# Patient Record
Sex: MALE | Race: WHITE | NOT HISPANIC OR LATINO | Employment: OTHER | URBAN - NONMETROPOLITAN AREA
[De-identification: names, ages, dates, MRNs, and addresses within clinical notes are randomized per-mention and may not be internally consistent; named-entity substitution may affect disease eponyms.]

---

## 2019-07-16 ENCOUNTER — OFFICE VISIT (OUTPATIENT)
Dept: PULMONOLOGY | Facility: HOSPITAL | Age: 54
End: 2019-07-16
Payer: COMMERCIAL

## 2019-07-16 ENCOUNTER — HOSPITAL ENCOUNTER (OUTPATIENT)
Dept: RADIOLOGY | Facility: HOSPITAL | Age: 54
Discharge: HOME | End: 2019-07-16
Attending: INTERNAL MEDICINE
Payer: COMMERCIAL

## 2019-07-16 VITALS
HEIGHT: 69 IN | WEIGHT: 315 LBS | HEART RATE: 79 BPM | OXYGEN SATURATION: 90 % | SYSTOLIC BLOOD PRESSURE: 130 MMHG | DIASTOLIC BLOOD PRESSURE: 84 MMHG | BODY MASS INDEX: 46.65 KG/M2

## 2019-07-16 DIAGNOSIS — J45.40 MODERATE PERSISTENT ASTHMA WITHOUT COMPLICATION: Primary | ICD-10-CM

## 2019-07-16 DIAGNOSIS — R09.02 HYPOXEMIA REQUIRING SUPPLEMENTAL OXYGEN: ICD-10-CM

## 2019-07-16 DIAGNOSIS — Z99.89 OSA ON CPAP: ICD-10-CM

## 2019-07-16 DIAGNOSIS — J41.8 MIXED SIMPLE AND MUCOPURULENT CHRONIC BRONCHITIS (HCC): ICD-10-CM

## 2019-07-16 DIAGNOSIS — Z99.81 HYPOXEMIA REQUIRING SUPPLEMENTAL OXYGEN: ICD-10-CM

## 2019-07-16 DIAGNOSIS — G47.33 OSA ON CPAP: ICD-10-CM

## 2019-07-16 DIAGNOSIS — J45.40 MODERATE PERSISTENT ASTHMA WITHOUT COMPLICATION: ICD-10-CM

## 2019-07-16 PROCEDURE — 71046 X-RAY EXAM CHEST 2 VIEWS: CPT

## 2019-07-16 PROCEDURE — 94640 AIRWAY INHALATION TREATMENT: CPT

## 2019-07-16 PROCEDURE — 94010 BREATHING CAPACITY TEST: CPT

## 2019-07-16 PROCEDURE — 99205 OFFICE O/P NEW HI 60 MIN: CPT

## 2019-07-16 RX ORDER — VALSARTAN 320 MG/1
1 TABLET ORAL DAILY
COMMUNITY
Start: 2014-07-07 | End: 2019-07-16

## 2019-07-16 RX ORDER — MONTELUKAST SODIUM 10 MG/1
10 TABLET ORAL
COMMUNITY
Start: 2018-12-10 | End: 2019-07-16

## 2019-07-16 RX ORDER — MELOXICAM 15 MG/1
15 TABLET ORAL
COMMUNITY
Start: 2017-02-06

## 2019-07-16 RX ORDER — ALBUTEROL SULFATE 90 UG/1
2 AEROSOL, METERED RESPIRATORY (INHALATION)
COMMUNITY
Start: 2018-10-04 | End: 2019-08-20

## 2019-07-16 RX ORDER — ALBUTEROL SULFATE 2.5 MG/3ML
SOLUTION RESPIRATORY (INHALATION)
COMMUNITY
End: 2019-07-16

## 2019-07-16 RX ORDER — LOSARTAN POTASSIUM 100 MG/1
100 TABLET ORAL
COMMUNITY
Start: 2019-02-01 | End: 2020-03-25 | Stop reason: SDUPTHER

## 2019-07-16 RX ORDER — CETIRIZINE HYDROCHLORIDE 10 MG/1
10 TABLET ORAL
COMMUNITY
Start: 2018-12-10

## 2019-07-16 RX ORDER — IPRATROPIUM BROMIDE AND ALBUTEROL SULFATE 2.5; .5 MG/3ML; MG/3ML
3 SOLUTION RESPIRATORY (INHALATION)
Status: DISCONTINUED | OUTPATIENT
Start: 2019-07-16 | End: 2019-07-18

## 2019-07-16 RX ORDER — CYCLOBENZAPRINE HCL 10 MG
10 TABLET ORAL
COMMUNITY
Start: 2017-02-06

## 2019-07-16 RX ORDER — HYDROCHLOROTHIAZIDE 25 MG/1
TABLET ORAL
COMMUNITY
Start: 2019-04-04 | End: 2020-02-07

## 2019-07-16 RX ORDER — AMLODIPINE BESYLATE 10 MG/1
TABLET ORAL
COMMUNITY
Start: 2019-03-28 | End: 2020-04-06 | Stop reason: SDUPTHER

## 2019-07-16 RX ORDER — PREDNISONE 20 MG/1
TABLET ORAL
COMMUNITY
Start: 2019-04-17 | End: 2019-07-16 | Stop reason: ALTCHOICE

## 2019-07-16 RX ORDER — SPIRONOLACTONE 50 MG/1
50 TABLET, FILM COATED ORAL DAILY
Qty: 30 TABLET | Refills: 5 | Status: SHIPPED | OUTPATIENT
Start: 2019-07-16 | End: 2020-02-03 | Stop reason: SDUPTHER

## 2019-07-16 RX ORDER — ATORVASTATIN CALCIUM 40 MG/1
40 TABLET, FILM COATED ORAL
COMMUNITY
Start: 2018-12-26 | End: 2020-02-03 | Stop reason: SDUPTHER

## 2019-07-16 RX ORDER — IPRATROPIUM BROMIDE AND ALBUTEROL SULFATE 2.5; .5 MG/3ML; MG/3ML
3 SOLUTION RESPIRATORY (INHALATION) 4 TIMES DAILY
Qty: 120 VIAL | Refills: 5 | Status: SHIPPED | OUTPATIENT
Start: 2019-07-16 | End: 2020-07-28

## 2019-07-16 RX ORDER — FLUTICASONE FUROATE AND VILANTEROL 100; 25 UG/1; UG/1
1 POWDER RESPIRATORY (INHALATION)
COMMUNITY
Start: 2018-12-10 | End: 2019-07-16

## 2019-07-16 RX ADMIN — IPRATROPIUM BROMIDE AND ALBUTEROL SULFATE 3 ML: 2.5; .5 SOLUTION RESPIRATORY (INHALATION) at 09:19

## 2019-07-16 NOTE — ASSESSMENT & PLAN NOTE
He is followed thru [facility] and is compliant w/ the CPAP but doesn't know the settings  He gets his machine thru [address] [facility]  He will need a nocturnal oximetry to evaluate adequacy of his CPAP and oxygenation status

## 2019-07-16 NOTE — ASSESSMENT & PLAN NOTE
He has a clinical diagnosis of asthma and is clinically better w/ steroids but inhalers are insufficient  Will check spirometry and CXR  NOTE: he was given an urgent Duoneb since he was profoundly hypoxemic w/ walking into the office

## 2019-07-16 NOTE — PROGRESS NOTES
Assessment/Plan:    SCOTT on CPAP  He is followed thru [facility] and is compliant w/ the CPAP but doesn't know the settings  He gets his machine thru [facility]  He will need a nocturnal oximetry to evaluate adequacy of his CPAP and oxygenation status  Moderate persistent asthma without complication  He has a clinical diagnosis of asthma and is clinically better w/ steroids but inhalers are insufficient  Will check spirometry and CXR  NOTE: he was given an urgent Duoneb since he was profoundly hypoxemic w/ walking into the office  Hypoxemia requiring supplemental oxygen  He had profound drop in his O2 sats w/ walking into the exam room from the waiting room (30 ft)  Will check ECHO to evaluate for pulmonary HTN    Mixed simple and mucopurulent chronic bronchitis ([address] )  Continue Duonebs  He will do better w/ supplemental since he was profoundly hypoxemic on presentation today  Will follow closely and adjust meds as needed  Diagnoses and all orders for this visit:    Moderate persistent asthma without complication  -     XR chest pa & lateral; Future  -     Discontinue: ipratropium-albuterol (DUO-NEB) 0 5-2 5 mg/3 mL inhalation solution 3 mL  -     Echo complete with contrast if indicated; Future  -     POCT spirometry  -     ipratropium-albuterol (DUO-NEB) 0 5-2 5 mg/3 mL nebulizer solution; Take 1 vial (3 mL total) by nebulization 4 (four) times a day  -     Oxygen    SCOTT on CPAP  -     Pulse oximetry overnight  -     Echo complete with contrast if indicated; Future  -     spironolactone (ALDACTONE) 50 mg tablet; Take 1 tablet (50 mg total) by mouth daily  -     Oxygen    Hypoxemia requiring supplemental oxygen  -     XR chest pa & lateral; Future  -     Pulse oximetry overnight  -     Echo complete with contrast if indicated; Future  -     spironolactone (ALDACTONE) 50 mg tablet;  Take 1 tablet (50 mg total) by mouth daily  -     Oxygen    Mixed simple and mucopurulent chronic bronchitis ([address] )    Other orders  -     Discontinue: fluticasone-salmeterol (ADVAIR DISKUS) 500-50 mcg/dose inhaler; Inhale  -     Discontinue: valsartan (DIOVAN) 320 MG tablet; Take 1 tablet by mouth daily  -     Discontinue: albuterol (PROAIR HFA) 90 mcg/act inhaler; Inhale 2 puffs  -     Discontinue: albuterol (2 5 mg/3 mL) 0 083 % nebulizer solution; Inhale  -     amLODIPine (NORVASC) 10 mg tablet; TAKE (1) TABLET BY MOUTH DAILY  -     atorvastatin (LIPITOR) 40 mg tablet; Take 40 mg by mouth  -     cetirizine (ZYRTEC ALLERGY) 10 mg tablet; Take 10 mg by mouth  -     cyclobenzaprine (FLEXERIL) 10 mg tablet; Take 10 mg by mouth  -     Discontinue: fluticasone-vilanterol (BREO ELLIPTA) 100-25 mcg/inh inhaler; Inhale 1 puff  -     hydrochlorothiazide (HYDRODIURIL) 25 mg tablet; TAKE 1 TABLET DAILY  -     losartan (COZAAR) 100 MG tablet; Take 100 mg by mouth  -     Discontinue: insulin NPH-insulin regular (NOVOLIN 70/30 RELION) 100 units/mL subcutaneous injection; Take 15 units prior to morning meal (prior to sleep) and 25 units prior to evening meal (prior to work)  -     meloxicam (MOBIC) 15 mg tablet; Take 15 mg by mouth  -     metFORMIN (GLUCOPHAGE) 1000 MG tablet; TAKE 1 TABLET TWICE DAILY WITH MORNING AND EVENING MEALS  -     Discontinue: montelukast (SINGULAIR) 10 mg tablet; Take 10 mg by mouth  -     Discontinue: predniSONE 20 mg tablet; Take 3 tabs (60mg) daily x 5 days (start 4/18/19)  -     sertraline (ZOLOFT) 50 mg tablet; Take 1 Tab by mouth daily  Subjective:      Patient ID: Elena Bhakta is a 53 y o  male  55yo non smoker is here for evaluation of SOBOE  He's been routinely treated for asthma w/ steroid bursts as well as Singulair and Advair but is unable to walk more than 50 feet once the steroids are finished  He continues to work full time in a [address] [employer] , third shift  He is morbidly obese but is slowly losing weight, about 40 pound over the last year    He says he struggles w/ it because he is unable to exercise due to SOB,  He denies cough or sputum but admits to wheezing  He has SCOTT that is treated w/ CPAP, given to him from [facility]  He sleeps better w/ the CPAP but doesn't know the settings  He denies h/o CAD or CHF  He denies significant family history of heart or lung disease  NOTE:spirometry shows combined severe obstruction and restriction and was done post BD because he needed urgent neb on presentation to the office  CXR to my read shows pulm edema, enlarged pulm arteries and mild cardiomegaly  The following portions of the patient's history were reviewed and updated as appropriate: allergies, current medications, past family history, past medical history, past social history, past surgical history and problem list     Review of Systems   Constitutional:        Trying to be active but becomes too SOB  Continues to work in spite of SOB   HENT: Negative  Respiratory:        Being treated for asthma and SCOTT w/ CPAP   Cardiovascular:        No h/o CAD or heart failure   Gastrointestinal: Negative  Endocrine:        Type 2 DM, poorly controlled but on intermittent steroids   Genitourinary: Negative  Musculoskeletal: Negative  Skin: Negative  Allergic/Immunologic: Negative  Neurological: Positive for dizziness  Negative for syncope  Hematological: Negative  Psychiatric/Behavioral: Negative  Objective:      /84 (BP Location: Left arm, Patient Position: Sitting)   Pulse 79   Ht 5' 9" (1 753 m)   Wt (!) 157 kg (347 lb)   SpO2 90%   BMI 51 24 kg/m²          Physical Exam   Constitutional: He is oriented to person, place, and time  He appears well-developed  Morbidly obese  Obvious SOB w/ minimal exertion   HENT:   Head: Normocephalic  Mouth/Throat: No oropharyngeal exudate  Narrowed oropharynx   Eyes: Pupils are equal, round, and reactive to light  Conjunctivae and EOM are normal  No scleral icterus     Neck: Normal range of motion  Neck supple  Short and stocky   Cardiovascular: Normal rate, regular rhythm, normal heart sounds and intact distal pulses  Exam reveals no gallop and no friction rub  No murmur heard  distant   Pulmonary/Chest: Effort normal    Generally diminished, improved w/ Duoneb but no wheezes, rales, rhonchi or sputum  Abdominal: Bowel sounds are normal  There is no tenderness  There is no guarding  Musculoskeletal: Normal range of motion  1+ edema, ambulatory   Neurological: He is alert and oriented to person, place, and time  Skin: Skin is warm and dry  No rash noted  Psychiatric: He has a normal mood and affect   His behavior is normal

## 2019-07-16 NOTE — ASSESSMENT & PLAN NOTE
He had profound drop in his O2 sats w/ walking into the exam room from the waiting room (30 ft)      Will check ECHO to evaluate for pulmonary HTN

## 2019-07-19 ENCOUNTER — APPOINTMENT (OUTPATIENT)
Dept: LAB | Facility: MEDICAL CENTER | Age: 54
End: 2019-07-19
Payer: COMMERCIAL

## 2019-07-19 ENCOUNTER — TRANSCRIBE ORDERS (OUTPATIENT)
Dept: RADIOLOGY | Facility: MEDICAL CENTER | Age: 54
End: 2019-07-19

## 2019-07-19 DIAGNOSIS — I10 HYPERTENSION, UNSPECIFIED TYPE: ICD-10-CM

## 2019-07-19 DIAGNOSIS — E78.00 HYPERCHOLESTEROLEMIA: ICD-10-CM

## 2019-07-19 DIAGNOSIS — J42 CHRONIC BRONCHITIS, UNSPECIFIED CHRONIC BRONCHITIS TYPE (HCC): Primary | ICD-10-CM

## 2019-07-19 DIAGNOSIS — I10 HYPERTENSION, UNSPECIFIED TYPE: Primary | ICD-10-CM

## 2019-07-19 DIAGNOSIS — R00.2 PALPITATIONS: ICD-10-CM

## 2019-07-19 LAB
ALBUMIN SERPL BCP-MCNC: 3.3 G/DL (ref 3.5–5)
ALP SERPL-CCNC: 95 U/L (ref 46–116)
ALT SERPL W P-5'-P-CCNC: 38 U/L (ref 12–78)
ANION GAP SERPL CALCULATED.3IONS-SCNC: 3 MMOL/L (ref 4–13)
AST SERPL W P-5'-P-CCNC: 17 U/L (ref 5–45)
BASOPHILS # BLD AUTO: 0.04 THOUSANDS/ΜL (ref 0–0.1)
BASOPHILS NFR BLD AUTO: 1 % (ref 0–1)
BILIRUB SERPL-MCNC: 0.71 MG/DL (ref 0.2–1)
BUN SERPL-MCNC: 11 MG/DL (ref 5–25)
CALCIUM SERPL-MCNC: 8.5 MG/DL (ref 8.3–10.1)
CHLORIDE SERPL-SCNC: 101 MMOL/L (ref 100–108)
CHOLEST SERPL-MCNC: 102 MG/DL (ref 50–200)
CO2 SERPL-SCNC: 31 MMOL/L (ref 21–32)
CREAT SERPL-MCNC: 0.63 MG/DL (ref 0.6–1.3)
EOSINOPHIL # BLD AUTO: 0.18 THOUSAND/ΜL (ref 0–0.61)
EOSINOPHIL NFR BLD AUTO: 2 % (ref 0–6)
ERYTHROCYTE [DISTWIDTH] IN BLOOD BY AUTOMATED COUNT: 13.6 % (ref 11.6–15.1)
GFR SERPL CREATININE-BSD FRML MDRD: 111 ML/MIN/1.73SQ M
GLUCOSE P FAST SERPL-MCNC: 245 MG/DL (ref 65–99)
HCT VFR BLD AUTO: 51.5 % (ref 36.5–49.3)
HDLC SERPL-MCNC: 22 MG/DL (ref 40–60)
HGB BLD-MCNC: 16.8 G/DL (ref 12–17)
IMM GRANULOCYTES # BLD AUTO: 0.04 THOUSAND/UL (ref 0–0.2)
IMM GRANULOCYTES NFR BLD AUTO: 1 % (ref 0–2)
LDLC SERPL CALC-MCNC: 43 MG/DL (ref 0–100)
LYMPHOCYTES # BLD AUTO: 1.02 THOUSANDS/ΜL (ref 0.6–4.47)
LYMPHOCYTES NFR BLD AUTO: 12 % (ref 14–44)
MCH RBC QN AUTO: 28.3 PG (ref 26.8–34.3)
MCHC RBC AUTO-ENTMCNC: 32.6 G/DL (ref 31.4–37.4)
MCV RBC AUTO: 87 FL (ref 82–98)
MONOCYTES # BLD AUTO: 0.58 THOUSAND/ΜL (ref 0.17–1.22)
MONOCYTES NFR BLD AUTO: 7 % (ref 4–12)
NEUTROPHILS # BLD AUTO: 6.34 THOUSANDS/ΜL (ref 1.85–7.62)
NEUTS SEG NFR BLD AUTO: 77 % (ref 43–75)
NONHDLC SERPL-MCNC: 80 MG/DL
NRBC BLD AUTO-RTO: 0 /100 WBCS
PLATELET # BLD AUTO: 264 THOUSANDS/UL (ref 149–390)
PMV BLD AUTO: 11 FL (ref 8.9–12.7)
POTASSIUM SERPL-SCNC: 3.4 MMOL/L (ref 3.5–5.3)
PROT SERPL-MCNC: 7 G/DL (ref 6.4–8.2)
RBC # BLD AUTO: 5.94 MILLION/UL (ref 3.88–5.62)
SODIUM SERPL-SCNC: 135 MMOL/L (ref 136–145)
T4 FREE SERPL-MCNC: 1.22 NG/DL (ref 0.76–1.46)
TRIGL SERPL-MCNC: 186 MG/DL
TSH SERPL DL<=0.05 MIU/L-ACNC: 1.8 UIU/ML (ref 0.36–3.74)
WBC # BLD AUTO: 8.2 THOUSAND/UL (ref 4.31–10.16)

## 2019-07-19 PROCEDURE — 36415 COLL VENOUS BLD VENIPUNCTURE: CPT

## 2019-07-19 PROCEDURE — 80053 COMPREHEN METABOLIC PANEL: CPT

## 2019-07-19 PROCEDURE — 85025 COMPLETE CBC W/AUTO DIFF WBC: CPT

## 2019-07-19 PROCEDURE — 84153 ASSAY OF PSA TOTAL: CPT

## 2019-07-19 PROCEDURE — 84443 ASSAY THYROID STIM HORMONE: CPT

## 2019-07-19 PROCEDURE — 84439 ASSAY OF FREE THYROXINE: CPT

## 2019-07-19 PROCEDURE — 84154 ASSAY OF PSA FREE: CPT

## 2019-07-19 PROCEDURE — 80061 LIPID PANEL: CPT

## 2019-07-22 ENCOUNTER — DOCUMENTATION (OUTPATIENT)
Dept: PULMONOLOGY | Facility: HOSPITAL | Age: 54
End: 2019-07-22

## 2019-07-22 ENCOUNTER — TELEPHONE (OUTPATIENT)
Dept: PULMONOLOGY | Facility: HOSPITAL | Age: 54
End: 2019-07-22

## 2019-07-22 NOTE — TELEPHONE ENCOUNTER
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

## 2019-07-22 NOTE — PROGRESS NOTES
Received message from [address] that OV note and O2 order need to match  I created addendum to note choosing chronic bronchitis

## 2019-07-23 LAB
PSA FREE MFR SERPL: >10 %
PSA FREE SERPL-MCNC: 0.01 NG/ML
PSA SERPL-MCNC: <0.1 NG/ML (ref 0–4)

## 2019-08-02 ENCOUNTER — HOSPITAL ENCOUNTER (OUTPATIENT)
Dept: NON INVASIVE DIAGNOSTICS | Facility: HOSPITAL | Age: 54
Discharge: HOME | End: 2019-08-02
Attending: INTERNAL MEDICINE
Payer: COMMERCIAL

## 2019-08-02 DIAGNOSIS — R09.02 HYPOXEMIA REQUIRING SUPPLEMENTAL OXYGEN: ICD-10-CM

## 2019-08-02 DIAGNOSIS — J45.40 MODERATE PERSISTENT ASTHMA WITHOUT COMPLICATION: ICD-10-CM

## 2019-08-02 DIAGNOSIS — Z99.81 HYPOXEMIA REQUIRING SUPPLEMENTAL OXYGEN: ICD-10-CM

## 2019-08-02 DIAGNOSIS — G47.33 OSA ON CPAP: ICD-10-CM

## 2019-08-02 DIAGNOSIS — Z99.89 OSA ON CPAP: ICD-10-CM

## 2019-08-02 PROCEDURE — 93306 TTE W/DOPPLER COMPLETE: CPT

## 2019-08-02 PROCEDURE — 93306 TTE W/DOPPLER COMPLETE: CPT | Performed by: INTERNAL MEDICINE

## 2019-08-20 ENCOUNTER — OFFICE VISIT (OUTPATIENT)
Dept: PULMONOLOGY | Facility: HOSPITAL | Age: 54
End: 2019-08-20
Payer: COMMERCIAL

## 2019-08-20 VITALS
SYSTOLIC BLOOD PRESSURE: 114 MMHG | DIASTOLIC BLOOD PRESSURE: 76 MMHG | OXYGEN SATURATION: 94 % | HEIGHT: 69 IN | HEART RATE: 77 BPM | WEIGHT: 315 LBS | BODY MASS INDEX: 46.65 KG/M2

## 2019-08-20 DIAGNOSIS — Z99.81 HYPOXEMIA REQUIRING SUPPLEMENTAL OXYGEN: ICD-10-CM

## 2019-08-20 DIAGNOSIS — J45.40 MODERATE PERSISTENT ASTHMA WITHOUT COMPLICATION: ICD-10-CM

## 2019-08-20 DIAGNOSIS — Z99.89 OSA ON CPAP: Primary | ICD-10-CM

## 2019-08-20 DIAGNOSIS — G47.33 OSA ON CPAP: Primary | ICD-10-CM

## 2019-08-20 DIAGNOSIS — E66.01 MORBID OBESITY WITH BMI OF 50.0-59.9, ADULT (HCC): ICD-10-CM

## 2019-08-20 DIAGNOSIS — I51.89 DIASTOLIC DYSFUNCTION WITHOUT HEART FAILURE: ICD-10-CM

## 2019-08-20 DIAGNOSIS — J41.8 MIXED SIMPLE AND MUCOPURULENT CHRONIC BRONCHITIS (HCC): ICD-10-CM

## 2019-08-20 DIAGNOSIS — R09.02 HYPOXEMIA REQUIRING SUPPLEMENTAL OXYGEN: ICD-10-CM

## 2019-08-20 PROCEDURE — 99215 OFFICE O/P EST HI 40 MIN: CPT | Performed by: INTERNAL MEDICINE

## 2019-08-20 RX ORDER — MONTELUKAST SODIUM 10 MG/1
TABLET ORAL
COMMUNITY
Start: 2019-08-07 | End: 2022-06-10

## 2019-08-20 RX ORDER — FLUTICASONE FUROATE AND VILANTEROL 200; 25 UG/1; UG/1
1 POWDER RESPIRATORY (INHALATION) DAILY
Qty: 1 INHALER | Refills: 11 | Status: SHIPPED | OUTPATIENT
Start: 2019-08-20 | End: 2020-02-03

## 2019-08-20 RX ORDER — CARVEDILOL 12.5 MG/1
12.5 TABLET ORAL 2 TIMES DAILY WITH MEALS
Qty: 60 TABLET | Refills: 11 | Status: SHIPPED | OUTPATIENT
Start: 2019-08-20 | End: 2020-09-14 | Stop reason: SDUPTHER

## 2019-08-20 RX ORDER — ALBUTEROL SULFATE 90 UG/1
2 AEROSOL, METERED RESPIRATORY (INHALATION) EVERY 6 HOURS PRN
Qty: 1 INHALER | Refills: 11 | Status: SHIPPED | OUTPATIENT
Start: 2019-08-20 | End: 2021-02-03 | Stop reason: SDUPTHER

## 2019-08-20 NOTE — PATIENT INSTRUCTIONS
SCOTT on CPAP  His CPAP machine is 18+ yrs old so we will repeat a CPAP titration in the sleep lab and get him new equipment  Moderate persistent asthma without complication  Respiratory status is improved since last visit  Will add Breo 1 puff daily for maintenance  Rinse mouth after use  Ventolin rescue inhaler as needed  He can still use the Duoneb as needed  Hypoxemia requiring supplemental oxygen  Continue supplemental O2 w/ POC  This may help his diastolic dysfunction  Diastolic dysfunction without heart failure  ECHO w/ grade 2 diastolic dysfunction  He is already on Aldactone, Cozaar and Norvasc  Will add small dose of Coreg 6 25mg BID and increase as tolerated to 12 5 mg BID  Morbid obesity with BMI of 50 0-59 9, adult [facility])  He declines dietary consultation because he understands what he needs to do to lose weight, now he just needs to do it! Increase activity by increasing arm exercises or doing water exercises

## 2019-08-20 NOTE — ASSESSMENT & PLAN NOTE
His CPAP machine is 18+ yrs old so we will repeat a CPAP titration in the sleep lab and get him new equipment

## 2019-08-20 NOTE — ASSESSMENT & PLAN NOTE
ECHO w/ grade 2 diastolic dysfunction  He is already on Aldactone, Cozaar and Norvasc  Will add small dose of Coreg 6 25mg BID and increase as tolerated to 12 5 mg BID

## 2019-08-20 NOTE — PROGRESS NOTES
Assessment/Plan:    SCOTT on CPAP  His CPAP machine is 18+ yrs old so we will repeat a CPAP titration in the sleep lab and get him new equipment  Moderate persistent asthma without complication  Respiratory status is improved since last visit  Will add Breo 1 puff daily for maintenance  Rinse mouth after use  Ventolin rescue inhaler as needed  He can still use the Duoneb as needed  Hypoxemia requiring supplemental oxygen  Continue supplemental O2 w/ POC  This may help his diastolic dysfunction  Diastolic dysfunction without heart failure  ECHO w/ grade 2 diastolic dysfunction  He is already on Aldactone, Cozaar and Norvasc  Will add small dose of Coreg 6 25mg BID and increase as tolerated to 12 5 mg BID  Morbid obesity with BMI of 50 0-59 9, adult [facility])  He declines dietary consultation because he understands what he needs to do to lose weight, now he just needs to do it! Increase activity by increasing arm exercises or doing water exercises  Mixed simple and mucopurulent chronic bronchitis (HCC)  Continue nebs and supplemental O2  Treat respiratory infections quickly  Diagnoses and all orders for this visit:    SCOTT on CPAP  -     CPAP Study; Future    Moderate persistent asthma without complication  -     fluticasone-vilanterol (BREO ELLIPTA) 200-25 MCG/INH inhaler; Inhale 1 puff daily Rinse mouth after use  -     albuterol (VENTOLIN HFA) 90 mcg/act inhaler; Inhale 2 puffs every 6 (six) hours as needed for wheezing  -     Complete PFT with post bronchodilator; Future    Hypoxemia requiring supplemental oxygen    Diastolic dysfunction without heart failure  -     carvedilol (COREG) 12 5 mg tablet; Take 1 tablet (12 5 mg total) by mouth 2 (two) times a day with meals BEGIN W/ 1/2 PO BID X 2 WEEKS, THEN INCREASE TO 1 PO BID  -     Basic metabolic panel; Future  -     HEMOGLOBIN A1C W/ EAG ESTIMATION;  Future    Morbid obesity with BMI of 50 0-59 9, adult ([facility] [address] )  -     Basic metabolic panel; Future  -     HEMOGLOBIN A1C W/ EAG ESTIMATION; Future    Mixed simple and mucopurulent chronic bronchitis (HCC)    Other orders  -     montelukast (SINGULAIR) 10 mg tablet          Subjective:      Patient ID: Lillie Hendrickson is a 53 y o  male  52 yo man w/ chronic bronchitis, asthma and hypoxemia is here for re evaluation of his respiratory status  He is better w/ the supplemental O2 and the regular inhalers  He is still takingDuonebs QID reugularly and feels improve exercise tolerance and energy  He is wearing his O2 all the time  NOTE: ECHO shows NO evidence of CAD or pulm HTN but he does have grade 2 diastolic dysfunction, even w/ his current meds  Sputum is much improved and cough is also imrpoved  He and his wife haven't noticed wheezing  The following portions of the patient's history were reviewed and updated as appropriate: allergies, current medications, past family history, past medical history, past social history, past surgical history and problem list     Review of Systems   All other systems reviewed and are negative  Objective:      /76 (BP Location: Left arm, Patient Position: Sitting)   Pulse 77   Ht 5' 9" (1 753 m)   Wt (!) 156 kg (345 lb)   SpO2 94%   BMI 50 95 kg/m²          Physical Exam   Constitutional: He is oriented to person, place, and time  He appears well-developed and well-nourished  HENT:   Narrowed oropharynx but without lesions  Eyes: Pupils are equal, round, and reactive to light  Conjunctivae are normal  No scleral icterus  Neck:   Short and stocky   Cardiovascular: Normal rate, regular rhythm and intact distal pulses  Exam reveals no gallop and no friction rub  Murmur heard  2/6 MARSHALL LLSB   Pulmonary/Chest: Effort normal and breath sounds normal  No stridor  No respiratory distress  He has no wheezes  He has no rales  He exhibits no tenderness  Cough currently non productive   Abdominal: Soft   Bowel sounds are normal  He exhibits no distension  There is no tenderness  obese   Musculoskeletal: Normal range of motion  He exhibits no edema  Neurological: He is alert and oriented to person, place, and time  Skin: Skin is warm and dry  No erythema  Psychiatric: He has a normal mood and affect  His behavior is normal    Nursing note and vitals reviewed

## 2019-08-20 NOTE — ASSESSMENT & PLAN NOTE
He declines dietary consultation because he understands what he needs to do to lose weight, now he just needs to do it! Increase activity by increasing arm exercises or doing water exercises

## 2019-08-20 NOTE — ASSESSMENT & PLAN NOTE
Respiratory status is improved since last visit  Will add Breo 1 puff daily for maintenance  Rinse mouth after use  Ventolin rescue inhaler as needed  He can still use the Duoneb as needed

## 2019-08-21 ENCOUNTER — TELEPHONE (OUTPATIENT)
Dept: PULMONOLOGY | Facility: CLINIC | Age: 54
End: 2019-08-21

## 2019-08-21 NOTE — TELEPHONE ENCOUNTER
Omar Summers from [facility] calling saying she got the order for the cpap study which is to be done during the day but she spoke to the patient and he said he can do anytime so she wants to clarify when to get it done  She also mentioned if we had the previous study from 20 years ago and if not, then we would need a diagnostic study order to do the cpap study  She will be back in the office on Friday if you have any questions for her please call [phone]

## 2019-08-21 NOTE — ASSESSMENT & PLAN NOTE
Continue Duonebs  He will do better w/ supplemental since he was profoundly hypoxemic on presentation today  Will follow closely and adjust meds as needed

## 2019-08-23 DIAGNOSIS — J42 CHRONIC BRONCHITIS, UNSPECIFIED CHRONIC BRONCHITIS TYPE (HCC): Primary | ICD-10-CM

## 2019-09-04 ENCOUNTER — HOSPITAL ENCOUNTER (OUTPATIENT)
Dept: PULMONOLOGY | Facility: HOSPITAL | Age: 54
Discharge: HOME | End: 2019-09-04
Attending: INTERNAL MEDICINE
Payer: COMMERCIAL

## 2019-09-04 DIAGNOSIS — J45.40 MODERATE PERSISTENT ASTHMA WITHOUT COMPLICATION: ICD-10-CM

## 2019-09-04 PROCEDURE — 94060 EVALUATION OF WHEEZING: CPT | Performed by: INTERNAL MEDICINE

## 2019-09-04 PROCEDURE — 94060 EVALUATION OF WHEEZING: CPT

## 2019-09-04 PROCEDURE — 94729 DIFFUSING CAPACITY: CPT | Performed by: INTERNAL MEDICINE

## 2019-09-04 PROCEDURE — 94729 DIFFUSING CAPACITY: CPT

## 2019-09-04 PROCEDURE — 94760 N-INVAS EAR/PLS OXIMETRY 1: CPT

## 2019-09-04 PROCEDURE — 94726 PLETHYSMOGRAPHY LUNG VOLUMES: CPT | Performed by: INTERNAL MEDICINE

## 2019-09-04 PROCEDURE — 94727 GAS DIL/WSHOT DETER LNG VOL: CPT

## 2019-09-04 RX ORDER — ALBUTEROL SULFATE 2.5 MG/3ML
2.5 SOLUTION RESPIRATORY (INHALATION) ONCE AS NEEDED
Status: COMPLETED | OUTPATIENT
Start: 2019-09-04 | End: 2019-09-04

## 2019-09-04 RX ADMIN — ALBUTEROL SULFATE 2.5 MG: 2.5 SOLUTION RESPIRATORY (INHALATION) at 07:45

## 2019-11-12 ENCOUNTER — TELEPHONE (OUTPATIENT)
Dept: PULMONOLOGY | Facility: CLINIC | Age: 54
End: 2019-11-12

## 2019-11-12 NOTE — TELEPHONE ENCOUNTER
Pt called c/o trying to get over a cold but now having a lot of green phlegm when coughing  Has been using nebulizer daily   Has appt 11/22, will forward to PA

## 2019-11-13 DIAGNOSIS — J44.1 COPD EXACERBATION (HCC): Primary | ICD-10-CM

## 2019-11-13 RX ORDER — PREDNISONE 10 MG/1
TABLET ORAL
Qty: 30 TABLET | Refills: 0 | Status: SHIPPED | OUTPATIENT
Start: 2019-11-13 | End: 2019-12-20 | Stop reason: ALTCHOICE

## 2019-11-13 NOTE — PROGRESS NOTES
Called patient to discuss symptoms  Patient has been complaining of increased chest tightness, wheeze, increased cough with sputum production for the last 4 days  Patient states that he originally had sinus infection that he believes travel down to his chest   Denies fevers or chills  Patient has been compliant on all pulmonary medications and oxygen therapy at home  He has an appointment with Dr Nuha Pena next week but doesn't think he can make it that long  Will sent prescription to [facility] for prednisone taper  Instructed patient to call in 48 hours if he does not note improvement

## 2019-12-03 ENCOUNTER — TELEPHONE (OUTPATIENT)
Dept: SLEEP CENTER | Facility: CLINIC | Age: 54
End: 2019-12-03

## 2019-12-03 NOTE — TELEPHONE ENCOUNTER
----- Message from Kevin Centeno MD sent at 12/2/2019  2:24 PM EST -----  Approved  ----- Message -----  From: Silvina Alvarez MA  Sent: 11/21/2019   9:24 AM EST  To: [facility] [place] Provider    This sleep study needs approval      If approved please sign and return to clerical pool  If denied please include reasons why  Also provide alternative testing if warranted  Please sign and return to clerical pool

## 2019-12-10 ENCOUNTER — TELEPHONE (OUTPATIENT)
Dept: PULMONOLOGY | Facility: CLINIC | Age: 54
End: 2019-12-10

## 2019-12-10 NOTE — TELEPHONE ENCOUNTER
I called Mr Akash Amin home number, spoke with his spouse, Larisa Stern  I asked if Mr Saad Palencia has another insurance because [facility] is listing him as inactive  Larisa Stern said grisel is in the process of "suing" his company  He was fired for his health issues and his insurance had been termed  Lj Elias is also trying to get coverage through [employer]  I asked if Lj Elias could call us as to whether or not he is planning on keeping the test appt  She will leave him a message

## 2019-12-20 ENCOUNTER — OFFICE VISIT (OUTPATIENT)
Dept: PULMONOLOGY | Facility: CLINIC | Age: 54
End: 2019-12-20
Payer: COMMERCIAL

## 2019-12-20 VITALS
WEIGHT: 315 LBS | DIASTOLIC BLOOD PRESSURE: 82 MMHG | OXYGEN SATURATION: 87 % | HEIGHT: 69 IN | HEART RATE: 76 BPM | SYSTOLIC BLOOD PRESSURE: 122 MMHG | BODY MASS INDEX: 46.65 KG/M2

## 2019-12-20 DIAGNOSIS — Z99.89 OSA ON CPAP: ICD-10-CM

## 2019-12-20 DIAGNOSIS — J44.9 MODERATE COPD (CHRONIC OBSTRUCTIVE PULMONARY DISEASE) (HCC): ICD-10-CM

## 2019-12-20 DIAGNOSIS — J45.40 MODERATE PERSISTENT ASTHMA WITHOUT COMPLICATION: Primary | ICD-10-CM

## 2019-12-20 DIAGNOSIS — G47.33 OSA ON CPAP: ICD-10-CM

## 2019-12-20 PROBLEM — R09.02 HYPOXEMIA REQUIRING SUPPLEMENTAL OXYGEN: Status: RESOLVED | Noted: 2019-07-16 | Resolved: 2019-12-20

## 2019-12-20 PROBLEM — J96.11 CHRONIC HYPOXEMIC RESPIRATORY FAILURE (HCC): Status: ACTIVE | Noted: 2019-12-20

## 2019-12-20 PROBLEM — Z99.81 HYPOXEMIA REQUIRING SUPPLEMENTAL OXYGEN: Status: RESOLVED | Noted: 2019-07-16 | Resolved: 2019-12-20

## 2019-12-20 PROBLEM — I50.30 DIASTOLIC HEART FAILURE (HCC): Status: ACTIVE | Noted: 2019-08-20

## 2019-12-20 PROBLEM — I51.89 DIASTOLIC DYSFUNCTION WITHOUT HEART FAILURE: Status: RESOLVED | Noted: 2019-08-20 | Resolved: 2019-12-20

## 2019-12-20 PROCEDURE — 99214 OFFICE O/P EST MOD 30 MIN: CPT | Performed by: INTERNAL MEDICINE

## 2019-12-20 RX ORDER — PREDNISONE 20 MG/1
40 TABLET ORAL DAILY
Qty: 10 TABLET | Refills: 0 | Status: SHIPPED | OUTPATIENT
Start: 2019-12-20 | End: 2020-02-03

## 2019-12-20 RX ORDER — DOXYCYCLINE 100 MG/1
100 TABLET ORAL 2 TIMES DAILY
Qty: 10 TABLET | Refills: 0 | Status: SHIPPED | OUTPATIENT
Start: 2019-12-20 | End: 2019-12-25

## 2019-12-20 NOTE — ASSESSMENT & PLAN NOTE
Nima Askew having acute exacerbation of his COPD  He is hypoxic in the office today without supplemental oxygen  I have encouraged him to continue his oxygen, uses duo nebs 4 times a day and continue his Breo  In addition I have given him 5 days of prednisone at 40 mg a day and 5 days of doxycycline at 100 mg twice a day  He will let us know if he is not feeling better within the next 24 hours  I am also concerned at the degree of his obstructive lung disease given his age and remote smoking history  About an alpha-1 phenotype in the office today  He denies any allergies

## 2019-12-20 NOTE — ASSESSMENT & PLAN NOTE
Wt Readings from Last 3 Encounters:   12/20/19 (!) 159 kg (350 lb)   08/20/19 (!) 156 kg (345 lb)   07/16/19 (!) 157 kg (347 lb)     Jordan Hoffmann appears well compensated on his current medical regimen

## 2019-12-20 NOTE — PROGRESS NOTES
Assessment/Plan: Moderate COPD (chronic obstructive pulmonary disease) (HCC)  Rut Sabillon having acute exacerbation of his COPD  He is hypoxic in the office today without supplemental oxygen  I have encouraged him to continue his oxygen, uses duo nebs 4 times a day and continue his Breo  In addition I have given him 5 days of prednisone at 40 mg a day and 5 days of doxycycline at 100 mg twice a day  He will let us know if he is not feeling better within the next 24 hours  I am also concerned at the degree of his obstructive lung disease given his age and remote smoking history  About an alpha-1 phenotype in the office today  He denies any allergies  Chronic hypoxemic respiratory failure (HCC)  Continue supplemental oxygen at 2 L    SCOTT on CPAP  Unfortunately patient was terminated from his job which means he has no health insurance to pay for repeat sleep study  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  Otherwise he will continue to use his CPAP  Diastolic heart failure (HCC)  Wt Readings from Last 3 Encounters:   12/20/19 (!) 159 kg (350 lb)   08/20/19 (!) 156 kg (345 lb)   07/16/19 (!) 157 kg (347 lb)     Cheri Guzman appears well compensated on his current medical regimen  Diagnoses and all orders for this visit:    Moderate persistent asthma without complication  -     predniSONE 20 mg tablet; Take 2 tablets (40 mg total) by mouth daily  -     doxycycline (ADOXA) 100 MG tablet; Take 1 tablet (100 mg total) by mouth 2 (two) times a day for 5 days    Moderate COPD (chronic obstructive pulmonary disease) (HCC)  -     Alpha 1 Antitrypsin Phenotype; Future  -     influenza vaccine, [id number], quadrivalent, recombinant, PF, 0 5 mL, for patients 18 yr+ (FLUBLOK)    SCOTT on CPAP          Subjective:      Patient ID: Kary Burger is a 53 y o  male  Jey's here for an exacerbation of his COPD  He has been coughing for about a week    He coughs up green mucus and has wheezing and shortness of breath  He is using his nebulizer 4 times a day and usually uses it once or twice a week  He is wearing his oxygen 24/7  He is wearing his CPAP all night every night      The following portions of the patient's history were reviewed and updated as appropriate: allergies, current medications, past family history, past medical history, past social history, past surgical history and problem list     Review of Systems   Constitutional: Negative  HENT: Negative  Eyes: Negative  Respiratory: Positive for cough, shortness of breath and wheezing  Cardiovascular: Negative  Gastrointestinal: Negative  Endocrine: Negative  Genitourinary: Negative  Allergic/Immunologic: Negative  Neurological: Negative  Hematological: Negative  Psychiatric/Behavioral: Negative  Objective:      /82 (BP Location: Left arm, Patient Position: Sitting)   Pulse 76   Ht 5' 9" (1 753 m)   Wt (!) 159 kg (350 lb)   SpO2 (!) 87%   BMI 51 69 kg/m²          Physical Exam   Constitutional: He is oriented to person, place, and time  He appears well-developed and well-nourished  HENT:   Head: Normocephalic  Eyes: Pupils are equal, round, and reactive to light  Neck: Neck supple  Cardiovascular: Normal rate  Pulmonary/Chest: Effort normal  No respiratory distress  He has no wheezes (Bilateral wheeze)  He has no rales  Abdominal: Soft  Musculoskeletal: Normal range of motion  He exhibits no edema  Neurological: He is alert and oriented to person, place, and time  Skin: Skin is warm and dry

## 2019-12-20 NOTE — ASSESSMENT & PLAN NOTE
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

## 2019-12-27 ENCOUNTER — TELEPHONE (OUTPATIENT)
Dept: PULMONOLOGY | Facility: CLINIC | Age: 54
End: 2019-12-27

## 2019-12-27 NOTE — TELEPHONE ENCOUNTER
Pt lmom c/o since he completed course of Prednisone, he is having difficulty breathing again  His pulse ox drops to 82 with ambulation and is 92 while sitting  Pt requesting another round of Prednisone   Will forward to PA

## 2019-12-27 NOTE — PROGRESS NOTES
Called patient to review symptoms  He continues to increased dyspnea on exertion, wheeze, cough and phlegm production  Denies fevers, chills, sick contacts  Dr Yan Munoz provided him with prednisone 40 mg p o  For 5 days last week which he states did not touch it    He has been compliant with pulmonary regimen  He is wearing supplemental oxygen and stating that oxygen saturations are currently at 85 %  I recommended that he go to the emergency department right away for IV solumedrol, bronchodilators, and further work-up if necessary

## 2020-01-16 NOTE — TELEPHONE ENCOUNTER
Pt called stating he now has insurance again  He will schedule previous sleep study that he cx  Pt also states that his PCP had an MI and retired without notice  He needs a refill for metformin and asking if Dr Tiana Del Rosario wouldn't mind sending a 30 day supply to his pharmacy   Will forward

## 2020-01-17 DIAGNOSIS — E11.69 TYPE 2 DIABETES MELLITUS WITH OTHER SPECIFIED COMPLICATION, WITHOUT LONG-TERM CURRENT USE OF INSULIN (HCC): Primary | ICD-10-CM

## 2020-02-03 ENCOUNTER — OFFICE VISIT (OUTPATIENT)
Dept: FAMILY MEDICINE CLINIC | Facility: CLINIC | Age: 55
End: 2020-02-03
Payer: COMMERCIAL

## 2020-02-03 ENCOUNTER — VBI (OUTPATIENT)
Dept: INTERNAL MEDICINE CLINIC | Facility: CLINIC | Age: 55
End: 2020-02-03

## 2020-02-03 VITALS
HEART RATE: 81 BPM | DIASTOLIC BLOOD PRESSURE: 62 MMHG | WEIGHT: 315 LBS | OXYGEN SATURATION: 87 % | HEIGHT: 69 IN | SYSTOLIC BLOOD PRESSURE: 108 MMHG | BODY MASS INDEX: 46.65 KG/M2

## 2020-02-03 DIAGNOSIS — Z99.89 OSA ON CPAP: ICD-10-CM

## 2020-02-03 DIAGNOSIS — Z13.31 DEPRESSION SCREENING NEGATIVE: ICD-10-CM

## 2020-02-03 DIAGNOSIS — G47.33 OSA ON CPAP: ICD-10-CM

## 2020-02-03 DIAGNOSIS — R09.02 HYPOXEMIA REQUIRING SUPPLEMENTAL OXYGEN: ICD-10-CM

## 2020-02-03 DIAGNOSIS — Z12.5 SCREENING FOR PROSTATE CANCER: ICD-10-CM

## 2020-02-03 DIAGNOSIS — E66.01 MORBID OBESITY WITH BMI OF 50.0-59.9, ADULT (HCC): ICD-10-CM

## 2020-02-03 DIAGNOSIS — Z12.11 SCREENING FOR COLON CANCER: ICD-10-CM

## 2020-02-03 DIAGNOSIS — E11.9 TYPE 2 DIABETES MELLITUS WITH HEMOGLOBIN A1C GOAL OF LESS THAN 8.0% (HCC): Primary | ICD-10-CM

## 2020-02-03 DIAGNOSIS — I10 ESSENTIAL HYPERTENSION: ICD-10-CM

## 2020-02-03 DIAGNOSIS — Z11.59 NEED FOR HEPATITIS C SCREENING TEST: ICD-10-CM

## 2020-02-03 DIAGNOSIS — Z23 NEED FOR INFLUENZA VACCINATION: ICD-10-CM

## 2020-02-03 DIAGNOSIS — E78.49 OTHER HYPERLIPIDEMIA: ICD-10-CM

## 2020-02-03 DIAGNOSIS — Z99.81 HYPOXEMIA REQUIRING SUPPLEMENTAL OXYGEN: ICD-10-CM

## 2020-02-03 PROBLEM — H52.13 MYOPIA, BILATERAL: Status: ACTIVE | Noted: 2019-07-30

## 2020-02-03 PROBLEM — H52.4 PRESBYOPIA: Status: ACTIVE | Noted: 2020-02-03

## 2020-02-03 PROCEDURE — 99204 OFFICE O/P NEW MOD 45 MIN: CPT | Performed by: PHYSICIAN ASSISTANT

## 2020-02-03 PROCEDURE — 90471 IMMUNIZATION ADMIN: CPT | Performed by: PHYSICIAN ASSISTANT

## 2020-02-03 PROCEDURE — 3074F SYST BP LT 130 MM HG: CPT | Performed by: PHYSICIAN ASSISTANT

## 2020-02-03 PROCEDURE — 3008F BODY MASS INDEX DOCD: CPT | Performed by: PHYSICIAN ASSISTANT

## 2020-02-03 PROCEDURE — 3078F DIAST BP <80 MM HG: CPT | Performed by: PHYSICIAN ASSISTANT

## 2020-02-03 PROCEDURE — 3066F NEPHROPATHY DOC TX: CPT | Performed by: PHYSICIAN ASSISTANT

## 2020-02-03 PROCEDURE — 90682 RIV4 VACC RECOMBINANT DNA IM: CPT | Performed by: PHYSICIAN ASSISTANT

## 2020-02-03 PROCEDURE — 1036F TOBACCO NON-USER: CPT | Performed by: PHYSICIAN ASSISTANT

## 2020-02-03 RX ORDER — SPIRONOLACTONE 50 MG/1
50 TABLET, FILM COATED ORAL DAILY
Qty: 30 TABLET | Refills: 2 | Status: SHIPPED | OUTPATIENT
Start: 2020-02-03 | End: 2020-05-24 | Stop reason: SDUPTHER

## 2020-02-03 RX ORDER — ATORVASTATIN CALCIUM 40 MG/1
40 TABLET, FILM COATED ORAL DAILY
Qty: 30 TABLET | Refills: 2 | Status: SHIPPED | OUTPATIENT
Start: 2020-02-03 | End: 2020-05-08

## 2020-02-03 NOTE — LETTER
Diabetic Eye Exam Form    Date Requested: 20  Patient: Crys Clinton  Patient : 1965   Referring Provider: Med Newton PA-C    Dilated Retinal Exam, Optomap-Iris Exam, or Fundus Photography Done         Yes (Elem one above)         No     Date of Diabetic Eye Exam ______________________________  Left Eye      Exam did show retinopathy    Exam did not show retinopathy         Mild       Moderate       None       Proliferative       Severe     Right Eye     Exam did show retinopathy    Exam did not show retinopathy         Mild       Moderate       None       Proliferative       Severe     Comments __________________________________________________________    Practice Providing Exam ______________________________________________    Exam Performed By (print name) _______________________________________      Provider Signature ___________________________________________________      These reports are needed for  compliance    Please fax this completed form and a copy of the Diabetic Eye Exam report to our office as soon as possible to [phone] kaykay Marte: Phone [phone]    We thank you for your assistance in treating our mutual patient

## 2020-02-03 NOTE — TELEPHONE ENCOUNTER
02/03/20 1:36 PM     Upon review of the In Basket request and the patient's chart, initial outreach has been made to the external facility via fax to [phone]  A second outreach attempt will be made within 3 business days      Thank you  Corbin Causey

## 2020-02-04 ENCOUNTER — TELEPHONE (OUTPATIENT)
Dept: GASTROENTEROLOGY | Facility: CLINIC | Age: 55
End: 2020-02-04

## 2020-02-04 NOTE — PROGRESS NOTES
Assessment/Plan:    Problem List Items Addressed This Visit        Endocrine    Type 2 diabetes mellitus with hemoglobin A1c goal of less than 8 0% (HCC) - Primary    Relevant Orders    Hemoglobin A1C    Microalbumin / creatinine urine ratio       Cardiovascular and Mediastinum    Hypertension    Relevant Medications    spironolactone (ALDACTONE) 50 mg tablet    Other Relevant Orders    CBC    Comprehensive metabolic panel    TSH, 3rd generation with Free T4 reflex       Other    Morbid obesity with BMI of 50 0-59 9, adult ([facility] [address] )      Other Visit Diagnoses     Need for hepatitis C screening test        Relevant Orders    Hepatitis C antibody    Screening for colon cancer        Relevant Orders    Ambulatory referral to Gastroenterology    Need for influenza vaccination        Relevant Orders    influenza vaccine, 1762-7765, quadrivalent, recombinant, PF, 0 5 mL, for patients 18 yr+ ([facility]) (Completed)    SCOTT on CPAP        Relevant Medications    spironolactone (ALDACTONE) 50 mg tablet    Hypoxemia requiring supplemental oxygen        Relevant Medications    spironolactone (ALDACTONE) 50 mg tablet    Other hyperlipidemia        Relevant Medications    atorvastatin (LIPITOR) 40 mg tablet    Other Relevant Orders    Lipid Panel with Direct LDL reflex    Depression screening negative        Screening for prostate cancer        Relevant Orders    PSA, Total Screen           Diagnoses and all orders for this visit:    Type 2 diabetes mellitus with hemoglobin A1c goal of less than 8 0% (HCC)  -     Hemoglobin A1C; Future  -     Microalbumin / creatinine urine ratio    Need for hepatitis C screening test  -     Hepatitis C antibody    Screening for colon cancer  -     Ambulatory referral to Gastroenterology; Future    Need for influenza vaccination  -     influenza vaccine, [id number], quadrivalent, recombinant, PF, 0 5 mL, for patients 18 yr+ ([facility])    SCOTT on CPAP  -     spironolactone (ALDACTONE) 50 mg tablet;  Take 1 tablet (50 mg total) by mouth daily    Hypoxemia requiring supplemental oxygen  -     spironolactone (ALDACTONE) 50 mg tablet; Take 1 tablet (50 mg total) by mouth daily    Other hyperlipidemia  -     atorvastatin (LIPITOR) 40 mg tablet; Take 1 tablet (40 mg total) by mouth daily  -     Lipid Panel with Direct LDL reflex; Future    Depression screening negative    Essential hypertension  -     CBC; Future  -     Comprehensive metabolic panel; Future  -     TSH, 3rd generation with Free T4 reflex; Future    Morbid obesity with BMI of 50 0-59 9, adult ([facility] [address] )    Screening for prostate cancer  -     PSA, Total Screen; Future    Other orders  -     Multiple Vitamins-Minerals (MENS MULTIVITAMIN PO); Take by mouth              Subjective:      Patient ID: Jenny Simmons is a 53 y o  male  Pt is here today to establish as a new patient  He was a patient of Dr Mignon Olszewski  He is diabetic, has not had labs x at least 1 year  He does not check his blood glucose at home  He refuses a foot exam today, states he had one done in December at Dr Rojelio Elizondo and "all was good " Will caregap for records  He refuses IRIS today, had eye exam within the past year, will caregap for records  He sees pulmonology in [place] every 3 months  He wears a CPAP machine nightly for at least 30 years  The following portions of the patient's history were reviewed and updated as appropriate:   He has no past medical history on file  ,  does not have any pertinent problems on file  ,   has a past surgical history that includes Appendectomy  ,  family history is not on file  ,   reports that he has quit smoking  His smoking use included cigarettes  He quit after 10 00 years of use  He has never used smokeless tobacco  He reports that he does not drink alcohol  His drug history is not on file  ,  is allergic to theophylline     Current Outpatient Medications   Medication Sig Dispense Refill    albuterol (VENTOLIN HFA) 90 mcg/act inhaler Inhale 2 puffs every 6 (six) hours as needed for wheezing 1 Inhaler 11    amLODIPine (NORVASC) 10 mg tablet TAKE (1) TABLET BY MOUTH DAILY   atorvastatin (LIPITOR) 40 mg tablet Take 1 tablet (40 mg total) by mouth daily 30 tablet 2    carvedilol (COREG) 12 5 mg tablet Take 1 tablet (12 5 mg total) by mouth 2 (two) times a day with meals BEGIN W/ 1/2 PO BID X 2 WEEKS, THEN INCREASE TO 1 PO BID 60 tablet 11    cetirizine (ZYRTEC ALLERGY) 10 mg tablet Take 10 mg by mouth      hydrochlorothiazide (HYDRODIURIL) 25 mg tablet TAKE 1 TABLET DAILY      ipratropium-albuterol (DUO-NEB) 0 5-2 5 mg/3 mL nebulizer solution Take 1 vial (3 mL total) by nebulization 4 (four) times a day 120 vial 5    losartan (COZAAR) 100 MG tablet Take 100 mg by mouth      metFORMIN (GLUCOPHAGE) 1000 MG tablet Take 1 tablet (1,000 mg total) by mouth 2 (two) times a day with meals 120 tablet 0    montelukast (SINGULAIR) 10 mg tablet       Multiple Vitamins-Minerals (MENS MULTIVITAMIN PO) Take by mouth      sertraline (ZOLOFT) 50 mg tablet Take 1 Tab by mouth daily   spironolactone (ALDACTONE) 50 mg tablet Take 1 tablet (50 mg total) by mouth daily 30 tablet 2    cyclobenzaprine (FLEXERIL) 10 mg tablet Take 10 mg by mouth      meloxicam (MOBIC) 15 mg tablet Take 15 mg by mouth       No current facility-administered medications for this visit  Review of Systems   Constitutional: Negative for activity change, appetite change, chills, diaphoresis, fatigue, fever and unexpected weight change  HENT: Negative for congestion, ear pain, postnasal drip, rhinorrhea, sinus pressure, sinus pain, sneezing, sore throat, tinnitus and voice change  Eyes: Negative for pain, redness and visual disturbance  Respiratory: Negative for cough, chest tightness, shortness of breath and wheezing  Cardiovascular: Negative for chest pain, palpitations and leg swelling     Gastrointestinal: Negative for abdominal pain, blood in stool, constipation, diarrhea, nausea and vomiting  Genitourinary: Negative for difficulty urinating, dysuria, frequency, hematuria and urgency  Musculoskeletal: Negative for arthralgias, back pain, gait problem, joint swelling, myalgias, neck pain and neck stiffness  Skin: Negative for color change, pallor, rash and wound  Neurological: Negative for dizziness, tremors, weakness, light-headedness and headaches  Psychiatric/Behavioral: Negative for dysphoric mood, self-injury, sleep disturbance and suicidal ideas  The patient is not nervous/anxious  Objective:  Vitals:    02/03/20 1106   BP: 108/62   BP Location: Left arm   Patient Position: Sitting   Pulse: 81   SpO2: (!) 87%   Weight: (!) 159 kg (349 lb 9 6 oz)   Height: 5' 9" (1 753 m)     Body mass index is 51 63 kg/m²  Physical Exam   Constitutional: He is oriented to person, place, and time  He appears well-developed and well-nourished  No distress  HENT:   Head: Normocephalic and atraumatic  Right Ear: Hearing, tympanic membrane, external ear and ear canal normal    Left Ear: Hearing, tympanic membrane, external ear and ear canal normal    Mouth/Throat: Uvula is midline, oropharynx is clear and moist and mucous membranes are normal  No oropharyngeal exudate  Eyes: Conjunctivae are normal  Right eye exhibits no discharge  Left eye exhibits no discharge  No scleral icterus  Neck: Neck supple  Carotid bruit is not present  No thyromegaly present  Cardiovascular: Normal rate, regular rhythm and normal heart sounds  No murmur heard  Pulmonary/Chest: Effort normal and breath sounds normal  No respiratory distress  He has no wheezes  Abdominal: Soft  Bowel sounds are normal  He exhibits no distension and no mass  There is no tenderness  There is no rebound and no guarding  Large/obese   Musculoskeletal: Normal range of motion  He exhibits no edema or tenderness  Lymphadenopathy:     He has no cervical adenopathy     Neurological: He is alert and oriented to person, place, and time  Skin: Skin is warm and dry  No rash noted  He is not diaphoretic  No erythema  Psychiatric: He has a normal mood and affect  His behavior is normal  Judgment and thought content normal    Vitals reviewed  PHQ-9 Depression Screening    PHQ-9:    Frequency of the following problems over the past two weeks:       Little interest or pleasure in doing things:  0 - not at all  Feeling down, depressed, or hopeless:  0 - not at all  PHQ-2 Score:  0       BMI Counseling: Body mass index is 51 63 kg/m²  The BMI is above normal  Nutrition recommendations include reducing portion sizes, decreasing overall calorie intake, 3-5 servings of fruits/vegetables daily, reducing fast food intake, consuming healthier snacks, decreasing soda and/or juice intake, moderation in carbohydrate intake, increasing intake of lean protein, reducing intake of saturated fat and trans fat and reducing intake of cholesterol  Exercise recommendations include exercising 3-5 times per week

## 2020-02-05 ENCOUNTER — APPOINTMENT (OUTPATIENT)
Dept: LAB | Facility: MEDICAL CENTER | Age: 55
End: 2020-02-05
Payer: COMMERCIAL

## 2020-02-05 ENCOUNTER — VBI (OUTPATIENT)
Dept: INTERNAL MEDICINE CLINIC | Facility: CLINIC | Age: 55
End: 2020-02-05

## 2020-02-05 DIAGNOSIS — Z12.5 SCREENING FOR PROSTATE CANCER: ICD-10-CM

## 2020-02-05 DIAGNOSIS — E11.9 TYPE 2 DIABETES MELLITUS WITH HEMOGLOBIN A1C GOAL OF LESS THAN 8.0% (HCC): ICD-10-CM

## 2020-02-05 DIAGNOSIS — I10 ESSENTIAL HYPERTENSION: ICD-10-CM

## 2020-02-05 DIAGNOSIS — E78.49 OTHER HYPERLIPIDEMIA: ICD-10-CM

## 2020-02-05 LAB
ALBUMIN SERPL BCP-MCNC: 3.5 G/DL (ref 3.5–5)
ALP SERPL-CCNC: 92 U/L (ref 46–116)
ALT SERPL W P-5'-P-CCNC: 35 U/L (ref 12–78)
ANION GAP SERPL CALCULATED.3IONS-SCNC: 4 MMOL/L (ref 4–13)
AST SERPL W P-5'-P-CCNC: 13 U/L (ref 5–45)
BILIRUB SERPL-MCNC: 0.56 MG/DL (ref 0.2–1)
BUN SERPL-MCNC: 17 MG/DL (ref 5–25)
CALCIUM SERPL-MCNC: 9.1 MG/DL (ref 8.3–10.1)
CHLORIDE SERPL-SCNC: 97 MMOL/L (ref 100–108)
CHOLEST SERPL-MCNC: 97 MG/DL (ref 50–200)
CO2 SERPL-SCNC: 29 MMOL/L (ref 21–32)
CREAT SERPL-MCNC: 0.87 MG/DL (ref 0.6–1.3)
CREAT UR-MCNC: 49.3 MG/DL
ERYTHROCYTE [DISTWIDTH] IN BLOOD BY AUTOMATED COUNT: 12.5 % (ref 11.6–15.1)
EST. AVERAGE GLUCOSE BLD GHB EST-MCNC: 286 MG/DL
GFR SERPL CREATININE-BSD FRML MDRD: 97 ML/MIN/1.73SQ M
GLUCOSE P FAST SERPL-MCNC: 337 MG/DL (ref 65–99)
HBA1C MFR BLD: 11.6 % (ref 4.2–6.3)
HCT VFR BLD AUTO: 45.1 % (ref 36.5–49.3)
HCV AB SER QL: NORMAL
HDLC SERPL-MCNC: 27 MG/DL
HGB BLD-MCNC: 15.6 G/DL (ref 12–17)
LDLC SERPL CALC-MCNC: 16 MG/DL (ref 0–100)
MCH RBC QN AUTO: 29.3 PG (ref 26.8–34.3)
MCHC RBC AUTO-ENTMCNC: 34.6 G/DL (ref 31.4–37.4)
MCV RBC AUTO: 85 FL (ref 82–98)
MICROALBUMIN UR-MCNC: 35.2 MG/L (ref 0–20)
MICROALBUMIN/CREAT 24H UR: 71 MG/G CREATININE (ref 0–30)
PLATELET # BLD AUTO: 249 THOUSANDS/UL (ref 149–390)
PMV BLD AUTO: 10.5 FL (ref 8.9–12.7)
POTASSIUM SERPL-SCNC: 4.4 MMOL/L (ref 3.5–5.3)
PROT SERPL-MCNC: 7.3 G/DL (ref 6.4–8.2)
PSA SERPL-MCNC: 0.1 NG/ML (ref 0–4)
RBC # BLD AUTO: 5.32 MILLION/UL (ref 3.88–5.62)
SODIUM SERPL-SCNC: 130 MMOL/L (ref 136–145)
TRIGL SERPL-MCNC: 271 MG/DL
TSH SERPL DL<=0.05 MIU/L-ACNC: 2.29 UIU/ML (ref 0.36–3.74)
WBC # BLD AUTO: 8.84 THOUSAND/UL (ref 4.31–10.16)

## 2020-02-05 PROCEDURE — 85027 COMPLETE CBC AUTOMATED: CPT

## 2020-02-05 PROCEDURE — 3046F HEMOGLOBIN A1C LEVEL >9.0%: CPT | Performed by: INTERNAL MEDICINE

## 2020-02-05 PROCEDURE — 82570 ASSAY OF URINE CREATININE: CPT | Performed by: PHYSICIAN ASSISTANT

## 2020-02-05 PROCEDURE — 80053 COMPREHEN METABOLIC PANEL: CPT

## 2020-02-05 PROCEDURE — 83036 HEMOGLOBIN GLYCOSYLATED A1C: CPT

## 2020-02-05 PROCEDURE — 80061 LIPID PANEL: CPT

## 2020-02-05 PROCEDURE — 36415 COLL VENOUS BLD VENIPUNCTURE: CPT | Performed by: PHYSICIAN ASSISTANT

## 2020-02-05 PROCEDURE — 82043 UR ALBUMIN QUANTITATIVE: CPT | Performed by: PHYSICIAN ASSISTANT

## 2020-02-05 PROCEDURE — 3060F POS MICROALBUMINURIA REV: CPT | Performed by: INTERNAL MEDICINE

## 2020-02-05 PROCEDURE — 86803 HEPATITIS C AB TEST: CPT | Performed by: PHYSICIAN ASSISTANT

## 2020-02-05 PROCEDURE — G0103 PSA SCREENING: HCPCS

## 2020-02-05 PROCEDURE — 84443 ASSAY THYROID STIM HORMONE: CPT

## 2020-02-05 NOTE — TELEPHONE ENCOUNTER
02/05/20 8:21 AM     As a follow-up, a second attempt has been made for outreach to the external facility via telephone call spoke to Franca Carney  A third and final attempt will be made within 3 business days      Thank you  Roland Montanez     Date/Time Type Contact Phone/Fax           02/05/2020 08:20 AM Phone (Stefano Russell) Franca Carney (Provider) [phone] Remove   Call Complete - Spoke with Franca Carney and she will fax 07/2019 diabetic eye exam      By Rloand Montanez

## 2020-02-06 ENCOUNTER — TELEPHONE (OUTPATIENT)
Dept: FAMILY MEDICINE CLINIC | Facility: CLINIC | Age: 55
End: 2020-02-06

## 2020-02-06 DIAGNOSIS — E11.65 UNCONTROLLED TYPE 2 DIABETES MELLITUS WITH HYPERGLYCEMIA (HCC): ICD-10-CM

## 2020-02-06 DIAGNOSIS — E87.1 HYPONATREMIA: ICD-10-CM

## 2020-02-06 DIAGNOSIS — E11.9 TYPE 2 DIABETES MELLITUS WITH HEMOGLOBIN A1C GOAL OF LESS THAN 8.0% (HCC): Primary | ICD-10-CM

## 2020-02-06 RX ORDER — LISINOPRIL 5 MG/1
5 TABLET ORAL DAILY
Qty: 90 TABLET | Refills: 0 | Status: SHIPPED | OUTPATIENT
Start: 2020-02-06 | End: 2020-02-07

## 2020-02-07 ENCOUNTER — TELEPHONE (OUTPATIENT)
Dept: FAMILY MEDICINE CLINIC | Facility: CLINIC | Age: 55
End: 2020-02-07

## 2020-02-07 ENCOUNTER — LAB (OUTPATIENT)
Dept: LAB | Facility: HOSPITAL | Age: 55
End: 2020-02-07
Payer: COMMERCIAL

## 2020-02-07 DIAGNOSIS — E87.1 HYPONATREMIA: Primary | ICD-10-CM

## 2020-02-07 DIAGNOSIS — E11.9 TYPE 2 DIABETES MELLITUS WITH HEMOGLOBIN A1C GOAL OF LESS THAN 8.0% (HCC): Primary | ICD-10-CM

## 2020-02-07 DIAGNOSIS — E87.1 HYPONATREMIA: ICD-10-CM

## 2020-02-07 LAB
ALBUMIN SERPL BCP-MCNC: 3.4 G/DL (ref 3.5–5)
ALP SERPL-CCNC: 89 U/L (ref 46–116)
ALT SERPL W P-5'-P-CCNC: 38 U/L (ref 12–78)
ANION GAP SERPL CALCULATED.3IONS-SCNC: 6 MMOL/L (ref 4–13)
AST SERPL W P-5'-P-CCNC: 17 U/L (ref 5–45)
BILIRUB SERPL-MCNC: 0.4 MG/DL (ref 0.2–1)
BUN SERPL-MCNC: 22 MG/DL (ref 5–25)
CALCIUM SERPL-MCNC: 8.8 MG/DL (ref 8.3–10.1)
CHLORIDE SERPL-SCNC: 97 MMOL/L (ref 100–108)
CO2 SERPL-SCNC: 29 MMOL/L (ref 21–32)
CREAT SERPL-MCNC: 0.83 MG/DL (ref 0.6–1.3)
GFR SERPL CREATININE-BSD FRML MDRD: 99 ML/MIN/1.73SQ M
GLUCOSE SERPL-MCNC: 346 MG/DL (ref 65–140)
POTASSIUM SERPL-SCNC: 4.6 MMOL/L (ref 3.5–5.3)
PROT SERPL-MCNC: 7.2 G/DL (ref 6.4–8.2)
SODIUM SERPL-SCNC: 132 MMOL/L (ref 136–145)

## 2020-02-07 PROCEDURE — 36415 COLL VENOUS BLD VENIPUNCTURE: CPT

## 2020-02-07 PROCEDURE — 80053 COMPREHEN METABOLIC PANEL: CPT

## 2020-02-07 RX ORDER — BLOOD-GLUCOSE METER
EACH MISCELLANEOUS
Qty: 1 KIT | Refills: 0 | Status: SHIPPED | OUTPATIENT
Start: 2020-02-07

## 2020-02-07 RX ORDER — LANCETS 30 GAUGE
EACH MISCELLANEOUS
Qty: 100 EACH | Refills: 3 | Status: SHIPPED | OUTPATIENT
Start: 2020-02-07 | End: 2020-06-21 | Stop reason: SDUPTHER

## 2020-02-07 NOTE — TELEPHONE ENCOUNTER
Marta Christensen   Phone Number: [phone]             02/06/20 3:34 PM     As a result of outreach to the external facility it was discovered that the patient did not have the requested Diabetic Eye Exam completed/given  We have spoke with the external facility and the patient did not have the requested service(s)  This message will now be closed  *To review the status updates documented on this request, open sent messages within your In Basket and select this message to view comments  If comments do not automatically appear for review, select the properties button to review*     Thank you   Luis Segovia    Previous Messages      ----- Message -----   From: Rex Beard   Sent: 2/3/2020  11:08 AM EST   To: [facility]   Subject: diabetic eye exam-TFP                             02/03/20 11:09 AM     Hello, our patient Kevin Connolly has had Diabetic Eye Exam completed/performed  Please assist in updating the patient chart by making an External outreach to [facility] specialist facility located in [place], [place]  The date of service is within past year       Thank you,   Rex HARTMANN

## 2020-02-18 LAB
LEFT EYE DIABETIC RETINOPATHY: NORMAL
RIGHT EYE DIABETIC RETINOPATHY: NORMAL
SEVERITY (EYE EXAM): NORMAL

## 2020-02-27 ENCOUNTER — DOCUMENTATION (OUTPATIENT)
Dept: PULMONOLOGY | Facility: CLINIC | Age: 55
End: 2020-02-27

## 2020-03-03 ENCOUNTER — TELEPHONE (OUTPATIENT)
Dept: PULMONOLOGY | Facility: CLINIC | Age: 55
End: 2020-03-03

## 2020-03-03 NOTE — TELEPHONE ENCOUNTER
Pt c/o having cold last week and now feels like it settled in his chest  Pt questioning if he could have some Prednisone sent to [facility] in chart   Has upcoming appt also scheduled for 3/20, will forward

## 2020-03-04 ENCOUNTER — TELEPHONE (OUTPATIENT)
Dept: OTHER | Facility: HOSPITAL | Age: 55
End: 2020-03-04

## 2020-03-04 DIAGNOSIS — J44.1 COPD WITH ACUTE EXACERBATION (HCC): Primary | ICD-10-CM

## 2020-03-04 DIAGNOSIS — J44.9 MODERATE COPD (CHRONIC OBSTRUCTIVE PULMONARY DISEASE) (HCC): Primary | ICD-10-CM

## 2020-03-04 RX ORDER — PREDNISONE 10 MG/1
TABLET ORAL
Qty: 30 TABLET | Refills: 0 | Status: SHIPPED | OUTPATIENT
Start: 2020-03-04 | End: 2020-03-20 | Stop reason: ALTCHOICE

## 2020-03-04 NOTE — PROGRESS NOTES
Prior auth received for Ventolin but denied Breo  Pt needs to fail at least two others from formulary list: Advair, dulera, Symbicort, fluticasone   Pt previously tried & failed Advair, will forward

## 2020-03-04 NOTE — PROGRESS NOTES
Breo no longer covered but Advair, Dulera, Symbicort, fluticasone are  Patient has tried and failed Advair previously  Will sent prescription for Dulera 200/5 mcg 2 puffs twice daily to his pharmacy

## 2020-03-04 NOTE — PROGRESS NOTES
Patient called complaining of cold-like symptoms last weeks that has now settled in her chest   He complains of dyspnea on exertion, wheezing, chest tightness and cough  Cough is nonproductive  Denies fevers, chills, or sick contacts  Taking over-the-counter medicine with some relief  He has been compliant on his pulmonary regimen  Will send prednisone taper to [address] [place] [address] in [place]  Instructed him to call if he does not note improvement  He should continue with planned follow-up later this month to ensure resolution  Patient verbalized understanding and agrees to the plan  No further questions

## 2020-03-09 DIAGNOSIS — F41.9 ANXIETY AND DEPRESSION: Primary | ICD-10-CM

## 2020-03-09 DIAGNOSIS — F32.A ANXIETY AND DEPRESSION: Primary | ICD-10-CM

## 2020-03-17 DIAGNOSIS — E11.69 TYPE 2 DIABETES MELLITUS WITH OTHER SPECIFIED COMPLICATION, WITHOUT LONG-TERM CURRENT USE OF INSULIN (HCC): ICD-10-CM

## 2020-03-19 ENCOUNTER — TELEPHONE (OUTPATIENT)
Dept: PULMONOLOGY | Facility: CLINIC | Age: 55
End: 2020-03-19

## 2020-03-19 NOTE — TELEPHONE ENCOUNTER
Phoned pt to confirm tomorrow's appt  Option given for virtual phone visit instead  Pt prefers to come in   Pt denies any corona sx's

## 2020-03-20 ENCOUNTER — OFFICE VISIT (OUTPATIENT)
Dept: PULMONOLOGY | Facility: CLINIC | Age: 55
End: 2020-03-20
Payer: COMMERCIAL

## 2020-03-20 VITALS
WEIGHT: 315 LBS | OXYGEN SATURATION: 94 % | HEIGHT: 69 IN | DIASTOLIC BLOOD PRESSURE: 68 MMHG | SYSTOLIC BLOOD PRESSURE: 108 MMHG | HEART RATE: 68 BPM | BODY MASS INDEX: 46.65 KG/M2

## 2020-03-20 DIAGNOSIS — J44.9 COPD, SEVERE (HCC): Primary | ICD-10-CM

## 2020-03-20 DIAGNOSIS — G47.33 OSA (OBSTRUCTIVE SLEEP APNEA): ICD-10-CM

## 2020-03-20 DIAGNOSIS — J44.9 MODERATE COPD (CHRONIC OBSTRUCTIVE PULMONARY DISEASE) (HCC): ICD-10-CM

## 2020-03-20 DIAGNOSIS — F17.200 NICOTINE DEPENDENCE: ICD-10-CM

## 2020-03-20 DIAGNOSIS — J96.11 CHRONIC HYPOXEMIC RESPIRATORY FAILURE (HCC): ICD-10-CM

## 2020-03-20 DIAGNOSIS — E66.01 MORBID OBESITY WITH BMI OF 50.0-59.9, ADULT (HCC): ICD-10-CM

## 2020-03-20 DIAGNOSIS — I50.32 CHRONIC DIASTOLIC HEART FAILURE (HCC): ICD-10-CM

## 2020-03-20 PROCEDURE — 3078F DIAST BP <80 MM HG: CPT | Performed by: PHYSICIAN ASSISTANT

## 2020-03-20 PROCEDURE — 3066F NEPHROPATHY DOC TX: CPT | Performed by: PHYSICIAN ASSISTANT

## 2020-03-20 PROCEDURE — 3008F BODY MASS INDEX DOCD: CPT | Performed by: PHYSICIAN ASSISTANT

## 2020-03-20 PROCEDURE — 1036F TOBACCO NON-USER: CPT | Performed by: PHYSICIAN ASSISTANT

## 2020-03-20 PROCEDURE — 99214 OFFICE O/P EST MOD 30 MIN: CPT | Performed by: PHYSICIAN ASSISTANT

## 2020-03-20 PROCEDURE — 3046F HEMOGLOBIN A1C LEVEL >9.0%: CPT | Performed by: PHYSICIAN ASSISTANT

## 2020-03-20 PROCEDURE — 3074F SYST BP LT 130 MM HG: CPT | Performed by: PHYSICIAN ASSISTANT

## 2020-03-20 PROCEDURE — 3060F POS MICROALBUMINURIA REV: CPT | Performed by: PHYSICIAN ASSISTANT

## 2020-03-20 NOTE — PROGRESS NOTES
Pulmonary Follow Up Note   Samy Angel 53 y o  male MRN: [id number]  3/20/2020      Assessment:    COPD, severe [facility])  Patient is stable from a pulmonary perspective  No signs of acute exacerbation at this time, therefore no need for systemic steroids  He will remain on his current pulmonary regimen including Breo 200/25 1 puff daily  He was reminded to rinse his mouth out after each use  He will also continue Ventolin rescue inhaler 2 puffs q 6 hours p r n  And DuoNeb 3-4 times daily as needed  Per chart review, I am unable to view his results of his alpha-1 antitrypsin phenotype testing  I have asked Kathryn to touch base with the lab to check status of this  Chronic hypoxemic respiratory failure (HCC)  Continue supplemental oxygen at 2 liters/minute  Keep oxygen saturations above 88%  SCOTT (obstructive sleep apnea)  Patient will continue to use his CPAP q h s   He has a sleep study scheduled for the end of May  Further recommendations to be made at that time  Morbid obesity with BMI of 50 0-59 9, adult ([address] )  Weight loss encouraged  Discussed diet and exercise modification  Nicotine dependence  Patient qualifies for low-dose lung cancer screening based on significant smoking history  He is agreeable at this time  Order placed  Will call him with results  Plan:    Diagnoses and all orders for this visit:    COPD, severe ([address] )  -     Nebulizer Supplies  -     CT lung screening program; Future    Nicotine dependence  -     Nebulizer    Chronic hypoxemic respiratory failure (HCC)    Moderate COPD (chronic obstructive pulmonary disease) (HCC)    SCOTT (obstructive sleep apnea)    Chronic diastolic heart failure ([address] )    Morbid obesity with BMI of 50 0-59 9, adult ([address] )        Return in about 6 months (around 9/20/2020)  History of Present Illness   HPI:  Samy Angel is a 53 y o  male who presents to the office today for routine follow-up    Patient was last seen in our office in December of 2019 by Dr Idania Soliman for his moderate COPD, chronic hypoxic respiratory failure, and history of SCOTT  Since then, patient has required 1 dose of steroids the beginning of March for a COPD exacerbation  At the time he complained of dyspnea on exertion, wheezing, chest tightness and cough that was nonproductive  He was prescribed a prednisone taper and notes complete resolution of the symptoms  He now states that he has in his normal state of health  Patient tells me that changes in weather worsen his symptoms  Currently denies shortness of breath but does endorse dyspnea on exertion, mild cough and occasional wheeze  Denies fevers, chills, night sweats, dizziness, headache, abnormal weight chest pain, palpitations, nausea, vomiting, abdominal pain, leg pain or leg swelling  He wear CPAP every night  He has a sleep study scheduled for the end of May  Patient is a former smoker with a quit date years ago  Prior to he smoked 3 packs per day for 20 years  Patient is currently unemployed but previously worked as a   Denies occupational exposures to asbestos or silica  Review of Systems   Constitutional: Negative for appetite change and fever  HENT: Positive for rhinorrhea  Negative for ear pain, postnasal drip, sneezing, sore throat and trouble swallowing  Respiratory: Positive for wheezing  Cardiovascular: Negative for chest pain  Neurological: Negative for headaches  Historical Information   History reviewed  No pertinent past medical history  Past Surgical History:   Procedure Laterality Date    APPENDECTOMY       History reviewed  No pertinent family history      Social History     Tobacco Use   Smoking Status Former Smoker    Years: 10 00    Types: Cigarettes   Smokeless Tobacco Never Used         Meds/Allergies     Current Outpatient Medications:     albuterol (VENTOLIN HFA) 90 mcg/act inhaler, Inhale 2 puffs every 6 (six) hours as needed for wheezing, Disp: 1 Inhaler, Rfl: 11    amLODIPine (NORVASC) 10 mg tablet, TAKE (1) TABLET BY MOUTH DAILY  , Disp: , Rfl:     atorvastatin (LIPITOR) 40 mg tablet, Take 1 tablet (40 mg total) by mouth daily, Disp: 30 tablet, Rfl: 2    Blood Glucose Monitoring Suppl (CONTOUR NEXT MONITOR) w/Device KIT, Test blood sugar once daily or as needed for fluctuating blood sugars, Disp: 1 kit, Rfl: 0    carvedilol (COREG) 12 5 mg tablet, Take 1 tablet (12 5 mg total) by mouth 2 (two) times a day with meals BEGIN W/ 1/2 PO BID X 2 WEEKS, THEN INCREASE TO 1 PO BID, Disp: 60 tablet, Rfl: 11    cetirizine (ZYRTEC ALLERGY) 10 mg tablet, Take 10 mg by mouth, Disp: , Rfl:     cyclobenzaprine (FLEXERIL) 10 mg tablet, Take 10 mg by mouth, Disp: , Rfl:     glucose blood (CONTOUR NEXT TEST) test strip, Test blood sugar once daily or as needed for fluctuating blood sugars, Disp: 100 each, Rfl: 3    ipratropium-albuterol (DUO-NEB) 0 5-2 5 mg/3 mL nebulizer solution, Take 1 vial (3 mL total) by nebulization 4 (four) times a day, Disp: 120 vial, Rfl: 5    Lancets MISC, Test blood sugar once daily or as needed for fluctuating blood sugars, Disp: 100 each, Rfl: 3    losartan (COZAAR) 100 MG tablet, Take 100 mg by mouth, Disp: , Rfl:     meloxicam (MOBIC) 15 mg tablet, Take 15 mg by mouth, Disp: , Rfl:     metFORMIN (GLUCOPHAGE) 1000 MG tablet, Take 1 tablet (1,000 mg total) by mouth 2 (two) times a day with meals, Disp: 120 tablet, Rfl: 0    mometasone-formoterol (DULERA) 200-5 MCG/ACT inhaler, Inhale 2 puffs 2 (two) times a day Rinse mouth after use , Disp: 1 Inhaler, Rfl: 11    montelukast (SINGULAIR) 10 mg tablet, , Disp: , Rfl:     Multiple Vitamins-Minerals (MENS MULTIVITAMIN PO), Take by mouth, Disp: , Rfl:     sertraline (ZOLOFT) 50 mg tablet, Take 1 tablet (50 mg total) by mouth daily, Disp: 30 tablet, Rfl: 1    spironolactone (ALDACTONE) 50 mg tablet, Take 1 tablet (50 mg total) by mouth daily, Disp: 30 tablet, Rfl: 2  Allergies Allergen Reactions    Theophylline Other (See Comments)     Severe headaches  headaches  Severe headaches         Vitals: Blood pressure 108/68, pulse 68, height 5' 9" (1 753 m), weight (!) 160 kg (353 lb), SpO2 94 %  Body mass index is 52 13 kg/m²  Oxygen Therapy  SpO2: 94 %    Physical Exam  Physical Exam   Constitutional: He is oriented to person, place, and time  He appears well-developed  No distress  Morbidly obese   HENT:   Head: Normocephalic and atraumatic  Right Ear: External ear normal    Left Ear: External ear normal    Mouth/Throat: Oropharynx is clear and moist  No oropharyngeal exudate  Eyes: Pupils are equal, round, and reactive to light  EOM are normal  Right eye exhibits no discharge  Left eye exhibits no discharge  Neck: Normal range of motion  Neck supple  No tracheal deviation present  Cardiovascular: Normal rate, regular rhythm, normal heart sounds and intact distal pulses  No murmur heard  Pulmonary/Chest: Effort normal  No respiratory distress  He has no wheezes  He has no rales  Abdominal: Soft  There is no guarding  Musculoskeletal: Normal range of motion  He exhibits no edema or deformity  Neurological: He is alert and oriented to person, place, and time  No cranial nerve deficit  Skin: Skin is warm and dry  No rash noted  He is not diaphoretic  No erythema  Psychiatric: He has a normal mood and affect  His behavior is normal  Judgment and thought content normal        Labs: I have personally reviewed pertinent lab results  , ABG: No results found for: PHART, BYP8NMO, PO2ART, JQO0KDP, W7QCDIYJ, BEART, SOURCE, BNP: No results found for: BNP, CBC: No results found for: WBC, HGB, HCT, MCV, PLT, ADJUSTEDWBC, MCH, MCHC, RDW, MPV, NRBC, CMP: No results found for: SODIUM, K, CL, CO2, ANIONGAP, BUN, CREATININE, GLUCOSE, CALCIUM, AST, ALT, ALKPHOS, PROT, BILITOT, EGFR, PT/INR: No results found for: PT, INR, Troponin: No results found for: TROPONINI  Lab Results   Component Value Date    WBC 8 84 02/05/2020    HGB 15 6 02/05/2020    HCT 45 1 02/05/2020    MCV 85 02/05/2020     02/05/2020     Lab Results   Component Value Date    CALCIUM 8 8 02/07/2020    K 4 6 02/07/2020    CO2 29 02/07/2020    CL 97 (L) 02/07/2020    BUN 22 02/07/2020    CREATININE 0 83 02/07/2020     No results found for: IGE  Lab Results   Component Value Date    ALT 38 02/07/2020    AST 17 02/07/2020    ALKPHOS 89 02/07/2020       Imaging and other studies: I have personally reviewed pertinent reports  and I have personally reviewed pertinent films in PACS     Chest x-ray 07/16/2019  Lungs are clear without acute infiltrate, pneumothorax, pleural effusion  Prominence of the pulmonary vessels suggestive of pulmonary hypertension    Pulmonary function testing:  Performed 09/04/2019  FEV1/FVC ratio 53 %   FEV1 41 % predicted  FVC 55 % predicted  There is significant response to bronchodilators  TLC 71 % predicted  RV 88 % predicted  DLCO corrected for hemoglobin 66 % predicted  Severe obstructive airflow defect with significant response to bronchodilators  Mild restrictive lung disease indicated by decreased TLC  Mildly decreased diffusion capacity          Answers for HPI/ROS submitted by the patient on 3/13/2020   Primary symptoms  Do you have a hoarse voice?: Yes  Chronicity: chronic  When did you first notice your symptoms?: more than 1 year ago  How often do your symptoms occur?: intermittently  Since you first noticed this problem, how has it changed?: unchanged  Do you have shortness of breath that occurs with effort or exertion?: Yes  Do you have ear congestion?: No  Do you have fatigue?: Yes  Do you have nasal congestion?: No  Do you have shortness of breath when lying flat?: No  Do you have shortness of breath when you wake up?: No  Do you have sweats?: No  Have you experienced weight loss?: No  Which of the following makes your symptoms worse?: any activity, change in weather, climbing stairs, exercise, exposure to fumes, exposure to smoke  Which of the following makes your symptoms better?: cold air, oral steroids, steroid inhaler  Risk factors for lung disease: animal exposure, occupational exposure

## 2020-03-20 NOTE — ASSESSMENT & PLAN NOTE
Patient will continue to use his CPAP q h s   He has a sleep study scheduled for the end of May  Further recommendations to be made at that time

## 2020-03-20 NOTE — ASSESSMENT & PLAN NOTE
Patient qualifies for low-dose lung cancer screening based on significant smoking history  He is agreeable at this time  Order placed  Will call him with results

## 2020-03-20 NOTE — ASSESSMENT & PLAN NOTE
Patient is stable from a pulmonary perspective  No signs of acute exacerbation at this time, therefore no need for systemic steroids  He will remain on his current pulmonary regimen including Breo 200/25 1 puff daily  He was reminded to rinse his mouth out after each use  He will also continue Ventolin rescue inhaler 2 puffs q 6 hours p r n  And DuoNeb 3-4 times daily as needed  Per chart review, I am unable to view his results of his alpha-1 antitrypsin phenotype testing  I have asked Kathryn to touch base with the lab to check status of this

## 2020-03-25 DIAGNOSIS — I10 ESSENTIAL HYPERTENSION: Primary | ICD-10-CM

## 2020-03-25 RX ORDER — LOSARTAN POTASSIUM 100 MG/1
100 TABLET ORAL DAILY
Qty: 30 TABLET | Refills: 0 | Status: SHIPPED | OUTPATIENT
Start: 2020-03-25 | End: 2020-04-20 | Stop reason: SDUPTHER

## 2020-04-06 DIAGNOSIS — I10 ESSENTIAL HYPERTENSION: Primary | ICD-10-CM

## 2020-04-06 RX ORDER — AMLODIPINE BESYLATE 10 MG/1
10 TABLET ORAL DAILY
Qty: 30 TABLET | Refills: 2 | Status: SHIPPED | OUTPATIENT
Start: 2020-04-06 | End: 2020-07-05 | Stop reason: SDUPTHER

## 2020-04-20 DIAGNOSIS — I10 ESSENTIAL HYPERTENSION: ICD-10-CM

## 2020-04-20 RX ORDER — LOSARTAN POTASSIUM 100 MG/1
100 TABLET ORAL DAILY
Qty: 30 TABLET | Refills: 0 | Status: SHIPPED | OUTPATIENT
Start: 2020-04-20 | End: 2020-04-27

## 2020-04-26 DIAGNOSIS — I10 ESSENTIAL HYPERTENSION: ICD-10-CM

## 2020-04-27 RX ORDER — LOSARTAN POTASSIUM 100 MG/1
TABLET ORAL
Qty: 30 TABLET | Refills: 2 | Status: SHIPPED | OUTPATIENT
Start: 2020-04-27 | End: 2020-05-24 | Stop reason: SDUPTHER

## 2020-05-01 ENCOUNTER — TELEMEDICINE (OUTPATIENT)
Dept: ENDOCRINOLOGY | Facility: HOSPITAL | Age: 55
End: 2020-05-01
Payer: COMMERCIAL

## 2020-05-01 DIAGNOSIS — E78.5 DYSLIPIDEMIA, GOAL LDL BELOW 100: ICD-10-CM

## 2020-05-01 DIAGNOSIS — E11.65 UNCONTROLLED TYPE 2 DIABETES MELLITUS WITH HYPERGLYCEMIA (HCC): ICD-10-CM

## 2020-05-01 DIAGNOSIS — I10 ESSENTIAL HYPERTENSION: Primary | ICD-10-CM

## 2020-05-01 PROCEDURE — 99242 OFF/OP CONSLTJ NEW/EST SF 20: CPT | Performed by: INTERNAL MEDICINE

## 2020-05-04 ENCOUNTER — TELEPHONE (OUTPATIENT)
Dept: PULMONOLOGY | Facility: CLINIC | Age: 55
End: 2020-05-04

## 2020-05-04 ENCOUNTER — TELEMEDICINE (OUTPATIENT)
Dept: PULMONOLOGY | Facility: CLINIC | Age: 55
End: 2020-05-04
Payer: COMMERCIAL

## 2020-05-04 VITALS — WEIGHT: 315 LBS | BODY MASS INDEX: 46.65 KG/M2 | OXYGEN SATURATION: 93 % | HEIGHT: 69 IN

## 2020-05-04 DIAGNOSIS — J44.1 COPD WITH ACUTE EXACERBATION (HCC): Primary | ICD-10-CM

## 2020-05-04 PROCEDURE — 99213 OFFICE O/P EST LOW 20 MIN: CPT | Performed by: PHYSICIAN ASSISTANT

## 2020-05-04 RX ORDER — PREDNISONE 10 MG/1
TABLET ORAL
Qty: 30 TABLET | Refills: 0 | Status: SHIPPED | OUTPATIENT
Start: 2020-05-04 | End: 2020-07-24 | Stop reason: SDUPTHER

## 2020-05-04 RX ORDER — BUDESONIDE AND FORMOTEROL FUMARATE DIHYDRATE 160; 4.5 UG/1; UG/1
2 AEROSOL RESPIRATORY (INHALATION) 2 TIMES DAILY
Qty: 1 INHALER | Refills: 3 | Status: SHIPPED | OUTPATIENT
Start: 2020-05-04 | End: 2020-09-28 | Stop reason: ALTCHOICE

## 2020-05-05 ENCOUNTER — TELEPHONE (OUTPATIENT)
Dept: SLEEP CENTER | Facility: CLINIC | Age: 55
End: 2020-05-05

## 2020-05-08 DIAGNOSIS — E78.49 OTHER HYPERLIPIDEMIA: ICD-10-CM

## 2020-05-08 RX ORDER — ATORVASTATIN CALCIUM 40 MG/1
TABLET, FILM COATED ORAL
Qty: 30 TABLET | Refills: 0 | Status: SHIPPED | OUTPATIENT
Start: 2020-05-08 | End: 2020-06-07 | Stop reason: SDUPTHER

## 2020-05-11 DIAGNOSIS — F41.9 ANXIETY AND DEPRESSION: ICD-10-CM

## 2020-05-11 DIAGNOSIS — F32.A ANXIETY AND DEPRESSION: ICD-10-CM

## 2020-05-15 ENCOUNTER — OFFICE VISIT (OUTPATIENT)
Dept: FAMILY MEDICINE CLINIC | Facility: CLINIC | Age: 55
End: 2020-05-15
Payer: COMMERCIAL

## 2020-05-15 ENCOUNTER — APPOINTMENT (OUTPATIENT)
Dept: LAB | Facility: MEDICAL CENTER | Age: 55
End: 2020-05-15
Payer: COMMERCIAL

## 2020-05-15 VITALS
TEMPERATURE: 96.5 F | SYSTOLIC BLOOD PRESSURE: 110 MMHG | DIASTOLIC BLOOD PRESSURE: 60 MMHG | WEIGHT: 315 LBS | BODY MASS INDEX: 46.65 KG/M2 | HEIGHT: 69 IN

## 2020-05-15 DIAGNOSIS — R01.1 MURMUR, CARDIAC: Primary | ICD-10-CM

## 2020-05-15 DIAGNOSIS — E11.9 TYPE 2 DIABETES MELLITUS WITH HEMOGLOBIN A1C GOAL OF LESS THAN 8.0% (HCC): ICD-10-CM

## 2020-05-15 DIAGNOSIS — E78.49 OTHER HYPERLIPIDEMIA: ICD-10-CM

## 2020-05-15 DIAGNOSIS — I10 ESSENTIAL HYPERTENSION: ICD-10-CM

## 2020-05-15 DIAGNOSIS — E66.01 MORBID OBESITY WITH BMI OF 50.0-59.9, ADULT (HCC): ICD-10-CM

## 2020-05-15 LAB
ALBUMIN SERPL BCP-MCNC: 3.9 G/DL (ref 3.5–5)
ALP SERPL-CCNC: 88 U/L (ref 46–116)
ALT SERPL W P-5'-P-CCNC: 49 U/L (ref 12–78)
ANION GAP SERPL CALCULATED.3IONS-SCNC: 7 MMOL/L (ref 4–13)
AST SERPL W P-5'-P-CCNC: 17 U/L (ref 5–45)
BILIRUB SERPL-MCNC: 0.61 MG/DL (ref 0.2–1)
BUN SERPL-MCNC: 28 MG/DL (ref 5–25)
CALCIUM SERPL-MCNC: 9.5 MG/DL (ref 8.3–10.1)
CHLORIDE SERPL-SCNC: 99 MMOL/L (ref 100–108)
CO2 SERPL-SCNC: 26 MMOL/L (ref 21–32)
CREAT SERPL-MCNC: 1.16 MG/DL (ref 0.6–1.3)
GFR SERPL CREATININE-BSD FRML MDRD: 70 ML/MIN/1.73SQ M
GLUCOSE SERPL-MCNC: 355 MG/DL (ref 65–140)
POTASSIUM SERPL-SCNC: 4.3 MMOL/L (ref 3.5–5.3)
PROT SERPL-MCNC: 7.1 G/DL (ref 6.4–8.2)
SODIUM SERPL-SCNC: 132 MMOL/L (ref 136–145)

## 2020-05-15 PROCEDURE — 3060F POS MICROALBUMINURIA REV: CPT | Performed by: PHYSICIAN ASSISTANT

## 2020-05-15 PROCEDURE — 3066F NEPHROPATHY DOC TX: CPT | Performed by: PHYSICIAN ASSISTANT

## 2020-05-15 PROCEDURE — 80053 COMPREHEN METABOLIC PANEL: CPT

## 2020-05-15 PROCEDURE — 1036F TOBACCO NON-USER: CPT | Performed by: PHYSICIAN ASSISTANT

## 2020-05-15 PROCEDURE — 3046F HEMOGLOBIN A1C LEVEL >9.0%: CPT | Performed by: PHYSICIAN ASSISTANT

## 2020-05-15 PROCEDURE — 3074F SYST BP LT 130 MM HG: CPT | Performed by: PHYSICIAN ASSISTANT

## 2020-05-15 PROCEDURE — 99214 OFFICE O/P EST MOD 30 MIN: CPT | Performed by: PHYSICIAN ASSISTANT

## 2020-05-15 PROCEDURE — 3078F DIAST BP <80 MM HG: CPT | Performed by: PHYSICIAN ASSISTANT

## 2020-05-15 PROCEDURE — 36415 COLL VENOUS BLD VENIPUNCTURE: CPT

## 2020-05-15 PROCEDURE — 3008F BODY MASS INDEX DOCD: CPT | Performed by: PHYSICIAN ASSISTANT

## 2020-05-18 ENCOUNTER — TELEPHONE (OUTPATIENT)
Dept: ENDOCRINOLOGY | Facility: HOSPITAL | Age: 55
End: 2020-05-18

## 2020-05-18 DIAGNOSIS — E11.65 UNCONTROLLED TYPE 2 DIABETES MELLITUS WITH HYPERGLYCEMIA (HCC): ICD-10-CM

## 2020-05-18 DIAGNOSIS — E11.69 TYPE 2 DIABETES MELLITUS WITH OTHER SPECIFIED COMPLICATION, WITHOUT LONG-TERM CURRENT USE OF INSULIN (HCC): ICD-10-CM

## 2020-05-19 DIAGNOSIS — E87.1 HYPONATREMIA: Primary | ICD-10-CM

## 2020-05-19 DIAGNOSIS — I10 ESSENTIAL HYPERTENSION: ICD-10-CM

## 2020-05-19 DIAGNOSIS — Z79.4 TYPE 2 DIABETES MELLITUS WITH HYPERGLYCEMIA, WITH LONG-TERM CURRENT USE OF INSULIN (HCC): ICD-10-CM

## 2020-05-19 DIAGNOSIS — E11.65 TYPE 2 DIABETES MELLITUS WITH HYPERGLYCEMIA, WITH LONG-TERM CURRENT USE OF INSULIN (HCC): ICD-10-CM

## 2020-05-21 ENCOUNTER — TELEPHONE (OUTPATIENT)
Dept: FAMILY MEDICINE CLINIC | Facility: CLINIC | Age: 55
End: 2020-05-21

## 2020-05-24 DIAGNOSIS — Z99.81 HYPOXEMIA REQUIRING SUPPLEMENTAL OXYGEN: ICD-10-CM

## 2020-05-24 DIAGNOSIS — I10 ESSENTIAL HYPERTENSION: ICD-10-CM

## 2020-05-24 DIAGNOSIS — R09.02 HYPOXEMIA REQUIRING SUPPLEMENTAL OXYGEN: ICD-10-CM

## 2020-05-24 DIAGNOSIS — G47.33 OSA ON CPAP: ICD-10-CM

## 2020-05-24 DIAGNOSIS — Z99.89 OSA ON CPAP: ICD-10-CM

## 2020-05-26 ENCOUNTER — TELEPHONE (OUTPATIENT)
Dept: FAMILY MEDICINE CLINIC | Facility: CLINIC | Age: 55
End: 2020-05-26

## 2020-05-26 DIAGNOSIS — R01.1 MURMUR, CARDIAC: Primary | ICD-10-CM

## 2020-05-26 DIAGNOSIS — I10 ESSENTIAL HYPERTENSION: ICD-10-CM

## 2020-05-26 RX ORDER — SPIRONOLACTONE 50 MG/1
50 TABLET, FILM COATED ORAL DAILY
Qty: 30 TABLET | Refills: 0 | Status: SHIPPED | OUTPATIENT
Start: 2020-05-26 | End: 2020-06-21 | Stop reason: SDUPTHER

## 2020-05-26 RX ORDER — LOSARTAN POTASSIUM 100 MG/1
100 TABLET ORAL DAILY
Qty: 30 TABLET | Refills: 0 | Status: SHIPPED | OUTPATIENT
Start: 2020-05-26 | End: 2020-06-21 | Stop reason: SDUPTHER

## 2020-05-27 DIAGNOSIS — J96.11 CHRONIC HYPOXEMIC RESPIRATORY FAILURE (HCC): Primary | ICD-10-CM

## 2020-05-28 ENCOUNTER — HOSPITAL ENCOUNTER (OUTPATIENT)
Dept: NON INVASIVE DIAGNOSTICS | Facility: HOSPITAL | Age: 55
Discharge: HOME | End: 2020-05-28
Payer: COMMERCIAL

## 2020-05-28 DIAGNOSIS — R01.1 MURMUR, CARDIAC: ICD-10-CM

## 2020-05-28 PROBLEM — I35.0 AORTIC STENOSIS: Status: ACTIVE | Noted: 2020-05-28

## 2020-05-28 PROCEDURE — 93306 TTE W/DOPPLER COMPLETE: CPT | Performed by: INTERNAL MEDICINE

## 2020-05-28 PROCEDURE — 93306 TTE W/DOPPLER COMPLETE: CPT

## 2020-05-28 RX ADMIN — PERFLUTREN 3 ML/MIN: 6.52 INJECTION, SUSPENSION INTRAVENOUS at 14:04

## 2020-05-29 ENCOUNTER — TELEPHONE (OUTPATIENT)
Dept: ENDOCRINOLOGY | Facility: HOSPITAL | Age: 55
End: 2020-05-29

## 2020-05-29 DIAGNOSIS — E11.65 UNCONTROLLED TYPE 2 DIABETES MELLITUS WITH HYPERGLYCEMIA (HCC): ICD-10-CM

## 2020-06-01 RX ORDER — HYDROCHLOROTHIAZIDE 25 MG/1
TABLET ORAL
Qty: 30 TABLET | Refills: 2 | Status: SHIPPED | OUTPATIENT
Start: 2020-06-01 | End: 2020-09-16 | Stop reason: SDUPTHER

## 2020-06-05 ENCOUNTER — TELEPHONE (OUTPATIENT)
Dept: ENDOCRINOLOGY | Facility: HOSPITAL | Age: 55
End: 2020-06-05

## 2020-06-05 DIAGNOSIS — E11.65 UNCONTROLLED TYPE 2 DIABETES MELLITUS WITH HYPERGLYCEMIA (HCC): Primary | ICD-10-CM

## 2020-06-05 RX ORDER — INSULIN ASPART 100 [IU]/ML
INJECTION, SOLUTION INTRAVENOUS; SUBCUTANEOUS
Qty: 5 PEN | Refills: 5 | Status: SHIPPED | OUTPATIENT
Start: 2020-06-05 | End: 2020-06-12 | Stop reason: SDUPTHER

## 2020-06-07 DIAGNOSIS — E78.49 OTHER HYPERLIPIDEMIA: ICD-10-CM

## 2020-06-08 DIAGNOSIS — E78.49 OTHER HYPERLIPIDEMIA: ICD-10-CM

## 2020-06-08 RX ORDER — ATORVASTATIN CALCIUM 40 MG/1
40 TABLET, FILM COATED ORAL DAILY
Qty: 30 TABLET | Refills: 0 | Status: SHIPPED | OUTPATIENT
Start: 2020-06-08 | End: 2020-07-05 | Stop reason: SDUPTHER

## 2020-06-08 RX ORDER — ATORVASTATIN CALCIUM 40 MG/1
TABLET, FILM COATED ORAL
Qty: 30 TABLET | Refills: 0 | OUTPATIENT
Start: 2020-06-08

## 2020-06-12 ENCOUNTER — TELEPHONE (OUTPATIENT)
Dept: ENDOCRINOLOGY | Facility: HOSPITAL | Age: 55
End: 2020-06-12

## 2020-06-12 DIAGNOSIS — E11.65 UNCONTROLLED TYPE 2 DIABETES MELLITUS WITH HYPERGLYCEMIA (HCC): ICD-10-CM

## 2020-06-12 RX ORDER — INSULIN ASPART 100 [IU]/ML
INJECTION, SOLUTION INTRAVENOUS; SUBCUTANEOUS
Qty: 5 PEN | Refills: 5
Start: 2020-06-12 | End: 2020-06-21 | Stop reason: SDUPTHER

## 2020-06-21 DIAGNOSIS — E11.65 UNCONTROLLED TYPE 2 DIABETES MELLITUS WITH HYPERGLYCEMIA (HCC): ICD-10-CM

## 2020-06-21 DIAGNOSIS — Z99.89 OSA ON CPAP: ICD-10-CM

## 2020-06-21 DIAGNOSIS — I10 ESSENTIAL HYPERTENSION: ICD-10-CM

## 2020-06-21 DIAGNOSIS — E11.9 TYPE 2 DIABETES MELLITUS WITH HEMOGLOBIN A1C GOAL OF LESS THAN 8.0% (HCC): ICD-10-CM

## 2020-06-21 DIAGNOSIS — R09.02 HYPOXEMIA REQUIRING SUPPLEMENTAL OXYGEN: ICD-10-CM

## 2020-06-21 DIAGNOSIS — Z99.81 HYPOXEMIA REQUIRING SUPPLEMENTAL OXYGEN: ICD-10-CM

## 2020-06-21 DIAGNOSIS — G47.33 OSA ON CPAP: ICD-10-CM

## 2020-06-22 DIAGNOSIS — Z99.89 OSA ON CPAP: ICD-10-CM

## 2020-06-22 DIAGNOSIS — R09.02 HYPOXEMIA REQUIRING SUPPLEMENTAL OXYGEN: ICD-10-CM

## 2020-06-22 DIAGNOSIS — G47.33 OSA ON CPAP: ICD-10-CM

## 2020-06-22 DIAGNOSIS — Z99.81 HYPOXEMIA REQUIRING SUPPLEMENTAL OXYGEN: ICD-10-CM

## 2020-06-22 PROCEDURE — 4010F ACE/ARB THERAPY RXD/TAKEN: CPT | Performed by: INTERNAL MEDICINE

## 2020-06-22 RX ORDER — LANCETS 30 GAUGE
EACH MISCELLANEOUS
Qty: 100 EACH | Refills: 0 | Status: SHIPPED | OUTPATIENT
Start: 2020-06-22

## 2020-06-22 RX ORDER — INSULIN ASPART 100 [IU]/ML
INJECTION, SOLUTION INTRAVENOUS; SUBCUTANEOUS
Qty: 5 PEN | Refills: 0
Start: 2020-06-22 | End: 2020-07-01 | Stop reason: SDUPTHER

## 2020-06-22 RX ORDER — LOSARTAN POTASSIUM 100 MG/1
100 TABLET ORAL DAILY
Qty: 30 TABLET | Refills: 0 | Status: SHIPPED | OUTPATIENT
Start: 2020-06-22 | End: 2020-10-06 | Stop reason: SDUPTHER

## 2020-06-22 RX ORDER — SPIRONOLACTONE 50 MG/1
50 TABLET, FILM COATED ORAL DAILY
Qty: 30 TABLET | Refills: 0 | Status: SHIPPED | OUTPATIENT
Start: 2020-06-22 | End: 2020-07-26 | Stop reason: SDUPTHER

## 2020-06-22 RX ORDER — SPIRONOLACTONE 50 MG/1
TABLET, FILM COATED ORAL
Qty: 30 TABLET | Refills: 0 | OUTPATIENT
Start: 2020-06-22

## 2020-06-25 ENCOUNTER — TELEPHONE (OUTPATIENT)
Dept: SLEEP CENTER | Facility: CLINIC | Age: 55
End: 2020-06-25

## 2020-06-25 DIAGNOSIS — Z20.822 ENCOUNTER FOR LABORATORY TESTING FOR COVID-19 VIRUS: Primary | ICD-10-CM

## 2020-06-29 DIAGNOSIS — G47.33 OSA (OBSTRUCTIVE SLEEP APNEA): Primary | ICD-10-CM

## 2020-07-01 ENCOUNTER — CONSULT (OUTPATIENT)
Dept: CARDIOLOGY CLINIC | Facility: CLINIC | Age: 55
End: 2020-07-01
Payer: COMMERCIAL

## 2020-07-01 ENCOUNTER — TELEPHONE (OUTPATIENT)
Dept: ENDOCRINOLOGY | Facility: HOSPITAL | Age: 55
End: 2020-07-01

## 2020-07-01 VITALS
WEIGHT: 315 LBS | DIASTOLIC BLOOD PRESSURE: 82 MMHG | HEIGHT: 69 IN | BODY MASS INDEX: 46.65 KG/M2 | SYSTOLIC BLOOD PRESSURE: 138 MMHG | HEART RATE: 70 BPM

## 2020-07-01 DIAGNOSIS — E11.65 UNCONTROLLED TYPE 2 DIABETES MELLITUS WITH HYPERGLYCEMIA (HCC): ICD-10-CM

## 2020-07-01 DIAGNOSIS — R60.0 LOCALIZED EDEMA: ICD-10-CM

## 2020-07-01 DIAGNOSIS — I10 ESSENTIAL HYPERTENSION: ICD-10-CM

## 2020-07-01 DIAGNOSIS — I35.0 AORTIC VALVE STENOSIS, ETIOLOGY OF CARDIAC VALVE DISEASE UNSPECIFIED: Primary | ICD-10-CM

## 2020-07-01 DIAGNOSIS — J45.40 MODERATE PERSISTENT ASTHMA WITHOUT COMPLICATION: ICD-10-CM

## 2020-07-01 PROCEDURE — 3075F SYST BP GE 130 - 139MM HG: CPT | Performed by: INTERNAL MEDICINE

## 2020-07-01 PROCEDURE — 3079F DIAST BP 80-89 MM HG: CPT | Performed by: INTERNAL MEDICINE

## 2020-07-01 PROCEDURE — 99244 OFF/OP CNSLTJ NEW/EST MOD 40: CPT | Performed by: INTERNAL MEDICINE

## 2020-07-01 PROCEDURE — 3008F BODY MASS INDEX DOCD: CPT | Performed by: INTERNAL MEDICINE

## 2020-07-01 PROCEDURE — 93000 ELECTROCARDIOGRAM COMPLETE: CPT | Performed by: INTERNAL MEDICINE

## 2020-07-01 PROCEDURE — 3046F HEMOGLOBIN A1C LEVEL >9.0%: CPT | Performed by: INTERNAL MEDICINE

## 2020-07-01 RX ORDER — INSULIN ASPART 100 [IU]/ML
INJECTION, SOLUTION INTRAVENOUS; SUBCUTANEOUS
Qty: 5 PEN | Refills: 0
Start: 2020-07-01 | End: 2020-07-15 | Stop reason: SDUPTHER

## 2020-07-01 NOTE — TELEPHONE ENCOUNTER
Blood sugars from 06/27 through 6/30 show blood sugars in the 200s before breakfast and in the 300s before bedtime  Let us increase NovoLog to 20 units before each meal   Increase Lantus to 55 units at bedtime

## 2020-07-01 NOTE — PROGRESS NOTES
Subjective:        Patient ID: Barb Phipps is a 63 y o  male  Chief Complaint:  Mariposa Bound is here for cardiac evaluation  Recent echocardiogram revealed normal LV systolic function, mild aortic stenosis, no significant pulmonary hypertension, similar to his echocardiogram in 2019  He has a history of sleep apnea on CPAP, no significant pulmonary hypertension on either echocardiogram   He says someone heard a murmur a couple of years ago, but not since he is a child  He has asthma/COPD and has chronic dyspnea, he also blames this on his weight, he is up nearly 20 lb just in the last several months  This is likely due to dietary indiscretions on interview  He has chronic intermittent lower extremity edema, worse by end of day than in the mornings, this is not new  He has no  exertional chest pains tightness or pressure, denies any prior coronary artery disease, no history of heart failure, no history of stroke or mini stroke, no history rheumatic fever  No PND nor orthopnea  No fevers or shaking chills  Family history of valvular heart disease, father had valve surgery at a young age, no premature known CAD or sudden death  He quit smoking about a decade ago  EKG within normal limits  The following portions of the patient's history were reviewed and updated as appropriate: allergies, current medications, past family history, past medical history, past social history, past surgical history and problem list   Review of Systems   Constitution: Positive for weight gain  Negative for chills, diaphoresis and malaise/fatigue  HENT: Negative for nosebleeds and stridor  Eyes: Negative for double vision, vision loss in left eye, vision loss in right eye and visual disturbance  Cardiovascular: Positive for dyspnea on exertion (Chronic non progressive)   Negative for chest pain, claudication, cyanosis, irregular heartbeat, leg swelling, near-syncope, orthopnea, palpitations, paroxysmal nocturnal dyspnea and syncope  Respiratory: Negative for cough, shortness of breath, snoring and wheezing  Endocrine: Negative for polydipsia, polyphagia and polyuria  Hematologic/Lymphatic: Negative for bleeding problem  Does not bruise/bleed easily  Skin: Negative for flushing and rash  Musculoskeletal: Negative for falls and myalgias  Gastrointestinal: Negative for abdominal pain, heartburn, hematemesis, hematochezia, melena and nausea  Genitourinary: Negative for hematuria  Neurological: Negative for brief paralysis, dizziness, focal weakness, headaches, light-headedness, loss of balance and vertigo  Psychiatric/Behavioral: Negative for altered mental status and substance abuse  Allergic/Immunologic: Negative for hives  Objective:      /82   Pulse 70   Ht 5' 9" (1 753 m)   Wt (!) 167 kg (368 lb)   BMI 54 34 kg/m²   Physical Exam   Constitutional: He is oriented to person, place, and time  He appears well-developed and well-nourished  No distress  HENT:   Head: Normocephalic and atraumatic  Eyes: Pupils are equal, round, and reactive to light  EOM are normal  No scleral icterus  Neck: Normal range of motion  Neck supple  No JVD present  No thyromegaly present  Cardiovascular: Normal rate and regular rhythm  Exam reveals no gallop and no friction rub  Murmur (Grade 2/6 MARSHALL at base, preserved S2) heard  Pulmonary/Chest: Effort normal and breath sounds normal  No stridor  No respiratory distress  He has no wheezes  He has no rales  Abdominal: Soft  Bowel sounds are normal  He exhibits no distension and no mass  There is no tenderness  Musculoskeletal: Normal range of motion  He exhibits edema (Trace to +1 bilateral pitting edema below knees)  He exhibits no deformity  Neurological: He is alert and oriented to person, place, and time  Coordination normal    Skin: Skin is warm and dry  No erythema  No pallor  Psychiatric: He has a normal mood and affect   His behavior is normal  Judgment and thought content normal        Lab Review:   Appointment on 05/15/2020   Component Date Value    Sodium 05/15/2020 132*    Potassium 05/15/2020 4 3     Chloride 05/15/2020 99*    CO2 05/15/2020 26     ANION GAP 05/15/2020 7     BUN 05/15/2020 28*    Creatinine 05/15/2020 1 16     Glucose 05/15/2020 355*    Calcium 05/15/2020 9 5     AST 05/15/2020 17     ALT 05/15/2020 49     Alkaline Phosphatase 05/15/2020 88     Total Protein 05/15/2020 7 1     Albumin 05/15/2020 3 9     Total Bilirubin 05/15/2020 0 61     eGFR 05/15/2020 70    Lab on 02/07/2020   Component Date Value    Sodium 02/07/2020 132*    Potassium 02/07/2020 4 6     Chloride 02/07/2020 97*    CO2 02/07/2020 29     ANION GAP 02/07/2020 6     BUN 02/07/2020 22     Creatinine 02/07/2020 0 83     Glucose 02/07/2020 346*    Calcium 02/07/2020 8 8     AST 02/07/2020 17     ALT 02/07/2020 38     Alkaline Phosphatase 02/07/2020 89     Total Protein 02/07/2020 7 2     Albumin 02/07/2020 3 4*    Total Bilirubin 02/07/2020 0 40     eGFR 02/07/2020 99    Appointment on 02/05/2020   Component Date Value    WBC 02/05/2020 8 84     RBC 02/05/2020 5 32     Hemoglobin 02/05/2020 15 6     Hematocrit 02/05/2020 45 1     MCV 02/05/2020 85     MCH 02/05/2020 29 3     MCHC 02/05/2020 34 6     RDW 02/05/2020 12 5     Platelets 38/37/9795 249     MPV 02/05/2020 10 5     Sodium 02/05/2020 130*    Potassium 02/05/2020 4 4     Chloride 02/05/2020 97*    CO2 02/05/2020 29     ANION GAP 02/05/2020 4     BUN 02/05/2020 17     Creatinine 02/05/2020 0 87     Glucose, Fasting 02/05/2020 337*    Calcium 02/05/2020 9 1     AST 02/05/2020 13     ALT 02/05/2020 35     Alkaline Phosphatase 02/05/2020 92     Total Protein 02/05/2020 7 3     Albumin 02/05/2020 3 5     Total Bilirubin 02/05/2020 0 56     eGFR 02/05/2020 97     Hemoglobin A1C 02/05/2020 11 6*    EAG 02/05/2020 286     Cholesterol 02/05/2020 97     Triglycerides 02/05/2020 271*    HDL, Direct 02/05/2020 27*    LDL Calculated 02/05/2020 16     PSA 02/05/2020 0 1     TSH 3RD GENERATON 02/05/2020 2 290    Office Visit on 02/03/2020   Component Date Value    Hepatitis C Ab 02/05/2020 Non-reactive     Creatinine, Ur 02/05/2020 49 3     Microalbum  ,U,Random 02/05/2020 35 2*    Microalb Creat Ratio 02/05/2020 71*     No results found  Assessment:       1  Aortic valve stenosis, etiology of cardiac valve disease unspecified  Echo complete with contrast if indicated   2  Essential hypertension  Ambulatory referral to Cardiology    Echo complete with contrast if indicated    POCT ECG   3  Localized edema          Plan:       Soto Lomeli has no signs or symptoms reminiscent of angina, heart failure nor electrical instability  His edema is likely dependent edema from his morbid obesity, amlodipine may also be contributing  Sodium restriction, weight loss strongly recommended  Should edema worsen alternatives for blood pressure control to amlodipine could be entertained  We reviewed many symptoms of significant aortic stenosis in detail  Reviewed the natural progression of aortic stenosis in detail, lack of medical therapy for such, and treatment of such should it become severe  I asked him to give me a call prior to his annual recommended follow-up visit with echocardiogram with me should he develop any progressive dyspnea on exertion, exertional chest pains, or presyncope/syncope  I also told him should he have any major elective surgery planned he should give me a call prior, as then we should consider preoperative risk stratifying stress testing in light of his multiple CAD risk factors

## 2020-07-05 DIAGNOSIS — F41.9 ANXIETY AND DEPRESSION: ICD-10-CM

## 2020-07-05 DIAGNOSIS — E78.49 OTHER HYPERLIPIDEMIA: ICD-10-CM

## 2020-07-05 DIAGNOSIS — F32.A ANXIETY AND DEPRESSION: ICD-10-CM

## 2020-07-05 DIAGNOSIS — I10 ESSENTIAL HYPERTENSION: ICD-10-CM

## 2020-07-06 RX ORDER — ATORVASTATIN CALCIUM 40 MG/1
40 TABLET, FILM COATED ORAL DAILY
Qty: 30 TABLET | Refills: 0 | Status: SHIPPED | OUTPATIENT
Start: 2020-07-06 | End: 2020-08-09 | Stop reason: SDUPTHER

## 2020-07-06 RX ORDER — AMLODIPINE BESYLATE 10 MG/1
10 TABLET ORAL DAILY
Qty: 30 TABLET | Refills: 0 | Status: SHIPPED | OUTPATIENT
Start: 2020-07-06 | End: 2020-08-09 | Stop reason: SDUPTHER

## 2020-07-07 DIAGNOSIS — G47.33 OSA (OBSTRUCTIVE SLEEP APNEA): Primary | ICD-10-CM

## 2020-07-13 DIAGNOSIS — E11.69 TYPE 2 DIABETES MELLITUS WITH OTHER SPECIFIED COMPLICATION, WITHOUT LONG-TERM CURRENT USE OF INSULIN (HCC): ICD-10-CM

## 2020-07-15 ENCOUNTER — TELEPHONE (OUTPATIENT)
Dept: OTHER | Facility: HOSPITAL | Age: 55
End: 2020-07-15

## 2020-07-15 DIAGNOSIS — E11.65 UNCONTROLLED TYPE 2 DIABETES MELLITUS WITH HYPERGLYCEMIA (HCC): ICD-10-CM

## 2020-07-15 RX ORDER — INSULIN ASPART 100 [IU]/ML
INJECTION, SOLUTION INTRAVENOUS; SUBCUTANEOUS
Qty: 5 PEN | Refills: 0
Start: 2020-07-15 | End: 2021-07-26

## 2020-07-15 NOTE — TELEPHONE ENCOUNTER
Reviewed blood sugars submitted from 07/06 through 7/13  Blood sugars appear to be improving but still consistently running above 200  Increase NovoLog to 25 units with each meal   Continue current Lantus dose  Send in blood sugars again in 1-2 weeks for review

## 2020-07-24 ENCOUNTER — TELEPHONE (OUTPATIENT)
Dept: PULMONOLOGY | Facility: CLINIC | Age: 55
End: 2020-07-24

## 2020-07-24 DIAGNOSIS — J44.1 COPD WITH ACUTE EXACERBATION (HCC): ICD-10-CM

## 2020-07-24 RX ORDER — PREDNISONE 10 MG/1
TABLET ORAL
Qty: 30 TABLET | Refills: 0 | Status: SHIPPED | OUTPATIENT
Start: 2020-07-24 | End: 2020-09-16 | Stop reason: ALTCHOICE

## 2020-07-24 NOTE — TELEPHONE ENCOUNTER
Pt called c/o tightness and more sob  Denies any fever or mucous  Last few days of O2 levels are ranging from 87-89, lowest at 84  Pt reqeusting steroid pack for weekend  Will forward to provider

## 2020-07-24 NOTE — TELEPHONE ENCOUNTER
Patient experiencing symptoms of a COPD exacerbation  Will send prednisone taper to pharmacy  Patient was instructed to go the emergency department if he has any further worsening symptoms

## 2020-07-26 DIAGNOSIS — R09.02 HYPOXEMIA REQUIRING SUPPLEMENTAL OXYGEN: ICD-10-CM

## 2020-07-26 DIAGNOSIS — Z99.81 HYPOXEMIA REQUIRING SUPPLEMENTAL OXYGEN: ICD-10-CM

## 2020-07-26 DIAGNOSIS — Z99.89 OSA ON CPAP: ICD-10-CM

## 2020-07-26 DIAGNOSIS — G47.33 OSA ON CPAP: ICD-10-CM

## 2020-07-27 DIAGNOSIS — J45.40 MODERATE PERSISTENT ASTHMA WITHOUT COMPLICATION: ICD-10-CM

## 2020-07-27 RX ORDER — SPIRONOLACTONE 50 MG/1
50 TABLET, FILM COATED ORAL DAILY
Qty: 30 TABLET | Refills: 0 | Status: SHIPPED | OUTPATIENT
Start: 2020-07-27 | End: 2020-09-01 | Stop reason: SDUPTHER

## 2020-07-28 DIAGNOSIS — E11.65 UNCONTROLLED TYPE 2 DIABETES MELLITUS WITH HYPERGLYCEMIA (HCC): ICD-10-CM

## 2020-07-28 RX ORDER — IPRATROPIUM BROMIDE AND ALBUTEROL SULFATE 2.5; .5 MG/3ML; MG/3ML
SOLUTION RESPIRATORY (INHALATION)
Qty: 360 ML | Refills: 0 | Status: SHIPPED | OUTPATIENT
Start: 2020-07-28 | End: 2020-11-10 | Stop reason: SDUPTHER

## 2020-08-09 DIAGNOSIS — F41.9 ANXIETY AND DEPRESSION: ICD-10-CM

## 2020-08-09 DIAGNOSIS — F32.A ANXIETY AND DEPRESSION: ICD-10-CM

## 2020-08-09 DIAGNOSIS — I10 ESSENTIAL HYPERTENSION: ICD-10-CM

## 2020-08-09 DIAGNOSIS — E78.49 OTHER HYPERLIPIDEMIA: ICD-10-CM

## 2020-08-10 RX ORDER — AMLODIPINE BESYLATE 10 MG/1
10 TABLET ORAL DAILY
Qty: 30 TABLET | Refills: 0 | Status: SHIPPED | OUTPATIENT
Start: 2020-08-10 | End: 2020-09-06 | Stop reason: SDUPTHER

## 2020-08-10 RX ORDER — ATORVASTATIN CALCIUM 40 MG/1
40 TABLET, FILM COATED ORAL DAILY
Qty: 30 TABLET | Refills: 0 | Status: SHIPPED | OUTPATIENT
Start: 2020-08-10 | End: 2020-09-06 | Stop reason: SDUPTHER

## 2020-08-31 DIAGNOSIS — G47.33 OSA ON CPAP: ICD-10-CM

## 2020-08-31 DIAGNOSIS — R09.02 HYPOXEMIA REQUIRING SUPPLEMENTAL OXYGEN: ICD-10-CM

## 2020-08-31 DIAGNOSIS — Z99.89 OSA ON CPAP: ICD-10-CM

## 2020-08-31 DIAGNOSIS — Z99.81 HYPOXEMIA REQUIRING SUPPLEMENTAL OXYGEN: ICD-10-CM

## 2020-09-01 DIAGNOSIS — G47.33 OSA ON CPAP: ICD-10-CM

## 2020-09-01 DIAGNOSIS — Z99.89 OSA ON CPAP: ICD-10-CM

## 2020-09-01 DIAGNOSIS — R09.02 HYPOXEMIA REQUIRING SUPPLEMENTAL OXYGEN: ICD-10-CM

## 2020-09-01 DIAGNOSIS — Z99.81 HYPOXEMIA REQUIRING SUPPLEMENTAL OXYGEN: ICD-10-CM

## 2020-09-01 RX ORDER — SPIRONOLACTONE 50 MG/1
50 TABLET, FILM COATED ORAL DAILY
Qty: 30 TABLET | Refills: 2 | Status: SHIPPED | OUTPATIENT
Start: 2020-09-01 | End: 2020-12-01 | Stop reason: SDUPTHER

## 2020-09-01 RX ORDER — SPIRONOLACTONE 50 MG/1
TABLET, FILM COATED ORAL
Qty: 30 TABLET | Refills: 0 | OUTPATIENT
Start: 2020-09-01

## 2020-09-02 ENCOUNTER — TELEPHONE (OUTPATIENT)
Dept: ADMINISTRATIVE | Facility: OTHER | Age: 55
End: 2020-09-02

## 2020-09-02 ENCOUNTER — APPOINTMENT (OUTPATIENT)
Dept: LAB | Facility: MEDICAL CENTER | Age: 55
End: 2020-09-02
Payer: COMMERCIAL

## 2020-09-02 ENCOUNTER — OFFICE VISIT (OUTPATIENT)
Dept: FAMILY MEDICINE CLINIC | Facility: CLINIC | Age: 55
End: 2020-09-02
Payer: COMMERCIAL

## 2020-09-02 ENCOUNTER — TELEPHONE (OUTPATIENT)
Dept: FAMILY MEDICINE CLINIC | Facility: CLINIC | Age: 55
End: 2020-09-02

## 2020-09-02 VITALS
TEMPERATURE: 97 F | HEART RATE: 76 BPM | DIASTOLIC BLOOD PRESSURE: 82 MMHG | SYSTOLIC BLOOD PRESSURE: 148 MMHG | WEIGHT: 315 LBS | OXYGEN SATURATION: 93 % | BODY MASS INDEX: 46.65 KG/M2 | HEIGHT: 69 IN

## 2020-09-02 DIAGNOSIS — E78.5 DYSLIPIDEMIA, GOAL LDL BELOW 100: ICD-10-CM

## 2020-09-02 DIAGNOSIS — E11.65 UNCONTROLLED TYPE 2 DIABETES MELLITUS WITH HYPERGLYCEMIA (HCC): ICD-10-CM

## 2020-09-02 DIAGNOSIS — Z23 NEED FOR VACCINATION: Primary | ICD-10-CM

## 2020-09-02 DIAGNOSIS — E66.01 MORBID OBESITY WITH BMI OF 50.0-59.9, ADULT (HCC): ICD-10-CM

## 2020-09-02 LAB
ALBUMIN SERPL BCP-MCNC: 3.5 G/DL (ref 3.5–5)
ALP SERPL-CCNC: 76 U/L (ref 46–116)
ALT SERPL W P-5'-P-CCNC: 40 U/L (ref 12–78)
ANION GAP SERPL CALCULATED.3IONS-SCNC: 7 MMOL/L (ref 4–13)
AST SERPL W P-5'-P-CCNC: 14 U/L (ref 5–45)
BASOPHILS # BLD AUTO: 0.05 THOUSANDS/ΜL (ref 0–0.1)
BASOPHILS NFR BLD AUTO: 1 % (ref 0–1)
BILIRUB SERPL-MCNC: 0.61 MG/DL (ref 0.2–1)
BUN SERPL-MCNC: 20 MG/DL (ref 5–25)
CALCIUM SERPL-MCNC: 8.8 MG/DL (ref 8.3–10.1)
CHLORIDE SERPL-SCNC: 100 MMOL/L (ref 100–108)
CHOLEST SERPL-MCNC: 108 MG/DL (ref 50–200)
CO2 SERPL-SCNC: 27 MMOL/L (ref 21–32)
CREAT SERPL-MCNC: 0.84 MG/DL (ref 0.6–1.3)
CREAT UR-MCNC: 60.6 MG/DL
EOSINOPHIL # BLD AUTO: 0.21 THOUSAND/ΜL (ref 0–0.61)
EOSINOPHIL NFR BLD AUTO: 2 % (ref 0–6)
ERYTHROCYTE [DISTWIDTH] IN BLOOD BY AUTOMATED COUNT: 12.8 % (ref 11.6–15.1)
EST. AVERAGE GLUCOSE BLD GHB EST-MCNC: 260 MG/DL
GFR SERPL CREATININE-BSD FRML MDRD: 98 ML/MIN/1.73SQ M
GLUCOSE P FAST SERPL-MCNC: 315 MG/DL (ref 65–99)
HBA1C MFR BLD: 10.7 %
HCT VFR BLD AUTO: 44.1 % (ref 36.5–49.3)
HDLC SERPL-MCNC: 23 MG/DL
HGB BLD-MCNC: 14.9 G/DL (ref 12–17)
IMM GRANULOCYTES # BLD AUTO: 0.07 THOUSAND/UL (ref 0–0.2)
IMM GRANULOCYTES NFR BLD AUTO: 1 % (ref 0–2)
LDLC SERPL CALC-MCNC: 24 MG/DL (ref 0–100)
LYMPHOCYTES # BLD AUTO: 1.25 THOUSANDS/ΜL (ref 0.6–4.47)
LYMPHOCYTES NFR BLD AUTO: 14 % (ref 14–44)
MCH RBC QN AUTO: 29.6 PG (ref 26.8–34.3)
MCHC RBC AUTO-ENTMCNC: 33.8 G/DL (ref 31.4–37.4)
MCV RBC AUTO: 88 FL (ref 82–98)
MICROALBUMIN UR-MCNC: 26.4 MG/L (ref 0–20)
MICROALBUMIN/CREAT 24H UR: 44 MG/G CREATININE (ref 0–30)
MONOCYTES # BLD AUTO: 0.74 THOUSAND/ΜL (ref 0.17–1.22)
MONOCYTES NFR BLD AUTO: 8 % (ref 4–12)
NEUTROPHILS # BLD AUTO: 6.84 THOUSANDS/ΜL (ref 1.85–7.62)
NEUTS SEG NFR BLD AUTO: 74 % (ref 43–75)
NRBC BLD AUTO-RTO: 0 /100 WBCS
PLATELET # BLD AUTO: 259 THOUSANDS/UL (ref 149–390)
PMV BLD AUTO: 10 FL (ref 8.9–12.7)
POTASSIUM SERPL-SCNC: 4.5 MMOL/L (ref 3.5–5.3)
PROT SERPL-MCNC: 7.2 G/DL (ref 6.4–8.2)
RBC # BLD AUTO: 5.03 MILLION/UL (ref 3.88–5.62)
SODIUM SERPL-SCNC: 134 MMOL/L (ref 136–145)
TRIGL SERPL-MCNC: 307 MG/DL
TSH SERPL DL<=0.05 MIU/L-ACNC: 2.36 UIU/ML (ref 0.36–3.74)
WBC # BLD AUTO: 9.16 THOUSAND/UL (ref 4.31–10.16)

## 2020-09-02 PROCEDURE — 85025 COMPLETE CBC W/AUTO DIFF WBC: CPT

## 2020-09-02 PROCEDURE — 82043 UR ALBUMIN QUANTITATIVE: CPT

## 2020-09-02 PROCEDURE — 90471 IMMUNIZATION ADMIN: CPT | Performed by: PHYSICIAN ASSISTANT

## 2020-09-02 PROCEDURE — 36415 COLL VENOUS BLD VENIPUNCTURE: CPT

## 2020-09-02 PROCEDURE — 80053 COMPREHEN METABOLIC PANEL: CPT

## 2020-09-02 PROCEDURE — 80061 LIPID PANEL: CPT

## 2020-09-02 PROCEDURE — 99213 OFFICE O/P EST LOW 20 MIN: CPT | Performed by: PHYSICIAN ASSISTANT

## 2020-09-02 PROCEDURE — 3060F POS MICROALBUMINURIA REV: CPT | Performed by: INTERNAL MEDICINE

## 2020-09-02 PROCEDURE — 84443 ASSAY THYROID STIM HORMONE: CPT

## 2020-09-02 PROCEDURE — 83036 HEMOGLOBIN GLYCOSYLATED A1C: CPT

## 2020-09-02 PROCEDURE — 3046F HEMOGLOBIN A1C LEVEL >9.0%: CPT | Performed by: INTERNAL MEDICINE

## 2020-09-02 PROCEDURE — 3725F SCREEN DEPRESSION PERFORMED: CPT | Performed by: PHYSICIAN ASSISTANT

## 2020-09-02 PROCEDURE — 90682 RIV4 VACC RECOMBINANT DNA IM: CPT | Performed by: PHYSICIAN ASSISTANT

## 2020-09-02 PROCEDURE — 82570 ASSAY OF URINE CREATININE: CPT

## 2020-09-02 NOTE — TELEPHONE ENCOUNTER
----- Message from Amada Sanders sent at 9/2/2020  8:41 AM EDT -----  Regarding: Diabetic foot exam-TFP  09/02/20 8:41 AM    Hello, our patient Tanesha Fields has had Diabetic Foot Exam completed/performed  Please assist in updating the patient chart by making an External outreach to Dr Bo Skiff (endocrinology) facility located in [place], [place] The date of service is within past year      Thank you,  Amada Sanders  [facility]

## 2020-09-02 NOTE — TELEPHONE ENCOUNTER
----- Message from Nereyda Perkins sent at 9/2/2020  8:40 AM EDT -----  Regarding: Diabetic eye exam-TFP  09/02/20 8:40 AM    Hello, our patient Siri Hanson has had Diabetic Eye Exam completed/performed   Please assist in updating the patient chart by making an External outreach to Dr Sol Garibay, eyecare specialists facility located in [place], [place] The date of service is within past year    Thank you,  Nereyda Perkins  [facility]

## 2020-09-02 NOTE — TELEPHONE ENCOUNTER
Thank him for the update!          1----- Message from Angelika Anaya sent at 9/2/2020 11:22 AM EDT -----  Regarding: FW: Non-Urgent Medical Question  Contact: [phone]    ----- Message -----  From: Kolton Bennett  Sent: 9/2/2020  11:19 AM EDT  To: [facility]  Subject: Non-Urgent Medical Question                      Hi Doctor I just wanted to let you know that after I took my mask off my O2 level came up to 93%   I know you were concerned so I thought I'd share it with you

## 2020-09-02 NOTE — TELEPHONE ENCOUNTER
Upon review of your request/inquiry, we have noted that you are requesting us to reach out to a [facility] Provider,(Dr Max montez) because of this we are requesting that you forward this request/concern to the [employer] email  The Quality team members assigned to this email will be more than happy to assist you  Any additional questions or concerns should be emailed to the Practice Liaisons via [employer] email, please do not reply via In Basket       Thank you  Ramin Guzman

## 2020-09-02 NOTE — LETTER
Diabetic Eye Exam Form    Date Requested: 20  Patient: Chun Tran  Patient : 1965   Referring Provider: Cristina Salinas PA-C    Dilated Retinal Exam, Optomap-Iris Exam, or Fundus Photography Done            IF Yes ([place] one, Type above)         No     Date of Diabetic Eye Exam ______________________________  Left Eye      Exam did show retinopathy    Exam did not show retinopathy         Mild       Moderate       None       Proliferative       Severe     Right Eye     Exam did show retinopathy    Exam did not show retinopathy         Mild       Moderate       None       Proliferative       Severe     Comments __________________________________________________________    Practice Providing Exam ______________________________________________    Exam Performed By (print name) _______________________________________      Provider Signature ___________________________________________________      These reports are needed for  compliance    Please fax this completed form and a copy of the Diabetic Eye Exam report to our office located at [place] [address] as soon as possible to [phone] attention Ana Maria: Phone [phone]    We thank you for your assistance in treating our mutual patient

## 2020-09-02 NOTE — PROGRESS NOTES
Assessment/Plan:    Problem List Items Addressed This Visit        Other    Morbid obesity with BMI of 50 0-59 9, adult ([facility] )      Other Visit Diagnoses     Need for vaccination    -  Primary    Relevant Orders    influenza vaccine, quadrivalent, recombinant, PF, 0 5 mL, for patients 18 yr+ ([facility]) (Completed)           Diagnoses and all orders for this visit:    Need for vaccination  -     influenza vaccine, quadrivalent, recombinant, PF, 0 5 mL, for patients 18 yr+ (FLUBLOK)    Morbid obesity with BMI of 50 0-59 9, adult ([facility] )        Did discuss his weight and that he gained more since his last OV  He has information at home to call weight management, he will call them himself  Subjective:      Patient ID: Poly Barker is a 63 y o  male  Barnet Bar is here today for a 3 month follow up appointments  He does not have any new complaints  He is doing labs today for his endocrinologist, therefore is refusing a FS A1C in office today  He sees a podiatrist and is also refusing a foot exam today, will send [employer] message  Pt refuses IRIS as he sees an eye specialist in [place], will send [employer] message  Pt has information at home to call GI and reschedule colonoscopy, states he will do that himself  The following portions of the patient's history were reviewed and updated as appropriate:   He has a past medical history of Aortic stenosis, Diabetes ([facility] ), Hyperlipidemia, and Hypertension (7/2/2014)  ,  does not have any pertinent problems on file  ,   has a past surgical history that includes Appendectomy  ,  family history includes No Known Problems in his mother  ,   reports that he has quit smoking  His smoking use included cigarettes  He quit after 10 00 years of use  He has never used smokeless tobacco  He reports that he does not drink alcohol  No history on file for drug ,  is allergic to theophylline     Current Outpatient Medications   Medication Sig Dispense Refill    amLODIPine (NORVASC) 10 mg tablet Take 1 tablet (10 mg total) by mouth daily 30 tablet 0    atorvastatin (LIPITOR) 40 mg tablet Take 1 tablet (40 mg total) by mouth daily 30 tablet 0    Blood Glucose Monitoring Suppl (CONTOUR NEXT MONITOR) w/Device KIT Test blood sugar once daily or as needed for fluctuating blood sugars 1 kit 0    budesonide-formoterol (SYMBICORT) 160-4 5 mcg/act inhaler Inhale 2 puffs 2 (two) times a day Rinse mouth after use   1 Inhaler 3    cyclobenzaprine (FLEXERIL) 10 mg tablet Take 10 mg by mouth      glucose blood (CONTOUR NEXT TEST) test strip Test blood sugar once daily or as needed for fluctuating blood sugars 100 each 3    hydrochlorothiazide (HYDRODIURIL) 25 mg tablet TAKE 1 TABLET DAILY 30 tablet 2    Insulin Aspart FlexPen (NovoLOG FlexPen) 100 UNIT/ML SOPN 25 units with each meal 5 pen 0    insulin glargine (Lantus SoloStar) 100 units/mL injection pen 55 units qhs 20 pen 1    Insulin Pen Needle (BD Pen Needle Dee Dee U/F) 32G X 4 MM MISC Use 4 a day 200 each 5    ipratropium-albuterol (DUO-NEB) 0 5-2 5 mg/3 mL nebulizer solution USE 1 AMPULE IN NEBULIZER 4 TIMES DAILY 360 mL 0    Lancets MISC Test blood sugar once daily or as needed for fluctuating blood sugars 100 each 0    losartan (COZAAR) 100 MG tablet Take 1 tablet (100 mg total) by mouth daily 30 tablet 0    meloxicam (MOBIC) 15 mg tablet Take 15 mg by mouth      metFORMIN (GLUCOPHAGE) 1000 MG tablet Take 1 tablet (1,000 mg total) by mouth 2 (two) times a day with meals 180 tablet 1    montelukast (SINGULAIR) 10 mg tablet       Multiple Vitamins-Minerals (MENS MULTIVITAMIN PO) Take by mouth      sertraline (ZOLOFT) 50 mg tablet Take 1 tablet (50 mg total) by mouth daily 30 tablet 0    spironolactone (ALDACTONE) 50 mg tablet Take 1 tablet (50 mg total) by mouth daily 30 tablet 2    albuterol (VENTOLIN HFA) 90 mcg/act inhaler Inhale 2 puffs every 6 (six) hours as needed for wheezing (Patient not taking: Reported on 7/1/2020) 1 Inhaler 11    carvedilol (COREG) 12 5 mg tablet Take 1 tablet (12 5 mg total) by mouth 2 (two) times a day with meals BEGIN W/ 1/2 PO BID X 2 WEEKS, THEN INCREASE TO 1 PO BID (Patient not taking: Reported on 7/1/2020) 60 tablet 11    cetirizine (ZYRTEC ALLERGY) 10 mg tablet Take 10 mg by mouth      predniSONE 10 mg tablet Take 40 mg daily for 3 days then 30 mg daily for 3 days then 20 mg daily for 3 days then 10 mg daily for 3 days (Patient not taking: Reported on 9/2/2020) 30 tablet 0     No current facility-administered medications for this visit  Review of Systems   Constitutional: Negative for activity change, appetite change, chills, diaphoresis, fatigue, fever and unexpected weight change  HENT: Negative for congestion, ear pain, postnasal drip, rhinorrhea, sinus pressure, sinus pain, sneezing, sore throat, tinnitus and voice change  Eyes: Negative for pain, redness and visual disturbance  Respiratory: Negative for cough, chest tightness, shortness of breath and wheezing  Cardiovascular: Negative for chest pain, palpitations and leg swelling  Gastrointestinal: Negative for abdominal pain, blood in stool, constipation, diarrhea, nausea and vomiting  Genitourinary: Negative for difficulty urinating, dysuria, frequency, hematuria and urgency  Musculoskeletal: Negative for arthralgias, back pain, gait problem, joint swelling, myalgias, neck pain and neck stiffness  Skin: Negative for color change, pallor, rash and wound  Neurological: Negative for dizziness, tremors, weakness, light-headedness and headaches  Psychiatric/Behavioral: Negative for dysphoric mood, self-injury, sleep disturbance and suicidal ideas  The patient is not nervous/anxious  Objective:  Vitals:    09/02/20 0844   BP: 148/82   Pulse: 76   Temp: (!) 97 °F (36 1 °C)   SpO2: 93%   Weight: (!) 171 kg (376 lb 12 8 oz)   Height: 5' 9" (1 753 m)     Body mass index is 55 64 kg/m²  Physical Exam  Vitals signs reviewed  Constitutional:       General: He is not in acute distress  Appearance: He is well-developed  He is obese  He is not diaphoretic  HENT:      Head: Normocephalic and atraumatic  Right Ear: Hearing, tympanic membrane, ear canal and external ear normal       Left Ear: Hearing, tympanic membrane, ear canal and external ear normal       Mouth/Throat:      Pharynx: Uvula midline  No oropharyngeal exudate  Eyes:      General: No scleral icterus  Right eye: No discharge  Left eye: No discharge  Conjunctiva/sclera: Conjunctivae normal    Neck:      Musculoskeletal: Neck supple  Thyroid: No thyromegaly  Vascular: No carotid bruit  Cardiovascular:      Rate and Rhythm: Normal rate and regular rhythm  Heart sounds: Normal heart sounds  No murmur  Pulmonary:      Effort: Pulmonary effort is normal  No respiratory distress  Breath sounds: Normal breath sounds  No wheezing  Abdominal:      General: Bowel sounds are normal  There is no distension  Palpations: Abdomen is soft  There is no mass  Tenderness: There is no abdominal tenderness  There is no guarding or rebound  Musculoskeletal: Normal range of motion  General: No tenderness  Lymphadenopathy:      Cervical: No cervical adenopathy  Skin:     General: Skin is warm and dry  Findings: No erythema or rash  Neurological:      Mental Status: He is alert and oriented to person, place, and time  Psychiatric:         Behavior: Behavior normal          Thought Content:  Thought content normal          Judgment: Judgment normal

## 2020-09-02 NOTE — TELEPHONE ENCOUNTER
Upon review of the In Basket request and the patient's chart,  DM eye-    Initial outreach has been made via fax, please see Contacts section for details  A second outreach attempt will be made within 5 business days      Thank you  Diane Borja

## 2020-09-05 DIAGNOSIS — I10 ESSENTIAL HYPERTENSION: ICD-10-CM

## 2020-09-05 DIAGNOSIS — F41.9 ANXIETY AND DEPRESSION: ICD-10-CM

## 2020-09-05 DIAGNOSIS — E78.49 OTHER HYPERLIPIDEMIA: ICD-10-CM

## 2020-09-05 DIAGNOSIS — F32.A ANXIETY AND DEPRESSION: ICD-10-CM

## 2020-09-06 DIAGNOSIS — I10 ESSENTIAL HYPERTENSION: ICD-10-CM

## 2020-09-06 DIAGNOSIS — E78.49 OTHER HYPERLIPIDEMIA: ICD-10-CM

## 2020-09-06 DIAGNOSIS — F32.A ANXIETY AND DEPRESSION: ICD-10-CM

## 2020-09-06 DIAGNOSIS — F41.9 ANXIETY AND DEPRESSION: ICD-10-CM

## 2020-09-08 RX ORDER — AMLODIPINE BESYLATE 10 MG/1
10 TABLET ORAL DAILY
Qty: 30 TABLET | Refills: 2 | Status: SHIPPED | OUTPATIENT
Start: 2020-09-08 | End: 2020-12-15 | Stop reason: SDUPTHER

## 2020-09-08 RX ORDER — ATORVASTATIN CALCIUM 40 MG/1
TABLET, FILM COATED ORAL
Qty: 30 TABLET | Refills: 0 | OUTPATIENT
Start: 2020-09-08

## 2020-09-08 RX ORDER — ATORVASTATIN CALCIUM 40 MG/1
40 TABLET, FILM COATED ORAL DAILY
Qty: 30 TABLET | Refills: 2 | Status: SHIPPED | OUTPATIENT
Start: 2020-09-08 | End: 2020-12-15 | Stop reason: SDUPTHER

## 2020-09-08 RX ORDER — AMLODIPINE BESYLATE 10 MG/1
TABLET ORAL
Qty: 30 TABLET | Refills: 0 | OUTPATIENT
Start: 2020-09-08

## 2020-09-09 NOTE — TELEPHONE ENCOUNTER
Upon review of the In Basket request we were able to locate, review, and update the patient chart as requested for Diabetic Eye Exam     Any additional questions or concerns should be emailed to the Practice Liaisons via [email or web address] email, please do not reply via In Basket      Thank you  Everette Cushing

## 2020-09-11 ENCOUNTER — TELEPHONE (OUTPATIENT)
Dept: PULMONOLOGY | Facility: CLINIC | Age: 55
End: 2020-09-11

## 2020-09-11 NOTE — TELEPHONE ENCOUNTER
Pt left a message c/o temp changes causing him to become more stuffy and producing more phlegm  Pt requesting steroids and also refill on his beta-blocker   Provider to be notified

## 2020-09-11 NOTE — TELEPHONE ENCOUNTER
We have on his chart that it was discontinued as of 7/1  Recommend he call Dr Loan Montalvo as it is a cardiac medication to see if he is still even supposed to be taking it anymore? If so, cardio should fill it

## 2020-09-11 NOTE — TELEPHONE ENCOUNTER
Pt called requesting a refill for Carvedilol 12 5 mg daily  Luli Dietrich he is unable to get in touch w/ the dr Katya An prescribes it and was wondering if you would give him a refill?

## 2020-09-14 DIAGNOSIS — I51.89 DIASTOLIC DYSFUNCTION WITHOUT HEART FAILURE: ICD-10-CM

## 2020-09-14 RX ORDER — CARVEDILOL 12.5 MG/1
12.5 TABLET ORAL 2 TIMES DAILY WITH MEALS
Qty: 180 TABLET | Refills: 3 | Status: SHIPPED | OUTPATIENT
Start: 2020-09-14 | End: 2021-10-06 | Stop reason: SDUPTHER

## 2020-09-16 ENCOUNTER — OFFICE VISIT (OUTPATIENT)
Dept: PULMONOLOGY | Facility: CLINIC | Age: 55
End: 2020-09-16
Payer: COMMERCIAL

## 2020-09-16 VITALS
OXYGEN SATURATION: 90 % | BODY MASS INDEX: 46.65 KG/M2 | SYSTOLIC BLOOD PRESSURE: 108 MMHG | HEIGHT: 69 IN | HEART RATE: 89 BPM | WEIGHT: 315 LBS | DIASTOLIC BLOOD PRESSURE: 78 MMHG

## 2020-09-16 DIAGNOSIS — I10 ESSENTIAL HYPERTENSION: ICD-10-CM

## 2020-09-16 DIAGNOSIS — F17.211 CIGARETTE NICOTINE DEPENDENCE IN REMISSION: ICD-10-CM

## 2020-09-16 DIAGNOSIS — I35.0 NONRHEUMATIC AORTIC VALVE STENOSIS: ICD-10-CM

## 2020-09-16 DIAGNOSIS — J96.11 CHRONIC HYPOXEMIC RESPIRATORY FAILURE (HCC): ICD-10-CM

## 2020-09-16 DIAGNOSIS — E66.01 MORBID OBESITY WITH BMI OF 50.0-59.9, ADULT (HCC): ICD-10-CM

## 2020-09-16 DIAGNOSIS — J44.9 COPD, SEVERE (HCC): Primary | ICD-10-CM

## 2020-09-16 DIAGNOSIS — G47.33 OSA (OBSTRUCTIVE SLEEP APNEA): ICD-10-CM

## 2020-09-16 PROCEDURE — 3078F DIAST BP <80 MM HG: CPT | Performed by: INTERNAL MEDICINE

## 2020-09-16 PROCEDURE — 99214 OFFICE O/P EST MOD 30 MIN: CPT | Performed by: INTERNAL MEDICINE

## 2020-09-16 PROCEDURE — 1036F TOBACCO NON-USER: CPT | Performed by: INTERNAL MEDICINE

## 2020-09-16 PROCEDURE — 3074F SYST BP LT 130 MM HG: CPT | Performed by: INTERNAL MEDICINE

## 2020-09-16 RX ORDER — HYDROCHLOROTHIAZIDE 25 MG/1
25 TABLET ORAL DAILY
Qty: 30 TABLET | Refills: 0 | Status: SHIPPED | OUTPATIENT
Start: 2020-09-16 | End: 2020-11-09 | Stop reason: SDUPTHER

## 2020-09-16 NOTE — ASSESSMENT & PLAN NOTE
No evidence of acute exacerbation at this time  No need for systemic steroids  Based on patient's frequency of exacerbation however I do believe that he requires increased maintenance therapy  I have instructed him to discontinue Symbicort and instead I have provided him samples of Trelegy Ellipta 1 puff daily  Samples were logged in provided patient  He demonstrated good technique in my presence  I have asked him to give me a call in 2 weeks to let me know his thoughts on trilogy at which point I will sent to the pharmacy if he finds it beneficial   Continue albuterol nebulizer/HFA q 6 hours p r n  Leia Reed Patient is up-to-date pneumococcal and influenza vaccination

## 2020-09-16 NOTE — ASSESSMENT & PLAN NOTE
Continue supplemental oxygen at 2 liters/minute with activities  Keep SpO2 greater than or equal to 88%  Increased activity was recommended  Patient's exercise tolerance is adequate at this time but could benefit from pulmonary rehab in the future

## 2020-09-16 NOTE — PROGRESS NOTES
Pulmonary Follow Up Note   Wolfgang Stark 63 y o  male MRN: [id number]  9/16/2020      Assessment:    COPD, severe ([address] )  No evidence of acute exacerbation at this time  No need for systemic steroids  Based on patient's frequency of exacerbation however I do believe that he requires increased maintenance therapy  I have instructed him to discontinue Symbicort and instead I have provided him samples of Trelegy Ellipta 1 puff daily  Samples were logged in provided patient  He demonstrated good technique in my presence  I have asked him to give me a call in 2 weeks to let me know his thoughts on trilogy at which point I will sent to the pharmacy if he finds it beneficial   Continue albuterol nebulizer/HFA q 6 hours p r n  Ryan Emmons Patient is up-to-date pneumococcal and influenza vaccination  Chronic hypoxemic respiratory failure (HCC)  Continue supplemental oxygen at 2 liters/minute with activities  Keep SpO2 greater than or equal to 88%  Increased activity was recommended  Patient's exercise tolerance is adequate at this time but could benefit from pulmonary rehab in the future  Aortic stenosis  Likely contributing to patient's breathlessness  Medical management per Cardiology  Morbid obesity with BMI of 50 0-59 9, adult [facility])  Patient needs massive weight loss  Discussed diet and exercise modification today in the office  Cigarette nicotine dependence in remission  Remains committed to abstinence  Patient qualifies for low-dose lung cancer screening CT that had not been scheduled from last appointment  Will have my medical assistant help schedule now  Will call him with results  SCOTT (obstructive sleep apnea)  Continue CPAP q h s     Patient's machine is well over 8years old and he is meant to have CPAP study scheduled post pandemic to confirm SCOTT and get him a new CPAP  This is yet to be scheduled so he will continue on his current CPAP for now      Essential hypertension  Will refill patient's HCTZ for 1 month however will defer further management to PCP/Cardiology  Plan:    Diagnoses and all orders for this visit:    COPD, severe ([address] )    Chronic hypoxemic respiratory failure (HCC)  -     hydrochlorothiazide (HYDRODIURIL) 25 mg tablet; Take 1 tablet (25 mg total) by mouth daily    Nonrheumatic aortic valve stenosis    Morbid obesity with BMI of 50 0-59 9, adult ([facility])    Cigarette nicotine dependence in remission    SCOTT (obstructive sleep apnea)    Essential hypertension        Return in about 3 months (around 12/16/2020)  History of Present Illness   HPI:  Kolton Bennett is a 63 y o  male who presents to the office today for sick visit  Patient has past medical history positive for severe COPD, SCOTT, aortic stenosis, hypertension, hyperlipidemia, and morbid obesity  Patient called the office 5 days ago stating that since there is a change in temperature/increased rain he had fell increased chest tightness, wheeze, stuffiness, and cough productive of clear phlegm  He is scheduled for sick visit today where he states all the symptoms have resolved and he feels back to his baseline  Per chart review however it appears patient has had similar symptoms almost every 2 months this year requiring steroid and/or antibiotic  Patient has severe COPD and has been maintained on Symbicort 2 puffs twice daily  He tells me that he is intolerant to it taking it that frequently stating that it makes his cough worse so he has been skipping every other day  He has previously been on [facility] which she felt was ineffective  Patient reports using his nebulizer today with albuterol and has not needed the use of his rescue inhaler at all on the last couple of weeks  Patient wears 2 L nasal cannula with activity  Patient also has SCOTT for which he wear CPAP and reports compliance  Patient denies fevers, chills, night sweats, dizziness, abnormal weight loss    Notes dyspnea on exertion but denies shortness of breath at rest   No chest pain, pleurisy, palpitations  He does have increased leg swelling for which he states hydrochlorothiazide helps  Reports a seldom cough that is mostly dry but sometimes productive of clear sputum  No hemoptysis or wheeze currently  Patient is a former smoker with a quit date 10 years ago  Prior to that he had a 30 pack year history  Review of Systems   All other systems reviewed and are negative        Historical Information   Past Medical History:   Diagnosis Date    Aortic stenosis     Diabetes ([address] )     Hyperlipidemia     Hypertension 7/2/2014     Past Surgical History:   Procedure Laterality Date    APPENDECTOMY       Family History   Problem Relation Age of Onset    No Known Problems Mother        Social History     Tobacco Use   Smoking Status Former Smoker    Years: 10 00    Types: Cigarettes   Smokeless Tobacco Never Used         Meds/Allergies     Current Outpatient Medications:     albuterol (VENTOLIN HFA) 90 mcg/act inhaler, Inhale 2 puffs every 6 (six) hours as needed for wheezing, Disp: 1 Inhaler, Rfl: 11    amLODIPine (NORVASC) 10 mg tablet, Take 1 tablet (10 mg total) by mouth daily, Disp: 30 tablet, Rfl: 2    atorvastatin (LIPITOR) 40 mg tablet, Take 1 tablet (40 mg total) by mouth daily, Disp: 30 tablet, Rfl: 2    Blood Glucose Monitoring Suppl (CONTOUR NEXT MONITOR) w/Device KIT, Test blood sugar once daily or as needed for fluctuating blood sugars, Disp: 1 kit, Rfl: 0    budesonide-formoterol (SYMBICORT) 160-4 5 mcg/act inhaler, Inhale 2 puffs 2 (two) times a day Rinse mouth after use , Disp: 1 Inhaler, Rfl: 3    carvedilol (COREG) 12 5 mg tablet, Take 1 tablet (12 5 mg total) by mouth 2 (two) times a day with meals, Disp: 180 tablet, Rfl: 3    cetirizine (ZYRTEC ALLERGY) 10 mg tablet, Take 10 mg by mouth, Disp: , Rfl:     cyclobenzaprine (FLEXERIL) 10 mg tablet, Take 10 mg by mouth, Disp: , Rfl:     glucose blood (CONTOUR NEXT TEST) test strip, Test blood sugar once daily or as needed for fluctuating blood sugars, Disp: 100 each, Rfl: 3    hydrochlorothiazide (HYDRODIURIL) 25 mg tablet, Take 1 tablet (25 mg total) by mouth daily, Disp: 30 tablet, Rfl: 0    Insulin Aspart FlexPen (NovoLOG FlexPen) 100 UNIT/ML SOPN, 25 units with each meal, Disp: 5 pen, Rfl: 0    insulin glargine (Lantus SoloStar) 100 units/mL injection pen, 55 units qhs, Disp: 20 pen, Rfl: 1    Insulin Pen Needle (BD Pen Needle Dee Dee U/F) 32G X 4 MM MISC, Use 4 a day, Disp: 200 each, Rfl: 5    ipratropium-albuterol (DUO-NEB) 0 5-2 5 mg/3 mL nebulizer solution, USE 1 AMPULE IN NEBULIZER 4 TIMES DAILY, Disp: 360 mL, Rfl: 0    Lancets MISC, Test blood sugar once daily or as needed for fluctuating blood sugars, Disp: 100 each, Rfl: 0    losartan (COZAAR) 100 MG tablet, Take 1 tablet (100 mg total) by mouth daily, Disp: 30 tablet, Rfl: 0    meloxicam (MOBIC) 15 mg tablet, Take 15 mg by mouth, Disp: , Rfl:     metFORMIN (GLUCOPHAGE) 1000 MG tablet, Take 1 tablet (1,000 mg total) by mouth 2 (two) times a day with meals, Disp: 180 tablet, Rfl: 1    montelukast (SINGULAIR) 10 mg tablet, , Disp: , Rfl:     Multiple Vitamins-Minerals (MENS MULTIVITAMIN PO), Take by mouth, Disp: , Rfl:     sertraline (ZOLOFT) 50 mg tablet, Take 1 tablet (50 mg total) by mouth daily, Disp: 30 tablet, Rfl: 2    spironolactone (ALDACTONE) 50 mg tablet, Take 1 tablet (50 mg total) by mouth daily, Disp: 30 tablet, Rfl: 2  Allergies   Allergen Reactions    Theophylline Other (See Comments)     Severe headaches  headaches  Severe headaches         Vitals: Blood pressure 108/78, pulse 89, height 5' 9" (1 753 m), weight (!) 168 kg (371 lb), SpO2 90 %  Body mass index is 54 79 kg/m²  Oxygen Therapy  SpO2: 90 %    Physical Exam  Physical Exam  Vitals signs reviewed  Constitutional:       Appearance: Normal appearance  He is well-developed  He is obese  HENT:      Head: Normocephalic and atraumatic  Right Ear: External ear normal       Left Ear: External ear normal       Nose: Nose normal       Mouth/Throat:      Mouth: Mucous membranes are moist       Pharynx: Oropharynx is clear  Eyes:      Extraocular Movements: Extraocular movements intact  Pupils: Pupils are equal, round, and reactive to light  Neck:      Musculoskeletal: Normal range of motion and neck supple  Cardiovascular:      Rate and Rhythm: Normal rate and regular rhythm  Pulses: Normal pulses  Heart sounds: Murmur present  Pulmonary:      Effort: Pulmonary effort is normal  No respiratory distress  Breath sounds: Normal breath sounds  No stridor  No wheezing, rhonchi or rales  Abdominal:      Palpations: Abdomen is soft  Tenderness: There is no abdominal tenderness  Hernia: No hernia is present  Musculoskeletal: Normal range of motion  General: No tenderness or deformity  Right lower leg: Edema (+1 pitting) present  Left lower leg: Edema (+1 pitting) present  Skin:     General: Skin is warm and dry  Capillary Refill: Capillary refill takes less than 2 seconds  Neurological:      General: No focal deficit present  Mental Status: He is alert and oriented to person, place, and time  Mental status is at baseline  Psychiatric:         Behavior: Behavior normal          Thought Content: Thought content normal          Judgment: Judgment normal          Labs: I have personally reviewed pertinent lab results  , ABG: No results found for: PHART, OTC5QYQ, PO2ART, PCP5DWR, P8LXASLA, BEART, SOURCE, BNP: No results found for: BNP, CBC: No results found for: WBC, HGB, HCT, MCV, PLT, ADJUSTEDWBC, MCH, MCHC, RDW, MPV, NRBC, CMP: No results found for: SODIUM, K, CL, CO2, ANIONGAP, BUN, CREATININE, GLUCOSE, CALCIUM, AST, ALT, ALKPHOS, PROT, BILITOT, EGFR, PT/INR: No results found for: PT, INR, Troponin: No results found for: TROPONINI  Lab Results   Component Value Date    WBC 9 16 09/02/2020    HGB 14 9 09/02/2020    HCT 44 1 09/02/2020    MCV 88 09/02/2020     09/02/2020     Lab Results   Component Value Date    CALCIUM 8 8 09/02/2020    K 4 5 09/02/2020    CO2 27 09/02/2020     09/02/2020    BUN 20 09/02/2020    CREATININE 0 84 09/02/2020     No results found for: IGE  Lab Results   Component Value Date    ALT 40 09/02/2020    AST 14 09/02/2020    ALKPHOS 76 09/02/2020       Imaging and other studies: I have personally reviewed pertinent reports  and I have personally reviewed pertinent films in PACS     Chest x-ray 07/16/2019  No acute cardiopulmonary disease  Prominence in central pulmonary vessels  No evidence of heart failure  No acute infiltrates, pneumothorax, pleural effusion  Pulmonary function testing:  Performed  09/04/2019  FEV1/FVC ratio 53 %   FEV1 41 % predicted  FVC 55 % predicted  There is response to bronchodilators  TLC 71 % predicted  RV 88 % predicted  DLCO corrected for hemoglobin 66 % predicted  Severe obstructive airflow defect  There is significant response to bronchodilators  There is mild restriction indicated by decreased TLC  Mildly decreased diffusion capacity  Other Studies: I have personally reviewed pertinent reports  Echocardiogram 05/28/2020  EF 65%  Left ventricular diastolic function parameters were normal   Right ventricular size and systolic function normal   Mild aortic stenosis  Trace tricuspid regurgitation  Estimated peak PA pressure 30 mm Hg

## 2020-09-16 NOTE — ASSESSMENT & PLAN NOTE
Continue CPAP q h s     Patient's machine is well over 8years old and he is meant to have CPAP study scheduled post pandemic to confirm SCOTT and get him a new CPAP  This is yet to be scheduled so he will continue on his current CPAP for now

## 2020-09-16 NOTE — ASSESSMENT & PLAN NOTE
Remains committed to abstinence  Patient qualifies for low-dose lung cancer screening CT that had not been scheduled from last appointment  Will have my medical assistant help schedule now  Will call him with results

## 2020-09-28 ENCOUNTER — TELEPHONE (OUTPATIENT)
Dept: PULMONOLOGY | Facility: CLINIC | Age: 55
End: 2020-09-28

## 2020-09-28 DIAGNOSIS — J44.9 COPD, SEVERE (HCC): Primary | ICD-10-CM

## 2020-09-28 NOTE — PROGRESS NOTES
Patient call the office back stating that trilogy is working very well for is requesting prescription sent to pharmacy  Scripts sent

## 2020-09-29 ENCOUNTER — DOCUMENTATION (OUTPATIENT)
Dept: PULMONOLOGY | Facility: CLINIC | Age: 55
End: 2020-09-29

## 2020-09-29 NOTE — PROGRESS NOTES
Beau Winston from [facility] called said patients medication is being denied and to try another med   She is also going to fax you a copy of the denial

## 2020-10-06 DIAGNOSIS — I10 ESSENTIAL HYPERTENSION: ICD-10-CM

## 2020-10-07 PROCEDURE — 4010F ACE/ARB THERAPY RXD/TAKEN: CPT | Performed by: INTERNAL MEDICINE

## 2020-10-07 RX ORDER — LOSARTAN POTASSIUM 100 MG/1
100 TABLET ORAL DAILY
Qty: 90 TABLET | Refills: 3 | OUTPATIENT
Start: 2020-10-07 | End: 2021-11-08

## 2020-10-09 ENCOUNTER — TELEPHONE (OUTPATIENT)
Dept: PULMONOLOGY | Facility: CLINIC | Age: 55
End: 2020-10-09

## 2020-10-12 DIAGNOSIS — J44.9 COPD, SEVERE (HCC): Primary | ICD-10-CM

## 2020-10-20 ENCOUNTER — HOSPITAL ENCOUNTER (OUTPATIENT)
Dept: CT IMAGING | Facility: HOSPITAL | Age: 55
Discharge: HOME | End: 2020-10-20
Payer: COMMERCIAL

## 2020-10-20 DIAGNOSIS — J44.9 COPD, SEVERE (HCC): Primary | ICD-10-CM

## 2020-10-20 DIAGNOSIS — J44.9 COPD, SEVERE (HCC): ICD-10-CM

## 2020-10-20 PROCEDURE — G0297 LDCT FOR LUNG CA SCREEN: HCPCS

## 2020-10-21 ENCOUNTER — TELEPHONE (OUTPATIENT)
Dept: PULMONOLOGY | Facility: CLINIC | Age: 55
End: 2020-10-21

## 2020-10-22 DIAGNOSIS — J44.9 COPD, SEVERE (HCC): Primary | ICD-10-CM

## 2020-10-22 RX ORDER — FLUTICASONE PROPIONATE 220 UG/1
2 AEROSOL, METERED RESPIRATORY (INHALATION) 2 TIMES DAILY
Qty: 1 INHALER | Refills: 5 | Status: SHIPPED | OUTPATIENT
Start: 2020-10-22 | End: 2020-12-16

## 2020-11-09 DIAGNOSIS — J96.11 CHRONIC HYPOXEMIC RESPIRATORY FAILURE (HCC): ICD-10-CM

## 2020-11-09 RX ORDER — HYDROCHLOROTHIAZIDE 25 MG/1
25 TABLET ORAL DAILY
Qty: 90 TABLET | Refills: 3 | Status: SHIPPED | OUTPATIENT
Start: 2020-11-09 | End: 2021-11-22

## 2020-11-10 ENCOUNTER — TELEPHONE (OUTPATIENT)
Dept: PULMONOLOGY | Facility: CLINIC | Age: 55
End: 2020-11-10

## 2020-11-10 DIAGNOSIS — J45.40 MODERATE PERSISTENT ASTHMA WITHOUT COMPLICATION: ICD-10-CM

## 2020-11-10 RX ORDER — IPRATROPIUM BROMIDE AND ALBUTEROL SULFATE 2.5; .5 MG/3ML; MG/3ML
3 SOLUTION RESPIRATORY (INHALATION) EVERY 6 HOURS PRN
Qty: 360 ML | Refills: 3 | Status: SHIPPED | OUTPATIENT
Start: 2020-11-10 | End: 2021-02-03 | Stop reason: SDUPTHER

## 2020-11-13 ENCOUNTER — TELEPHONE (OUTPATIENT)
Dept: ENDOCRINOLOGY | Facility: CLINIC | Age: 55
End: 2020-11-13

## 2020-11-16 DIAGNOSIS — E78.5 DYSLIPIDEMIA, GOAL LDL BELOW 100: ICD-10-CM

## 2020-11-16 DIAGNOSIS — E11.69 TYPE 2 DIABETES MELLITUS WITH OTHER SPECIFIED COMPLICATION, WITHOUT LONG-TERM CURRENT USE OF INSULIN (HCC): ICD-10-CM

## 2020-11-16 DIAGNOSIS — E11.65 UNCONTROLLED TYPE 2 DIABETES MELLITUS WITH HYPERGLYCEMIA (HCC): Primary | ICD-10-CM

## 2020-11-16 DIAGNOSIS — I10 ESSENTIAL HYPERTENSION: ICD-10-CM

## 2020-12-01 DIAGNOSIS — Z99.89 OSA ON CPAP: ICD-10-CM

## 2020-12-01 DIAGNOSIS — R09.02 HYPOXEMIA REQUIRING SUPPLEMENTAL OXYGEN: ICD-10-CM

## 2020-12-01 DIAGNOSIS — Z99.81 HYPOXEMIA REQUIRING SUPPLEMENTAL OXYGEN: ICD-10-CM

## 2020-12-01 DIAGNOSIS — G47.33 OSA ON CPAP: ICD-10-CM

## 2020-12-02 RX ORDER — SPIRONOLACTONE 50 MG/1
50 TABLET, FILM COATED ORAL DAILY
Qty: 90 TABLET | Refills: 3 | Status: SHIPPED | OUTPATIENT
Start: 2020-12-02 | End: 2021-12-13

## 2020-12-15 DIAGNOSIS — I10 ESSENTIAL HYPERTENSION: ICD-10-CM

## 2020-12-15 DIAGNOSIS — E78.49 OTHER HYPERLIPIDEMIA: ICD-10-CM

## 2020-12-15 DIAGNOSIS — F32.A ANXIETY AND DEPRESSION: ICD-10-CM

## 2020-12-15 DIAGNOSIS — F41.9 ANXIETY AND DEPRESSION: ICD-10-CM

## 2020-12-16 ENCOUNTER — TELEMEDICINE (OUTPATIENT)
Dept: PULMONOLOGY | Facility: CLINIC | Age: 55
End: 2020-12-16
Payer: COMMERCIAL

## 2020-12-16 VITALS — WEIGHT: 315 LBS | HEIGHT: 69 IN | BODY MASS INDEX: 46.65 KG/M2 | HEART RATE: 90 BPM

## 2020-12-16 DIAGNOSIS — E66.01 MORBID OBESITY WITH BMI OF 50.0-59.9, ADULT (HCC): ICD-10-CM

## 2020-12-16 DIAGNOSIS — I35.0 NONRHEUMATIC AORTIC VALVE STENOSIS: ICD-10-CM

## 2020-12-16 DIAGNOSIS — J44.9 COPD, SEVERE (HCC): Primary | ICD-10-CM

## 2020-12-16 DIAGNOSIS — J96.11 CHRONIC HYPOXEMIC RESPIRATORY FAILURE (HCC): ICD-10-CM

## 2020-12-16 DIAGNOSIS — G47.33 OSA (OBSTRUCTIVE SLEEP APNEA): ICD-10-CM

## 2020-12-16 DIAGNOSIS — F17.211 CIGARETTE NICOTINE DEPENDENCE IN REMISSION: ICD-10-CM

## 2020-12-16 DIAGNOSIS — R42 DIZZINESS: ICD-10-CM

## 2020-12-16 DIAGNOSIS — I50.32 CHRONIC DIASTOLIC HEART FAILURE (HCC): ICD-10-CM

## 2020-12-16 PROCEDURE — 3008F BODY MASS INDEX DOCD: CPT | Performed by: PHYSICIAN ASSISTANT

## 2020-12-16 PROCEDURE — 99214 OFFICE O/P EST MOD 30 MIN: CPT | Performed by: PHYSICIAN ASSISTANT

## 2020-12-16 PROCEDURE — 1036F TOBACCO NON-USER: CPT | Performed by: PHYSICIAN ASSISTANT

## 2020-12-16 RX ORDER — FLUTICASONE PROPIONATE 220 UG/1
2 AEROSOL, METERED RESPIRATORY (INHALATION) 2 TIMES DAILY
Qty: 1 INHALER | Refills: 5
Start: 2020-12-16 | End: 2021-02-03 | Stop reason: SDUPTHER

## 2020-12-16 RX ORDER — AMLODIPINE BESYLATE 10 MG/1
10 TABLET ORAL DAILY
Qty: 90 TABLET | Refills: 3 | Status: SHIPPED | OUTPATIENT
Start: 2020-12-16 | End: 2022-01-11 | Stop reason: SDUPTHER

## 2020-12-16 RX ORDER — ATORVASTATIN CALCIUM 40 MG/1
40 TABLET, FILM COATED ORAL DAILY
Qty: 90 TABLET | Refills: 3 | Status: SHIPPED | OUTPATIENT
Start: 2020-12-16 | End: 2022-01-11 | Stop reason: SDUPTHER

## 2021-01-16 DIAGNOSIS — F41.9 ANXIETY AND DEPRESSION: ICD-10-CM

## 2021-01-16 DIAGNOSIS — F32.A ANXIETY AND DEPRESSION: ICD-10-CM

## 2021-01-29 ENCOUNTER — TELEPHONE (OUTPATIENT)
Dept: PULMONOLOGY | Facility: CLINIC | Age: 56
End: 2021-01-29

## 2021-01-29 NOTE — TELEPHONE ENCOUNTER
Pt called  He wanted your input on the vaccine  Do you think he should get it with his lung issues? He just wants your opinion

## 2021-01-31 DIAGNOSIS — E11.69 TYPE 2 DIABETES MELLITUS WITH OTHER SPECIFIED COMPLICATION, WITHOUT LONG-TERM CURRENT USE OF INSULIN (HCC): ICD-10-CM

## 2021-02-03 ENCOUNTER — TELEPHONE (OUTPATIENT)
Dept: PULMONOLOGY | Facility: CLINIC | Age: 56
End: 2021-02-03

## 2021-02-03 DIAGNOSIS — J45.40 MODERATE PERSISTENT ASTHMA WITHOUT COMPLICATION: ICD-10-CM

## 2021-02-03 DIAGNOSIS — J44.9 COPD, SEVERE (HCC): ICD-10-CM

## 2021-02-03 RX ORDER — IPRATROPIUM BROMIDE AND ALBUTEROL SULFATE 2.5; .5 MG/3ML; MG/3ML
3 SOLUTION RESPIRATORY (INHALATION) EVERY 6 HOURS PRN
Qty: 360 ML | Refills: 3 | Status: SHIPPED | OUTPATIENT
Start: 2021-02-03 | End: 2021-02-04

## 2021-02-03 RX ORDER — ALBUTEROL SULFATE 90 UG/1
2 AEROSOL, METERED RESPIRATORY (INHALATION) EVERY 6 HOURS PRN
Qty: 1 INHALER | Refills: 5 | Status: SHIPPED | OUTPATIENT
Start: 2021-02-03 | End: 2021-02-04

## 2021-02-03 RX ORDER — FLUTICASONE PROPIONATE 220 UG/1
2 AEROSOL, METERED RESPIRATORY (INHALATION) 2 TIMES DAILY
Qty: 1 INHALER | Refills: 5
Start: 2021-02-03 | End: 2021-02-04

## 2021-02-03 NOTE — TELEPHONE ENCOUNTER
Pt called  He went to  his inhaers at [facility] and was told his inhalers were cancelled  Can you please send them to pharmacy, he's just about out  ?

## 2021-02-04 ENCOUNTER — TELEPHONE (OUTPATIENT)
Dept: PULMONOLOGY | Facility: CLINIC | Age: 56
End: 2021-02-04

## 2021-02-04 RX ORDER — ALBUTEROL SULFATE 90 UG/1
2 AEROSOL, METERED RESPIRATORY (INHALATION) EVERY 6 HOURS PRN
Qty: 1 INHALER | Refills: 2 | Status: SHIPPED | OUTPATIENT
Start: 2021-02-04 | End: 2022-04-21 | Stop reason: SDUPTHER

## 2021-02-04 RX ORDER — FLUTICASONE PROPIONATE 220 UG/1
2 AEROSOL, METERED RESPIRATORY (INHALATION) 2 TIMES DAILY
Qty: 1 INHALER | Refills: 2
Start: 2021-02-04 | End: 2021-02-05 | Stop reason: SDUPTHER

## 2021-02-04 RX ORDER — IPRATROPIUM BROMIDE AND ALBUTEROL SULFATE 2.5; .5 MG/3ML; MG/3ML
3 SOLUTION RESPIRATORY (INHALATION) EVERY 6 HOURS PRN
Qty: 360 ML | Refills: 2 | Status: SHIPPED | OUTPATIENT
Start: 2021-02-04

## 2021-02-05 DIAGNOSIS — J44.9 COPD, SEVERE (HCC): ICD-10-CM

## 2021-02-05 RX ORDER — FLUTICASONE PROPIONATE 220 UG/1
2 AEROSOL, METERED RESPIRATORY (INHALATION) 2 TIMES DAILY
Qty: 1 INHALER | Refills: 2 | Status: SHIPPED | OUTPATIENT
Start: 2021-02-05 | End: 2021-06-11 | Stop reason: SDUPTHER

## 2021-02-17 ENCOUNTER — TELEPHONE (OUTPATIENT)
Dept: FAMILY MEDICINE CLINIC | Facility: CLINIC | Age: 56
End: 2021-02-17

## 2021-03-05 LAB — HBA1C MFR BLD HPLC: 12.8 %

## 2021-03-05 PROCEDURE — 3046F HEMOGLOBIN A1C LEVEL >9.0%: CPT | Performed by: PHYSICIAN ASSISTANT

## 2021-03-09 ENCOUNTER — OFFICE VISIT (OUTPATIENT)
Dept: FAMILY MEDICINE CLINIC | Facility: CLINIC | Age: 56
End: 2021-03-09
Payer: COMMERCIAL

## 2021-03-09 ENCOUNTER — TELEPHONE (OUTPATIENT)
Dept: ADMINISTRATIVE | Facility: OTHER | Age: 56
End: 2021-03-09

## 2021-03-09 VITALS
BODY MASS INDEX: 46.65 KG/M2 | WEIGHT: 315 LBS | TEMPERATURE: 95.1 F | SYSTOLIC BLOOD PRESSURE: 118 MMHG | HEIGHT: 69 IN | DIASTOLIC BLOOD PRESSURE: 82 MMHG | OXYGEN SATURATION: 94 % | HEART RATE: 66 BPM

## 2021-03-09 DIAGNOSIS — E66.01 MORBID OBESITY WITH BMI OF 50.0-59.9, ADULT (HCC): ICD-10-CM

## 2021-03-09 DIAGNOSIS — Z12.11 SCREENING FOR COLON CANCER: ICD-10-CM

## 2021-03-09 DIAGNOSIS — Z13.31 DEPRESSION SCREENING NEGATIVE: ICD-10-CM

## 2021-03-09 DIAGNOSIS — E11.9 TYPE 2 DIABETES MELLITUS WITH HEMOGLOBIN A1C GOAL OF LESS THAN 8.0% (HCC): Primary | ICD-10-CM

## 2021-03-09 PROBLEM — J44.1 COPD WITH ACUTE EXACERBATION (HCC): Status: RESOLVED | Noted: 2020-05-04 | Resolved: 2021-03-09

## 2021-03-09 PROBLEM — F17.211 CIGARETTE NICOTINE DEPENDENCE IN REMISSION: Status: RESOLVED | Noted: 2020-03-20 | Resolved: 2021-03-09

## 2021-03-09 PROBLEM — R42 DIZZINESS: Status: RESOLVED | Noted: 2020-12-16 | Resolved: 2021-03-09

## 2021-03-09 PROCEDURE — 99213 OFFICE O/P EST LOW 20 MIN: CPT | Performed by: PHYSICIAN ASSISTANT

## 2021-03-09 NOTE — PROGRESS NOTES
Assessment/Plan:    Problem List Items Addressed This Visit        Endocrine    Type 2 diabetes mellitus with hemoglobin A1c goal of less than 8 0% ([facility]) - Primary       Other    Morbid obesity with BMI of 50 0-59 9, adult ([facility] )      Other Visit Diagnoses     Screening for colon cancer        Relevant Orders    Cologuard    Depression screening negative               Diagnoses and all orders for this visit:    Type 2 diabetes mellitus with hemoglobin A1c goal of less than 8 0% ([facility] )    Screening for colon cancer  -     Cologuard; Future    Depression screening negative    Morbid obesity with BMI of 50 0-59 9, adult ([facility] )        No problem-specific Assessment & Plan notes found for this encounter  Subjective:      Patient ID: Bozena Gaona is a 63 y o  male  Ruth Gallagher is here today for follow up  He had an appointment with Alysha HOU last week regarding DM, states A1C was elevated at 12%  Changes were made to medication, he no longer is taking Lantus and is going to be starting a new daily injectable that "begins with an S " He will call us back to tell us the name of his medication  He is agreeable to Cologuard to screen for colon CA  Will send Roel Verdin message to [facility] office for foot exam and official A1C results  No new problems, Ruth Gallagher just completed Moderna Covid-19 vaccination 3/6/2021 without side effect  The following portions of the patient's history were reviewed and updated as appropriate:   He has a past medical history of Aortic stenosis, Diabetes ([facility] ), Hyperlipidemia, and Hypertension (7/2/2014)  ,  does not have any pertinent problems on file  ,   has a past surgical history that includes Appendectomy  ,  family history includes No Known Problems in his mother  ,   reports that he has quit smoking  His smoking use included cigarettes  He quit after 10 00 years of use  He has never used smokeless tobacco  He reports that he does not drink alcohol   No history on file for drug ,  is allergic to theophylline     Current Outpatient Medications   Medication Sig Dispense Refill    albuterol (Ventolin HFA) 90 mcg/act inhaler Inhale 2 puffs every 6 (six) hours as needed for wheezing 1 Inhaler 2    amLODIPine (NORVASC) 10 mg tablet Take 1 tablet (10 mg total) by mouth daily 90 tablet 3    atorvastatin (LIPITOR) 40 mg tablet Take 1 tablet (40 mg total) by mouth daily 90 tablet 3    Blood Glucose Monitoring Suppl (CONTOUR NEXT MONITOR) w/Device KIT Test blood sugar once daily or as needed for fluctuating blood sugars 1 kit 0    carvedilol (COREG) 12 5 mg tablet Take 1 tablet (12 5 mg total) by mouth 2 (two) times a day with meals 180 tablet 3    cetirizine (ZYRTEC ALLERGY) 10 mg tablet Take 10 mg by mouth      cyclobenzaprine (FLEXERIL) 10 mg tablet Take 10 mg by mouth      fluticasone (FLOVENT HFA) 220 mcg/act inhaler Inhale 2 puffs 2 (two) times a day Rinse mouth after use   1 Inhaler 2    glucose blood (CONTOUR NEXT TEST) test strip Test blood sugar once daily or as needed for fluctuating blood sugars 100 each 3    hydrochlorothiazide (HYDRODIURIL) 25 mg tablet Take 1 tablet (25 mg total) by mouth daily 90 tablet 3    Insulin Pen Needle (BD Pen Needle Dee Dee U/F) 32G X 4 MM MISC Use 4 a day 200 each 5    ipratropium-albuterol (DUO-NEB) 0 5-2 5 mg/3 mL nebulizer solution Take 1 vial (3 mL total) by nebulization every 6 (six) hours as needed for wheezing or shortness of breath 360 mL 2    Lancets MISC Test blood sugar once daily or as needed for fluctuating blood sugars 100 each 0    losartan (COZAAR) 100 MG tablet Take 1 tablet (100 mg total) by mouth daily 90 tablet 3    meloxicam (MOBIC) 15 mg tablet Take 15 mg by mouth      metFORMIN (GLUCOPHAGE) 1000 MG tablet TAKE 1 TABLET BY MOUTH TWICE DAILY WITH MEALS 180 tablet 0    montelukast (SINGULAIR) 10 mg tablet       Multiple Vitamins-Minerals (MENS MULTIVITAMIN PO) Take by mouth      sertraline (ZOLOFT) 50 mg tablet Take 1 tablet (50 mg total) by mouth daily 30 tablet 2    spironolactone (ALDACTONE) 50 mg tablet Take 1 tablet (50 mg total) by mouth daily 90 tablet 3    umeclidinium-vilanterol (ANORO ELLIPTA) 62 5-25 MCG/INH inhaler Inhale 1 puff daily 1 Inhaler 2    Insulin Aspart FlexPen (NovoLOG FlexPen) 100 UNIT/ML SOPN 25 units with each meal (Patient not taking: Reported on 3/9/2021) 5 pen 0    insulin glargine (Lantus SoloStar) 100 units/mL injection pen 55 units qhs (Patient not taking: Reported on 3/9/2021) 20 pen 1     No current facility-administered medications for this visit  Review of Systems   Constitutional: Negative for activity change, appetite change, chills, diaphoresis, fatigue, fever and unexpected weight change  HENT: Negative for congestion, ear pain, postnasal drip, rhinorrhea, sinus pressure, sinus pain, sneezing, sore throat, tinnitus and voice change  Eyes: Negative for pain, redness and visual disturbance  Respiratory: Negative for cough, chest tightness, shortness of breath and wheezing  Cardiovascular: Negative for chest pain, palpitations and leg swelling  Gastrointestinal: Negative for abdominal pain, blood in stool, constipation, diarrhea, nausea and vomiting  Genitourinary: Negative for difficulty urinating, dysuria, frequency, hematuria and urgency  Musculoskeletal: Negative for arthralgias, back pain, gait problem, joint swelling, myalgias, neck pain and neck stiffness  Skin: Negative for color change, pallor, rash and wound  Neurological: Negative for dizziness, tremors, weakness, light-headedness and headaches  Psychiatric/Behavioral: Negative for dysphoric mood, self-injury, sleep disturbance and suicidal ideas  The patient is not nervous/anxious  Objective:  Vitals:    03/09/21 1135   BP: 118/82   Pulse: 66   Temp: (!) 95 1 °F (35 1 °C)   SpO2: 94%   Weight: (!) 158 kg (348 lb 3 2 oz)   Height: 5' 9" (1 753 m)     Body mass index is 51 42 kg/m²  Physical Exam  Vitals signs reviewed  Constitutional:       General: He is not in acute distress  Appearance: He is well-developed  He is obese  He is not diaphoretic  HENT:      Head: Normocephalic and atraumatic  Right Ear: Hearing, tympanic membrane, ear canal and external ear normal       Left Ear: Hearing, tympanic membrane, ear canal and external ear normal       Mouth/Throat:      Pharynx: Uvula midline  No oropharyngeal exudate  Eyes:      General: No scleral icterus  Right eye: No discharge  Left eye: No discharge  Conjunctiva/sclera: Conjunctivae normal    Neck:      Musculoskeletal: Neck supple  Thyroid: No thyromegaly  Vascular: No carotid bruit  Cardiovascular:      Rate and Rhythm: Normal rate and regular rhythm  Heart sounds: Normal heart sounds  No murmur  Pulmonary:      Effort: Pulmonary effort is normal  No respiratory distress  Breath sounds: Wheezing (mild expiratory) present  Abdominal:      General: Bowel sounds are normal  There is no distension  Palpations: Abdomen is soft  There is no mass  Tenderness: There is no abdominal tenderness  There is no guarding or rebound  Musculoskeletal: Normal range of motion  General: No tenderness  Lymphadenopathy:      Cervical: No cervical adenopathy  Skin:     General: Skin is warm and dry  Findings: No erythema or rash  Neurological:      Mental Status: He is alert and oriented to person, place, and time  Psychiatric:         Behavior: Behavior normal          Thought Content: Thought content normal          Judgment: Judgment normal          PHQ-9 Depression Screening    PHQ-9:   Frequency of the following problems over the past two weeks:      Little interest or pleasure in doing things: 0 - not at all  Feeling down, depressed, or hopeless: 0 - not at all  PHQ-2 Score: 0       BMI Counseling: Body mass index is 51 42 kg/m²   The BMI is above normal  Nutrition recommendations include reducing portion sizes, decreasing overall calorie intake, 3-5 servings of fruits/vegetables daily, reducing fast food intake, consuming healthier snacks, decreasing soda and/or juice intake, moderation in carbohydrate intake, increasing intake of lean protein, reducing intake of saturated fat and trans fat and reducing intake of cholesterol  Exercise recommendations include exercising 3-5 times per week

## 2021-03-09 NOTE — LETTER
Diabetic Foot Exam Form    Date Requested: 21  Patient: Kevin Gamble  Patient : 1965   Referring Provider: Jael Zapata PA-C    Diabetic Foot Exam Performed with shoes and socks removed        Yes         No     Date of Diabetic Foot Exam ______________________________  Risk Score ____________________________________________    Left Foot       Visual Inspection         Monofilament Testing Sensory Exam        Pedal Pulses         Additional Comments         Right Foot      Visual Inspection         Monofilament Testing Sensory Exam       Pedal Pulses         Additional Comments         Comments __________________________________________________________    Practice Providing Exam ______________________________________________    Exam Performed By (print name) _______________________________________      Provider Signature ___________________________________________________      These reports are needed for  compliance    Please fax this completed form and a copy of the Diabetic Foot Exam report to our office located at [place] [address] as soon as possible to [phone] kaykay Hopper: Phone [phone]    We thank you for your assistance in treating our mutual patient

## 2021-03-09 NOTE — TELEPHONE ENCOUNTER
----- Message from Zachery Randhawa sent at 3/9/2021 11:43 AM EST -----  Regarding: diabetic foot exam-TFP  03/09/21 11:43 AM    Hello, our patient Jules Latif has had Diabetic Foot Exam completed/performed  Please assist in updating the patient chart by making an External outreach to Dr Brittanie Jhaveri facility located in [place], [place]  The date of service is within past month      Thank you,  Zachery Randhawa  PG JORGITO HARTMANN

## 2021-03-09 NOTE — TELEPHONE ENCOUNTER
Upon review of the In Basket request and the patient's chart, initial outreach has been made via fax, please see Contacts section for details       Thank you  Guicho Borja MA

## 2021-03-11 NOTE — TELEPHONE ENCOUNTER
Upon review of the In Basket request we requested foot exam and facility stated they do not preform foot exams - need to contact podiatry    Any additional questions or concerns should be emailed to the Practice Liaisons via [email or web address] email, please do not reply via In Basket      Thank you  Kirsten Nesbitt MA

## 2021-03-25 ENCOUNTER — HOSPITAL ENCOUNTER (EMERGENCY)
Facility: HOSPITAL | Age: 56
Discharge: HOME | End: 2021-03-25
Attending: EMERGENCY MEDICINE | Admitting: EMERGENCY MEDICINE
Payer: COMMERCIAL

## 2021-03-25 VITALS
DIASTOLIC BLOOD PRESSURE: 67 MMHG | WEIGHT: 315 LBS | OXYGEN SATURATION: 92 % | RESPIRATION RATE: 22 BRPM | BODY MASS INDEX: 51.44 KG/M2 | TEMPERATURE: 97.5 F | SYSTOLIC BLOOD PRESSURE: 128 MMHG | HEART RATE: 74 BPM

## 2021-03-25 DIAGNOSIS — M79.605 LEFT LEG PAIN: Primary | ICD-10-CM

## 2021-03-25 DIAGNOSIS — E11.65 HYPERGLYCEMIA DUE TO DIABETES MELLITUS (HCC): ICD-10-CM

## 2021-03-25 LAB
ALBUMIN SERPL BCP-MCNC: 3.6 G/DL (ref 3.5–5)
ALP SERPL-CCNC: 90 U/L (ref 46–116)
ALT SERPL W P-5'-P-CCNC: 46 U/L (ref 12–78)
ANION GAP SERPL CALCULATED.3IONS-SCNC: 8 MMOL/L (ref 4–13)
APTT PPP: 26 SECONDS (ref 23–37)
AST SERPL W P-5'-P-CCNC: 15 U/L (ref 5–45)
BASOPHILS # BLD AUTO: 0.05 THOUSANDS/ΜL (ref 0–0.1)
BASOPHILS NFR BLD AUTO: 0 % (ref 0–1)
BILIRUB SERPL-MCNC: 0.4 MG/DL (ref 0.2–1)
BUN SERPL-MCNC: 20 MG/DL (ref 5–25)
CALCIUM SERPL-MCNC: 9.2 MG/DL (ref 8.3–10.1)
CHLORIDE SERPL-SCNC: 99 MMOL/L (ref 100–108)
CK SERPL-CCNC: 105 U/L (ref 39–308)
CO2 SERPL-SCNC: 28 MMOL/L (ref 21–32)
CREAT SERPL-MCNC: 0.81 MG/DL (ref 0.6–1.3)
D DIMER PPP FEU-MCNC: 0.29 UG/ML FEU
EOSINOPHIL # BLD AUTO: 0.21 THOUSAND/ΜL (ref 0–0.61)
EOSINOPHIL NFR BLD AUTO: 2 % (ref 0–6)
ERYTHROCYTE [DISTWIDTH] IN BLOOD BY AUTOMATED COUNT: 13.2 % (ref 11.6–15.1)
GFR SERPL CREATININE-BSD FRML MDRD: 99 ML/MIN/1.73SQ M
GLUCOSE SERPL-MCNC: 328 MG/DL (ref 65–140)
HCT VFR BLD AUTO: 43.5 % (ref 36.5–49.3)
HGB BLD-MCNC: 14.5 G/DL (ref 12–17)
IMM GRANULOCYTES # BLD AUTO: 0.07 THOUSAND/UL (ref 0–0.2)
IMM GRANULOCYTES NFR BLD AUTO: 1 % (ref 0–2)
INR PPP: 0.96 (ref 0.84–1.19)
LYMPHOCYTES # BLD AUTO: 1.12 THOUSANDS/ΜL (ref 0.6–4.47)
LYMPHOCYTES NFR BLD AUTO: 10 % (ref 14–44)
MAGNESIUM SERPL-MCNC: 1.8 MG/DL (ref 1.6–2.6)
MCH RBC QN AUTO: 28.7 PG (ref 26.8–34.3)
MCHC RBC AUTO-ENTMCNC: 33.3 G/DL (ref 31.4–37.4)
MCV RBC AUTO: 86 FL (ref 82–98)
MONOCYTES # BLD AUTO: 0.77 THOUSAND/ΜL (ref 0.17–1.22)
MONOCYTES NFR BLD AUTO: 7 % (ref 4–12)
NEUTROPHILS # BLD AUTO: 9.03 THOUSANDS/ΜL (ref 1.85–7.62)
NEUTS SEG NFR BLD AUTO: 80 % (ref 43–75)
NRBC BLD AUTO-RTO: 0 /100 WBCS
NT-PROBNP SERPL-MCNC: 41 PG/ML
PLATELET # BLD AUTO: 317 THOUSANDS/UL (ref 149–390)
PMV BLD AUTO: 9.4 FL (ref 8.9–12.7)
POTASSIUM SERPL-SCNC: 4.4 MMOL/L (ref 3.5–5.3)
PROT SERPL-MCNC: 7.5 G/DL (ref 6.4–8.2)
PROTHROMBIN TIME: 12.6 SECONDS (ref 11.6–14.5)
RBC # BLD AUTO: 5.06 MILLION/UL (ref 3.88–5.62)
SODIUM SERPL-SCNC: 135 MMOL/L (ref 136–145)
TROPONIN I SERPL-MCNC: <0.02 NG/ML
WBC # BLD AUTO: 11.25 THOUSAND/UL (ref 4.31–10.16)

## 2021-03-25 PROCEDURE — 85610 PROTHROMBIN TIME: CPT | Performed by: PHYSICIAN ASSISTANT

## 2021-03-25 PROCEDURE — 85379 FIBRIN DEGRADATION QUANT: CPT | Performed by: PHYSICIAN ASSISTANT

## 2021-03-25 PROCEDURE — 36415 COLL VENOUS BLD VENIPUNCTURE: CPT | Performed by: PHYSICIAN ASSISTANT

## 2021-03-25 PROCEDURE — 84484 ASSAY OF TROPONIN QUANT: CPT | Performed by: PHYSICIAN ASSISTANT

## 2021-03-25 PROCEDURE — 83735 ASSAY OF MAGNESIUM: CPT | Performed by: PHYSICIAN ASSISTANT

## 2021-03-25 PROCEDURE — 99284 EMERGENCY DEPT VISIT MOD MDM: CPT

## 2021-03-25 PROCEDURE — 82550 ASSAY OF CK (CPK): CPT | Performed by: PHYSICIAN ASSISTANT

## 2021-03-25 PROCEDURE — 85025 COMPLETE CBC W/AUTO DIFF WBC: CPT | Performed by: PHYSICIAN ASSISTANT

## 2021-03-25 PROCEDURE — 85730 THROMBOPLASTIN TIME PARTIAL: CPT | Performed by: PHYSICIAN ASSISTANT

## 2021-03-25 PROCEDURE — 93005 ELECTROCARDIOGRAM TRACING: CPT

## 2021-03-25 PROCEDURE — 99284 EMERGENCY DEPT VISIT MOD MDM: CPT | Performed by: PHYSICIAN ASSISTANT

## 2021-03-25 PROCEDURE — 96374 THER/PROPH/DIAG INJ IV PUSH: CPT

## 2021-03-25 PROCEDURE — 83880 ASSAY OF NATRIURETIC PEPTIDE: CPT | Performed by: PHYSICIAN ASSISTANT

## 2021-03-25 PROCEDURE — 80053 COMPREHEN METABOLIC PANEL: CPT | Performed by: PHYSICIAN ASSISTANT

## 2021-03-25 RX ORDER — ACETAMINOPHEN 325 MG/1
650 TABLET ORAL ONCE
Status: COMPLETED | OUTPATIENT
Start: 2021-03-25 | End: 2021-03-25

## 2021-03-25 RX ORDER — KETOROLAC TROMETHAMINE 30 MG/ML
15 INJECTION, SOLUTION INTRAMUSCULAR; INTRAVENOUS ONCE
Status: COMPLETED | OUTPATIENT
Start: 2021-03-25 | End: 2021-03-25

## 2021-03-25 RX ADMIN — ACETAMINOPHEN 650 MG: 325 TABLET, FILM COATED ORAL at 18:02

## 2021-03-25 RX ADMIN — KETOROLAC TROMETHAMINE 15 MG: 30 INJECTION, SOLUTION INTRAMUSCULAR; INTRAVENOUS at 18:02

## 2021-03-25 NOTE — ED PROVIDER NOTES
History  Chief Complaint   Patient presents with    Leg Pain     Pt states, "I think I have a DVT in my left leg " endorses LLE pain and swelling off and on for a year  63year old male presents ambulatory to the ER for evaluation of left leg pain  Pt reports on and off swelling along with pain in posterior calf over the past year  Denies injury or trauma to the leg  He reports brownish discoloration of the lower extremity which also appears darkened  Pt has otherwise been in usual state of health  Denies fever, chills, cough or congestion  Denies chest pain, SOB  Denies N/V/D, abdominal pain  No reported aggravating or alleviating factors  Occasionally takes aleve for the pain  No prior evaluation of symptoms  He denies any symptoms related to the right lower extremity  He also reports numbness/tingling in his feet  Also reports some cramping "anrulfo horse" along the sole of his foot  PMH includes DM, HTN, HLD, COPD/asthma  Denies personal h/o DVT or PE  Pt does not currently take any blood thinners  Pt expresses concern regarding a blood clot in his leg  Denies recent travel  Denies any prolonged immobilization  Denies any recent surgery  No know clotting issues        History provided by:  Patient   used: No    Leg Pain  Location:  Leg  Injury: no    Leg location:  L lower leg  Pain details:     Quality:  Aching and cramping    Radiates to:  Does not radiate    Timing:  Intermittent  Prior injury to area:  No  Relieved by:  Nothing  Worsened by:  Nothing  Ineffective treatments:  NSAIDs  Associated symptoms: numbness and swelling    Associated symptoms: no back pain, no decreased ROM, no fatigue, no fever, no itching, no muscle weakness and no neck pain    Risk factors: obesity    Risk factors: no concern for non-accidental trauma, no frequent fractures, no known bone disorder and no recent illness        Prior to Admission Medications   Prescriptions Last Dose Informant Patient Reported? Taking? Blood Glucose Monitoring Suppl (CONTOUR NEXT MONITOR) w/Device KIT  Self No No   Sig: Test blood sugar once daily or as needed for fluctuating blood sugars   Insulin Aspart FlexPen (NovoLOG FlexPen) 100 UNIT/ML SOPN Not Taking at Unknown time  No No   Si units with each meal   Patient not taking: Reported on 3/9/2021   Insulin Pen Needle (BD Pen Needle Dee Dee U/F) 32G X 4 MM MISC   No No   Sig: Use 4 a day   Lancets MISC   No No   Sig: Test blood sugar once daily or as needed for fluctuating blood sugars   Multiple Vitamins-Minerals (MENS MULTIVITAMIN PO) 3/25/2021 at Unknown time Self Yes Yes   Sig: Take by mouth   albuterol (Ventolin HFA) 90 mcg/act inhaler   No Yes   Sig: Inhale 2 puffs every 6 (six) hours as needed for wheezing   amLODIPine (NORVASC) 10 mg tablet 3/25/2021 at Unknown time  No Yes   Sig: Take 1 tablet (10 mg total) by mouth daily   atorvastatin (LIPITOR) 40 mg tablet 3/25/2021 at Unknown time  No Yes   Sig: Take 1 tablet (40 mg total) by mouth daily   carvedilol (COREG) 12 5 mg tablet 3/25/2021 at Unknown time  No Yes   Sig: Take 1 tablet (12 5 mg total) by mouth 2 (two) times a day with meals   cetirizine (ZYRTEC ALLERGY) 10 mg tablet 3/25/2021 at Unknown time Self Yes Yes   Sig: Take 10 mg by mouth   cyclobenzaprine (FLEXERIL) 10 mg tablet 3/25/2021 at Unknown time Self Yes Yes   Sig: Take 10 mg by mouth   fluticasone (FLOVENT HFA) 220 mcg/act inhaler 3/25/2021 at Unknown time  No Yes   Sig: Inhale 2 puffs 2 (two) times a day Rinse mouth after use     glucose blood (CONTOUR NEXT TEST) test strip  Self No No   Sig: Test blood sugar once daily or as needed for fluctuating blood sugars   hydrochlorothiazide (HYDRODIURIL) 25 mg tablet 3/25/2021 at Unknown time  No Yes   Sig: Take 1 tablet (25 mg total) by mouth daily   insulin glargine (Lantus SoloStar) 100 units/mL injection pen Not Taking at Unknown time  No No   Si units qhs   Patient not taking: Reported on 3/9/2021   ipratropium-albuterol (DUO-NEB) 0 5-2 5 mg/3 mL nebulizer solution   No Yes   Sig: Take 1 vial (3 mL total) by nebulization every 6 (six) hours as needed for wheezing or shortness of breath   losartan (COZAAR) 100 MG tablet 3/25/2021 at Unknown time  No Yes   Sig: Take 1 tablet (100 mg total) by mouth daily   meloxicam (MOBIC) 15 mg tablet 3/25/2021 at Unknown time Self Yes Yes   Sig: Take 15 mg by mouth   metFORMIN (GLUCOPHAGE) 1000 MG tablet 3/25/2021 at Unknown time  No Yes   Sig: TAKE 1 TABLET BY MOUTH TWICE DAILY WITH MEALS   montelukast (SINGULAIR) 10 mg tablet Not Taking at Unknown time Self Yes No   sertraline (ZOLOFT) 50 mg tablet 3/25/2021 at Unknown time  No Yes   Sig: Take 1 tablet (50 mg total) by mouth daily   spironolactone (ALDACTONE) 50 mg tablet 3/25/2021 at Unknown time  No Yes   Sig: Take 1 tablet (50 mg total) by mouth daily   umeclidinium-vilanterol (ANORO ELLIPTA) 62 5-25 MCG/INH inhaler 3/25/2021 at Unknown time  No Yes   Sig: Inhale 1 puff daily      Facility-Administered Medications: None       Past Medical History:   Diagnosis Date    Aortic stenosis     Diabetes ([facility] [address] )     Hyperlipidemia     Hypertension 7/2/2014       Past Surgical History:   Procedure Laterality Date    APPENDECTOMY         Family History   Problem Relation Age of Onset    No Known Problems Mother      I have reviewed and agree with the history as documented  E-Cigarette/Vaping    E-Cigarette Use Never User      E-Cigarette/Vaping Substances    Nicotine No     THC No     CBD No     Flavoring No     Other No     Unknown No      Social History     Tobacco Use    Smoking status: Former Smoker     Years: 10 00     Types: Cigarettes    Smokeless tobacco: Never Used   Substance Use Topics    Alcohol use: Never     Frequency: Never    Drug use: Never       Review of Systems   Constitutional: Negative  Negative for chills, fatigue and fever  HENT: Negative    Negative for congestion, rhinorrhea and sore throat  Eyes: Negative  Negative for visual disturbance  Respiratory: Negative  Negative for cough, shortness of breath and wheezing  Cardiovascular: Positive for leg swelling  Negative for chest pain and palpitations  Gastrointestinal: Negative  Negative for abdominal pain, constipation, diarrhea, nausea and vomiting  Genitourinary: Negative  Negative for dysuria, flank pain, frequency and hematuria  Musculoskeletal: Positive for arthralgias and myalgias  Negative for back pain and neck pain  Skin: Negative  Negative for itching and rash  Neurological: Positive for numbness (feet)  Negative for dizziness, light-headedness and headaches  Psychiatric/Behavioral: Negative  Negative for confusion  All other systems reviewed and are negative  Physical Exam  Physical Exam  Vitals signs and nursing note reviewed  Constitutional:       General: He is not in acute distress  Appearance: Normal appearance  He is well-developed  He is obese  He is not toxic-appearing  HENT:      Head: Normocephalic and atraumatic  Right Ear: Hearing and external ear normal       Left Ear: Hearing and external ear normal       Nose: Nose normal       Mouth/Throat:      Pharynx: Oropharynx is clear  Uvula midline  No oropharyngeal exudate  Eyes:      General: No scleral icterus  Conjunctiva/sclera: Conjunctivae normal       Pupils: Pupils are equal, round, and reactive to light  Neck:      Musculoskeletal: Normal range of motion and neck supple  Trachea: Trachea and phonation normal  No tracheal deviation  Cardiovascular:      Rate and Rhythm: Normal rate and regular rhythm  Pulses: Normal pulses  Dorsalis pedis pulses are 2+ on the right side and 2+ on the left side  Posterior tibial pulses are 2+ on the right side and 2+ on the left side  Heart sounds: Normal heart sounds, S1 normal and S2 normal  No murmur     Pulmonary:      Effort: Pulmonary effort is normal  No tachypnea or respiratory distress  Breath sounds: Normal breath sounds  No wheezing, rhonchi or rales  Abdominal:      General: Bowel sounds are normal  There is no distension  Palpations: Abdomen is soft  Tenderness: There is no abdominal tenderness  There is no guarding or rebound  Hernia: No hernia is present  Musculoskeletal: Normal range of motion  Right lower leg: He exhibits no tenderness  1+ Edema present  Left lower leg: He exhibits tenderness  He exhibits no bony tenderness, no deformity and no laceration  1+ Edema present  Skin:     General: Skin is warm and dry  Capillary Refill: Capillary refill takes less than 2 seconds  Findings: Rash present  Comments: There are stasis dermatitis changes of the bilateral lower extremities L>R  Neurological:      General: No focal deficit present  Mental Status: He is alert and oriented to person, place, and time  GCS: GCS eye subscore is 4  GCS verbal subscore is 5  GCS motor subscore is 6  Cranial Nerves: No cranial nerve deficit  Sensory: Sensation is intact  No sensory deficit  Motor: Motor function is intact  No abnormal muscle tone  Gait: Gait normal       Deep Tendon Reflexes: Reflexes are normal and symmetric     Psychiatric:         Mood and Affect: Mood normal          Speech: Speech normal          Behavior: Behavior normal          Vital Signs  ED Triage Vitals   Temperature Pulse Respirations Blood Pressure SpO2   03/25/21 1737 03/25/21 1737 03/25/21 1737 03/25/21 1737 03/25/21 1737   97 5 °F (36 4 °C) 80 17 132/71 94 %      Temp Source Heart Rate Source Patient Position - Orthostatic VS BP Location FiO2 (%)   03/25/21 1737 03/25/21 1737 03/25/21 1737 03/25/21 1737 --   Temporal Monitor Sitting Left arm       Pain Score       03/25/21 1802       No Pain           Vitals:    03/25/21 1737 03/25/21 1900   BP: 132/71 128/67   Pulse: 80 74 Patient Position - Orthostatic VS: Sitting          Visual Acuity      ED Medications  Medications   ketorolac (TORADOL) injection 15 mg (15 mg Intravenous Given 3/25/21 1802)   acetaminophen (TYLENOL) tablet 650 mg (650 mg Oral Given 3/25/21 1802)       Diagnostic Studies  Results Reviewed     Procedure Component Value Units Date/Time    Magnesium [[id number]]  (Normal) Collected: 03/25/21 1758    Lab Status: Final result Specimen: Blood from Arm, Right Updated: 03/25/21 1826     Magnesium 1 8 mg/dL     CK Total with Reflex CKMB [[id number]]  (Normal) Collected: 03/25/21 1758    Lab Status: Final result Specimen: Blood from Arm, Right Updated: 03/25/21 1826     Total  U/L     NT-BNP PRO [[id number]]  (Normal) Collected: 03/25/21 1758    Lab Status: Final result Specimen: Blood from Arm, Right Updated: 03/25/21 1826     NT-proBNP 41 pg/mL     Troponin I [[id number]]  (Normal) Collected: 03/25/21 1758    Lab Status: Final result Specimen: Blood from Arm, Right Updated: 03/25/21 1823     Troponin I <0 02 ng/mL     Comprehensive metabolic panel [[id number]]  (Abnormal) Collected: 03/25/21 1758    Lab Status: Final result Specimen: Blood from Arm, Right Updated: 03/25/21 1819     Sodium 135 mmol/L      Potassium 4 4 mmol/L      Chloride 99 mmol/L      CO2 28 mmol/L      ANION GAP 8 mmol/L      BUN 20 mg/dL      Creatinine 0 81 mg/dL      Glucose 328 mg/dL      Calcium 9 2 mg/dL      AST 15 U/L      ALT 46 U/L      Alkaline Phosphatase 90 U/L      Total Protein 7 5 g/dL      Albumin 3 6 g/dL      Total Bilirubin 0 40 mg/dL      eGFR 99 ml/min/1 73sq m     Narrative:      Juanjose guidelines for Chronic Kidney Disease (CKD):     Stage 1 with normal or high GFR (GFR > 90 mL/min/1 73 square meters)    Stage 2 Mild CKD (GFR = 60-89 mL/min/1 73 square meters)    Stage 3A Moderate CKD (GFR = 45-59 mL/min/1 73 square meters)    Stage 3B Moderate CKD (GFR = 30-44 mL/min/1 73 square meters)   Stage 4 Severe CKD (GFR = 15-29 mL/min/1 73 square meters)    Stage 5 End Stage CKD (GFR <15 mL/min/1 73 square meters)  Note: GFR calculation is accurate only with a steady state creatinine    D-Dimer [[id number]]  (Normal) Collected: 03/25/21 1758    Lab Status: Final result Specimen: Blood from Arm, Right Updated: 03/25/21 1816     D-Dimer, Quant 0 29 ug/ml FEU     Protime-INR [[id number]]  (Normal) Collected: 03/25/21 1758    Lab Status: Final result Specimen: Blood from Arm, Right Updated: 03/25/21 1813     Protime 12 6 seconds      INR 0 96    APTT [[id number]]  (Normal) Collected: 03/25/21 1758    Lab Status: Final result Specimen: Blood from Arm, Right Updated: 03/25/21 1813     PTT 26 seconds     CBC and differential [[id number]]  (Abnormal) Collected: 03/25/21 1758    Lab Status: Final result Specimen: Blood from Arm, Right Updated: 03/25/21 1804     WBC 11 25 Thousand/uL      RBC 5 06 Million/uL      Hemoglobin 14 5 g/dL      Hematocrit 43 5 %      MCV 86 fL      MCH 28 7 pg      MCHC 33 3 g/dL      RDW 13 2 %      MPV 9 4 fL      Platelets 873 Thousands/uL      nRBC 0 /100 WBCs      Neutrophils Relative 80 %      Immat GRANS % 1 %      Lymphocytes Relative 10 %      Monocytes Relative 7 %      Eosinophils Relative 2 %      Basophils Relative 0 %      Neutrophils Absolute 9 03 Thousands/µL      Immature Grans Absolute 0 07 Thousand/uL      Lymphocytes Absolute 1 12 Thousands/µL      Monocytes Absolute 0 77 Thousand/µL      Eosinophils Absolute 0 21 Thousand/µL      Basophils Absolute 0 05 Thousands/µL                  No orders to display              Procedures  ECG 12 Lead Documentation Only    Date/Time: 3/25/2021 6:46 PM  Performed by: Gwyn Lisa PA-C  Authorized by: Gwyn Lisa PA-C     Indications / Diagnosis:  Leg swelling  ECG reviewed by me, the ED Provider: yes    Patient location:  ED  Previous ECG:     Previous ECG:  Unavailable    Comparison to cardiac monitor: Yes Interpretation:     Interpretation: normal    Rate:     ECG rate:  75    ECG rate assessment: normal    Rhythm:     Rhythm: sinus rhythm    Ectopy:     Ectopy: none    QRS:     QRS axis:  Normal    QRS intervals:  Normal  Conduction:     Conduction: normal    ST segments:     ST segments:  Normal  T waves:     T waves: normal    Comments:      , QRS 92, QT/QTc 398/444; no acute ischemic changes  ED Course  ED Course as of Mar 25 1920   u Mar 25, 2021   1740 Unfortunately vascular tech not available to perform venous duplex today  Will check labs  If d dimer positive, will need anticoagulation and follow up outpatient US tomorrow morning  1807 WBC(!): 11 25   1807 Hemoglobin: 14 5   1807 Platelet Count: 343   1814 INR: 0 96   1814 Protime: 12 6   1814 PTT: 26   1816 D-Dimer, Quant: 0 29   1820 Known diabetic   Glucose, Random(!): 328   1820 Creatinine: 0 81   1820 BUN: 20   1820 Sodium(!): 135   1820 Potassium: 4 4   1820 Chloride(!): 99   1820 CO2: 28   1820 Anion Gap: 8   1820 Calcium: 9 2   1820 AST: 15   1820 ALT: 46   1820 Alkaline Phosphatase: 90   1820 Total Protein: 7 5   1820 Albumin: 3 6   1820 TOTAL BILIRUBIN: 0 40   1820 eGFR: 99   1825 Troponin I: <0 02   1827 Magnesium: 1 8   1827 NT-proBNP: 41   1827 Total CK: 105   1850 Pt updated on all results  Work up here revealed normal d dimer, making DVT less likely  There is no asymmetrical edema or recent risk factors such as surgery, immobilization, etc   No personal h/o DVT or clotting factors issues known  Chelo Rebraxton' DVT risk is low  Discussed continued symptomatic treatment  Rest, ice, compression, elevation  Continue OTC medications as needed for pain relief  Recommended continued monitoring of blood sugars and outpatient follow up  Strict return precautions outlined  Advised outpatient follow up with PCP or return to ER for change in condition as outlined   Pt verbalized understanding and had no further questions  HEART Risk Score      Most Recent Value   Heart Score Risk Calculator   History  0 Filed at: 03/25/2021 1914   ECG  0 Filed at: 03/25/2021 1914   Age  1 Filed at: 03/25/2021 1914   Risk Factors  1 Filed at: 03/25/2021 1914   Troponin  0 Filed at: 03/25/2021 1914   HEART Score  2 Filed at: 03/25/2021 1914                      SBIRT 22yo+      Most Recent Value   SBIRT (23 yo +)   In order to provide better care to our patients, we are screening all of our patients for alcohol and drug use  Would it be okay to ask you these screening questions? Yes Filed at: 03/25/2021 6046   Initial Alcohol Screen: US AUDIT-C    1  How often do you have a drink containing alcohol?  0 Filed at: 03/25/2021 1809   2  How many drinks containing alcohol do you have on a typical day you are drinking? 0 Filed at: 03/25/2021 1809   3a  Male UNDER 65: How often do you have five or more drinks on one occasion? 0 Filed at: 03/25/2021 1809   3b  FEMALE Any Age, or MALE 65+: How often do you have 4 or more drinks on one occassion? 0 Filed at: 03/25/2021 1809   Audit-C Score  0 Filed at: 03/25/2021 0844   ROSA MARIA: How many times in the past year have you    Used an illegal drug or used a prescription medication for non-medical reasons?   Never Filed at: 03/25/2021 8844            Wells' Criteria for DVT      Most Recent Value   Wells' Criteria for DVT   Active cancer Treatment or palliation within 6 months  0 Filed at: 03/25/2021 1759   Bedridden recently >3 days or major surgery within 12 weeks  0 Filed at: 03/25/2021 1759   Calf swelling >3 cm compared to the other leg  0 Filed at: 03/25/2021 1759   Entire leg swollen  0 Filed at: 03/25/2021 1759   Collateral (nonvaricose) superficial veins present  1 Filed at: 03/25/2021 1759   Localized tenderness along the deep venous system  0 Filed at: 03/25/2021 1759   Pitting edema, confined to symptomatic leg  0 Filed at: 03/25/2021 8685   Paralysis, paresis, or recent plaster immobilization of the lower extremity  0 Filed at: 03/25/2021 1759   Previously documented DVT  0 Filed at: 03/25/2021 1759   Alternative diagnosis to DVT as likely or more likely  --   Wells DVT Critera Score  1 Filed at: 03/25/2021 1759              [facility]  Number of Diagnoses or Management Options  Hyperglycemia due to diabetes mellitus ([facility] ): established and worsening  Left leg pain: new and requires workup     Amount and/or Complexity of Data Reviewed  Clinical lab tests: ordered and reviewed  Decide to obtain previous medical records or to obtain history from someone other than the patient: yes  Obtain history from someone other than the patient: yes  Review and summarize past medical records: yes    Patient Progress  Patient progress: improved      Disposition  Final diagnoses:   Left leg pain   Hyperglycemia due to diabetes mellitus ([facility] )     Time reflects when diagnosis was documented in both MDM as applicable and the Disposition within this note     Time User Action Codes Description Comment    3/25/2021  7:02 PM Kelsi Wiseman Add [M79 605] Left leg pain     3/25/2021  7:03 PM Kelsi Wiseman Add [E11 65] Hyperglycemia due to diabetes mellitus [facility])       ED Disposition     ED Disposition Condition Date/Time Comment    Discharge Stable Thu Mar 25, 2021  7:02 PM Ifeoam Bustillos discharge to home/self care              Follow-up Information     Follow up With Specialties Details Why Contact Info Additional Information    Ashley Jhaveri PA-C Physician Assistant Schedule an appointment as soon as possible for a visit   [address]  [facility] [place] [address] [facility] Emergency Medicine  As needed [phone]  [facility] Emergency Department[address], [address]          Discharge Medication List as of 3/25/2021  7:04 PM      CONTINUE these medications which have NOT CHANGED    Details   albuterol (Ventolin HFA) 90 mcg/act inhaler Inhale 2 puffs every 6 (six) hours as needed for wheezing, Starting Thu 2/4/2021, Normal      amLODIPine (NORVASC) 10 mg tablet Take 1 tablet (10 mg total) by mouth daily, Starting Wed 12/16/2020, Normal      atorvastatin (LIPITOR) 40 mg tablet Take 1 tablet (40 mg total) by mouth daily, Starting Wed 12/16/2020, Normal      carvedilol (COREG) 12 5 mg tablet Take 1 tablet (12 5 mg total) by mouth 2 (two) times a day with meals, Starting Mon 9/14/2020, Normal      cetirizine (ZYRTEC ALLERGY) 10 mg tablet Take 10 mg by mouth, Starting Mon 12/10/2018, Historical Med      cyclobenzaprine (FLEXERIL) 10 mg tablet Take 10 mg by mouth, Starting Mon 2/6/2017, Historical Med      fluticasone (FLOVENT HFA) 220 mcg/act inhaler Inhale 2 puffs 2 (two) times a day Rinse mouth after use , Starting Fri 2/5/2021, Normal      hydrochlorothiazide (HYDRODIURIL) 25 mg tablet Take 1 tablet (25 mg total) by mouth daily, Starting Mon 11/9/2020, Normal      ipratropium-albuterol (DUO-NEB) 0 5-2 5 mg/3 mL nebulizer solution Take 1 vial (3 mL total) by nebulization every 6 (six) hours as needed for wheezing or shortness of breath, Starting Thu 2/4/2021, Normal      losartan (COZAAR) 100 MG tablet Take 1 tablet (100 mg total) by mouth daily, Starting Wed 10/7/2020, Phone In      meloxicam (MOBIC) 15 mg tablet Take 15 mg by mouth, Starting Mon 2/6/2017, Historical Med      metFORMIN (GLUCOPHAGE) 1000 MG tablet TAKE 1 TABLET BY MOUTH TWICE DAILY WITH MEALS, Normal      Multiple Vitamins-Minerals (MENS MULTIVITAMIN PO) Take by mouth, Historical Med      sertraline (ZOLOFT) 50 mg tablet Take 1 tablet (50 mg total) by mouth daily, Starting Mon 1/18/2021, Normal      spironolactone (ALDACTONE) 50 mg tablet Take 1 tablet (50 mg total) by mouth daily, Starting Wed 12/2/2020, Normal      umeclidinium-vilanterol (ANORO ELLIPTA) 62 5-25 MCG/INH inhaler Inhale 1 puff daily, Starting u 2/4/2021, No Print      Blood Glucose Monitoring Suppl (CONTOUR NEXT MONITOR) w/Device KIT Test blood sugar once daily or as needed for fluctuating blood sugars, Normal      glucose blood (CONTOUR NEXT TEST) test strip Test blood sugar once daily or as needed for fluctuating blood sugars, Normal      Insulin Aspart FlexPen (NovoLOG FlexPen) 100 UNIT/ML SOPN 25 units with each meal, No Print      insulin glargine (Lantus SoloStar) 100 units/mL injection pen 55 units qhs, Normal      Insulin Pen Needle (BD Pen Needle Dee Dee U/F) 32G X 4 MM MISC Use 4 a day, Normal      Lancets MISC Test blood sugar once daily or as needed for fluctuating blood sugars, Normal      montelukast (SINGULAIR) 10 mg tablet Starting Wed 8/7/2019, Historical Med           No discharge procedures on file      PDMP Review     None          ED Provider  Electronically Signed by           Edwardo Saxena PA-C  03/25/21 8940

## 2021-03-25 NOTE — DISCHARGE INSTRUCTIONS
Rest, ice, compression, elevation  Continue OTC medications as needed for pain relief  Consider use of compression stockings  Continue to monitor your blood sugars and follow up with PCP  Return to ER as needed

## 2021-03-26 LAB
ATRIAL RATE: 75 BPM
P AXIS: 76 DEGREES
PR INTERVAL: 158 MS
QRS AXIS: -6 DEGREES
QRSD INTERVAL: 92 MS
QT INTERVAL: 398 MS
QTC INTERVAL: 444 MS
T WAVE AXIS: 52 DEGREES
VENTRICULAR RATE: 75 BPM

## 2021-03-26 PROCEDURE — 93010 ELECTROCARDIOGRAM REPORT: CPT | Performed by: INTERNAL MEDICINE

## 2021-03-30 ENCOUNTER — OFFICE VISIT (OUTPATIENT)
Dept: FAMILY MEDICINE CLINIC | Facility: CLINIC | Age: 56
End: 2021-03-30
Payer: COMMERCIAL

## 2021-03-30 ENCOUNTER — TELEPHONE (OUTPATIENT)
Dept: ADMINISTRATIVE | Facility: OTHER | Age: 56
End: 2021-03-30

## 2021-03-30 VITALS
TEMPERATURE: 97.1 F | SYSTOLIC BLOOD PRESSURE: 110 MMHG | WEIGHT: 315 LBS | OXYGEN SATURATION: 92 % | DIASTOLIC BLOOD PRESSURE: 80 MMHG | HEART RATE: 88 BPM | HEIGHT: 69 IN | BODY MASS INDEX: 46.65 KG/M2

## 2021-03-30 DIAGNOSIS — L81.9 DISCOLORATION OF SKIN OF MULTIPLE SITES OF LOWER EXTREMITY: ICD-10-CM

## 2021-03-30 DIAGNOSIS — I87.2 VENOUS STASIS DERMATITIS OF BOTH LOWER EXTREMITIES: Primary | ICD-10-CM

## 2021-03-30 PROCEDURE — 3008F BODY MASS INDEX DOCD: CPT | Performed by: PHYSICIAN ASSISTANT

## 2021-03-30 PROCEDURE — 3079F DIAST BP 80-89 MM HG: CPT | Performed by: PHYSICIAN ASSISTANT

## 2021-03-30 PROCEDURE — 3074F SYST BP LT 130 MM HG: CPT | Performed by: PHYSICIAN ASSISTANT

## 2021-03-30 PROCEDURE — 3725F SCREEN DEPRESSION PERFORMED: CPT | Performed by: PHYSICIAN ASSISTANT

## 2021-03-30 PROCEDURE — 1036F TOBACCO NON-USER: CPT | Performed by: PHYSICIAN ASSISTANT

## 2021-03-30 PROCEDURE — 99213 OFFICE O/P EST LOW 20 MIN: CPT | Performed by: PHYSICIAN ASSISTANT

## 2021-03-30 RX ORDER — DAPAGLIFLOZIN 10 MG/1
10 TABLET, FILM COATED ORAL DAILY
COMMUNITY

## 2021-03-30 RX ORDER — INSULIN GLARGINE AND LIXISENATIDE 100; 33 U/ML; UG/ML
45 INJECTION, SOLUTION SUBCUTANEOUS DAILY
COMMUNITY
Start: 2021-03-11 | End: 2022-01-26

## 2021-03-30 NOTE — TELEPHONE ENCOUNTER
----- Message from Anshu Arboleda sent at 3/30/2021  7:57 AM EDT -----  Regarding: Diabetic foot exam-TFP  03/30/21 7:57 AM    Hello, our patient Cherise Turner has had Diabetic Foot Exam completed/performed  Please assist in updating the patient chart by making an External outreach to [facility] located in [place], [place]  The date of service is within past year      Thank you,  Anshu HARTMANN

## 2021-03-30 NOTE — TELEPHONE ENCOUNTER
Upon review of the In Basket request and the patient's chart, initial outreach has been made via fax, please see Contacts section for details     Hemoglobin A1c  Thank you  Farhan Mohan MA

## 2021-03-30 NOTE — TELEPHONE ENCOUNTER
----- Message from Batsheva Gibbs sent at 3/30/2021  7:54 AM EDT -----  Regarding: Hemoglobin N2x-FGI  03/30/21 7:54 AM    Hello, our patient No patient name on file  has had Hemoglobin A1c completed/performed  Please assist in updating the patient chart by making an External outreach to [facility] located in [place], [place]  The date of service is within the past year      Thank you,  Batsheva Gibbs  PG JORGITO HARTMANN

## 2021-03-30 NOTE — PROGRESS NOTES
Assessment/Plan:    Problem List Items Addressed This Visit     None      Visit Diagnoses     Venous stasis dermatitis of both lower extremities    -  Primary    Relevant Orders    VAS lower limb venous duplex study, complete bilateral    Discoloration of skin of multiple sites of lower extremity        Relevant Orders    VAS lower limb arterial duplex, complete bilateral           Diagnoses and all orders for this visit:    Venous stasis dermatitis of both lower extremities  -     VAS lower limb venous duplex study, complete bilateral; Future    Discoloration of skin of multiple sites of lower extremity  -     VAS lower limb arterial duplex, complete bilateral; Future    Other orders  -     Dapagliflozin Propanediol (Farxiga) 10 MG TABS; Take 10 mg by mouth daily  -     Soliqua 100-33 UNT-MCG/ML injection pen; Inject 45 Units under the skin daily              Subjective:      Patient ID: Bozena Gaona is a 63 y o  male  Ruth Gallagher is here today to follow up from ER visit last week  He went to the ER with concerns of swelling and discolored skin L LE  He has magda discoloration in his anterior LE, however, on his posterior L LE has a round, dark bluish/purple/brown circular lesion approximately 5 cm diameter  The area is not painful or itchy, it is not open  Pt perceived it to be black in color and was concerned for gangrene  The following portions of the patient's history were reviewed and updated as appropriate:   He has a past medical history of Aortic stenosis, Diabetes ([address] ), Hyperlipidemia, and Hypertension (7/2/2014)  ,  does not have any pertinent problems on file  ,   has a past surgical history that includes Appendectomy  ,  family history includes No Known Problems in his mother  ,   reports that he has quit smoking  His smoking use included cigarettes  He quit after 10 00 years of use  He has never used smokeless tobacco  He reports that he does not drink alcohol or use drugs  ,  is allergic to theophylline     Current Outpatient Medications   Medication Sig Dispense Refill    albuterol (Ventolin HFA) 90 mcg/act inhaler Inhale 2 puffs every 6 (six) hours as needed for wheezing 1 Inhaler 2    amLODIPine (NORVASC) 10 mg tablet Take 1 tablet (10 mg total) by mouth daily 90 tablet 3    atorvastatin (LIPITOR) 40 mg tablet Take 1 tablet (40 mg total) by mouth daily 90 tablet 3    Blood Glucose Monitoring Suppl (CONTOUR NEXT MONITOR) w/Device KIT Test blood sugar once daily or as needed for fluctuating blood sugars 1 kit 0    carvedilol (COREG) 12 5 mg tablet Take 1 tablet (12 5 mg total) by mouth 2 (two) times a day with meals 180 tablet 3    cetirizine (ZYRTEC ALLERGY) 10 mg tablet Take 10 mg by mouth      cyclobenzaprine (FLEXERIL) 10 mg tablet Take 10 mg by mouth      Dapagliflozin Propanediol (Farxiga) 10 MG TABS Take 10 mg by mouth daily      fluticasone (FLOVENT HFA) 220 mcg/act inhaler Inhale 2 puffs 2 (two) times a day Rinse mouth after use   1 Inhaler 2    glucose blood (CONTOUR NEXT TEST) test strip Test blood sugar once daily or as needed for fluctuating blood sugars 100 each 3    hydrochlorothiazide (HYDRODIURIL) 25 mg tablet Take 1 tablet (25 mg total) by mouth daily 90 tablet 3    Insulin Pen Needle (BD Pen Needle Dee Dee U/F) 32G X 4 MM MISC Use 4 a day 200 each 5    ipratropium-albuterol (DUO-NEB) 0 5-2 5 mg/3 mL nebulizer solution Take 1 vial (3 mL total) by nebulization every 6 (six) hours as needed for wheezing or shortness of breath 360 mL 2    Lancets MISC Test blood sugar once daily or as needed for fluctuating blood sugars 100 each 0    losartan (COZAAR) 100 MG tablet Take 1 tablet (100 mg total) by mouth daily 90 tablet 3    meloxicam (MOBIC) 15 mg tablet Take 15 mg by mouth      metFORMIN (GLUCOPHAGE) 1000 MG tablet TAKE 1 TABLET BY MOUTH TWICE DAILY WITH MEALS 180 tablet 0    montelukast (SINGULAIR) 10 mg tablet       Multiple Vitamins-Minerals (MENS MULTIVITAMIN PO) Take by mouth      sertraline (ZOLOFT) 50 mg tablet Take 1 tablet (50 mg total) by mouth daily 30 tablet 2    Soliqua 100-33 UNT-MCG/ML injection pen Inject 45 Units under the skin daily      spironolactone (ALDACTONE) 50 mg tablet Take 1 tablet (50 mg total) by mouth daily 90 tablet 3    umeclidinium-vilanterol (ANORO ELLIPTA) 62 5-25 MCG/INH inhaler Inhale 1 puff daily 1 Inhaler 2    Insulin Aspart FlexPen (NovoLOG FlexPen) 100 UNIT/ML SOPN 25 units with each meal (Patient not taking: Reported on 3/9/2021) 5 pen 0    insulin glargine (Lantus SoloStar) 100 units/mL injection pen 55 units qhs (Patient not taking: Reported on 3/9/2021) 20 pen 1     No current facility-administered medications for this visit  Review of Systems   Constitutional: Negative for activity change, appetite change, chills, diaphoresis, fatigue, fever and unexpected weight change  HENT: Negative for congestion, ear pain, postnasal drip, rhinorrhea, sinus pressure, sinus pain, sneezing, sore throat, tinnitus and voice change  Eyes: Negative for pain, redness and visual disturbance  Respiratory: Negative for cough, chest tightness, shortness of breath and wheezing  Cardiovascular: Positive for leg swelling  Negative for chest pain and palpitations  Gastrointestinal: Negative for abdominal pain, blood in stool, constipation, diarrhea, nausea and vomiting  Genitourinary: Negative for difficulty urinating, dysuria, frequency, hematuria and urgency  Musculoskeletal: Negative for arthralgias, back pain, gait problem, joint swelling, myalgias, neck pain and neck stiffness  Skin: Positive for color change  Negative for pallor, rash and wound  Neurological: Negative for dizziness, tremors, weakness, light-headedness and headaches  Psychiatric/Behavioral: Negative for dysphoric mood, self-injury, sleep disturbance and suicidal ideas  The patient is not nervous/anxious            Objective:  Vitals:    03/30/21 0749   BP: 110/80   Pulse: 88   Temp: (!) 97 1 °F (36 2 °C)   SpO2: 92%   Weight: (!) 163 kg (360 lb 6 4 oz)   Height: 5' 9" (1 753 m)     Body mass index is 53 22 kg/m²  Physical Exam  Vitals signs reviewed  Constitutional:       General: He is not in acute distress  Appearance: He is well-developed  He is not diaphoretic  HENT:      Head: Normocephalic and atraumatic  Right Ear: Hearing, tympanic membrane, ear canal and external ear normal       Left Ear: Hearing, tympanic membrane, ear canal and external ear normal       Mouth/Throat:      Pharynx: Uvula midline  No oropharyngeal exudate  Eyes:      General: No scleral icterus  Right eye: No discharge  Left eye: No discharge  Conjunctiva/sclera: Conjunctivae normal    Neck:      Musculoskeletal: Neck supple  Thyroid: No thyromegaly  Vascular: No carotid bruit  Cardiovascular:      Rate and Rhythm: Normal rate and regular rhythm  Heart sounds: Normal heart sounds  No murmur  Pulmonary:      Effort: Pulmonary effort is normal  No respiratory distress  Breath sounds: Normal breath sounds  No wheezing  Abdominal:      General: Bowel sounds are normal  There is no distension  Palpations: Abdomen is soft  There is no mass  Tenderness: There is no abdominal tenderness  There is no guarding or rebound  Musculoskeletal: Normal range of motion  General: No tenderness  Lymphadenopathy:      Cervical: No cervical adenopathy  Skin:     General: Skin is warm and dry  Findings: No erythema or rash  Comments: Bilateral LE with magda skin discoloration from chronic venous stasis   Neurological:      Mental Status: He is alert and oriented to person, place, and time  Psychiatric:         Behavior: Behavior normal          Thought Content:  Thought content normal          Judgment: Judgment normal

## 2021-03-30 NOTE — TELEPHONE ENCOUNTER
Upon review of the In Basket request we were able to locate, review, and update the patient chart as requested for Hemoglobin A1c  Any additional questions or concerns should be emailed to the Practice Liaisons via [email or web address] email, please do not reply via In Basket      Thank you  Calvin Rosenthal MA

## 2021-03-30 NOTE — LETTER
Lab Result(s) Request Form: Hemoglobin A1c      Date Requested: 21  Patient: Omar Uriasinstein  Patient : 1965   Referring Provider: Amirah Ellis, PA-C        Date of Lab Collection ______________________________       The above patient has informed us that they have completed their   most recent Hemoglobin A1c at your facility  Please complete   this form and attach all corresponding procedure reports/results  Comments __________________________________________________________  ____________________________________________________________________  ____________________________________________________________________  ____________________________________________________________________    Collecting/Resulting Facility  ___________________________________________  Form Completed By (print name) ________________________________________    Signature ___________________________________________________________      These reports are needed for  compliance    Please fax this completed form and a copy of the lab results/report to our office located at [place] [address] as soon as possible to [phone] kay[address] [place]: Phone [phone]    We thank you for your assistance in treating our mutual patient

## 2021-04-18 DIAGNOSIS — F41.9 ANXIETY AND DEPRESSION: ICD-10-CM

## 2021-04-18 DIAGNOSIS — F32.A ANXIETY AND DEPRESSION: ICD-10-CM

## 2021-06-11 ENCOUNTER — TELEPHONE (OUTPATIENT)
Dept: PULMONOLOGY | Facility: CLINIC | Age: 56
End: 2021-06-11

## 2021-06-11 DIAGNOSIS — J44.9 COPD, SEVERE (HCC): ICD-10-CM

## 2021-06-11 RX ORDER — FLUTICASONE PROPIONATE 220 UG/1
2 AEROSOL, METERED RESPIRATORY (INHALATION) 2 TIMES DAILY
Qty: 12 G | Refills: 3 | Status: SHIPPED | OUTPATIENT
Start: 2021-06-11 | End: 2021-10-25

## 2021-06-14 ENCOUNTER — HOSPITAL ENCOUNTER (OUTPATIENT)
Dept: NON INVASIVE DIAGNOSTICS | Facility: HOSPITAL | Age: 56
Discharge: HOME | End: 2021-06-14
Attending: INTERNAL MEDICINE
Payer: COMMERCIAL

## 2021-06-14 DIAGNOSIS — I10 ESSENTIAL HYPERTENSION: ICD-10-CM

## 2021-06-14 DIAGNOSIS — I35.0 AORTIC VALVE STENOSIS, ETIOLOGY OF CARDIAC VALVE DISEASE UNSPECIFIED: ICD-10-CM

## 2021-06-14 PROCEDURE — 93306 TTE W/DOPPLER COMPLETE: CPT | Performed by: INTERNAL MEDICINE

## 2021-06-14 PROCEDURE — 93306 TTE W/DOPPLER COMPLETE: CPT

## 2021-07-01 ENCOUNTER — OFFICE VISIT (OUTPATIENT)
Dept: CARDIOLOGY CLINIC | Facility: CLINIC | Age: 56
End: 2021-07-01
Payer: COMMERCIAL

## 2021-07-01 VITALS
SYSTOLIC BLOOD PRESSURE: 122 MMHG | HEIGHT: 69 IN | HEART RATE: 78 BPM | BODY MASS INDEX: 46.65 KG/M2 | WEIGHT: 315 LBS | DIASTOLIC BLOOD PRESSURE: 70 MMHG

## 2021-07-01 DIAGNOSIS — E78.5 DYSLIPIDEMIA, GOAL LDL BELOW 100: ICD-10-CM

## 2021-07-01 DIAGNOSIS — I10 ESSENTIAL HYPERTENSION: ICD-10-CM

## 2021-07-01 DIAGNOSIS — I35.0 AORTIC VALVE STENOSIS, ETIOLOGY OF CARDIAC VALVE DISEASE UNSPECIFIED: Primary | ICD-10-CM

## 2021-07-01 DIAGNOSIS — G47.33 OSA (OBSTRUCTIVE SLEEP APNEA): ICD-10-CM

## 2021-07-01 DIAGNOSIS — R60.0 LOCALIZED EDEMA: ICD-10-CM

## 2021-07-01 PROCEDURE — 3008F BODY MASS INDEX DOCD: CPT | Performed by: INTERNAL MEDICINE

## 2021-07-01 PROCEDURE — 3078F DIAST BP <80 MM HG: CPT | Performed by: INTERNAL MEDICINE

## 2021-07-01 PROCEDURE — 93000 ELECTROCARDIOGRAM COMPLETE: CPT | Performed by: INTERNAL MEDICINE

## 2021-07-01 PROCEDURE — 99214 OFFICE O/P EST MOD 30 MIN: CPT | Performed by: INTERNAL MEDICINE

## 2021-07-01 PROCEDURE — 1036F TOBACCO NON-USER: CPT | Performed by: INTERNAL MEDICINE

## 2021-07-01 PROCEDURE — 3074F SYST BP LT 130 MM HG: CPT | Performed by: INTERNAL MEDICINE

## 2021-07-01 NOTE — PROGRESS NOTES
Subjective:        Patient ID: Marcia Galvez is a 61 y o  male  Chief Complaint:  Ira Gutierres is here for his routine cardiac follow-up for aortic stenosis  Since our visit last use it new cardiac setbacks, no hospitalizations, no cardiac symptoms  Specifically no chest pains concerning shortness of breath palpitations presyncope syncope TIA or claudication like symptoms  Echocardiogram recently revealed moderate aortic stenosis, preserved LV function wall motion  EKG today within normal limits  I reviewed the echo with him today personally, his mean gradient has geoffrey fairly significantly from the mid teens to the mid to upper 20s now  The following portions of the patient's history were reviewed and updated as appropriate: allergies, current medications, past family history, past medical history, past social history, past surgical history and problem list   Review of Systems   Constitutional: Negative for chills, diaphoresis, malaise/fatigue and weight gain  HENT: Negative for nosebleeds and stridor  Eyes: Negative for double vision, vision loss in left eye, vision loss in right eye and visual disturbance  Cardiovascular: Negative for chest pain, claudication, cyanosis, dyspnea on exertion, irregular heartbeat, leg swelling, near-syncope, orthopnea, palpitations, paroxysmal nocturnal dyspnea and syncope  Respiratory: Negative for cough, shortness of breath, snoring and wheezing  Endocrine: Negative for polydipsia, polyphagia and polyuria  Hematologic/Lymphatic: Negative for bleeding problem  Does not bruise/bleed easily  Skin: Negative for flushing and rash  Musculoskeletal: Negative for falls and myalgias  Gastrointestinal: Negative for abdominal pain, heartburn, hematemesis, hematochezia, melena and nausea  Genitourinary: Negative for hematuria  Neurological: Negative for brief paralysis, dizziness, focal weakness, headaches, light-headedness, loss of balance and vertigo  Psychiatric/Behavioral: Negative for altered mental status and substance abuse  Allergic/Immunologic: Negative for hives  Objective:      /70   Pulse 78   Ht 5' 9" (1 753 m)   Wt (!) 163 kg (359 lb)   BMI 53 02 kg/m²   Physical Exam  Constitutional:       General: He is not in acute distress  Appearance: He is well-developed  HENT:      Head: Normocephalic and atraumatic  Eyes:      General: No scleral icterus  Pupils: Pupils are equal, round, and reactive to light  Neck:      Thyroid: No thyromegaly  Vascular: No JVD  Cardiovascular:      Rate and Rhythm: Normal rate and regular rhythm  Heart sounds: Murmur (Grade 2/6 MARSHALL at base preserved S2) heard  No friction rub  No gallop  Pulmonary:      Effort: Pulmonary effort is normal  No respiratory distress  Breath sounds: Normal breath sounds  No stridor  No wheezing or rales  Abdominal:      General: Bowel sounds are normal  There is no distension  Palpations: Abdomen is soft  There is no mass  Tenderness: There is no abdominal tenderness  Musculoskeletal:         General: No deformity  Normal range of motion  Cervical back: Normal range of motion and neck supple  Skin:     General: Skin is warm and dry  Coloration: Skin is not pale  Findings: No erythema  Neurological:      Mental Status: He is alert and oriented to person, place, and time  Coordination: Coordination normal    Psychiatric:         Mood and Affect: Mood normal          Behavior: Behavior normal          Thought Content: Thought content normal          Judgment: Judgment normal          Lab Review:   No visits with results within 2 Month(s) from this visit  Latest known visit with results is:   Telephone on 03/30/2021   Component Date Value    Hemoglobin A1C 03/05/2021 12 8      Echo complete with contrast if indicated    Result Date: 6/14/2021  Narrative: [address] [phone] Transthoracic Echocardiogram Limited 2D, M-mode, Doppler, and Color Doppler Study date:  2021 Patient: Jay Freire MR number: [id number] Account number: [de-identified] : 1965 Age: 61 years Gender: Male Status: Outpatient Location: Echo lab Height: 69 in Weight: 360 lb BP: 117/ 55 mmHg Indications: aortic stenosis Diagnoses: 424 1 - AORTIC VALVE DISORDER Sonographer:  Raudel Ratliff RDCS Primary Physician:  Chris Amin PA-C Referring Physician: WENDY Hill  Group:  [facility] Interpreting Physician:  Neetu Low DO SUMMARY LEFT VENTRICLE: Systolic function was normal  Ejection fraction was estimated to be 60 %  Although no diagnostic regional wall motion abnormality was identified, this possibility cannot be completely excluded on the basis of this study  Wall thickness was mildly increased  AORTIC VALVE: The valve was not visualized well enough to rule out a bicuspid morphology  Leaflets exhibited normal thickness and normal cuspal separation  There was moderate stenosis  Valve mean gradient was 16 mmHg  Estimated aortic valve area (by VTI) was 1 38 cmï¾²  Estimated aortic valve area (by Vmax) was 1 23 cmï¾²  TRICUSPID VALVE: There was mild regurgitation  HISTORY: PRIOR HISTORY: morbid obesity, hypertension, diabetes mellitus, obstructive sleep apnea, asthma, severe copd, respiratory failure with hypoxia, dyslipidemia PROCEDURE: The procedure was performed in the echo lab  This was a routine study  The transthoracic approach was used  The study included limited 2D imaging, M-mode, limited spectral Doppler, and color Doppler  Intravenous contrast (Definity solution [1 3 ml Definity/8 7ml normal saline solution], 6 ml) was administered to opacify the left ventricle  Echocardiographic views were limited due to poor acoustic window availability, decreased penetration, and lung interference   This was a technically difficult study  LEFT VENTRICLE: Size was normal  Systolic function was normal  Ejection fraction was estimated to be 60 %  Although no diagnostic regional wall motion abnormality was identified, this possibility cannot be completely excluded on the basis of this study  Wall thickness was mildly increased  DOPPLER: Left ventricular diastolic function parameters were normal  RIGHT VENTRICLE: The size was normal  Systolic function was normal  Wall thickness was normal  LEFT ATRIUM: Size was normal  RIGHT ATRIUM: Size was normal  MITRAL VALVE: Valve structure was normal  There was normal leaflet separation  DOPPLER: The transmitral velocity was within the normal range  There was no evidence for stenosis  There was no regurgitation  AORTIC VALVE: The valve was not visualized well enough to rule out a bicuspid morphology  Leaflets exhibited normal thickness and normal cuspal separation  DOPPLER: Transaortic velocity was within the normal range  There was moderate stenosis  There was no regurgitation  TRICUSPID VALVE: The valve structure was normal  There was normal leaflet separation  DOPPLER: The transtricuspid velocity was within the normal range  There was no evidence for stenosis  There was mild regurgitation  PULMONIC VALVE: Leaflets exhibited normal thickness, no calcification, and normal cuspal separation  DOPPLER: The transpulmonic velocity was within the normal range  There was no regurgitation  PERICARDIUM: There was no pericardial effusion  The pericardium was normal in appearance  AORTA: The root exhibited normal size  SYSTEMIC VEINS: IVC: The inferior vena cava was not well visualized   MEASUREMENT TABLES 2D MEASUREMENTS LVOT   (Reference normals) Diam   20 mm   (--) DOPPLER MEASUREMENTS LVOT   (Reference normals) Peak brad   130 cm/s   (--) VTI   28 cm   (--) Stroke vol   87 96 ml   (--) Aortic valve   (Reference normals) Peak brad   337 cm/s   (--) VTI   63 cm   (--) Mean gradient   16 mmHg   (--) Obstr index, VTI   0 44    (--) Valve area, VTI   1 38 cmï¾²   (--) Obstr index, Vmax   0 39    (--) Valve area, Vmax   1 23 cmï¾²   (--) SYSTEM MEASUREMENT TABLES 2D %FS: 45 24 % EDV(Teich): 118 01 ml EF(Teich): 76 35 % ESV(Teich): 27 9 ml IVSd: 0 96 cm LVEDV MOD A4C: 123 29 ml LVEF MOD A4C: 15 36 % LVESV MOD A4C: 104 35 ml LVIDd: 5 cm LVIDs: 2 74 cm LVLd A4C: 10 cm LVLs A4C: 9 48 cm LVOT Diam: 2 02 cm LVPWd: 0 96 cm RWT: 0 38 SV MOD A4C: 18 94 ml SV(Teich): 90 1 ml CW AV Env  Ti: 256 9 ms AV VTI: 63 47 cm AV Vmax: 3 47 m/s AV Vmean: 2 46 m/s AV maxP 04 mmHg AV meanP 87 mmHg TR Vmax: 2 99 m/s TR maxP 38 mmHg MM Ao Diam: 3 38 cm PW LOLIS (VTI): 1 35 cm2 LOLIS Vmax: 1 11 cm2 LOLIS Vmax, Pt: 1 13 cm2 E' Lat: 0 11 m/s E' Sept: 0 09 m/s E/E' Lat: 8 86 E/E' Sept: 11 64 LVOT Env  Ti: 302 57 ms LVOT VTI: 26 71 cm LVOT Vmax: 1 22 m/s LVOT Vmean: 0 88 m/s LVOT maxP 97 mmHg LVOT meanPG: 3 5 mmHg LVSV Dopp: 85 51 ml MV A Jorge A: 0 89 m/s MV Dec [place]: 3 37 m/s2 MV DecT: 301 54 ms MV E Jorge A: 1 01 m/s MV E/A Ratio: 1 14 [facility] Accredited Echocardiography Laboratory Prepared and electronically signed by Trey Parish DO Signed 2021 15:49:54         Assessment:       1  Aortic valve stenosis, etiology of cardiac valve disease unspecified  Echo complete with contrast if indicated   2  Localized edema  POCT ECG   3  Essential hypertension  POCT ECG   4  Dyslipidemia, goal LDL below 100     5  SCOTT (obstructive sleep apnea)          Plan:       [place] Frank has no signs nor symptoms reminiscent of unstable angina, left or right-sided heart failure decompensation nor malignant dysrhythmia  His edema is consistent with morbid obesity and dependent edema, not decompensated heart failure  His blood pressure is well controlled, he is tolerating statin therapy for lipid control , may I recommend goal LDL less than 100  He is compliant with CPAP for his sleep apnea that I encouraged continue      From an aortic stenosis standpoint his AS remains asymptomatic and is now moderate in severity  Discussed that his progression rate is likely a little bit higher than average and that I have recommended annual echocardiographic follow-up now  He understands  SBE antibiotic prophylaxis is not required  Natural progression and symptoms of AS and its lack of medical therapy and potential surgical therapies in the future when it would become severe or symptomatic such as TAVR and open AVR explained today to him  Answered all questions to his liking  He will call me prior to his 1 year scheduled follow-up visit should he develop any chest pains shortness of breath presyncope or syncope prior  Echocardiogram ordered in 1 year  Please let me know if you need me to see him sooner

## 2021-07-01 NOTE — PATIENT INSTRUCTIONS
Aortic Stenosis   WHAT YOU NEED TO KNOW:   What is aortic stenosis? Aortic stenosis is a condition that makes your aortic valve become narrow and stiff  The narrow, stiff valve causes your heart to work harder to pump blood into the aorta  What causes aortic stenosis? · Calcium buildup  can happen as you age  Calcium builds up on the aortic valve walls  The buildup stiffens and thickens the valve  · Congenital heart defect  means you were born with a heart problem  Over time, the problem may lead to narrowing or blocking of your aortic valve  · Rheumatic fever  can develop after you have a strep throat infection  Rheumatic fever causes your heart valves to thicken with scars  What are the signs and symptoms of aortic stenosis? · Chest pain or tightness    · Fast, jumpy, or fluttery heartbeat    · Shortness of breath during activity or when you lie down    · Severe tiredness    · Dizziness or feeling faint    How is aortic stenosis diagnosed? Your healthcare provider will ask about your signs and symptoms and listen to your heart  He or she will ask if you have had strep throat or rheumatic fever in the past  You may need any of the following tests:  · Blood tests: You may need blood taken to give healthcare providers information about how your body is working  The blood may be taken from your hand, arm, or IV  · A chest x-ray  shows the size of your heart  It may also show if fluid is around your heart and lungs  · An EKG  test records the electrical activity of your heart  It is used to check for abnormal heart rhythm caused by aortic stenosis  · An echocardiogram  is a type of ultrasound  Sound waves are used to show the structure and function of your heart  · A stress test  may show the changes that take place in your heart while it is under stress  Stress may be placed on your heart with exercise or medicine   Ask for more information about this test     · Cardiac catheterization  is a procedure done to find and treat heart blockages  A catheter (long thin tube) is inserted into your arm, neck, or groin and moved into your heart  An x-ray may be used to guide the tube to the right place  Contrast liquid may be put into your vein so the pictures show up better on a monitor  Tell the healthcare provider if you have ever had an allergic reaction to contrast liquid  How is aortic stenosis treated? · Valve replacement  is the main treatment for aortic stenosis  It is a surgery to remove part or all of your aortic valve  A new valve is then secured in place  The new valve may be from a donor (another person or animal), or may be an artificial valve  There are 2 different approaches for valve replacement  It may be done as a open heart procedure  Your valve may also be replaced through a catheter placed through a vessel in your groin area  Your healthcare provider will talk to you about which approach is right for you  · Balloon valvuloplasty  helps widen your aortic valve and allow blood to flow through easier  It is also called a closed valvotomy  A catheter with a balloon on the tip is inserted through a small incision in your arm or groin  The catheter is guided through a blood vessel and into your left atrium near your aortic valve  When the balloon is inflated, it stretches the valve opening  How can I manage my symptoms? · Limit activities  Your healthcare provider may have you limit strenuous activity  Strenuous activity will make your heart work too hard  Ask your healthcare provider what activities are safe for you to do  · Take your medicines as directed  You may need medicines to lower your blood pressure  You may also need medicine to help your heart's rhythm  Your healthcare provider will prescribe the medicine that is right for you  How can I prevent aortic stenosis? · Manage other health conditions    High blood pressure and high cholesterol levels increase your risk for aortic stenosis  Ask your healthcare providers for more information on managing these conditions  · Maintain a healthy weight  Being overweight can increase your risk for high blood pressure and coronary artery disease  These conditions can make your symptoms worse  Ask your healthcare provider how much you should weigh  Ask him or her to help you create a weight loss plan if you are overweight  · Eat heart healthy foods  Eat whole grains, fruits, and vegetables every day  Limit salt and high-fat foods  Ask your healthcare provider for more information on a heart healthy diet  · Get treatment for strep throat  If strep throat is not treated, it can cause rheumatic fever  · Take care of your teeth and gums  Gingivitis, a gum disease, increases your risk for aortic stenosis  See your dental provider regularly to treat problems early  Call 911 or have someone else call if:   · You have any of the following signs of a heart attack:      ? Squeezing, pressure, or pain in your chest    ? You may  also have any of the following:     § Discomfort or pain in your back, neck, jaw, stomach, or arm    § Shortness of breath    § Nausea or vomiting    § Lightheadedness or a sudden cold sweat    · You have any of the following signs of a stroke:      ? Numbness or drooping on one side of your face     ? Weakness in an arm or leg    ? Confusion or difficulty speaking    ? Dizziness, a severe headache, or vision loss    When should I seek immediate care? · You have chest pain when you move around  It goes away when you are still  · You have increasing shortness of breath  · You faint  When should I contact my healthcare provider? · The veins in your neck look swollen or are bulging  · You have increased swelling in your legs or ankles  · Your heart beats faster than usual     · You feel your heart flutter often  · You have questions or concerns about your condition or care      CARE AGREEMENT:   You have the right to help plan your care  Learn about your health condition and how it may be treated  Discuss treatment options with your healthcare providers to decide what care you want to receive  You always have the right to refuse treatment  The above information is an  only  It is not intended as medical advice for individual conditions or treatments  Talk to your doctor, nurse or pharmacist before following any medical regimen to see if it is safe and effective for you  © Copyright [employer] [facility] 2021 Information is for End User's use only and may not be sold, redistributed or otherwise used for commercial purposes   All illustrations and images included in CareNotes® are the copyrighted property of [employer]  or [address] [place] [address]

## 2021-07-19 DIAGNOSIS — F41.9 ANXIETY AND DEPRESSION: ICD-10-CM

## 2021-07-19 DIAGNOSIS — F32.A ANXIETY AND DEPRESSION: ICD-10-CM

## 2021-07-22 ENCOUNTER — HOSPITAL ENCOUNTER (EMERGENCY)
Facility: HOSPITAL | Age: 56
Discharge: HOME | End: 2021-07-22
Attending: EMERGENCY MEDICINE
Payer: COMMERCIAL

## 2021-07-22 ENCOUNTER — APPOINTMENT (EMERGENCY)
Dept: RADIOLOGY | Facility: HOSPITAL | Age: 56
End: 2021-07-22
Payer: COMMERCIAL

## 2021-07-22 VITALS
TEMPERATURE: 97.9 F | BODY MASS INDEX: 46.65 KG/M2 | WEIGHT: 315 LBS | HEIGHT: 69 IN | DIASTOLIC BLOOD PRESSURE: 53 MMHG | OXYGEN SATURATION: 91 % | RESPIRATION RATE: 20 BRPM | HEART RATE: 91 BPM | SYSTOLIC BLOOD PRESSURE: 120 MMHG

## 2021-07-22 DIAGNOSIS — J44.1 COPD WITH ACUTE EXACERBATION (HCC): Primary | ICD-10-CM

## 2021-07-22 DIAGNOSIS — J18.9 PNEUMONIA: ICD-10-CM

## 2021-07-22 DIAGNOSIS — U07.1 LAB TEST POSITIVE FOR DETECTION OF COVID-19 VIRUS: ICD-10-CM

## 2021-07-22 LAB
ANION GAP SERPL CALCULATED.3IONS-SCNC: 9 MMOL/L (ref 4–13)
BASOPHILS # BLD AUTO: 0.05 THOUSANDS/ΜL (ref 0–0.1)
BASOPHILS NFR BLD AUTO: 0 % (ref 0–1)
BUN SERPL-MCNC: 30 MG/DL (ref 5–25)
CALCIUM SERPL-MCNC: 9.2 MG/DL (ref 8.3–10.1)
CHLORIDE SERPL-SCNC: 99 MMOL/L (ref 100–108)
CO2 SERPL-SCNC: 28 MMOL/L (ref 21–32)
CREAT SERPL-MCNC: 1.06 MG/DL (ref 0.6–1.3)
D DIMER PPP FEU-MCNC: 0.28 UG/ML FEU
EOSINOPHIL # BLD AUTO: 0.11 THOUSAND/ΜL (ref 0–0.61)
EOSINOPHIL NFR BLD AUTO: 1 % (ref 0–6)
ERYTHROCYTE [DISTWIDTH] IN BLOOD BY AUTOMATED COUNT: 13.9 % (ref 11.6–15.1)
GFR SERPL CREATININE-BSD FRML MDRD: 78 ML/MIN/1.73SQ M
GLUCOSE SERPL-MCNC: 264 MG/DL (ref 65–140)
HCT VFR BLD AUTO: 46.5 % (ref 36.5–49.3)
HGB BLD-MCNC: 15 G/DL (ref 12–17)
IMM GRANULOCYTES # BLD AUTO: 0.09 THOUSAND/UL (ref 0–0.2)
IMM GRANULOCYTES NFR BLD AUTO: 1 % (ref 0–2)
LYMPHOCYTES # BLD AUTO: 0.89 THOUSANDS/ΜL (ref 0.6–4.47)
LYMPHOCYTES NFR BLD AUTO: 5 % (ref 14–44)
MAGNESIUM SERPL-MCNC: 2 MG/DL (ref 1.6–2.6)
MCH RBC QN AUTO: 27.9 PG (ref 26.8–34.3)
MCHC RBC AUTO-ENTMCNC: 32.3 G/DL (ref 31.4–37.4)
MCV RBC AUTO: 87 FL (ref 82–98)
MONOCYTES # BLD AUTO: 1.1 THOUSAND/ΜL (ref 0.17–1.22)
MONOCYTES NFR BLD AUTO: 6 % (ref 4–12)
NEUTROPHILS # BLD AUTO: 15.12 THOUSANDS/ΜL (ref 1.85–7.62)
NEUTS SEG NFR BLD AUTO: 87 % (ref 43–75)
NRBC BLD AUTO-RTO: 0 /100 WBCS
NT-PROBNP SERPL-MCNC: 97 PG/ML
PLATELET # BLD AUTO: 293 THOUSANDS/UL (ref 149–390)
PMV BLD AUTO: 9.7 FL (ref 8.9–12.7)
POTASSIUM SERPL-SCNC: 4.3 MMOL/L (ref 3.5–5.3)
RBC # BLD AUTO: 5.37 MILLION/UL (ref 3.88–5.62)
SARS-COV-2 RNA RESP QL NAA+PROBE: POSITIVE
SODIUM SERPL-SCNC: 136 MMOL/L (ref 136–145)
TROPONIN I SERPL-MCNC: <0.02 NG/ML
WBC # BLD AUTO: 17.36 THOUSAND/UL (ref 4.31–10.16)

## 2021-07-22 PROCEDURE — U0003 INFECTIOUS AGENT DETECTION BY NUCLEIC ACID (DNA OR RNA); SEVERE ACUTE RESPIRATORY SYNDROME CORONAVIRUS 2 (SARS-COV-2) (CORONAVIRUS DISEASE [COVID-19]), AMPLIFIED PROBE TECHNIQUE, MAKING USE OF HIGH THROUGHPUT TECHNOLOGIES AS DESCRIBED BY CMS-2020-01-R: HCPCS | Performed by: EMERGENCY MEDICINE

## 2021-07-22 PROCEDURE — 96375 TX/PRO/DX INJ NEW DRUG ADDON: CPT

## 2021-07-22 PROCEDURE — 93005 ELECTROCARDIOGRAM TRACING: CPT

## 2021-07-22 PROCEDURE — 80048 BASIC METABOLIC PNL TOTAL CA: CPT | Performed by: EMERGENCY MEDICINE

## 2021-07-22 PROCEDURE — 96365 THER/PROPH/DIAG IV INF INIT: CPT

## 2021-07-22 PROCEDURE — 84484 ASSAY OF TROPONIN QUANT: CPT | Performed by: EMERGENCY MEDICINE

## 2021-07-22 PROCEDURE — 83880 ASSAY OF NATRIURETIC PEPTIDE: CPT | Performed by: EMERGENCY MEDICINE

## 2021-07-22 PROCEDURE — 85379 FIBRIN DEGRADATION QUANT: CPT | Performed by: EMERGENCY MEDICINE

## 2021-07-22 PROCEDURE — 99285 EMERGENCY DEPT VISIT HI MDM: CPT | Performed by: EMERGENCY MEDICINE

## 2021-07-22 PROCEDURE — 87040 BLOOD CULTURE FOR BACTERIA: CPT | Performed by: EMERGENCY MEDICINE

## 2021-07-22 PROCEDURE — 94640 AIRWAY INHALATION TREATMENT: CPT

## 2021-07-22 PROCEDURE — 36415 COLL VENOUS BLD VENIPUNCTURE: CPT | Performed by: EMERGENCY MEDICINE

## 2021-07-22 PROCEDURE — U0005 INFEC AGEN DETEC AMPLI PROBE: HCPCS | Performed by: EMERGENCY MEDICINE

## 2021-07-22 PROCEDURE — 83735 ASSAY OF MAGNESIUM: CPT | Performed by: EMERGENCY MEDICINE

## 2021-07-22 PROCEDURE — 99285 EMERGENCY DEPT VISIT HI MDM: CPT

## 2021-07-22 PROCEDURE — 85025 COMPLETE CBC W/AUTO DIFF WBC: CPT | Performed by: EMERGENCY MEDICINE

## 2021-07-22 PROCEDURE — 71045 X-RAY EXAM CHEST 1 VIEW: CPT

## 2021-07-22 RX ORDER — AZITHROMYCIN 250 MG/1
500 TABLET, FILM COATED ORAL ONCE
Status: COMPLETED | OUTPATIENT
Start: 2021-07-22 | End: 2021-07-22

## 2021-07-22 RX ORDER — DEXAMETHASONE 2 MG/1
TABLET ORAL
Qty: 5 TABLET | Refills: 0 | Status: SHIPPED | OUTPATIENT
Start: 2021-07-22 | End: 2021-07-26

## 2021-07-22 RX ORDER — IPRATROPIUM BROMIDE AND ALBUTEROL SULFATE 2.5; .5 MG/3ML; MG/3ML
3 SOLUTION RESPIRATORY (INHALATION)
Status: DISCONTINUED | OUTPATIENT
Start: 2021-07-22 | End: 2021-07-22 | Stop reason: HOSPADM

## 2021-07-22 RX ORDER — AZITHROMYCIN 250 MG/1
250 TABLET, FILM COATED ORAL DAILY
Qty: 4 TABLET | Refills: 0 | Status: SHIPPED | OUTPATIENT
Start: 2021-07-22 | End: 2021-07-26

## 2021-07-22 RX ORDER — AMOXICILLIN AND CLAVULANATE POTASSIUM 875; 125 MG/1; MG/1
1 TABLET, FILM COATED ORAL 2 TIMES DAILY
Qty: 10 TABLET | Refills: 0 | Status: SHIPPED | OUTPATIENT
Start: 2021-07-22 | End: 2021-08-02 | Stop reason: HOSPADM

## 2021-07-22 RX ORDER — DEXAMETHASONE SODIUM PHOSPHATE 10 MG/ML
10 INJECTION, SOLUTION INTRAMUSCULAR; INTRAVENOUS ONCE
Status: COMPLETED | OUTPATIENT
Start: 2021-07-22 | End: 2021-07-22

## 2021-07-22 RX ORDER — CEFPODOXIME PROXETIL 200 MG/1
200 TABLET, FILM COATED ORAL 2 TIMES DAILY
Qty: 10 TABLET | Refills: 0 | Status: SHIPPED | OUTPATIENT
Start: 2021-07-22 | End: 2021-07-22

## 2021-07-22 RX ORDER — CEFTRIAXONE 2 G/50ML
2000 INJECTION, SOLUTION INTRAVENOUS ONCE
Status: COMPLETED | OUTPATIENT
Start: 2021-07-22 | End: 2021-07-22

## 2021-07-22 RX ADMIN — CEFTRIAXONE 2000 MG: 2 INJECTION, SOLUTION INTRAVENOUS at 15:58

## 2021-07-22 RX ADMIN — IPRATROPIUM BROMIDE AND ALBUTEROL SULFATE 3 ML: 2.5; .5 SOLUTION RESPIRATORY (INHALATION) at 14:52

## 2021-07-22 RX ADMIN — DEXAMETHASONE SODIUM PHOSPHATE 10 MG: 10 INJECTION, SOLUTION INTRAMUSCULAR; INTRAVENOUS at 14:50

## 2021-07-22 RX ADMIN — AZITHROMYCIN MONOHYDRATE 500 MG: 250 TABLET ORAL at 16:29

## 2021-07-22 NOTE — ED PROVIDER NOTES
History  Chief Complaint   Patient presents with    Shortness of Breath     pt reports chronic sob; worse with exertion; worsening today     65-year-old male with history of COPD and on chronic 5 L nasal cannula as baseline sats run 90% he is 91-92 on a great day  He has hyperlipidemia hypertension type 2 diabetes peripheral edema obstructive sleep apnea and moderate aortic stenosis  There is just at his cardiologist's office on 2021 he had some progression of the aortic stenosis and a repeat echo was ordered  Patient presents with 4 day history of gradual worsening shortness of breath increasing dyspnea on exertion he is coughing up greenish phlegm he denies any fever he denies any pleuritic pain he has had no DVT or PE he initially presented with sats of 74% here in the emergency department but he did not wears oxygen in his car on his baseline nasal cannula he is 89-90% here he is denying any chest pain there has been no sick contacts or travel travel his lower extremity edema is at baseline he denies any nausea vomiting no abdominal pain no difficulties with urination  He has use nebulizers at home and those do seem to help his breathing; he has been on steroids previously but not recently for his wheezing; s/p Moderna *2 last 3/6/21          Prior to Admission Medications   Prescriptions Last Dose Informant Patient Reported? Taking?    Blood Glucose Monitoring Suppl (CONTOUR NEXT MONITOR) w/Device KIT  Self No No   Sig: Test blood sugar once daily or as needed for fluctuating blood sugars   Dapagliflozin Propanediol (Farxiga) 10 MG TABS  Self Yes No   Sig: Take 10 mg by mouth daily   Insulin Aspart FlexPen (NovoLOG FlexPen) 100 UNIT/ML SOPN   No No   Si units with each meal   Patient not taking: Reported on 3/9/2021   Insulin Pen Needle (BD Pen Needle Dee Dee U/F) 32G X 4 MM MISC   No No   Sig: Use 4 a day   Lancets MISC   No No   Sig: Test blood sugar once daily or as needed for fluctuating blood sugars   Multiple Vitamins-Minerals (MENS MULTIVITAMIN PO)  Self Yes No   Sig: Take by mouth   Soliqua 100-33 UNT-MCG/ML injection pen   Yes No   Sig: Inject 45 Units under the skin daily   albuterol (Ventolin HFA) 90 mcg/act inhaler   No No   Sig: Inhale 2 puffs every 6 (six) hours as needed for wheezing   amLODIPine (NORVASC) 10 mg tablet   No No   Sig: Take 1 tablet (10 mg total) by mouth daily   atorvastatin (LIPITOR) 40 mg tablet   No No   Sig: Take 1 tablet (40 mg total) by mouth daily   carvedilol (COREG) 12 5 mg tablet   No No   Sig: Take 1 tablet (12 5 mg total) by mouth 2 (two) times a day with meals   cetirizine (ZYRTEC ALLERGY) 10 mg tablet  Self Yes No   Sig: Take 10 mg by mouth   cyclobenzaprine (FLEXERIL) 10 mg tablet  Self Yes No   Sig: Take 10 mg by mouth   fluticasone (FLOVENT HFA) 220 mcg/act inhaler   No No   Sig: Inhale 2 puffs 2 (two) times a day Rinse mouth after use     glucose blood (CONTOUR NEXT TEST) test strip  Self No No   Sig: Test blood sugar once daily or as needed for fluctuating blood sugars   hydrochlorothiazide (HYDRODIURIL) 25 mg tablet   No No   Sig: Take 1 tablet (25 mg total) by mouth daily   insulin glargine (Lantus SoloStar) 100 units/mL injection pen   No No   Si units qhs   Patient not taking: Reported on 3/9/2021   ipratropium-albuterol (DUO-NEB) 0 5-2 5 mg/3 mL nebulizer solution   No No   Sig: Take 1 vial (3 mL total) by nebulization every 6 (six) hours as needed for wheezing or shortness of breath   losartan (COZAAR) 100 MG tablet   No No   Sig: Take 1 tablet (100 mg total) by mouth daily   meloxicam (MOBIC) 15 mg tablet  Self Yes No   Sig: Take 15 mg by mouth   metFORMIN (GLUCOPHAGE) 1000 MG tablet   No No   Sig: TAKE 1 TABLET BY MOUTH TWICE DAILY WITH MEALS   montelukast (SINGULAIR) 10 mg tablet  Self Yes No   sertraline (ZOLOFT) 50 mg tablet   No No   Sig: Take 1 tablet (50 mg total) by mouth daily   spironolactone (ALDACTONE) 50 mg tablet   No No   Sig: Take 1 tablet (50 mg total) by mouth daily   umeclidinium-vilanterol (ANORO ELLIPTA) 62 5-25 MCG/INH inhaler   No No   Sig: Inhale 1 puff daily      Facility-Administered Medications: None       Past Medical History:   Diagnosis Date    Aortic stenosis     Diabetes ([facility] [address] )     Hyperlipidemia     Hypertension 7/2/2014       Past Surgical History:   Procedure Laterality Date    APPENDECTOMY         Family History   Problem Relation Age of Onset    No Known Problems Mother      I have reviewed and agree with the history as documented  E-Cigarette/Vaping    E-Cigarette Use Never User      E-Cigarette/Vaping Substances    Nicotine No     THC No     CBD No     Flavoring No     Other No     Unknown No      Social History     Tobacco Use    Smoking status: Former Smoker     Years: 10 00     Types: Cigarettes    Smokeless tobacco: Never Used   Vaping Use    Vaping Use: Never used   Substance Use Topics    Alcohol use: Never    Drug use: Never       Review of Systems   Constitutional: Positive for activity change  Negative for appetite change, chills, diaphoresis and fever  HENT: Negative for congestion, ear discharge, ear pain, sinus pressure, sinus pain, sore throat, trouble swallowing and voice change  Eyes: Negative for pain and visual disturbance  Respiratory: Positive for cough, shortness of breath and wheezing  Negative for chest tightness  Cardiovascular: Positive for leg swelling (at baseline)  Negative for chest pain and palpitations  Gastrointestinal: Negative for abdominal pain and vomiting  Genitourinary: Negative for difficulty urinating  Musculoskeletal: Negative for arthralgias and back pain  Skin: Negative for color change and rash  Neurological: Negative for dizziness, seizures, syncope, weakness, light-headedness and headaches  All other systems reviewed and are negative  Physical Exam  Physical Exam  Vitals and nursing note reviewed     Constitutional:       General: He is not in acute distress  Appearance: He is well-developed  HENT:      Head: Normocephalic and atraumatic  Right Ear: Tympanic membrane normal       Left Ear: Tympanic membrane normal    Eyes:      Conjunctiva/sclera: Conjunctivae normal    Cardiovascular:      Rate and Rhythm: Normal rate and regular rhythm  Heart sounds: No murmur heard  Pulmonary:      Effort: Pulmonary effort is normal  No respiratory distress  Breath sounds: No stridor  Wheezing present  No rhonchi or rales  Comments: sats 89-90% pm 5L NC; speaks full sentences  Chest:      Chest wall: No tenderness  Abdominal:      General: There is no distension  Palpations: Abdomen is soft  Tenderness: There is no abdominal tenderness  There is no right CVA tenderness, left CVA tenderness or guarding  Musculoskeletal:      Cervical back: Neck supple  Right lower leg: No edema  Left lower leg: No edema  Comments: Symmetric trace   Skin:     General: Skin is warm and dry  Capillary Refill: Capillary refill takes less than 2 seconds  Neurological:      General: No focal deficit present  Mental Status: He is alert  Cranial Nerves: No cranial nerve deficit  Sensory: No sensory deficit  Motor: No weakness        Coordination: Coordination normal       Gait: Gait normal    Psychiatric:         Mood and Affect: Mood normal          Vital Signs  ED Triage Vitals [07/22/21 1412]   Temperature Pulse Respirations Blood Pressure SpO2   97 9 °F (36 6 °C) 101 22 140/69 (!) 74 %      Temp Source Heart Rate Source Patient Position - Orthostatic VS BP Location FiO2 (%)   Temporal Monitor Sitting Left arm --      Pain Score       --           Vitals:    07/22/21 1412 07/22/21 1430 07/22/21 1530 07/22/21 1630   BP: 140/69 120/62 123/57 120/53   Pulse: 101 86 89 91   Patient Position - Orthostatic VS: Sitting Sitting Sitting Sitting         Visual Acuity      ED Medications  Medications ipratropium-albuterol (DUO-NEB) 0 5-2 5 mg/3 mL inhalation solution 3 mL (3 mL Nebulization Given 7/22/21 1452)   dexamethasone (PF) (DECADRON) injection 10 mg (10 mg Intravenous Given 7/22/21 1450)   cefTRIAXone (ROCEPHIN) IVPB (premix in dextrose) 2,000 mg 50 mL (0 mg Intravenous Stopped 7/22/21 1629)   azithromycin (ZITHROMAX) tablet 500 mg (500 mg Oral Given 7/22/21 1629)       Diagnostic Studies  Results Reviewed     Procedure Component Value Units Date/Time    Novel Coronavirus (Covid-19),PCR [facility] - 2 Hour Stat [[id number]]  (Abnormal) Collected: 07/22/21 1556    Lab Status: Final result Specimen: Nares from Nose Updated: 07/22/21 1646     SARS-CoV-2 Positive    Narrative: The specimen collection materials, transport medium, and/or testing methodology utilized in the production of these test results have been proven to be reliable in a limited validation with an abbreviated program under the Emergency Utilization Authorization provided by the FDA  Testing reported as "Presumptive positive" will be confirmed with secondary testing to ensure result accuracy  Clinical caution and judgement should be used with the interpretation of these results with consideration of the clinical impression and other laboratory testing  Testing reported as "Positive" or "Negative" has been proven to be accurate according to standard laboratory validation requirements  All testing is performed with control materials showing appropriate reactivity at standard intervals  Blood culture #2 [[id number]] Collected: 07/22/21 1557    Lab Status: In process Specimen: Blood from Arm, Right Updated: 07/22/21 1603    Blood culture #1 [[id number]] Collected: 07/22/21 1557    Lab Status:  In process Specimen: Blood from Arm, Left Updated: 07/22/21 0562    Basic metabolic panel [[id number]]  (Abnormal) Collected: 07/22/21 1448    Lab Status: Final result Specimen: Blood from Arm, Left Updated: 07/22/21 1519     Sodium 136 mmol/L Relative 6 %      Eosinophils Relative 1 %      Basophils Relative 0 %      Neutrophils Absolute 15 12 Thousands/µL      Immature Grans Absolute 0 09 Thousand/uL      Lymphocytes Absolute 0 89 Thousands/µL      Monocytes Absolute 1 10 Thousand/µL      Eosinophils Absolute 0 11 Thousand/µL      Basophils Absolute 0 05 Thousands/µL                  XR chest 1 view portable   ED Interpretation by John Jauregui MD (07/22 1534)   Read by me; Radiologist to provide formal interpretation  Right basilar infiltrate                 Procedures  ECG 12 Lead Documentation Only    Date/Time: 7/22/2021 2:20 PM  Performed by: John Jauregui MD  Authorized by: John Jauregui MD     Indications / Diagnosis:  Sob  ECG reviewed by me, the ED Provider: yes    Patient location:  ED  Previous ECG:     Previous ECG:  Compared to current    Comparison ECG info:  3/25/21 18:44    Similarity:  No change  Rate:     ECG rate:  89    ECG rate assessment: normal    Rhythm:     Rhythm: sinus rhythm    QRS:     QRS axis:  Normal  Comments:      No acute ischemic changes             ED Course  ED Course as of Jul 22 1850   Thu Jul 22, 2021   1541 Patient feels markedly improved   Reasculation no wheezing improved excursion sats 87% on 5L; reviewed labs offered admission prefers to go home will rx with zithromax , rocephin IV then cefpodoxime and continued zithromax at home      1703 Contacted pulmonary Dr Cesar Jackson via TC to review breathru COVID result      1747 Case reviewed with Dr Cesar Jackson recommends admission as hypoxic can result precipitously I did review the pulmonary recommendations with the patient but he does not want to be admitted reviewed self isolation precautions for period of 10 days which would amount to 14 days of quarantine pulmonary kait rib request Augmentin as has better coverage than the cefpodoxime as well as at the Zithromax patient will monitor his pulse oximeter if he drops will return for re-evaluation  Will review vitamin DC and zinc supplementation as well as patient is already on a multivitamin                                SBIRT 20yo+      Most Recent Value   SBIRT (22 yo +)   In order to provide better care to our patients, we are screening all of our patients for alcohol and drug use  Would it be okay to ask you these screening questions? Yes Filed at: 07/22/2021 1414   Initial Alcohol Screen: US AUDIT-C    1  How often do you have a drink containing alcohol?  0 Filed at: 07/22/2021 1414   2  How many drinks containing alcohol do you have on a typical day you are drinking? 0 Filed at: 07/22/2021 1414   3a  Male UNDER 65: How often do you have five or more drinks on one occasion? 0 Filed at: 07/22/2021 1414   3b  FEMALE Any Age, or MALE 65+: How often do you have 4 or more drinks on one occassion? 0 Filed at: 07/22/2021 1414   Audit-C Score  0 Filed at: 07/22/2021 1414   ROSA MARIA: How many times in the past year have you    Used an illegal drug or used a prescription medication for non-medical reasons?   Never Filed at: 07/22/2021 1414                    MDM  Number of Diagnoses or Management Options  COPD with acute exacerbation ([facility] )  Lab test positive for detection of COVID-19 virus  Pneumonia  Diagnosis management comments: Mdm:  COPD exacerbation, acute coronary syndrome, pulmonary embolism, CHF      Patient will reviewed monoclonal Ab therapy with pulmonary at followup visit      Disposition  Final diagnoses:   COPD with acute exacerbation ([facility] )   Pneumonia - RLL   Lab test positive for detection of COVID-19 virus - breakthru covid     Time reflects when diagnosis was documented in both MDM as applicable and the Disposition within this note     Time User Action Codes Description Comment    7/22/2021  4:18 PM Alea Bello [J44 1] COPD with acute exacerbation ([facility] )     7/22/2021  4:18 PM Asif Capone [J18 9] Pneumonia     7/22/2021  4:19 PM Asif Solano Modify [J18 9] Pneumonia RLL    7/22/2021  5:21 PM Tajik Matas Add [U07 1] Lab test positive for detection of COVID-19 virus     7/22/2021  5:21 PM Tajik Matas Modify [U07 1] Lab test positive for detection of COVID-19 virus breakthru covid      ED Disposition     ED Disposition Condition Date/Time Comment    Discharge Stable Thu Jul 22, 2021  5:49 PM Alveda Reil discharge to home/self care  Follow-up Information     Follow up With Specialties Details Why Contact Info Additional [facility] Call in 1 day schedule telemedicine visits [address] [phone] [facility], [address], [address] [place] [address] Medication List as of 7/22/2021  6:01 PM      START taking these medications    Details   amoxicillin-clavulanate (AUGMENTIN) 875-125 mg per tablet Take 1 tablet by mouth 2 (two) times a day for 5 days, Starting Thu 7/22/2021, Until Tue 7/27/2021, Normal      azithromycin (Zithromax Z-Melvin) 250 mg tablet Take 1 tablet (250 mg total) by mouth daily for 4 days, Starting Thu 7/22/2021, Until Mon 7/26/2021, Normal      dexamethasone (DECADRON) 2 mg tablet Take with food   Take one time dose of 10mg by mouth tomorrow, Normal         CONTINUE these medications which have NOT CHANGED    Details   albuterol (Ventolin HFA) 90 mcg/act inhaler Inhale 2 puffs every 6 (six) hours as needed for wheezing, Starting Thu 2/4/2021, Normal      amLODIPine (NORVASC) 10 mg tablet Take 1 tablet (10 mg total) by mouth daily, Starting Wed 12/16/2020, Normal      atorvastatin (LIPITOR) 40 mg tablet Take 1 tablet (40 mg total) by mouth daily, Starting Wed 12/16/2020, Normal      Blood Glucose Monitoring Suppl (CONTOUR NEXT MONITOR) w/Device KIT Test blood sugar once daily or as needed for fluctuating blood sugars, Normal      carvedilol (COREG) 12 5 mg tablet Take 1 tablet (12 5 mg total) by mouth 2 (two) times a day with meals, Starting Mon 9/14/2020, Normal      cetirizine (ZYRTEC ALLERGY) 10 mg tablet Take 10 mg by mouth, Starting Mon 12/10/2018, Historical Med      cyclobenzaprine (FLEXERIL) 10 mg tablet Take 10 mg by mouth, Starting Mon 2/6/2017, Historical Med      Dapagliflozin Propanediol (Farxiga) 10 MG TABS Take 10 mg by mouth daily, Historical Med      fluticasone (FLOVENT HFA) 220 mcg/act inhaler Inhale 2 puffs 2 (two) times a day Rinse mouth after use , Starting Fri 6/11/2021, Normal      glucose blood (CONTOUR NEXT TEST) test strip Test blood sugar once daily or as needed for fluctuating blood sugars, Normal      hydrochlorothiazide (HYDRODIURIL) 25 mg tablet Take 1 tablet (25 mg total) by mouth daily, Starting Mon 11/9/2020, Normal      Insulin Aspart FlexPen (NovoLOG FlexPen) 100 UNIT/ML SOPN 25 units with each meal, No Print      insulin glargine (Lantus SoloStar) 100 units/mL injection pen 55 units qhs, Normal      Insulin Pen Needle (BD Pen Needle Dee Dee U/F) 32G X 4 MM MISC Use 4 a day, Normal      ipratropium-albuterol (DUO-NEB) 0 5-2 5 mg/3 mL nebulizer solution Take 1 vial (3 mL total) by nebulization every 6 (six) hours as needed for wheezing or shortness of breath, Starting Thu 2/4/2021, Normal      Lancets MISC Test blood sugar once daily or as needed for fluctuating blood sugars, Normal      losartan (COZAAR) 100 MG tablet Take 1 tablet (100 mg total) by mouth daily, Starting Wed 10/7/2020, Phone In      meloxicam (MOBIC) 15 mg tablet Take 15 mg by mouth, Starting Mon 2/6/2017, Historical Med      metFORMIN (GLUCOPHAGE) 1000 MG tablet TAKE 1 TABLET BY MOUTH TWICE DAILY WITH MEALS, Normal      montelukast (SINGULAIR) 10 mg tablet Starting Wed 8/7/2019, Historical Med      Multiple Vitamins-Minerals (MENS MULTIVITAMIN PO) Take by mouth, Historical Med      sertraline (ZOLOFT) 50 mg tablet Take 1 tablet (50 mg total) by mouth daily, Starting Mon 7/19/2021, Normal      Soliqua 100-33 UNT-MCG/ML injection pen Inject 45 Units under the skin daily, Starting Thu 3/11/2021, Historical Med      spironolactone (ALDACTONE) 50 mg tablet Take 1 tablet (50 mg total) by mouth daily, Starting Wed 12/2/2020, Normal      umeclidinium-vilanterol (ANORO ELLIPTA) 62 5-25 MCG/INH inhaler Inhale 1 puff daily, Starting Thu 2/4/2021, No Print               PDMP Review     None          ED Provider  Electronically Signed by           Sindhu Vicente MD  07/22/21 3750

## 2021-07-22 NOTE — ED NOTES
Patient placed on 5L NC  Patient 74% on arrival  States he wears 5L NC chronically, however, states he left oxygen in the car       Titus Lancaster RN  07/22/21 5128

## 2021-07-22 NOTE — DISCHARGE INSTRUCTIONS
Continue nebs as previously prescribed    Return if need albuterol neb more often than every 4 hours  Return with worsening shortness of breath chest pain oxygens below 87% on baseline of 5 L nasal cannula  Worsening shortness of breath with activity or any new or worsening symptoms  Finish 4 additional days of zithromax  Finish 5 additional day of augmentin  Complete 1 additional dose of dexamethasone 10mg tomorrow  Probiotics over the counter to prevent diarrhea from antibiotics  Self isolate for 10days  Zinc 220mg by mouth daily for 7 days  Vitamin D3 2000 IU daily  Vitamin C 1g twice daily for 7 days  Continue multivitamin daily  Move positions while sleeping side to side recruits different parts of lung

## 2021-07-23 ENCOUNTER — TELEPHONE (OUTPATIENT)
Dept: FAMILY MEDICINE CLINIC | Facility: CLINIC | Age: 56
End: 2021-07-23

## 2021-07-23 ENCOUNTER — TELEPHONE (OUTPATIENT)
Dept: PULMONOLOGY | Facility: CLINIC | Age: 56
End: 2021-07-23

## 2021-07-23 LAB
ATRIAL RATE: 89 BPM
P AXIS: 75 DEGREES
PR INTERVAL: 156 MS
QRS AXIS: -6 DEGREES
QRSD INTERVAL: 94 MS
QT INTERVAL: 372 MS
QTC INTERVAL: 452 MS
T WAVE AXIS: 59 DEGREES
VENTRICULAR RATE: 89 BPM

## 2021-07-23 PROCEDURE — 93010 ELECTROCARDIOGRAM REPORT: CPT | Performed by: INTERNAL MEDICINE

## 2021-07-23 NOTE — TELEPHONE ENCOUNTER
Please have him call back on Monday with an update  If he is still feeling poorly schedule a virtual visit tuesday

## 2021-07-23 NOTE — TELEPHONE ENCOUNTER
ROMANAI-Pt called  He was seen at ER yesterday  Pt has Pneumonia and tested positive for Covid  They wanted to keep him but he wouldn't stay  He is feeling a little better today  He just wanted you to be aware

## 2021-07-26 ENCOUNTER — TELEMEDICINE (OUTPATIENT)
Dept: FAMILY MEDICINE CLINIC | Facility: CLINIC | Age: 56
End: 2021-07-26
Payer: COMMERCIAL

## 2021-07-26 VITALS — HEIGHT: 69 IN | BODY MASS INDEX: 46.65 KG/M2 | WEIGHT: 315 LBS

## 2021-07-26 DIAGNOSIS — U07.1 COVID-19: Primary | ICD-10-CM

## 2021-07-26 DIAGNOSIS — Z92.29 COVID-19 VACCINE SERIES COMPLETED: ICD-10-CM

## 2021-07-26 PROCEDURE — 1036F TOBACCO NON-USER: CPT | Performed by: PHYSICIAN ASSISTANT

## 2021-07-26 PROCEDURE — 99212 OFFICE O/P EST SF 10 MIN: CPT | Performed by: PHYSICIAN ASSISTANT

## 2021-07-26 RX ORDER — GLIMEPIRIDE 4 MG/1
4 TABLET ORAL 2 TIMES DAILY
COMMUNITY
Start: 2021-07-20

## 2021-07-26 NOTE — PROGRESS NOTES
COVID-19 Outpatient Progress Note    Assessment/Plan:    Problem List Items Addressed This Visit     None      Visit Diagnoses     COVID-19    -  Primary    COVID-19 vaccine series completed             Disposition:     I recommended continued isolation until at least 24 hours have passed since recovery defined as resolution of fever without the use of fever-reducing medications AND improvement in COVID symptoms AND 10 days have passed since onset of symptoms (or 10 days have passed since date of first positive viral diagnostic test for asymptomatic patients)  Pt tested positive for Covid 19 7/22, pt states he is feeling better every day  He increased supplemental oxygen from usual 5L to 7L and sats mid 80s to low 90s  Pt initially refused admission recommendation  Pt did complete Moderna vaccination series  Pt declines monoclonal antibody treatment  I have spent 10 minutes directly with the patient  Greater than 50% of this time was spent in counseling/coordination of care regarding: diagnostic results, prognosis, risks and benefits of treatment options, instructions for management, patient and family education, importance of treatment compliance, risk factor reductions and impressions  If symptoms worsen, pt to go to ER immediately  He is in agreement           Verification of patient location:    Patient is located in the following state in which I hold an active license [place]    Encounter provider Veronica Dumont PA-C    Provider located at [address] [id number]    Recent Visits  Date Type Provider Dept   07/23/21 Telephone [facility]   Showing recent visits within past 7 days and meeting all other requirements  Today's Visits  Date Type Provider Dept   07/26/21 Telemedicine [facility]   Showing today's visits and meeting all other requirements  Future Appointments  No visits were found meeting these conditions  Showing future appointments within next 150 days and meeting all other requirements       Patient agrees to participate in a virtual check in via telephone or video visit instead of presenting to the office to address urgent/immediate medical needs  Patient is aware this is a billable service  After connecting through Telephone, the patient was identified by name and date of birth  Gm Jarquin was informed that this was a telemedicine visit and that the exam was being conducted confidentially over secure lines  My office door was closed  No one else was in the room  Gm Jarquin acknowledged consent and understanding of privacy and security of the telemedicine visit  I informed the patient that I have reviewed his record in Epic and presented the opportunity for him to ask any questions regarding the visit today  The patient agreed to participate  It was my intent to perform this visit via video technology but the patient was not able to do a video connection so the visit was completed via audio telephone only  Subjective:   Gm Jarquin is a 61 y o  male who has been screened for COVID-19  Symptom change since last report: improving  Patient's symptoms include fever, chills, fatigue, anosmia, loss of taste, cough, shortness of breath, chest tightness and myalgias  Patient denies congestion, rhinorrhea, sore throat, abdominal pain, nausea, vomiting, diarrhea and headaches  Lori Eldridge has been staying home and has isolated themselves in his home  He is taking care to not share personal items and is cleaning all surfaces that are touched often, like counters, tabletops, and doorknobs using household cleaning sprays or wipes  He is wearing a mask when he leaves his room       Date of positive COVID-19 PCR: 7/22/2021    Lab Results   Component Value Date    SARSCOV2 Positive (A) 07/22/2021     Past Medical History:   Diagnosis Date    Aortic stenosis     Diabetes ([facility] [address] )     Hyperlipidemia     Hypertension 7/2/2014     Past Surgical History:   Procedure Laterality Date    APPENDECTOMY       Current Outpatient Medications   Medication Sig Dispense Refill    albuterol (Ventolin HFA) 90 mcg/act inhaler Inhale 2 puffs every 6 (six) hours as needed for wheezing 1 Inhaler 2    amLODIPine (NORVASC) 10 mg tablet Take 1 tablet (10 mg total) by mouth daily 90 tablet 3    amoxicillin-clavulanate (AUGMENTIN) 875-125 mg per tablet Take 1 tablet by mouth 2 (two) times a day for 5 days 10 tablet 0    atorvastatin (LIPITOR) 40 mg tablet Take 1 tablet (40 mg total) by mouth daily 90 tablet 3    azithromycin (Zithromax Z-Melvin) 250 mg tablet Take 1 tablet (250 mg total) by mouth daily for 4 days 4 tablet 0    Blood Glucose Monitoring Suppl (CONTOUR NEXT MONITOR) w/Device KIT Test blood sugar once daily or as needed for fluctuating blood sugars 1 kit 0    carvedilol (COREG) 12 5 mg tablet Take 1 tablet (12 5 mg total) by mouth 2 (two) times a day with meals 180 tablet 3    cetirizine (ZYRTEC ALLERGY) 10 mg tablet Take 10 mg by mouth      cyclobenzaprine (FLEXERIL) 10 mg tablet Take 10 mg by mouth      Dapagliflozin Propanediol (Farxiga) 10 MG TABS Take 10 mg by mouth daily      fluticasone (FLOVENT HFA) 220 mcg/act inhaler Inhale 2 puffs 2 (two) times a day Rinse mouth after use   12 g 3    glucose blood (CONTOUR NEXT TEST) test strip Test blood sugar once daily or as needed for fluctuating blood sugars 100 each 3    hydrochlorothiazide (HYDRODIURIL) 25 mg tablet Take 1 tablet (25 mg total) by mouth daily 90 tablet 3    Insulin Pen Needle (BD Pen Needle Dee Dee U/F) 32G X 4 MM MISC Use 4 a day 200 each 5    ipratropium-albuterol (DUO-NEB) 0 5-2 5 mg/3 mL nebulizer solution Take 1 vial (3 mL total) by nebulization every 6 (six) hours as needed for wheezing or shortness of breath 360 mL 2    Lancets MISC Test blood sugar once daily or as needed for fluctuating blood sugars 100 each 0    losartan (COZAAR) 100 MG tablet Take 1 tablet (100 mg total) by mouth daily 90 tablet 3    meloxicam (MOBIC) 15 mg tablet Take 15 mg by mouth      metFORMIN (GLUCOPHAGE) 1000 MG tablet TAKE 1 TABLET BY MOUTH TWICE DAILY WITH MEALS 180 tablet 0    Multiple Vitamins-Minerals (MENS MULTIVITAMIN PO) Take by mouth      sertraline (ZOLOFT) 50 mg tablet Take 1 tablet (50 mg total) by mouth daily 30 tablet 0    Soliqua 100-33 UNT-MCG/ML injection pen Inject 45 Units under the skin daily      spironolactone (ALDACTONE) 50 mg tablet Take 1 tablet (50 mg total) by mouth daily 90 tablet 3    umeclidinium-vilanterol (ANORO ELLIPTA) 62 5-25 MCG/INH inhaler Inhale 1 puff daily 1 Inhaler 2    glimepiride (AMARYL) 4 mg tablet Take 4 mg by mouth 2 (two) times a day      montelukast (SINGULAIR) 10 mg tablet  (Patient not taking: Reported on 7/26/2021)       No current facility-administered medications for this visit  Allergies   Allergen Reactions    Theophylline Other (See Comments)     Severe headaches  headaches  Severe headaches         Review of Systems   Constitutional: Positive for chills, fatigue and fever  Negative for activity change, appetite change, diaphoresis and unexpected weight change  HENT: Negative for congestion, ear pain, postnasal drip, rhinorrhea, sinus pressure, sinus pain, sneezing, sore throat, tinnitus and voice change  Eyes: Negative for pain, redness and visual disturbance  Respiratory: Positive for cough, chest tightness and shortness of breath  Negative for wheezing  Cardiovascular: Negative for chest pain, palpitations and leg swelling  Gastrointestinal: Negative for abdominal pain, blood in stool, constipation, diarrhea, nausea and vomiting  Genitourinary: Negative for difficulty urinating, dysuria, frequency, hematuria and urgency  Musculoskeletal: Positive for myalgias  Negative for arthralgias, back pain, gait problem, joint swelling, neck pain and neck stiffness  Skin: Negative for color change, pallor, rash and wound  Neurological: Negative for dizziness, tremors, weakness, light-headedness and headaches  Psychiatric/Behavioral: Negative for dysphoric mood, self-injury, sleep disturbance and suicidal ideas  The patient is not nervous/anxious  Objective:    Vitals:    07/26/21 1155   Weight: (!) 163 kg (360 lb)   Height: 5' 9" (1 753 m)            VIRTUAL VISIT DISCLAIMER    Lillie Lairdcorry verbally agrees to participate in [employer]  Pt is aware that [employer] could be limited without vital signs or the ability to perform a full hands-on physical Shan Lav understands he or the provider may request at any time to terminate the video visit and request the patient to seek care or treatment in person

## 2021-07-27 LAB
BACTERIA BLD CULT: NORMAL
BACTERIA BLD CULT: NORMAL

## 2021-07-28 ENCOUNTER — HOSPITAL ENCOUNTER (INPATIENT)
Facility: HOSPITAL | Age: 56
LOS: 5 days | Discharge: HOME | DRG: 720 | End: 2021-08-02
Attending: EMERGENCY MEDICINE | Admitting: INTERNAL MEDICINE
Payer: COMMERCIAL

## 2021-07-28 ENCOUNTER — APPOINTMENT (EMERGENCY)
Dept: RADIOLOGY | Facility: HOSPITAL | Age: 56
DRG: 720 | End: 2021-07-28
Payer: COMMERCIAL

## 2021-07-28 ENCOUNTER — TELEPHONE (OUTPATIENT)
Dept: FAMILY MEDICINE CLINIC | Facility: CLINIC | Age: 56
End: 2021-07-28

## 2021-07-28 DIAGNOSIS — U07.1 COVID-19: Primary | ICD-10-CM

## 2021-07-28 DIAGNOSIS — A41.89 SEPSIS DUE TO COVID-19 (HCC): ICD-10-CM

## 2021-07-28 DIAGNOSIS — J18.9 PNEUMONIA: ICD-10-CM

## 2021-07-28 DIAGNOSIS — E87.1 HYPONATREMIA: ICD-10-CM

## 2021-07-28 DIAGNOSIS — J96.21 ACUTE ON CHRONIC RESPIRATORY FAILURE WITH HYPOXIA (HCC): ICD-10-CM

## 2021-07-28 DIAGNOSIS — U07.1 SEPSIS DUE TO COVID-19 (HCC): ICD-10-CM

## 2021-07-28 DIAGNOSIS — R09.02 HYPOXIA: ICD-10-CM

## 2021-07-28 DIAGNOSIS — J44.9 COPD, SEVERE (HCC): ICD-10-CM

## 2021-07-28 PROBLEM — J96.01 ACUTE RESPIRATORY FAILURE WITH HYPOXIA (HCC): Status: ACTIVE | Noted: 2021-07-28

## 2021-07-28 PROBLEM — A41.9 SEPSIS (HCC): Status: ACTIVE | Noted: 2021-07-28

## 2021-07-28 LAB
ALBUMIN SERPL BCP-MCNC: 2.8 G/DL (ref 3.5–5)
ALP SERPL-CCNC: 84 U/L (ref 46–116)
ALT SERPL W P-5'-P-CCNC: 39 U/L (ref 12–78)
ANION GAP SERPL CALCULATED.3IONS-SCNC: 11 MMOL/L (ref 4–13)
AST SERPL W P-5'-P-CCNC: 21 U/L (ref 5–45)
BASE EX.OXY STD BLDV CALC-SCNC: 89.6 % (ref 60–80)
BASE EXCESS BLDV CALC-SCNC: -2.5 MMOL/L
BASOPHILS # BLD AUTO: 0.07 THOUSANDS/ΜL (ref 0–0.1)
BASOPHILS NFR BLD AUTO: 0 % (ref 0–1)
BILIRUB SERPL-MCNC: 0.62 MG/DL (ref 0.2–1)
BUN SERPL-MCNC: 32 MG/DL (ref 5–25)
CALCIUM ALBUM COR SERPL-MCNC: 10.2 MG/DL (ref 8.3–10.1)
CALCIUM SERPL-MCNC: 9.2 MG/DL (ref 8.3–10.1)
CHLORIDE SERPL-SCNC: 92 MMOL/L (ref 100–108)
CK SERPL-CCNC: 110 U/L (ref 39–308)
CO2 SERPL-SCNC: 25 MMOL/L (ref 21–32)
CREAT SERPL-MCNC: 1.01 MG/DL (ref 0.6–1.3)
CRP SERPL QL: 235.1 MG/L
D DIMER PPP FEU-MCNC: 0.54 UG/ML FEU
EOSINOPHIL # BLD AUTO: 0.1 THOUSAND/ΜL (ref 0–0.61)
EOSINOPHIL NFR BLD AUTO: 0 % (ref 0–6)
ERYTHROCYTE [DISTWIDTH] IN BLOOD BY AUTOMATED COUNT: 14 % (ref 11.6–15.1)
GFR SERPL CREATININE-BSD FRML MDRD: 82 ML/MIN/1.73SQ M
GLUCOSE SERPL-MCNC: 159 MG/DL (ref 65–140)
HCO3 BLDV-SCNC: 24.4 MMOL/L (ref 24–30)
HCT VFR BLD AUTO: 45.8 % (ref 36.5–49.3)
HGB BLD-MCNC: 15 G/DL (ref 12–17)
IMM GRANULOCYTES # BLD AUTO: 0.18 THOUSAND/UL (ref 0–0.2)
IMM GRANULOCYTES NFR BLD AUTO: 1 % (ref 0–2)
INR PPP: 1.17 (ref 0.84–1.19)
LACTATE SERPL-SCNC: 1.8 MMOL/L (ref 0.5–2)
LYMPHOCYTES # BLD AUTO: 0.84 THOUSANDS/ΜL (ref 0.6–4.47)
LYMPHOCYTES NFR BLD AUTO: 4 % (ref 14–44)
MAGNESIUM SERPL-MCNC: 2.5 MG/DL (ref 1.6–2.6)
MCH RBC QN AUTO: 27.8 PG (ref 26.8–34.3)
MCHC RBC AUTO-ENTMCNC: 32.8 G/DL (ref 31.4–37.4)
MCV RBC AUTO: 85 FL (ref 82–98)
MONOCYTES # BLD AUTO: 1.22 THOUSAND/ΜL (ref 0.17–1.22)
MONOCYTES NFR BLD AUTO: 5 % (ref 4–12)
NEUTROPHILS # BLD AUTO: 21.6 THOUSANDS/ΜL (ref 1.85–7.62)
NEUTS SEG NFR BLD AUTO: 90 % (ref 43–75)
NRBC BLD AUTO-RTO: 0 /100 WBCS
NT-PROBNP SERPL-MCNC: 63 PG/ML
O2 CT BLDV-SCNC: 18.5 ML/DL
PCO2 BLDV: 50.7 MM HG (ref 42–50)
PH BLDV: 7.3 [PH] (ref 7.3–7.4)
PLATELET # BLD AUTO: 378 THOUSANDS/UL (ref 149–390)
PMV BLD AUTO: 9.5 FL (ref 8.9–12.7)
PO2 BLDV: 68.8 MM HG (ref 35–45)
POTASSIUM SERPL-SCNC: 4.5 MMOL/L (ref 3.5–5.3)
PROT SERPL-MCNC: 8.2 G/DL (ref 6.4–8.2)
PROTHROMBIN TIME: 14.7 SECONDS (ref 11.6–14.5)
RBC # BLD AUTO: 5.39 MILLION/UL (ref 3.88–5.62)
SODIUM SERPL-SCNC: 128 MMOL/L (ref 136–145)
TRIGL SERPL-MCNC: 130 MG/DL
TROPONIN I SERPL-MCNC: <0.02 NG/ML
WBC # BLD AUTO: 24.01 THOUSAND/UL (ref 4.31–10.16)

## 2021-07-28 PROCEDURE — 80053 COMPREHEN METABOLIC PANEL: CPT | Performed by: EMERGENCY MEDICINE

## 2021-07-28 PROCEDURE — 83880 ASSAY OF NATRIURETIC PEPTIDE: CPT | Performed by: EMERGENCY MEDICINE

## 2021-07-28 PROCEDURE — 84484 ASSAY OF TROPONIN QUANT: CPT | Performed by: EMERGENCY MEDICINE

## 2021-07-28 PROCEDURE — 85610 PROTHROMBIN TIME: CPT | Performed by: EMERGENCY MEDICINE

## 2021-07-28 PROCEDURE — 93005 ELECTROCARDIOGRAM TRACING: CPT

## 2021-07-28 PROCEDURE — 99223 1ST HOSP IP/OBS HIGH 75: CPT | Performed by: NURSE PRACTITIONER

## 2021-07-28 PROCEDURE — 83735 ASSAY OF MAGNESIUM: CPT | Performed by: EMERGENCY MEDICINE

## 2021-07-28 PROCEDURE — 82330 ASSAY OF CALCIUM: CPT | Performed by: EMERGENCY MEDICINE

## 2021-07-28 PROCEDURE — 71045 X-RAY EXAM CHEST 1 VIEW: CPT

## 2021-07-28 PROCEDURE — 36415 COLL VENOUS BLD VENIPUNCTURE: CPT | Performed by: EMERGENCY MEDICINE

## 2021-07-28 PROCEDURE — 87040 BLOOD CULTURE FOR BACTERIA: CPT | Performed by: EMERGENCY MEDICINE

## 2021-07-28 PROCEDURE — 82955 ASSAY OF G6PD ENZYME: CPT | Performed by: EMERGENCY MEDICINE

## 2021-07-28 PROCEDURE — 96361 HYDRATE IV INFUSION ADD-ON: CPT

## 2021-07-28 PROCEDURE — 85025 COMPLETE CBC W/AUTO DIFF WBC: CPT | Performed by: EMERGENCY MEDICINE

## 2021-07-28 PROCEDURE — 82805 BLOOD GASES W/O2 SATURATION: CPT | Performed by: EMERGENCY MEDICINE

## 2021-07-28 PROCEDURE — 96365 THER/PROPH/DIAG IV INF INIT: CPT

## 2021-07-28 PROCEDURE — 83605 ASSAY OF LACTIC ACID: CPT | Performed by: EMERGENCY MEDICINE

## 2021-07-28 PROCEDURE — 99291 CRITICAL CARE FIRST HOUR: CPT | Performed by: EMERGENCY MEDICINE

## 2021-07-28 PROCEDURE — 99285 EMERGENCY DEPT VISIT HI MDM: CPT

## 2021-07-28 PROCEDURE — 84145 PROCALCITONIN (PCT): CPT | Performed by: EMERGENCY MEDICINE

## 2021-07-28 PROCEDURE — 94760 N-INVAS EAR/PLS OXIMETRY 1: CPT

## 2021-07-28 PROCEDURE — 86140 C-REACTIVE PROTEIN: CPT | Performed by: EMERGENCY MEDICINE

## 2021-07-28 PROCEDURE — 84478 ASSAY OF TRIGLYCERIDES: CPT | Performed by: EMERGENCY MEDICINE

## 2021-07-28 PROCEDURE — 82728 ASSAY OF FERRITIN: CPT | Performed by: EMERGENCY MEDICINE

## 2021-07-28 PROCEDURE — 85379 FIBRIN DEGRADATION QUANT: CPT | Performed by: EMERGENCY MEDICINE

## 2021-07-28 PROCEDURE — 82550 ASSAY OF CK (CPK): CPT | Performed by: EMERGENCY MEDICINE

## 2021-07-28 RX ORDER — ASCORBIC ACID 500 MG
1000 TABLET ORAL EVERY 12 HOURS SCHEDULED
Status: DISCONTINUED | OUTPATIENT
Start: 2021-07-28 | End: 2021-08-02 | Stop reason: HOSPADM

## 2021-07-28 RX ORDER — ACETAMINOPHEN 325 MG/1
650 TABLET ORAL EVERY 6 HOURS PRN
Status: DISCONTINUED | OUTPATIENT
Start: 2021-07-28 | End: 2021-08-02 | Stop reason: HOSPADM

## 2021-07-28 RX ORDER — FAMOTIDINE 20 MG/1
20 TABLET, FILM COATED ORAL 2 TIMES DAILY
Status: DISCONTINUED | OUTPATIENT
Start: 2021-07-29 | End: 2021-08-02 | Stop reason: HOSPADM

## 2021-07-28 RX ORDER — ATORVASTATIN CALCIUM 40 MG/1
40 TABLET, FILM COATED ORAL
Status: DISCONTINUED | OUTPATIENT
Start: 2021-07-28 | End: 2021-07-28 | Stop reason: SDUPTHER

## 2021-07-28 RX ORDER — MULTIVITAMIN/IRON/FOLIC ACID 18MG-0.4MG
1 TABLET ORAL DAILY
Status: DISCONTINUED | OUTPATIENT
Start: 2021-08-05 | End: 2021-08-02 | Stop reason: HOSPADM

## 2021-07-28 RX ORDER — ALBUTEROL SULFATE 90 UG/1
2 AEROSOL, METERED RESPIRATORY (INHALATION) EVERY 6 HOURS PRN
Status: DISCONTINUED | OUTPATIENT
Start: 2021-07-28 | End: 2021-08-02 | Stop reason: HOSPADM

## 2021-07-28 RX ORDER — CARVEDILOL 12.5 MG/1
12.5 TABLET ORAL 2 TIMES DAILY WITH MEALS
Status: DISCONTINUED | OUTPATIENT
Start: 2021-07-29 | End: 2021-08-02 | Stop reason: HOSPADM

## 2021-07-28 RX ORDER — CEFTRIAXONE 1 G/50ML
1000 INJECTION, SOLUTION INTRAVENOUS ONCE
Status: COMPLETED | OUTPATIENT
Start: 2021-07-28 | End: 2021-07-28

## 2021-07-28 RX ORDER — LOSARTAN POTASSIUM 50 MG/1
100 TABLET ORAL DAILY
Status: DISCONTINUED | OUTPATIENT
Start: 2021-07-29 | End: 2021-08-02 | Stop reason: HOSPADM

## 2021-07-28 RX ORDER — DEXAMETHASONE SODIUM PHOSPHATE 4 MG/ML
6 INJECTION, SOLUTION INTRA-ARTICULAR; INTRALESIONAL; INTRAMUSCULAR; INTRAVENOUS; SOFT TISSUE EVERY 24 HOURS
Status: DISCONTINUED | OUTPATIENT
Start: 2021-07-28 | End: 2021-07-29

## 2021-07-28 RX ORDER — INSULIN GLARGINE 100 [IU]/ML
45 INJECTION, SOLUTION SUBCUTANEOUS EVERY MORNING
Status: DISCONTINUED | OUTPATIENT
Start: 2021-07-29 | End: 2021-08-02 | Stop reason: HOSPADM

## 2021-07-28 RX ORDER — HEPARIN SODIUM 5000 [USP'U]/ML
5000 INJECTION, SOLUTION INTRAVENOUS; SUBCUTANEOUS EVERY 8 HOURS SCHEDULED
Status: DISCONTINUED | OUTPATIENT
Start: 2021-07-28 | End: 2021-08-02 | Stop reason: HOSPADM

## 2021-07-28 RX ORDER — ONDANSETRON 2 MG/ML
4 INJECTION INTRAMUSCULAR; INTRAVENOUS EVERY 6 HOURS PRN
Status: DISCONTINUED | OUTPATIENT
Start: 2021-07-28 | End: 2021-08-02 | Stop reason: HOSPADM

## 2021-07-28 RX ORDER — CEFTRIAXONE 1 G/50ML
1000 INJECTION, SOLUTION INTRAVENOUS EVERY 24 HOURS
Status: DISCONTINUED | OUTPATIENT
Start: 2021-07-29 | End: 2021-07-30

## 2021-07-28 RX ORDER — POLYETHYLENE GLYCOL 3350 17 G/17G
17 POWDER, FOR SOLUTION ORAL DAILY PRN
Status: DISCONTINUED | OUTPATIENT
Start: 2021-07-28 | End: 2021-08-02 | Stop reason: HOSPADM

## 2021-07-28 RX ORDER — AMLODIPINE BESYLATE 10 MG/1
10 TABLET ORAL DAILY
Status: DISCONTINUED | OUTPATIENT
Start: 2021-07-29 | End: 2021-08-02 | Stop reason: HOSPADM

## 2021-07-28 RX ORDER — ZINC SULFATE 50(220)MG
220 CAPSULE ORAL DAILY
Status: DISCONTINUED | OUTPATIENT
Start: 2021-07-29 | End: 2021-08-02 | Stop reason: HOSPADM

## 2021-07-28 RX ORDER — DOXYCYCLINE HYCLATE 100 MG/1
100 CAPSULE ORAL EVERY 12 HOURS
Status: DISCONTINUED | OUTPATIENT
Start: 2021-07-28 | End: 2021-07-30

## 2021-07-28 RX ORDER — ATORVASTATIN CALCIUM 40 MG/1
40 TABLET, FILM COATED ORAL
Status: DISCONTINUED | OUTPATIENT
Start: 2021-07-29 | End: 2021-08-02 | Stop reason: HOSPADM

## 2021-07-28 RX ORDER — MELATONIN
2000 DAILY
Status: DISCONTINUED | OUTPATIENT
Start: 2021-07-29 | End: 2021-08-02 | Stop reason: HOSPADM

## 2021-07-28 RX ORDER — FLUTICASONE PROPIONATE 220 UG/1
2 AEROSOL, METERED RESPIRATORY (INHALATION) 2 TIMES DAILY
Status: DISCONTINUED | OUTPATIENT
Start: 2021-07-29 | End: 2021-08-02 | Stop reason: HOSPADM

## 2021-07-28 RX ADMIN — SODIUM CHLORIDE 1000 ML: 0.9 INJECTION, SOLUTION INTRAVENOUS at 21:12

## 2021-07-28 RX ADMIN — CEFTRIAXONE 1000 MG: 1 INJECTION, SOLUTION INTRAVENOUS at 21:12

## 2021-07-28 NOTE — TELEPHONE ENCOUNTER
While preparing the Pt for an [address] [place] [address] visit the pt stated that he was moving air but didn't feel right,he felt really weak  Pt stated that he took his oxygen level this morning when he woke up and it was at 60%   Advised fluke and Pt was advised to go to the ER

## 2021-07-29 PROBLEM — U07.1 SEPSIS DUE TO COVID-19 (HCC): Status: ACTIVE | Noted: 2021-07-28

## 2021-07-29 PROBLEM — E87.1 HYPONATREMIA: Status: ACTIVE | Noted: 2021-07-29

## 2021-07-29 PROBLEM — A41.89 SEPSIS DUE TO COVID-19 (HCC): Status: ACTIVE | Noted: 2021-07-28

## 2021-07-29 PROBLEM — J96.21 ACUTE ON CHRONIC RESPIRATORY FAILURE WITH HYPOXIA (HCC): Status: ACTIVE | Noted: 2021-07-28

## 2021-07-29 LAB
ALBUMIN SERPL BCP-MCNC: 2.5 G/DL (ref 3.5–5)
ALP SERPL-CCNC: 76 U/L (ref 46–116)
ALT SERPL W P-5'-P-CCNC: 36 U/L (ref 12–78)
ANION GAP SERPL CALCULATED.3IONS-SCNC: 10 MMOL/L (ref 4–13)
ANION GAP SERPL CALCULATED.3IONS-SCNC: 9 MMOL/L (ref 4–13)
AST SERPL W P-5'-P-CCNC: 22 U/L (ref 5–45)
ATRIAL RATE: 84 BPM
BASOPHILS # BLD AUTO: 0.03 THOUSANDS/ΜL (ref 0–0.1)
BASOPHILS NFR BLD AUTO: 0 % (ref 0–1)
BILIRUB SERPL-MCNC: 0.45 MG/DL (ref 0.2–1)
BUN SERPL-MCNC: 27 MG/DL (ref 5–25)
BUN SERPL-MCNC: 34 MG/DL (ref 5–25)
CA-I BLD-SCNC: 1.17 MMOL/L (ref 1.12–1.32)
CALCIUM ALBUM COR SERPL-MCNC: 10 MG/DL (ref 8.3–10.1)
CALCIUM SERPL-MCNC: 7.2 MG/DL (ref 8.3–10.1)
CALCIUM SERPL-MCNC: 8.8 MG/DL (ref 8.3–10.1)
CHLORIDE SERPL-SCNC: 101 MMOL/L (ref 100–108)
CHLORIDE SERPL-SCNC: 94 MMOL/L (ref 100–108)
CK MB SERPL-MCNC: 1 NG/ML (ref 0–5)
CK MB SERPL-MCNC: <1 % (ref 0–2.5)
CK SERPL-CCNC: 169 U/L (ref 39–308)
CO2 SERPL-SCNC: 21 MMOL/L (ref 21–32)
CO2 SERPL-SCNC: 24 MMOL/L (ref 21–32)
CREAT SERPL-MCNC: 0.83 MG/DL (ref 0.6–1.3)
CREAT SERPL-MCNC: 0.86 MG/DL (ref 0.6–1.3)
CRP SERPL QL: 248.6 MG/L
EOSINOPHIL # BLD AUTO: 0.01 THOUSAND/ΜL (ref 0–0.61)
EOSINOPHIL NFR BLD AUTO: 0 % (ref 0–6)
ERYTHROCYTE [DISTWIDTH] IN BLOOD BY AUTOMATED COUNT: 14 % (ref 11.6–15.1)
EST. AVERAGE GLUCOSE BLD GHB EST-MCNC: 189 MG/DL
FERRITIN SERPL-MCNC: 1053 NG/ML (ref 8–388)
FERRITIN SERPL-MCNC: 978 NG/ML (ref 8–388)
GFR SERPL CREATININE-BSD FRML MDRD: 96 ML/MIN/1.73SQ M
GFR SERPL CREATININE-BSD FRML MDRD: 98 ML/MIN/1.73SQ M
GLUCOSE SERPL-MCNC: 108 MG/DL (ref 65–140)
GLUCOSE SERPL-MCNC: 185 MG/DL (ref 65–140)
GLUCOSE SERPL-MCNC: 208 MG/DL (ref 65–140)
GLUCOSE SERPL-MCNC: 213 MG/DL (ref 65–140)
GLUCOSE SERPL-MCNC: 222 MG/DL (ref 65–140)
GLUCOSE SERPL-MCNC: 231 MG/DL (ref 65–140)
GLUCOSE SERPL-MCNC: 272 MG/DL (ref 65–140)
HBA1C MFR BLD: 8.2 %
HBV CORE AB SER QL: NORMAL
HBV CORE IGM SER QL: NORMAL
HBV SURFACE AG SER QL: NORMAL
HCT VFR BLD AUTO: 42.1 % (ref 36.5–49.3)
HCV AB SER QL: NORMAL
HGB BLD-MCNC: 13.8 G/DL (ref 12–17)
HIV 1+2 AB+HIV1 P24 AG SERPL QL IA: NORMAL
HIV1 P24 AG SER QL: NORMAL
IMM GRANULOCYTES # BLD AUTO: 0.1 THOUSAND/UL (ref 0–0.2)
IMM GRANULOCYTES NFR BLD AUTO: 1 % (ref 0–2)
LYMPHOCYTES # BLD AUTO: 0.39 THOUSANDS/ΜL (ref 0.6–4.47)
LYMPHOCYTES NFR BLD AUTO: 2 % (ref 14–44)
MAGNESIUM SERPL-MCNC: 2.6 MG/DL (ref 1.6–2.6)
MCH RBC QN AUTO: 27.9 PG (ref 26.8–34.3)
MCHC RBC AUTO-ENTMCNC: 32.8 G/DL (ref 31.4–37.4)
MCV RBC AUTO: 85 FL (ref 82–98)
MONOCYTES # BLD AUTO: 0.28 THOUSAND/ΜL (ref 0.17–1.22)
MONOCYTES NFR BLD AUTO: 2 % (ref 4–12)
NEUTROPHILS # BLD AUTO: 17.09 THOUSANDS/ΜL (ref 1.85–7.62)
NEUTS SEG NFR BLD AUTO: 95 % (ref 43–75)
NRBC BLD AUTO-RTO: 0 /100 WBCS
P AXIS: 78 DEGREES
PHOSPHATE SERPL-MCNC: 4 MG/DL (ref 2.7–4.5)
PLATELET # BLD AUTO: 335 THOUSANDS/UL (ref 149–390)
PMV BLD AUTO: 9.3 FL (ref 8.9–12.7)
POTASSIUM SERPL-SCNC: 3.9 MMOL/L (ref 3.5–5.3)
POTASSIUM SERPL-SCNC: 4.8 MMOL/L (ref 3.5–5.3)
PR INTERVAL: 142 MS
PROCALCITONIN SERPL-MCNC: 0.11 NG/ML
PROCALCITONIN SERPL-MCNC: 0.19 NG/ML
PROT SERPL-MCNC: 7.7 G/DL (ref 6.4–8.2)
QRS AXIS: -12 DEGREES
QRSD INTERVAL: 94 MS
QT INTERVAL: 382 MS
QTC INTERVAL: 451 MS
RBC # BLD AUTO: 4.95 MILLION/UL (ref 3.88–5.62)
SODIUM SERPL-SCNC: 128 MMOL/L (ref 136–145)
SODIUM SERPL-SCNC: 131 MMOL/L (ref 136–145)
T WAVE AXIS: 65 DEGREES
VENTRICULAR RATE: 84 BPM
WBC # BLD AUTO: 17.9 THOUSAND/UL (ref 4.31–10.16)

## 2021-07-29 PROCEDURE — 82948 REAGENT STRIP/BLOOD GLUCOSE: CPT

## 2021-07-29 PROCEDURE — 99255 IP/OBS CONSLTJ NEW/EST HI 80: CPT | Performed by: INTERNAL MEDICINE

## 2021-07-29 PROCEDURE — 86704 HEP B CORE ANTIBODY TOTAL: CPT | Performed by: NURSE PRACTITIONER

## 2021-07-29 PROCEDURE — 93010 ELECTROCARDIOGRAM REPORT: CPT | Performed by: INTERNAL MEDICINE

## 2021-07-29 PROCEDURE — 86140 C-REACTIVE PROTEIN: CPT | Performed by: NURSE PRACTITIONER

## 2021-07-29 PROCEDURE — 83036 HEMOGLOBIN GLYCOSYLATED A1C: CPT | Performed by: NURSE PRACTITIONER

## 2021-07-29 PROCEDURE — 84145 PROCALCITONIN (PCT): CPT | Performed by: NURSE PRACTITIONER

## 2021-07-29 PROCEDURE — 85025 COMPLETE CBC W/AUTO DIFF WBC: CPT | Performed by: NURSE PRACTITIONER

## 2021-07-29 PROCEDURE — 82550 ASSAY OF CK (CPK): CPT | Performed by: NURSE PRACTITIONER

## 2021-07-29 PROCEDURE — 3052F HG A1C>EQUAL 8.0%<EQUAL 9.0%: CPT | Performed by: PHYSICIAN ASSISTANT

## 2021-07-29 PROCEDURE — 80048 BASIC METABOLIC PNL TOTAL CA: CPT | Performed by: FAMILY MEDICINE

## 2021-07-29 PROCEDURE — XW033E5 INTRODUCTION OF REMDESIVIR ANTI-INFECTIVE INTO PERIPHERAL VEIN, PERCUTANEOUS APPROACH, NEW TECHNOLOGY GROUP 5: ICD-10-PCS | Performed by: INTERNAL MEDICINE

## 2021-07-29 PROCEDURE — 97162 PT EVAL MOD COMPLEX 30 MIN: CPT

## 2021-07-29 PROCEDURE — 94760 N-INVAS EAR/PLS OXIMETRY 1: CPT

## 2021-07-29 PROCEDURE — 87340 HEPATITIS B SURFACE AG IA: CPT | Performed by: NURSE PRACTITIONER

## 2021-07-29 PROCEDURE — 94660 CPAP INITIATION&MGMT: CPT

## 2021-07-29 PROCEDURE — 82728 ASSAY OF FERRITIN: CPT | Performed by: NURSE PRACTITIONER

## 2021-07-29 PROCEDURE — 97167 OT EVAL HIGH COMPLEX 60 MIN: CPT

## 2021-07-29 PROCEDURE — 86803 HEPATITIS C AB TEST: CPT | Performed by: NURSE PRACTITIONER

## 2021-07-29 PROCEDURE — 87806 HIV AG W/HIV1&2 ANTB W/OPTIC: CPT | Performed by: NURSE PRACTITIONER

## 2021-07-29 PROCEDURE — 83735 ASSAY OF MAGNESIUM: CPT | Performed by: NURSE PRACTITIONER

## 2021-07-29 PROCEDURE — 99232 SBSQ HOSP IP/OBS MODERATE 35: CPT | Performed by: FAMILY MEDICINE

## 2021-07-29 PROCEDURE — 84100 ASSAY OF PHOSPHORUS: CPT | Performed by: NURSE PRACTITIONER

## 2021-07-29 PROCEDURE — 86705 HEP B CORE ANTIBODY IGM: CPT | Performed by: NURSE PRACTITIONER

## 2021-07-29 PROCEDURE — 80053 COMPREHEN METABOLIC PANEL: CPT | Performed by: NURSE PRACTITIONER

## 2021-07-29 PROCEDURE — 82553 CREATINE MB FRACTION: CPT | Performed by: NURSE PRACTITIONER

## 2021-07-29 RX ORDER — DEXAMETHASONE SODIUM PHOSPHATE 4 MG/ML
8 INJECTION, SOLUTION INTRA-ARTICULAR; INTRALESIONAL; INTRAMUSCULAR; INTRAVENOUS; SOFT TISSUE EVERY 12 HOURS SCHEDULED
Status: DISCONTINUED | OUTPATIENT
Start: 2021-07-29 | End: 2021-08-02 | Stop reason: HOSPADM

## 2021-07-29 RX ORDER — SODIUM CHLORIDE 9 MG/ML
75 INJECTION, SOLUTION INTRAVENOUS ONCE
Status: COMPLETED | OUTPATIENT
Start: 2021-07-29 | End: 2021-07-29

## 2021-07-29 RX ADMIN — ZINC SULFATE 220 MG (50 MG) CAPSULE 220 MG: CAPSULE at 09:26

## 2021-07-29 RX ADMIN — INSULIN GLARGINE 45 UNITS: 100 INJECTION, SOLUTION SUBCUTANEOUS at 09:25

## 2021-07-29 RX ADMIN — OXYCODONE HYDROCHLORIDE AND ACETAMINOPHEN 1000 MG: 500 TABLET ORAL at 09:25

## 2021-07-29 RX ADMIN — INSULIN LISPRO 4 UNITS: 100 INJECTION, SOLUTION INTRAVENOUS; SUBCUTANEOUS at 16:53

## 2021-07-29 RX ADMIN — ATORVASTATIN CALCIUM 40 MG: 40 TABLET, FILM COATED ORAL at 16:53

## 2021-07-29 RX ADMIN — DOXYCYCLINE HYCLATE 100 MG: 100 CAPSULE ORAL at 01:12

## 2021-07-29 RX ADMIN — INSULIN LISPRO 6 UNITS: 100 INJECTION, SOLUTION INTRAVENOUS; SUBCUTANEOUS at 12:01

## 2021-07-29 RX ADMIN — HEPARIN SODIUM 5000 UNITS: 5000 INJECTION INTRAVENOUS; SUBCUTANEOUS at 21:52

## 2021-07-29 RX ADMIN — HEPARIN SODIUM 5000 UNITS: 5000 INJECTION INTRAVENOUS; SUBCUTANEOUS at 05:23

## 2021-07-29 RX ADMIN — TIOTROPIUM BROMIDE 18 MCG: 18 CAPSULE ORAL; RESPIRATORY (INHALATION) at 09:32

## 2021-07-29 RX ADMIN — OXYCODONE HYDROCHLORIDE AND ACETAMINOPHEN 1000 MG: 500 TABLET ORAL at 01:11

## 2021-07-29 RX ADMIN — CHOLECALCIFEROL TAB 25 MCG (1000 UNIT) 2000 UNITS: 25 TAB at 09:25

## 2021-07-29 RX ADMIN — DOXYCYCLINE HYCLATE 100 MG: 100 CAPSULE ORAL at 22:00

## 2021-07-29 RX ADMIN — AMLODIPINE BESYLATE 10 MG: 10 TABLET ORAL at 09:27

## 2021-07-29 RX ADMIN — DEXAMETHASONE SODIUM PHOSPHATE 8 MG: 4 INJECTION, SOLUTION INTRA-ARTICULAR; INTRALESIONAL; INTRAMUSCULAR; INTRAVENOUS; SOFT TISSUE at 21:52

## 2021-07-29 RX ADMIN — TOCILIZUMAB 800 MG: 20 INJECTION, SOLUTION, CONCENTRATE INTRAVENOUS at 03:33

## 2021-07-29 RX ADMIN — DOXYCYCLINE HYCLATE 100 MG: 100 CAPSULE ORAL at 11:19

## 2021-07-29 RX ADMIN — SODIUM CHLORIDE 75 ML/HR: 0.9 INJECTION, SOLUTION INTRAVENOUS at 10:05

## 2021-07-29 RX ADMIN — SODIUM CHLORIDE 200 MG: 0.9 INJECTION, SOLUTION INTRAVENOUS at 01:12

## 2021-07-29 RX ADMIN — INSULIN LISPRO 4 UNITS: 100 INJECTION, SOLUTION INTRAVENOUS; SUBCUTANEOUS at 21:55

## 2021-07-29 RX ADMIN — HEPARIN SODIUM 5000 UNITS: 5000 INJECTION INTRAVENOUS; SUBCUTANEOUS at 01:11

## 2021-07-29 RX ADMIN — FAMOTIDINE 20 MG: 20 TABLET ORAL at 09:27

## 2021-07-29 RX ADMIN — FLUTICASONE PROPIONATE 2 PUFF: 220 AEROSOL, METERED RESPIRATORY (INHALATION) at 11:20

## 2021-07-29 RX ADMIN — DEXAMETHASONE SODIUM PHOSPHATE 6 MG: 4 INJECTION, SOLUTION INTRAMUSCULAR; INTRAVENOUS at 01:11

## 2021-07-29 RX ADMIN — CEFTRIAXONE 1000 MG: 1 INJECTION, SOLUTION INTRAVENOUS at 21:52

## 2021-07-29 RX ADMIN — OXYCODONE HYDROCHLORIDE AND ACETAMINOPHEN 1000 MG: 500 TABLET ORAL at 21:52

## 2021-07-29 RX ADMIN — LOSARTAN POTASSIUM 100 MG: 50 TABLET, FILM COATED ORAL at 09:26

## 2021-07-29 RX ADMIN — REMDESIVIR 100 MG: 100 INJECTION, POWDER, LYOPHILIZED, FOR SOLUTION INTRAVENOUS at 23:31

## 2021-07-29 RX ADMIN — SALMETEROL XINAFOATE 1 PUFF: 50 POWDER, METERED ORAL; RESPIRATORY (INHALATION) at 21:53

## 2021-07-29 RX ADMIN — SALMETEROL XINAFOATE 1 PUFF: 50 POWDER, METERED ORAL; RESPIRATORY (INHALATION) at 11:20

## 2021-07-29 RX ADMIN — CARVEDILOL 12.5 MG: 12.5 TABLET, FILM COATED ORAL at 09:30

## 2021-07-29 RX ADMIN — FLUTICASONE PROPIONATE 2 PUFF: 220 AEROSOL, METERED RESPIRATORY (INHALATION) at 17:02

## 2021-07-29 RX ADMIN — SERTRALINE HYDROCHLORIDE 50 MG: 50 TABLET ORAL at 09:27

## 2021-07-29 RX ADMIN — HEPARIN SODIUM 5000 UNITS: 5000 INJECTION INTRAVENOUS; SUBCUTANEOUS at 14:06

## 2021-07-29 RX ADMIN — FAMOTIDINE 20 MG: 20 TABLET ORAL at 17:05

## 2021-07-29 RX ADMIN — INSULIN LISPRO 4 UNITS: 100 INJECTION, SOLUTION INTRAVENOUS; SUBCUTANEOUS at 08:30

## 2021-07-29 RX ADMIN — CARVEDILOL 12.5 MG: 12.5 TABLET, FILM COATED ORAL at 16:53

## 2021-07-29 NOTE — PLAN OF CARE
Problem: Nutrition/Hydration-ADULT  Goal: Nutrient/Hydration intake appropriate for improving, restoring or maintaining nutritional needs  Description: Monitor and assess patient's nutrition/hydration status for malnutrition  Collaborate with interdisciplinary team and initiate plan and interventions as ordered  Monitor patient's weight and dietary intake as ordered or per policy  Utilize nutrition screening tool and intervene as necessary  Determine patient's food preferences and provide high-protein, high-caloric foods as appropriate       INTERVENTIONS:  - Monitor oral intake, urinary output, labs, and treatment plans  - Assess nutrition and hydration status and recommend course of action  - Evaluate amount of meals eaten  - Assist patient with eating if necessary   - Allow adequate time for meals  - Recommend/ encourage appropriate diets, oral nutritional supplements, and vitamin/mineral supplements  - Order, calculate, and assess calorie counts as needed  - Recommend, monitor, and adjust tube feedings and TPN/PPN based on assessed needs  - Assess need for intravenous fluids  - Provide specific nutrition/hydration education as appropriate  - Include patient/family/caregiver in decisions related to nutrition  Outcome: Progressing     Problem: RESPIRATORY - ADULT  Goal: Achieves optimal ventilation and oxygenation  Description: INTERVENTIONS:  - Assess for changes in respiratory status  - Assess for changes in mentation and behavior  - Position to facilitate oxygenation and minimize respiratory effort  - Oxygen administered by appropriate delivery if ordered  - Initiate smoking cessation education as indicated  - Encourage broncho-pulmonary hygiene including cough, deep breathe, Incentive Spirometry  - Assess the need for suctioning and aspirate as needed  - Assess and instruct to report SOB or any respiratory difficulty  - Respiratory Therapy support as indicated  Outcome: Progressing     Problem: METABOLIC, FLUID AND ELECTROLYTES - ADULT  Goal: Electrolytes maintained within normal limits  Description: INTERVENTIONS:  - Monitor labs and assess patient for signs and symptoms of electrolyte imbalances  - Administer electrolyte replacement as ordered  - Monitor response to electrolyte replacements, including repeat lab results as appropriate  - Instruct patient on fluid and nutrition as appropriate  Outcome: Progressing  Goal: Fluid balance maintained  Description: INTERVENTIONS:  - Monitor labs   - Monitor I/O and WT  - Instruct patient on fluid and nutrition as appropriate  - Assess for signs & symptoms of volume excess or deficit  Outcome: Progressing  Goal: Glucose maintained within target range  Description: INTERVENTIONS:  - Monitor Blood Glucose as ordered  - Assess for signs and symptoms of hyperglycemia and hypoglycemia  - Administer ordered medications to maintain glucose within target range  - Assess nutritional intake and initiate nutrition service referral as needed  Outcome: Progressing     Problem: PAIN - ADULT  Goal: Verbalizes/displays adequate comfort level or baseline comfort level  Description: Interventions:  - Encourage patient to monitor pain and request assistance  - Assess pain using appropriate pain scale  - Administer analgesics based on type and severity of pain and evaluate response  - Implement non-pharmacological measures as appropriate and evaluate response  - Consider cultural and social influences on pain and pain management  - Notify physician/advanced practitioner if interventions unsuccessful or patient reports new pain  Outcome: Progressing     Problem: INFECTION - ADULT  Goal: Absence or prevention of progression during hospitalization  Description: INTERVENTIONS:  - Assess and monitor for signs and symptoms of infection  - Monitor lab/diagnostic results  - Monitor all insertion sites, i e  indwelling lines, tubes, and drains  - Monitor endotracheal if appropriate and nasal secretions for changes in amount and color  - [place] appropriate cooling/warming therapies per order  - Administer medications as ordered  - Instruct and encourage patient and family to use good hand hygiene technique  - Identify and instruct in appropriate isolation precautions for identified infection/condition  Outcome: Progressing     Problem: SAFETY ADULT  Goal: Patient will remain free of falls  Description: INTERVENTIONS:  - Educate patient/family on patient safety including physical limitations  - Instruct patient to call for assistance with activity   - Consult OT/PT to assist with strengthening/mobility   - Keep Call bell within reach  - Keep bed low and locked with side rails adjusted as appropriate  - Keep care items and personal belongings within reach  - Initiate and maintain comfort rounds  - Make Fall Risk Sign visible to staff  - Offer Toileting every 2 Hours, in advance of need  - Initiate/Maintain fall alarm  - Obtain necessary fall risk management equipment: nonskid socks, alarm, mobility equipment as needed  - Apply yellow socks and bracelet for high fall risk patients  - Consider moving patient to room near nurses station  Outcome: Progressing  Goal: Maintain or return to baseline ADL function  Description: INTERVENTIONS:  -  Assess patient's ability to carry out ADLs; assess patient's baseline for ADL function and identify physical deficits which impact ability to perform ADLs (bathing, care of mouth/teeth, toileting, grooming, dressing, etc )  - Assess/evaluate cause of self-care deficits   - Assess range of motion  - Assess patient's mobility; develop plan if impaired  - Assess patient's need for assistive devices and provide as appropriate  - Encourage maximum independence but intervene and supervise when necessary  - Involve family in performance of ADLs  - Assess for home care needs following discharge   - Consider OT consult to assist with ADL evaluation and planning for discharge  - Provide patient education as appropriate  Outcome: Progressing  Goal: Maintains/Returns to pre admission functional level  Description: INTERVENTIONS:  - Perform BMAT or MOVE assessment daily    - Set and communicate daily mobility goal to care team and patient/family/caregiver  - Collaborate with rehabilitation services on mobility goals if consulted  - Perform Range of Motion 4 times a day  - Reposition patient every 2 hours  - Dangle patient 3 times a day  - Stand patient 3 times a day  - Ambulate patient 3 times a day  - Out of bed to chair 3 times a day   - Out of bed for meals 3 times a day  - Out of bed for toileting  - Record patient progress and toleration of activity level   Outcome: Progressing     Problem: DISCHARGE PLANNING  Goal: Discharge to home or other facility with appropriate resources  Description: INTERVENTIONS:  - Identify barriers to discharge w/patient and caregiver  - Arrange for needed discharge resources and transportation as appropriate  - Identify discharge learning needs (meds, wound care, etc )  - Arrange for interpretive services to assist at discharge as needed  - Refer to Case Management Department for coordinating discharge planning if the patient needs post-hospital services based on physician/advanced practitioner order or complex needs related to functional status, cognitive ability, or social support system  Outcome: Progressing     Problem: Knowledge Deficit  Goal: Patient/family/caregiver demonstrates understanding of disease process, treatment plan, medications, and discharge instructions  Description: Complete learning assessment and assess knowledge base    Interventions:  - Provide teaching at level of understanding  - Provide teaching via preferred learning methods  Outcome: Progressing

## 2021-07-29 NOTE — ASSESSMENT & PLAN NOTE
Baseline 5 L O2 supplementation now requiring mid flow 12 L to keep an SpO2 greater than 90%  In the setting of severe COPD and COVID positive    Respiratory protocol in place  Pulmonary consulted  Continue mid flow-titrate oxygen to keep SpO2 greater than 90%  Please see additional treatment plan for COVID

## 2021-07-29 NOTE — RESPIRATORY THERAPY NOTE
Pt's wife brought in bipap to getting home settings off his for use with our v60 bipap, pt has covid and needs filter on exhalation port which is not capable with his unit  Upon observation the bipap unit was filthy with broken cord wrapped with paper tape, and water chamber extremely dirty  Talked with infection control, case management to see if we can get him a new unit  Dr Minesh Rodriguez aware  Waiting to hear from casemanagement

## 2021-07-29 NOTE — ASSESSMENT & PLAN NOTE
As evidence by febrile 100 3, tachycardia tachypnea and elevated white blood cell count of 24 01  In the setting of COVID-please see additional treatment plan for COVID  Follow-up blood cultures obtained x2  Decadron 1 milligram/kilogram  Day 1 of remdesivir  Received Actemra 07/28/2021  Day 1 Rocephin/doxycycline  Continue to trend inflammatory markers

## 2021-07-29 NOTE — OCCUPATIONAL THERAPY NOTE
Occupational Therapy Evaluation     Patient Name: Karyna Denney  XIGFA'T Date: 7/29/2021  Problem List  Principal Problem:    COVID-19  Active Problems: Morbid obesity with BMI of 50 0-59 9, adult (HCC)    COPD, severe ([facility] [address] )    Essential hypertension    Type 2 diabetes mellitus with hemoglobin A1c goal of less than 8 0% ([facility])    Aortic stenosis    Acute on chronic respiratory failure with hypoxia ([facility])    Sepsis due to COVID-19 [facility])    Hyponatremia    Past Medical History  Past Medical History:   Diagnosis Date    Aortic stenosis     Diabetes ([facility] )     Hyperlipidemia     Hypertension 7/2/2014     Past Surgical History  Past Surgical History:   Procedure Laterality Date    APPENDECTOMY      EYE SURGERY               07/29/21 1042   OT Last Visit   OT Visit Date 07/29/21   Note Type   Note type Evaluation   Restrictions/Precautions   Weight Bearing Precautions Per Order No   Other Precautions Fall Risk;Multiple lines; Airborne/isolation;Contact/isolation   Pain Assessment   Pain Assessment Tool Pain Assessment not indicated - pt denies pain   Home Living   Type of [address] [place] [address] Two level;Bed/bath upstairs;Stairs to enter without rails; Other (Comment)  (1 [place] no HR; FOS to 2nd c HR)   Bathroom Shower/Tub Tub/shower unit   Bathroom Toilet Standard   Bathroom Accessibility Accessible   Home Equipment Other (Comment)  (no DME; O2-5L at baseline)   Additional Comments pt reports no device at baseline during functional mobility   Prior Function   Level of [place] Independent with ADLs and functional mobility   Lives With Spouse   ADL Assistance Independent   IADLs Independent   Falls in the last 6 months 0   Vocational On disability   Comments pt is (I) with driving   Psychosocial   Psychosocial (WDL) WDL   Subjective   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"   ADL   Where Assessed Edge of bed   LB Dressing Assistance 7  Independent   Additional Comments pt seated EOB and dons socks with increased time    Bed Mobility   Supine to Sit 6  Modified independent   Additional items Bedrails   Sit to Supine   (pt seated EOB at end of session)   Additional Comments pt on 7L O2 during session; pt on 8L O2 on tank during session; seated EOB ranged 88-91%; pt with mobility range 85-89% on 8L O2; pt moderately SOB after ~40 ft of mobility   Transfers   Sit to Stand 7  Independent   Stand to Sit 7  Independent   Additional Comments pt with no device, no LOB or instability, and adequate safety awareness with functional transfers and lines during session   Functional Mobility   Functional Mobility 7  Independent   Additional Comments (A) for line management, however no physical (A); pt with midly decreased endurance and moderately SOB as stated above    Additional items   (no device)   Balance   Static Sitting Good   Dynamic Sitting Good   Static Standing Good   Dynamic Standing Fair +   Ambulatory Fair +   Activity Tolerance   Activity Tolerance Patient limited by fatigue   RUE Assessment   RUE Assessment WFL   LUE Assessment   LUE Assessment WFL   Hand Function   Gross Motor Coordination Functional   Fine Motor Coordination Functional   Sensation   Light Touch No apparent deficits   Sharp/Dull No apparent deficits   Cognition   Overall Cognitive Status WFL   Arousal/Participation Alert   Attention Within functional limits   Orientation Level Oriented X4   Memory Within functional limits   Following Commands Follows all commands and directions without difficulty   Assessment   Limitation Decreased endurance   Assessment Patient is a 61 y o  male [facility] due to COVID-19  Pt performed at (I) level with no OT needs identified at this time Comorbidities affecting pt's physical performance at time of assessment include HTN, hyperlipidemia, DM, aortic stenosis    The patient's raw score on the AM-PAC Daily Activity inpatient short form is 24, standardized score is 57 54, greater than 39 4  Patients at this level are likely to benefit from DC to home  From OT standpoint recommend anticipate no needs upon D/C  No further acute OT needs identified at this time  Recommend continued mobilization with hospital staff and restorative services while in the hospital to increase pts endurance and strength upon D/C  From OT standpoint, recommend D/C to home with family support when medically cleared  D/C pt from OT caseload at this time  Goals   Patient Goals to go home    Recommendation   OT Discharge Recommendation No rehabilitation needs   AM-PAC Daily Activity Inpatient   Lower Body Dressing 4   Bathing 4   Toileting 4   Upper Body Dressing 4   Grooming 4   Eating 4   Daily Activity Raw Score 24   Daily Activity Standardized Score (Calc for Raw Score >=11) 57 54   AM-PAC Applied Cognition Inpatient   Following a Speech/Presentation 4   Understanding Ordinary Conversation 4   Taking Medications 4   Remembering Where Things Are Placed or Put Away 4   Remembering List of 4-5 Errands 4   Taking Care of Complicated Tasks 4   Applied Cognition Raw Score 24   Applied Cognition Standardized Score 62 21     Pt is performing at PLOF; OT services will be discontinued at this time  Pt left seated EOB at end of session; PT present with pt at end of session for COVID education and breathing techniques; all needs within reach; all lines intact

## 2021-07-29 NOTE — RESPIRATORY THERAPY NOTE
Pt stated he wears 5 l/m @ home, pt is on 6 l/m nc here and stated a lil SOB when moving around  Increased 0xygen to 7 l/m and added humidity pulse ox 90-92% pt states much better

## 2021-07-29 NOTE — CASE MANAGEMENT
Received call from Respiratory therapist that wife brought patient's bipap in from home  The bipap machine is extremely dirty with a badly frayed cord  I called Cheri Graf from [address] to see when patient got Bipap and if they would be able to repair it or give him a new one  She is checking patients record and will get back to me

## 2021-07-29 NOTE — ED PROVIDER NOTES
History  Chief Complaint   Patient presents with    Shortness of Breath     Covid positive on thursday and c/o increase of SOB that is worse today  O2 at 64% on room air upon arrival     Patient is a 14-year-old male with history of COPD on 5 L at baseline who presents for shortness of breath, dyspnea on exertion  Patient was seen here on 07/22 and diagnosed with COVID pneumonia  It was recommended that he stay for admission but he decided to go home on antibiotics  Patient says that [facility] he left he should return around and came back in   He says that he has been having progressing shortness of breath with exertion over the past few days  He says that his pulse ox was reading in the low 60s at home on his 5 L  He says that if his oxygen is at 92% they are happy   He also says that he has been producing some mucus with this cough   He denies any chest pain, lightheadedness, dizziness, nausea, vomiting, abdominal pain, leg swelling  He is satting in the low 90s on 10 L here  He has no in obvious respiratory distress  Prior to Admission Medications   Prescriptions Last Dose Informant Patient Reported? Taking?    Blood Glucose Monitoring Suppl (CONTOUR NEXT MONITOR) w/Device KIT  Self No No   Sig: Test blood sugar once daily or as needed for fluctuating blood sugars   Dapagliflozin Propanediol (Farxiga) 10 MG TABS  Self Yes No   Sig: Take 10 mg by mouth daily   Insulin Pen Needle (BD Pen Needle [place] U/F) 32G X 4 MM MISC   No No   Sig: Use 4 a day   Lancets MISC   No No   Sig: Test blood sugar once daily or as needed for fluctuating blood sugars   Multiple Vitamins-Minerals (MENS MULTIVITAMIN PO)  Self Yes No   Sig: Take by mouth   Soliqua 100-33 UNT-MCG/ML injection pen   Yes No   Sig: Inject 45 Units under the skin daily   albuterol (Ventolin HFA) 90 mcg/act inhaler   No No   Sig: Inhale 2 puffs every 6 (six) hours as needed for wheezing   amLODIPine (NORVASC) 10 mg tablet   No No   Sig: Take 1 tablet (10 mg total) by mouth daily   amoxicillin-clavulanate (AUGMENTIN) 875-125 mg per tablet   No No   Sig: Take 1 tablet by mouth 2 (two) times a day for 5 days   Patient not taking: Reported on 7/28/2021   atorvastatin (LIPITOR) 40 mg tablet   No No   Sig: Take 1 tablet (40 mg total) by mouth daily   carvedilol (COREG) 12 5 mg tablet   No No   Sig: Take 1 tablet (12 5 mg total) by mouth 2 (two) times a day with meals   cetirizine (ZYRTEC ALLERGY) 10 mg tablet  Self Yes No   Sig: Take 10 mg by mouth   cyclobenzaprine (FLEXERIL) 10 mg tablet  Self Yes No   Sig: Take 10 mg by mouth   fluticasone (FLOVENT HFA) 220 mcg/act inhaler   No No   Sig: Inhale 2 puffs 2 (two) times a day Rinse mouth after use     glimepiride (AMARYL) 4 mg tablet   Yes No   Sig: Take 4 mg by mouth 2 (two) times a day   glucose blood (CONTOUR NEXT TEST) test strip  Self No No   Sig: Test blood sugar once daily or as needed for fluctuating blood sugars   hydrochlorothiazide (HYDRODIURIL) 25 mg tablet   No No   Sig: Take 1 tablet (25 mg total) by mouth daily   ipratropium-albuterol (DUO-NEB) 0 5-2 5 mg/3 mL nebulizer solution   No No   Sig: Take 1 vial (3 mL total) by nebulization every 6 (six) hours as needed for wheezing or shortness of breath   losartan (COZAAR) 100 MG tablet   No No   Sig: Take 1 tablet (100 mg total) by mouth daily   meloxicam (MOBIC) 15 mg tablet  Self Yes No   Sig: Take 15 mg by mouth   metFORMIN (GLUCOPHAGE) 1000 MG tablet   No No   Sig: TAKE 1 TABLET BY MOUTH TWICE DAILY WITH MEALS   montelukast (SINGULAIR) 10 mg tablet  Self Yes No   Patient not taking: Reported on 7/26/2021   sertraline (ZOLOFT) 50 mg tablet   No No   Sig: Take 1 tablet (50 mg total) by mouth daily   spironolactone (ALDACTONE) 50 mg tablet   No No   Sig: Take 1 tablet (50 mg total) by mouth daily   umeclidinium-vilanterol (ANORO ELLIPTA) 62 5-25 MCG/INH inhaler   No No   Sig: Inhale 1 puff daily      Facility-Administered Medications: None       Past Medical History:   Diagnosis Date    Aortic stenosis     Diabetes ([facility] [address] )     Hyperlipidemia     Hypertension 7/2/2014       Past Surgical History:   Procedure Laterality Date    APPENDECTOMY         Family History   Problem Relation Age of Onset    No Known Problems Mother      I have reviewed and agree with the history as documented  E-Cigarette/Vaping    E-Cigarette Use Never User      E-Cigarette/Vaping Substances    Nicotine No     THC No     CBD No     Flavoring No     Other No     Unknown No      Social History     Tobacco Use    Smoking status: Former Smoker     Years: 10 00     Types: Cigarettes    Smokeless tobacco: Never Used   Vaping Use    Vaping Use: Never used   Substance Use Topics    Alcohol use: Never    Drug use: Never       Review of Systems   Constitutional: Negative for chills, fever and unexpected weight change  HENT: Negative for congestion, sore throat and trouble swallowing  Eyes: Negative for pain, discharge and itching  Respiratory: Negative for cough, chest tightness, shortness of breath and wheezing  Cardiovascular: Negative for chest pain, palpitations and leg swelling  Gastrointestinal: Negative for abdominal pain, blood in stool, diarrhea, nausea and vomiting  Endocrine: Negative for polyuria  Genitourinary: Negative for difficulty urinating, dysuria, frequency and hematuria  Musculoskeletal: Negative for arthralgias and back pain  Neurological: Negative for dizziness, syncope, weakness, light-headedness and headaches  Physical Exam  Physical Exam  Vitals and nursing note reviewed  Constitutional:       General: He is not in acute distress  Appearance: He is well-developed  HENT:      Head: Normocephalic and atraumatic  Right Ear: External ear normal       Left Ear: External ear normal    Eyes:      Conjunctiva/sclera: Conjunctivae normal       Pupils: Pupils are equal, round, and reactive to light     Cardiovascular:      Rate and Rhythm: Normal rate and regular rhythm  Heart sounds: Normal heart sounds  No murmur heard  No friction rub  No gallop  Pulmonary:      Effort: Pulmonary effort is normal  No respiratory distress  Breath sounds: Decreased breath sounds and wheezing present  No rales  Abdominal:      General: Bowel sounds are normal  There is no distension  Palpations: Abdomen is soft  Tenderness: There is no abdominal tenderness  There is no guarding  Musculoskeletal:         General: No tenderness or deformity  Normal range of motion  Cervical back: Normal range of motion  Lymphadenopathy:      Cervical: No cervical adenopathy  Skin:     General: Skin is warm and dry  Neurological:      General: No focal deficit present  Mental Status: He is alert and oriented to person, place, and time  Mental status is at baseline  Cranial Nerves: No cranial nerve deficit  Sensory: No sensory deficit  Motor: No weakness or abnormal muscle tone     Psychiatric:         Behavior: Behavior normal          Vital Signs  ED Triage Vitals [07/28/21 2000]   Temperature Pulse Respirations Blood Pressure SpO2   100 3 °F (37 9 °C) 86 (!) 24 159/73 (!) 64 %      Temp Source Heart Rate Source Patient Position - Orthostatic VS BP Location FiO2 (%)   Oral Monitor Sitting Right arm --      Pain Score       --           Vitals:    07/28/21 2030 07/28/21 2115 07/28/21 2130 07/28/21 2200   BP: 120/56 127/59 142/62 126/61   Pulse: 80 85 79 79   Patient Position - Orthostatic VS: Sitting Sitting Sitting Sitting         Visual Acuity      ED Medications  Medications   albuterol (PROVENTIL HFA,VENTOLIN HFA) inhaler 2 puff (has no administration in time range)   amLODIPine (NORVASC) tablet 10 mg (has no administration in time range)   atorvastatin (LIPITOR) tablet 40 mg (has no administration in time range)   carvedilol (COREG) tablet 12 5 mg (has no administration in time range)   Dapagliflozin Propanediol TABS 10 mg (has no administration in time range)   fluticasone (FLOVENT HFA) 220 mcg/act inhaler 2 puff (has no administration in time range)   losartan (COZAAR) tablet 100 mg (has no administration in time range)   sertraline (ZOLOFT) tablet 50 mg (has no administration in time range)   insulin glargine (LANTUS) subcutaneous injection 45 Units 0 45 mL (has no administration in time range)   anoro ellipta 1 puff (has no administration in time range)   cholecalciferol (VITAMIN D3) tablet 2,000 Units (has no administration in time range)   cefTRIAXone (ROCEPHIN) IVPB (premix in dextrose) 1,000 mg 50 mL (has no administration in time range)   doxycycline hyclate (VIBRAMYCIN) capsule 100 mg (has no administration in time range)   ascorbic acid (VITAMIN C) tablet 1,000 mg (has no administration in time range)   zinc sulfate (ZINCATE) capsule 220 mg (has no administration in time range)     Followed by   multivitamin-minerals (CENTRUM ADULTS) tablet 1 tablet (has no administration in time range)   atorvastatin (LIPITOR) tablet 40 mg (has no administration in time range)   dexamethasone (DECADRON) injection 6 mg (has no administration in time range)   famotidine (PEPCID) tablet 20 mg (has no administration in time range)   tocilizumab (ACTEMRA) 800 mg in sodium chloride 0 9 % 60 mL IVPB (has no administration in time range)   polyethylene glycol (MIRALAX) packet 17 g (has no administration in time range)   remdesivir (Veklury) 200 mg in sodium chloride 0 9 % 250 mL IVPB (has no administration in time range)     Followed by   remdesivir Afton Tomeka) 100 mg in sodium chloride 0 9 % 250 mL IVPB (has no administration in time range)   insulin lispro (HumaLOG) 100 units/mL subcutaneous injection 2-12 Units (has no administration in time range)   insulin lispro (HumaLOG) 100 units/mL subcutaneous injection 2-12 Units (has no administration in time range)   acetaminophen (TYLENOL) tablet 650 mg (has no administration in time range) ondansetron (ZOFRAN) injection 4 mg (has no administration in time range)   heparin (porcine) subcutaneous injection 5,000 Units (has no administration in time range)   cefTRIAXone (ROCEPHIN) IVPB (premix in dextrose) 1,000 mg 50 mL (0 mg Intravenous Stopped 7/28/21 2139)   sodium chloride 0 9 % bolus 1,000 mL (1,000 mL Intravenous New Bag 7/28/21 2112)       Diagnostic Studies  Results Reviewed     Procedure Component Value Units Date/Time    Blood gas, venous [[id number]]  (Abnormal) Collected: 07/28/21 2115    Lab Status: Final result Specimen: Blood from Arm, Right Updated: 07/28/21 2144     pH, Anurag 7 301     pCO2, Anurag 50 7 mm Hg      pO2, Anurag 68 8 mm Hg      HCO3, Anurag 24 4 mmol/L      Base Excess, Anurag -2 5 mmol/L      O2 Content, Anurag 18 5 ml/dL      O2 HGB, VENOUS 89 6 %     C-reactive protein [[id number]]  (Abnormal) Collected: 07/28/21 2028    Lab Status: Final result Specimen: Blood from Arm, Right Updated: 07/28/21 2111      1 mg/L     NT-BNP PRO [[id number]]  (Normal) Collected: 07/28/21 2028    Lab Status: Final result Specimen: Blood from Arm, Right Updated: 07/28/21 2111     NT-proBNP 63 pg/mL     Triglycerides [[id number]]  (Normal) Collected: 07/28/21 2028    Lab Status: Final result Specimen: Blood from Arm, Right Updated: 07/28/21 2111     Triglycerides 130 mg/dL     Magnesium [[id number]]  (Normal) Collected: 07/28/21 2028    Lab Status: Final result Specimen: Blood from Arm, Right Updated: 07/28/21 2111     Magnesium 2 5 mg/dL     Comprehensive metabolic panel [[id number]]  (Abnormal) Collected: 07/28/21 2028    Lab Status: Final result Specimen: Blood from Arm, Right Updated: 07/28/21 2108     Sodium 128 mmol/L      Potassium 4 5 mmol/L      Chloride 92 mmol/L      CO2 25 mmol/L      ANION GAP 11 mmol/L      BUN 32 mg/dL      Creatinine 1 01 mg/dL      Glucose 159 mg/dL      Calcium 9 2 mg/dL      Corrected Calcium 10 2 mg/dL      AST 21 U/L      ALT 39 U/L      Alkaline Phosphatase 84 U/L Total Protein 8 2 g/dL      Albumin 2 8 g/dL      Total Bilirubin 0 62 mg/dL      eGFR 82 ml/min/1 73sq m     Narrative:      [place] guidelines for Chronic Kidney Disease (CKD):     Stage 1 with normal or high GFR (GFR > 90 mL/min/1 73 square meters)    Stage 2 Mild CKD (GFR = 60-89 mL/min/1 73 square meters)    Stage 3A Moderate CKD (GFR = 45-59 mL/min/1 73 square meters)    Stage 3B Moderate CKD (GFR = 30-44 mL/min/1 73 square meters)    Stage 4 Severe CKD (GFR = 15-29 mL/min/1 73 square meters)    Stage 5 End Stage CKD (GFR <15 mL/min/1 73 square meters)  Note: GFR calculation is accurate only with a steady state creatinine    CK (with reflex to MB) [[id number]]  (Normal) Collected: 07/28/21 2028    Lab Status: Final result Specimen: Blood from Arm, Right Updated: 07/28/21 2106     Total  U/L     Lactic acid, plasma [[id number]]  (Normal) Collected: 07/28/21 2028    Lab Status: Final result Specimen: Blood from Arm, Right Updated: 07/28/21 2102     LACTIC ACID 1 8 mmol/L     Narrative:      Result may be elevated if tourniquet was used during collection      Protime-INR [[id number]]  (Abnormal) Collected: 07/28/21 2028    Lab Status: Final result Specimen: Blood from Arm, Right Updated: 07/28/21 2102     Protime 14 7 seconds      INR 1 17    Troponin I [[id number]]  (Normal) Collected: 07/28/21 2028    Lab Status: Final result Specimen: Blood from Arm, Right Updated: 07/28/21 2101     Troponin I <0 02 ng/mL     D-dimer, quantitative [[id number]]  (Abnormal) Collected: 07/28/21 2028    Lab Status: Final result Specimen: Blood from Arm, Right Updated: 07/28/21 2100     D-Dimer, Quant 0 54 ug/ml FEU     CBC and differential [[id number]]  (Abnormal) Collected: 07/28/21 2028    Lab Status: Final result Specimen: Blood from Arm, Right Updated: 07/28/21 2055     WBC 24 01 Thousand/uL      RBC 5 39 Million/uL      Hemoglobin 15 0 g/dL      Hematocrit 45 8 %      MCV 85 fL      MCH 27 8 pg      MCHC 32 8 g/dL      RDW 14 0 %      MPV 9 5 fL      Platelets 940 Thousands/uL      nRBC 0 /100 WBCs      Neutrophils Relative 90 %      Immat GRANS % 1 %      Lymphocytes Relative 4 %      Monocytes Relative 5 %      Eosinophils Relative 0 %      Basophils Relative 0 %      Neutrophils Absolute 21 60 Thousands/µL      Immature Grans Absolute 0 18 Thousand/uL      Lymphocytes Absolute 0 84 Thousands/µL      Monocytes Absolute 1 22 Thousand/µL      Eosinophils Absolute 0 10 Thousand/µL      Basophils Absolute 0 07 Thousands/µL     Narrative: This is an appended report  These results have been appended to a previously verified report  Blood culture #1 [[id number]] Collected: 07/28/21 2028    Lab Status: In process Specimen: Blood from Arm, Right Updated: 07/28/21 2040    Blood culture #2 [[id number]] Collected: 07/28/21 2028    Lab Status: In process Specimen: Blood from Arm, Right Updated: 07/28/21 2040    Ferritin [[id number]] Collected: 07/28/21 2028    Lab Status: In process Specimen: Blood from Arm, Right Updated: 07/28/21 2040    Calcium, ionized [[id number]] Collected: 07/28/21 2028    Lab Status: In process Specimen: Blood from Arm, Right Updated: 07/28/21 2040    Procalcitonin with AM Reflex [[id number]] Collected: 07/28/21 2028    Lab Status: In process Specimen: Blood from Arm, Right Updated: 07/28/21 2040    Glucose 6 phosphate dehydrogenase [[id number]] Collected: 07/28/21 2028    Lab Status:  In process Specimen: Blood from Arm, Right Updated: 07/28/21 2040                 XR chest 1 view portable    (Results Pending)              Procedures  CriticalCare Time  Performed by: Vipul Salas DO  Authorized by: Vipul Salas DO     Critical care provider statement:     Critical care time (minutes):  35    Critical care time was exclusive of:  Separately billable procedures and treating other patients and teaching time    Critical care was necessary to treat or prevent imminent or life-threatening deterioration of the following conditions:  Respiratory failure    Critical care was time spent personally by me on the following activities:  Blood draw for specimens, obtaining history from patient or surrogate, development of treatment plan with patient or surrogate, discussions with consultants, examination of patient, evaluation of patient's response to treatment, review of old charts, re-evaluation of patient's condition, ordering and review of radiographic studies, ordering and review of laboratory studies, ordering and performing treatments and interventions and interpretation of cardiac output measurements    I assumed direction of critical care for this patient from another provider in my specialty: no               ED Course                             SBIRT 22yo+      Most Recent Value   SBIRT (22 yo +)   In order to provide better care to our patients, we are screening all of our patients for alcohol and drug use  Would it be okay to ask you these screening questions? Yes Filed at: 07/28/2021 2009   Initial Alcohol Screen: US AUDIT-C    1  How often do you have a drink containing alcohol?  0 Filed at: 07/28/2021 2009   2  How many drinks containing alcohol do you have on a typical day you are drinking? 0 Filed at: 07/28/2021 2009   3a  Male UNDER 65: How often do you have five or more drinks on one occasion? 0 Filed at: 07/28/2021 2009   3b  FEMALE Any Age, or MALE 65+: How often do you have 4 or more drinks on one occassion? 0 Filed at: 07/28/2021 2009   Audit-C Score  0 Filed at: 07/28/2021 2009   ROSA MARIA: How many times in the past year have you    Used an illegal drug or used a prescription medication for non-medical reasons?   Never Filed at: 07/28/2021 2009                    MDM  Number of Diagnoses or Management Options  COVID-19  Hypoxia  Pneumonia  Diagnosis management comments: 58-year-old male with history of COPD on 5 L baseline presenting for progressing shortness of breath  Was diagnosed with COVID pneumonia on 07/22, left against medical advice was not admitted to the hospital   Has been having progressing worsening shortness breath over the last few days  Initial sats in the low 60s on 5 L  Improved to low 90s on 10 L  Rest of vitals within normal limits  COVID workup obtain  Patient started on mid flow  Labs show a white count of 24  Patient comfortable on current oxygen settings  Spoke with ICU doctor here who agreed that patient is okay for admission here  Disposition  Final diagnoses:   COVID-19   Pneumonia   Hypoxia     Time reflects when diagnosis was documented in both MDM as applicable and the Disposition within this note     Time User Action Codes Description Comment    7/28/2021  9:59 PM Brielle Newer Add [U07 1] COVID-19     7/28/2021  9:59 PM Brielle Newer Add [J18 9] Pneumonia     7/28/2021  9:59 PM Brielle Newer Add [R09 02] Hypoxia       ED Disposition     ED Disposition Condition Date/Time Comment    Admit Stable Wed Jul 28, 2021  9:59 PM Case was discussed with JULES and the patient's admission status was agreed to be Admission Status: inpatient status to the service of Dr Beverly Zaldivar   Follow-up Information    None         Current Discharge Medication List      CONTINUE these medications which have NOT CHANGED    Details   albuterol (Ventolin HFA) 90 mcg/act inhaler Inhale 2 puffs every 6 (six) hours as needed for wheezing  Qty: 1 Inhaler, Refills: 2    Comments: Substitution to a formulary equivalent within the same pharmaceutical class is authorized  Associated Diagnoses:  Moderate persistent asthma without complication      amLODIPine (NORVASC) 10 mg tablet Take 1 tablet (10 mg total) by mouth daily  Qty: 90 tablet, Refills: 3    Associated Diagnoses: Essential hypertension      atorvastatin (LIPITOR) 40 mg tablet Take 1 tablet (40 mg total) by mouth daily  Qty: 90 tablet, Refills: 3    Associated Diagnoses: Other hyperlipidemia      Blood Glucose Monitoring Suppl (CONTOUR NEXT MONITOR) w/Device KIT Test blood sugar once daily or as needed for fluctuating blood sugars  Qty: 1 kit, Refills: 0    Associated Diagnoses: Type 2 diabetes mellitus with hemoglobin A1c goal of less than 8 0% ([facility])      carvedilol (COREG) 12 5 mg tablet Take 1 tablet (12 5 mg total) by mouth 2 (two) times a day with meals  Qty: 180 tablet, Refills: 3    Associated Diagnoses: Diastolic dysfunction without heart failure      cetirizine (ZYRTEC ALLERGY) 10 mg tablet Take 10 mg by mouth      cyclobenzaprine (FLEXERIL) 10 mg tablet Take 10 mg by mouth      Dapagliflozin Propanediol (Farxiga) 10 MG TABS Take 10 mg by mouth daily      fluticasone (FLOVENT HFA) 220 mcg/act inhaler Inhale 2 puffs 2 (two) times a day Rinse mouth after use  Qty: 12 g, Refills: 3    Associated Diagnoses: COPD, severe ([facility])      glimepiride (AMARYL) 4 mg tablet Take 4 mg by mouth 2 (two) times a day      glucose blood (CONTOUR NEXT TEST) test strip Test blood sugar once daily or as needed for fluctuating blood sugars  Qty: 100 each, Refills: 3    Associated Diagnoses: Type 2 diabetes mellitus with hemoglobin A1c goal of less than 8 0% ([facility])      hydrochlorothiazide (HYDRODIURIL) 25 mg tablet Take 1 tablet (25 mg total) by mouth daily  Qty: 90 tablet, Refills: 3    Associated Diagnoses: Chronic hypoxemic respiratory failure ([facility])      Insulin Pen Needle (BD Pen Needle [place] U/F) 32G X 4 MM MISC Use 4 a day  Qty: 200 each, Refills: 5    Associated Diagnoses: Uncontrolled type 2 diabetes mellitus with hyperglycemia ([facility])      ipratropium-albuterol (DUO-NEB) 0 5-2 5 mg/3 mL nebulizer solution Take 1 vial (3 mL total) by nebulization every 6 (six) hours as needed for wheezing or shortness of breath  Qty: 360 mL, Refills: 2    Associated Diagnoses:  Moderate persistent asthma without complication      Lancets MISC Test blood sugar once daily or as needed for fluctuating blood sugars  Qty: 100 each, Refills: 0 Associated Diagnoses: Type 2 diabetes mellitus with hemoglobin A1c goal of less than 8 0% ([facility])      losartan (COZAAR) 100 MG tablet Take 1 tablet (100 mg total) by mouth daily  Qty: 90 tablet, Refills: 3    Associated Diagnoses: Essential hypertension      meloxicam (MOBIC) 15 mg tablet Take 15 mg by mouth      metFORMIN (GLUCOPHAGE) 1000 MG tablet TAKE 1 TABLET BY MOUTH TWICE DAILY WITH MEALS  Qty: 180 tablet, Refills: 0    Associated Diagnoses: Type 2 diabetes mellitus with other specified complication, without long-term current use of insulin ([facility])      montelukast (SINGULAIR) 10 mg tablet       Multiple Vitamins-Minerals (MENS MULTIVITAMIN PO) Take by mouth      sertraline (ZOLOFT) 50 mg tablet Take 1 tablet (50 mg total) by mouth daily  Qty: 30 tablet, Refills: 0    Associated Diagnoses: Anxiety and depression      Soliqua 100-33 UNT-MCG/ML injection pen Inject 45 Units under the skin daily      spironolactone (ALDACTONE) 50 mg tablet Take 1 tablet (50 mg total) by mouth daily  Qty: 90 tablet, Refills: 3    Associated Diagnoses: SCOTT on CPAP; Hypoxemia requiring supplemental oxygen      umeclidinium-vilanterol (ANORO ELLIPTA) 62 5-25 MCG/INH inhaler Inhale 1 puff daily  Qty: 1 Inhaler, Refills: 2    Associated Diagnoses: COPD, severe ([facility])         STOP taking these medications       amoxicillin-clavulanate (AUGMENTIN) 875-125 mg per tablet Comments:   Reason for Stopping:             No discharge procedures on file      PDMP Review     None          ED Provider  Electronically Signed by           Artie Leon DO  07/28/21 7673

## 2021-07-29 NOTE — ASSESSMENT & PLAN NOTE
Lab Results   Component Value Date    HGBA1C 12 8 03/05/2021       No results for input(s): POCGLU in the last 72 hours  Blood Sugar Average: Last 72 hrs:     Collect new A1c  Accu-Cheks a c  HS  Insulin sliding scale  Continue prior to admission long-acting insulin 45 units q a m

## 2021-07-29 NOTE — PROGRESS NOTES
[address] [facility] [address]  Progress Note Benja Ibarra 1965, 61 y o  male MRN: [id number]  Unit/Bed#: 427-01 Encounter: [id number]  Primary Care Provider: Jh Cleaning PA-C   Date and time admitted to hospital: 7/28/2021  7:59 PM    Acute on chronic respiratory failure with hypoxia (HCC)  Assessment & Plan  Baseline O2 5 L nasal cannula  Initially requiring 12 L but is now requiring 7 L    Sepsis due to COVID-19 [facility])  Assessment & Plan  As evidence by febrile 100 3, tachycardia tachypnea and elevated white blood cell count of 24 01  In the setting of COVID-please see additional treatment plan for COVID  Follow-up blood cultures obtained x2  Decadron 1 milligram/kilogram  Day 1 of remdesivir  Received Actemra 07/28/2021  Day 1 Rocephin/doxycycline  Continue to trend inflammatory markers      Hyponatremia  Assessment & Plan  Likely hypovolemic hyponatremia  Volume expand with normal saline x1 L  Check BMP this evening    Type 2 diabetes mellitus with hemoglobin A1c goal of less than 8 0% [facility])  Assessment & Plan  Lab Results   Component Value Date    HGBA1C 12 8 03/05/2021       Recent Labs     07/29/21  0102 07/29/21  0650   POCGLU 108 208*       Blood Sugar Average: Last 72 hrs:  (P) 158   Collect new A1c    Continue dapagliflozin  lantus 45 units HS  ISS     Morbid obesity with BMI of 50 0-59 9, adult ([facility] [address] )  Assessment & Plan  As evidence by a BMI of 51 24  Recommend outpatient nutritional counseling Lifestyle modification        Progress Note Benja Ibarra 61 y o  male MRN: [id number]    Unit/Bed#: 427-01 Encounter: [id number]        Subjective:   Patient seen and examined at bedside  Feeling better, but significantly SOB with ambulation    Objective:     Vitals:   Vitals:    07/29/21 0652   BP: 139/81   Pulse: 83   Resp: 22   Temp: 97 9 °F (36 6 °C)   SpO2: 90%     Body mass index is 51 05 kg/m²      Intake/Output Summary (Last 24 hours) at 7/29/2021 1012  Last data filed at 7/28/2021 2139  Gross per 24 hour   Intake 50 ml   Output --   Net 50 ml       Physical Exam:   /81 (BP Location: Right arm)   Pulse 83   Temp 97 9 °F (36 6 °C) (Oral)   Resp 22   Ht 5' 9" (1 753 m)   Wt (!) 157 kg (345 lb 10 9 oz)   SpO2 90%   BMI 51 05 kg/m²   General appearance: alert and oriented, in no acute distress  Lungs: minimally diminished  Heart: regular rate and rhythm, S1, S2 normal, no murmur, click, rub or gallop  Abdomen: soft, non-tender; bowel sounds normal; no masses,  no organomegaly  Extremities: extremities normal, warm and well-perfused; no cyanosis, clubbing, or edema  Pulses: 2+ and symmetric  Neurologic: Grossly normal     Invasive Devices     Peripheral Intravenous Line            Peripheral IV 07/28/21 Left Antecubital <1 day    Peripheral IV 07/28/21 Right Forearm <1 day                Results from last 7 days   Lab Units 07/29/21  0524 07/28/21 2028 07/22/21  1448   WBC Thousand/uL 17 90* 24 01* 17 36*   HEMOGLOBIN g/dL 13 8 15 0 15 0   HEMATOCRIT % 42 1 45 8 46 5   PLATELETS Thousands/uL 335 378 293       Results from last 7 days   Lab Units 07/29/21  0524 07/28/21 2028 07/22/21  1448   POTASSIUM mmol/L 4 8 4 5 4 3   CHLORIDE mmol/L 94* 92* 99*   CO2 mmol/L 24 25 28   BUN mg/dL 27* 32* 30*   CREATININE mg/dL 0 86 1 01 1 06   CALCIUM mg/dL 8 8 9 2 9 2   ALK PHOS U/L 76 84  --    ALT U/L 36 39  --    AST U/L 22 21  --        Medication Administration - last 24 hours from 07/28/2021 1012 to 07/29/2021 1012       Date/Time Order Dose Route Action Action by     07/28/2021 2139 cefTRIAXone (ROCEPHIN) IVPB (premix in dextrose) 1,000 mg 50 mL 0 mg Intravenous Stopped Maye Diaz RN     07/28/2021 2112 cefTRIAXone (ROCEPHIN) IVPB (premix in dextrose) 1,000 mg 50 mL 1,000 mg Intravenous [address] Maye Diaz RN     07/29/2021 0114 sodium chloride 0 9 % bolus 1,000 mL 0 mL Intravenous Stopped Ellen Crisostomo RN     07/28/2021 2112 sodium chloride 0 9 % bolus 1,000 mL 1,000 mL Intravenous [address] [facility]     07/29/2021 7909 amLODIPine (NORVASC) tablet 10 mg 10 mg Oral Given [facility], RN     07/29/2021 0930 carvedilol (COREG) tablet 12 5 mg 12 5 mg Oral Given [facility], RN     07/29/2021 1002 Dapagliflozin Propanediol TABS 10 mg 10 mg Oral Not Given [facility], RN     07/29/2021 0926 losartan (COZAAR) tablet 100 mg 100 mg Oral Given [facility], RN     07/29/2021 4814 sertraline (ZOLOFT) tablet 50 mg 50 mg Oral Given [facility], RN     07/29/2021 0925 insulin glargine (LANTUS) subcutaneous injection 45 Units 0 45 mL 45 Units Subcutaneous Given [facility], RN     07/29/2021 5811 cholecalciferol (VITAMIN D3) tablet 2,000 Units 2,000 Units Oral Given [facility], RN     07/29/2021 0112 doxycycline hyclate (VIBRAMYCIN) capsule 100 mg 100 mg Oral Given Nate Del Toro, RN     07/29/2021 5686 ascorbic acid (VITAMIN C) tablet 1,000 mg 1,000 mg Oral Given [facility], RN     07/29/2021 0111 ascorbic acid (VITAMIN C) tablet 1,000 mg 1,000 mg Oral Given [place] Alverto, RN     07/29/2021 6953 zinc sulfate (ZINCATE) capsule 220 mg 220 mg Oral Given [facility], RN     07/28/2021 2304 atorvastatin (LIPITOR) tablet 40 mg 40 mg Oral Not Given Nate Del Toro, RN     07/29/2021 0111 dexamethasone (DECADRON) injection 6 mg 6 mg Intravenous Given Nate Del Toro, RN     07/29/2021 3095 famotidine (PEPCID) tablet 20 mg 20 mg Oral Given [facility], RN     07/29/2021 0333 tocilizumab (ACTEMRA) 800 mg in sodium chloride 0 9 % 60 mL IVPB 800 mg Intravenous [address] Nate Del Toro, RN     07/29/2021 0112 remdesivir (Veklury) 200 mg in sodium chloride 0 9 % 250 mL IVPB 200 mg Intravenous [address] Nate Del Toro RN     07/29/2021 0830 insulin lispro (HumaLOG) 100 units/mL subcutaneous injection 2-12 Units 4 Units Subcutaneous Given Grabiel Jose RN     07/29/2021 0112 insulin lispro (HumaLOG) 100 units/mL subcutaneous injection 2-12 Units 2 Units Subcutaneous Not Given Nate Del Toro RN 07/29/2021 0523 heparin (porcine) subcutaneous injection 5,000 Units 5,000 Units Subcutaneous Given Magdy Gale RN     07/29/2021 0111 heparin (porcine) subcutaneous injection 5,000 Units 5,000 Units Subcutaneous Given Magdy Gale RN     07/29/2021 0932 tiotropium (SPIRIVA) capsule for inhaler 18 mcg 18 mcg Inhalation Given Marta Logan RN     07/29/2021 1005 sodium chloride 0 9 % infusion 75 mL/hr Intravenous New Bag Marta Logan RN            Lab, Imaging and other studies: I have personally reviewed pertinent reports      VTE Pharmacologic Prophylaxis: Enoxaparin (Lovenox)  VTE Mechanical Prophylaxis: sequential compression device     Emily Ace MD  7/29/2021,10:12 AM

## 2021-07-29 NOTE — ASSESSMENT & PLAN NOTE
As evidence by febrile 100 3, tachycardia tachypnea and elevated white blood cell count of 24 01  In the setting of COVID-please see additional treatment plan for COVID  Blood cultures collected in process  Patient started on ceftriaxone and doxycycline

## 2021-07-29 NOTE — UTILIZATION REVIEW
Inpatient Admission Authorization Request   NOTIFICATION OF INPATIENT ADMISSION/INPATIENT AUTHORIZATION REQUEST   SERVICING FACILITY:   [address] [place] [address]  [address]  Tax ID:  [id number]  NPI: [id number]  Place of Service: [place] [address]  Place of Service Code: 24     ATTENDING PROVIDER:  Attending Name and NPI#: Amanda Flanagan [place] [[id number]]  Address: [address]  Phone: [phone]     UTILIZATION REVIEW CONTACT:  Jaqui Parish, Utilization   Network Utilization Review Department  Phone: [phone]  Fax [phone]  Email: [email or web address]     PHYSICIAN ADVISORY SERVICES:  FOR [id number] REVIEW - MEDICAL NECESSITY DENIAL  Phone: [phone]  Fax: [phone]  Email: [email or web address]     TYPE OF REQUEST:  Inpatient Status     ADMISSION INFORMATION:  ADMISSION DATE/TIME: 7/28/21 10:08 PM  PATIENT DIAGNOSIS CODE/DESCRIPTION:  Pneumonia [J18 9]  Hypoxia [R09 02]  COVID-19 [U07 1]  DISCHARGE DATE/TIME: No discharge date for patient encounter  DISCHARGE DISPOSITION (IF DISCHARGED): Home/Self Care     IMPORTANT INFORMATION:  Please contact the Jaqui Parish directly with any questions or concerns regarding this request  Department voicemails are confidential     Send requests for admission clinical reviews, concurrent reviews, approvals, and administrative denials due to lack of clinical to fax [phone]

## 2021-07-29 NOTE — CASE MANAGEMENT
Sarah Lobato from [address] called back they did not provide patient with his bipap  I also called [facility], [facility] Home patient and [facility], none of those facilities provided patient with bipap

## 2021-07-29 NOTE — ASSESSMENT & PLAN NOTE
Blood pressure currently stable  Admit to med surge  Continue prior to admission Norvasc Coreg and Cozaar currently holding prior to admission hydrochlorothiazide and spironolactone  Trend blood pressure per protocol

## 2021-07-29 NOTE — ASSESSMENT & PLAN NOTE
Lab Results   Component Value Date    HGBA1C 12 8 03/05/2021       Recent Labs     07/29/21  0102 07/29/21  0650   POCGLU 108 208*       Blood Sugar Average: Last 72 hrs:  (P) 158   Collect new A1c    Continue dapagliflozin  lantus 45 units HS  ISS

## 2021-07-29 NOTE — RESPIRATORY THERAPY NOTE
Patient arrived to ER C/O increased Shortness of Breath  Patient tested CoVid +    Placed patient on [email or web address]

## 2021-07-29 NOTE — ASSESSMENT & PLAN NOTE
As evidence by SpO2 stat of 64% on room air baseline 5 L O2 supplementation currently requiring 12 L mid flow to maintain SpO2 greater than 90%  Please see additional treatment plan for COVID  Pulmonary consulted

## 2021-07-29 NOTE — UTILIZATION REVIEW
Initial Clinical Review    Admission: Date/Time/Statement:   Admission Orders (From admission, onward)     Ordered        07/28/21 2210  Inpatient Admission  Once                   Orders Placed This Encounter   Procedures    Inpatient Admission     Standing Status:   Standing     Number of Occurrences:   1     Order Specific Question:   Level of Care     Answer:   Med Surg [16]     Order Specific Question:   Estimated length of stay     Answer:   More than 2 Midnights     Order Specific Question:   Certification     Answer:   I certify that inpatient services are medically necessary for this patient for a duration of greater than two midnights  See H&P and MD Progress Notes for additional information about the patient's course of treatment  ED Arrival Information     Expected Arrival Acuity    7/28/2021 7/28/2021 19:58 Emergent         Means of arrival Escorted by Service Admission type    Ambulance [facility] Emergency         Arrival complaint    SOB/COVID +        Chief Complaint   Patient presents with    Shortness of Breath     Covid positive on thursday and c/o increase of SOB that is worse today  O2 at 64% on room air upon arrival       Initial Presentation: 60 yo m with a pmh of severe COPD, diabetes, HTN, HLD and aortic stenosis, he had received both doses of COVID vaccines  He presented to the ED for evaluation of increasing SOB  On arrival he was noted to be 64% on room air  He reports his baseline is 5 L NC, he now requires 12L mid flow to maintain sat > 90%  He reports he tested positive for COVID- 19, last Thursday, he refused admission and went home on antibiotics  He notes increasing SOB that worsened today, he has a cough with some mucous  He is admitted inpatient due to acute respiratory failure with hypoxia due to COVID, and COPD exacerbation  Date: 7/29/2021   Day 2:   Pt initially requiring 12L  But now weaned to 7 liters ( baseline is 5L)   He reports feeling better not back to his baseline, he remains significantly SOB with ambulation  Continue current medications  COVID pathway,hyponatremia, likely hypovolemic, He was bolused 1L NSS        ED Triage Vitals   Temperature Pulse Respirations Blood Pressure SpO2   07/28/21 2000 07/28/21 2000 07/28/21 2000 07/28/21 2000 07/28/21 2000   100 3 °F (37 9 °C) 86 (!) 24 159/73 (!) 64 %      Temp Source Heart Rate Source Patient Position - Orthostatic VS BP Location FiO2 (%)   07/28/21 2000 07/28/21 2000 07/28/21 2000 07/28/21 2000 --   Oral Monitor Sitting Right arm       Pain Score       07/28/21 2307       No Pain          Wt Readings from Last 1 Encounters:   07/29/21 (!) 157 kg (345 lb 10 9 oz)     Additional Vital Signs:   Date/Time  Temp  Pulse  Resp  BP  MAP (mmHg)  SpO2  Calculated FIO2 (%) - Nasal Cannula  O2 Flow Rate (L/min)  Nasal Cannula O2 Flow Rate (L/min)  O2 Device  O2 Interface Device  Patient Position - Orthostatic VS   07/29/21 0939  --  --  --  --  --  --  --  7 L/min  --  Nasal cannula  --  --   07/29/21 06:52:48  97 9 °F (36 6 °C)  83  22  139/81  100  90 %  44  --  6 L/min  Nasal cannula  --  Sitting   07/29/21 0033  --  --  --  --  --  94 %  44  --  6 L/min  Nasal cannula  --  --   07/28/21 2313  --  --  --  --  --  --  44  --  6 L/min  Nasal cannula  --  --   07/28/21 23:01:46  98 6 °F (37 °C)  77  18  133/85  101  89 %Abnormal   --  --  --  --  --  --   07/28/21 2200  --  79  20  126/61  84  97 %  --  --  --  Mid flow nasal cannula  --  Sitting   07/28/21 2130  --  79  20  142/62  89  97 %  --  --  --  Mid flow nasal cannula  --  Sitting   07/28/21 2115  --  85  20  127/59  85  96 %  --  --  --  Mid flow nasal cannula  --  Sitting   07/28/21 2030  --  80  20  120/56  81  97 %  --  --  --  --  --  Sitting   07/28/21 2025  --  --  --  --  --  92 %  --  12 L/min  --  Mid flow nasal cannula  RICHARD vasquezgs  --   07/28/21 2008  --  --  --  --  --  --  --  --  --  Nasal cannula  --  --             Pertinent Labs/Diagnostic Test Results:   Results from last 7 days   Lab Units 07/22/21  1556   SARS-COV-2  Positive*     Results from last 7 days   Lab Units 07/29/21  0524 07/28/21 2028 07/22/21  1448   WBC Thousand/uL 17 90* 24 01* 17 36*   HEMOGLOBIN g/dL 13 8 15 0 15 0   HEMATOCRIT % 42 1 45 8 46 5   PLATELETS Thousands/uL 335 378 293   NEUTROS ABS Thousands/µL 17 09* 21 60* 15 12*         Results from last 7 days   Lab Units 07/29/21  0524 07/28/21 2028 07/22/21  1448   SODIUM mmol/L 128* 128* 136   POTASSIUM mmol/L 4 8 4 5 4 3   CHLORIDE mmol/L 94* 92* 99*   CO2 mmol/L 24 25 28   ANION GAP mmol/L 10 11 9   BUN mg/dL 27* 32* 30*   CREATININE mg/dL 0 86 1 01 1 06   EGFR ml/min/1 73sq m 96 82 78   CALCIUM mg/dL 8 8 9 2 9 2   CALCIUM, IONIZED mmol/L  --  1 17  --    MAGNESIUM mg/dL 2 6 2 5 2 0   PHOSPHORUS mg/dL 4 0  --   --      Results from last 7 days   Lab Units 07/29/21  0524 07/28/21 2028   AST U/L 22 21   ALT U/L 36 39   ALK PHOS U/L 76 84   TOTAL PROTEIN g/dL 7 7 8 2   ALBUMIN g/dL 2 5* 2 8*   TOTAL BILIRUBIN mg/dL 0 45 0 62     Results from last 7 days   Lab Units 07/29/21  0650 07/29/21  0102   POC GLUCOSE mg/dl 208* 108     Results from last 7 days   Lab Units 07/29/21  0524 07/28/21 2028 07/22/21  1448   GLUCOSE RANDOM mg/dL 185* 159* 264*     Results from last 7 days   Lab Units 07/28/21  2115   PH SUJEY  7 301   PCO2 SUJEY mm Hg 50 7*   PO2 SUJEY mm Hg 68 8*   HCO3 SUJEY mmol/L 24 4   BASE EXC SUJEY mmol/L -2 5   O2 CONTENT SUJEY ml/dL 18 5   O2 HGB, VENOUS % 89 6*         Results from last 7 days   Lab Units 07/29/21  0524 07/28/21 2028   CK TOTAL U/L 169 110   CK MB INDEX % <1 0  --    CK MB ng/mL 1 0  --      Results from last 7 days   Lab Units 07/28/21 2028 07/22/21  1448   TROPONIN I ng/mL <0 02 <0 02     Results from last 7 days   Lab Units 07/28/21 2028 07/22/21  1448   D-DIMER QUANTITATIVE ug/ml FEU 0 54* 0 28     Results from last 7 days   Lab Units 07/28/21 2028   PROTIME seconds 14 7*   INR  1 17 Results from last 7 days   Lab Units 07/28/21 2028   PROCALCITONIN ng/ml 0 19     Results from last 7 days   Lab Units 07/28/21 2028   LACTIC ACID mmol/L 1 8     Results from last 7 days   Lab Units 07/28/21 2028 07/22/21  1448   NT-PRO BNP pg/mL 63 97     Results from last 7 days   Lab Units 07/28/21 2028   FERRITIN ng/mL 978*     Results from last 7 days   Lab Units 07/29/21  0524 07/28/21 2028   CRP mg/L 248 6* 235 1*       Results from last 7 days   Lab Units 07/28/21 2028 07/22/21  1557   BLOOD CULTURE  Received in Microbiology Lab  Culture in Progress  Received in Microbiology Lab  Culture in Progress  No Growth After 5 Days  No Growth After 5 Days  CXR 7/29 - Increased density is patchy multifocal airspace opacities throughout the mid and lower lung zones with either worsening of viral pneumonia due to COVID-19 infection or, in the appropriate clinical setting, possibly developing superimposed bilateral   pneumonia       EKG - 7/29 - NSR - no significant changes     ED Treatment:   Medication Administration from 07/28/2021 1958 to 07/28/2021 2233       Date/Time Order Dose Route Action Comments     07/28/2021 2112 cefTRIAXone (ROCEPHIN) IVPB (premix in dextrose) 1,000 mg 50 mL 1,000 mg Intravenous New Bag      07/28/2021 2112 sodium chloride 0 9 % bolus 1,000 mL 1,000 mL Intravenous New Bag         Past Medical History:   Diagnosis Date    Aortic stenosis     Diabetes ([facility] )     Hyperlipidemia     Hypertension 7/2/2014     Present on Admission:   Type 2 diabetes mellitus with hemoglobin A1c goal of less than 8 0% ([facility])   Essential hypertension   COPD, severe ([facility] )   Aortic stenosis   COVID-19      Admitting Diagnosis: Pneumonia [J18 9]  Hypoxia [R09 02]  COVID-19 [U07 1]  Age/Sex: 61 y o  male       Admission Orders:  Scheduled Medications:  amLODIPine, 10 mg, Oral, Daily  ascorbic acid, 1,000 mg, Oral, Q12H [facility]  atorvastatin, 40 mg, Oral, Daily With Dinner  carvedilol, 12 5 mg, Oral, BID With Meals  cefTRIAXone, 1,000 mg, Intravenous, Q24H  cholecalciferol, 2,000 Units, Oral, Daily  Dapagliflozin Propanediol, 10 mg, Oral, Daily  dexamethasone, 6 mg, Intravenous, Q24H  doxycycline hyclate, 100 mg, Oral, Q12H  famotidine, 20 mg, Oral, BID  fluticasone, 2 puff, Inhalation, BID  heparin (porcine), 5,000 Units, Subcutaneous, Q8H MADELINE  insulin glargine, 45 Units, Subcutaneous, QAM  insulin lispro, 2-12 Units, Subcutaneous, TID AC  insulin lispro, 2-12 Units, Subcutaneous, HS  losartan, 100 mg, Oral, Daily  zinc sulfate, 220 mg, Oral, Daily   Followed by  Isla Cranker ON 8/5/2021] multivitamin-minerals, 1 tablet, Oral, Daily  remdesivir, 100 mg, Intravenous, Q24H  tiotropium, 18 mcg, Inhalation, Daily   And  salmeterol, 1 puff, Inhalation, Q12H MADELINE  sertraline, 50 mg, Oral, Daily  Actemra 800 mg iv once - 7/29 x 1       Continuous IV Infusions:     PRN Meds:  acetaminophen, 650 mg, Oral, Q6H PRN  albuterol, 2 puff, Inhalation, Q6H PRN  ondansetron, 4 mg, Intravenous, Q6H PRN  pneumococcal 13-valent conjugate vaccine, 0 5 mL, Intramuscular, Prior to discharge  polyethylene glycol, 17 g, Oral, Daily PRN    Nursing orders -  VS - incentive spirometry - daily weights - Self proning - activity as tolerated with assistance, Ambulate q shift - I & O q shift - SCD's to le's-  Diet cons carb  - PT/OT evaluation       Network Utilization Review Department  ATTENTION: Please call with any questions or concerns to [phone] and carefully listen to the prompts so that you are directed to the right person  All voicemails are confidential   Deja Watkins all requests for admission clinical reviews, approved or denied determinations and any other requests to dedicated fax number below belonging to the campus where the patient is receiving treatment   List of dedicated fax numbers for the Facilities:  [address] [place] [address] DENIALS (Administrative/Medical Necessity) [phone]   [address] [place] [address] (Maternity/NICU/Pediatrics) [address] [facility] [facility] [address] [facility] [address] [id number] [place] [address] Daniella Graham [facility] [address] [facility] [address] [facility] [phone]

## 2021-07-29 NOTE — H&P
[address] [facility] [address]  H&P- Jolene Aly 1965, 61 y o  male MRN: [id number]  Unit/Bed#: VICTOR M Encounter: [id number]  Primary Care Provider: Preethi Velásquez PA-C   Date and time admitted to hospital: 7/28/2021  7:59 PM    Sepsis [facility])  Assessment & Plan  As evidence by febrile 100 3, tachycardia tachypnea and elevated white blood cell count of 24 01  In the setting of COVID-please see additional treatment plan for COVID  Blood cultures collected in process  Patient started on ceftriaxone and doxycycline    Acute respiratory failure with hypoxia ([address] )  Assessment & Plan  As evidence by SpO2 stat of 64% on room air baseline 5 L O2 supplementation currently requiring 12 L mid flow to maintain SpO2 greater than 90%  Please see additional treatment plan for COVID  Pulmonary consulted    * COVID-19  Assessment & Plan  COVID positive emergency room  Patient  requiring 12 L mid flow O2 supplementation-baseline 5 L nasal cannula  Trend CBC and CMP daily  Collect troponin less than 0 02- CK-110, BNP-63, CRP-235 1, D-dimer-0 54 and ferritin and interleukin 6  D-dimer was 0 54-heparin 7500 units q 8 hours ordered  Vitamin D3 2000 units IU ordered daily, vitamin-C 1 g p o  Q 12 hours, zinc 220 p o  Q d , famotidine 20 mg IV b i d , and remdesivir 200 mg initial dose followed by 100 mg daily  Doxycycline and ceftriaxone ordered  Activity as tolerated respiratory protocol in place and incentive spirometer ordered  Pulmonary consulted    COPD, severe (HCC)  Assessment & Plan  Baseline 5 L O2 supplementation now requiring mid flow 12 L to keep an SpO2 greater than 90%  In the setting of severe COPD and COVID positive    Respiratory protocol in place  Pulmonary consulted  Continue mid flow-titrate oxygen to keep SpO2 greater than 90%  Please see additional treatment plan for COVID    Type 2 diabetes mellitus with hemoglobin A1c goal of less than 8 0% [facility])  Assessment & Plan  Lab Results   Component Value Date HGBA1C 12 8 03/05/2021       No results for input(s): POCGLU in the last 72 hours  Blood Sugar Average: Last 72 hrs:     Collect new A1c  Accu-Cheks a c  HS  Insulin sliding scale  Continue prior to admission long-acting insulin 45 units q a m  Essential hypertension  Assessment & Plan  Blood pressure currently stable  Admit to med surge  Continue prior to admission Norvasc Coreg and Cozaar currently holding prior to admission hydrochlorothiazide and spironolactone  Trend blood pressure per protocol    Aortic stenosis  Assessment & Plan  Moderate stenosis noted in June 2021    Morbid obesity with BMI of 50 0-59 9, adult ([facility] [address] )  Assessment & Plan  As evidence by a BMI of 51 24  Recommend outpatient nutritional counseling Lifestyle modification      VTE Prophylaxis: Heparin  / sequential compression device   Code Status: FULL  POLST: POLST is not applicable to this patient    Anticipated Length of Stay:  Patient will be admitted on an Inpatient basis with an anticipated length of stay of  Greater than 2 midnights  Justification for Hospital Stay:  Acute respiratory failure with hypoxia, COVID, sepsis criteria met COPD    Total Time for Visit, including Counseling / Coordination of Care: 45 minutes  Greater than 50% of this total time spent on direct patient counseling and coordination of care  Chief Complaint:  Diagnosed with COVID last Thursday chief complaint increased shortness of breath that is worse today    History of Present Illness:    Karyna Rudolph is a 61 y o  male current every day smoker, history of severe COPD, diabetes, hypertension hyperlipidemia and aortic stenosis presented to the emergency room for evaluation of increased shortness of breath  Upon arrival patient was noted to be 64% on room air  Patient states wears 5 L nasal cannula currently requiring 12 L mid flow to maintain SpO2 sat greater than 90%    Patient reports tested COVID positive last Thursday recommended admission at that time patient declined went home on antibiotics  Patient noted increased shortness of breath was worsened today  Patient admits to of cough with some mucus does not describe denies chest pain denies lightheadedness dizziness nausea vomiting abdominal pain denies lower extremity swelling  Images and labs were collected emergency room-see below  Patient will be admitted to [facility]-pulmonary will be consulted  Review of Systems:    Review of Systems   Respiratory: Positive for cough and shortness of breath  Neurological: Positive for weakness  All other systems reviewed and are negative  Past Medical and Surgical History:     Past Medical History:   Diagnosis Date    Aortic stenosis     Diabetes ([address] )     Hyperlipidemia     Hypertension 7/2/2014       Past Surgical History:   Procedure Laterality Date    APPENDECTOMY         Meds/Allergies:    Prior to Admission medications    Medication Sig Start Date End Date Taking?  Authorizing Provider   albuterol (Ventolin HFA) 90 mcg/act inhaler Inhale 2 puffs every 6 (six) hours as needed for wheezing 2/4/21   Lj Pickett PA-C   amLODIPine (NORVASC) 10 mg tablet Take 1 tablet (10 mg total) by mouth daily 12/16/20   Willie Valdivia DO   amoxicillin-clavulanate (AUGMENTIN) 875-125 mg per tablet Take 1 tablet by mouth 2 (two) times a day for 5 days  Patient not taking: Reported on 7/28/2021 7/22/21 7/27/21  José Miguel Jenkins MD   atorvastatin (LIPITOR) 40 mg tablet Take 1 tablet (40 mg total) by mouth daily 12/16/20   Willie Valdivia DO   Blood Glucose Monitoring Suppl (CONTOUR NEXT MONITOR) w/Device KIT Test blood sugar once daily or as needed for fluctuating blood sugars 2/7/20   Chris Amin PA-C   carvedilol (COREG) 12 5 mg tablet Take 1 tablet (12 5 mg total) by mouth 2 (two) times a day with meals 9/14/20   Iván Chan PA-C   cetirizine (ZYRTEC ALLERGY) 10 mg tablet Take 10 mg by mouth 12/10/18   Historical Provider, MD   cyclobenzaprine (FLEXERIL) 10 mg tablet Take 10 mg by mouth 2/6/17   Historical Provider, MD   Dapagliflozin Propanediol (Farxiga) 10 MG TABS Take 10 mg by mouth daily    Historical Provider, MD   fluticasone (FLOVENT HFA) 220 mcg/act inhaler Inhale 2 puffs 2 (two) times a day Rinse mouth after use  6/11/21   Lj Pickett PA-C   glimepiride (AMARYL) 4 mg tablet Take 4 mg by mouth 2 (two) times a day 7/20/21   Historical Provider, MD   glucose blood (CONTOUR NEXT TEST) test strip Test blood sugar once daily or as needed for fluctuating blood sugars 2/7/20   Ismael Fitzgerald PA-C   hydrochlorothiazide (HYDRODIURIL) 25 mg tablet Take 1 tablet (25 mg total) by mouth daily 11/9/20   Yong Lance PA-C   Insulin Pen Needle (BD Pen Needle Dee [place] U/F) 32G X 4 MM MISC Use 4 a day 7/1/20   Nel Jolly DO   ipratropium-albuterol (DUO-NEB) 0 5-2 5 mg/3 mL nebulizer solution Take 1 vial (3 mL total) by nebulization every 6 (six) hours as needed for wheezing or shortness of breath 2/4/21   Lj Pickett PA-C   Lancets MISC Test blood sugar once daily or as needed for fluctuating blood sugars 6/22/20   Ismael Fitzgerald PA-C   losartan (COZAAR) 100 MG tablet Take 1 tablet (100 mg total) by mouth daily 10/7/20   Nohelia Clancy DO   meloxicam (MOBIC) 15 mg tablet Take 15 mg by mouth 2/6/17   Historical Provider, MD   metFORMIN (GLUCOPHAGE) 1000 MG tablet TAKE 1 TABLET BY MOUTH TWICE DAILY WITH MEALS 1/31/21   Nel Jolly DO   montelukast (SINGULAIR) 10 mg tablet  8/7/19   Historical Provider, MD   Multiple Vitamins-Minerals (MENS MULTIVITAMIN PO) Take by mouth    Historical Provider, MD   sertraline (ZOLOFT) 50 mg tablet Take 1 tablet (50 mg total) by mouth daily 7/19/21   BAIRON Ramirez 100-33 UNT-MCG/ML injection pen Inject 45 Units under the skin daily 3/11/21   Historical Provider, MD   spironolactone (ALDACTONE) 50 mg tablet Take 1 tablet (50 mg total) by mouth daily 12/2/20   Yong Lance PA-C umeclidinium-vilanterol (ANORO ELLIPTA) 62 5-25 MCG/INH inhaler Inhale 1 puff daily 2/4/21   Lj Pickett PA-C     I have reviewed home medications using allscripts  Allergies: Allergies   Allergen Reactions    Theophylline Other (See Comments)     Severe headaches  headaches  Severe headaches         Social History:     Marital Status: /Civil Union   Occupation:  Noncontributory    Substance Use History:   Social History     Substance and Sexual Activity   Alcohol Use Never     Social History     Tobacco Use   Smoking Status Former Smoker    Years: 10 00    Types: Cigarettes   Smokeless Tobacco Never Used     Social History     Substance and Sexual Activity   Drug Use Never       Family History:    Family History   Problem Relation Age of Onset    No Known Problems Mother        Physical Exam:     Vitals:   Blood Pressure: 126/61 (07/28/21 2200)  Pulse: 79 (07/28/21 2200)  Temperature: 100 3 °F (37 9 °C) (07/28/21 2000)  Temp Source: Oral (07/28/21 2000)  Respirations: 20 (07/28/21 2200)  Height: 5' 9" (175 3 cm) (07/28/21 2000)  Weight - Scale: (!) 157 kg (347 lb 0 1 oz) (07/28/21 2000)  SpO2: 97 % (07/28/21 2200)    Physical Exam  Constitutional:       Appearance: Normal appearance  HENT:      Head: Normocephalic and atraumatic  Mouth/Throat:      Mouth: Mucous membranes are moist       Pharynx: Oropharynx is clear  Eyes:      Extraocular Movements: Extraocular movements intact  Pupils: Pupils are equal, round, and reactive to light  Cardiovascular:      Rate and Rhythm: Normal rate and regular rhythm  Pulmonary:      Effort: Pulmonary effort is normal       Breath sounds: Normal breath sounds  Abdominal:      General: Abdomen is flat  Bowel sounds are normal       Palpations: Abdomen is soft  Musculoskeletal:         General: No swelling or tenderness  Skin:     General: Skin is warm and dry  Capillary Refill: Capillary refill takes 2 to 3 seconds  Neurological:      General: No focal deficit present  Mental Status: He is alert and oriented to person, place, and time  Psychiatric:         Mood and Affect: Mood normal          Behavior: Behavior normal          Thought Content: Thought content normal          Judgment: Judgment normal          Additional Data:     Lab Results: I have personally reviewed pertinent reports  Results from last 7 days   Lab Units 07/28/21 2028   WBC Thousand/uL 24 01*   HEMOGLOBIN g/dL 15 0   HEMATOCRIT % 45 8   PLATELETS Thousands/uL 378   NEUTROS PCT % 90*   LYMPHS PCT % 4*   MONOS PCT % 5   EOS PCT % 0     Results from last 7 days   Lab Units 07/28/21 2028   POTASSIUM mmol/L 4 5   CHLORIDE mmol/L 92*   CO2 mmol/L 25   BUN mg/dL 32*   CREATININE mg/dL 1 01   CALCIUM mg/dL 9 2   ALK PHOS U/L 84   ALT U/L 39   AST U/L 21     Results from last 7 days   Lab Units 07/28/21 2028   INR  1 17       Imaging: I have personally reviewed pertinent reports  XR chest 1 view portable    Result Date: 7/23/2021  Narrative: CHEST INDICATION:   sob  COMPARISON: July 16, 2019  EXAM PERFORMED/VIEWS:  XR CHEST PORTABLE Images: 2 FINDINGS:  Lungs adequately aerated  No lobar consolidation or large effusion  atelectasis right lung base  Cardiac size within normal limits  No vascular congestion or peribronchial thickening  Osseous structures appear within normal limits for patient age  Impression: Atelectasis right lung base  Workstation performed: [id number]         Epic / Care Everywhere Records Reviewed: Yes     ** Please Note: This note has been constructed using a voice recognition system   **

## 2021-07-29 NOTE — PLAN OF CARE
Problem: PHYSICAL THERAPY ADULT  Goal: Performs mobility at highest level of function for planned discharge setting  See evaluation for individualized goals  Description:  Note: Prognosis: Excellent  Problem List:  (SOB with activity)  Assessment: Patient is a 61 y o  male evaluated by Physical Therapy s/p admit to [address] [facility] on 7/28/2021 with admitting diagnosis of: Pneumonia, Hypoxia, COVID-19 and principal problem of: COVID-19  PT was consulted to assess patient's functional mobility and discharge needs  Ordered are PT Evaluation and treatment with activity level of: ambulate patient  Comorbidities affecting patient's physical performance at time of assessment include: COPD with O2 dependence, DM, aortic stenosis, HTN, obesity  Personal factors affecting the patient at time of IE include: step(s) to enter home, O2 dependent  Pt lives with his wife (who is blind) and 6year old daughter in a 2 story home with bed/bath on second floor  Pt walks without device  Drives  A moderate complexity PT eval was completed on 7/29/21  Pt demonstrated good safety with gait, transfers, bed mobility and was I  Pt on 7L of O2 with sats 89-91% at rest, decreasing to as low as 85% with activity  Sats quickly increased to 87-88% with rest breaks and pursed lip breathing  Pt was educated on use of incentive spirometer  When seated at EOB, pt completed seated breathing exercises with hands behind head  Sats increased to 93%  Pt also educated on prone positioning  Pt was able to tolerate prone position  Sats 89-90% on 7 L  After 5 minutes, sats improved to 93%  Pt encouraged to tolerate prone positioning for 2 hrs, 3x a day  Pt given written handout on breathing exercises, prone positioning, and incentive spirometer use  Pt transferred OOB to chair at end of eval   The patient's AM-PAC Basic Mobility Inpatient Short Form Raw Score is 24, Standardized Score is 57 68   A standardized score greater than 42 9 suggests the patient may benefit from discharge to home  Please also refer to the recommendation of the Physical Therapist for safe discharge planning  Due to pt being safe with all mobility and demonstrating understanding of prone positioning, exercises, incentive spirometer, no further skilled PT interventions indicated  Will d/c PT at this time  D/c recommendation at this time is to home at d/c                  PT - OK to Discharge: Yes (when medically stable)    See flowsheet documentation for full assessment

## 2021-07-29 NOTE — CASE MANAGEMENT
Met with pt to discuss role as  in helping pt to develop discharge plan and to help pt carry out their plan  Pt's current LOS is    1 day   Pt is a Risk of Unplanned Readmission score of  15     Pt is not a 30 day readmission  Pt is not a bundle  Pt lives in a house with his wife  Pt has 1 [place]  Pt has 13 steps on the inside  Pt has his bedroom and bathroom on the 2nd floor  Pt has a Nebulizer, Bipap and Oxygen from [address]  Pt normally uses the 02 continuously at 5 liters  Pt has no DME  Pt is independent with ADL's and functional mobility  Pt's wife does the cooking  Pt did have both Covid Vaccines  Pt drives   Pt's wife does not drive  Pt has never had home care or any services in the home  Pt has never been to STR  Pt has no history of Substances abuse  Pt has no history of mental illness  PCP: Kassidy Farooq   Pulmonologist : [facility]  Preferred Pharmacy: [facility] in [facility] Pt with no barriers to getting or paying for his meds  Case Management met with the patient to evaluate the patient's prior level of functioning and living situation and any barriers to discharge and to forming a safe discharge plan  CM also evaluated the patient for any services in the home or need for services  Case management also notified patient that their preferences will be take into consideration when developing their discharge plan  Beatriz Wilkinson , Pt's wife is his primary contact ([phone])  Pt's family will likely give the patient a ride home  I will continue to follow for any Case Management needs

## 2021-07-29 NOTE — CONSULTS
Consultation - Pulmonary Medicine   Shaniqua Obrien 61 y o  male MRN: [id number]  Unit/Bed#: 427-01 Encounter: [id number]      Assessment/Plan:    1  Acute on chronic hypoxic respiratory failure   1   currently on 7 L nasal cannula  Oxygen requirement at baseline 5 L nasal cannula   2  Titrate oxygen maintain SpO2 greater than or equal to 88%   3  Pulmonary toilet: Increase activity as tolerated, out of bed as tolerated, cough and deep breathing as encouraged, incentive spirometry q 1 hour, self proning encouraged  2  Moderate COVID-19 pneumonia  1  Continue remdesivir for  A total of 5 days (day#2/5)  2  Continue ceftriaxone and doxycycline for now- if procalcitonin x2 negative consider discontinuation of antibiotics   3  Increase Decadron to 8 mg IV q 12 hours  4  Continue rest of COVID protocol per algorithm including vitamins, Pepcid, and atorvastatin  5  Trend D-dimer, CRP, ferritin  3  Severe COPD without acute exacerbation  1  Systemic steroids as above  2  Continue Serevent, Flovent, and Spiriva while inpatient   3  Continue as needed albuterol HFA 2 puffs q 6 hours  4  Type 2 diabetes mellitus  1  Monitor blood sugars closely in the setting of systemic steroid use  2  Further treatment per primary team  5  Morbid obesity  1  Weight loss encouraged   6  SCOTT  1  Add BIPAP with filter during all hours of sleep     History of Present Illness   Physician Requesting Consult: Fan Hamlin MD  Reason for Consult / Principal Problem: COVID  Hx and PE limited by: n/a  Chief Complaint: shortness of breath  HPI: Shaniqua Obrien is a 61 y o   male who presented to [address] [facility] with complaints of  Shortness of breath and hypoxia  Patient's past medical history positive for severe COPD, SCOTT on CPAP, morbid obesity, restrictive lung disease, chronic diastolic CHF, and nicotine dependence in remission  Patient reported feeling unwell since last Monday    Pertinent symptoms included worsening shortness of breath, nonproductive cough, generalized malaise and fatigue  He denied fevers or chills  He also noted decreased oxygenation on 5 L while at home prompting him to call the Pulmonary office last week  He was instructed to go to the emergency department which he did Thursday 07/23/2020  There, he tested positive for COVID however denied admission at that time  He went home and used his nebulizer upwards to 6 times a day without much relief  He did not receive convalescent plasma  Unfortunately he continued to have desaturations and worsening shortness of breath and cough prompting him to return yesterday to the ED  He was noted to be hypoxic and tachypneic  He had a temperature of a 100 3° on admission  Laboratory workup revealed leukocytosis with elevated CRP, D-dimer ferritin  Chest x-ray today shows worsening bilateral ground-glass infiltrates  He was admitted to the medical-surgical floor and started on Covid protocol  Pulmonary was consulted for this reason  Presently, patient reports that he is feeling better but not close to his baseline  Not requiring 7 L nasal cannula  Offers no new complaints but continues to have shortness of breath and cough  From a pulmonary perspective, patient follows with [facility]  He has a history of severe COPD on Flovent and Anoro as well as as needed DuoNeb  He is a former smoker with a  60 pack year history  Quit smoking  Patient is disabled but previously worked as a   Does have coal exposures  Patient also has obstructive sleep apnea for which he uses CPAP  He reports that he uses his CPAP every night  Inpatient consult to Pulmonology  Consult performed by: Matthew Mendez PA-C  Consult ordered by: JAMES Jimenez          Review of Systems   All other systems reviewed and are negative        Historical Information   Past Medical History:   Diagnosis Date    Aortic stenosis     Diabetes ([facility] [address] )     Hyperlipidemia     Hypertension 7/2/2014     Past Surgical History:   Procedure Laterality Date    APPENDECTOMY      EYE SURGERY       Social History   Social History     Substance and Sexual Activity   Alcohol Use Never     Social History     Substance and Sexual Activity   Drug Use Never     Social History     Tobacco Use   Smoking Status Former Smoker    Years: 10 00    Types: Cigarettes   Smokeless Tobacco Never Used     E-Cigarette/Vaping    E-Cigarette Use Never User      E-Cigarette/Vaping Substances    Nicotine No     THC No     CBD No     Flavoring No     Other No     Unknown No      Occupational History: not working currently   See HPI    Family History:   Family History   Problem Relation Age of Onset    No Known Problems Mother        Meds/Allergies   all current active meds have been reviewed, pertinent pulmonary meds have been reviewed and current meds:   Current Facility-Administered Medications   Medication Dose Route Frequency    acetaminophen (TYLENOL) tablet 650 mg  650 mg Oral Q6H PRN    albuterol (PROVENTIL HFA,VENTOLIN HFA) inhaler 2 puff  2 puff Inhalation Q6H PRN    amLODIPine (NORVASC) tablet 10 mg  10 mg Oral Daily    ascorbic acid (VITAMIN C) tablet 1,000 mg  1,000 mg Oral Q12H [facility]    atorvastatin (LIPITOR) tablet 40 mg  40 mg Oral Daily With Dinner    carvedilol (COREG) tablet 12 5 mg  12 5 mg Oral BID With Meals    cefTRIAXone (ROCEPHIN) IVPB (premix in dextrose) 1,000 mg 50 mL  1,000 mg Intravenous Q24H    cholecalciferol (VITAMIN D3) tablet 2,000 Units  2,000 Units Oral Daily    Dapagliflozin Propanediol TABS 10 mg  10 mg Oral Daily    dexamethasone (DECADRON) injection 8 mg  8 mg Intravenous Q12H [facility]    doxycycline hyclate (VIBRAMYCIN) capsule 100 mg  100 mg Oral Q12H    famotidine (PEPCID) tablet 20 mg  20 mg Oral BID    fluticasone (FLOVENT HFA) 220 mcg/act inhaler 2 puff  2 puff Inhalation BID    heparin (porcine) subcutaneous injection 5,000 Units  5,000 Units Subcutaneous Q8H [address]    insulin glargine (LANTUS) subcutaneous injection 45 Units 0 45 mL  45 Units Subcutaneous QAM    insulin lispro (HumaLOG) 100 units/mL subcutaneous injection 2-12 Units  2-12 Units Subcutaneous TID AC    insulin lispro (HumaLOG) 100 units/mL subcutaneous injection 2-12 Units  2-12 Units Subcutaneous HS    losartan (COZAAR) tablet 100 mg  100 mg Oral Daily    zinc sulfate (ZINCATE) capsule 220 mg  220 mg Oral Daily    Followed by   Hong Funez ON 8/5/2021] multivitamin-minerals (CENTRUM ADULTS) tablet 1 tablet  1 tablet Oral Daily    ondansetron (ZOFRAN) injection 4 mg  4 mg Intravenous Q6H PRN    pneumococcal 13-valent conjugate vaccine (PREVNAR-13) IM injection 0 5 mL  0 5 mL Intramuscular Prior to discharge    polyethylene glycol (MIRALAX) packet 17 g  17 g Oral Daily PRN    remdesivir (Veklury) 100 mg in sodium chloride 0 9 % 250 mL IVPB  100 mg Intravenous Q24H    tiotropium (SPIRIVA) capsule for inhaler 18 mcg  18 mcg Inhalation Daily    And    salmeterol (Serevent Diskus) 50 mcg/dose inhaler 1 puff  1 puff Inhalation Q12H [address]    sertraline (ZOLOFT) tablet 50 mg  50 mg Oral Daily       Allergies   Allergen Reactions    Theophylline Other (See Comments)     Severe headaches  headaches  Severe headaches         Objective   Vitals: Blood pressure 139/81, pulse 83, temperature 97 9 °F (36 6 °C), temperature source Oral, resp  rate 22, height 5' 9" (1 753 m), weight (!) 157 kg (345 lb 10 9 oz), SpO2 90 %  7L NC,Body mass index is 51 05 kg/m²  Intake/Output Summary (Last 24 hours) at 7/29/2021 1443  Last data filed at 7/29/2021 0840  Gross per 24 hour   Intake 350 ml   Output --   Net 350 ml     Invasive Devices     Peripheral Intravenous Line            Peripheral IV 07/28/21 Left Antecubital <1 day    Peripheral IV 07/28/21 Right Forearm <1 day                Physical Exam  Vitals and nursing note reviewed  Constitutional:       General: He is not in acute distress  Appearance: Normal appearance  He is obese  HENT:      Head: Normocephalic and atraumatic  Right Ear: External ear normal       Left Ear: External ear normal       Nose: Nose normal       Mouth/Throat:      Mouth: Mucous membranes are moist       Pharynx: Oropharynx is clear  Eyes:      Extraocular Movements: Extraocular movements intact  Conjunctiva/sclera: Conjunctivae normal       Pupils: Pupils are equal, round, and reactive to light  Cardiovascular:      Rate and Rhythm: Normal rate and regular rhythm  Pulses: Normal pulses  Heart sounds: No murmur heard  Pulmonary:      Effort: Pulmonary effort is normal  No respiratory distress  Breath sounds: No wheezing, rhonchi or rales  Comments: Diminished breath sounds bilaterally  Abdominal:      General: Bowel sounds are normal       Palpations: Abdomen is soft  There is no mass  Tenderness: There is no abdominal tenderness  Hernia: No hernia is present  Musculoskeletal:         General: No tenderness or deformity  Normal range of motion  Cervical back: Normal range of motion and neck supple  No muscular tenderness  Right lower leg: No edema  Left lower leg: No edema  Skin:     General: Skin is warm and dry  Neurological:      General: No focal deficit present  Mental Status: He is alert and oriented to person, place, and time  Mental status is at baseline  Psychiatric:         Mood and Affect: Mood normal          Behavior: Behavior normal          Thought Content: Thought content normal          Judgment: Judgment normal          Lab Results:   I have personally reviewed pertinent lab results  , ABG: No results found for: PHART, FCJ2AVG, PO2ART, WGD8TAI, W0WXSTRH, BEART, SOURCE, BNP: No results found for: BNP, CBC:   Lab Results   Component Value Date    WBC 17 90 (H) 07/29/2021    HGB 13 8 07/29/2021    HCT 42 1 07/29/2021    MCV 85 07/29/2021     07/29/2021    MCH 27 9 07/29/2021 MCHC 32 8 07/29/2021    RDW 14 0 07/29/2021    MPV 9 3 07/29/2021    NRBC 0 07/29/2021   , CMP:   Lab Results   Component Value Date    SODIUM 128 (L) 07/29/2021    K 4 8 07/29/2021    CL 94 (L) 07/29/2021    CO2 24 07/29/2021    BUN 27 (H) 07/29/2021    CREATININE 0 86 07/29/2021    CALCIUM 8 8 07/29/2021    AST 22 07/29/2021    ALT 36 07/29/2021    ALKPHOS 76 07/29/2021    EGFR 96 07/29/2021   , PT/INR:   Lab Results   Component Value Date    INR 1 17 07/28/2021   , Troponin:   Lab Results   Component Value Date    TROPONINI <0 02 07/28/2021         Imaging Studies: I have personally reviewed pertinent reports  and I have personally reviewed pertinent films in PACS      chest x-ray 07/28/2021   Increased bilateral multifocal airspace opacities when compared to 07/22/2021 consistent with worsening COVID-19 infection    EKG, Pathology, and Other Studies: I have personally reviewed pertinent reports  EKG yesterday with normal sinus rhythm    Pulmonary Results (PFTs, PSG): I have personally reviewed pertinent reports  pulmonary Function Test 09/04/2019   FEV1/FVC ratio 53%   FEV1 41% predicted   FVC 55% predicted    there is significant response to bronchodilators  TLC 71% predicted   RV 88% predicted  DLCO corrected for patient's hemoglobin 66%   Severe obstructive airflow defect with significant response to bronchodilators  Mild restrictive lung disease indicated by decreased TLC  Mildly decreased diffusion capacity  VTE Prophylaxis: Sequential compression device (Venodyne)  and Heparin    Code Status: Level 1 - Full Code    Portions of the record may have been created with voice recognition software  Occasional wrong word or "sound a like" substitutions may have occurred due to the inherent limitations of voice recognition software  Read the chart carefully and recognize, using context, where substitutions have occurred

## 2021-07-29 NOTE — ASSESSMENT & PLAN NOTE
COVID positive emergency room  Patient  requiring 12 L mid flow O2 supplementation-baseline 5 L nasal cannula  Trend CBC and CMP daily  Collect troponin less than 0 02- CK-110, BNP-63, CRP-235 1, D-dimer-0 54 and ferritin and interleukin 6  D-dimer was 0 54-heparin 7500 units q 8 hours ordered  Vitamin D3 2000 units IU ordered daily, vitamin-C 1 g p o  Q 12 hours, zinc 220 p o  Q d , famotidine 20 mg IV b i d , and remdesivir 200 mg initial dose followed by 100 mg daily  Doxycycline and ceftriaxone ordered  Activity as tolerated respiratory protocol in place and incentive spirometer ordered  Pulmonary consulted

## 2021-07-30 LAB
ALBUMIN SERPL BCP-MCNC: 2.4 G/DL (ref 3.5–5)
ALP SERPL-CCNC: 72 U/L (ref 46–116)
ALT SERPL W P-5'-P-CCNC: 57 U/L (ref 12–78)
ANION GAP SERPL CALCULATED.3IONS-SCNC: 9 MMOL/L (ref 4–13)
AST SERPL W P-5'-P-CCNC: 24 U/L (ref 5–45)
BASOPHILS # BLD AUTO: 0.02 THOUSANDS/ΜL (ref 0–0.1)
BASOPHILS NFR BLD AUTO: 0 % (ref 0–1)
BILIRUB SERPL-MCNC: 0.28 MG/DL (ref 0.2–1)
BUN SERPL-MCNC: 47 MG/DL (ref 5–25)
CALCIUM ALBUM COR SERPL-MCNC: 10.3 MG/DL (ref 8.3–10.1)
CALCIUM SERPL-MCNC: 9 MG/DL (ref 8.3–10.1)
CHLORIDE SERPL-SCNC: 97 MMOL/L (ref 100–108)
CO2 SERPL-SCNC: 24 MMOL/L (ref 21–32)
CREAT SERPL-MCNC: 1 MG/DL (ref 0.6–1.3)
CRP SERPL QL: 149.1 MG/L
D DIMER PPP FEU-MCNC: 0.37 UG/ML FEU
EOSINOPHIL # BLD AUTO: 0 THOUSAND/ΜL (ref 0–0.61)
EOSINOPHIL NFR BLD AUTO: 0 % (ref 0–6)
ERYTHROCYTE [DISTWIDTH] IN BLOOD BY AUTOMATED COUNT: 13.9 % (ref 11.6–15.1)
G6PD BLD QN: 268 U/10E12 RBC (ref 127–427)
GFR SERPL CREATININE-BSD FRML MDRD: 83 ML/MIN/1.73SQ M
GLUCOSE SERPL-MCNC: 289 MG/DL (ref 65–140)
GLUCOSE SERPL-MCNC: 292 MG/DL (ref 65–140)
GLUCOSE SERPL-MCNC: 329 MG/DL (ref 65–140)
GLUCOSE SERPL-MCNC: 343 MG/DL (ref 65–140)
GLUCOSE SERPL-MCNC: 354 MG/DL (ref 65–140)
HCT VFR BLD AUTO: 43.4 % (ref 36.5–49.3)
HGB BLD-MCNC: 13.8 G/DL (ref 12–17)
IMM GRANULOCYTES # BLD AUTO: 0.09 THOUSAND/UL (ref 0–0.2)
IMM GRANULOCYTES NFR BLD AUTO: 1 % (ref 0–2)
LYMPHOCYTES # BLD AUTO: 0.41 THOUSANDS/ΜL (ref 0.6–4.47)
LYMPHOCYTES NFR BLD AUTO: 5 % (ref 14–44)
MCH RBC QN AUTO: 27.5 PG (ref 26.8–34.3)
MCHC RBC AUTO-ENTMCNC: 31.8 G/DL (ref 31.4–37.4)
MCV RBC AUTO: 87 FL (ref 82–98)
MONOCYTES # BLD AUTO: 0.14 THOUSAND/ΜL (ref 0.17–1.22)
MONOCYTES NFR BLD AUTO: 2 % (ref 4–12)
NEUTROPHILS # BLD AUTO: 7.87 THOUSANDS/ΜL (ref 1.85–7.62)
NEUTS SEG NFR BLD AUTO: 92 % (ref 43–75)
NRBC BLD AUTO-RTO: 0 /100 WBCS
PLATELET # BLD AUTO: 384 THOUSANDS/UL (ref 149–390)
PMV BLD AUTO: 9.9 FL (ref 8.9–12.7)
POTASSIUM SERPL-SCNC: 4.9 MMOL/L (ref 3.5–5.3)
PROCALCITONIN SERPL-MCNC: 0.08 NG/ML
PROT SERPL-MCNC: 7.5 G/DL (ref 6.4–8.2)
RBC # BLD AUTO: 4.77 X10E6/UL (ref 4.14–5.8)
RBC # BLD AUTO: 5.01 MILLION/UL (ref 3.88–5.62)
SODIUM SERPL-SCNC: 130 MMOL/L (ref 136–145)
WBC # BLD AUTO: 8.53 THOUSAND/UL (ref 4.31–10.16)

## 2021-07-30 PROCEDURE — 99233 SBSQ HOSP IP/OBS HIGH 50: CPT | Performed by: INTERNAL MEDICINE

## 2021-07-30 PROCEDURE — 86140 C-REACTIVE PROTEIN: CPT | Performed by: FAMILY MEDICINE

## 2021-07-30 PROCEDURE — 85379 FIBRIN DEGRADATION QUANT: CPT | Performed by: FAMILY MEDICINE

## 2021-07-30 PROCEDURE — 85025 COMPLETE CBC W/AUTO DIFF WBC: CPT | Performed by: FAMILY MEDICINE

## 2021-07-30 PROCEDURE — 84145 PROCALCITONIN (PCT): CPT | Performed by: NURSE PRACTITIONER

## 2021-07-30 PROCEDURE — 94660 CPAP INITIATION&MGMT: CPT

## 2021-07-30 PROCEDURE — 94760 N-INVAS EAR/PLS OXIMETRY 1: CPT

## 2021-07-30 PROCEDURE — 80053 COMPREHEN METABOLIC PANEL: CPT | Performed by: FAMILY MEDICINE

## 2021-07-30 PROCEDURE — 82948 REAGENT STRIP/BLOOD GLUCOSE: CPT

## 2021-07-30 PROCEDURE — 99232 SBSQ HOSP IP/OBS MODERATE 35: CPT | Performed by: FAMILY MEDICINE

## 2021-07-30 RX ADMIN — ZINC SULFATE 220 MG (50 MG) CAPSULE 220 MG: CAPSULE at 10:07

## 2021-07-30 RX ADMIN — HEPARIN SODIUM 5000 UNITS: 5000 INJECTION INTRAVENOUS; SUBCUTANEOUS at 22:17

## 2021-07-30 RX ADMIN — ACETAMINOPHEN 650 MG: 325 TABLET, FILM COATED ORAL at 17:17

## 2021-07-30 RX ADMIN — INSULIN GLARGINE 45 UNITS: 100 INJECTION, SOLUTION SUBCUTANEOUS at 10:06

## 2021-07-30 RX ADMIN — SERTRALINE HYDROCHLORIDE 50 MG: 50 TABLET ORAL at 10:07

## 2021-07-30 RX ADMIN — OXYCODONE HYDROCHLORIDE AND ACETAMINOPHEN 1000 MG: 500 TABLET ORAL at 20:36

## 2021-07-30 RX ADMIN — ATORVASTATIN CALCIUM 40 MG: 40 TABLET, FILM COATED ORAL at 17:01

## 2021-07-30 RX ADMIN — FLUTICASONE PROPIONATE 2 PUFF: 220 AEROSOL, METERED RESPIRATORY (INHALATION) at 10:17

## 2021-07-30 RX ADMIN — FAMOTIDINE 20 MG: 20 TABLET ORAL at 17:01

## 2021-07-30 RX ADMIN — FLUTICASONE PROPIONATE 2 PUFF: 220 AEROSOL, METERED RESPIRATORY (INHALATION) at 20:47

## 2021-07-30 RX ADMIN — LOSARTAN POTASSIUM 100 MG: 50 TABLET, FILM COATED ORAL at 10:06

## 2021-07-30 RX ADMIN — OXYCODONE HYDROCHLORIDE AND ACETAMINOPHEN 1000 MG: 500 TABLET ORAL at 10:02

## 2021-07-30 RX ADMIN — CARVEDILOL 12.5 MG: 12.5 TABLET, FILM COATED ORAL at 10:05

## 2021-07-30 RX ADMIN — INSULIN LISPRO 8 UNITS: 100 INJECTION, SOLUTION INTRAVENOUS; SUBCUTANEOUS at 12:12

## 2021-07-30 RX ADMIN — AMLODIPINE BESYLATE 10 MG: 10 TABLET ORAL at 10:02

## 2021-07-30 RX ADMIN — CHOLECALCIFEROL TAB 25 MCG (1000 UNIT) 2000 UNITS: 25 TAB at 10:05

## 2021-07-30 RX ADMIN — TIOTROPIUM BROMIDE 18 MCG: 18 CAPSULE ORAL; RESPIRATORY (INHALATION) at 10:17

## 2021-07-30 RX ADMIN — SALMETEROL XINAFOATE 1 PUFF: 50 POWDER, METERED ORAL; RESPIRATORY (INHALATION) at 10:19

## 2021-07-30 RX ADMIN — DEXAMETHASONE SODIUM PHOSPHATE 8 MG: 4 INJECTION, SOLUTION INTRA-ARTICULAR; INTRALESIONAL; INTRAMUSCULAR; INTRAVENOUS; SOFT TISSUE at 20:37

## 2021-07-30 RX ADMIN — SALMETEROL XINAFOATE 1 PUFF: 50 POWDER, METERED ORAL; RESPIRATORY (INHALATION) at 20:47

## 2021-07-30 RX ADMIN — DOXYCYCLINE HYCLATE 100 MG: 100 CAPSULE ORAL at 10:06

## 2021-07-30 RX ADMIN — REMDESIVIR 100 MG: 100 INJECTION, POWDER, LYOPHILIZED, FOR SOLUTION INTRAVENOUS at 22:41

## 2021-07-30 RX ADMIN — FAMOTIDINE 20 MG: 20 TABLET ORAL at 10:06

## 2021-07-30 RX ADMIN — DEXAMETHASONE SODIUM PHOSPHATE 8 MG: 4 INJECTION, SOLUTION INTRA-ARTICULAR; INTRALESIONAL; INTRAMUSCULAR; INTRAVENOUS; SOFT TISSUE at 10:06

## 2021-07-30 RX ADMIN — HEPARIN SODIUM 5000 UNITS: 5000 INJECTION INTRAVENOUS; SUBCUTANEOUS at 05:45

## 2021-07-30 RX ADMIN — INSULIN LISPRO 8 UNITS: 100 INJECTION, SOLUTION INTRAVENOUS; SUBCUTANEOUS at 22:18

## 2021-07-30 RX ADMIN — HEPARIN SODIUM 5000 UNITS: 5000 INJECTION INTRAVENOUS; SUBCUTANEOUS at 17:01

## 2021-07-30 RX ADMIN — INSULIN LISPRO 10 UNITS: 100 INJECTION, SOLUTION INTRAVENOUS; SUBCUTANEOUS at 17:15

## 2021-07-30 NOTE — ASSESSMENT & PLAN NOTE
Continue prior to admission Norvasc Coreg and Cozaar currently holding prior to admission hydrochlorothiazide and spironolactone

## 2021-07-30 NOTE — PROGRESS NOTES
Progress Note - Pulmonary   Elena Bhakta 61 y o  male MRN: [id number]  Unit/Bed#: 427-01 Encounter: [id number]    Assessment/Plan:    1  Acute on chronic hypoxic respiratory failure   1  Currently on 6L NC  Oxygen requirement at baseline 5 L nasal cannula   2  Titrate oxygen maintain SpO2 greater than or equal to 88%   3  Pulmonary toilet: Increase activity as tolerated, out of bed as tolerated, cough and deep breathing as encouraged, incentive spirometry q 1 hour, self proning encouraged  2  Moderate COVID-19 pneumonia  1  Of note,  He was vaccinated with Moderna vaccine in March at [address]  2  Continue remdesivir for a total of 5 days (day#3/5)  3  Can discontinue ceftriaxone and doxycycline now that PCT x2 WNL  4  Responding well to increased dose of decadron- will continue Decadron 8 mg IV q12hrs  5  Received Actemra 800 mg IV once   6  Not a candidate for convalescent plasma  7  Continue rest of COVID protocol per algorithm including vitamins, Pepcid, and atorvastatin  8  Trend D-dimer, CRP, ferritin  3  Severe COPD without acute exacerbation  1  Systemic steroids as above  2  Continue Serevent, Flovent, and Spiriva while inpatient   3  Continue as needed albuterol HFA 2 puffs q 6 hours  4  Type 2 diabetes mellitus  1  Monitor blood sugars closely in the setting of systemic steroid use  2  Further treatment per primary team  5  Morbid obesity  1  Weight loss encouraged   6  SCOTT  1  Continue BIPAP with filter during all hours of sleep      plan of care discussed with patient and Dr Raffy Escobedo  Chief Complaint:    "My breathing is about the same "    Subjective:     patient was seen examined today  No overnight events reported  Patient slept with BiPAP on now currently on 6 L nasal cannula  Patient reports that his breathing at rest feels about the same but he still has significant dyspnea on exertion with minimal activities    He states when he walks around the room his oxygen saturations dropped into the 70s  He also has a cough productive of green sputum  Denies wheezing or chest tightness  No hemoptysis  No pain  Denies fevers or chills  Objective:    Vitals: Blood pressure 120/68, pulse 68, temperature (!) 97 3 °F (36 3 °C), resp  rate 21, height 5' 9" (1 753 m), weight (!) 156 kg (343 lb 14 7 oz), SpO2 94 %  6L NC,Body mass index is 50 79 kg/m²  Intake/Output Summary (Last 24 hours) at 7/30/2021 1056  Last data filed at 7/29/2021 1200  Gross per 24 hour   Intake 143 75 ml   Output --   Net 143 75 ml       Invasive Devices     Peripheral Intravenous Line            Peripheral IV 07/28/21 Right Forearm 1 day                Physical Exam:     Physical Exam  Vitals and nursing note reviewed  Constitutional:       General: He is not in acute distress  Appearance: Normal appearance  He is obese  HENT:      Head: Normocephalic and atraumatic  Right Ear: External ear normal       Left Ear: External ear normal       Nose: Nose normal       Mouth/Throat:      Mouth: Mucous membranes are moist       Pharynx: Oropharynx is clear  Eyes:      Extraocular Movements: Extraocular movements intact  Conjunctiva/sclera: Conjunctivae normal       Pupils: Pupils are equal, round, and reactive to light  Cardiovascular:      Rate and Rhythm: Normal rate and regular rhythm  Pulses: Normal pulses  Heart sounds: No murmur heard  Pulmonary:      Effort: Pulmonary effort is normal  No respiratory distress  Breath sounds: No wheezing, rhonchi or rales  Comments: Decreased breath sounds bilaterally  Abdominal:      General: Bowel sounds are normal       Palpations: Abdomen is soft  There is no mass  Tenderness: There is no abdominal tenderness  Hernia: No hernia is present  Musculoskeletal:         General: No tenderness or deformity  Normal range of motion  Cervical back: Normal range of motion and neck supple  No muscular tenderness        Right lower leg: No edema  Left lower leg: No edema  Skin:     General: Skin is warm and dry  Neurological:      General: No focal deficit present  Mental Status: He is alert and oriented to person, place, and time  Mental status is at baseline  Psychiatric:         Mood and Affect: Mood normal          Behavior: Behavior normal          Thought Content: Thought content normal          Judgment: Judgment normal          Labs: I have personally reviewed pertinent lab results  , ABG: No results found for: PHART, CRD2EWA, PO2ART, PQE2GUN, H0JGUOVV, BEART, SOURCE, BNP: No results found for: BNP, CBC:   Lab Results   Component Value Date    WBC 8 53 07/30/2021    HGB 13 8 07/30/2021    HCT 43 4 07/30/2021    MCV 87 07/30/2021     07/30/2021    MCH 27 5 07/30/2021    MCHC 31 8 07/30/2021    RDW 13 9 07/30/2021    MPV 9 9 07/30/2021    NRBC 0 07/30/2021   , CMP:   Lab Results   Component Value Date    SODIUM 130 (L) 07/30/2021    K 4 9 07/30/2021    CL 97 (L) 07/30/2021    CO2 24 07/30/2021    BUN 47 (H) 07/30/2021    CREATININE 1 00 07/30/2021    CALCIUM 9 0 07/30/2021    AST 24 07/30/2021    ALT 57 07/30/2021    ALKPHOS 72 07/30/2021    EGFR 83 07/30/2021   , PT/INR: No results found for: PT, INR, Troponin: No results found for: TROPONINI    Imaging and other studies: I have personally reviewed pertinent films in PACS

## 2021-07-30 NOTE — ASSESSMENT & PLAN NOTE
Sepsis due to covid pneumonia   As evidence by febrile 100 3, tachycardia tachypnea and elevated white blood cell count of 24 01  In the setting of COVID-please see additional treatment plan for COVID  Follow-up blood cultures obtained x2  Decadron 1 milligram/kilogram  Day 2 of remdesivir  Received Actemra 07/28/2021  Day 1 Rocephin/doxycycline, will DC given negative procal  Continue to trend inflammatory markers

## 2021-07-30 NOTE — PROGRESS NOTES
[address] [facility] [address]  Progress Note Benja Ibarra 1965, 61 y o  male MRN: [id number]  Unit/Bed#: 427-01 Encounter: [id number]  Primary Care Provider: Jh Cleaning PA-C   Date and time admitted to hospital: 7/28/2021  7:59 PM    Acute on chronic respiratory failure with hypoxia (HCC)  Assessment & Plan  Baseline O2 5 L nasal cannula  Initially requiring 12 L but is now requiring 7 L    Sepsis due to COVID-19 [facility])  Assessment & Plan  Sepsis due to covid pneumonia   As evidence by febrile 100 3, tachycardia tachypnea and elevated white blood cell count of 24 01  In the setting of COVID-please see additional treatment plan for COVID  Follow-up blood cultures obtained x2  Decadron 1 milligram/kilogram  Day 2 of remdesivir  Received Actemra 07/28/2021  Day 1 Rocephin/doxycycline, will DC given negative procal  Continue to trend inflammatory markers      Hyponatremia  Assessment & Plan  Mild continue to monitor    Essential hypertension  Assessment & Plan  Continue prior to admission Norvasc Coreg and Cozaar currently holding prior to admission hydrochlorothiazide and spironolactone          Progress Note - Nelly Nation 61 y o  male MRN: [id number]    Unit/Bed#: 427-01 Encounter: [id number]        Subjective:   Patient seen and examined, feeling better  But gets verySOB with ambulation    Objective:     Vitals:   Vitals:    07/30/21 0649   BP: 120/68   Pulse: 68   Resp: 21   Temp: (!) 97 3 °F (36 3 °C)   SpO2: 94%     Body mass index is 50 79 kg/m²      Intake/Output Summary (Last 24 hours) at 7/30/2021 1036  Last data filed at 7/29/2021 1200  Gross per 24 hour   Intake 143 75 ml   Output --   Net 143 75 ml       Physical Exam:   /68   Pulse 68   Temp (!) 97 3 °F (36 3 °C)   Resp 21   Ht 5' 9" (1 753 m)   Wt (!) 156 kg (343 lb 14 7 oz)   SpO2 94%   BMI 50 79 kg/m²   General appearance: alert and oriented, in no acute distress  Head: Normocephalic, without obvious abnormality, atraumatic  Lungs: clear to auscultation bilaterally  Heart: regular rate and rhythm, S1, S2 normal, no murmur, click, rub or gallop  Abdomen: soft, non-tender; bowel sounds normal; no masses,  no organomegaly  Extremities: extremities normal, warm and well-perfused; no cyanosis, clubbing, or edema  Pulses: 2+ and symmetric  Neurologic: Grossly normal     Invasive Devices     Peripheral Intravenous Line            Peripheral IV 07/28/21 Right Forearm 1 day                Results from last 7 days   Lab Units 07/30/21  0654 07/29/21  0524 07/28/21 2028   WBC Thousand/uL 8 53 17 90* 24 01*   HEMOGLOBIN g/dL 13 8 13 8 15 0   HEMATOCRIT % 43 4 42 1 45 8   PLATELETS Thousands/uL 384 335 378       Results from last 7 days   Lab Units 07/30/21  0653 07/29/21  1734 07/29/21  0524 07/28/21 2028   POTASSIUM mmol/L 4 9 3 9 4 8 4 5   CHLORIDE mmol/L 97* 101 94* 92*   CO2 mmol/L 24 21 24 25   BUN mg/dL 47* 34* 27* 32*   CREATININE mg/dL 1 00 0 83 0 86 1 01   CALCIUM mg/dL 9 0 7 2* 8 8 9 2   ALK PHOS U/L 72  --  76 84   ALT U/L 57  --  36 39   AST U/L 24  --  22 21       Medication Administration - last 24 hours from 07/29/2021 1036 to 07/30/2021 1036       Date/Time Order Dose Route Action Action by     07/30/2021 1002 amLODIPine (NORVASC) tablet 10 mg 10 mg Oral Given Rebecca Jett RN     07/29/2021 1653 atorvastatin (LIPITOR) tablet 40 mg 40 mg Oral Given Thai Lindsey RN     07/30/2021 1005 carvedilol (COREG) tablet 12 5 mg 12 5 mg Oral Given Rebecca Jett RN     07/29/2021 1653 carvedilol (COREG) tablet 12 5 mg 12 5 mg Oral Given Thai Lindsey RN     07/30/2021 1015 Dapagliflozin Propanediol TABS 10 mg 10 mg Oral Not Given Rebecca Jett RN     07/30/2021 1017 fluticasone (FLOVENT HFA) 220 mcg/act inhaler 2 puff 2 puff Inhalation Given Rebecca Jett RN     07/29/2021 1702 fluticasone (FLOVENT HFA) 220 mcg/act inhaler 2 puff 2 puff Inhalation Given Thai Lindsey RN     07/29/2021 1120 fluticasone (FLOVENT HFA) 220 mcg/act inhaler 2 puff 2 puff Inhalation Given Samara Duran, RN     07/30/2021 1006 losartan (COZAAR) tablet 100 mg 100 mg Oral Given Sina Landers RN     07/30/2021 1007 sertraline (ZOLOFT) tablet 50 mg 50 mg Oral Given Sina Landers RN     07/30/2021 1006 insulin glargine (LANTUS) subcutaneous injection 45 Units 0 45 mL 45 Units Subcutaneous Given Sina Landers RN     07/30/2021 1005 cholecalciferol (VITAMIN D3) tablet 2,000 Units 2,000 Units Oral Given Sina Landers RN     07/29/2021 2152 cefTRIAXone (ROCEPHIN) IVPB (premix in dextrose) 1,000 mg 50 mL 1,000 mg Intravenous New Bag Sarah Atkins, TAMIKO     07/30/2021 1006 doxycycline hyclate (VIBRAMYCIN) capsule 100 mg 100 mg Oral Given Sina Landers RN     07/30/2021 0722 doxycycline hyclate (VIBRAMYCIN) capsule 100 mg 100 mg Oral Not Given Paresh Mendoza RN     07/29/2021 2200 doxycycline hyclate (VIBRAMYCIN) capsule 100 mg 100 mg Oral Given Paresh Mendoza RN     07/29/2021 1119 doxycycline hyclate (VIBRAMYCIN) capsule 100 mg 100 mg Oral Given Samara Duran RN     07/30/2021 1002 ascorbic acid (VITAMIN C) tablet 1,000 mg 1,000 mg Oral Given Sina Landers RN     07/29/2021 2152 ascorbic acid (VITAMIN C) tablet 1,000 mg 1,000 mg Oral Given Paresh Mendoza RN     07/30/2021 1007 zinc sulfate (ZINCATE) capsule 220 mg 220 mg Oral Given Sina Landers RN     07/30/2021 1006 famotidine (PEPCID) tablet 20 mg 20 mg Oral Given Sina Landers RN     07/29/2021 1705 famotidine (PEPCID) tablet 20 mg 20 mg Oral Given Samara Duran RN     07/29/2021 1400 remdesivir (Veklury) 200 mg in sodium chloride 0 9 % 250 mL IVPB 0 mg Intravenous Stopped Paresh Mendoza RN     07/29/2021 2331 remdesivir (Veklury) 100 mg in sodium chloride 0 9 % 250 mL IVPB 100 mg Intravenous New Bag Sarah Atkins, RN     07/30/2021 1018 insulin lispro (HumaLOG) 100 units/mL subcutaneous injection 2-12 Units 6 Units Subcutaneous Given Sina Landers, RN     07/29/2021 1653 insulin lispro (HumaLOG) 100 units/mL subcutaneous injection 2-12 Units 4 Units Subcutaneous Given Ruth Cook RN     07/29/2021 1201 insulin lispro (HumaLOG) 100 units/mL subcutaneous injection 2-12 Units 6 Units Subcutaneous Given Ruth Cook RN     07/29/2021 2155 insulin lispro (HumaLOG) 100 units/mL subcutaneous injection 2-12 Units 4 Units Subcutaneous Given Kendal Quesada RN     07/30/2021 0545 heparin (porcine) subcutaneous injection 5,000 Units 5,000 Units Subcutaneous Given Kendal Quesada RN     07/29/2021 2152 heparin (porcine) subcutaneous injection 5,000 Units 5,000 Units Subcutaneous Given Kendal Quesada RN     07/29/2021 1406 heparin (porcine) subcutaneous injection 5,000 Units 5,000 Units Subcutaneous Given Ruth Cook RN     07/30/2021 1017 tiotropium (SPIRIVA) capsule for inhaler 18 mcg 18 mcg Inhalation Given Mariluz Riggs RN     07/30/2021 1019 salmeterol (Serevent Diskus) 50 mcg/dose inhaler 1 puff 1 puff Inhalation Given Mariluz Riggs RN     07/29/2021 2153 salmeterol (Serevent Diskus) 50 mcg/dose inhaler 1 puff 1 puff Inhalation Given Kendal Quesada RN     07/29/2021 1120 salmeterol (Serevent Diskus) 50 mcg/dose inhaler 1 puff 1 puff Inhalation Given Estuardo Howard RN     07/29/2021 1200 sodium chloride 0 9 % infusion 0 mL/hr Intravenous Stopped Sarah Atkins RN     07/30/2021 1006 dexamethasone (DECADRON) injection 8 mg 8 mg Intravenous Given Mariluz Riggs RN     07/29/2021 2152 dexamethasone (DECADRON) injection 8 mg 8 mg Intravenous Given Kendal Quesada RN            Lab, Imaging and other studies: I have personally reviewed pertinent reports      VTE Pharmacologic Prophylaxis: heparin  VTE Mechanical Prophylaxis: sequential compression device     Jumana Bhatti MD  7/30/2021,10:36 AM

## 2021-07-31 LAB
ALBUMIN SERPL BCP-MCNC: 2.3 G/DL (ref 3.5–5)
ALP SERPL-CCNC: 66 U/L (ref 46–116)
ALT SERPL W P-5'-P-CCNC: 60 U/L (ref 12–78)
ANION GAP SERPL CALCULATED.3IONS-SCNC: 7 MMOL/L (ref 4–13)
AST SERPL W P-5'-P-CCNC: 15 U/L (ref 5–45)
BASOPHILS # BLD AUTO: 0.01 THOUSANDS/ΜL (ref 0–0.1)
BASOPHILS NFR BLD AUTO: 0 % (ref 0–1)
BILIRUB SERPL-MCNC: 0.28 MG/DL (ref 0.2–1)
BUN SERPL-MCNC: 46 MG/DL (ref 5–25)
CALCIUM ALBUM COR SERPL-MCNC: 10.7 MG/DL (ref 8.3–10.1)
CALCIUM SERPL-MCNC: 9.3 MG/DL (ref 8.3–10.1)
CHLORIDE SERPL-SCNC: 98 MMOL/L (ref 100–108)
CO2 SERPL-SCNC: 24 MMOL/L (ref 21–32)
CREAT SERPL-MCNC: 0.97 MG/DL (ref 0.6–1.3)
CRP SERPL QL: 74.1 MG/L
D DIMER PPP FEU-MCNC: 0.3 UG/ML FEU
EOSINOPHIL # BLD AUTO: 0 THOUSAND/ΜL (ref 0–0.61)
EOSINOPHIL NFR BLD AUTO: 0 % (ref 0–6)
ERYTHROCYTE [DISTWIDTH] IN BLOOD BY AUTOMATED COUNT: 14 % (ref 11.6–15.1)
GFR SERPL CREATININE-BSD FRML MDRD: 86 ML/MIN/1.73SQ M
GLUCOSE SERPL-MCNC: 312 MG/DL (ref 65–140)
GLUCOSE SERPL-MCNC: 330 MG/DL (ref 65–140)
GLUCOSE SERPL-MCNC: 333 MG/DL (ref 65–140)
GLUCOSE SERPL-MCNC: 370 MG/DL (ref 65–140)
GLUCOSE SERPL-MCNC: 384 MG/DL (ref 65–140)
HCT VFR BLD AUTO: 40.4 % (ref 36.5–49.3)
HGB BLD-MCNC: 12.8 G/DL (ref 12–17)
IMM GRANULOCYTES # BLD AUTO: 0.13 THOUSAND/UL (ref 0–0.2)
IMM GRANULOCYTES NFR BLD AUTO: 2 % (ref 0–2)
LYMPHOCYTES # BLD AUTO: 0.42 THOUSANDS/ΜL (ref 0.6–4.47)
LYMPHOCYTES NFR BLD AUTO: 5 % (ref 14–44)
MCH RBC QN AUTO: 27.5 PG (ref 26.8–34.3)
MCHC RBC AUTO-ENTMCNC: 31.7 G/DL (ref 31.4–37.4)
MCV RBC AUTO: 87 FL (ref 82–98)
MONOCYTES # BLD AUTO: 0.34 THOUSAND/ΜL (ref 0.17–1.22)
MONOCYTES NFR BLD AUTO: 4 % (ref 4–12)
NEUTROPHILS # BLD AUTO: 7.78 THOUSANDS/ΜL (ref 1.85–7.62)
NEUTS SEG NFR BLD AUTO: 89 % (ref 43–75)
NRBC BLD AUTO-RTO: 0 /100 WBCS
PLATELET # BLD AUTO: 416 THOUSANDS/UL (ref 149–390)
PMV BLD AUTO: 9.4 FL (ref 8.9–12.7)
POTASSIUM SERPL-SCNC: 5.5 MMOL/L (ref 3.5–5.3)
PROT SERPL-MCNC: 6.8 G/DL (ref 6.4–8.2)
RBC # BLD AUTO: 4.66 MILLION/UL (ref 3.88–5.62)
SODIUM SERPL-SCNC: 129 MMOL/L (ref 136–145)
WBC # BLD AUTO: 8.68 THOUSAND/UL (ref 4.31–10.16)

## 2021-07-31 PROCEDURE — 80053 COMPREHEN METABOLIC PANEL: CPT | Performed by: FAMILY MEDICINE

## 2021-07-31 PROCEDURE — 94660 CPAP INITIATION&MGMT: CPT

## 2021-07-31 PROCEDURE — 86140 C-REACTIVE PROTEIN: CPT | Performed by: FAMILY MEDICINE

## 2021-07-31 PROCEDURE — 94760 N-INVAS EAR/PLS OXIMETRY 1: CPT

## 2021-07-31 PROCEDURE — 85025 COMPLETE CBC W/AUTO DIFF WBC: CPT | Performed by: FAMILY MEDICINE

## 2021-07-31 PROCEDURE — 82948 REAGENT STRIP/BLOOD GLUCOSE: CPT

## 2021-07-31 PROCEDURE — 99232 SBSQ HOSP IP/OBS MODERATE 35: CPT | Performed by: FAMILY MEDICINE

## 2021-07-31 PROCEDURE — 85379 FIBRIN DEGRADATION QUANT: CPT | Performed by: FAMILY MEDICINE

## 2021-07-31 RX ORDER — INSULIN GLARGINE 100 [IU]/ML
15 INJECTION, SOLUTION SUBCUTANEOUS
Status: DISCONTINUED | OUTPATIENT
Start: 2021-07-31 | End: 2021-08-02 | Stop reason: HOSPADM

## 2021-07-31 RX ADMIN — FLUTICASONE PROPIONATE 2 PUFF: 220 AEROSOL, METERED RESPIRATORY (INHALATION) at 17:12

## 2021-07-31 RX ADMIN — DEXAMETHASONE SODIUM PHOSPHATE 8 MG: 4 INJECTION, SOLUTION INTRA-ARTICULAR; INTRALESIONAL; INTRAMUSCULAR; INTRAVENOUS; SOFT TISSUE at 09:45

## 2021-07-31 RX ADMIN — SERTRALINE HYDROCHLORIDE 50 MG: 50 TABLET ORAL at 09:45

## 2021-07-31 RX ADMIN — INSULIN LISPRO 10 UNITS: 100 INJECTION, SOLUTION INTRAVENOUS; SUBCUTANEOUS at 17:12

## 2021-07-31 RX ADMIN — INSULIN GLARGINE 15 UNITS: 100 INJECTION, SOLUTION SUBCUTANEOUS at 22:53

## 2021-07-31 RX ADMIN — CHOLECALCIFEROL TAB 25 MCG (1000 UNIT) 2000 UNITS: 25 TAB at 09:46

## 2021-07-31 RX ADMIN — FAMOTIDINE 20 MG: 20 TABLET ORAL at 17:12

## 2021-07-31 RX ADMIN — SALMETEROL XINAFOATE 1 PUFF: 50 POWDER, METERED ORAL; RESPIRATORY (INHALATION) at 22:48

## 2021-07-31 RX ADMIN — ATORVASTATIN CALCIUM 40 MG: 40 TABLET, FILM COATED ORAL at 17:11

## 2021-07-31 RX ADMIN — FAMOTIDINE 20 MG: 20 TABLET ORAL at 09:46

## 2021-07-31 RX ADMIN — SALMETEROL XINAFOATE 1 PUFF: 50 POWDER, METERED ORAL; RESPIRATORY (INHALATION) at 10:04

## 2021-07-31 RX ADMIN — DEXAMETHASONE SODIUM PHOSPHATE 8 MG: 4 INJECTION, SOLUTION INTRA-ARTICULAR; INTRALESIONAL; INTRAMUSCULAR; INTRAVENOUS; SOFT TISSUE at 22:42

## 2021-07-31 RX ADMIN — FLUTICASONE PROPIONATE 2 PUFF: 220 AEROSOL, METERED RESPIRATORY (INHALATION) at 10:03

## 2021-07-31 RX ADMIN — AMLODIPINE BESYLATE 10 MG: 10 TABLET ORAL at 09:46

## 2021-07-31 RX ADMIN — INSULIN LISPRO 8 UNITS: 100 INJECTION, SOLUTION INTRAVENOUS; SUBCUTANEOUS at 09:51

## 2021-07-31 RX ADMIN — OXYCODONE HYDROCHLORIDE AND ACETAMINOPHEN 1000 MG: 500 TABLET ORAL at 09:46

## 2021-07-31 RX ADMIN — ZINC SULFATE 220 MG (50 MG) CAPSULE 220 MG: CAPSULE at 09:46

## 2021-07-31 RX ADMIN — INSULIN LISPRO 10 UNITS: 100 INJECTION, SOLUTION INTRAVENOUS; SUBCUTANEOUS at 12:39

## 2021-07-31 RX ADMIN — INSULIN GLARGINE 45 UNITS: 100 INJECTION, SOLUTION SUBCUTANEOUS at 09:45

## 2021-07-31 RX ADMIN — OXYCODONE HYDROCHLORIDE AND ACETAMINOPHEN 1000 MG: 500 TABLET ORAL at 22:42

## 2021-07-31 RX ADMIN — INSULIN LISPRO 10 UNITS: 100 INJECTION, SOLUTION INTRAVENOUS; SUBCUTANEOUS at 17:13

## 2021-07-31 RX ADMIN — LOSARTAN POTASSIUM 100 MG: 50 TABLET, FILM COATED ORAL at 09:46

## 2021-07-31 RX ADMIN — REMDESIVIR 100 MG: 100 INJECTION, POWDER, LYOPHILIZED, FOR SOLUTION INTRAVENOUS at 22:43

## 2021-07-31 RX ADMIN — TIOTROPIUM BROMIDE 18 MCG: 18 CAPSULE ORAL; RESPIRATORY (INHALATION) at 10:03

## 2021-07-31 RX ADMIN — HEPARIN SODIUM 5000 UNITS: 5000 INJECTION INTRAVENOUS; SUBCUTANEOUS at 22:42

## 2021-07-31 RX ADMIN — INSULIN LISPRO 10 UNITS: 100 INJECTION, SOLUTION INTRAVENOUS; SUBCUTANEOUS at 22:47

## 2021-07-31 RX ADMIN — CARVEDILOL 12.5 MG: 12.5 TABLET, FILM COATED ORAL at 09:46

## 2021-07-31 RX ADMIN — HEPARIN SODIUM 5000 UNITS: 5000 INJECTION INTRAVENOUS; SUBCUTANEOUS at 13:17

## 2021-07-31 RX ADMIN — INSULIN LISPRO 8 UNITS: 100 INJECTION, SOLUTION INTRAVENOUS; SUBCUTANEOUS at 12:39

## 2021-07-31 RX ADMIN — CARVEDILOL 12.5 MG: 12.5 TABLET, FILM COATED ORAL at 17:11

## 2021-07-31 RX ADMIN — HEPARIN SODIUM 5000 UNITS: 5000 INJECTION INTRAVENOUS; SUBCUTANEOUS at 05:58

## 2021-07-31 NOTE — ASSESSMENT & PLAN NOTE
Sepsis due to covid pneumonia   As evidence by febrile 100 3, tachycardia tachypnea and elevated white blood cell count of 24 01  In the setting of COVID-please see additional treatment plan for COVID  Follow-up blood cultures obtained x2  Decadron 1 milligram/kilogram  Day 3 of remdesivir  Received Actemra 07/28/2021  Rocephin/doxycycline or discontinued in the setting of negative procalcitonin  Inflammatory markers are improving

## 2021-07-31 NOTE — PLAN OF CARE
Problem: Nutrition/Hydration-ADULT  Goal: Nutrient/Hydration intake appropriate for improving, restoring or maintaining nutritional needs  Description: Monitor and assess patient's nutrition/hydration status for malnutrition  Collaborate with interdisciplinary team and initiate plan and interventions as ordered  Monitor patient's weight and dietary intake as ordered or per policy  Utilize nutrition screening tool and intervene as necessary  Determine patient's food preferences and provide high-protein, high-caloric foods as appropriate       INTERVENTIONS:  - Monitor oral intake, urinary output, labs, and treatment plans  - Assess nutrition and hydration status and recommend course of action  - Evaluate amount of meals eaten  - Assist patient with eating if necessary   - Allow adequate time for meals  - Recommend/ encourage appropriate diets, oral nutritional supplements, and vitamin/mineral supplements  - Order, calculate, and assess calorie counts as needed  - Recommend, monitor, and adjust tube feedings and TPN/PPN based on assessed needs  - Assess need for intravenous fluids  - Provide specific nutrition/hydration education as appropriate  - Include patient/family/caregiver in decisions related to nutrition  Outcome: Progressing     Problem: RESPIRATORY - ADULT  Goal: Achieves optimal ventilation and oxygenation  Description: INTERVENTIONS:  - Assess for changes in respiratory status  - Assess for changes in mentation and behavior  - Position to facilitate oxygenation and minimize respiratory effort  - Oxygen administered by appropriate delivery if ordered  - Initiate smoking cessation education as indicated  - Encourage broncho-pulmonary hygiene including cough, deep breathe, Incentive Spirometry  - Assess the need for suctioning and aspirate as needed  - Assess and instruct to report SOB or any respiratory difficulty  - Respiratory Therapy support as indicated  Outcome: Progressing     Problem: METABOLIC, FLUID AND ELECTROLYTES - ADULT  Goal: Electrolytes maintained within normal limits  Description: INTERVENTIONS:  - Monitor labs and assess patient for signs and symptoms of electrolyte imbalances  - Administer electrolyte replacement as ordered  - Monitor response to electrolyte replacements, including repeat lab results as appropriate  - Instruct patient on fluid and nutrition as appropriate  Outcome: Progressing  Goal: Fluid balance maintained  Description: INTERVENTIONS:  - Monitor labs   - Monitor I/O and WT  - Instruct patient on fluid and nutrition as appropriate  - Assess for signs & symptoms of volume excess or deficit  Outcome: Progressing  Goal: Glucose maintained within target range  Description: INTERVENTIONS:  - Monitor Blood Glucose as ordered  - Assess for signs and symptoms of hyperglycemia and hypoglycemia  - Administer ordered medications to maintain glucose within target range  - Assess nutritional intake and initiate nutrition service referral as needed  Outcome: Progressing     Problem: PAIN - ADULT  Goal: Verbalizes/displays adequate comfort level or baseline comfort level  Description: Interventions:  - Encourage patient to monitor pain and request assistance  - Assess pain using appropriate pain scale  - Administer analgesics based on type and severity of pain and evaluate response  - Implement non-pharmacological measures as appropriate and evaluate response  - Consider cultural and social influences on pain and pain management  - Notify physician/advanced practitioner if interventions unsuccessful or patient reports new pain  Outcome: Progressing     Problem: INFECTION - ADULT  Goal: Absence or prevention of progression during hospitalization  Description: INTERVENTIONS:  - Assess and monitor for signs and symptoms of infection  - Monitor lab/diagnostic results  - Monitor all insertion sites, i e  indwelling lines, tubes, and drains  - Monitor endotracheal if appropriate and nasal secretions for changes in amount and color  - Parker appropriate cooling/warming therapies per order  - Administer medications as ordered  - Instruct and encourage patient and family to use good hand hygiene technique  - Identify and instruct in appropriate isolation precautions for identified infection/condition  Outcome: Progressing     Problem: SAFETY ADULT  Goal: Patient will remain free of falls  Description: INTERVENTIONS:  - Educate patient/family on patient safety including physical limitations  - Instruct patient to call for assistance with activity   - Consult OT/PT to assist with strengthening/mobility   - Keep Call bell within reach  - Keep bed low and locked with side rails adjusted as appropriate  - Keep care items and personal belongings within reach  - Initiate and maintain comfort rounds  - Make Fall Risk Sign visible to staff  - Offer Toileting every 2 Hours, in advance of need  - Initiate/Maintain fall alarm  - Obtain necessary fall risk management equipment: nonskid socks, alarm, mobility equipment as needed  - Apply yellow socks and bracelet for high fall risk patients  - Consider moving patient to room near nurses station  Outcome: Progressing  Goal: Maintain or return to baseline ADL function  Description: INTERVENTIONS:  -  Assess patient's ability to carry out ADLs; assess patient's baseline for ADL function and identify physical deficits which impact ability to perform ADLs (bathing, care of mouth/teeth, toileting, grooming, dressing, etc )  - Assess/evaluate cause of self-care deficits   - Assess range of motion  - Assess patient's mobility; develop plan if impaired  - Assess patient's need for assistive devices and provide as appropriate  - Encourage maximum independence but intervene and supervise when necessary  - Involve family in performance of ADLs  - Assess for home care needs following discharge   - Consider OT consult to assist with ADL evaluation and planning for discharge  - Provide patient education as appropriate  Outcome: Progressing  Goal: Maintains/Returns to pre admission functional level  Description: INTERVENTIONS:  - Perform BMAT or MOVE assessment daily    - Set and communicate daily mobility goal to care team and patient/family/caregiver  - Collaborate with rehabilitation services on mobility goals if consulted  - Perform Range of Motion 4 times a day  - Reposition patient every 2 hours  - Dangle patient 3 times a day  - Stand patient 3 times a day  - Ambulate patient 3 times a day  - Out of bed to chair 3 times a day   - Out of bed for meals 3 times a day  - Out of bed for toileting  - Record patient progress and toleration of activity level   Outcome: Progressing     Problem: DISCHARGE PLANNING  Goal: Discharge to home or other facility with appropriate resources  Description: INTERVENTIONS:  - Identify barriers to discharge w/patient and caregiver  - Arrange for needed discharge resources and transportation as appropriate  - Identify discharge learning needs (meds, wound care, etc )  - Arrange for interpretive services to assist at discharge as needed  - Refer to Case Management Department for coordinating discharge planning if the patient needs post-hospital services based on physician/advanced practitioner order or complex needs related to functional status, cognitive ability, or social support system  Outcome: Progressing     Problem: Knowledge Deficit  Goal: Patient/family/caregiver demonstrates understanding of disease process, treatment plan, medications, and discharge instructions  Description: Complete learning assessment and assess knowledge base    Interventions:  - Provide teaching at level of understanding  - Provide teaching via preferred learning methods  Outcome: Progressing     Problem: Potential for Falls  Goal: Patient will remain free of falls  Description: INTERVENTIONS:  - Educate patient/family on patient safety including physical limitations  - Instruct patient to call for assistance with activity   - Consult OT/PT to assist with strengthening/mobility   - Keep Call bell within reach  - Keep bed low and locked with side rails adjusted as appropriate  - Keep care items and personal belongings within reach  - Initiate and maintain comfort rounds  - Make Fall Risk Sign visible to staff  - Offer Toileting every 2 Hours, in advance of need  - Initiate/Maintain fall alarm  - Obtain necessary fall risk management equipment: nonskid socks, alarm, mobility equipment as needed  - Apply yellow socks and bracelet for high fall risk patients  - Consider moving patient to room near nurses station  Outcome: Progressing

## 2021-07-31 NOTE — PROGRESS NOTES
[address] [facility] [address]  Progress Note Garrick Neff 1965, 61 y o  male MRN: [id number]  Unit/Bed#: 427-01 Encounter: [id number]  Primary Care Provider: Bebo Zhang PA-C   Date and time admitted to hospital: 7/28/2021  7:59 PM    Acute on chronic respiratory failure with hypoxia [facility])  Assessment & Plan  Baseline O2 5 L nasal cannula  Initially requiring 12 L but is now requiring 6 L 07/31/2021    Sepsis due to COVID-19 [facility])  Assessment & Plan  Sepsis due to covid pneumonia   As evidence by febrile 100 3, tachycardia tachypnea and elevated white blood cell count of 24 01  In the setting of COVID-please see additional treatment plan for COVID  Follow-up blood cultures obtained x2  Decadron 1 milligram/kilogram  Day 3 of remdesivir  Received Actemra 07/28/2021  Rocephin/doxycycline or discontinued in the setting of negative procalcitonin  Inflammatory markers are improving      Type 2 diabetes mellitus with hemoglobin A1c goal of less than 8 0% [facility])  Assessment & Plan  Lab Results   Component Value Date    HGBA1C 8 2 (H) 07/29/2021       Recent Labs     07/30/21  1156 07/30/21  1709 07/30/21  2039 07/31/21  0818   POCGLU 329* 354* 343* 312*       Blood Sugar Average: Last 72 hrs:  (P) 266 8   Collect new A1c    Continue dapagliflozin  lantus 45 units HS  Suspect worsening hyperglycemia is due to steroid use, will add 15 units of Lantus HS and 10 units of Humalog t i d   With meals 07/31/2021    Essential hypertension  Assessment & Plan  Continue prior to admission Norvasc Coreg and Cozaar currently holding prior to admission hydrochlorothiazide and spironolactone      Morbid obesity with BMI of 50 0-59 9, adult [facility])  Assessment & Plan  As evidence by a BMI of 51 24  Recommend outpatient nutritional counseling Lifestyle modification        Progress Note - Brice Melgoza 61 y o  male MRN: [id number]    Unit/Bed#: 427-01 Encounter: [id number]        Subjective:   Patient seen an examined at bedside  Feeling better     Objective:     Vitals:   Vitals:    07/31/21 0820   BP: 129/69   Pulse: 61   Resp: 18   Temp: (!) 97 3 °F (36 3 °C)   SpO2: 94%     Body mass index is 51 18 kg/m²      Intake/Output Summary (Last 24 hours) at 7/31/2021 1018  Last data filed at 7/30/2021 1235  Gross per 24 hour   Intake 540 ml   Output --   Net 540 ml       Physical Exam:   /69 (BP Location: Right arm)   Pulse 61   Temp (!) 97 3 °F (36 3 °C) (Oral)   Resp 18   Ht 5' 9" (1 753 m)   Wt (!) 157 kg (346 lb 9 oz)   SpO2 94%   BMI 51 18 kg/m²   General appearance: alert and oriented, in no acute distress  Head: Normocephalic, without obvious abnormality, atraumatic  Lungs: clear to auscultation bilaterally  Heart: regular rate and rhythm, S1, S2 normal, no murmur, click, rub or gallop  Abdomen: soft, non-tender; bowel sounds normal; no masses,  no organomegaly  Extremities: extremities normal, warm and well-perfused; no cyanosis, clubbing, or edema  Pulses: 2+ and symmetric  Neurologic: Grossly normal     Invasive Devices     Peripheral Intravenous Line            Peripheral IV 07/28/21 Right Forearm 2 days                Results from last 7 days   Lab Units 07/31/21  0610 07/30/21  0654 07/29/21  0524   WBC Thousand/uL 8 68 8 53 17 90*   HEMOGLOBIN g/dL 12 8 13 8 13 8   HEMATOCRIT % 40 4 43 4 42 1   PLATELETS Thousands/uL 416* 384 335       Results from last 7 days   Lab Units 07/31/21  0611 07/30/21  0653 07/29/21  1734 07/29/21  0524   POTASSIUM mmol/L 5 5* 4 9 3 9 4 8   CHLORIDE mmol/L 98* 97* 101 94*   CO2 mmol/L 24 24 21 24   BUN mg/dL 46* 47* 34* 27*   CREATININE mg/dL 0 97 1 00 0 83 0 86   CALCIUM mg/dL 9 3 9 0 7 2* 8 8   ALK PHOS U/L 66 72  --  76   ALT U/L 60 57  --  36   AST U/L 15 24  --  22       Medication Administration - last 24 hours from 07/30/2021 1018 to 07/31/2021 1018       Date/Time Order Dose Route Action Action by     07/31/2021 0946 amLODIPine (NORVASC) tablet 10 mg 10 mg Oral Given Cleo Spencer Chasidy Martinez, RN     07/30/2021 1701 atorvastatin (LIPITOR) tablet 40 mg 40 mg Oral Given Sarthak Katty, RN     07/31/2021 0946 carvedilol (COREG) tablet 12 5 mg 12 5 mg Oral Given Jorge Hand, RN     07/30/2021 1701 carvedilol (COREG) tablet 12 5 mg 12 5 mg Oral Not Given Sarthak Alaniz, RN     07/31/2021 1003 Dapagliflozin Propanediol TABS 10 mg 10 mg Oral Not Given Jorge Hand, RN     07/31/2021 1003 fluticasone (FLOVENT HFA) 220 mcg/act inhaler 2 puff 2 puff Inhalation Given Jorge Hand, RN     07/30/2021 2047 fluticasone (FLOVENT HFA) 220 mcg/act inhaler 2 puff 2 puff Inhalation Given Mayra Mannheim, RN     07/31/2021 0946 losartan (COZAAR) tablet 100 mg 100 mg Oral Given Jorge Hand, RN     07/31/2021 0945 sertraline (ZOLOFT) tablet 50 mg 50 mg Oral Given Jorge Hand, RN     07/31/2021 0945 insulin glargine (LANTUS) subcutaneous injection 45 Units 0 45 mL 45 Units Subcutaneous Given Jorge Hand, RN     07/31/2021 1632 cholecalciferol (VITAMIN D3) tablet 2,000 Units 2,000 Units Oral Given Jorge Hand, RN     07/31/2021 6331 ascorbic acid (VITAMIN C) tablet 1,000 mg 1,000 mg Oral Given Jorge Hand, RN     07/30/2021 2036 ascorbic acid (VITAMIN C) tablet 1,000 mg 1,000 mg Oral Given Mayra Mannheim, RN     07/31/2021 0946 zinc sulfate (ZINCATE) capsule 220 mg 220 mg Oral Given Jorge Hand, RN     07/31/2021 0946 famotidine (PEPCID) tablet 20 mg 20 mg Oral Given Jorge Hand, RN     07/30/2021 1701 famotidine (PEPCID) tablet 20 mg 20 mg Oral Given Meyers Chuck Katty, RN     07/30/2021 2241 remdesivir Loralyn Nancy) 100 mg in sodium chloride 0 9 % 250 mL IVPB 100 mg Intravenous Valeriy, RN     07/31/2021 0951 insulin lispro (HumaLOG) 100 units/mL subcutaneous injection 2-12 Units 8 Units Subcutaneous Given Jorge Avery RN     07/30/2021 1715 insulin lispro (HumaLOG) 100 units/mL subcutaneous injection 2-12 Units 10 Units Subcutaneous Given Sarthak Alaniz RN     07/30/2021 1212 insulin lispro (HumaLOG) 100 units/mL subcutaneous injection 2-12 Units 8 Units Subcutaneous Given Manoj Parra RN     07/30/2021 2218 insulin lispro (HumaLOG) 100 units/mL subcutaneous injection 2-12 Units 8 Units Subcutaneous Given Arelis Ng RN     07/30/2021 1717 acetaminophen (TYLENOL) tablet 650 mg 650 mg Oral Given Manoj Parra, RN     07/31/2021 0558 heparin (porcine) subcutaneous injection 5,000 Units 5,000 Units Subcutaneous Given Arelis Ng RN     07/30/2021 2217 heparin (porcine) subcutaneous injection 5,000 Units 5,000 Units Subcutaneous Given Arelis Ng, TAMIKO     07/30/2021 1701 heparin (porcine) subcutaneous injection 5,000 Units 5,000 Units Subcutaneous Given Manoj Parra RN     07/31/2021 1003 tiotropium (SPIRIVA) capsule for inhaler 18 mcg 18 mcg Inhalation Given Imani Moya RN     07/31/2021 1004 salmeterol (Serevent Diskus) 50 mcg/dose inhaler 1 puff 1 puff Inhalation Given Imani Moya RN     07/30/2021 2047 salmeterol (Serevent Diskus) 50 mcg/dose inhaler 1 puff 1 puff Inhalation Given Arelis Ng RN     07/30/2021 1019 salmeterol (Serevent Diskus) 50 mcg/dose inhaler 1 puff 1 puff Inhalation Given Manoj Parra RN     07/31/2021 0945 dexamethasone (DECADRON) injection 8 mg 8 mg Intravenous Given Imani Moya RN     07/30/2021 2037 dexamethasone (DECADRON) injection 8 mg 8 mg Intravenous Given Arelis Ng RN            Lab, Imaging and other studies: I have personally reviewed pertinent reports      VTE Pharmacologic Prophylaxis: Heparin  VTE Mechanical Prophylaxis: sequential compression device     Charlaine Burkitt, MD  7/31/2021,10:18 AM

## 2021-07-31 NOTE — ASSESSMENT & PLAN NOTE
Lab Results   Component Value Date    HGBA1C 8 2 (H) 07/29/2021       Recent Labs     07/30/21  1156 07/30/21  1709 07/30/21 2039 07/31/21  0818   POCGLU 329* 354* 343* 312*       Blood Sugar Average: Last 72 hrs:  (P) 266 8   Collect new A1c    Continue dapagliflozin  lantus 45 units HS  Suspect worsening hyperglycemia is due to steroid use, will add 15 units of Lantus HS and 10 units of Humalog t i d   With meals 07/31/2021

## 2021-07-31 NOTE — RESPIRATORY THERAPY NOTE
07/30/21 1989   Respiratory Assessment   Assessment Type Assess only   General Appearance Awake; Alert   Respiratory Pattern Dyspnea with exertion   Chest Assessment Chest expansion symmetrical;Trachea midline   Bilateral Breath Sounds Diminished   Resp Comments Patient received walking out of the bathroom, patient was a little winded, patient normally wears 5 lpm oxygen at home  Patient has the incentive spirometer at bedside and states he is using it   Patient demonstrated to me, he has good return technique   Subjective Data Patient feels he is doing a little better   Oxygen Therapy/Pulse Ox   O2 Device Nasal cannula   O2 Therapy Oxygen humidified   Nasal Cannula O2 Flow Rate (L/min) 6 L/min   Calculated FIO2 (%) - Nasal Cannula 44   SpO2 Activity At Rest   $ Pulse Oximetry Spot Check Charge Completed

## 2021-07-31 NOTE — PLAN OF CARE
Problem: Nutrition/Hydration-ADULT  Goal: Nutrient/Hydration intake appropriate for improving, restoring or maintaining nutritional needs  Description: Monitor and assess patient's nutrition/hydration status for malnutrition  Collaborate with interdisciplinary team and initiate plan and interventions as ordered  Monitor patient's weight and dietary intake as ordered or per policy  Utilize nutrition screening tool and intervene as necessary  Determine patient's food preferences and provide high-protein, high-caloric foods as appropriate       INTERVENTIONS:  - Monitor oral intake, urinary output, labs, and treatment plans  - Assess nutrition and hydration status and recommend course of action  - Evaluate amount of meals eaten  - Assist patient with eating if necessary   - Allow adequate time for meals  - Recommend/ encourage appropriate diets, oral nutritional supplements, and vitamin/mineral supplements  - Order, calculate, and assess calorie counts as needed  - Recommend, monitor, and adjust tube feedings and TPN/PPN based on assessed needs  - Assess need for intravenous fluids  - Provide specific nutrition/hydration education as appropriate  - Include patient/family/caregiver in decisions related to nutrition  Outcome: Progressing     Problem: RESPIRATORY - ADULT  Goal: Achieves optimal ventilation and oxygenation  Description: INTERVENTIONS:  - Assess for changes in respiratory status  - Assess for changes in mentation and behavior  - Position to facilitate oxygenation and minimize respiratory effort  - Oxygen administered by appropriate delivery if ordered  - Initiate smoking cessation education as indicated  - Encourage broncho-pulmonary hygiene including cough, deep breathe, Incentive Spirometry  - Assess the need for suctioning and aspirate as needed  - Assess and instruct to report SOB or any respiratory difficulty  - Respiratory Therapy support as indicated  Outcome: Progressing     Problem: METABOLIC, FLUID AND ELECTROLYTES - ADULT  Goal: Electrolytes maintained within normal limits  Description: INTERVENTIONS:  - Monitor labs and assess patient for signs and symptoms of electrolyte imbalances  - Administer electrolyte replacement as ordered  - Monitor response to electrolyte replacements, including repeat lab results as appropriate  - Instruct patient on fluid and nutrition as appropriate  Outcome: Progressing  Goal: Fluid balance maintained  Description: INTERVENTIONS:  - Monitor labs   - Monitor I/O and WT  - Instruct patient on fluid and nutrition as appropriate  - Assess for signs & symptoms of volume excess or deficit  Outcome: Progressing  Goal: Glucose maintained within target range  Description: INTERVENTIONS:  - Monitor Blood Glucose as ordered  - Assess for signs and symptoms of hyperglycemia and hypoglycemia  - Administer ordered medications to maintain glucose within target range  - Assess nutritional intake and initiate nutrition service referral as needed  Outcome: Progressing     Problem: PAIN - ADULT  Goal: Verbalizes/displays adequate comfort level or baseline comfort level  Description: Interventions:  - Encourage patient to monitor pain and request assistance  - Assess pain using appropriate pain scale  - Administer analgesics based on type and severity of pain and evaluate response  - Implement non-pharmacological measures as appropriate and evaluate response  - Consider cultural and social influences on pain and pain management  - Notify physician/advanced practitioner if interventions unsuccessful or patient reports new pain  Outcome: Progressing     Problem: INFECTION - ADULT  Goal: Absence or prevention of progression during hospitalization  Description: INTERVENTIONS:  - Assess and monitor for signs and symptoms of infection  - Monitor lab/diagnostic results  - Monitor all insertion sites, i e  indwelling lines, tubes, and drains  - Monitor endotracheal if appropriate and nasal secretions for changes in amount and color  - [place] appropriate cooling/warming therapies per order  - Administer medications as ordered  - Instruct and encourage patient and family to use good hand hygiene technique  - Identify and instruct in appropriate isolation precautions for identified infection/condition  Outcome: Progressing     Problem: SAFETY ADULT  Goal: Patient will remain free of falls  Description: INTERVENTIONS:  - Educate patient/family on patient safety including physical limitations  - Instruct patient to call for assistance with activity   - Consult OT/PT to assist with strengthening/mobility   - Keep Call bell within reach  - Keep bed low and locked with side rails adjusted as appropriate  - Keep care items and personal belongings within reach  - Initiate and maintain comfort rounds  - Make Fall Risk Sign visible to staff  - Offer Toileting every 2 Hours, in advance of need  - Initiate/Maintain fall alarm  - Obtain necessary fall risk management equipment: nonskid socks, alarm, mobility equipment as needed  - Apply yellow socks and bracelet for high fall risk patients  - Consider moving patient to room near nurses station  Outcome: Progressing  Goal: Maintain or return to baseline ADL function  Description: INTERVENTIONS:  -  Assess patient's ability to carry out ADLs; assess patient's baseline for ADL function and identify physical deficits which impact ability to perform ADLs (bathing, care of mouth/teeth, toileting, grooming, dressing, etc )  - Assess/evaluate cause of self-care deficits   - Assess range of motion  - Assess patient's mobility; develop plan if impaired  - Assess patient's need for assistive devices and provide as appropriate  - Encourage maximum independence but intervene and supervise when necessary  - Involve family in performance of ADLs  - Assess for home care needs following discharge   - Consider OT consult to assist with ADL evaluation and planning for discharge  - Provide patient education as appropriate  Outcome: Progressing  Goal: Maintains/Returns to pre admission functional level  Description: INTERVENTIONS:  - Perform BMAT or MOVE assessment daily    - Set and communicate daily mobility goal to care team and patient/family/caregiver  - Collaborate with rehabilitation services on mobility goals if consulted  - Perform Range of Motion 4 times a day  - Reposition patient every 2 hours  - Dangle patient 3 times a day  - Stand patient 3 times a day  - Ambulate patient 3 times a day  - Out of bed to chair 3 times a day   - Out of bed for meals 3 times a day  - Out of bed for toileting  - Record patient progress and toleration of activity level   Outcome: Progressing     Problem: DISCHARGE PLANNING  Goal: Discharge to home or other facility with appropriate resources  Description: INTERVENTIONS:  - Identify barriers to discharge w/patient and caregiver  - Arrange for needed discharge resources and transportation as appropriate  - Identify discharge learning needs (meds, wound care, etc )  - Arrange for interpretive services to assist at discharge as needed  - Refer to Case Management Department for coordinating discharge planning if the patient needs post-hospital services based on physician/advanced practitioner order or complex needs related to functional status, cognitive ability, or social support system  Outcome: Progressing     Problem: Knowledge Deficit  Goal: Patient/family/caregiver demonstrates understanding of disease process, treatment plan, medications, and discharge instructions  Description: Complete learning assessment and assess knowledge base    Interventions:  - Provide teaching at level of understanding  - Provide teaching via preferred learning methods  Outcome: Progressing     Problem: Potential for Falls  Goal: Patient will remain free of falls  Description: INTERVENTIONS:  - Educate patient/family on patient safety including physical limitations  - Instruct patient to call for assistance with activity   - Consult OT/PT to assist with strengthening/mobility   - Keep Call bell within reach  - Keep bed low and locked with side rails adjusted as appropriate  - Keep care items and personal belongings within reach  - Initiate and maintain comfort rounds  - Make Fall Risk Sign visible to staff  - Offer Toileting every 2 Hours, in advance of need  - Initiate/Maintain fall alarm  - Obtain necessary fall risk management equipment: nonskid socks, alarm, mobility equipment as needed  - Apply yellow socks and bracelet for high fall risk patients  - Consider moving patient to room near nurses station  Outcome: Progressing

## 2021-08-01 LAB
ALBUMIN SERPL BCP-MCNC: 2.5 G/DL (ref 3.5–5)
ALP SERPL-CCNC: 68 U/L (ref 46–116)
ALT SERPL W P-5'-P-CCNC: 56 U/L (ref 12–78)
ANION GAP SERPL CALCULATED.3IONS-SCNC: 8 MMOL/L (ref 4–13)
AST SERPL W P-5'-P-CCNC: 16 U/L (ref 5–45)
BASOPHILS # BLD AUTO: 0.02 THOUSANDS/ΜL (ref 0–0.1)
BASOPHILS NFR BLD AUTO: 0 % (ref 0–1)
BILIRUB SERPL-MCNC: 0.34 MG/DL (ref 0.2–1)
BUN SERPL-MCNC: 44 MG/DL (ref 5–25)
CALCIUM ALBUM COR SERPL-MCNC: 10.6 MG/DL (ref 8.3–10.1)
CALCIUM SERPL-MCNC: 9.4 MG/DL (ref 8.3–10.1)
CHLORIDE SERPL-SCNC: 98 MMOL/L (ref 100–108)
CO2 SERPL-SCNC: 24 MMOL/L (ref 21–32)
CREAT SERPL-MCNC: 0.98 MG/DL (ref 0.6–1.3)
CRP SERPL QL: 47.6 MG/L
D DIMER PPP FEU-MCNC: 0.37 UG/ML FEU
EOSINOPHIL # BLD AUTO: 0 THOUSAND/ΜL (ref 0–0.61)
EOSINOPHIL NFR BLD AUTO: 0 % (ref 0–6)
ERYTHROCYTE [DISTWIDTH] IN BLOOD BY AUTOMATED COUNT: 13.9 % (ref 11.6–15.1)
GFR SERPL CREATININE-BSD FRML MDRD: 85 ML/MIN/1.73SQ M
GLUCOSE SERPL-MCNC: 293 MG/DL (ref 65–140)
GLUCOSE SERPL-MCNC: 316 MG/DL (ref 65–140)
GLUCOSE SERPL-MCNC: 347 MG/DL (ref 65–140)
GLUCOSE SERPL-MCNC: 406 MG/DL (ref 65–140)
GLUCOSE SERPL-MCNC: 409 MG/DL (ref 65–140)
HCT VFR BLD AUTO: 42.6 % (ref 36.5–49.3)
HGB BLD-MCNC: 13.5 G/DL (ref 12–17)
IMM GRANULOCYTES # BLD AUTO: 0.14 THOUSAND/UL (ref 0–0.2)
IMM GRANULOCYTES NFR BLD AUTO: 2 % (ref 0–2)
LYMPHOCYTES # BLD AUTO: 0.38 THOUSANDS/ΜL (ref 0.6–4.47)
LYMPHOCYTES NFR BLD AUTO: 4 % (ref 14–44)
MCH RBC QN AUTO: 27.4 PG (ref 26.8–34.3)
MCHC RBC AUTO-ENTMCNC: 31.7 G/DL (ref 31.4–37.4)
MCV RBC AUTO: 86 FL (ref 82–98)
MONOCYTES # BLD AUTO: 0.37 THOUSAND/ΜL (ref 0.17–1.22)
MONOCYTES NFR BLD AUTO: 4 % (ref 4–12)
NEUTROPHILS # BLD AUTO: 8.04 THOUSANDS/ΜL (ref 1.85–7.62)
NEUTS SEG NFR BLD AUTO: 90 % (ref 43–75)
NRBC BLD AUTO-RTO: 0 /100 WBCS
PLATELET # BLD AUTO: 459 THOUSANDS/UL (ref 149–390)
PMV BLD AUTO: 9.6 FL (ref 8.9–12.7)
POTASSIUM SERPL-SCNC: 5.6 MMOL/L (ref 3.5–5.3)
PROT SERPL-MCNC: 7 G/DL (ref 6.4–8.2)
RBC # BLD AUTO: 4.93 MILLION/UL (ref 3.88–5.62)
SODIUM SERPL-SCNC: 130 MMOL/L (ref 136–145)
WBC # BLD AUTO: 8.95 THOUSAND/UL (ref 4.31–10.16)

## 2021-08-01 PROCEDURE — 99232 SBSQ HOSP IP/OBS MODERATE 35: CPT | Performed by: FAMILY MEDICINE

## 2021-08-01 PROCEDURE — 85379 FIBRIN DEGRADATION QUANT: CPT | Performed by: FAMILY MEDICINE

## 2021-08-01 PROCEDURE — 86140 C-REACTIVE PROTEIN: CPT | Performed by: FAMILY MEDICINE

## 2021-08-01 PROCEDURE — 80053 COMPREHEN METABOLIC PANEL: CPT | Performed by: FAMILY MEDICINE

## 2021-08-01 PROCEDURE — 85025 COMPLETE CBC W/AUTO DIFF WBC: CPT | Performed by: FAMILY MEDICINE

## 2021-08-01 PROCEDURE — 94760 N-INVAS EAR/PLS OXIMETRY 1: CPT

## 2021-08-01 PROCEDURE — 82948 REAGENT STRIP/BLOOD GLUCOSE: CPT

## 2021-08-01 PROCEDURE — 94660 CPAP INITIATION&MGMT: CPT

## 2021-08-01 RX ADMIN — INSULIN LISPRO 12 UNITS: 100 INJECTION, SOLUTION INTRAVENOUS; SUBCUTANEOUS at 13:00

## 2021-08-01 RX ADMIN — FAMOTIDINE 20 MG: 20 TABLET ORAL at 17:56

## 2021-08-01 RX ADMIN — OXYCODONE HYDROCHLORIDE AND ACETAMINOPHEN 1000 MG: 500 TABLET ORAL at 09:11

## 2021-08-01 RX ADMIN — TIOTROPIUM BROMIDE 18 MCG: 18 CAPSULE ORAL; RESPIRATORY (INHALATION) at 09:16

## 2021-08-01 RX ADMIN — CARVEDILOL 12.5 MG: 12.5 TABLET, FILM COATED ORAL at 17:56

## 2021-08-01 RX ADMIN — HEPARIN SODIUM 5000 UNITS: 5000 INJECTION INTRAVENOUS; SUBCUTANEOUS at 22:31

## 2021-08-01 RX ADMIN — SALMETEROL XINAFOATE 1 PUFF: 50 POWDER, METERED ORAL; RESPIRATORY (INHALATION) at 22:37

## 2021-08-01 RX ADMIN — REMDESIVIR 100 MG: 100 INJECTION, POWDER, LYOPHILIZED, FOR SOLUTION INTRAVENOUS at 22:31

## 2021-08-01 RX ADMIN — OXYCODONE HYDROCHLORIDE AND ACETAMINOPHEN 1000 MG: 500 TABLET ORAL at 22:30

## 2021-08-01 RX ADMIN — HEPARIN SODIUM 5000 UNITS: 5000 INJECTION INTRAVENOUS; SUBCUTANEOUS at 05:43

## 2021-08-01 RX ADMIN — ATORVASTATIN CALCIUM 40 MG: 40 TABLET, FILM COATED ORAL at 17:56

## 2021-08-01 RX ADMIN — INSULIN GLARGINE 15 UNITS: 100 INJECTION, SOLUTION SUBCUTANEOUS at 22:32

## 2021-08-01 RX ADMIN — AMLODIPINE BESYLATE 10 MG: 10 TABLET ORAL at 09:12

## 2021-08-01 RX ADMIN — DEXAMETHASONE SODIUM PHOSPHATE 8 MG: 4 INJECTION, SOLUTION INTRA-ARTICULAR; INTRALESIONAL; INTRAMUSCULAR; INTRAVENOUS; SOFT TISSUE at 09:12

## 2021-08-01 RX ADMIN — SALMETEROL XINAFOATE 1 PUFF: 50 POWDER, METERED ORAL; RESPIRATORY (INHALATION) at 09:16

## 2021-08-01 RX ADMIN — CARVEDILOL 12.5 MG: 12.5 TABLET, FILM COATED ORAL at 09:13

## 2021-08-01 RX ADMIN — SERTRALINE HYDROCHLORIDE 50 MG: 50 TABLET ORAL at 09:12

## 2021-08-01 RX ADMIN — INSULIN LISPRO 10 UNITS: 100 INJECTION, SOLUTION INTRAVENOUS; SUBCUTANEOUS at 13:01

## 2021-08-01 RX ADMIN — INSULIN LISPRO 10 UNITS: 100 INJECTION, SOLUTION INTRAVENOUS; SUBCUTANEOUS at 18:00

## 2021-08-01 RX ADMIN — INSULIN LISPRO 10 UNITS: 100 INJECTION, SOLUTION INTRAVENOUS; SUBCUTANEOUS at 09:16

## 2021-08-01 RX ADMIN — ZINC SULFATE 220 MG (50 MG) CAPSULE 220 MG: CAPSULE at 09:12

## 2021-08-01 RX ADMIN — INSULIN GLARGINE 45 UNITS: 100 INJECTION, SOLUTION SUBCUTANEOUS at 09:11

## 2021-08-01 RX ADMIN — INSULIN LISPRO 6 UNITS: 100 INJECTION, SOLUTION INTRAVENOUS; SUBCUTANEOUS at 09:15

## 2021-08-01 RX ADMIN — DEXAMETHASONE SODIUM PHOSPHATE 8 MG: 4 INJECTION, SOLUTION INTRA-ARTICULAR; INTRALESIONAL; INTRAMUSCULAR; INTRAVENOUS; SOFT TISSUE at 22:31

## 2021-08-01 RX ADMIN — FLUTICASONE PROPIONATE 2 PUFF: 220 AEROSOL, METERED RESPIRATORY (INHALATION) at 18:00

## 2021-08-01 RX ADMIN — FAMOTIDINE 20 MG: 20 TABLET ORAL at 09:12

## 2021-08-01 RX ADMIN — INSULIN LISPRO 12 UNITS: 100 INJECTION, SOLUTION INTRAVENOUS; SUBCUTANEOUS at 17:57

## 2021-08-01 RX ADMIN — LOSARTAN POTASSIUM 100 MG: 50 TABLET, FILM COATED ORAL at 09:12

## 2021-08-01 RX ADMIN — HEPARIN SODIUM 5000 UNITS: 5000 INJECTION INTRAVENOUS; SUBCUTANEOUS at 13:00

## 2021-08-01 RX ADMIN — INSULIN LISPRO 10 UNITS: 100 INJECTION, SOLUTION INTRAVENOUS; SUBCUTANEOUS at 22:38

## 2021-08-01 RX ADMIN — CHOLECALCIFEROL TAB 25 MCG (1000 UNIT) 2000 UNITS: 25 TAB at 09:11

## 2021-08-01 RX ADMIN — FLUTICASONE PROPIONATE 2 PUFF: 220 AEROSOL, METERED RESPIRATORY (INHALATION) at 09:17

## 2021-08-01 NOTE — PLAN OF CARE
Problem: Nutrition/Hydration-ADULT  Goal: Nutrient/Hydration intake appropriate for improving, restoring or maintaining nutritional needs  Description: Monitor and assess patient's nutrition/hydration status for malnutrition  Collaborate with interdisciplinary team and initiate plan and interventions as ordered  Monitor patient's weight and dietary intake as ordered or per policy  Utilize nutrition screening tool and intervene as necessary  Determine patient's food preferences and provide high-protein, high-caloric foods as appropriate       INTERVENTIONS:  - Monitor oral intake, urinary output, labs, and treatment plans  - Assess nutrition and hydration status and recommend course of action  - Evaluate amount of meals eaten  - Assist patient with eating if necessary   - Allow adequate time for meals  - Recommend/ encourage appropriate diets, oral nutritional supplements, and vitamin/mineral supplements  - Order, calculate, and assess calorie counts as needed  - Recommend, monitor, and adjust tube feedings and TPN/PPN based on assessed needs  - Assess need for intravenous fluids  - Provide specific nutrition/hydration education as appropriate  - Include patient/family/caregiver in decisions related to nutrition  Outcome: Progressing     Problem: RESPIRATORY - ADULT  Goal: Achieves optimal ventilation and oxygenation  Description: INTERVENTIONS:  - Assess for changes in respiratory status  - Assess for changes in mentation and behavior  - Position to facilitate oxygenation and minimize respiratory effort  - Oxygen administered by appropriate delivery if ordered  - Initiate smoking cessation education as indicated  - Encourage broncho-pulmonary hygiene including cough, deep breathe, Incentive Spirometry  - Assess the need for suctioning and aspirate as needed  - Assess and instruct to report SOB or any respiratory difficulty  - Respiratory Therapy support as indicated  Outcome: Progressing     Problem: METABOLIC, FLUID AND ELECTROLYTES - ADULT  Goal: Electrolytes maintained within normal limits  Description: INTERVENTIONS:  - Monitor labs and assess patient for signs and symptoms of electrolyte imbalances  - Administer electrolyte replacement as ordered  - Monitor response to electrolyte replacements, including repeat lab results as appropriate  - Instruct patient on fluid and nutrition as appropriate  Outcome: Progressing  Goal: Fluid balance maintained  Description: INTERVENTIONS:  - Monitor labs   - Monitor I/O and WT  - Instruct patient on fluid and nutrition as appropriate  - Assess for signs & symptoms of volume excess or deficit  Outcome: Progressing  Goal: Glucose maintained within target range  Description: INTERVENTIONS:  - Monitor Blood Glucose as ordered  - Assess for signs and symptoms of hyperglycemia and hypoglycemia  - Administer ordered medications to maintain glucose within target range  - Assess nutritional intake and initiate nutrition service referral as needed  Outcome: Progressing     Problem: PAIN - ADULT  Goal: Verbalizes/displays adequate comfort level or baseline comfort level  Description: Interventions:  - Encourage patient to monitor pain and request assistance  - Assess pain using appropriate pain scale  - Administer analgesics based on type and severity of pain and evaluate response  - Implement non-pharmacological measures as appropriate and evaluate response  - Consider cultural and social influences on pain and pain management  - Notify physician/advanced practitioner if interventions unsuccessful or patient reports new pain  Outcome: Progressing     Problem: INFECTION - ADULT  Goal: Absence or prevention of progression during hospitalization  Description: INTERVENTIONS:  - Assess and monitor for signs and symptoms of infection  - Monitor lab/diagnostic results  - Monitor all insertion sites, i e  indwelling lines, tubes, and drains  - Monitor endotracheal if appropriate and nasal secretions for changes in amount and color  - [place] appropriate cooling/warming therapies per order  - Administer medications as ordered  - Instruct and encourage patient and family to use good hand hygiene technique  - Identify and instruct in appropriate isolation precautions for identified infection/condition  Outcome: Progressing     Problem: SAFETY ADULT  Goal: Patient will remain free of falls  Description: INTERVENTIONS:  - Educate patient/family on patient safety including physical limitations  - Instruct patient to call for assistance with activity   - Consult OT/PT to assist with strengthening/mobility   - Keep Call bell within reach  - Keep bed low and locked with side rails adjusted as appropriate  - Keep care items and personal belongings within reach  - Initiate and maintain comfort rounds  - Make Fall Risk Sign visible to staff  - Offer Toileting every 2 Hours, in advance of need  - Initiate/Maintain fall alarm  - Obtain necessary fall risk management equipment: nonskid socks, alarm, mobility equipment as needed  - Apply yellow socks and bracelet for high fall risk patients  - Consider moving patient to room near nurses station  Outcome: Progressing  Goal: Maintain or return to baseline ADL function  Description: INTERVENTIONS:  -  Assess patient's ability to carry out ADLs; assess patient's baseline for ADL function and identify physical deficits which impact ability to perform ADLs (bathing, care of mouth/teeth, toileting, grooming, dressing, etc )  - Assess/evaluate cause of self-care deficits   - Assess range of motion  - Assess patient's mobility; develop plan if impaired  - Assess patient's need for assistive devices and provide as appropriate  - Encourage maximum independence but intervene and supervise when necessary  - Involve family in performance of ADLs  - Assess for home care needs following discharge   - Consider OT consult to assist with ADL evaluation and planning for discharge  - Provide patient education as appropriate  Outcome: Progressing  Goal: Maintains/Returns to pre admission functional level  Description: INTERVENTIONS:  - Perform BMAT or MOVE assessment daily    - Set and communicate daily mobility goal to care team and patient/family/caregiver  - Collaborate with rehabilitation services on mobility goals if consulted  - Perform Range of Motion 4 times a day  - Reposition patient every 2 hours  - Dangle patient 3 times a day  - Stand patient 3 times a day  - Ambulate patient 3 times a day  - Out of bed to chair 3 times a day   - Out of bed for meals 3 times a day  - Out of bed for toileting  - Record patient progress and toleration of activity level   Outcome: Progressing     Problem: DISCHARGE PLANNING  Goal: Discharge to home or other facility with appropriate resources  Description: INTERVENTIONS:  - Identify barriers to discharge w/patient and caregiver  - Arrange for needed discharge resources and transportation as appropriate  - Identify discharge learning needs (meds, wound care, etc )  - Arrange for interpretive services to assist at discharge as needed  - Refer to Case Management Department for coordinating discharge planning if the patient needs post-hospital services based on physician/advanced practitioner order or complex needs related to functional status, cognitive ability, or social support system  Outcome: Progressing     Problem: Knowledge Deficit  Goal: Patient/family/caregiver demonstrates understanding of disease process, treatment plan, medications, and discharge instructions  Description: Complete learning assessment and assess knowledge base    Interventions:  - Provide teaching at level of understanding  - Provide teaching via preferred learning methods  Outcome: Progressing     Problem: Potential for Falls  Goal: Patient will remain free of falls  Description: INTERVENTIONS:  - Educate patient/family on patient safety including physical limitations  - Instruct patient to call for assistance with activity   - Consult OT/PT to assist with strengthening/mobility   - Keep Call bell within reach  - Keep bed low and locked with side rails adjusted as appropriate  - Keep care items and personal belongings within reach  - Initiate and maintain comfort rounds  - Make Fall Risk Sign visible to staff  - Offer Toileting every 2 Hours, in advance of need  - Initiate/Maintain fall alarm  - Obtain necessary fall risk management equipment: nonskid socks, alarm, mobility equipment as needed  - Apply yellow socks and bracelet for high fall risk patients  - Consider moving patient to room near nurses station  Outcome: Progressing

## 2021-08-01 NOTE — PROGRESS NOTES
[address] [facility] [address]  Progress Note Philly Claros 1965, 61 y o  male MRN: [id number]  Unit/Bed#: 427-01 Encounter: [id number]  Primary Care Provider: Maricarmen Montes PA-C   Date and time admitted to hospital: 7/28/2021  7:59 PM    Acute on chronic respiratory failure with hypoxia [facility])  Assessment & Plan  Baseline O2 5 L nasal cannula  Initially requiring 12 L but is now requiring 6 L   Resolved, now at baseline 5L NC 8/1/21    Sepsis due to COVID-19 [facility])  Assessment & Plan  Sepsis due to covid pneumonia   As evidence by febrile 100 3, tachycardia tachypnea and elevated white blood cell count of 24 01  In the setting of COVID-please see additional treatment plan for COVID  Follow-up blood cultures obtained x2  Decadron 1 milligram/kilogram  Day 4 of remdesivir  Received Actemra 07/28/2021  Rocephin/doxycycline or discontinued in the setting of negative procalcitonin  Inflammatory markers are improving      Type 2 diabetes mellitus with hemoglobin A1c goal of less than 8 0% [facility])  Assessment & Plan  Lab Results   Component Value Date    HGBA1C 8 2 (H) 07/29/2021       Recent Labs     07/31/21  1230 07/31/21  1703 07/31/21  2116 08/01/21  0819   POCGLU 330* 370* 384* 293*       Blood Sugar Average: Last 72 hrs:  (P) 288 [id number]   Collect new A1c    Continue dapagliflozin  lantus 45 units HS  Suspect worsening hyperglycemia is due to steroid use, will add 15 units of Lantus HS and 10 units of Humalog t i d  With meals 07/31/2021        Progress Note - Regino Myers 61 y o  male MRN: [id number]    Unit/Bed#: 427-01 Encounter: [id number]        Subjective:   Patient seen examined bedside, feels well with no acute complaints    Objective:     Vitals:   Vitals:    08/01/21 0820   BP: 118/67   Pulse: 56   Resp: 14   Temp: 97 6 °F (36 4 °C)   SpO2: 93%     Body mass index is 51 18 kg/m²    No intake or output data in the 24 hours ending 08/01/21 0915    Physical Exam:   /67   Pulse 56 Temp 97 6 °F (36 4 °C)   Resp 14   Ht 5' 9" (1 753 m)   Wt (!) 157 kg (346 lb 9 oz)   SpO2 93%   BMI 51 18 kg/m²   General appearance: alert and oriented, in no acute distress  Head: Normocephalic, without obvious abnormality, atraumatic  Lungs: clear to auscultation bilaterally  Heart: regular rate and rhythm, S1, S2 normal, no murmur, click, rub or gallop  Abdomen: soft, non-tender; bowel sounds normal; no masses,  no organomegaly  Extremities: extremities normal, warm and well-perfused; no cyanosis, clubbing, or edema  Pulses: 2+ and symmetric  Neurologic: Grossly normal     Invasive Devices     Peripheral Intravenous Line            Peripheral IV 07/28/21 Right Forearm 3 days                Results from last 7 days   Lab Units 08/01/21  0528 07/31/21  0610 07/30/21  0654   WBC Thousand/uL 8 95 8 68 8 53   HEMOGLOBIN g/dL 13 5 12 8 13 8   HEMATOCRIT % 42 6 40 4 43 4   PLATELETS Thousands/uL 459* 416* 384       Results from last 7 days   Lab Units 08/01/21  0528 07/31/21  0611 07/30/21  0653   POTASSIUM mmol/L 5 6* 5 5* 4 9   CHLORIDE mmol/L 98* 98* 97*   CO2 mmol/L 24 24 24   BUN mg/dL 44* 46* 47*   CREATININE mg/dL 0 98 0 97 1 00   CALCIUM mg/dL 9 4 9 3 9 0   ALK PHOS U/L 68 66 72   ALT U/L 56 60 57   AST U/L 16 15 24       Medication Administration - last 24 hours from 07/31/2021 0915 to 08/01/2021 0915       Date/Time Order Dose Route Action Action by     08/01/2021 0912 amLODIPine (NORVASC) tablet 10 mg 10 mg Oral Given Martha Diaz RN     07/31/2021 0946 amLODIPine (NORVASC) tablet 10 mg 10 mg Oral Given Martha Diaz RN     07/31/2021 1711 atorvastatin (LIPITOR) tablet 40 mg 40 mg Oral Given Theresa Orellana RN     08/01/2021 0913 carvedilol (COREG) tablet 12 5 mg 12 5 mg Oral Given Martha Diaz RN     07/31/2021 1711 carvedilol (COREG) tablet 12 5 mg 12 5 mg Oral Given Theresa Orellana RN     07/31/2021 0946 carvedilol (COREG) tablet 12 5 mg 12 5 mg Oral Given Martha Diaz RN 07/31/2021 1003 Dapagliflozin Propanediol TABS 10 mg 10 mg Oral Not Given Salvador Bud, RN     07/31/2021 1712 fluticasone (FLOVENT HFA) 220 mcg/act inhaler 2 puff 2 puff Inhalation Given Marcelina Campos, RN     07/31/2021 1003 fluticasone (FLOVENT HFA) 220 mcg/act inhaler 2 puff 2 puff Inhalation Given Salvador Bud, RN     08/01/2021 0912 losartan (COZAAR) tablet 100 mg 100 mg Oral Given Salvador Bud, RN     07/31/2021 0946 losartan (COZAAR) tablet 100 mg 100 mg Oral Given Salvador Bud, RN     08/01/2021 0912 sertraline (ZOLOFT) tablet 50 mg 50 mg Oral Given Salvador Bud, RN     07/31/2021 0945 sertraline (ZOLOFT) tablet 50 mg 50 mg Oral Given Salvador Bud, RN     08/01/2021 0911 insulin glargine (LANTUS) subcutaneous injection 45 Units 0 45 mL 45 Units Subcutaneous Given Salvador Bud, RN     07/31/2021 0945 insulin glargine (LANTUS) subcutaneous injection 45 Units 0 45 mL 45 Units Subcutaneous Given Salvador Bud, RN     08/01/2021 5008 cholecalciferol (VITAMIN D3) tablet 2,000 Units 2,000 Units Oral Given Salvador Bud, RN     07/31/2021 0679 cholecalciferol (VITAMIN D3) tablet 2,000 Units 2,000 Units Oral Given Salvador Bud, RN     08/01/2021 0491 ascorbic acid (VITAMIN C) tablet 1,000 mg 1,000 mg Oral Given Salvador Bud, RN     07/31/2021 2242 ascorbic acid (VITAMIN C) tablet 1,000 mg 1,000 mg Oral Given Marcelina Campos, RN     07/31/2021 0996 ascorbic acid (VITAMIN C) tablet 1,000 mg 1,000 mg Oral Given Salvador Bud, RN     08/01/2021 0912 zinc sulfate (ZINCATE) capsule 220 mg 220 mg Oral Given Salvador Bud, RN     07/31/2021 0946 zinc sulfate (ZINCATE) capsule 220 mg 220 mg Oral Given Salvador Bud, RN     08/01/2021 0912 famotidine (PEPCID) tablet 20 mg 20 mg Oral Given Salvador Rocha RN     07/31/2021 1712 famotidine (PEPCID) tablet 20 mg 20 mg Oral Given Marcelina Campos RN     07/31/2021 0946 famotidine (PEPCID) tablet 20 mg 20 mg Oral Given Salvador Rocha RN 07/31/2021 2243 remdesivir (Veklury) 100 mg in sodium chloride 0 9 % 250 mL IVPB 100 mg Intravenous Gartbinduv[address]ulio cet 37 Nate Del Toro RN     07/31/2021 1712 insulin lispro (HumaLOG) 100 units/mL subcutaneous injection 2-12 Units 10 Units Subcutaneous Given Nate Del Toro RN     07/31/2021 1239 insulin lispro (HumaLOG) 100 units/mL subcutaneous injection 2-12 Units 8 Units Subcutaneous Given Franchester Gottron, RN     07/31/2021 8639 insulin lispro (HumaLOG) 100 units/mL subcutaneous injection 2-12 Units 8 Units Subcutaneous Given Franchester Gottron, RN     07/31/2021 2247 insulin lispro (HumaLOG) 100 units/mL subcutaneous injection 2-12 Units 10 Units Subcutaneous Given Nate Del Toro RN     08/01/2021 0543 heparin (porcine) subcutaneous injection 5,000 Units 5,000 Units Subcutaneous Given Nicole Mckeon RN     07/31/2021 2242 heparin (porcine) subcutaneous injection 5,000 Units 5,000 Units Subcutaneous Given Nate Del Toro RN     07/31/2021 1317 heparin (porcine) subcutaneous injection 5,000 Units 5,000 Units Subcutaneous Given Franchester Gottron, RN     07/31/2021 1003 tiotropium (SPIRIVA) capsule for inhaler 18 mcg 18 mcg Inhalation Given Franchester Gottron, RN     07/31/2021 2248 salmeterol (Serevent Diskus) 50 mcg/dose inhaler 1 puff 1 puff Inhalation Given Nate Del Toro RN     07/31/2021 1004 salmeterol (Serevent Diskus) 50 mcg/dose inhaler 1 puff 1 puff Inhalation Given Franchester Gottron, RN     08/01/2021 0912 dexamethasone (DECADRON) injection 8 mg 8 mg Intravenous Given Franchester Gottron, RN     07/31/2021 2242 dexamethasone (DECADRON) injection 8 mg 8 mg Intravenous Given Nate Del Toro RN     07/31/2021 0945 dexamethasone (DECADRON) injection 8 mg 8 mg Intravenous Given Franchester Gottron, RN     07/31/2021 2253 insulin glargine (LANTUS) subcutaneous injection 15 Units 0 15 mL 15 Units Subcutaneous Given Nate Del Toro RN     07/31/2021 1713 insulin lispro (HumaLOG) 100 units/mL subcutaneous injection 10 Units 10 Units Subcutaneous Given Yesenia Turcios RN     07/31/2021 1239 insulin lispro (HumaLOG) 100 units/mL subcutaneous injection 10 Units 10 Units Subcutaneous Given Gaviota Moran RN            Lab, Imaging and other studies: I have personally reviewed pertinent reports      VTE Pharmacologic Prophylaxis: Enoxaparin (Lovenox)  VTE Mechanical Prophylaxis: sequential compression device     Stephy Reddy MD  8/1/2021,9:15 AM

## 2021-08-01 NOTE — ASSESSMENT & PLAN NOTE
Sepsis due to covid pneumonia   As evidence by febrile 100 3, tachycardia tachypnea and elevated white blood cell count of 24 01  In the setting of COVID-please see additional treatment plan for COVID  Follow-up blood cultures obtained x2  Decadron 1 milligram/kilogram  Day 4 of remdesivir  Received Actemra 07/28/2021  Rocephin/doxycycline or discontinued in the setting of negative procalcitonin  Inflammatory markers are improving

## 2021-08-01 NOTE — ASSESSMENT & PLAN NOTE
Baseline O2 5 L nasal cannula  Initially requiring 12 L but is now requiring 6 L   Resolved, now at baseline 5L NC 8/1/21

## 2021-08-01 NOTE — ASSESSMENT & PLAN NOTE
Lab Results   Component Value Date    HGBA1C 8 2 (H) 07/29/2021       Recent Labs     07/31/21  1230 07/31/21  1703 07/31/21  2116 08/01/21  0819   POCGLU 330* 370* 384* 293*       Blood Sugar Average: Last 72 hrs:  (P) 288 [id number]   Collect new A1c    Continue dapagliflozin  lantus 45 units HS  Suspect worsening hyperglycemia is due to steroid use, will add 15 units of Lantus HS and 10 units of Humalog t i d   With meals 07/31/2021

## 2021-08-02 VITALS
RESPIRATION RATE: 14 BRPM | DIASTOLIC BLOOD PRESSURE: 68 MMHG | TEMPERATURE: 98.3 F | HEART RATE: 50 BPM | SYSTOLIC BLOOD PRESSURE: 127 MMHG | HEIGHT: 69 IN | OXYGEN SATURATION: 94 % | WEIGHT: 315 LBS | BODY MASS INDEX: 46.65 KG/M2

## 2021-08-02 LAB
ALBUMIN SERPL BCP-MCNC: 2.7 G/DL (ref 3.5–5)
ALP SERPL-CCNC: 72 U/L (ref 46–116)
ALT SERPL W P-5'-P-CCNC: 74 U/L (ref 12–78)
ANION GAP SERPL CALCULATED.3IONS-SCNC: 6 MMOL/L (ref 4–13)
AST SERPL W P-5'-P-CCNC: 16 U/L (ref 5–45)
BASOPHILS # BLD AUTO: 0.02 THOUSANDS/ΜL (ref 0–0.1)
BASOPHILS NFR BLD AUTO: 0 % (ref 0–1)
BILIRUB SERPL-MCNC: 0.39 MG/DL (ref 0.2–1)
BUN SERPL-MCNC: 36 MG/DL (ref 5–25)
CALCIUM ALBUM COR SERPL-MCNC: 10.7 MG/DL (ref 8.3–10.1)
CALCIUM SERPL-MCNC: 9.7 MG/DL (ref 8.3–10.1)
CHLORIDE SERPL-SCNC: 99 MMOL/L (ref 100–108)
CO2 SERPL-SCNC: 26 MMOL/L (ref 21–32)
CREAT SERPL-MCNC: 0.97 MG/DL (ref 0.6–1.3)
CRP SERPL QL: 24.8 MG/L
D DIMER PPP FEU-MCNC: 0.31 UG/ML FEU
EOSINOPHIL # BLD AUTO: 0 THOUSAND/ΜL (ref 0–0.61)
EOSINOPHIL NFR BLD AUTO: 0 % (ref 0–6)
ERYTHROCYTE [DISTWIDTH] IN BLOOD BY AUTOMATED COUNT: 13.8 % (ref 11.6–15.1)
GFR SERPL CREATININE-BSD FRML MDRD: 86 ML/MIN/1.73SQ M
GLUCOSE SERPL-MCNC: 283 MG/DL (ref 65–140)
GLUCOSE SERPL-MCNC: 317 MG/DL (ref 65–140)
HCT VFR BLD AUTO: 44 % (ref 36.5–49.3)
HGB BLD-MCNC: 14 G/DL (ref 12–17)
IMM GRANULOCYTES # BLD AUTO: 0.15 THOUSAND/UL (ref 0–0.2)
IMM GRANULOCYTES NFR BLD AUTO: 2 % (ref 0–2)
LYMPHOCYTES # BLD AUTO: 0.38 THOUSANDS/ΜL (ref 0.6–4.47)
LYMPHOCYTES NFR BLD AUTO: 4 % (ref 14–44)
MCH RBC QN AUTO: 27.5 PG (ref 26.8–34.3)
MCHC RBC AUTO-ENTMCNC: 31.8 G/DL (ref 31.4–37.4)
MCV RBC AUTO: 86 FL (ref 82–98)
MONOCYTES # BLD AUTO: 0.45 THOUSAND/ΜL (ref 0.17–1.22)
MONOCYTES NFR BLD AUTO: 5 % (ref 4–12)
NEUTROPHILS # BLD AUTO: 8.57 THOUSANDS/ΜL (ref 1.85–7.62)
NEUTS SEG NFR BLD AUTO: 89 % (ref 43–75)
NRBC BLD AUTO-RTO: 0 /100 WBCS
PLATELET # BLD AUTO: 489 THOUSANDS/UL (ref 149–390)
PMV BLD AUTO: 9.3 FL (ref 8.9–12.7)
POTASSIUM SERPL-SCNC: 5.6 MMOL/L (ref 3.5–5.3)
PROCALCITONIN SERPL-MCNC: <0.05 NG/ML
PROT SERPL-MCNC: 7 G/DL (ref 6.4–8.2)
RBC # BLD AUTO: 5.1 MILLION/UL (ref 3.88–5.62)
SODIUM SERPL-SCNC: 131 MMOL/L (ref 136–145)
WBC # BLD AUTO: 9.57 THOUSAND/UL (ref 4.31–10.16)

## 2021-08-02 PROCEDURE — 99239 HOSP IP/OBS DSCHRG MGMT >30: CPT | Performed by: INTERNAL MEDICINE

## 2021-08-02 PROCEDURE — 80053 COMPREHEN METABOLIC PANEL: CPT | Performed by: FAMILY MEDICINE

## 2021-08-02 PROCEDURE — 84145 PROCALCITONIN (PCT): CPT | Performed by: FAMILY MEDICINE

## 2021-08-02 PROCEDURE — 82948 REAGENT STRIP/BLOOD GLUCOSE: CPT

## 2021-08-02 PROCEDURE — 99232 SBSQ HOSP IP/OBS MODERATE 35: CPT | Performed by: INTERNAL MEDICINE

## 2021-08-02 PROCEDURE — 85025 COMPLETE CBC W/AUTO DIFF WBC: CPT | Performed by: FAMILY MEDICINE

## 2021-08-02 PROCEDURE — 94760 N-INVAS EAR/PLS OXIMETRY 1: CPT

## 2021-08-02 PROCEDURE — 85379 FIBRIN DEGRADATION QUANT: CPT | Performed by: FAMILY MEDICINE

## 2021-08-02 PROCEDURE — 86140 C-REACTIVE PROTEIN: CPT | Performed by: FAMILY MEDICINE

## 2021-08-02 PROCEDURE — 94660 CPAP INITIATION&MGMT: CPT

## 2021-08-02 RX ORDER — MELATONIN
2000 DAILY
Qty: 6 TABLET | Refills: 0 | Status: SHIPPED | OUTPATIENT
Start: 2021-08-03 | End: 2021-08-24

## 2021-08-02 RX ORDER — PREDNISONE 20 MG/1
TABLET ORAL
Qty: 9 TABLET | Refills: 0 | Status: SHIPPED | OUTPATIENT
Start: 2021-08-02 | End: 2021-08-10

## 2021-08-02 RX ORDER — ZINC SULFATE 50(220)MG
220 CAPSULE ORAL DAILY
Qty: 2 CAPSULE | Refills: 0 | Status: SHIPPED | OUTPATIENT
Start: 2021-08-03 | End: 2021-08-24

## 2021-08-02 RX ADMIN — FLUTICASONE PROPIONATE 2 PUFF: 220 AEROSOL, METERED RESPIRATORY (INHALATION) at 09:05

## 2021-08-02 RX ADMIN — SERTRALINE HYDROCHLORIDE 50 MG: 50 TABLET ORAL at 08:44

## 2021-08-02 RX ADMIN — HEPARIN SODIUM 5000 UNITS: 5000 INJECTION INTRAVENOUS; SUBCUTANEOUS at 06:35

## 2021-08-02 RX ADMIN — OXYCODONE HYDROCHLORIDE AND ACETAMINOPHEN 1000 MG: 500 TABLET ORAL at 08:44

## 2021-08-02 RX ADMIN — DEXAMETHASONE SODIUM PHOSPHATE 8 MG: 4 INJECTION, SOLUTION INTRA-ARTICULAR; INTRALESIONAL; INTRAMUSCULAR; INTRAVENOUS; SOFT TISSUE at 08:43

## 2021-08-02 RX ADMIN — INSULIN LISPRO 10 UNITS: 100 INJECTION, SOLUTION INTRAVENOUS; SUBCUTANEOUS at 08:50

## 2021-08-02 RX ADMIN — FAMOTIDINE 20 MG: 20 TABLET ORAL at 08:44

## 2021-08-02 RX ADMIN — SALMETEROL XINAFOATE 1 PUFF: 50 POWDER, METERED ORAL; RESPIRATORY (INHALATION) at 09:05

## 2021-08-02 RX ADMIN — CARVEDILOL 12.5 MG: 12.5 TABLET, FILM COATED ORAL at 06:35

## 2021-08-02 RX ADMIN — TIOTROPIUM BROMIDE 18 MCG: 18 CAPSULE ORAL; RESPIRATORY (INHALATION) at 09:06

## 2021-08-02 RX ADMIN — CHOLECALCIFEROL TAB 25 MCG (1000 UNIT) 2000 UNITS: 25 TAB at 08:43

## 2021-08-02 RX ADMIN — INSULIN GLARGINE 45 UNITS: 100 INJECTION, SOLUTION SUBCUTANEOUS at 08:44

## 2021-08-02 RX ADMIN — AMLODIPINE BESYLATE 10 MG: 10 TABLET ORAL at 08:43

## 2021-08-02 RX ADMIN — ZINC SULFATE 220 MG (50 MG) CAPSULE 220 MG: CAPSULE at 08:44

## 2021-08-02 RX ADMIN — LOSARTAN POTASSIUM 100 MG: 50 TABLET, FILM COATED ORAL at 08:43

## 2021-08-02 RX ADMIN — INSULIN LISPRO 6 UNITS: 100 INJECTION, SOLUTION INTRAVENOUS; SUBCUTANEOUS at 08:50

## 2021-08-02 NOTE — ASSESSMENT & PLAN NOTE
Baseline 5 L O2 supplementation initially requiring mid flow 12 L in the setting of severe COPD and COVID positive  Appears stable  Will insure steroid taper following discharge

## 2021-08-02 NOTE — PLAN OF CARE
Problem: Nutrition/Hydration-ADULT  Goal: Nutrient/Hydration intake appropriate for improving, restoring or maintaining nutritional needs  Description: Monitor and assess patient's nutrition/hydration status for malnutrition  Collaborate with interdisciplinary team and initiate plan and interventions as ordered  Monitor patient's weight and dietary intake as ordered or per policy  Utilize nutrition screening tool and intervene as necessary  Determine patient's food preferences and provide high-protein, high-caloric foods as appropriate       INTERVENTIONS:  - Monitor oral intake, urinary output, labs, and treatment plans  - Assess nutrition and hydration status and recommend course of action  - Evaluate amount of meals eaten  - Assist patient with eating if necessary   - Allow adequate time for meals  - Recommend/ encourage appropriate diets, oral nutritional supplements, and vitamin/mineral supplements  - Order, calculate, and assess calorie counts as needed  - Recommend, monitor, and adjust tube feedings and TPN/PPN based on assessed needs  - Assess need for intravenous fluids  - Provide specific nutrition/hydration education as appropriate  - Include patient/family/caregiver in decisions related to nutrition  Outcome: Progressing     Problem: RESPIRATORY - ADULT  Goal: Achieves optimal ventilation and oxygenation  Description: INTERVENTIONS:  - Assess for changes in respiratory status  - Assess for changes in mentation and behavior  - Position to facilitate oxygenation and minimize respiratory effort  - Oxygen administered by appropriate delivery if ordered  - Initiate smoking cessation education as indicated  - Encourage broncho-pulmonary hygiene including cough, deep breathe, Incentive Spirometry  - Assess the need for suctioning and aspirate as needed  - Assess and instruct to report SOB or any respiratory difficulty  - Respiratory Therapy support as indicated  Outcome: Progressing     Problem: METABOLIC, FLUID AND ELECTROLYTES - ADULT  Goal: Electrolytes maintained within normal limits  Description: INTERVENTIONS:  - Monitor labs and assess patient for signs and symptoms of electrolyte imbalances  - Administer electrolyte replacement as ordered  - Monitor response to electrolyte replacements, including repeat lab results as appropriate  - Instruct patient on fluid and nutrition as appropriate  Outcome: Progressing  Goal: Fluid balance maintained  Description: INTERVENTIONS:  - Monitor labs   - Monitor I/O and WT  - Instruct patient on fluid and nutrition as appropriate  - Assess for signs & symptoms of volume excess or deficit  Outcome: Progressing  Goal: Glucose maintained within target range  Description: INTERVENTIONS:  - Monitor Blood Glucose as ordered  - Assess for signs and symptoms of hyperglycemia and hypoglycemia  - Administer ordered medications to maintain glucose within target range  - Assess nutritional intake and initiate nutrition service referral as needed  Outcome: Progressing     Problem: PAIN - ADULT  Goal: Verbalizes/displays adequate comfort level or baseline comfort level  Description: Interventions:  - Encourage patient to monitor pain and request assistance  - Assess pain using appropriate pain scale  - Administer analgesics based on type and severity of pain and evaluate response  - Implement non-pharmacological measures as appropriate and evaluate response  - Consider cultural and social influences on pain and pain management  - Notify physician/advanced practitioner if interventions unsuccessful or patient reports new pain  Outcome: Progressing     Problem: INFECTION - ADULT  Goal: Absence or prevention of progression during hospitalization  Description: INTERVENTIONS:  - Assess and monitor for signs and symptoms of infection  - Monitor lab/diagnostic results  - Monitor all insertion sites, i e  indwelling lines, tubes, and drains  - Monitor endotracheal if appropriate and nasal secretions for changes in amount and color  - [place] appropriate cooling/warming therapies per order  - Administer medications as ordered  - Instruct and encourage patient and family to use good hand hygiene technique  - Identify and instruct in appropriate isolation precautions for identified infection/condition  Outcome: Progressing     Problem: SAFETY ADULT  Goal: Patient will remain free of falls  Description: INTERVENTIONS:  - Educate patient/family on patient safety including physical limitations  - Instruct patient to call for assistance with activity   - Consult OT/PT to assist with strengthening/mobility   - Keep Call bell within reach  - Keep bed low and locked with side rails adjusted as appropriate  - Keep care items and personal belongings within reach  - Initiate and maintain comfort rounds  - Make Fall Risk Sign visible to staff  - Offer Toileting every 2 Hours, in advance of need  - Initiate/Maintain fall alarm  - Obtain necessary fall risk management equipment: nonskid socks, alarm, mobility equipment as needed  - Apply yellow socks and bracelet for high fall risk patients  - Consider moving patient to room near nurses station  Outcome: Progressing  Goal: Maintain or return to baseline ADL function  Description: INTERVENTIONS:  -  Assess patient's ability to carry out ADLs; assess patient's baseline for ADL function and identify physical deficits which impact ability to perform ADLs (bathing, care of mouth/teeth, toileting, grooming, dressing, etc )  - Assess/evaluate cause of self-care deficits   - Assess range of motion  - Assess patient's mobility; develop plan if impaired  - Assess patient's need for assistive devices and provide as appropriate  - Encourage maximum independence but intervene and supervise when necessary  - Involve family in performance of ADLs  - Assess for home care needs following discharge   - Consider OT consult to assist with ADL evaluation and planning for discharge  - Provide patient education as appropriate  Outcome: Progressing  Goal: Maintains/Returns to pre admission functional level  Description: INTERVENTIONS:  - Perform BMAT or MOVE assessment daily    - Set and communicate daily mobility goal to care team and patient/family/caregiver  - Collaborate with rehabilitation services on mobility goals if consulted  - Perform Range of Motion 4 times a day  - Reposition patient every 2 hours  - Dangle patient 3 times a day  - Stand patient 3 times a day  - Ambulate patient 3 times a day  - Out of bed to chair 3 times a day   - Out of bed for meals 3 times a day  - Out of bed for toileting  - Record patient progress and toleration of activity level   Outcome: Progressing     Problem: DISCHARGE PLANNING  Goal: Discharge to home or other facility with appropriate resources  Description: INTERVENTIONS:  - Identify barriers to discharge w/patient and caregiver  - Arrange for needed discharge resources and transportation as appropriate  - Identify discharge learning needs (meds, wound care, etc )  - Arrange for interpretive services to assist at discharge as needed  - Refer to Case Management Department for coordinating discharge planning if the patient needs post-hospital services based on physician/advanced practitioner order or complex needs related to functional status, cognitive ability, or social support system  Outcome: Progressing     Problem: Knowledge Deficit  Goal: Patient/family/caregiver demonstrates understanding of disease process, treatment plan, medications, and discharge instructions  Description: Complete learning assessment and assess knowledge base    Interventions:  - Provide teaching at level of understanding  - Provide teaching via preferred learning methods  Outcome: Progressing     Problem: Potential for Falls  Goal: Patient will remain free of falls  Description: INTERVENTIONS:  - Educate patient/family on patient safety including physical limitations  - Instruct patient to call for assistance with activity   - Consult OT/PT to assist with strengthening/mobility   - Keep Call bell within reach  - Keep bed low and locked with side rails adjusted as appropriate  - Keep care items and personal belongings within reach  - Initiate and maintain comfort rounds  - Make Fall Risk Sign visible to staff  - Offer Toileting every 2 Hours, in advance of need  - Initiate/Maintain fall alarm  - Obtain necessary fall risk management equipment: nonskid socks, alarm, mobility equipment as needed  - Apply yellow socks and bracelet for high fall risk patients  - Consider moving patient to room near nurses station  Outcome: Progressing

## 2021-08-02 NOTE — CASE MANAGEMENT
Pt is being discharged today  Pt's wife will give the patient a ride home  Pt has a follow up appointment with Pulmonary already  I in basket messaged patient's PCP to call patient to arrange a virtual appointment  AVS and discharge instructions reviewed with patient with a good understanding  Case Management reviewed discharge planning process including the following: identifying help that is needed at home, pt's preference for discharge needs and Meds at [facility]  Reviewed with Pt that any member of the healthcare team can answer questions regarding : medications, jmportance of recognizing  Signs and symptoms of any  medical problems  Case Management also encouraged pt to follow up with all recommended appointments after discharge

## 2021-08-02 NOTE — ASSESSMENT & PLAN NOTE
Sepsis secondary to covid pneumonia - diagnosis based on fevers, tachycardia, tachypnea, leukocytosis  Sepsis remains resolved

## 2021-08-02 NOTE — DISCHARGE INSTR - AVS FIRST PAGE

## 2021-08-02 NOTE — ASSESSMENT & PLAN NOTE
Potentially hypovolemic, improved during hospitalization  Mild continued hyponatremia at time of discharge, with a sodium of 131

## 2021-08-02 NOTE — RESPIRATORY THERAPY NOTE
08/02/21 0036   Respiratory Assessment   Assessment Type Assess only   Respiratory Pattern Dyspnea with exertion   Chest Assessment Chest expansion symmetrical;Trachea midline   Bilateral Breath Sounds Diminished   Cough Non-productive   Resp Comments Patient was up and walking around  He states he is feeling better   Patient is not have any wheezing   Non-Invasive Information   O2 Interface Device Full face mask   Non-Invasive Ventilation Mode BiPAP   $ Intermittent NIV Yes   $ Pulse Oximetry Spot Check Charge Completed   Non-Invasive Settings   FiO2 (%) 38   IPAP (cm) 18 cm   EPAP (cm) 9 cm   Rate (Set) 10   Pressure Support (cm H2O) 9   Rise Time 3   Inspiratory Time (Set) 0 9   Non-Invasive Readings   Skin Intervention Mask rotated;Skin intact   Total Rate 20   Vt (mL) (Mech) 920   MV (Mech) 19 4   Peak Pressure (Obs) 18   Non-Invasive Alarms   Insp Pressure High (cm H20) 40   Insp Pressure Low (cm H20) 4   Low Insp Pressure Time (sec) 20 sec   MV Low (L/min) 4   Vt High (mL) 2650   Vt Low (mL) 150   High Resp Rate (BPM) 50 BPM   Low Resp Rate (BPM) 8 BPM   Apnea Interval (sec) 20   Apnea Volume (mL) 7

## 2021-08-02 NOTE — ASSESSMENT & PLAN NOTE
Continue home medications upon discharge  Please note patient has mild hyperkalemia upon spironolactone - will recommend a low-potassium diet upon discharge, repeat BMP in 3-5 days to ensure stability

## 2021-08-02 NOTE — ASSESSMENT & PLAN NOTE
Lab Results   Component Value Date    HGBA1C 8 2 (H) 07/29/2021       Recent Labs     08/01/21  1141 08/01/21  1600 08/01/21 2022 08/02/21  0810   POCGLU 406* 409* 347* 283*       Blood Sugar Average: Last 72 hrs:  (P) 405 [id number]   Collect new A1c    Continue dapagliflozin, basal insulin upon discharge  Worsening blood sugars are in the setting of steroid use and acute infection

## 2021-08-02 NOTE — PLAN OF CARE
Problem: Nutrition/Hydration-ADULT  Goal: Nutrient/Hydration intake appropriate for improving, restoring or maintaining nutritional needs  Description: Monitor and assess patient's nutrition/hydration status for malnutrition  Collaborate with interdisciplinary team and initiate plan and interventions as ordered  Monitor patient's weight and dietary intake as ordered or per policy  Utilize nutrition screening tool and intervene as necessary  Determine patient's food preferences and provide high-protein, high-caloric foods as appropriate       INTERVENTIONS:  - Monitor oral intake, urinary output, labs, and treatment plans  - Assess nutrition and hydration status and recommend course of action  - Evaluate amount of meals eaten  - Assist patient with eating if necessary   - Allow adequate time for meals  - Recommend/ encourage appropriate diets, oral nutritional supplements, and vitamin/mineral supplements  - Order, calculate, and assess calorie counts as needed  - Recommend, monitor, and adjust tube feedings and TPN/PPN based on assessed needs  - Assess need for intravenous fluids  - Provide specific nutrition/hydration education as appropriate  - Include patient/family/caregiver in decisions related to nutrition  8/2/2021 1100 by Blade Grullon LPN  Outcome: Adequate for Discharge  8/2/2021 0752 by Blade Grullon LPN  Outcome: Progressing     Problem: RESPIRATORY - ADULT  Goal: Achieves optimal ventilation and oxygenation  Description: INTERVENTIONS:  - Assess for changes in respiratory status  - Assess for changes in mentation and behavior  - Position to facilitate oxygenation and minimize respiratory effort  - Oxygen administered by appropriate delivery if ordered  - Initiate smoking cessation education as indicated  - Encourage broncho-pulmonary hygiene including cough, deep breathe, Incentive Spirometry  - Assess the need for suctioning and aspirate as needed  - Assess and instruct to report SOB or any respiratory difficulty  - Respiratory Therapy support as indicated  8/2/2021 1100 by Kinga Alvarenga LPN  Outcome: Adequate for Discharge  8/2/2021 0752 by Kinga Alvarenga LPN  Outcome: Progressing     Problem: METABOLIC, FLUID AND ELECTROLYTES - ADULT  Goal: Electrolytes maintained within normal limits  Description: INTERVENTIONS:  - Monitor labs and assess patient for signs and symptoms of electrolyte imbalances  - Administer electrolyte replacement as ordered  - Monitor response to electrolyte replacements, including repeat lab results as appropriate  - Instruct patient on fluid and nutrition as appropriate  8/2/2021 1100 by Kinga Alvarenga LPN  Outcome: Adequate for Discharge  8/2/2021 0752 by Kinga Alvarenga LPN  Outcome: Progressing  Goal: Fluid balance maintained  Description: INTERVENTIONS:  - Monitor labs   - Monitor I/O and WT  - Instruct patient on fluid and nutrition as appropriate  - Assess for signs & symptoms of volume excess or deficit  8/2/2021 1100 by Kinga Alvarenga LPN  Outcome: Adequate for Discharge  8/2/2021 0752 by Kinga Alvarenga LPN  Outcome: Progressing  Goal: Glucose maintained within target range  Description: INTERVENTIONS:  - Monitor Blood Glucose as ordered  - Assess for signs and symptoms of hyperglycemia and hypoglycemia  - Administer ordered medications to maintain glucose within target range  - Assess nutritional intake and initiate nutrition service referral as needed  8/2/2021 1100 by Kinga Alvarenga LPN  Outcome: Adequate for Discharge  8/2/2021 0752 by Kinga Alvarenga LPN  Outcome: Progressing     Problem: PAIN - ADULT  Goal: Verbalizes/displays adequate comfort level or baseline comfort level  Description: Interventions:  - Encourage patient to monitor pain and request assistance  - Assess pain using appropriate pain scale  - Administer analgesics based on type and severity of pain and evaluate response  - Implement non-pharmacological measures as appropriate and evaluate response  - Consider cultural and social influences on pain and pain management  - Notify physician/advanced practitioner if interventions unsuccessful or patient reports new pain  8/2/2021 1100 by Kristina Mora LPN  Outcome: Adequate for Discharge  8/2/2021 0752 by Kristina Mora LPN  Outcome: Progressing     Problem: INFECTION - ADULT  Goal: Absence or prevention of progression during hospitalization  Description: INTERVENTIONS:  - Assess and monitor for signs and symptoms of infection  - Monitor lab/diagnostic results  - Monitor all insertion sites, i e  indwelling lines, tubes, and drains  - Monitor endotracheal if appropriate and nasal secretions for changes in amount and color  - Roberta appropriate cooling/warming therapies per order  - Administer medications as ordered  - Instruct and encourage patient and family to use good hand hygiene technique  - Identify and instruct in appropriate isolation precautions for identified infection/condition  8/2/2021 1100 by Kristina Mora LPN  Outcome: Adequate for Discharge  8/2/2021 0752 by Kristina Mora LPN  Outcome: Progressing     Problem: SAFETY ADULT  Goal: Patient will remain free of falls  Description: INTERVENTIONS:  - Educate patient/family on patient safety including physical limitations  - Instruct patient to call for assistance with activity   - Consult OT/PT to assist with strengthening/mobility   - Keep Call bell within reach  - Keep bed low and locked with side rails adjusted as appropriate  - Keep care items and personal belongings within reach  - Initiate and maintain comfort rounds  - Make Fall Risk Sign visible to staff  - Offer Toileting every 2 Hours, in advance of need  - Initiate/Maintain fall alarm  - Obtain necessary fall risk management equipment: nonskid socks, alarm, mobility equipment as needed  - Apply yellow socks and bracelet for high fall risk patients  - Consider moving patient to room near nurses station  8/2/2021 1100 by Boston Casiano LPN  Outcome: Adequate for Discharge  8/2/2021 9007 by Boston Casiano LPN  Outcome: Progressing  Goal: Maintain or return to baseline ADL function  Description: INTERVENTIONS:  -  Assess patient's ability to carry out ADLs; assess patient's baseline for ADL function and identify physical deficits which impact ability to perform ADLs (bathing, care of mouth/teeth, toileting, grooming, dressing, etc )  - Assess/evaluate cause of self-care deficits   - Assess range of motion  - Assess patient's mobility; develop plan if impaired  - Assess patient's need for assistive devices and provide as appropriate  - Encourage maximum independence but intervene and supervise when necessary  - Involve family in performance of ADLs  - Assess for home care needs following discharge   - Consider OT consult to assist with ADL evaluation and planning for discharge  - Provide patient education as appropriate  8/2/2021 1100 by Boston Casiano LPN  Outcome: Adequate for Discharge  8/2/2021 0752 by Boston Casiano LPN  Outcome: Progressing  Goal: Maintains/Returns to pre admission functional level  Description: INTERVENTIONS:  - Perform BMAT or MOVE assessment daily    - Set and communicate daily mobility goal to care team and patient/family/caregiver  - Collaborate with rehabilitation services on mobility goals if consulted  - Perform Range of Motion 4 times a day  - Reposition patient every 2 hours    - Dangle patient 3 times a day  - Stand patient 3 times a day  - Ambulate patient 3 times a day  - Out of bed to chair 3 times a day   - Out of bed for meals 3 times a day  - Out of bed for toileting  - Record patient progress and toleration of activity level   8/2/2021 1100 by Boston Casiano LPN  Outcome: Adequate for Discharge  8/2/2021 0752 by Boston Casiano LPN  Outcome: Progressing     Problem: DISCHARGE PLANNING  Goal: Discharge to home or other facility with appropriate resources  Description: INTERVENTIONS:  - Identify barriers to discharge w/patient and caregiver  - Arrange for needed discharge resources and transportation as appropriate  - Identify discharge learning needs (meds, wound care, etc )  - Arrange for interpretive services to assist at discharge as needed  - Refer to Case Management Department for coordinating discharge planning if the patient needs post-hospital services based on physician/advanced practitioner order or complex needs related to functional status, cognitive ability, or social support system  8/2/2021 1100 by Mark Willoughby LPN  Outcome: Adequate for Discharge  8/2/2021 0752 by Mark Willoughby LPN  Outcome: Progressing     Problem: Knowledge Deficit  Goal: Patient/family/caregiver demonstrates understanding of disease process, treatment plan, medications, and discharge instructions  Description: Complete learning assessment and assess knowledge base    Interventions:  - Provide teaching at level of understanding  - Provide teaching via preferred learning methods  8/2/2021 1100 by Mark Willoughby LPN  Outcome: Adequate for Discharge  8/2/2021 0752 by Mark Willoughby LPN  Outcome: Progressing     Problem: Potential for Falls  Goal: Patient will remain free of falls  Description: INTERVENTIONS:  - Educate patient/family on patient safety including physical limitations  - Instruct patient to call for assistance with activity   - Consult OT/PT to assist with strengthening/mobility   - Keep Call bell within reach  - Keep bed low and locked with side rails adjusted as appropriate  - Keep care items and personal belongings within reach  - Initiate and maintain comfort rounds  - Make Fall Risk Sign visible to staff  - Offer Toileting every 2 Hours, in advance of need  - Initiate/Maintain fall alarm  - Obtain necessary fall risk management equipment: nonskid socks, alarm, mobility equipment as needed  - Apply yellow socks and bracelet for high fall risk patients  - Consider moving patient to room near nurses station  8/2/2021 1100 by Ondina Hernandez LPN  Outcome: Adequate for Discharge  8/2/2021 0752 by Ondina Hernandez LPN  Outcome: Progressing

## 2021-08-02 NOTE — NURSING NOTE
Patient left floor in stable condition via wheelchair accompanied by LPN  Discharge instructions reviewed with verbal understanding

## 2021-08-02 NOTE — DISCHARGE SUMMARY
[address]  Discharge- [place] 1965, 61 y o  male MRN: [id number]  Unit/Bed#: 427-01 Encounter: [id number]  Primary Care Provider: Myrtha Mcburney, PA-C   Date and time admitted to hospital: 7/28/2021  7:59 PM    * COVID-19  Assessment & Plan  · Acute hypoxic respiratory failure has resolved  · Has remained on IV steroids - given underlying COPD may benefit from taper following discharge  · Inflammatory markers trended and all improved significantly  · Remains on high potency statin at baseline  · Was maintained on chemical DVT prophylaxis - does not meet criteria for discharge anticoagulation per guideline  · Continue vitamins following discharge  · Receive remdesivir, Actemra while hospitalized  · Initially on antibiotics, discharged with negative procalcitonin levels  · Will insure pulmonology follow-up after discharge as well, and PCP follow-up within 3 days  Acute on chronic respiratory failure with hypoxia (HCC)  Assessment & Plan  Back to baseline O2 5 L by nasal cannula - acute portion of respiratory failure has resolved  Sepsis due to COVID-19 [facility])  Assessment & Plan  Sepsis secondary to covid pneumonia - diagnosis based on fevers, tachycardia, tachypnea, leukocytosis  Sepsis remains resolved  Hyponatremia  Assessment & Plan  Potentially hypovolemic, improved during hospitalization  Mild continued hyponatremia at time of discharge, with a sodium of 131  Type 2 diabetes mellitus with hemoglobin A1c goal of less than 8 0% [facility])  Assessment & Plan  Lab Results   Component Value Date    HGBA1C 8 2 (H) 07/29/2021       Recent Labs     08/01/21  1141 08/01/21  1600 08/01/21 2022 08/02/21  0810   POCGLU 406* 409* 347* 283*       Blood Sugar Average: Last 72 hrs:  (P) 557 [id number]   Collect new A1c    Continue dapagliflozin, basal insulin upon discharge  Worsening blood sugars are in the setting of steroid use and acute infection      COPD, severe [facility])  Assessment & Plan  Baseline 5 L O2 supplementation initially requiring mid flow 12 L in the setting of severe COPD and COVID positive  Appears stable  Will insure steroid taper following discharge  Morbid obesity with BMI of 50 0-59 9, adult [facility])  Assessment & Plan  History noted  Essential hypertension  Assessment & Plan  Continue home medications upon discharge  Please note patient has mild hyperkalemia upon spironolactone - will recommend a low-potassium diet upon discharge, repeat BMP in 3-5 days to ensure stability  Aortic stenosis  Assessment & Plan  Moderate stenosis by ECHO 6/2021  Discharging Physician / Practitioner: Monika Lo MD  PCP: Preethi Velásquez PA-C  Admission Date:   Admission Orders (From admission, onward)     Ordered        07/28/21 2210  Inpatient Admission  Once                   Discharge Date: 08/02/21    Medical Problems     Resolved Problems  Date Reviewed: 8/2/2021    None                Consultations During Hospital Stay:  · Pulmonology    Procedures Performed:   · None    Significant Findings / Test Results:   XR chest 1 view portable    Result Date: 7/29/2021  Impression: Increased density is patchy multifocal airspace opacities throughout the mid and lower lung zones with either worsening of viral pneumonia due to COVID-19 infection or, in the appropriate clinical setting, possibly developing superimposed bilateral pneumonia  Workstation performed: [id number]     Incidental Findings:   · As above     Test Results Pending at Discharge (will require follow up): · None     Outpatient Tests Requested:  · BMP    Complications:  None    Reason for Admission: COVID-19 Pneumonia, Sepsis    HPI from original H&P dated 07/28/2021:     Jolene Aly is a 61 y o  male current every day smoker, history of severe COPD, diabetes, hypertension hyperlipidemia and aortic stenosis presented to the emergency room for evaluation of increased shortness of breath    Upon arrival patient was noted to be 64% on room air  Patient states wears 5 L nasal cannula currently requiring 12 L mid flow to maintain SpO2 sat greater than 90%  Patient reports tested COVID positive last Thursday recommended admission at that time patient declined went home on antibiotics  Patient noted increased shortness of breath was worsened today  Patient admits to of cough with some mucus does not describe denies chest pain denies lightheadedness dizziness nausea vomiting abdominal pain denies lower extremity swelling  Images and labs were collected emergency room-see below  Patient will be admitted to [facility]-pulmonary will be consulted  Please see above list of diagnoses and related plan for additional information  Condition at Discharge: stable     Discharge Day Visit / Exam:     * Please refer to separate progress note for these details *      Discharge instructions/Information to patient and family:   See after visit summary for information provided to patient and family  Provisions for Follow-Up Care:  See after visit summary for information related to follow-up care and any pertinent home health orders  Disposition:     Home    For Discharges to [facility] SNF:   · Not Applicable to this Patient - Not Applicable to this Patient    Planned Readmission: No     Discharge Statement:  I spent 40 minutes discharging the patient  This time was spent on the day of discharge  I had direct contact with the patient on the day of discharge  Greater than 50% of the total time was spent examining patient, answering all patient questions, arranging and discussing plan of care with patient as well as directly providing post-discharge instructions  Additional time then spent on discharge activities  Discharge Medications:  See after visit summary for reconciled discharge medications provided to patient and family        ** Please Note: This note has been constructed using a voice recognition system **

## 2021-08-02 NOTE — ASSESSMENT & PLAN NOTE
· Acute hypoxic respiratory failure has resolved  · Has remained on IV steroids - given underlying COPD may benefit from taper following discharge  · Inflammatory markers trended and all improved significantly  · Remains on high potency statin at baseline  · Was maintained on chemical DVT prophylaxis - does not meet criteria for discharge anticoagulation per guideline  · Continue vitamins following discharge  · Receive remdesivir, Actemra while hospitalized  · Initially on antibiotics, discharged with negative procalcitonin levels  · Will insure pulmonology follow-up after discharge as well, and PCP follow-up within 3 days

## 2021-08-02 NOTE — PROGRESS NOTES
Progress Note - Pulmonary   Usha Abo 61 y o  male MRN: [id number]  Unit/Bed#: 427-01 Encounter: [id number]    Assessment/Plan:    1  Acute on chronic hypoxic respiratory failure   1  Currently on 5L NC   Oxygen requirement at baseline 5 L nasal cannula   2  Titrate oxygen maintain SpO2 greater than or equal to 88%   3  Pulmonary toilet: Increase activity as tolerated, out of bed as tolerated, cough and deep breathing as encouraged, incentive spirometry q 1 hour, self proning encouraged  2  Moderate COVID-19 pneumonia  1  Of note,  He was vaccinated with Moderna vaccine in March at [address]  2  Completed 5 doses of remdesivir 8/1/21   3  Discontinued ceftriaxone and doxycycline after PCT WNL x2 earlier this admission  4  Responding well to increased dose of decadron- may go home on prednisone taper   5  Received Actemra 800 mg IV once   6  Not a candidate for convalescent plasma  7  Continue rest of COVID protocol per algorithm including vitamins, Pepcid, and atorvastatin  8  Trend D-dimer, CRP, ferritin (trending down)  9  Does not require A/C with eliquis at discharge   3  Severe COPD without acute exacerbation  1  Systemic steroids as above  2  Continue Serevent, Flovent, and Spiriva while inpatient   3  Continue as needed albuterol HFA 2 puffs q 6 hours  4  Resume Anoro 1 puff daily, Flovent  mcg 2 puffs BID, and as needed DuoNeb q6hrs prn and albuterol HFA 2 puffs q6hrs prn   4  Type 2 diabetes mellitus  1  Monitor blood sugars closely in the setting of systemic steroid use  2  Further treatment per primary team  5  Morbid obesity  1  Weight loss encouraged   6  SCOTT  1  Continue BIPAP with filter during all hours of sleep     Plan of care discussed with patient and Dr Clarissa Larios  Stable for discharge, will sign off  Call with questions  Outpatient pulmonary follow-up will be communicated with patient via phone call as he was discharged before appointment was made       Chief Complaint:    "I feel okay "    Subjective:    Patient was seen and examined today  No overnight events reported  Patient is seen sitting on edge of bed on 5L NC with no distress  He states that he feels much better  No fevers or chills  RUTHERFORD is improved and notes only mild desaturations when he gets up and moves to the mid 80s that rebounds quickly  No wheezing  Has some cough that is less frequent and less productive  He feels that he can take care of himself at home  Objective:    Vitals: Blood pressure 127/68, pulse (!) 50, temperature 98 3 °F (36 8 °C), resp  rate 14, height 5' 9" (1 753 m), weight (!) 153 kg (337 lb 4 9 oz), SpO2 94 %  5L NC,Body mass index is 49 81 kg/m²  Intake/Output Summary (Last 24 hours) at 8/2/2021 1226  Last data filed at 8/1/2021 1250  Gross per 24 hour   Intake 360 ml   Output --   Net 360 ml       Invasive Devices     None                 Physical Exam:     Physical Exam  Vitals and nursing note reviewed  Constitutional:       General: He is not in acute distress  Appearance: Normal appearance  He is obese  HENT:      Head: Normocephalic and atraumatic  Right Ear: External ear normal       Left Ear: External ear normal       Nose: Nose normal       Mouth/Throat:      Mouth: Mucous membranes are moist       Pharynx: Oropharynx is clear  Eyes:      Extraocular Movements: Extraocular movements intact  Conjunctiva/sclera: Conjunctivae normal       Pupils: Pupils are equal, round, and reactive to light  Cardiovascular:      Rate and Rhythm: Normal rate and regular rhythm  Pulses: Normal pulses  Heart sounds: No murmur heard  Pulmonary:      Effort: Pulmonary effort is normal  No respiratory distress  Breath sounds: No wheezing  Comments: Diminished but otherwise CTA bilaterally  Abdominal:      General: Bowel sounds are normal       Palpations: Abdomen is soft  There is no mass  Tenderness: There is no abdominal tenderness  Hernia: No hernia is present  Musculoskeletal:         General: No tenderness or deformity  Normal range of motion  Cervical back: Normal range of motion and neck supple  No muscular tenderness  Right lower leg: No edema  Left lower leg: No edema  Skin:     General: Skin is warm and dry  Neurological:      General: No focal deficit present  Mental Status: He is alert and oriented to person, place, and time  Mental status is at baseline  Psychiatric:         Mood and Affect: Mood normal          Behavior: Behavior normal          Thought Content: Thought content normal          Judgment: Judgment normal          Labs: I have personally reviewed pertinent lab results  , ABG: No results found for: PHART, KWA7OIT, PO2ART, DYI8XGS, M4IXGNUS, BEART, SOURCE, BNP: No results found for: BNP, CBC:   Lab Results   Component Value Date    WBC 9 57 08/02/2021    HGB 14 0 08/02/2021    HCT 44 0 08/02/2021    MCV 86 08/02/2021     (H) 08/02/2021    MCH 27 5 08/02/2021    MCHC 31 8 08/02/2021    RDW 13 8 08/02/2021    MPV 9 3 08/02/2021    NRBC 0 08/02/2021   , CMP:   Lab Results   Component Value Date    SODIUM 131 (L) 08/02/2021    K 5 6 (H) 08/02/2021    CL 99 (L) 08/02/2021    CO2 26 08/02/2021    BUN 36 (H) 08/02/2021    CREATININE 0 97 08/02/2021    CALCIUM 9 7 08/02/2021    AST 16 08/02/2021    ALT 74 08/02/2021    ALKPHOS 72 08/02/2021    EGFR 86 08/02/2021   , PT/INR: No results found for: PT, INR, Troponin: No results found for: TROPONINI  D   Dimer WNL  CRP trending down, currently 24    Imaging and other studies: none to review today

## 2021-08-03 ENCOUNTER — TRANSITIONAL CARE MANAGEMENT (OUTPATIENT)
Dept: FAMILY MEDICINE CLINIC | Facility: CLINIC | Age: 56
End: 2021-08-03

## 2021-08-03 LAB
BACTERIA BLD CULT: NORMAL
BACTERIA BLD CULT: NORMAL

## 2021-08-03 NOTE — UTILIZATION REVIEW
Notification of Discharge   This is a Notification of Discharge from our facility [address]  Please be advised that this patient has been discharge from our facility  Below you will find the admission and discharge date and time including the patients disposition  UTILIZATION REVIEW CONTACT:  Sergio Rust  Utilization   Network Utilization Review Department  Phone: [phone] x carefully listen to the prompts  All voicemails are confidential   Email: [email or web address]     PHYSICIAN ADVISORY SERVICES:  FOR [id number] REVIEW - MEDICAL NECESSITY DENIAL  Phone: [phone]  Fax: [phone]  Email: [email or web address]     PRESENTATION DATE: 7/28/2021  7:59 PM  OBERVATION ADMISSION DATE:   INPATIENT ADMISSION DATE: 7/28/21 10:08 PM   DISCHARGE DATE: 8/2/2021 11:30 AM  DISPOSITION: Home/Self Care Home/Self Care      IMPORTANT INFORMATION:  Send all requests for admission clinical reviews, approved or denied determinations and any other requests to dedicated fax number below belonging to the campus where the patient is receiving treatment   List of dedicated fax numbers:  [address] DENIALS (Administrative/Medical Necessity) [phone]   [address] [place] [address] (Maternity/NICU/Pediatrics) [phone]   Renetta Navarrete [phone]   Alice Mayo [phone]   Jeremy Pepper [phone]   Deena Lance [address] [phone]   [facility]  [phone]   [address] [facility] [address] [facility]  [address] [facility] 1000 [facility] [phone]

## 2021-08-05 ENCOUNTER — TELEMEDICINE (OUTPATIENT)
Dept: FAMILY MEDICINE CLINIC | Facility: CLINIC | Age: 56
End: 2021-08-05
Payer: COMMERCIAL

## 2021-08-05 VITALS — TEMPERATURE: 97.4 F | BODY MASS INDEX: 46.65 KG/M2 | WEIGHT: 315 LBS | HEIGHT: 69 IN

## 2021-08-05 DIAGNOSIS — Z09 HOSPITAL DISCHARGE FOLLOW-UP: Primary | ICD-10-CM

## 2021-08-05 DIAGNOSIS — U07.1 COVID-19: ICD-10-CM

## 2021-08-05 PROCEDURE — 99214 OFFICE O/P EST MOD 30 MIN: CPT | Performed by: PHYSICIAN ASSISTANT

## 2021-08-05 PROCEDURE — 1111F DSCHRG MED/CURRENT MED MERGE: CPT | Performed by: PHYSICIAN ASSISTANT

## 2021-08-05 PROCEDURE — 3008F BODY MASS INDEX DOCD: CPT | Performed by: PHYSICIAN ASSISTANT

## 2021-08-05 RX ORDER — ZINC GLUCONATE 50 MG
50 TABLET ORAL
COMMUNITY
End: 2021-08-24

## 2021-08-05 RX ORDER — SEMAGLUTIDE 1.34 MG/ML
INJECTION, SOLUTION SUBCUTANEOUS
COMMUNITY
Start: 2021-07-26 | End: 2021-10-26

## 2021-08-05 NOTE — PROGRESS NOTES
Virtual TCM Visit:    Verification of patient location:    Patient is located in the following state in which I hold an active license [place]        Assessment/Plan:        Problem List Items Addressed This Visit        Other    COVID-19      Other Visit Diagnoses     Hospital discharge follow-up    -  Primary         Pt to have BMP drawn at earliest convenience per hospital discharge record to recheck potassium level  Reason for visit is Covid 19 admission follow up  Encounter provider Toñito Lion PA-C       Provider located at [address] [address]      Recent Visits  No visits were found meeting these conditions  Showing recent visits within past 7 days and meeting all other requirements  Today's Visits  Date Type Provider Dept   08/05/21 Telemedicine Toñito Lion PA-C [facility]   Showing today's visits and meeting all other requirements  Future Appointments  No visits were found meeting these conditions  Showing future appointments within next 150 days and meeting all other requirements       After connecting through [employer], the patient was identified by name and date of birth  Karyna Denney was informed that this is a telemedicine visit and that the visit is being conducted through telephone  My office door was closed  No one else was in the room  He acknowledged consent and understanding of privacy and security of the video platform  The patient has agreed to participate and understands they can discontinue the visit at any time  Patient is aware this is a billable service  Subjective:     Patient ID: Karyna Denney is a 61 y o  male  Jey's appointment today is TCM due to Covid 19 admission, was admitted 7/28-8/2/21  Pt tested positive for Covid 19 on 7/22/21  He has follow up scheduled with pulmonology, continuous oxygen is back to baseline at 5L and is satting around 96%   He does get easily fatigued but overall feeling slightly better each day  Review of Systems   Constitutional: Positive for fatigue  Negative for activity change, appetite change, chills, diaphoresis, fever and unexpected weight change  HENT: Negative for congestion, ear pain, postnasal drip, rhinorrhea, sinus pressure, sinus pain, sneezing, sore throat, tinnitus and voice change  Eyes: Negative for pain, redness and visual disturbance  Respiratory: Negative for cough, chest tightness, shortness of breath and wheezing  Cardiovascular: Negative for chest pain, palpitations and leg swelling  Gastrointestinal: Negative for abdominal pain, blood in stool, constipation, diarrhea, nausea and vomiting  Genitourinary: Negative for difficulty urinating, dysuria, frequency, hematuria and urgency  Musculoskeletal: Negative for arthralgias, back pain, gait problem, joint swelling, myalgias, neck pain and neck stiffness  Skin: Negative for color change, pallor, rash and wound  Neurological: Negative for dizziness, tremors, weakness, light-headedness and headaches  Psychiatric/Behavioral: Negative for dysphoric mood, self-injury, sleep disturbance and suicidal ideas  The patient is not nervous/anxious  Objective:    Vitals:    08/05/21 1044   Temp: (!) 97 4 °F (36 3 °C)   Weight: (!) 153 kg (337 lb)   Height: 5' 9" (1 753 m)        It was my intent to perform this visit via video technology but the patient was not able to do a video connection so the visit was completed via audio telephone only  Transitional Care Management Review:  Lillie Hendrickson is a 61 y o  male here for TCM follow up       During the TCM phone call patient stated:    TCM Call (since 7/5/2021)     Date and time call was made  8/3/2021 10:56 AM    Patient was hospitialized at  [address] [place] [address]        Date of Admission  07/28/21    Date of discharge  08/02/21    Diagnosis  COVID 19 WITH HYPOXIA     Disposition  Home    Were the patients medications reviewed and updated  No    Current Symptoms  None      TCM Call (since 7/5/2021)     Post hospital issues  None    Should patient be enrolled in anticoag monitoring? No    Scheduled for follow up? Yes (Comment)  VIRTUAL MARCELA 8/5/21    Patients specialists  Pulmonlolgist    Pulmonologist name  DR Robb Patient     Did you obtain your prescribed medications  Yes    Do you need help managing your prescriptions or medications  No    Is transportation to your appointment needed  No    I have advised the patient to call PCP with any new or worsening symptoms  DIOGENES ZIMMERMAN-          I spent 10 minutes with the patient during this visit  Mena Schwab, PA-C      VIRTUAL VISIT DISCLAIMER    Arleen Claros verbally agrees to participate in [employer]  Pt is aware that [employer] could be limited without vital signs or the ability to perform a full hands-on physical Bethany Cloud understands he or the provider may request at any time to terminate the video visit and request the patient to seek care or treatment in person

## 2021-08-06 ENCOUNTER — TELEPHONE (OUTPATIENT)
Dept: FAMILY MEDICINE CLINIC | Facility: CLINIC | Age: 56
End: 2021-08-06

## 2021-08-06 NOTE — TELEPHONE ENCOUNTER
Pt called stating he has a sore throat that is unbearable and that he has tried numerous medications  Pt asking if there is anything you can prescribe or any advice?

## 2021-08-14 DIAGNOSIS — J44.9 COPD, SEVERE (HCC): ICD-10-CM

## 2021-08-16 DIAGNOSIS — J44.9 COPD, SEVERE (HCC): ICD-10-CM

## 2021-08-16 RX ORDER — UMECLIDINIUM BROMIDE AND VILANTEROL TRIFENATATE 62.5; 25 UG/1; UG/1
1 POWDER RESPIRATORY (INHALATION) DAILY
Qty: 60 BLISTER | Refills: 2 | Status: SHIPPED | OUTPATIENT
Start: 2021-08-16 | End: 2021-12-30 | Stop reason: SDUPTHER

## 2021-08-16 RX ORDER — UMECLIDINIUM BROMIDE AND VILANTEROL TRIFENATATE 62.5; 25 UG/1; UG/1
POWDER RESPIRATORY (INHALATION)
Qty: 60 BLISTER | Refills: 0 | Status: SHIPPED | OUTPATIENT
Start: 2021-08-16 | End: 2021-08-16 | Stop reason: SDUPTHER

## 2021-08-22 DIAGNOSIS — F32.A ANXIETY AND DEPRESSION: ICD-10-CM

## 2021-08-22 DIAGNOSIS — F41.9 ANXIETY AND DEPRESSION: ICD-10-CM

## 2021-08-24 ENCOUNTER — OFFICE VISIT (OUTPATIENT)
Dept: PULMONOLOGY | Facility: CLINIC | Age: 56
End: 2021-08-24
Payer: COMMERCIAL

## 2021-08-24 VITALS
BODY MASS INDEX: 51.69 KG/M2 | HEART RATE: 97 BPM | SYSTOLIC BLOOD PRESSURE: 145 MMHG | OXYGEN SATURATION: 92 % | DIASTOLIC BLOOD PRESSURE: 78 MMHG | TEMPERATURE: 98.5 F | WEIGHT: 315 LBS

## 2021-08-24 DIAGNOSIS — G47.33 OSA (OBSTRUCTIVE SLEEP APNEA): Primary | ICD-10-CM

## 2021-08-24 DIAGNOSIS — J96.11 CHRONIC HYPOXEMIC RESPIRATORY FAILURE (HCC): ICD-10-CM

## 2021-08-24 DIAGNOSIS — J44.9 COPD, SEVERE (HCC): ICD-10-CM

## 2021-08-24 DIAGNOSIS — U07.1 COVID-19: ICD-10-CM

## 2021-08-24 PROCEDURE — 1111F DSCHRG MED/CURRENT MED MERGE: CPT | Performed by: PHYSICIAN ASSISTANT

## 2021-08-24 PROCEDURE — 3078F DIAST BP <80 MM HG: CPT | Performed by: PHYSICIAN ASSISTANT

## 2021-08-24 PROCEDURE — 99214 OFFICE O/P EST MOD 30 MIN: CPT | Performed by: PHYSICIAN ASSISTANT

## 2021-08-24 PROCEDURE — 3077F SYST BP >= 140 MM HG: CPT | Performed by: PHYSICIAN ASSISTANT

## 2021-08-24 PROCEDURE — 1036F TOBACCO NON-USER: CPT | Performed by: PHYSICIAN ASSISTANT

## 2021-08-24 NOTE — ASSESSMENT & PLAN NOTE
Patient has severe states even FEV1 41%  predicted  No evidence of acute exacerbation at this time therefore for his pulmonary regimen will include Anoro Ellipta 1 puff daily and Flovent 220 mcg 2 puffs twice daily  His rinse his mouth out after each use of Flovent  Continue albuterol HFA / nebs q 6 hours p r n     Will recheck pulmonary function tests and 6 minutes walk in 6 weeks

## 2021-08-24 NOTE — ASSESSMENT & PLAN NOTE
Continue current CPAP during all hours of sleep  Will repeat order diagnostic sleep study in hopes that we can to give patient a new CPAP

## 2021-08-24 NOTE — ASSESSMENT & PLAN NOTE
Patient is recovering well from his recent COVID infection  No need for COVID directed therapies at this time  Reviewed his med list and recommend discontinuation of vitamin-C, vitamin-D and zinc   He may continue multivitamin  Not on AC  Recommend repeating CXR in 6 weeks

## 2021-08-24 NOTE — PROGRESS NOTES
Pulmonary Follow Up Note   Usha Rubin 61 y o  male MRN: [id number]  8/24/2021      Assessment:    COPD, severe ([facility] [address] )  Patient has severe states even FEV1 41%  predicted  No evidence of acute exacerbation at this time therefore for his pulmonary regimen will include Anoro Ellipta 1 puff daily and Flovent 220 mcg 2 puffs twice daily  His rinse his mouth out after each use of Flovent  Continue albuterol HFA / nebs q 6 hours p r n     Will recheck pulmonary function tests and 6 minutes walk in 6 weeks  SCOTT (obstructive sleep apnea)   Continue current CPAP during all hours of sleep  Will repeat order diagnostic sleep study in hopes that we can to give patient a new CPAP  Chronic hypoxemic respiratory failure (HCC)  On his baseline oxygen requirements of 5 L nasal cannula  Keep oxygen saturations above 88%  Increase activity as tolerated  COVID-19  Patient is recovering well from his recent COVID infection  No need for COVID directed therapies at this time  Reviewed his med list and recommend discontinuation of vitamin-C, vitamin-D and zinc   He may continue multivitamin  Not on AC  Recommend repeating CXR in 6 weeks  Plan:    Diagnoses and all orders for this visit:    SCOTT (obstructive sleep apnea)  -     Diagnostic Sleep Study; Future    COVID-19  -     Ambulatory referral to Pulmonology  -     XR chest pa & lateral; Future    COPD, severe (HCC)  -     Complete PFT with post Bronchodilator and Six Minute walk; Future  -     Alpha 1 Antitrypsin Phenotype; Future    Chronic hypoxemic respiratory failure ([facility] [address] )        Return in about 8 weeks (around 10/19/2021)  History of Present Illness   HPI:  Usha Rubin is a 61 y o  male who   Presents the office today for hospital follow-up    Patient's past medical history positive for severe COPD, chronic hypoxic respiratory failure,   SCOTT on CPAP, restrictive lung disease, chronic diastolic CHF, nicotine dependence in remission, and recent hospitalization for COVID-19 pneumonia  Patient was hospitalized at the end of July and early August for COVID-19 which resulted acute on chronic hypoxic respiratory failure and pneumonia  He was noted to have increased shortness of breath, nonproductive cough, generalized malaise and fever  Objectively was noted be hypoxic and tachypneic with a temperature of a 100 3° on admission  Laboratory workup revealed leukocytosis with elevated CRP, D-dimer and ferritin  Chest x-ray revealed bilateral ground-glass infiltrates  He required 7 L nasal cannula max, baseline 5 L nasal cannula  Patient was treated with 5 days of remdesivir, IV Decadron 1 milligram/kilograms and sent home on a prednisone taper  He received Actemra 100 mg IV once but did not receive convalescent plasma  Of note he was vaccinated with the Moderna vaccine in March and April       since discharge, patient reports that respiratory wise he is feeling fine  He still continues to feel easy fatigability  Denies worsening shortness of breath, cough, sputum production, hemoptysis or wheeze  No dizziness or headaches  Denies chest pain or palpitations  Reports improved appetite  He does note occasional nausea but denies episodes of vomiting, diarrhea or abdominal pain  Review of Systems   All other systems reviewed and are negative        Historical Information   Past Medical History:   Diagnosis Date    Aortic stenosis     Diabetes ([facility] [address] )     Hyperlipidemia     Hypertension 7/2/2014     Past Surgical History:   Procedure Laterality Date    APPENDECTOMY      EYE SURGERY       Family History   Problem Relation Age of Onset    No Known Problems Mother        Social History     Tobacco Use   Smoking Status Former Smoker    Years: 10 00    Types: Cigarettes   Smokeless Tobacco Never Used         Meds/Allergies     Current Outpatient Medications:     albuterol (Ventolin HFA) 90 mcg/act inhaler, Inhale 2 puffs every 6 (six) hours as needed for wheezing, Disp: 1 Inhaler, Rfl: 2    amLODIPine (NORVASC) 10 mg tablet, Take 1 tablet (10 mg total) by mouth daily, Disp: 90 tablet, Rfl: 3    atorvastatin (LIPITOR) 40 mg tablet, Take 1 tablet (40 mg total) by mouth daily, Disp: 90 tablet, Rfl: 3    Blood Glucose Monitoring Suppl (CONTOUR NEXT MONITOR) w/Device KIT, Test blood sugar once daily or as needed for fluctuating blood sugars, Disp: 1 kit, Rfl: 0    carvedilol (COREG) 12 5 mg tablet, Take 1 tablet (12 5 mg total) by mouth 2 (two) times a day with meals, Disp: 180 tablet, Rfl: 3    cetirizine (ZYRTEC ALLERGY) 10 mg tablet, Take 10 mg by mouth, Disp: , Rfl:     cyclobenzaprine (FLEXERIL) 10 mg tablet, Take 10 mg by mouth, Disp: , Rfl:     Dapagliflozin Propanediol (Farxiga) 10 MG TABS, Take 10 mg by mouth daily, Disp: , Rfl:     fluticasone (FLOVENT HFA) 220 mcg/act inhaler, Inhale 2 puffs 2 (two) times a day Rinse mouth after use , Disp: 12 g, Rfl: 3    glimepiride (AMARYL) 4 mg tablet, Take 4 mg by mouth 2 (two) times a day, Disp: , Rfl:     glucose blood (CONTOUR NEXT TEST) test strip, Test blood sugar once daily or as needed for fluctuating blood sugars, Disp: 100 each, Rfl: 3    hydrochlorothiazide (HYDRODIURIL) 25 mg tablet, Take 1 tablet (25 mg total) by mouth daily, Disp: 90 tablet, Rfl: 3    Insulin Pen Needle (BD Pen Needle Dee Dee U/F) 32G X 4 MM MISC, Use 4 a day, Disp: 200 each, Rfl: 5    ipratropium-albuterol (DUO-NEB) 0 5-2 5 mg/3 mL nebulizer solution, Take 1 vial (3 mL total) by nebulization every 6 (six) hours as needed for wheezing or shortness of breath, Disp: 360 mL, Rfl: 2    Lancets MISC, Test blood sugar once daily or as needed for fluctuating blood sugars, Disp: 100 each, Rfl: 0    losartan (COZAAR) 100 MG tablet, Take 1 tablet (100 mg total) by mouth daily, Disp: 90 tablet, Rfl: 3    meloxicam (MOBIC) 15 mg tablet, Take 15 mg by mouth, Disp: , Rfl:     metFORMIN (GLUCOPHAGE) 1000 MG tablet, TAKE 1 TABLET BY MOUTH TWICE DAILY WITH MEALS, Disp: 180 tablet, Rfl: 0    montelukast (SINGULAIR) 10 mg tablet, , Disp: , Rfl:     Multiple Vitamins-Minerals (MENS MULTIVITAMIN PO), Take by mouth, Disp: , Rfl:     Ozempic, 0 25 or 0 5 MG/DOSE, 2 MG/1 5ML SOPN, INJECT 0 5MG (ONE HALF) SUBCUTANEOUSLY ONCE WEEKLY, Disp: , Rfl:     Semaglutide,0 25 or 0 5MG/DOS, 2 MG/1 5ML SOPN, Inject 0 5 mg under the skin, Disp: , Rfl:     sertraline (ZOLOFT) 50 mg tablet, Take 1 tablet (50 mg total) by mouth daily, Disp: 30 tablet, Rfl: 0    Soliqua 100-33 UNT-MCG/ML injection pen, Inject 45 Units under the skin daily, Disp: , Rfl:     spironolactone (ALDACTONE) 50 mg tablet, Take 1 tablet (50 mg total) by mouth daily, Disp: 90 tablet, Rfl: 3    umeclidinium-vilanterol (Anoro Ellipta) 62 5-25 MCG/INH inhaler, Inhale 1 puff daily, Disp: 60 blister, Rfl: 2  Allergies   Allergen Reactions    Theophylline Other (See Comments)     Severe headaches  headaches  Severe headaches         Vitals: Blood pressure 145/78, pulse 97, temperature 98 5 °F (36 9 °C), weight (!) 159 kg (350 lb), SpO2 92 %  Body mass index is 51 69 kg/m²  Oxygen Therapy  SpO2: 92 %    Physical Exam  Physical Exam  Vitals reviewed  Constitutional:       Appearance: Normal appearance  He is well-developed  He is obese  HENT:      Head: Normocephalic and atraumatic  Right Ear: External ear normal       Left Ear: External ear normal    Eyes:      Extraocular Movements: Extraocular movements intact  Pupils: Pupils are equal, round, and reactive to light  Cardiovascular:      Rate and Rhythm: Normal rate and regular rhythm  Pulses: Normal pulses  Heart sounds: Murmur heard  Pulmonary:      Effort: Pulmonary effort is normal  No respiratory distress  Breath sounds: Normal breath sounds  No stridor  No wheezing, rhonchi or rales  Abdominal:      Palpations: Abdomen is soft  Tenderness: There is no abdominal tenderness        Hernia: No hernia is present  Musculoskeletal:         General: No swelling, tenderness or deformity  Normal range of motion  Cervical back: Normal range of motion and neck supple  Skin:     General: Skin is warm and dry  Capillary Refill: Capillary refill takes less than 2 seconds  Neurological:      General: No focal deficit present  Mental Status: He is alert and oriented to person, place, and time  Mental status is at baseline  Psychiatric:         Behavior: Behavior normal          Thought Content: Thought content normal          Judgment: Judgment normal          Labs: I have personally reviewed pertinent lab results  , ABG: No results found for: PHART, VVH1IAU, PO2ART, ZXG7OTN, B7CECJZN, BEART, SOURCE, BNP: No results found for: BNP, CBC: No results found for: WBC, HGB, HCT, MCV, PLT, ADJUSTEDWBC, MCH, MCHC, RDW, MPV, NRBC, CMP: No results found for: SODIUM, K, CL, CO2, ANIONGAP, BUN, CREATININE, GLUCOSE, CALCIUM, AST, ALT, ALKPHOS, PROT, BILITOT, EGFR, PT/INR: No results found for: PT, INR, Troponin: No results found for: TROPONINI  Lab Results   Component Value Date    WBC 9 57 08/02/2021    HGB 14 0 08/02/2021    HCT 44 0 08/02/2021    MCV 86 08/02/2021     (H) 08/02/2021     Lab Results   Component Value Date    CALCIUM 9 7 08/02/2021    K 5 6 (H) 08/02/2021    CO2 26 08/02/2021    CL 99 (L) 08/02/2021    BUN 36 (H) 08/02/2021    CREATININE 0 97 08/02/2021     No results found for: IGE  Lab Results   Component Value Date    ALT 74 08/02/2021    AST 16 08/02/2021    ALKPHOS 72 08/02/2021       Imaging and other studies: I have personally reviewed pertinent reports  and I have personally reviewed pertinent films in PACS     Chest x-ray 07/28/2021   Increase patchy multifocal opacities to around mid and lower lung zones bilaterally      Pulmonary function testing:  Performed   09/04/2019  FEV1/FVC ratio  53%   FEV1  41% predicted  FVC  55% predicted  There is response to bronchodilators  TLC 71 % predicted  RV 88 % predicted  DLCO corrected for hemoglobin 66 % predicted   severe obstructive airflow defect  There is significant response to bronchodilators  Mild restrictive lung disease indicated by decreased TLC  Mildly decreased diffusion capacity

## 2021-08-24 NOTE — ASSESSMENT & PLAN NOTE
On his baseline oxygen requirements of 5 L nasal cannula  Keep oxygen saturations above 88%  Increase activity as tolerated

## 2021-08-30 ENCOUNTER — APPOINTMENT (OUTPATIENT)
Dept: RADIOLOGY | Facility: MEDICAL CENTER | Age: 56
End: 2021-08-30
Payer: COMMERCIAL

## 2021-08-30 ENCOUNTER — APPOINTMENT (OUTPATIENT)
Dept: LAB | Facility: MEDICAL CENTER | Age: 56
End: 2021-08-30
Payer: COMMERCIAL

## 2021-08-30 DIAGNOSIS — E11.65 UNCONTROLLED TYPE 2 DIABETES MELLITUS WITH HYPERGLYCEMIA (HCC): ICD-10-CM

## 2021-08-30 DIAGNOSIS — U07.1 COVID-19: ICD-10-CM

## 2021-08-30 DIAGNOSIS — I10 ESSENTIAL HYPERTENSION: ICD-10-CM

## 2021-08-30 DIAGNOSIS — J44.9 COPD, SEVERE (HCC): ICD-10-CM

## 2021-08-30 DIAGNOSIS — E78.5 DYSLIPIDEMIA, GOAL LDL BELOW 100: ICD-10-CM

## 2021-08-30 DIAGNOSIS — E87.1 HYPONATREMIA: ICD-10-CM

## 2021-08-30 DIAGNOSIS — E11.69 TYPE 2 DIABETES MELLITUS WITH OTHER SPECIFIED COMPLICATION, WITHOUT LONG-TERM CURRENT USE OF INSULIN (HCC): ICD-10-CM

## 2021-08-30 LAB
ANION GAP SERPL CALCULATED.3IONS-SCNC: 7 MMOL/L (ref 4–13)
BUN SERPL-MCNC: 16 MG/DL (ref 5–25)
CALCIUM SERPL-MCNC: 9.3 MG/DL (ref 8.3–10.1)
CHLORIDE SERPL-SCNC: 99 MMOL/L (ref 100–108)
CO2 SERPL-SCNC: 27 MMOL/L (ref 21–32)
CREAT SERPL-MCNC: 0.82 MG/DL (ref 0.6–1.3)
GFR SERPL CREATININE-BSD FRML MDRD: 98 ML/MIN/1.73SQ M
GLUCOSE P FAST SERPL-MCNC: 190 MG/DL (ref 65–99)
POTASSIUM SERPL-SCNC: 4.3 MMOL/L (ref 3.5–5.3)
SODIUM SERPL-SCNC: 133 MMOL/L (ref 136–145)

## 2021-08-30 PROCEDURE — 80048 BASIC METABOLIC PNL TOTAL CA: CPT

## 2021-08-30 PROCEDURE — 82104 ALPHA-1-ANTITRYPSIN PHENO: CPT

## 2021-08-30 PROCEDURE — 36415 COLL VENOUS BLD VENIPUNCTURE: CPT

## 2021-08-30 PROCEDURE — 71046 X-RAY EXAM CHEST 2 VIEWS: CPT

## 2021-08-30 PROCEDURE — 82103 ALPHA-1-ANTITRYPSIN TOTAL: CPT

## 2021-09-02 LAB
A1AT PHENOTYP SERPL IFE: NORMAL
A1AT SERPL-MCNC: 186 MG/DL (ref 101–187)

## 2021-09-03 ENCOUNTER — TELEPHONE (OUTPATIENT)
Dept: FAMILY MEDICINE CLINIC | Facility: CLINIC | Age: 56
End: 2021-09-03

## 2021-09-03 NOTE — TELEPHONE ENCOUNTER
Called stating pt's right leg is swollen, warm to touch and red  Pt was told to call our office and get leg checked however he did not

## 2021-09-12 ENCOUNTER — HOSPITAL ENCOUNTER (OUTPATIENT)
Dept: SLEEP CENTER | Facility: HOSPITAL | Age: 56
Discharge: HOME | End: 2021-09-12
Payer: COMMERCIAL

## 2021-09-12 DIAGNOSIS — G47.33 OSA (OBSTRUCTIVE SLEEP APNEA): ICD-10-CM

## 2021-09-12 PROCEDURE — 95810 POLYSOM 6/> YRS 4/> PARAM: CPT | Performed by: INTERNAL MEDICINE

## 2021-09-12 PROCEDURE — 95810 POLYSOM 6/> YRS 4/> PARAM: CPT

## 2021-09-13 DIAGNOSIS — G47.33 OSA (OBSTRUCTIVE SLEEP APNEA): Primary | ICD-10-CM

## 2021-09-13 NOTE — PROGRESS NOTES
Sleep Study Documentation    Pre-Sleep Study       Sleep testing procedure explained to patient:YES    Patient napped prior to study:NO    [address] [place] worker after 12PM   Caffeine use:NO    Alcohol:Dayshift workers after 5PM: Alcohol use:NO    Typical day for patient:YES       Study Documentation    Sleep Study Indications: snoring, witnessed apneas, unrefreshing sleep    Sleep Study: Diagnostic   Snore: Moderate  Supplemental O2: no    O2 flow rate (L/min) range n/a  O2 flow rate (L/min) final n/a  Minimum SaO2 59  Baseline SaO2 87        Mode of Therapy:    EKG abnormalities: no     EEG abnormalities: no    Sleep Study Recorded < 2 hours: N/A    Sleep Study Recorded > 2 hours but incomplete study: N/A    Sleep Study Recorded 6 hours but no sleep obtained: NO    Patient classification: disabled       Post-Sleep Study    Medication used at bedtime or during sleep study:YES other prescription medications    Patient reports time it took to fall asleep:greater than 60 minutes    Patient reports waking up during study:3 or more times  Patient reports returning to sleep in greater than 30 minutes  Patient reports sleeping less than 2 hours without dreaming  Patient reports sleep during study:worse than usual    Patient rated sleepiness: Somewhat sleepy or tired    PAP treatment:no

## 2021-09-14 ENCOUNTER — TELEPHONE (OUTPATIENT)
Dept: SLEEP CENTER | Facility: CLINIC | Age: 56
End: 2021-09-14

## 2021-09-14 NOTE — TELEPHONE ENCOUNTER
I spoke with patient, advised above  Scheduled titration study 9/30/2021 at [facility]  instructions provided, patient verbalizes understanding

## 2021-09-14 NOTE — TELEPHONE ENCOUNTER
Patient seen by WENDY JULES at [address]    Left call back message patient sleep study shows severe SCOTT AHI 41 8    Dr Laura De La Cruz ordered CPAP study   Patient needs to schedule study

## 2021-09-21 ENCOUNTER — TELEPHONE (OUTPATIENT)
Dept: PULMONOLOGY | Facility: CLINIC | Age: 56
End: 2021-09-21

## 2021-09-21 NOTE — TELEPHONE ENCOUNTER
Called [facility] and patient that there are refills on his Anoro           ----- Message from Chula Lopez RN sent at 9/21/2021  2:42 PM EDT -----  It was sent on 8/16 with refills  ----- Message -----  From: Edinson Aleman  Sent: 9/21/2021   2:21 PM EDT  To: Chula Lopez RN    Pt called requesting refill on Anoro, Send to [facility]

## 2021-09-28 ENCOUNTER — HOSPITAL ENCOUNTER (OUTPATIENT)
Dept: SLEEP CENTER | Facility: HOSPITAL | Age: 56
Discharge: HOME | End: 2021-09-28
Attending: INTERNAL MEDICINE
Payer: COMMERCIAL

## 2021-09-28 DIAGNOSIS — G47.33 OSA (OBSTRUCTIVE SLEEP APNEA): ICD-10-CM

## 2021-09-28 PROCEDURE — 95811 POLYSOM 6/>YRS CPAP 4/> PARM: CPT | Performed by: INTERNAL MEDICINE

## 2021-09-28 PROCEDURE — 95811 POLYSOM 6/>YRS CPAP 4/> PARM: CPT

## 2021-09-28 NOTE — PROGRESS NOTES
Sleep Study Documentation    Pre-Sleep Study       Sleep testing procedure explained to patient:YES    Patient napped prior to study:NO    [address] [place] worker after 12PM   Caffeine use:NO    Alcohol:Dayshift workers after 5PM: Alcohol use:NO    Typical day for patient:YES       Study Documentation    Sleep Study Indications: The patient is here for a CPAP titration  He had a diagnostic study done which resulted with an AHI of 41 8 consisting of obstructive apneas and hypopneas  Snoring was also noted on the study  Sleep Study: Treatment   Optimal PAP pressure: 20cm/14cm  Leak:Small  Snore:Eliminated  REM Obtained:yes  Supplemental O2: yes  O2 flow rate (L/min) range 1  O2 flow rate (L/min) final 2  Minimum SaO2 85  Baseline SaO2 90  PAP mask tried (list all)ResMed AirFit F20, size medium  PAP mask choice (final)ResMed AirFit F20, size medium  PAP mask type:full face  PAP pressure at which snoring was eliminated 20cm/14cm  Mode of Therapy:BiPAP  ETCO2:No  CPAP changed to BiPAP:Yes  If yes why switched to Bi-PAP to try and eliminated low O2 saturations    Mode of Therapy:BiPAP    EKG abnormalities: no     EEG abnormalities: no    Sleep Study Recorded < 2 hours: N/A    Sleep Study Recorded > 2 hours but incomplete study: N/A    Sleep Study Recorded 6 hours but no sleep obtained: NO    Patient classification: Employed       Post-Sleep Study    Medication used at bedtime or during sleep study:YES other prescription medications    Patient reports time it took to fall asleep:20 to 30 minutes    Patient reports waking up during study:3 or more times  Patient reports returning to sleep without difficulty  Patient reports sleeping 4 to 6 hours without dreaming  Patient reports sleep during study:typical    Patient rated sleepiness: Somewhat sleepy or tired    PAP treatment:yes: Post PAP treatment patient reports feeling unchanged and would wear PAP mask at home

## 2021-09-29 ENCOUNTER — OFFICE VISIT (OUTPATIENT)
Dept: PULMONOLOGY | Facility: CLINIC | Age: 56
End: 2021-09-29
Payer: COMMERCIAL

## 2021-09-29 VITALS
OXYGEN SATURATION: 93 % | DIASTOLIC BLOOD PRESSURE: 60 MMHG | WEIGHT: 315 LBS | HEIGHT: 69 IN | TEMPERATURE: 97.2 F | BODY MASS INDEX: 46.65 KG/M2 | SYSTOLIC BLOOD PRESSURE: 102 MMHG | HEART RATE: 82 BPM

## 2021-09-29 DIAGNOSIS — J44.9 COPD, SEVERE (HCC): Primary | ICD-10-CM

## 2021-09-29 DIAGNOSIS — G47.33 OSA (OBSTRUCTIVE SLEEP APNEA): ICD-10-CM

## 2021-09-29 DIAGNOSIS — G47.33 OSA (OBSTRUCTIVE SLEEP APNEA): Primary | ICD-10-CM

## 2021-09-29 DIAGNOSIS — U07.1 COVID-19: ICD-10-CM

## 2021-09-29 PROBLEM — J96.21 ACUTE ON CHRONIC RESPIRATORY FAILURE WITH HYPOXIA (HCC): Status: RESOLVED | Noted: 2021-07-28 | Resolved: 2021-09-29

## 2021-09-29 PROCEDURE — 99214 OFFICE O/P EST MOD 30 MIN: CPT | Performed by: INTERNAL MEDICINE

## 2021-09-29 PROCEDURE — 3008F BODY MASS INDEX DOCD: CPT | Performed by: INTERNAL MEDICINE

## 2021-09-29 NOTE — PROGRESS NOTES
Pulmonary Follow Up Note   Lloyd Rodriguez 61 y o  male MRN: [id number]  9/29/2021      Assessment/Plan:    COPD, severe (HCC)  · Overall stable without exacerbation Previous PFTs from 2019 show FEV1: 1 36 L   41 % predicted  · Continue current regimen, Anoro Ellipta 1 puff daily and Flovent 220 mcg 2 puffs twice daily  · Discussed that he needs to lose weight as this will help his overall breathing and conditioning     SCOTT (obstructive sleep apnea)  · Currently using CPAP but reports having sleep study last night and being placed on BiPAP  · Await final sleep study interpretation, may need new script for BiPAP if indicated    COVID-19  · Overall has recovered well, no acute issues at this time    Health Maintenance  Immunization History   Administered Date(s) Administered    INFLUENZA 11/25/2014, 09/19/2016, 11/01/2017, 12/17/2018    Influenza, recombinant, quadrivalent,injectable, preservative free 02/03/2020, 09/02/2020    Influenza, seasonal, injectable 11/25/2014, 08/31/2015    Pneumococcal Polysaccharide PPV23 05/23/2014    SARS-CoV-2 / COVID-19 mRNA IM (Vanesa Martell) 02/06/2021, 03/06/2021    Tdap 05/23/2014       Return in about 3 months (around 12/29/2021)  History of Present Illness   HPI:  Lloyd Rodriguez is a 61 y o  male who  Presents today for follow-up of severe COPD, chronic hypoxic respiratory failure, SCOTT on CPAP, restrictive lung disease, heart failure, previous tobacco use and recent COVID infection  He was last seen in our clinic about 1 month ago and since then has been doing well  He has not had any acute complaints  He did undergo sleep study last night and is awaiting the final results, he does state that during the sleep study they did place him on BiPAP, he has been using CPAP previously  He reports using oxygen overnight but does not report using supplemental oxygen during the day  He has not had any exacerbations over the past 1 month      Review of Systems   Constitutional: Negative for activity change, appetite change, chills and fever  HENT: Negative for congestion, ear pain, postnasal drip, rhinorrhea, sneezing, sore throat, trouble swallowing and voice change  Respiratory: Negative for cough, shortness of breath and wheezing  Cardiovascular: Negative for chest pain and palpitations  Gastrointestinal: Negative for abdominal distention, abdominal pain, diarrhea, nausea and vomiting  Endocrine: Negative for cold intolerance and heat intolerance  Musculoskeletal: Negative for arthralgias, back pain, myalgias and neck stiffness  Skin: Negative for color change, pallor and rash  Neurological: Negative for dizziness, weakness, numbness and headaches  Psychiatric/Behavioral: Negative for agitation  The patient is not nervous/anxious          Historical Information   Past Medical History:   Diagnosis Date    Aortic stenosis     [facility]     Diabetes ([facility] )     Hyperlipidemia     Hypertension 7/2/2014    Sleep apnea, obstructive      Past Surgical History:   Procedure Laterality Date    APPENDECTOMY      EYE SURGERY       Family History   Problem Relation Age of Onset    No Known Problems Mother          Meds/Allergies     Current Outpatient Medications:     albuterol (Ventolin HFA) 90 mcg/act inhaler, Inhale 2 puffs every 6 (six) hours as needed for wheezing, Disp: 1 Inhaler, Rfl: 2    amLODIPine (NORVASC) 10 mg tablet, Take 1 tablet (10 mg total) by mouth daily, Disp: 90 tablet, Rfl: 3    atorvastatin (LIPITOR) 40 mg tablet, Take 1 tablet (40 mg total) by mouth daily, Disp: 90 tablet, Rfl: 3    Blood Glucose Monitoring Suppl (CONTOUR NEXT MONITOR) w/Device KIT, Test blood sugar once daily or as needed for fluctuating blood sugars, Disp: 1 kit, Rfl: 0    carvedilol (COREG) 12 5 mg tablet, Take 1 tablet (12 5 mg total) by mouth 2 (two) times a day with meals, Disp: 180 tablet, Rfl: 3    cetirizine (ZYRTEC ALLERGY) 10 mg tablet, Take 10 mg by mouth, Disp: , Rfl:     cyclobenzaprine (FLEXERIL) 10 mg tablet, Take 10 mg by mouth, Disp: , Rfl:     Dapagliflozin Propanediol (Farxiga) 10 MG TABS, Take 10 mg by mouth daily, Disp: , Rfl:     fluticasone (FLOVENT HFA) 220 mcg/act inhaler, Inhale 2 puffs 2 (two) times a day Rinse mouth after use , Disp: 12 g, Rfl: 3    glimepiride (AMARYL) 4 mg tablet, Take 4 mg by mouth 2 (two) times a day, Disp: , Rfl:     glucose blood (CONTOUR NEXT TEST) test strip, Test blood sugar once daily or as needed for fluctuating blood sugars, Disp: 100 each, Rfl: 3    hydrochlorothiazide (HYDRODIURIL) 25 mg tablet, Take 1 tablet (25 mg total) by mouth daily, Disp: 90 tablet, Rfl: 3    Insulin Pen Needle (BD Pen Needle Dee Dee U/F) 32G X 4 MM MISC, Use 4 a day, Disp: 200 each, Rfl: 5    ipratropium-albuterol (DUO-NEB) 0 5-2 5 mg/3 mL nebulizer solution, Take 1 vial (3 mL total) by nebulization every 6 (six) hours as needed for wheezing or shortness of breath, Disp: 360 mL, Rfl: 2    Lancets MISC, Test blood sugar once daily or as needed for fluctuating blood sugars, Disp: 100 each, Rfl: 0    losartan (COZAAR) 100 MG tablet, Take 1 tablet (100 mg total) by mouth daily, Disp: 90 tablet, Rfl: 3    meloxicam (MOBIC) 15 mg tablet, Take 15 mg by mouth, Disp: , Rfl:     metFORMIN (GLUCOPHAGE) 1000 MG tablet, TAKE 1 TABLET BY MOUTH TWICE DAILY WITH MEALS, Disp: 180 tablet, Rfl: 0    montelukast (SINGULAIR) 10 mg tablet, , Disp: , Rfl:     Multiple Vitamins-Minerals (MENS MULTIVITAMIN PO), Take by mouth, Disp: , Rfl:     Ozempic, 0 25 or 0 5 MG/DOSE, 2 MG/1 5ML SOPN, INJECT 0 5MG (ONE HALF) SUBCUTANEOUSLY ONCE WEEKLY, Disp: , Rfl:     Semaglutide,0 25 or 0 5MG/DOS, 2 MG/1 5ML SOPN, Inject 0 5 mg under the skin, Disp: , Rfl:     sertraline (ZOLOFT) 50 mg tablet, Take 1 tablet (50 mg total) by mouth daily, Disp: 30 tablet, Rfl: 2    Soliqua 100-33 UNT-MCG/ML injection pen, Inject 45 Units under the skin daily, Disp: , Rfl:     spironolactone (ALDACTONE) 50 mg tablet, Take 1 tablet (50 mg total) by mouth daily, Disp: 90 tablet, Rfl: 3    umeclidinium-vilanterol (Anoro Ellipta) 62 5-25 MCG/INH inhaler, Inhale 1 puff daily, Disp: 60 blister, Rfl: 2  Allergies   Allergen Reactions    Theophylline Other (See Comments)     Severe headaches  headaches  Severe headaches         Vitals: Blood pressure 102/60, pulse 82, temperature (!) 97 2 °F (36 2 °C), temperature source Tympanic, height 5' 9" (1 753 m), weight (!) 155 kg (341 lb 9 6 oz), SpO2 93 %  Body mass index is 50 45 kg/m²  Oxygen Therapy  SpO2: 93 %  Oxygen Therapy: None (Room air)    Physical Exam  Physical Exam  Constitutional:       Appearance: Normal appearance  HENT:      Head: Normocephalic and atraumatic  Nose: Nose normal       Mouth/Throat:      Mouth: Mucous membranes are moist       Pharynx: Oropharynx is clear  Cardiovascular:      Rate and Rhythm: Normal rate and regular rhythm  Pulses: Normal pulses  Heart sounds: Normal heart sounds  No murmur heard  Pulmonary:      Effort: Pulmonary effort is normal  No respiratory distress  Breath sounds: Normal breath sounds  No wheezing  Abdominal:      General: Abdomen is flat  There is no distension  Palpations: Abdomen is soft  Tenderness: There is no abdominal tenderness  Musculoskeletal:         General: No swelling or tenderness  Cervical back: Normal range of motion and neck supple  Right lower leg: No edema  Left lower leg: No edema  Skin:     General: Skin is warm and dry  Findings: No rash  Neurological:      General: No focal deficit present  Mental Status: He is alert and oriented to person, place, and time  Psychiatric:         Mood and Affect: Mood normal          Behavior: Behavior normal          Labs: I have personally reviewed pertinent lab results    Lab Results   Component Value Date    WBC 9 57 08/02/2021    HGB 14 0 08/02/2021    HCT 44 0 08/02/2021    MCV 86 08/02/2021     (H) 08/02/2021     Lab Results   Component Value Date    CALCIUM 9 3 08/30/2021    K 4 3 08/30/2021    CO2 27 08/30/2021    CL 99 (L) 08/30/2021    BUN 16 08/30/2021    CREATININE 0 82 08/30/2021     No results found for: IGE  Lab Results   Component Value Date    ALT 74 08/02/2021    AST 16 08/02/2021    ALKPHOS 72 08/02/2021     Imaging and other studies: I have personally reviewed pertinent reports  and I have personally reviewed pertinent films in PACS    Pulmonary function testing:   FEV1/FVC Ratio: 53 %  Forced Vital Capacity: 2 54 L    55 % predicted  FEV1: 1 36 L     41 % predicted     Lung volumes by body plethysmography:   Total Lung Capacity 71 % predicted   Residual volume 88 % predicted     DLCO corrected for patients hemoglobin level: 66 %    EKG, Pathology, and Other Studies: I have personally reviewed pertinent reports  and I have personally reviewed pertinent films in PACS      MARIEL Lomeli    [facility]    Answers for HPI/ROS submitted by the patient on 9/29/2021  Do you have shortness of breath that occurs with effort or exertion?: Yes  Do you have ear congestion?: No  Do you have heartburn?: No  Do you have fatigue?: No  Do you have nasal congestion?: No  Do you have shortness of breath when lying flat?: No  Do you have shortness of breath when you wake up?: No  Do you have sweats?: No  Have you experienced weight loss?: No  Which of the following makes your symptoms worse?: climbing stairs, exposure to smoke  Which of the following makes your symptoms better?: cold air, oral steroids, steroid inhaler

## 2021-09-30 ENCOUNTER — TELEPHONE (OUTPATIENT)
Dept: SLEEP CENTER | Facility: CLINIC | Age: 56
End: 2021-09-30

## 2021-09-30 NOTE — TELEPHONE ENCOUNTER
Spoke with patient, advised BiPAP ordered  Patient would like to use [facility]  Please send all information  Patient does have 2 follow up appointments:  10/22/2021 with [facility]  12/13/2021 with Dr Satish Alfaro  Patient asking if he needs to keep both appointments?   Please call patient

## 2021-09-30 NOTE — TELEPHONE ENCOUNTER
Spoke to patient, he's going to keep both appointments for now  Will send BIPAP orders to [address]

## 2021-10-01 LAB — HBA1C MFR BLD HPLC: 7.8 %

## 2021-10-01 PROCEDURE — 3051F HG A1C>EQUAL 7.0%<8.0%: CPT | Performed by: PHYSICIAN ASSISTANT

## 2021-10-04 DIAGNOSIS — I51.89 DIASTOLIC DYSFUNCTION WITHOUT HEART FAILURE: ICD-10-CM

## 2021-10-05 RX ORDER — CARVEDILOL 12.5 MG/1
TABLET ORAL
Qty: 180 TABLET | Refills: 0 | OUTPATIENT
Start: 2021-10-05

## 2021-10-07 ENCOUNTER — TELEPHONE (OUTPATIENT)
Dept: PULMONOLOGY | Facility: CLINIC | Age: 56
End: 2021-10-07

## 2021-10-07 RX ORDER — CARVEDILOL 12.5 MG/1
12.5 TABLET ORAL 2 TIMES DAILY WITH MEALS
Qty: 180 TABLET | Refills: 3 | Status: SHIPPED | OUTPATIENT
Start: 2021-10-07

## 2021-10-08 ENCOUNTER — TELEPHONE (OUTPATIENT)
Dept: PULMONOLOGY | Facility: CLINIC | Age: 56
End: 2021-10-08

## 2021-10-11 ENCOUNTER — TELEPHONE (OUTPATIENT)
Dept: FAMILY MEDICINE CLINIC | Facility: CLINIC | Age: 56
End: 2021-10-11

## 2021-10-22 ENCOUNTER — OFFICE VISIT (OUTPATIENT)
Dept: PULMONOLOGY | Facility: CLINIC | Age: 56
End: 2021-10-22
Payer: COMMERCIAL

## 2021-10-22 VITALS
WEIGHT: 315 LBS | HEIGHT: 69 IN | DIASTOLIC BLOOD PRESSURE: 64 MMHG | BODY MASS INDEX: 46.65 KG/M2 | HEART RATE: 80 BPM | SYSTOLIC BLOOD PRESSURE: 104 MMHG | OXYGEN SATURATION: 95 % | TEMPERATURE: 97.2 F

## 2021-10-22 DIAGNOSIS — U07.1 COVID-19: ICD-10-CM

## 2021-10-22 DIAGNOSIS — E66.01 MORBID OBESITY WITH BMI OF 50.0-59.9, ADULT (HCC): ICD-10-CM

## 2021-10-22 DIAGNOSIS — J44.9 COPD, SEVERE (HCC): ICD-10-CM

## 2021-10-22 DIAGNOSIS — J96.11 CHRONIC HYPOXEMIC RESPIRATORY FAILURE (HCC): Primary | ICD-10-CM

## 2021-10-22 DIAGNOSIS — G47.61 PERIODIC LIMB MOVEMENT DISORDER (PLMD): ICD-10-CM

## 2021-10-22 DIAGNOSIS — G47.33 OSA (OBSTRUCTIVE SLEEP APNEA): ICD-10-CM

## 2021-10-22 PROCEDURE — 99214 OFFICE O/P EST MOD 30 MIN: CPT | Performed by: PHYSICIAN ASSISTANT

## 2021-10-22 PROCEDURE — 94618 PULMONARY STRESS TESTING: CPT | Performed by: PHYSICIAN ASSISTANT

## 2021-10-22 RX ORDER — INSULIN DETEMIR 100 [IU]/ML
60 INJECTION, SOLUTION SUBCUTANEOUS DAILY
COMMUNITY
Start: 2021-10-18

## 2021-10-25 DIAGNOSIS — J44.9 COPD, SEVERE (HCC): ICD-10-CM

## 2021-10-25 RX ORDER — FLUTICASONE PROPIONATE 220 UG/1
AEROSOL, METERED RESPIRATORY (INHALATION)
Qty: 12 G | Refills: 0 | Status: SHIPPED | OUTPATIENT
Start: 2021-10-25 | End: 2021-11-24

## 2021-10-26 ENCOUNTER — TELEPHONE (OUTPATIENT)
Dept: ADMINISTRATIVE | Facility: OTHER | Age: 56
End: 2021-10-26

## 2021-10-26 ENCOUNTER — OFFICE VISIT (OUTPATIENT)
Dept: FAMILY MEDICINE CLINIC | Facility: CLINIC | Age: 56
End: 2021-10-26
Payer: COMMERCIAL

## 2021-10-26 ENCOUNTER — APPOINTMENT (OUTPATIENT)
Dept: LAB | Facility: MEDICAL CENTER | Age: 56
End: 2021-10-26
Payer: COMMERCIAL

## 2021-10-26 VITALS
HEART RATE: 82 BPM | OXYGEN SATURATION: 95 % | BODY MASS INDEX: 46.65 KG/M2 | SYSTOLIC BLOOD PRESSURE: 110 MMHG | DIASTOLIC BLOOD PRESSURE: 68 MMHG | TEMPERATURE: 96.6 F | WEIGHT: 315 LBS | HEIGHT: 69 IN

## 2021-10-26 DIAGNOSIS — Z12.11 SCREENING FOR COLORECTAL CANCER: ICD-10-CM

## 2021-10-26 DIAGNOSIS — Z79.4 TYPE 2 DIABETES MELLITUS WITH DIABETIC NEUROPATHY, WITH LONG-TERM CURRENT USE OF INSULIN (HCC): Primary | ICD-10-CM

## 2021-10-26 DIAGNOSIS — Z12.12 SCREENING FOR COLORECTAL CANCER: ICD-10-CM

## 2021-10-26 DIAGNOSIS — E11.40 TYPE 2 DIABETES MELLITUS WITH DIABETIC NEUROPATHY, WITH LONG-TERM CURRENT USE OF INSULIN (HCC): Primary | ICD-10-CM

## 2021-10-26 DIAGNOSIS — F32.A ANXIETY AND DEPRESSION: ICD-10-CM

## 2021-10-26 DIAGNOSIS — F41.9 ANXIETY AND DEPRESSION: ICD-10-CM

## 2021-10-26 DIAGNOSIS — G47.61 PERIODIC LIMB MOVEMENT DISORDER (PLMD): ICD-10-CM

## 2021-10-26 PROBLEM — A41.89 SEPSIS DUE TO COVID-19 (HCC): Status: RESOLVED | Noted: 2021-07-28 | Resolved: 2021-10-26

## 2021-10-26 PROBLEM — E87.1 HYPONATREMIA: Status: RESOLVED | Noted: 2021-07-29 | Resolved: 2021-10-26

## 2021-10-26 PROBLEM — U07.1 SEPSIS DUE TO COVID-19 (HCC): Status: RESOLVED | Noted: 2021-07-28 | Resolved: 2021-10-26

## 2021-10-26 PROBLEM — U07.1 COVID-19: Status: RESOLVED | Noted: 2021-07-28 | Resolved: 2021-10-26

## 2021-10-26 LAB
FERRITIN SERPL-MCNC: 267 NG/ML (ref 8–388)
IRON SATN MFR SERPL: 17 % (ref 20–50)
IRON SERPL-MCNC: 48 UG/DL (ref 65–175)
MAGNESIUM SERPL-MCNC: 2.6 MG/DL (ref 1.6–2.6)
TIBC SERPL-MCNC: 282 UG/DL (ref 250–450)

## 2021-10-26 PROCEDURE — 1036F TOBACCO NON-USER: CPT | Performed by: PHYSICIAN ASSISTANT

## 2021-10-26 PROCEDURE — 36415 COLL VENOUS BLD VENIPUNCTURE: CPT

## 2021-10-26 PROCEDURE — 83735 ASSAY OF MAGNESIUM: CPT

## 2021-10-26 PROCEDURE — 83540 ASSAY OF IRON: CPT

## 2021-10-26 PROCEDURE — 99214 OFFICE O/P EST MOD 30 MIN: CPT | Performed by: PHYSICIAN ASSISTANT

## 2021-10-26 PROCEDURE — 82728 ASSAY OF FERRITIN: CPT

## 2021-10-26 PROCEDURE — 3008F BODY MASS INDEX DOCD: CPT | Performed by: PHYSICIAN ASSISTANT

## 2021-10-26 PROCEDURE — 3725F SCREEN DEPRESSION PERFORMED: CPT | Performed by: PHYSICIAN ASSISTANT

## 2021-10-26 PROCEDURE — 83550 IRON BINDING TEST: CPT

## 2021-10-26 RX ORDER — GABAPENTIN 100 MG/1
100 CAPSULE ORAL 3 TIMES DAILY
Qty: 90 CAPSULE | Refills: 2 | Status: SHIPPED | OUTPATIENT
Start: 2021-10-26 | End: 2022-01-19 | Stop reason: SDUPTHER

## 2021-11-08 DIAGNOSIS — I10 ESSENTIAL HYPERTENSION: ICD-10-CM

## 2021-11-08 PROCEDURE — 4010F ACE/ARB THERAPY RXD/TAKEN: CPT | Performed by: PHYSICIAN ASSISTANT

## 2021-11-08 RX ORDER — LOSARTAN POTASSIUM 100 MG/1
TABLET ORAL
Qty: 90 TABLET | Refills: 0 | Status: SHIPPED | OUTPATIENT
Start: 2021-11-08 | End: 2022-02-10 | Stop reason: SDUPTHER

## 2021-11-14 NOTE — TELEPHONE ENCOUNTER
Upon review of the In Basket request we have found this is a duplicate request and no further action is needed  This request was completed upon initial request, the patient chart is up to date, and this message will now be closed  DM Foot Exam-[facility] doesn't do DM Foot Exams or bill for them  They will send A1c results  Request for Hemoglobin A1c sent  Any additional questions or concerns should be emailed to the Practice Liaisons via [email or web address] email, please do not reply via In Basket      Thank you  Ger Kong MA No

## 2021-11-22 DIAGNOSIS — J96.11 CHRONIC HYPOXEMIC RESPIRATORY FAILURE (HCC): ICD-10-CM

## 2021-11-22 RX ORDER — HYDROCHLOROTHIAZIDE 25 MG/1
TABLET ORAL
Qty: 90 TABLET | Refills: 0 | Status: SHIPPED | OUTPATIENT
Start: 2021-11-22 | End: 2022-02-23 | Stop reason: SDUPTHER

## 2021-11-24 DIAGNOSIS — J44.9 COPD, SEVERE (HCC): ICD-10-CM

## 2021-11-24 RX ORDER — FLUTICASONE PROPIONATE 220 UG/1
2 AEROSOL, METERED RESPIRATORY (INHALATION) 2 TIMES DAILY
Qty: 12 G | Refills: 5 | Status: SHIPPED | OUTPATIENT
Start: 2021-11-24 | End: 2022-04-21 | Stop reason: SDUPTHER

## 2021-12-13 DIAGNOSIS — Z99.81 HYPOXEMIA REQUIRING SUPPLEMENTAL OXYGEN: ICD-10-CM

## 2021-12-13 DIAGNOSIS — R09.02 HYPOXEMIA REQUIRING SUPPLEMENTAL OXYGEN: ICD-10-CM

## 2021-12-13 DIAGNOSIS — G47.33 OSA ON CPAP: ICD-10-CM

## 2021-12-13 DIAGNOSIS — Z99.89 OSA ON CPAP: ICD-10-CM

## 2021-12-13 RX ORDER — SPIRONOLACTONE 50 MG/1
TABLET, FILM COATED ORAL
Qty: 90 TABLET | Refills: 0 | Status: SHIPPED | OUTPATIENT
Start: 2021-12-13 | End: 2022-03-21 | Stop reason: SDUPTHER

## 2021-12-28 ENCOUNTER — HOSPITAL ENCOUNTER (OUTPATIENT)
Dept: PULMONOLOGY | Facility: HOSPITAL | Age: 56
Discharge: HOME | End: 2021-12-28
Payer: COMMERCIAL

## 2021-12-28 DIAGNOSIS — F41.9 ANXIETY AND DEPRESSION: ICD-10-CM

## 2021-12-28 DIAGNOSIS — F32.A ANXIETY AND DEPRESSION: ICD-10-CM

## 2021-12-28 DIAGNOSIS — J44.9 COPD, SEVERE (HCC): ICD-10-CM

## 2021-12-28 PROCEDURE — 94729 DIFFUSING CAPACITY: CPT

## 2021-12-28 PROCEDURE — 94729 DIFFUSING CAPACITY: CPT | Performed by: INTERNAL MEDICINE

## 2021-12-28 PROCEDURE — 94060 EVALUATION OF WHEEZING: CPT | Performed by: INTERNAL MEDICINE

## 2021-12-28 PROCEDURE — 94727 GAS DIL/WSHOT DETER LNG VOL: CPT

## 2021-12-28 PROCEDURE — 94060 EVALUATION OF WHEEZING: CPT

## 2021-12-28 PROCEDURE — 94726 PLETHYSMOGRAPHY LUNG VOLUMES: CPT | Performed by: INTERNAL MEDICINE

## 2021-12-28 PROCEDURE — 94760 N-INVAS EAR/PLS OXIMETRY 1: CPT

## 2021-12-28 RX ORDER — ALBUTEROL SULFATE 2.5 MG/3ML
2.5 SOLUTION RESPIRATORY (INHALATION) ONCE AS NEEDED
Status: COMPLETED | OUTPATIENT
Start: 2021-12-28 | End: 2021-12-28

## 2021-12-28 RX ADMIN — ALBUTEROL SULFATE 2.5 MG: 2.5 SOLUTION RESPIRATORY (INHALATION) at 13:21

## 2021-12-30 DIAGNOSIS — J44.9 COPD, SEVERE (HCC): ICD-10-CM

## 2021-12-30 RX ORDER — UMECLIDINIUM BROMIDE AND VILANTEROL TRIFENATATE 62.5; 25 UG/1; UG/1
1 POWDER RESPIRATORY (INHALATION) DAILY
Qty: 60 BLISTER | Refills: 3 | Status: SHIPPED | OUTPATIENT
Start: 2021-12-30 | End: 2022-04-21 | Stop reason: SDUPTHER

## 2022-01-11 DIAGNOSIS — I10 ESSENTIAL HYPERTENSION: ICD-10-CM

## 2022-01-11 DIAGNOSIS — E78.49 OTHER HYPERLIPIDEMIA: ICD-10-CM

## 2022-01-12 RX ORDER — ATORVASTATIN CALCIUM 40 MG/1
40 TABLET, FILM COATED ORAL DAILY
Qty: 90 TABLET | Refills: 3 | Status: SHIPPED | OUTPATIENT
Start: 2022-01-12

## 2022-01-12 RX ORDER — AMLODIPINE BESYLATE 10 MG/1
10 TABLET ORAL DAILY
Qty: 90 TABLET | Refills: 3 | Status: SHIPPED | OUTPATIENT
Start: 2022-01-12

## 2022-01-19 DIAGNOSIS — E11.40 TYPE 2 DIABETES MELLITUS WITH DIABETIC NEUROPATHY, WITH LONG-TERM CURRENT USE OF INSULIN (HCC): ICD-10-CM

## 2022-01-19 DIAGNOSIS — Z79.4 TYPE 2 DIABETES MELLITUS WITH DIABETIC NEUROPATHY, WITH LONG-TERM CURRENT USE OF INSULIN (HCC): ICD-10-CM

## 2022-01-20 RX ORDER — GABAPENTIN 100 MG/1
100 CAPSULE ORAL 3 TIMES DAILY
Qty: 90 CAPSULE | Refills: 0 | Status: SHIPPED | OUTPATIENT
Start: 2022-01-20 | End: 2022-01-26 | Stop reason: SDUPTHER

## 2022-01-26 ENCOUNTER — TELEPHONE (OUTPATIENT)
Dept: ADMINISTRATIVE | Facility: OTHER | Age: 57
End: 2022-01-26

## 2022-01-26 ENCOUNTER — OFFICE VISIT (OUTPATIENT)
Dept: FAMILY MEDICINE CLINIC | Facility: CLINIC | Age: 57
End: 2022-01-26
Payer: COMMERCIAL

## 2022-01-26 ENCOUNTER — APPOINTMENT (OUTPATIENT)
Dept: LAB | Facility: MEDICAL CENTER | Age: 57
End: 2022-01-26
Payer: COMMERCIAL

## 2022-01-26 VITALS
BODY MASS INDEX: 46.65 KG/M2 | DIASTOLIC BLOOD PRESSURE: 82 MMHG | OXYGEN SATURATION: 96 % | TEMPERATURE: 96.5 F | HEIGHT: 69 IN | HEART RATE: 86 BPM | SYSTOLIC BLOOD PRESSURE: 130 MMHG | WEIGHT: 315 LBS

## 2022-01-26 DIAGNOSIS — Z12.11 SCREENING FOR COLON CANCER: ICD-10-CM

## 2022-01-26 DIAGNOSIS — G62.9 POLYNEUROPATHY: ICD-10-CM

## 2022-01-26 DIAGNOSIS — I10 ESSENTIAL HYPERTENSION: ICD-10-CM

## 2022-01-26 DIAGNOSIS — E11.40 TYPE 2 DIABETES MELLITUS WITH DIABETIC NEUROPATHY, WITH LONG-TERM CURRENT USE OF INSULIN (HCC): Primary | ICD-10-CM

## 2022-01-26 DIAGNOSIS — Z79.4 TYPE 2 DIABETES MELLITUS WITH DIABETIC NEUROPATHY, WITH LONG-TERM CURRENT USE OF INSULIN (HCC): ICD-10-CM

## 2022-01-26 DIAGNOSIS — E78.5 HYPERLIPIDEMIA, UNSPECIFIED HYPERLIPIDEMIA TYPE: ICD-10-CM

## 2022-01-26 DIAGNOSIS — E55.9 VITAMIN D DEFICIENCY: ICD-10-CM

## 2022-01-26 DIAGNOSIS — E11.40 TYPE 2 DIABETES MELLITUS WITH DIABETIC NEUROPATHY, WITH LONG-TERM CURRENT USE OF INSULIN (HCC): ICD-10-CM

## 2022-01-26 DIAGNOSIS — Z79.4 TYPE 2 DIABETES MELLITUS WITH DIABETIC NEUROPATHY, WITH LONG-TERM CURRENT USE OF INSULIN (HCC): Primary | ICD-10-CM

## 2022-01-26 DIAGNOSIS — E11.8 TYPE 2 DIABETES MELLITUS WITH UNSPECIFIED COMPLICATIONS (HCC): ICD-10-CM

## 2022-01-26 DIAGNOSIS — E66.01 MORBID OBESITY WITH BMI OF 50.0-59.9, ADULT (HCC): ICD-10-CM

## 2022-01-26 LAB
25(OH)D3 SERPL-MCNC: 34.7 NG/ML (ref 30–100)
ALBUMIN SERPL BCP-MCNC: 3.6 G/DL (ref 3.5–5)
ALP SERPL-CCNC: 83 U/L (ref 46–116)
ALT SERPL W P-5'-P-CCNC: 41 U/L (ref 12–78)
ANION GAP SERPL CALCULATED.3IONS-SCNC: 6 MMOL/L (ref 4–13)
AST SERPL W P-5'-P-CCNC: 17 U/L (ref 5–45)
BASOPHILS # BLD AUTO: 0.05 THOUSANDS/ΜL (ref 0–0.1)
BASOPHILS NFR BLD AUTO: 1 % (ref 0–1)
BILIRUB SERPL-MCNC: 0.75 MG/DL (ref 0.2–1)
BUN SERPL-MCNC: 18 MG/DL (ref 5–25)
CALCIUM SERPL-MCNC: 9.1 MG/DL (ref 8.3–10.1)
CHLORIDE SERPL-SCNC: 106 MMOL/L (ref 100–108)
CHOLEST SERPL-MCNC: 94 MG/DL
CO2 SERPL-SCNC: 25 MMOL/L (ref 21–32)
CREAT SERPL-MCNC: 0.94 MG/DL (ref 0.6–1.3)
EOSINOPHIL # BLD AUTO: 0.25 THOUSAND/ΜL (ref 0–0.61)
EOSINOPHIL NFR BLD AUTO: 2 % (ref 0–6)
ERYTHROCYTE [DISTWIDTH] IN BLOOD BY AUTOMATED COUNT: 14.5 % (ref 11.6–15.1)
GFR SERPL CREATININE-BSD FRML MDRD: 89 ML/MIN/1.73SQ M
GLUCOSE P FAST SERPL-MCNC: 182 MG/DL (ref 65–99)
HCT VFR BLD AUTO: 44.8 % (ref 36.5–49.3)
HDLC SERPL-MCNC: 22 MG/DL
HGB BLD-MCNC: 14.6 G/DL (ref 12–17)
IMM GRANULOCYTES # BLD AUTO: 0.06 THOUSAND/UL (ref 0–0.2)
IMM GRANULOCYTES NFR BLD AUTO: 1 % (ref 0–2)
LDLC SERPL CALC-MCNC: 14 MG/DL (ref 0–100)
LYMPHOCYTES # BLD AUTO: 1.3 THOUSANDS/ΜL (ref 0.6–4.47)
LYMPHOCYTES NFR BLD AUTO: 13 % (ref 14–44)
MCH RBC QN AUTO: 27.5 PG (ref 26.8–34.3)
MCHC RBC AUTO-ENTMCNC: 32.6 G/DL (ref 31.4–37.4)
MCV RBC AUTO: 84 FL (ref 82–98)
MONOCYTES # BLD AUTO: 0.74 THOUSAND/ΜL (ref 0.17–1.22)
MONOCYTES NFR BLD AUTO: 7 % (ref 4–12)
NEUTROPHILS # BLD AUTO: 7.95 THOUSANDS/ΜL (ref 1.85–7.62)
NEUTS SEG NFR BLD AUTO: 76 % (ref 43–75)
NONHDLC SERPL-MCNC: 72 MG/DL
NRBC BLD AUTO-RTO: 0 /100 WBCS
PLATELET # BLD AUTO: 290 THOUSANDS/UL (ref 149–390)
PMV BLD AUTO: 9.9 FL (ref 8.9–12.7)
POTASSIUM SERPL-SCNC: 4.5 MMOL/L (ref 3.5–5.3)
PROT SERPL-MCNC: 7.2 G/DL (ref 6.4–8.2)
PSA SERPL-MCNC: 0.1 NG/ML (ref 0–4)
RBC # BLD AUTO: 5.31 MILLION/UL (ref 3.88–5.62)
SODIUM SERPL-SCNC: 137 MMOL/L (ref 136–145)
T3FREE SERPL-MCNC: 2.61 PG/ML (ref 2.3–4.2)
T4 FREE SERPL-MCNC: 1.15 NG/DL (ref 0.76–1.46)
TRIGL SERPL-MCNC: 292 MG/DL
TSH SERPL DL<=0.05 MIU/L-ACNC: 1.87 UIU/ML (ref 0.36–3.74)
WBC # BLD AUTO: 10.35 THOUSAND/UL (ref 4.31–10.16)

## 2022-01-26 PROCEDURE — 3075F SYST BP GE 130 - 139MM HG: CPT | Performed by: PHYSICIAN ASSISTANT

## 2022-01-26 PROCEDURE — 80061 LIPID PANEL: CPT

## 2022-01-26 PROCEDURE — 84443 ASSAY THYROID STIM HORMONE: CPT

## 2022-01-26 PROCEDURE — 82306 VITAMIN D 25 HYDROXY: CPT

## 2022-01-26 PROCEDURE — 99213 OFFICE O/P EST LOW 20 MIN: CPT | Performed by: PHYSICIAN ASSISTANT

## 2022-01-26 PROCEDURE — 3079F DIAST BP 80-89 MM HG: CPT | Performed by: PHYSICIAN ASSISTANT

## 2022-01-26 PROCEDURE — G0103 PSA SCREENING: HCPCS

## 2022-01-26 PROCEDURE — 84439 ASSAY OF FREE THYROXINE: CPT

## 2022-01-26 PROCEDURE — 84481 FREE ASSAY (FT-3): CPT

## 2022-01-26 PROCEDURE — 80053 COMPREHEN METABOLIC PANEL: CPT

## 2022-01-26 PROCEDURE — 85025 COMPLETE CBC W/AUTO DIFF WBC: CPT

## 2022-01-26 PROCEDURE — 1036F TOBACCO NON-USER: CPT | Performed by: PHYSICIAN ASSISTANT

## 2022-01-26 PROCEDURE — 36415 COLL VENOUS BLD VENIPUNCTURE: CPT

## 2022-01-26 PROCEDURE — 83036 HEMOGLOBIN GLYCOSYLATED A1C: CPT

## 2022-01-26 PROCEDURE — 3008F BODY MASS INDEX DOCD: CPT | Performed by: PHYSICIAN ASSISTANT

## 2022-01-26 RX ORDER — GABAPENTIN 100 MG/1
100 CAPSULE ORAL 3 TIMES DAILY
Qty: 90 CAPSULE | Refills: 0 | Status: SHIPPED | OUTPATIENT
Start: 2022-01-26 | End: 2022-02-11 | Stop reason: SDUPTHER

## 2022-01-26 RX ORDER — SEMAGLUTIDE 1.34 MG/ML
INJECTION, SOLUTION SUBCUTANEOUS
COMMUNITY
Start: 2021-12-18

## 2022-01-26 NOTE — PROGRESS NOTES
Assessment/Plan:    Problem List Items Addressed This Visit        Endocrine    Type 2 diabetes mellitus with diabetic neuropathy, with long-term current use of insulin ([facility] ) - Primary    Relevant Medications    Ozempic, 1 MG/DOSE, 4 MG/3ML SOPN injection pen    gabapentin (NEURONTIN) 100 mg capsule    Other Relevant Orders    Hemoglobin A1C       Cardiovascular and Mediastinum    Essential hypertension       Other    Morbid obesity with BMI of 50 0-59 9, adult ([facility] )      Other Visit Diagnoses     Screening for colon cancer        Relevant Orders    Cologuard           Diagnoses and all orders for this visit:    Type 2 diabetes mellitus with diabetic neuropathy, with long-term current use of insulin ([facility] )  -     Hemoglobin A1C; Future  -     gabapentin (NEURONTIN) 100 mg capsule; Take 1 capsule (100 mg total) by mouth 3 (three) times a day    Screening for colon cancer  -     Cologuard    Essential hypertension    Morbid obesity with BMI of 50 0-59 9, adult ([facility])    Other orders  -     Ozempic, 1 MG/DOSE, 4 MG/3ML SOPN injection pen; INJECT 1MG SUBCUTANEOUSLY ONCE A WEEK        No problem-specific Assessment & Plan notes found for this encounter  Subjective:      Patient ID: Radha Cannon is a 61 y o  male  Ilsa  is here today for follow up  Had labs drawn this morning  Recent A1C reportedly 7 3% which is excellent, he feels much better since DM now controlled  He does not have any new complaints  He has an echocardiogram scheduled through Dr Catherine Casanova in the near future, and believes he has a diabetic eye exam scheduled in [place] for next month (he will ask them to send us a report)  The following portions of the patient's history were reviewed and updated as appropriate:   He has a past medical history of Aortic stenosis, COPD (chronic obstructive pulmonary disease) ([facility] ), Diabetes ([facility] ), Diabetes mellitus ([facility] ), Hyperlipidemia, Hypertension (7/2/2014), and Sleep apnea, obstructive  ,  does not have any pertinent problems on file  ,   has a past surgical history that includes Appendectomy and Eye surgery  ,  family history includes No Known Problems in his mother  ,   reports that he has quit smoking  His smoking use included cigarettes  He quit after 10 00 years of use  He has never used smokeless tobacco  He reports that he does not drink alcohol and does not use drugs  ,  is allergic to theophylline     Current Outpatient Medications   Medication Sig Dispense Refill    albuterol (Ventolin HFA) 90 mcg/act inhaler Inhale 2 puffs every 6 (six) hours as needed for wheezing 1 Inhaler 2    amLODIPine (NORVASC) 10 mg tablet Take 1 tablet (10 mg total) by mouth daily 90 tablet 3    atorvastatin (LIPITOR) 40 mg tablet Take 1 tablet (40 mg total) by mouth daily 90 tablet 3    Blood Glucose Monitoring Suppl (CONTOUR NEXT MONITOR) w/Device KIT Test blood sugar once daily or as needed for fluctuating blood sugars 1 kit 0    carvedilol (COREG) 12 5 mg tablet Take 1 tablet (12 5 mg total) by mouth 2 (two) times a day with meals 180 tablet 3    cetirizine (ZYRTEC ALLERGY) 10 mg tablet Take 10 mg by mouth      cyclobenzaprine (FLEXERIL) 10 mg tablet Take 10 mg by mouth      Dapagliflozin Propanediol (Farxiga) 10 MG TABS Take 10 mg by mouth daily      fluticasone (Flovent HFA) 220 mcg/act inhaler Inhale 2 puffs 2 (two) times a day Rinse mouth after use 12 g 5    gabapentin (NEURONTIN) 100 mg capsule Take 1 capsule (100 mg total) by mouth 3 (three) times a day 90 capsule 0    glimepiride (AMARYL) 4 mg tablet Take 4 mg by mouth 2 (two) times a day      glucose blood (CONTOUR NEXT TEST) test strip Test blood sugar once daily or as needed for fluctuating blood sugars 100 each 3    hydrochlorothiazide (HYDRODIURIL) 25 mg tablet Take 1 tablet by mouth once daily 90 tablet 0    Insulin Pen Needle (BD Pen Needle Dee Dee U/F) 32G X 4 MM MISC Use 4 a day 200 each 5    ipratropium-albuterol (DUO-NEB) 0 5-2 5 mg/3 mL nebulizer solution Take 1 vial (3 mL total) by nebulization every 6 (six) hours as needed for wheezing or shortness of breath 360 mL 2    Lancets MISC Test blood sugar once daily or as needed for fluctuating blood sugars 100 each 0    Levemir FlexTouch 100 units/mL injection pen Inject 60 Units under the skin daily       losartan (COZAAR) 100 MG tablet Take 1 tablet by mouth once daily 90 tablet 0    meloxicam (MOBIC) 15 mg tablet Take 15 mg by mouth      metFORMIN (GLUCOPHAGE) 1000 MG tablet TAKE 1 TABLET BY MOUTH TWICE DAILY WITH MEALS 180 tablet 0    montelukast (SINGULAIR) 10 mg tablet       Multiple Vitamins-Minerals (MENS MULTIVITAMIN PO) Take by mouth      sertraline (ZOLOFT) 50 mg tablet Take 1 tablet (50 mg total) by mouth daily 30 tablet 0    sertraline (ZOLOFT) 50 mg tablet Take 1 tablet by mouth once daily 30 tablet 0    spironolactone (ALDACTONE) 50 mg tablet Take 1 tablet by mouth once daily 90 tablet 0    umeclidinium-vilanterol (Anoro Ellipta) 62 5-25 MCG/INH inhaler Inhale 1 puff daily 60 blister 3    Ozempic, 1 MG/DOSE, 4 MG/3ML SOPN injection pen INJECT 1MG SUBCUTANEOUSLY ONCE A WEEK       No current facility-administered medications for this visit  Review of Systems   Constitutional: Negative for activity change, appetite change, chills, diaphoresis, fatigue, fever and unexpected weight change  HENT: Negative for congestion, ear pain, postnasal drip, rhinorrhea, sinus pressure, sinus pain, sneezing, sore throat, tinnitus and voice change  Eyes: Negative for pain, redness and visual disturbance  Respiratory: Negative for cough, chest tightness, shortness of breath and wheezing  Cardiovascular: Negative for chest pain, palpitations and leg swelling  Gastrointestinal: Negative for abdominal pain, blood in stool, constipation, diarrhea, nausea and vomiting  Genitourinary: Negative for difficulty urinating, dysuria, frequency, hematuria and urgency     Musculoskeletal: Negative for arthralgias, back pain, gait problem, joint swelling, myalgias, neck pain and neck stiffness  Skin: Negative for color change, pallor, rash and wound  Neurological: Negative for dizziness, tremors, weakness, light-headedness and headaches  Psychiatric/Behavioral: Negative for dysphoric mood, self-injury, sleep disturbance and suicidal ideas  The patient is not nervous/anxious  Objective:  Vitals:    01/26/22 0948   BP: 130/82   Pulse: 86   Temp: (!) 96 5 °F (35 8 °C)   SpO2: 96%   Weight: (!) 162 kg (357 lb 6 4 oz)   Height: 5' 9" (1 753 m)     Body mass index is 52 78 kg/m²  Physical Exam  Vitals reviewed  Constitutional:       General: He is not in acute distress  Appearance: He is well-developed  He is obese  He is not diaphoretic  HENT:      Head: Normocephalic and atraumatic  Right Ear: Hearing, tympanic membrane, ear canal and external ear normal       Left Ear: Hearing, tympanic membrane, ear canal and external ear normal       Mouth/Throat:      Pharynx: Uvula midline  No oropharyngeal exudate  Eyes:      General: No scleral icterus  Right eye: No discharge  Left eye: No discharge  Conjunctiva/sclera: Conjunctivae normal    Neck:      Thyroid: No thyromegaly  Vascular: No carotid bruit  Cardiovascular:      Rate and Rhythm: Normal rate and regular rhythm  Heart sounds: Normal heart sounds  No murmur heard  Pulmonary:      Effort: Pulmonary effort is normal  No respiratory distress  Breath sounds: Normal breath sounds  No wheezing  Abdominal:      General: Bowel sounds are normal  There is no distension  Palpations: Abdomen is soft  There is no mass  Tenderness: There is no abdominal tenderness  There is no guarding or rebound  Musculoskeletal:         General: No tenderness  Normal range of motion  Cervical back: Neck supple  Lymphadenopathy:      Cervical: No cervical adenopathy     Skin: General: Skin is warm and dry  Findings: No erythema or rash  Neurological:      Mental Status: He is alert and oriented to person, place, and time  Psychiatric:         Behavior: Behavior normal          Thought Content: Thought content normal          Judgment: Judgment normal          BMI Counseling: Body mass index is 52 78 kg/m²  The BMI is above normal  Nutrition recommendations include reducing portion sizes, decreasing overall calorie intake, 3-5 servings of fruits/vegetables daily, reducing fast food intake, consuming healthier snacks, decreasing soda and/or juice intake, moderation in carbohydrate intake, increasing intake of lean protein, reducing intake of saturated fat and trans fat and reducing intake of cholesterol  Exercise recommendations include exercising 3-5 times per week

## 2022-01-26 NOTE — TELEPHONE ENCOUNTER
Upon review of the In Basket request and the patient's chart, initial outreach has been made via fax, please see Contacts section for details       Thank you  Shayne Mckeon MA

## 2022-01-26 NOTE — LETTER
Vaccination Request Form: Influenza      Date Requested: 22  Patient: Cony Rice  Patient : 1965   Referring Provider: Asia Lacey PA-C       The above patient has informed us that they have had their   most recent Influenza administered at your facility  Please   complete this form and attach all corresponding documentation  Date of Vaccine(s) Given  ______________________________    Lot Number(s) _______________________________________    Manufacture(s) ______________________________________    Dose Amount (s) _____________________________________    Expiration Date(s) ____________________________________    Comments __________________________________________________________  ____________________________________________________________________  ____________________________________________________________________  ____________________________________________________________________    Administering Facility  ________________________________________________    Vaccine Administered By (print name) ___________________________________      Form Completed By (print name) _______________________________________      Signature ___________________________________________________________      These reports are needed for  compliance  Please fax this completed form and a copy of the Vaccine Document(s) to our office located at [place] [address] as soon as possible to [phone] kaykay Simmons: Phone [phone]    We thank you for your assistance in treating our mutual patient

## 2022-01-26 NOTE — TELEPHONE ENCOUNTER
----- Message from Norma Wade sent at 1/26/2022  9:53 AM EST -----  Regarding: Flu shot-TFP  01/26/22 9:53 AM    Hello, our patient Rolanda Colón has had Immunization(s) completed/performed  Please assist in updating the patient chart by making an External outreach to [facility] located in [place], [place]  The date of service is within past 3 months      Thank you,  Norma Wade   [facility]

## 2022-01-26 NOTE — TELEPHONE ENCOUNTER
----- Message from Maira Theodore sent at 1/26/2022  9:52 AM EST -----  Regarding: HgbA1c-TPC  01/26/22 9:52 AM    Hello, our patient Dany Ward has had Hemoglobin A1c completed/performed  Please assist in updating the patient chart by making an External outreach to [facility] facility located in [place], [place]  The date of service is within past year      Thank you,  Maira Theodore   [facility]

## 2022-01-26 NOTE — TELEPHONE ENCOUNTER
Upon review of the In Basket request we have found this is a duplicate request and no further action is needed  This request was completed upon initial request, the patient chart is up to date, and this message will now be closed  Hemoglobin A1c  Any additional questions or concerns should be emailed to the Practice Liaisons via [email or web address] email, please do not reply via In Basket      Thank you  Debby Triplett MA

## 2022-01-27 LAB
EST. AVERAGE GLUCOSE BLD GHB EST-MCNC: 160 MG/DL
HBA1C MFR BLD: 7.2 %

## 2022-01-27 PROCEDURE — 3051F HG A1C>EQUAL 7.0%<8.0%: CPT | Performed by: PHYSICIAN ASSISTANT

## 2022-01-27 PROCEDURE — 3061F NEG MICROALBUMINURIA REV: CPT | Performed by: PHYSICIAN ASSISTANT

## 2022-01-28 NOTE — TELEPHONE ENCOUNTER
Upon review of the In Basket request we were able to locate, review, and update the patient chart as requested for Immunization(s) Influenza  Any additional questions or concerns should be emailed to the Practice Liaisons via [email or web address] email, please do not reply via In Basket      Thank you  Bryson Macario MA

## 2022-02-10 ENCOUNTER — OFFICE VISIT (OUTPATIENT)
Dept: PULMONOLOGY | Facility: CLINIC | Age: 57
End: 2022-02-10
Payer: COMMERCIAL

## 2022-02-10 VITALS
HEART RATE: 75 BPM | HEIGHT: 69 IN | WEIGHT: 315 LBS | DIASTOLIC BLOOD PRESSURE: 68 MMHG | OXYGEN SATURATION: 90 % | TEMPERATURE: 98.8 F | BODY MASS INDEX: 46.65 KG/M2 | SYSTOLIC BLOOD PRESSURE: 130 MMHG

## 2022-02-10 DIAGNOSIS — I10 ESSENTIAL HYPERTENSION: ICD-10-CM

## 2022-02-10 DIAGNOSIS — G47.33 OSA (OBSTRUCTIVE SLEEP APNEA): ICD-10-CM

## 2022-02-10 DIAGNOSIS — J44.9 COPD, SEVERE (HCC): Primary | ICD-10-CM

## 2022-02-10 DIAGNOSIS — E66.01 MORBID OBESITY WITH BMI OF 50.0-59.9, ADULT (HCC): ICD-10-CM

## 2022-02-10 PROCEDURE — 4010F ACE/ARB THERAPY RXD/TAKEN: CPT | Performed by: INTERNAL MEDICINE

## 2022-02-10 PROCEDURE — 99214 OFFICE O/P EST MOD 30 MIN: CPT | Performed by: INTERNAL MEDICINE

## 2022-02-10 PROCEDURE — 1036F TOBACCO NON-USER: CPT | Performed by: INTERNAL MEDICINE

## 2022-02-10 PROCEDURE — 3008F BODY MASS INDEX DOCD: CPT | Performed by: INTERNAL MEDICINE

## 2022-02-10 PROCEDURE — 3075F SYST BP GE 130 - 139MM HG: CPT | Performed by: INTERNAL MEDICINE

## 2022-02-10 PROCEDURE — 3078F DIAST BP <80 MM HG: CPT | Performed by: INTERNAL MEDICINE

## 2022-02-10 RX ORDER — LOSARTAN POTASSIUM 100 MG/1
100 TABLET ORAL DAILY
Qty: 90 TABLET | Refills: 3 | Status: SHIPPED | OUTPATIENT
Start: 2022-02-10

## 2022-02-10 NOTE — PROGRESS NOTES
Pulmonary Follow Up Note   Siri Hanson 61 y o  male MRN: [id number]  2/10/2022      Assessment/Plan:    COPD, severe (HCC)  · Overall stable without exacerbation Previous PFTs from 2019 show FEV1: 1 36 L   41 % predicted  · Continue current regimen, Anoro Ellipta 1 puff daily and Flovent 220 mcg 2 puffs twice daily  · Needs to continue with trying to improve his conditioning and overall weight loss  · Willing to try pulmonary rehab, referral placed today     SCOTT (obstructive sleep apnea)  · Currently using CPAP but reports having sleep study last night and being placed on BiPAP  · Compliance report reviewed from 12/5 to 1/3, AHI of remains less than 1  · Continue BiPAP with current settings     COVID-19  · Overall has recovered well, no acute issues at this time      Health Maintenance  Immunization History   Administered Date(s) Administered    COVID-19 MODERNA VACC 0 5 ML IM 02/06/2021, 03/06/2021, 10/26/2021    INFLUENZA 11/25/2014, 09/19/2016, 11/01/2017, 12/17/2018    Influenza, recombinant, quadrivalent,injectable, preservative free 02/03/2020, 09/02/2020    Influenza, seasonal, injectable 11/25/2014, 08/31/2015    Pneumococcal Polysaccharide PPV23 05/23/2014    Tdap 05/23/2014    influenza, injectable, quadrivalent 12/05/2021       CT Lung Cancer Screening:    Return in about 3 months (around 5/10/2022)  History of Present Illness   HPI:  Siri Hanson is a 61 y o  male who presents to the office today follow up  He does have a medical history of severe COPD he, chronic hypoxic respiratory failure, SCOTT on CPAP, restrictive lung disease, chronic diastolic CHF, nicotine dependence in remission  He does have underlying SCOTT, is compliant with his BiPAP  Compliance report reviewed, shows favorable AHI  Continues to 2 L nasal cannula overnight  Review of Systems   Constitutional: Negative for activity change, appetite change, chills and fever     HENT: Negative for congestion, ear pain, postnasal drip, rhinorrhea, sneezing, sore throat, trouble swallowing and voice change  Respiratory: Positive for cough and wheezing  Negative for shortness of breath  Cardiovascular: Negative for chest pain and palpitations  Gastrointestinal: Negative for abdominal distention, abdominal pain, diarrhea, nausea and vomiting  Endocrine: Negative for cold intolerance and heat intolerance  Musculoskeletal: Positive for myalgias  Negative for arthralgias, back pain and neck stiffness  Skin: Negative for color change, pallor and rash  Neurological: Negative for dizziness, weakness, numbness and headaches  Psychiatric/Behavioral: Negative for agitation  The patient is not nervous/anxious          Historical Information   Past Medical History:   Diagnosis Date    Aortic stenosis     [facility] )     Diabetes ([facility] )     Diabetes mellitus ([facility] )     Hyperlipidemia     Hypertension 7/2/2014    Sleep apnea, obstructive      Past Surgical History:   Procedure Laterality Date    APPENDECTOMY      EYE SURGERY       Family History   Problem Relation Age of Onset    No Known Problems Mother          Meds/Allergies     Current Outpatient Medications:     albuterol (Ventolin HFA) 90 mcg/act inhaler, Inhale 2 puffs every 6 (six) hours as needed for wheezing, Disp: 1 Inhaler, Rfl: 2    amLODIPine (NORVASC) 10 mg tablet, Take 1 tablet (10 mg total) by mouth daily, Disp: 90 tablet, Rfl: 3    atorvastatin (LIPITOR) 40 mg tablet, Take 1 tablet (40 mg total) by mouth daily, Disp: 90 tablet, Rfl: 3    Blood Glucose Monitoring Suppl (CONTOUR NEXT MONITOR) w/Device KIT, Test blood sugar once daily or as needed for fluctuating blood sugars, Disp: 1 kit, Rfl: 0    carvedilol (COREG) 12 5 mg tablet, Take 1 tablet (12 5 mg total) by mouth 2 (two) times a day with meals, Disp: 180 tablet, Rfl: 3    cetirizine (ZYRTEC ALLERGY) 10 mg tablet, Take 10 mg by mouth, Disp: , Rfl:    cyclobenzaprine (FLEXERIL) 10 mg tablet, Take 10 mg by mouth, Disp: , Rfl:     Dapagliflozin Propanediol (Farxiga) 10 MG TABS, Take 10 mg by mouth daily, Disp: , Rfl:     fluticasone (Flovent HFA) 220 mcg/act inhaler, Inhale 2 puffs 2 (two) times a day Rinse mouth after use, Disp: 12 g, Rfl: 5    gabapentin (NEURONTIN) 100 mg capsule, Take 1 capsule (100 mg total) by mouth 3 (three) times a day, Disp: 90 capsule, Rfl: 0    glimepiride (AMARYL) 4 mg tablet, Take 4 mg by mouth 2 (two) times a day, Disp: , Rfl:     glucose blood (CONTOUR NEXT TEST) test strip, Test blood sugar once daily or as needed for fluctuating blood sugars, Disp: 100 each, Rfl: 3    hydrochlorothiazide (HYDRODIURIL) 25 mg tablet, Take 1 tablet by mouth once daily, Disp: 90 tablet, Rfl: 0    Insulin Pen Needle (BD Pen Needle Dee Dee U/F) 32G X 4 MM MISC, Use 4 a day, Disp: 200 each, Rfl: 5    ipratropium-albuterol (DUO-NEB) 0 5-2 5 mg/3 mL nebulizer solution, Take 1 vial (3 mL total) by nebulization every 6 (six) hours as needed for wheezing or shortness of breath, Disp: 360 mL, Rfl: 2    Lancets MISC, Test blood sugar once daily or as needed for fluctuating blood sugars, Disp: 100 each, Rfl: 0    Levemir FlexTouch 100 units/mL injection pen, Inject 60 Units under the skin daily , Disp: , Rfl:     losartan (COZAAR) 100 MG tablet, Take 1 tablet (100 mg total) by mouth daily, Disp: 90 tablet, Rfl: 3    meloxicam (MOBIC) 15 mg tablet, Take 15 mg by mouth, Disp: , Rfl:     metFORMIN (GLUCOPHAGE) 1000 MG tablet, TAKE 1 TABLET BY MOUTH TWICE DAILY WITH MEALS, Disp: 180 tablet, Rfl: 0    montelukast (SINGULAIR) 10 mg tablet, , Disp: , Rfl:     Multiple Vitamins-Minerals (MENS MULTIVITAMIN PO), Take by mouth, Disp: , Rfl:     Ozempic, 1 MG/DOSE, 4 MG/3ML SOPN injection pen, INJECT 1MG SUBCUTANEOUSLY ONCE A WEEK, Disp: , Rfl:     sertraline (ZOLOFT) 50 mg tablet, Take 1 tablet by mouth once daily, Disp: 30 tablet, Rfl: 0    sertraline (ZOLOFT) 50 mg tablet, Take 1 tablet (50 mg total) by mouth daily, Disp: 30 tablet, Rfl: 2    spironolactone (ALDACTONE) 50 mg tablet, Take 1 tablet by mouth once daily, Disp: 90 tablet, Rfl: 0    umeclidinium-vilanterol (Anoro Ellipta) 62 5-25 MCG/INH inhaler, Inhale 1 puff daily, Disp: 60 blister, Rfl: 3  Allergies   Allergen Reactions    Theophylline Other (See Comments)     Severe headaches  headaches  Severe headaches         Vitals: Blood pressure 130/68, pulse 75, temperature 98 8 °F (37 1 °C), height 5' 9" (1 753 m), weight (!) 158 kg (348 lb), SpO2 90 %  Body mass index is 51 39 kg/m²  Oxygen Therapy  SpO2: 90 %    Physical Exam  Constitutional:       Appearance: Normal appearance  HENT:      Head: Normocephalic and atraumatic  Nose: Nose normal       Mouth/Throat:      Mouth: Mucous membranes are moist       Pharynx: Oropharynx is clear  Cardiovascular:      Rate and Rhythm: Normal rate and regular rhythm  Pulses: Normal pulses  Heart sounds: Normal heart sounds  No murmur heard  Pulmonary:      Effort: Pulmonary effort is normal  No respiratory distress  Breath sounds: Normal breath sounds  No wheezing  Abdominal:      General: Abdomen is flat  There is no distension  Palpations: Abdomen is soft  Tenderness: There is no abdominal tenderness  Musculoskeletal:         General: No swelling or tenderness  Cervical back: Normal range of motion and neck supple  Right lower leg: No edema  Left lower leg: No edema  Skin:     General: Skin is warm and dry  Findings: No rash  Neurological:      General: No focal deficit present  Mental Status: He is alert and oriented to person, place, and time  Psychiatric:         Mood and Affect: Mood normal          Behavior: Behavior normal          Labs: I have personally reviewed pertinent lab results    Lab Results   Component Value Date    WBC 10 35 (H) 01/26/2022    HGB 14 6 01/26/2022    HCT 44 8 01/26/2022    MCV 84 01/26/2022     01/26/2022     Lab Results   Component Value Date    CALCIUM 9 1 01/26/2022    K 4 5 01/26/2022    CO2 25 01/26/2022     01/26/2022    BUN 18 01/26/2022    CREATININE 0 94 01/26/2022     No results found for: IGE  Lab Results   Component Value Date    ALT 41 01/26/2022    AST 17 01/26/2022    ALKPHOS 83 01/26/2022     Imaging and other studies: I have personally reviewed pertinent reports  and I have personally reviewed pertinent films in PACS    Pulmonary function testing:   FEV1/FVC Ratio: 63 %  Forced Vital Capacity: 2 57 L    55 % predicted  FEV1: 1 62 L     45 % predicted     After administration of bronchodilator   FVC: 2 68 L, 58% predicted, 4% change  FEV1: 1 69 L, 47% predicted, 4% change     Lung volumes by body plethysmography:   Total Lung Capacity 84 % predicted   Residual volume 138 % predicted     DLCO corrected for patients hemoglobin level: 91 %    EKG, Pathology, and Other Studies: I have personally reviewed pertinent reports  and I have personally reviewed pertinent films in PACS      MARIEL Rosas    [facility]    Answers for HPI/ROS submitted by the patient on 2/3/2022  Chronicity: chronic  When did you first notice your symptoms?: more than 1 year ago  How often do your symptoms occur?: daily  Since you first noticed this problem, how has it changed?: unchanged  Do you have shortness of breath that occurs with effort or exertion?: Yes  Do you have ear congestion?: No  Do you have heartburn?: No  Do you have fatigue?: No  Do you have nasal congestion?: Yes  Do you have shortness of breath when lying flat?: No  Do you have shortness of breath when you wake up?: No  Do you have sweats?: No  Have you experienced weight loss?: No  Which of the following makes your symptoms worse?: any activity, change in weather, climbing stairs, exercise, exposure to fumes, exposure to smoke, strenuous activity, URI  Which of the following makes your symptoms better?: cold air, oral steroids, steroid inhaler

## 2022-02-10 NOTE — TELEPHONE ENCOUNTER
Patient was at his podiatrist today and he recommended that you increase his Gabapentin from 100 mg TID to 300 mg TID

## 2022-02-11 DIAGNOSIS — E11.40 TYPE 2 DIABETES MELLITUS WITH DIABETIC NEUROPATHY, WITH LONG-TERM CURRENT USE OF INSULIN (HCC): ICD-10-CM

## 2022-02-11 DIAGNOSIS — Z79.4 TYPE 2 DIABETES MELLITUS WITH DIABETIC NEUROPATHY, WITH LONG-TERM CURRENT USE OF INSULIN (HCC): ICD-10-CM

## 2022-02-11 RX ORDER — GABAPENTIN 300 MG/1
300 CAPSULE ORAL 3 TIMES DAILY
Qty: 90 CAPSULE | Refills: 2 | Status: SHIPPED | OUTPATIENT
Start: 2022-02-11 | End: 2022-06-07 | Stop reason: SDUPTHER

## 2022-02-15 ENCOUNTER — TELEPHONE (OUTPATIENT)
Dept: FAMILY MEDICINE CLINIC | Facility: CLINIC | Age: 57
End: 2022-02-15

## 2022-02-15 DIAGNOSIS — Z12.11 SCREENING FOR COLON CANCER: Primary | ICD-10-CM

## 2022-02-23 DIAGNOSIS — J96.11 CHRONIC HYPOXEMIC RESPIRATORY FAILURE (HCC): ICD-10-CM

## 2022-02-23 RX ORDER — HYDROCHLOROTHIAZIDE 25 MG/1
25 TABLET ORAL DAILY
Qty: 90 TABLET | Refills: 3 | Status: SHIPPED | OUTPATIENT
Start: 2022-02-23 | End: 2022-06-21

## 2022-02-28 ENCOUNTER — CLINICAL SUPPORT (OUTPATIENT)
Dept: PULMONOLOGY | Facility: HOSPITAL | Age: 57
End: 2022-02-28
Attending: INTERNAL MEDICINE

## 2022-02-28 DIAGNOSIS — J44.9 COPD, SEVERE (HCC): Primary | ICD-10-CM

## 2022-02-28 NOTE — PROGRESS NOTES
Pulmonary Rehabilitation Plan of Care   Initial Care Plan      Today's date: 2022   # of Exercise Sessions Completed: Evaluation   Patient name: Radha Cannon      : 1965  Age: 61 y o  MRN: [id number]  Referring Physician: Jeannine Apodaca MD  Pulmonologist: Moises Flower MD  Provider: [facility]  Clinician: Jayce Bartlett MS    Dx: COPD severe   Date of onset: 21      SUMMARY OF PROGRESS:  Today is Melvin's initial evaluation to begin Pulmonary Rehab  Patient does have a PFT on file, revealing an FEV1/FVC ratio of 63% and an FEV1 of 45%  This is suggestive of severe obstruction  Since their diagnosis, the patient has been experiencing increased dyspnea, increased sitting time and decreased physical activity  They report dyspnea, weakness and fatigue when completing ADLs   The patient currently does not follow a formal exercise program at home  Patient states he does house work and helps his friend with his [place] to keep him active  Depression screening using the PHQ-9 interprets the patient's score 10-14 = Moderate Depression  SANTI-7 screening tool for anxiety suggests 10-14 = Moderate anxiety  When addressed, the patient admits to having depression/anxiety  Patient reports excellent social/emotional support  Information to begin using [employer] was provided as well as contact information for counseling through [email or web address]  PHQ-9 score will be reassessed in 30 days  The patient is a former smoker, qt 10 years ago   Patient admits to 100% medication compliance  At rest, the patient rated dyspnea 0/10 with SpO2 90% on room air  They completed an initial 6MWT, walking 1,050 ft on supplemental O2 2/min via nasal canula  The patients rating of perceived dyspnea during the 6MWT was 6/10 with SpO2 87%  Patient took zero rest breaks  Resting  /68 with appropriate hemodynamic response to exercise reaching 122/68    Patient will exercise on supplemental O2 2/min via nasal gera  Education on smoking cessation, oxygen use, breathing techniques, pulmonary anatomy, exercise for the pulmonary patient, healthy eating, stress, and relaxation will be provided  Patient goals include: decrease sitting time, increase in strength, lose 5 lbs, wants to be able to play with his children without feeling SOB, increase functional capacity, decrease reliance in O2  [employer] will attend 24 exercise sessions, 2x/wk for 12-18 weeks beginning 3/2/22  Will progress patient as tolerated over the next 30 days          Medication compliance: Yes   Comments: Pt reports to be compliant with medications  Fall Risk: Low   Comments: Ambulates with a steady gait with no assist device    Smoking: Former user    RPD at Rest: 0/10  RPD with Exercise:  6/10    Assessment of progression of lung disease and functional status:  CAT:   Shortness of breath questionnaire: 41/120      EXERCISE ASSESSMENT and PLAN    Current Exercise Program in Rehab:       Frequency: 3 days/week        Minutes: 30-35         METS: 2 0-2 5              SpO2: >90%              RPD: 0-3                      HR: 20-30 beats above RHR   RPE: 4-5         Modalities: UBE, NuStep and Room walking      Exercise Progression 30 Day Goals :    Frequency: 3 days/week        Minutes: 40-45         METS: 2 5-3 0              SpO2: >90%              RPD: 0-3                      HR: 20-30 beats above RHR   RPE: 4-5        Modalities: Treadmill, UBE, NuStep and Recumbent bike     Strength trainin-3 days / week   Modalities: Chest Press, Pull Downs and free weights    Oxygen Needs: on room air at rest and patient states he is supposed to use O2 3-4L but does not use   Oxygen Goal: Maintain SpO2>90% during exercise    Home Exercise: none  Education: pursed lipped breathing, diaphragmatic breathing, relaxation breathing, home exercise and benefits of exercise for pulmonary disease    Goals: reduced dyspnea during exercise (0-3/10), improved exercise tolerance (max METs tolerated in pulmonary rehab), attend pulmonary rehab regularly, decrease sitting time at home and start a walking program  Progressing:  Reviewed Pt goals and determined plan of care    Plan: Titrate supplemental oxygen as needed to maintain SpO2>90% with exercise, learn to conserve energy with ADLs , practice breathing techniques 3x/day and reduce time sitting at home    Readiness to change: Preparation:  (Getting ready to change)       NUTRITION ASSESSMENT AND PLAN    Weight control:    Starting weight: 350   Current weight:   350    Diabetes: DM T2    Goals:fasting BG , eliminate processed meats, Eat 4-5 cups of fruits and vegetables daily, use olive or canola oil in baking, choose low sodium processed foods, eliminate butter and choose healthy snacks: light popcorn, plain pretzels  Education: heart healthy eating  low sodium diet  exercise and diabetes management   wt  loss   healthy choices while dining out  portion control  Progressing:Reviewed Pt goals and determined plan of care  Plan: Education class: Reading Food Labels, replace unhealthy snacks with fruits & vegs, reduce portion sizes, eat fewer desserts and sweets, avoid processed foods and drink more water  Readiness to change: Preparation:  (Getting ready to change)       PSYCHOSOCIAL ASSESSMENT AND PLAN    Emotional:  Depression assessment:  PHQ-9 = 10-14 = Moderate Depression            Anxiety measure:  SANTI-7 = 10-14 = Moderate anxiety  Self-reported stress level: 7   Social support: Very Good    Goals:   Increased interest in doing things, improved sleep, improved positive thoughts of well being, increased energy and Feel less anxious  Education: benefits of a positive support system, class:  Stress and Your Health , class:  Relaxation and class:  Stress and Pulmonary Disease    Progressing:Reviewed Pt goals and determined plan of care  Plan: Exercise, Spend time outdoors, Enjoy a hobby, Keep a positive mindset and Enjoy family  Readiness to change: Preparation:  (Getting ready to change)       OTHER CORE COMPONENTS     Tobacco:   Social History     Tobacco Use   Smoking Status Former Smoker    Years: 10 00    Types: Cigarettes   Smokeless Tobacco Never Used       Tobacco Use Intervention: Referral to tobacco expert:   N/A: Pt has a remote history of smoking    Blood pressure:    Restin/68   Exercise: 122/68   Recovery: 108/64    Goals: consistent BP < 130/80, reduced dietary sodium <2300mg, moderate intensity exercise >150 mins/wk and medication compliance  Education:  pathophysiology of pulmonary disease, control coughing, inspiratory muscle training and environmental triggers  Progressing:Reviewed Pt goals and determined plan of care  Plan: Avoid Processed foods, engage in regular exercise and check labels for sodium content  Readiness to change: Preparation:  (Getting ready to change)     PULMONARY REHAB ASSESSMENT    Today's date: 2022  Patient name: Erica Xavier     : 1965       MRN: [id number]  PCP: Holly Saucedo PA-C  Referring Physician: Dilip Ragsdale MD  Pulmonologist: Risa Fatima MD    Dx: COPD, severe       Date of onset: 21  Cultural needs: N/A    Weight:    Wt Readings from Last 1 Encounters:   02/10/22 (!) 158 kg (348 lb)      Height:   Ht Readings from Last 1 Encounters:   02/10/22 5' 9" (1 753 m)     Medical History:   Past Medical History:   Diagnosis Date    Aortic stenosis     [facility] )     Diabetes ([facility] )     Diabetes mellitus ([facility] )     Hyperlipidemia     Hypertension 2014    Sleep apnea, obstructive          Physical Limitations: N/A    Oxygen needs: patient walks on room air, patient states he is supposed to use 3-4L of O2 but does not use   Only uses bipap at night     History of Toxic Exposure:  Chemicals    Risk Factors   Cholesterol: Yes  Smoking: Former user  HTN: Yes  DM: Type 2   Obesity: Yes   Inactivity: Yes  Stress: perceived  stress: 7/10   Stressors:current health condition    Goals for Stress Management: self care, take time to relax     Family History:  Family History   Problem Relation Age of Onset    No Known Problems Mother        Allergies:  Theophylline  ETOH:   Social History     Substance and Sexual Activity   Alcohol Use Never         Current Medications:   Current Outpatient Medications   Medication Sig Dispense Refill    albuterol (Ventolin HFA) 90 mcg/act inhaler Inhale 2 puffs every 6 (six) hours as needed for wheezing 1 Inhaler 2    amLODIPine (NORVASC) 10 mg tablet Take 1 tablet (10 mg total) by mouth daily 90 tablet 3    atorvastatin (LIPITOR) 40 mg tablet Take 1 tablet (40 mg total) by mouth daily 90 tablet 3    Blood Glucose Monitoring Suppl (CONTOUR NEXT MONITOR) w/Device KIT Test blood sugar once daily or as needed for fluctuating blood sugars 1 kit 0    carvedilol (COREG) 12 5 mg tablet Take 1 tablet (12 5 mg total) by mouth 2 (two) times a day with meals 180 tablet 3    cetirizine (ZYRTEC ALLERGY) 10 mg tablet Take 10 mg by mouth      cyclobenzaprine (FLEXERIL) 10 mg tablet Take 10 mg by mouth      Dapagliflozin Propanediol (Farxiga) 10 MG TABS Take 10 mg by mouth daily      fluticasone (Flovent HFA) 220 mcg/act inhaler Inhale 2 puffs 2 (two) times a day Rinse mouth after use 12 g 5    gabapentin (NEURONTIN) 300 mg capsule Take 1 capsule (300 mg total) by mouth 3 (three) times a day 90 capsule 2    glimepiride (AMARYL) 4 mg tablet Take 4 mg by mouth 2 (two) times a day      glucose blood (CONTOUR NEXT TEST) test strip Test blood sugar once daily or as needed for fluctuating blood sugars 100 each 3    hydrochlorothiazide (HYDRODIURIL) 25 mg tablet Take 1 tablet (25 mg total) by mouth daily 90 tablet 3    Insulin Pen Needle (BD Pen Needle Dee Dee U/F) 32G X 4 MM MISC Use 4 a day 200 each 5    ipratropium-albuterol (DUO-NEB) 0 5-2 5 mg/3 mL nebulizer solution Take 1 vial (3 mL total) by nebulization every 6 (six) hours as needed for wheezing or shortness of breath 360 mL 2    Lancets MISC Test blood sugar once daily or as needed for fluctuating blood sugars 100 each 0    Levemir FlexTouch 100 units/mL injection pen Inject 60 Units under the skin daily       losartan (COZAAR) 100 MG tablet Take 1 tablet (100 mg total) by mouth daily 90 tablet 3    meloxicam (MOBIC) 15 mg tablet Take 15 mg by mouth      metFORMIN (GLUCOPHAGE) 1000 MG tablet TAKE 1 TABLET BY MOUTH TWICE DAILY WITH MEALS 180 tablet 0    montelukast (SINGULAIR) 10 mg tablet       Multiple Vitamins-Minerals (MENS MULTIVITAMIN PO) Take by mouth      Ozempic, 1 MG/DOSE, 4 MG/3ML SOPN injection pen INJECT 1MG SUBCUTANEOUSLY ONCE A WEEK      sertraline (ZOLOFT) 50 mg tablet Take 1 tablet by mouth once daily 30 tablet 0    sertraline (ZOLOFT) 50 mg tablet Take 1 tablet (50 mg total) by mouth daily 30 tablet 2    spironolactone (ALDACTONE) 50 mg tablet Take 1 tablet by mouth once daily 90 tablet 0    umeclidinium-vilanterol (Anoro Ellipta) 62 5-25 MCG/INH inhaler Inhale 1 puff daily 60 blister 3     No current facility-administered medications for this visit  Functional Status Prior to Diagnosis for Treatment   Occupation: employed  Recreation:    ADLs: No limitations  [place]: No limitations  Exercise: walking, lifting   Other: exposed to chemicals, fuel, etc     Current Functional Status  Occupation: unemployed, disabled  Recreation: see above  ADLs:Capable of performing light ADLs only  [place]: Capable of performing light ADLs only  Exercise: none   Other: N/A    Patient Specific Goals:   decrease sitting time, increase in strength, lose 5 lbs, wants to be able to play with his children without feeling SOB, increase functional capacity, decrease reliance in O2       Short Term Program Goals: increased strength improved energy/stamina with ADLs exercise 120-150 mins/wk improved BG control    Long Term Goals: increased maximal walking duration  increased intial training workload  Improved [facility] Activity Status score  Improved functional capacity    Oxygen Goals: Maintain SpO2>90% titrating supplemental oxygen as needed     Ability to reach goals/rehabilitation potential:  Good    Projected return to function: 8-12 weeks  Objective tests: 6 MWT      Nutritional   Reviewed details of Rate your Plate  Discussed key elements of heart healthy eating  Reviewed patient goals for dietary modifications and their clinical implications  Reviewed most recent lipid profile  Goals for dietary modification: increase fruits and vegetables  eliminate butter  low sodium  improved snack choices  more nuts/seeds      Emotional/Social  Patient reports he/she is coping well with good social support and denies depression or anxiety    SOCIAL SUPPORT NETWORK    Marital status:       Domestic Violence Screening: No    Comments: Patient is a good candidate for pulmonary rehab  Patient states he has been feeling down with the cold weather and cannot get out to exercise  Patient wants to get back into exercise being in a gym but feels intimidated because he is SOB  Patient will exercise on 2L of O2 as patient's O2 drops below 90%

## 2022-03-02 ENCOUNTER — TELEPHONE (OUTPATIENT)
Dept: PULMONOLOGY | Facility: CLINIC | Age: 57
End: 2022-03-02

## 2022-03-02 NOTE — TELEPHONE ENCOUNTER
Pt called  He has a lung infection right now, bringing up green phlegm  He's requesting a antibiotic and steroids  Please send to [facility]

## 2022-03-03 DIAGNOSIS — J44.9 COPD, SEVERE (HCC): Primary | ICD-10-CM

## 2022-03-03 RX ORDER — PREDNISONE 20 MG/1
40 TABLET ORAL DAILY
Qty: 10 TABLET | Refills: 0 | Status: SHIPPED | OUTPATIENT
Start: 2022-03-03 | End: 2022-03-08

## 2022-03-03 RX ORDER — AZITHROMYCIN 250 MG/1
TABLET, FILM COATED ORAL
Qty: 6 TABLET | Refills: 0 | Status: SHIPPED | OUTPATIENT
Start: 2022-03-03 | End: 2022-03-07

## 2022-03-03 NOTE — TELEPHONE ENCOUNTER
Called and spoke with Max Burt  He said he has been sick for about 2 days now  He is coughing up green mucous  He has a little wheezing  Denies chest tightness, any SOB more than normal, fevers and chills  He said he has been using his nebulizer and he has not needed it in 8 months  He is using his Anoro  Please advise

## 2022-03-03 NOTE — TELEPHONE ENCOUNTER
Called and discussed with patient, is having wheezing, coughing and some productive sputum  Will prescribe a course of prednisone as well as azithromycin for 5 days  If he has continued to have symptoms at the end of this course, he should be seen in the office for a sick visit

## 2022-03-11 ENCOUNTER — TELEPHONE (OUTPATIENT)
Dept: PULMONOLOGY | Facility: HOSPITAL | Age: 57
End: 2022-03-11

## 2022-03-14 ENCOUNTER — TELEPHONE (OUTPATIENT)
Dept: PULMONOLOGY | Facility: HOSPITAL | Age: 57
End: 2022-03-14

## 2022-03-16 ENCOUNTER — TELEPHONE (OUTPATIENT)
Dept: PULMONOLOGY | Facility: CLINIC | Age: 57
End: 2022-03-16

## 2022-03-16 NOTE — TELEPHONE ENCOUNTER
Patient is scheduled for an appointment today 03/16/2021 at 1:00 PM        ----- Message from [address] [place] [address] sent at 3/15/2022  4:22 PM EDT -----  yes  ----- Message -----  From: Ludwig Abeljarod  Sent: 3/15/2022   4:03 PM EDT  To: [address] [place], [place] [address]    Patient called  Dr Veena Cheng sent an antibiotic for him about 1 week ago and he was feeling better  He still has constant coughing and wheezing, can't bring anything up  Should he be seen? Please advise

## 2022-03-18 ENCOUNTER — HOSPITAL ENCOUNTER (EMERGENCY)
Facility: HOSPITAL | Age: 57
Discharge: HOME | End: 2022-03-18
Attending: EMERGENCY MEDICINE | Admitting: EMERGENCY MEDICINE
Payer: COMMERCIAL

## 2022-03-18 ENCOUNTER — APPOINTMENT (EMERGENCY)
Dept: RADIOLOGY | Facility: HOSPITAL | Age: 57
End: 2022-03-18
Payer: COMMERCIAL

## 2022-03-18 VITALS
OXYGEN SATURATION: 92 % | BODY MASS INDEX: 51.69 KG/M2 | TEMPERATURE: 96 F | WEIGHT: 315 LBS | SYSTOLIC BLOOD PRESSURE: 139 MMHG | RESPIRATION RATE: 18 BRPM | HEART RATE: 65 BPM | DIASTOLIC BLOOD PRESSURE: 71 MMHG

## 2022-03-18 DIAGNOSIS — J18.9 COMMUNITY ACQUIRED PNEUMONIA OF RIGHT LUNG: Primary | ICD-10-CM

## 2022-03-18 LAB
ANION GAP SERPL CALCULATED.3IONS-SCNC: 7 MMOL/L (ref 4–13)
BASE EX.OXY STD BLDV CALC-SCNC: 84.3 % (ref 60–80)
BASE EXCESS BLDV CALC-SCNC: -1.8 MMOL/L
BASOPHILS # BLD AUTO: 0.04 THOUSANDS/ΜL (ref 0–0.1)
BASOPHILS NFR BLD AUTO: 0 % (ref 0–1)
BUN SERPL-MCNC: 28 MG/DL (ref 5–25)
CALCIUM SERPL-MCNC: 8.9 MG/DL (ref 8.3–10.1)
CHLORIDE SERPL-SCNC: 100 MMOL/L (ref 100–108)
CO2 SERPL-SCNC: 25 MMOL/L (ref 21–32)
CREAT SERPL-MCNC: 1.54 MG/DL (ref 0.6–1.3)
EOSINOPHIL # BLD AUTO: 0.2 THOUSAND/ΜL (ref 0–0.61)
EOSINOPHIL NFR BLD AUTO: 2 % (ref 0–6)
ERYTHROCYTE [DISTWIDTH] IN BLOOD BY AUTOMATED COUNT: 14.9 % (ref 11.6–15.1)
FLUAV RNA RESP QL NAA+PROBE: NEGATIVE
FLUBV RNA RESP QL NAA+PROBE: NEGATIVE
GFR SERPL CREATININE-BSD FRML MDRD: 49 ML/MIN/1.73SQ M
GLUCOSE SERPL-MCNC: 316 MG/DL (ref 65–140)
HCO3 BLDV-SCNC: 23.2 MMOL/L (ref 24–30)
HCT VFR BLD AUTO: 42.8 % (ref 36.5–49.3)
HGB BLD-MCNC: 14.7 G/DL (ref 12–17)
IMM GRANULOCYTES # BLD AUTO: 0.08 THOUSAND/UL (ref 0–0.2)
IMM GRANULOCYTES NFR BLD AUTO: 1 % (ref 0–2)
LYMPHOCYTES # BLD AUTO: 0.84 THOUSANDS/ΜL (ref 0.6–4.47)
LYMPHOCYTES NFR BLD AUTO: 9 % (ref 14–44)
MCH RBC QN AUTO: 28.4 PG (ref 26.8–34.3)
MCHC RBC AUTO-ENTMCNC: 34.3 G/DL (ref 31.4–37.4)
MCV RBC AUTO: 83 FL (ref 82–98)
MONOCYTES # BLD AUTO: 0.66 THOUSAND/ΜL (ref 0.17–1.22)
MONOCYTES NFR BLD AUTO: 7 % (ref 4–12)
NEUTROPHILS # BLD AUTO: 8.08 THOUSANDS/ΜL (ref 1.85–7.62)
NEUTS SEG NFR BLD AUTO: 81 % (ref 43–75)
NRBC BLD AUTO-RTO: 0 /100 WBCS
NT-PROBNP SERPL-MCNC: 74 PG/ML
O2 CT BLDV-SCNC: 17.7 ML/DL
PCO2 BLDV: 40.2 MM HG (ref 42–50)
PH BLDV: 7.38 [PH] (ref 7.3–7.4)
PLATELET # BLD AUTO: 277 THOUSANDS/UL (ref 149–390)
PMV BLD AUTO: 9.8 FL (ref 8.9–12.7)
PO2 BLDV: 53.5 MM HG (ref 35–45)
POTASSIUM SERPL-SCNC: 4.7 MMOL/L (ref 3.5–5.3)
RBC # BLD AUTO: 5.18 MILLION/UL (ref 3.88–5.62)
RSV RNA RESP QL NAA+PROBE: NEGATIVE
SARS-COV-2 RNA RESP QL NAA+PROBE: NEGATIVE
SODIUM SERPL-SCNC: 132 MMOL/L (ref 136–145)
WBC # BLD AUTO: 9.9 THOUSAND/UL (ref 4.31–10.16)

## 2022-03-18 PROCEDURE — 99285 EMERGENCY DEPT VISIT HI MDM: CPT

## 2022-03-18 PROCEDURE — 0241U HB NFCT DS VIR RESP RNA 4 TRGT: CPT | Performed by: EMERGENCY MEDICINE

## 2022-03-18 PROCEDURE — 36415 COLL VENOUS BLD VENIPUNCTURE: CPT | Performed by: EMERGENCY MEDICINE

## 2022-03-18 PROCEDURE — 94640 AIRWAY INHALATION TREATMENT: CPT

## 2022-03-18 PROCEDURE — 85025 COMPLETE CBC W/AUTO DIFF WBC: CPT | Performed by: EMERGENCY MEDICINE

## 2022-03-18 PROCEDURE — 82805 BLOOD GASES W/O2 SATURATION: CPT | Performed by: EMERGENCY MEDICINE

## 2022-03-18 PROCEDURE — 80048 BASIC METABOLIC PNL TOTAL CA: CPT | Performed by: EMERGENCY MEDICINE

## 2022-03-18 PROCEDURE — 83880 ASSAY OF NATRIURETIC PEPTIDE: CPT | Performed by: EMERGENCY MEDICINE

## 2022-03-18 PROCEDURE — 71045 X-RAY EXAM CHEST 1 VIEW: CPT

## 2022-03-18 PROCEDURE — 99285 EMERGENCY DEPT VISIT HI MDM: CPT | Performed by: EMERGENCY MEDICINE

## 2022-03-18 PROCEDURE — 93005 ELECTROCARDIOGRAM TRACING: CPT

## 2022-03-18 RX ORDER — PREDNISONE 20 MG/1
40 TABLET ORAL ONCE
Status: COMPLETED | OUTPATIENT
Start: 2022-03-18 | End: 2022-03-18

## 2022-03-18 RX ORDER — PREDNISONE 20 MG/1
40 TABLET ORAL DAILY
Qty: 10 TABLET | Refills: 0 | Status: SHIPPED | OUTPATIENT
Start: 2022-03-19 | End: 2022-03-24

## 2022-03-18 RX ORDER — IPRATROPIUM BROMIDE AND ALBUTEROL SULFATE 2.5; .5 MG/3ML; MG/3ML
3 SOLUTION RESPIRATORY (INHALATION) ONCE
Status: DISCONTINUED | OUTPATIENT
Start: 2022-03-18 | End: 2022-03-18

## 2022-03-18 RX ORDER — LEVOFLOXACIN 750 MG/1
750 TABLET ORAL DAILY
Qty: 5 TABLET | Refills: 0 | Status: SHIPPED | OUTPATIENT
Start: 2022-03-18 | End: 2022-03-23

## 2022-03-18 RX ADMIN — PREDNISONE 40 MG: 20 TABLET ORAL at 12:18

## 2022-03-18 RX ADMIN — ALBUTEROL SULFATE 5 MG: 2.5 SOLUTION RESPIRATORY (INHALATION) at 12:19

## 2022-03-18 RX ADMIN — IPRATROPIUM BROMIDE 0.5 MG: 0.5 SOLUTION RESPIRATORY (INHALATION) at 12:19

## 2022-03-18 NOTE — DISCHARGE INSTRUCTIONS
You have been prescribed levofloxacin for antibiotic treatment  Please take it once per day for the next five days    You have been prescribed a short course of prednisone which you should take to completion  You can continue use of the nebulizer as needed for shortness of breath  Please continue all other medications at their current dosages and frequencies  Please go to the ER if you develop any increasing shortness of breath, lightheadedness, or passing out at any point

## 2022-03-18 NOTE — ED PROVIDER NOTES
History  Chief Complaint   Patient presents with    Shortness of Breath     sob for 1 week  cough was green now non-productive  using inhaler and nebulizer with no relief     79-year-old male with noted past medical history presents to the emergency department stating that he has been having increasing dyspnea over the past 3-4 days  Began with wheezing/cough about 10 days prior; cough at that point was productive of green phlegm  He did contact his pulmonologist who prescribed a five day course of azithromycin and prednisone, both of which he completed with good improvement symptoms for about two days after they were finished  However, sx subsequently worsened with increasing coughing and shortness of breath over the past three days not improving with his home nebulizer treatment  Symptoms were notably worse today not responding to home nebulizer treatment  His cough is now no longer productive of any phlegm  He is dyspneic at rest and this is worse with any physical activity  No fevers at any point  No hemoptysis  No nausea/vomiting/diaphoresis/lightheadedness/syncope/palpitations  No orthopnea or paroxysmal nocturnal dyspnea  No lower extremity edema  He has otherwise been taking his prescribed medications as ordered  No sick contacts in past 30d  He has had Covid-19 in July 2021 and has been vaccinated against the disease as well  COPD oxygen-dependent at night 4 lpm  He has not needed to use oxygen during the daytime  A/P:  Evidence of bronchospasm on examination with history of COPD with potential decompensation related to viral lower respiratory tract infection versus pneumonia  His transient improvement and subsequent worsening suggests an infectious etiology for which I will pursue workup with CBC/BMP/VBG/BNP and chest imaging  In the interval, administer nebulizer treatment and oral steroid  Disposition pending        History provided by:  Medical records and patient  Shortness of Breath  Associated symptoms: cough    Associated symptoms: no abdominal pain, no chest pain, no fever and no vomiting        Prior to Admission Medications   Prescriptions Last Dose Informant Patient Reported? Taking?    Blood Glucose Monitoring Suppl (CONTOUR NEXT MONITOR) w/Device KIT  Self No No   Sig: Test blood sugar once daily or as needed for fluctuating blood sugars   Dapagliflozin Propanediol (Farxiga) 10 MG TABS  Self Yes No   Sig: Take 10 mg by mouth daily   Insulin Pen Needle (BD Pen Needle Dee Dee U/F) 32G X 4 MM MISC   No No   Sig: Use 4 a day   Lancets MISC   No No   Sig: Test blood sugar once daily or as needed for fluctuating blood sugars   Levemir FlexTouch 100 units/mL injection pen  Self Yes No   Sig: Inject 60 Units under the skin daily    Multiple Vitamins-Minerals (MENS MULTIVITAMIN PO)  Self Yes No   Sig: Take by mouth   Ozempic, 1 MG/DOSE, 4 MG/3ML SOPN injection pen   Yes No   Sig: INJECT 1MG SUBCUTANEOUSLY ONCE A WEEK   albuterol (Ventolin HFA) 90 mcg/act inhaler   No No   Sig: Inhale 2 puffs every 6 (six) hours as needed for wheezing   amLODIPine (NORVASC) 10 mg tablet   No No   Sig: Take 1 tablet (10 mg total) by mouth daily   atorvastatin (LIPITOR) 40 mg tablet   No No   Sig: Take 1 tablet (40 mg total) by mouth daily   carvedilol (COREG) 12 5 mg tablet   No No   Sig: Take 1 tablet (12 5 mg total) by mouth 2 (two) times a day with meals   cetirizine (ZYRTEC ALLERGY) 10 mg tablet  Self Yes No   Sig: Take 10 mg by mouth   cyclobenzaprine (FLEXERIL) 10 mg tablet  Self Yes No   Sig: Take 10 mg by mouth   fluticasone (Flovent HFA) 220 mcg/act inhaler   No No   Sig: Inhale 2 puffs 2 (two) times a day Rinse mouth after use   gabapentin (NEURONTIN) 300 mg capsule   No No   Sig: Take 1 capsule (300 mg total) by mouth 3 (three) times a day   glimepiride (AMARYL) 4 mg tablet   Yes No   Sig: Take 4 mg by mouth 2 (two) times a day   glucose blood (CONTOUR NEXT TEST) test strip  Self No No   Sig: Test blood sugar once daily or as needed for fluctuating blood sugars   hydrochlorothiazide (HYDRODIURIL) 25 mg tablet   No No   Sig: Take 1 tablet (25 mg total) by mouth daily   ipratropium-albuterol (DUO-NEB) 0 5-2 5 mg/3 mL nebulizer solution   No No   Sig: Take 1 vial (3 mL total) by nebulization every 6 (six) hours as needed for wheezing or shortness of breath   losartan (COZAAR) 100 MG tablet   No No   Sig: Take 1 tablet (100 mg total) by mouth daily   meloxicam (MOBIC) 15 mg tablet  Self Yes No   Sig: Take 15 mg by mouth   metFORMIN (GLUCOPHAGE) 1000 MG tablet   No No   Sig: TAKE 1 TABLET BY MOUTH TWICE DAILY WITH MEALS   montelukast (SINGULAIR) 10 mg tablet  Self Yes No   sertraline (ZOLOFT) 50 mg tablet   No No   Sig: Take 1 tablet by mouth once daily   sertraline (ZOLOFT) 50 mg tablet   No No   Sig: Take 1 tablet (50 mg total) by mouth daily   spironolactone (ALDACTONE) 50 mg tablet   No No   Sig: Take 1 tablet by mouth once daily   umeclidinium-vilanterol (Anoro Ellipta) 62 5-25 MCG/INH inhaler   No No   Sig: Inhale 1 puff daily      Facility-Administered Medications: None       Past Medical History:   Diagnosis Date    Aortic stenosis     COPD (chronic obstructive pulmonary disease) (HCC)     Diabetes ([facility] )     Diabetes mellitus ([facility] )     Hyperlipidemia     Hypertension 7/2/2014    Sleep apnea, obstructive        Past Surgical History:   Procedure Laterality Date    APPENDECTOMY      EYE SURGERY         Family History   Problem Relation Age of Onset    No Known Problems Mother      I have reviewed and agree with the history as documented      E-Cigarette/Vaping    E-Cigarette Use Never User      E-Cigarette/Vaping Substances    Nicotine No     THC No     CBD No     Flavoring No     Other No     Unknown No      Social History     Tobacco Use    Smoking status: Former Smoker     Years: 10 00     Types: Cigarettes    Smokeless tobacco: Never Used   Vaping Use    Vaping Use: Never used Substance Use Topics    Alcohol use: Never    Drug use: Never       Review of Systems   Constitutional: Negative for chills, fatigue and fever  Respiratory: Positive for cough and shortness of breath  Cardiovascular: Negative for chest pain and palpitations  Gastrointestinal: Negative for abdominal pain, diarrhea, nausea and vomiting  Neurological: Negative for dizziness, syncope, weakness, light-headedness and numbness  All other systems reviewed and are negative  Physical Exam  Physical Exam  Vitals and nursing note reviewed  Constitutional:       General: He is awake  He is in acute distress (Mild respiratory distress)  Appearance: Normal appearance  He is well-developed  HENT:      Head: Normocephalic and atraumatic  Right Ear: Hearing and external ear normal       Left Ear: Hearing and external ear normal    Neck:      Thyroid: No thyroid mass or thyroid tenderness  Vascular: No JVD  Trachea: Trachea and phonation normal    Cardiovascular:      Rate and Rhythm: Normal rate and regular rhythm  Pulses:           Radial pulses are 2+ on the right side and 2+ on the left side  Dorsalis pedis pulses are 2+ on the right side and 2+ on the left side  Posterior tibial pulses are 2+ on the right side and 2+ on the left side  Heart sounds: Normal heart sounds, S1 normal and S2 normal  No murmur heard  No friction rub  No gallop  Pulmonary:      Effort: Tachypnea and accessory muscle usage present  No respiratory distress  Breath sounds: No stridor  Wheezing and rhonchi present  No decreased breath sounds or rales  Comments: Diffuse expiratory wheezes/rhonchi in all lung fields  Speaks in full sentences  Abdominal:      Tenderness: There is no abdominal tenderness  There is no guarding or rebound  Comments: Obese; nondistended  Skin:     General: Skin is warm and dry     Neurological:      Mental Status: He is alert and oriented to person, place, and time  GCS: GCS eye subscore is 4  GCS verbal subscore is 5  GCS motor subscore is 6  Cranial Nerves: No cranial nerve deficit  Sensory: No sensory deficit  Motor: No abnormal muscle tone  Comments: PERRLA; EOMI  Sensation intact to light touch over face in V1-V3 distribution bilaterally  Facial expressions symmetric  Tongue/uvula midline  Shoulder shrug equal bilaterally  Strength 5/5 in UE/LE bilaterally  Sensation intact to light touch in UE/LE bilaterally  Vital Signs  ED Triage Vitals   Temperature Pulse Respirations Blood Pressure SpO2   03/18/22 1137 03/18/22 1137 03/18/22 1137 03/18/22 1137 03/18/22 1137   (!) 96 °F (35 6 °C) 81 22 136/67 92 %      Temp Source Heart Rate Source Patient Position - Orthostatic VS BP Location FiO2 (%)   03/18/22 1137 03/18/22 1230 03/18/22 1137 03/18/22 1137 --   Tympanic Monitor Sitting Left arm       Pain Score       03/18/22 1137       No Pain           Vitals:    03/18/22 1137 03/18/22 1230 03/18/22 1300 03/18/22 1330   BP: 136/67 120/56 132/70 139/71   Pulse: 81 65 66 65   Patient Position - Orthostatic VS: Sitting            Visual Acuity      ED Medications  Medications   predniSONE tablet 40 mg (40 mg Oral Given 3/18/22 1218)   ipratropium (ATROVENT) 0 02 % inhalation solution 0 5 mg (0 5 mg Nebulization Given 3/18/22 1219)   albuterol inhalation solution 5 mg (5 mg Nebulization Given 3/18/22 1219)       Diagnostic Studies  Results Reviewed     Procedure Component Value Units Date/Time    COVID/FLU/RSV [[id number]]  (Normal) Collected: 03/18/22 1208    Lab Status: Final result Specimen: Nares from Nose Updated: 03/18/22 1316     SARS-CoV-2 Negative     INFLUENZA A PCR Negative     INFLUENZA B PCR Negative     RSV PCR Negative    Narrative:      FOR PEDIATRIC PATIENTS - copy/paste COVID Guidelines URL to browser: [email or web address]    SARS-CoV-2 assay is a Nucleic Acid Amplification assay intended for the  qualitative detection of nucleic acid from SARS-CoV-2 in nasopharyngeal  swabs  Results are for the presumptive identification of SARS-CoV-2 RNA  Positive results are indicative of infection with SARS-CoV-2, the virus  causing COVID-19, but do not rule out bacterial infection or co-infection  with other viruses  Laboratories within the [place] and its  territories are required to report all positive results to the appropriate  public health authorities  Negative results do not preclude SARS-CoV-2  infection and should not be used as the sole basis for treatment or other  patient management decisions  Negative results must be combined with  clinical observations, patient history, and epidemiological information  This test has not been FDA cleared or approved  This test has been authorized by FDA under an Emergency Use Authorization  (EUA)  This test is only authorized for the duration of time the  declaration that circumstances exist justifying the authorization of the  emergency use of an in vitro diagnostic tests for detection of SARS-CoV-2  virus and/or diagnosis of COVID-19 infection under section 564(b)(1) of  the Act, 21 U  S C  961NAG-1(O)(4), unless the authorization is terminated  or revoked sooner  The test has been validated but independent review by FDA  and CLIA is pending  Test performed using [employer] GeneXpert: This RT-PCR assay targets N2,  a region unique to SARS-CoV-2  A conserved region in the E-gene was chosen  for pan-Sarbecovirus detection which includes SARS-CoV-2      Basic metabolic panel [[id number]]  (Abnormal) Collected: 03/18/22 1208    Lab Status: Final result Specimen: Blood from Arm, Left Updated: 03/18/22 1239     Sodium 132 mmol/L      Potassium 4 7 mmol/L      Chloride 100 mmol/L      CO2 25 mmol/L      ANION GAP 7 mmol/L      BUN 28 mg/dL      Creatinine 1 54 mg/dL      Glucose 316 mg/dL      Calcium 8 9 mg/dL      eGFR 49 ml/min/1 73sq m     Narrative:      National Kidney Disease Foundation guidelines for Chronic Kidney Disease (CKD):     Stage 1 with normal or high GFR (GFR > 90 mL/min/1 73 square meters)    Stage 2 Mild CKD (GFR = 60-89 mL/min/1 73 square meters)    Stage 3A Moderate CKD (GFR = 45-59 mL/min/1 73 square meters)    Stage 3B Moderate CKD (GFR = 30-44 mL/min/1 73 square meters)    Stage 4 Severe CKD (GFR = 15-29 mL/min/1 73 square meters)    Stage 5 End Stage CKD (GFR <15 mL/min/1 73 square meters)  Note: GFR calculation is accurate only with a steady state creatinine    NT-BNP PRO [[id number]]  (Normal) Collected: 03/18/22 1208    Lab Status: Final result Specimen: Blood from Arm, Left Updated: 03/18/22 1239     NT-proBNP 74 pg/mL     Blood gas, venous [[id number]]  (Abnormal) Collected: 03/18/22 1208    Lab Status: Final result Specimen: Blood from Arm, Left Updated: 03/18/22 1220     pH, Anurag 7 379     pCO2, Anurag 40 2 mm Hg      pO2, Anurag 53 5 mm Hg      HCO3, Anurag 23 2 mmol/L      Base Excess, Anurag -1 8 mmol/L      O2 Content, Anurag 17 7 ml/dL      O2 HGB, VENOUS 84 3 %     CBC and differential [[id number]]  (Abnormal) Collected: 03/18/22 1208    Lab Status: Final result Specimen: Blood from Arm, Left Updated: 03/18/22 1214     WBC 9 90 Thousand/uL      RBC 5 18 Million/uL      Hemoglobin 14 7 g/dL      Hematocrit 42 8 %      MCV 83 fL      MCH 28 4 pg      MCHC 34 3 g/dL      RDW 14 9 %      MPV 9 8 fL      Platelets 496 Thousands/uL      nRBC 0 /100 WBCs      Neutrophils Relative 81 %      Immat GRANS % 1 %      Lymphocytes Relative 9 %      Monocytes Relative 7 %      Eosinophils Relative 2 %      Basophils Relative 0 %      Neutrophils Absolute 8 08 Thousands/µL      Immature Grans Absolute 0 08 Thousand/uL      Lymphocytes Absolute 0 84 Thousands/µL      Monocytes Absolute 0 66 Thousand/µL      Eosinophils Absolute 0 20 Thousand/µL      Basophils Absolute 0 04 Thousands/µL                  XR chest 1 view portable   ED Interpretation by Tiff Dixon DO (03/18 1254)   Airway midline  Right basilar consolidation consistent with infiltrate  Mild effusion on the right  No vascular congestion  Cardiac silhouette enlarged but stable compared to prior  Slightly limited by soft tissue Osseous structures appear normal        by Kaitlyn Jara MD (03/18 1344)                 Procedures  Procedures         ED Course  ED Course as of 03/18/22 1416   Fri Mar 18, 2022   1216 ECG NSR 78 bpm   QRS 84 Qtc 446  No acute st/t changes  No ectopy  Normal axis  Mild baseline variability  No Q waves  Interpreted by me   1217 CBC and differential(!)  WBC normal  Hemoglobin/hematocrit normal  Platelets normal   4075 Blood gas, venous(!)  PH normal   Mild respiratory alkalosis with metabolic compensation  Hyperoxia   1252 NT-BNP PRO  Normal   4022 Basic metabolic panel(!)  Mild hyponatremia  Mild elevation of BUN/creatinine  Mild hyperglycemia with CO2/anion gap normal    1255 CXR suggests RLL infiltrate new from previous films  This is consistent with his clinical presentation and I will plan for abx treatment--recently treated with azithromycin, so will treat with levofloxacin assuming Covid-19 testing negative  CURB-65 zero  PORT score 77, Class III    1316 COVID/FLU/RSV  Negative   1343 All results were noted and d/w patient  Will treat for community-acquired pneumonia on basis of chest x-ray  Levofloxacin 750mg QD x5 days and short course of oral prednisone  Discussed short vs long course of prednisone: d/t adverse effect profile typical of prolonged steroid courses, would avoid if possible  Will need follow-up with primary physician and/or pulmonologist coming week to assure that he has responded to treatment  ED return for any worsening symptoms or failure to respond to therapy  All questions were answered to patient's satisfaction prior to discharge             SBIRT 20yo+      Most Recent Value   SBIRT (24 yo +) In order to provide better care to our patients, we are screening all of our patients for alcohol and drug use  Would it be okay to ask you these screening questions? Yes Filed at: 03/18/2022 1141   Initial Alcohol Screen: US AUDIT-C     1  How often do you have a drink containing alcohol? 0 Filed at: 03/18/2022 1141   2  How many drinks containing alcohol do you have on a typical day you are drinking? 0 Filed at: 03/18/2022 1141   3a  Male UNDER 65: How often do you have five or more drinks on one occasion? 0 Filed at: 03/18/2022 1141   3b  FEMALE Any Age, or MALE 65+: How often do you have 4 or more drinks on one occassion? 0 Filed at: 03/18/2022 1141   Audit-C Score 0 Filed at: 03/18/2022 1141   ROSA MARIA: How many times in the past year have you    Used an illegal drug or used a prescription medication for non-medical reasons? Never Filed at: 03/18/2022 1141        MDM    Disposition  Final diagnoses:   Community acquired pneumonia of right lung     Time reflects when diagnosis was documented in both MDM as applicable and the Disposition within this note     Time User Action Codes Description Comment    3/18/2022  1:39 PM Jake Capone [J18 [id number]] Community acquired pneumonia of right lung       ED Disposition     ED Disposition Condition Date/Time Comment    Discharge Stable Fri Mar 18, 2022  1:39 PM Shelly Calderon discharge to home/self care              Follow-up Information     Follow up With Specialties Details Why Contact Info    Jimbo Campos PA-C Physician Assistant Schedule an appointment as soon as possible for a visit in 4 days For recheck of your symptoms [address] [place] [address]  [phone]            Patient's Medications   Discharge Prescriptions    LEVOFLOXACIN (LEVAQUIN) 750 MG TABLET    Take 1 tablet (750 mg total) by mouth daily for 5 days       Start Date: 3/18/2022 End Date: 3/23/2022       Order Dose: 750 mg       Quantity: 5 tablet    Refills: 0    PREDNISONE 20 MG TABLET    Take 2 tablets (40 mg total) by mouth daily for 5 days       Start Date: 3/19/2022 End Date: 3/24/2022       Order Dose: 40 mg       Quantity: 10 tablet    Refills: 0       No discharge procedures on file      PDMP Review     None          ED Provider  Electronically Signed by           Grace Wagner DO  03/18/22 6701

## 2022-03-21 ENCOUNTER — TELEPHONE (OUTPATIENT)
Dept: PULMONOLOGY | Facility: CLINIC | Age: 57
End: 2022-03-21

## 2022-03-21 DIAGNOSIS — G47.33 OSA ON CPAP: ICD-10-CM

## 2022-03-21 DIAGNOSIS — R09.02 HYPOXEMIA REQUIRING SUPPLEMENTAL OXYGEN: ICD-10-CM

## 2022-03-21 DIAGNOSIS — Z99.81 HYPOXEMIA REQUIRING SUPPLEMENTAL OXYGEN: ICD-10-CM

## 2022-03-21 DIAGNOSIS — Z99.89 OSA ON CPAP: ICD-10-CM

## 2022-03-21 LAB
ATRIAL RATE: 78 BPM
P AXIS: 77 DEGREES
PR INTERVAL: 160 MS
QRS AXIS: 18 DEGREES
QRSD INTERVAL: 84 MS
QT INTERVAL: 392 MS
QTC INTERVAL: 446 MS
T WAVE AXIS: 65 DEGREES
VENTRICULAR RATE: 78 BPM

## 2022-03-21 PROCEDURE — 93010 ELECTROCARDIOGRAM REPORT: CPT | Performed by: INTERNAL MEDICINE

## 2022-03-21 RX ORDER — SPIRONOLACTONE 50 MG/1
50 TABLET, FILM COATED ORAL DAILY
Qty: 90 TABLET | Refills: 3 | Status: SHIPPED | OUTPATIENT
Start: 2022-03-21

## 2022-03-21 NOTE — TELEPHONE ENCOUNTER
Yaneth GUTIERREZ  [facility]  Spironolactone 50 mg 1 tab QD    90 with 3 refills     Dr Robinson Crow in [place]       He is out of medication

## 2022-03-28 ENCOUNTER — TELEPHONE (OUTPATIENT)
Dept: PULMONOLOGY | Facility: HOSPITAL | Age: 57
End: 2022-03-28

## 2022-03-28 NOTE — PROGRESS NOTES
Pulmonary Rehabilitation Plan of Care   30 Day Progress Note          Today's date: 3/28/2022   Total visits to date: interview   Patient name: Wolfgang Stark      : 1965  Age: 61 y o  MRN: [id number]  Referring Physician: Deven Parra MD  Provider: [facility]  Clinician: Brayan Funez RN    Dx:   Encounter Diagnosis   Name Primary?  COPD, severe ([address] ) Yes     Date of onset:     COMMENTS:  Chance Bruno has only had his interview   He has been scheduled but has not started  He had been seen in ER on 3/18 for pneumonia  After talking to him today he did say he will start on Wednesday 3/30              EXERCISE/ACTIVITY    Cardiopulmonary Goals:   Min: 30-40   HR: 20-30bpm  resting   RPE: 4-6  (moderate to moderately hard exercise)   O2 sat: >90%    Modalities: Treadmill, UBE, NuStep and Recumbent bike  Strength trainin-3 days / week, 12-15 repitations  and 1-2 sets per modality    Modalities: Chest press, Lateral pull down  and Lateral raise    Exercise Progression: Progress as tolerated to maintain RPE 4-5      RPE 4-6  Home activity:   Goals: 10% improvement in functional capacity, home exercise days opposite MN and >150 mins of exercise/wk  Education: Benefit of exercise, home exercise, pursed lip breathing, RPE scale    Plan:home exercise target 30 mins, 2 days opposite MN and Improved 6MW results  Readiness to change: Contemplation:  (Acknowledging that there is a problem but not yet ready or sure of wanting to make a change)    NUTRITION    Weight control:    Starting weight: 350        Current Weight:      Diabetes: T2D  Goals:decreased body fat%, improved A1c and Improved Rate Your Plate score  Education:More frequent meals, smaller portions  diabetes management and exercise  weight loss  portion control    Label Reading  Plan: Education Class: Healthy Eating  Readiness to change: Contemplation:  (Acknowledging that there is a problem but not yet ready or sure of wanting to make a change)    PSYCHOSOCIAL    Emotional:  10-14 = Moderate Depression  Social support: Excellent  Goals: Reduce perceived stress to 1-3/10, improved [facility] QOL < 27 and PHQ-9 - reduced severity by one level  Education: signs/sxs of depression  benefits of positive support system  coping mechanisms  depression and chronic disease  Plan: Education Class: Stress and Your Lungs, Relaxation and Mindfulness and Anxiety and Lung Disease  Readiness to change: Pre-Contemplation:   (Not yet acknowledging that there is a problem behavior that needs to change)    OTHER CORE COMPONENTS     Tobacco:   Social History     Tobacco Use   Smoking Status Former Smoker    Years: 10     Types: Cigarettes   Smokeless Tobacco Never Used     Oxygen:6MWT on 2l/nc  Blood pressure:    Restin/68   Exercise:  122/68  Goals: consistent BP < 130/80 and moderate intensity exercise >150 mins/wkweight loss  Education: Pulmonary Disease, physiology, Exercise, strength, flexibility, oxygen, breathing techniques, Diet,nutrition and Avoiding Infections  Plan: Causes of lung disease, Prevention and treatment, Exercise benefits and Proper nutrition  Readiness to change: Pre-Contemplation:   (Not yet acknowledging that there is a problem behavior that needs to change)

## 2022-04-01 ENCOUNTER — TELEPHONE (OUTPATIENT)
Dept: PULMONOLOGY | Facility: HOSPITAL | Age: 57
End: 2022-04-01

## 2022-04-08 ENCOUNTER — TELEPHONE (OUTPATIENT)
Dept: PULMONOLOGY | Facility: HOSPITAL | Age: 57
End: 2022-04-08

## 2022-04-13 ENCOUNTER — TELEPHONE (OUTPATIENT)
Dept: CARDIAC REHAB | Facility: HOSPITAL | Age: 57
End: 2022-04-13

## 2022-04-13 NOTE — PROGRESS NOTES
Pulmonary Rehabilitation Plan of Care   Discharge               Today's date: 3/28/2022   Total visits to date:   Patient name: Noah Liu                                                : 1965  Age: 61 y o  MRN: [id number]  Referring Physician: Laury Lancaster MD  Provider: [facility]  Clinician: Suze Brooks MS      Dx:        Encounter Diagnosis   Name Primary?  COPD, severe ([facility] [address] ) Yes      Date of onset:      COMMENTS:  Bhavya Carmen has attended his initial evaluation on 22 and has not returned since  Patient was contacted and informed that he will be discharged and to give us a call if he would like to attend  [facility]      EXERCISE/ACTIVITY     Cardiopulmonary Goals:              Min: 30-40              HR: 20-30bpm  resting              RPE: 4-6  (moderate to moderately hard exercise)              O2 sat: >90%                Modalities: Treadmill, UBE, NuStep and Recumbent bike  Strength trainin-3 days / week, 12-15 repitations  and 1-2 sets per modality               Modalities: Chest press, Lateral pull down  and Lateral raise     Exercise Progression: Progress as tolerated to maintain RPE 4-5        RPE 4-6  Home activity:   Goals: 10% improvement in functional capacity, home exercise days opposite IA and >150 mins of exercise/wk  Education: Benefit of exercise, home exercise, pursed lip breathing, RPE scale    Plan:home exercise target 30 mins, 2 days opposite IA and Improved 6MW results  Readiness to change: Contemplation:  (Acknowledging that there is a problem but not yet ready or sure of wanting to make a change)     NUTRITION     Weight control:               Starting weight: 350              Current Weight:

## 2022-04-20 ENCOUNTER — OFFICE VISIT (OUTPATIENT)
Dept: PULMONOLOGY | Facility: CLINIC | Age: 57
End: 2022-04-20
Payer: COMMERCIAL

## 2022-04-20 ENCOUNTER — HOSPITAL ENCOUNTER (OUTPATIENT)
Dept: RADIOLOGY | Facility: HOSPITAL | Age: 57
Discharge: HOME | End: 2022-04-20
Payer: COMMERCIAL

## 2022-04-20 VITALS
OXYGEN SATURATION: 89 % | HEART RATE: 92 BPM | HEIGHT: 69 IN | BODY MASS INDEX: 46.65 KG/M2 | SYSTOLIC BLOOD PRESSURE: 118 MMHG | WEIGHT: 315 LBS | TEMPERATURE: 98.5 F | DIASTOLIC BLOOD PRESSURE: 82 MMHG

## 2022-04-20 DIAGNOSIS — I35.0 NONRHEUMATIC AORTIC VALVE STENOSIS: ICD-10-CM

## 2022-04-20 DIAGNOSIS — J96.11 CHRONIC HYPOXEMIC RESPIRATORY FAILURE (HCC): ICD-10-CM

## 2022-04-20 DIAGNOSIS — G47.33 OSA (OBSTRUCTIVE SLEEP APNEA): ICD-10-CM

## 2022-04-20 DIAGNOSIS — E66.01 MORBID OBESITY WITH BMI OF 50.0-59.9, ADULT (HCC): ICD-10-CM

## 2022-04-20 DIAGNOSIS — R06.00 DOE (DYSPNEA ON EXERTION): ICD-10-CM

## 2022-04-20 DIAGNOSIS — J44.9 COPD, SEVERE (HCC): Primary | ICD-10-CM

## 2022-04-20 DIAGNOSIS — I50.33 ACUTE ON CHRONIC DIASTOLIC HEART FAILURE (HCC): ICD-10-CM

## 2022-04-20 PROBLEM — R06.09 DOE (DYSPNEA ON EXERTION): Status: ACTIVE | Noted: 2022-04-20

## 2022-04-20 PROCEDURE — 3079F DIAST BP 80-89 MM HG: CPT | Performed by: PHYSICIAN ASSISTANT

## 2022-04-20 PROCEDURE — 1036F TOBACCO NON-USER: CPT | Performed by: PHYSICIAN ASSISTANT

## 2022-04-20 PROCEDURE — 3074F SYST BP LT 130 MM HG: CPT | Performed by: PHYSICIAN ASSISTANT

## 2022-04-20 PROCEDURE — 71046 X-RAY EXAM CHEST 2 VIEWS: CPT

## 2022-04-20 PROCEDURE — 99214 OFFICE O/P EST MOD 30 MIN: CPT | Performed by: PHYSICIAN ASSISTANT

## 2022-04-20 PROCEDURE — 3008F BODY MASS INDEX DOCD: CPT | Performed by: PHYSICIAN ASSISTANT

## 2022-04-20 NOTE — ASSESSMENT & PLAN NOTE
I reviewed his compliance data made available today in the office  He uses his auto BiPAP on average 8 hours and 17 minutes a night with average AHI 0 3  Recommend he continue BiPAP use during all hours of sleep  Patient reports improvement in symptoms since initiating PAP therapy  Other

## 2022-04-20 NOTE — ASSESSMENT & PLAN NOTE
Patient has severe COPD with FEV1 of 41% predicted in 2019  Has previously been treated with 2 courses of systemic steroids for exacerbation with continuation of symptoms  His physical exam today is not consistent with COPD exacerbation and I am hesitant to represcribed systemic steroids  I recommend that he continue using his maintenance inhalers with Flovent and Anoro as prescribed  Continue albuterol HFA 2 puffs q 6 hours p r n  While out of the house  He is more than welcome to increase the use of his nebulizer to every 6 hours as needed for shortness of breath or wheeze while in the house

## 2022-04-20 NOTE — ASSESSMENT & PLAN NOTE
I reviewed multiple notes/encounters with patient and his family today in the office including last ED visit with vital signs, laboratory workup, and imaging  His increased shortness of breath remain somewhat of unclear etiology currently but at the top of my differential is CHF exacerbation given physical exam findings and patient's symptoms currently  I would recommend holding off on additional antibiotics and steroids now  Will order CBC, BMP, BNP, and chest x-ray  I have low threshold to start Lasix 40 mg p o  Daily and check echocardiogram pending results

## 2022-04-20 NOTE — PROGRESS NOTES
Pulmonary Follow Up Note   Adam Neff 61 y o  male MRN: [id number]  4/20/2022      Assessment:    COPD, severe ([address] )  Patient has severe COPD with FEV1 of 41% predicted in 2019  Has previously been treated with 2 courses of systemic steroids for exacerbation with continuation of symptoms  His physical exam today is not consistent with COPD exacerbation and I am hesitant to represcribed systemic steroids  I recommend that he continue using his maintenance inhalers with Flovent and Anoro as prescribed  Continue albuterol HFA 2 puffs q 6 hours p r n  While out of the house  He is more than welcome to increase the use of his nebulizer to every 6 hours as needed for shortness of breath or wheeze while in the house  Chronic hypoxemic respiratory failure (HCC)  Oxygen saturations in the office today are 89% on room air  He does have supplemental oxygen at home that he uses on an as-needed basis  He has related that he has seen desaturations into the low 80s while at home on his home pulse oximeter  I recommended he uses supplemental oxygen at 2 liters/minute continuously while he is acutely ill  RUTHERFORD (dyspnea on exertion)  I reviewed multiple notes/encounters with patient and his family today in the office including last ED visit with vital signs, laboratory workup, and imaging  His increased shortness of breath remain somewhat of unclear etiology currently but at the top of my differential is CHF exacerbation given physical exam findings and patient's symptoms currently  I would recommend holding off on additional antibiotics and steroids now  Will order CBC, BMP, BNP, and chest x-ray  I have low threshold to start Lasix 40 mg p o  Daily and check echocardiogram pending results  Morbid obesity with BMI of 50 0-59 9, adult ([address] )  Weight loss encouraged      Diastolic heart failure (HCC)  Wt Readings from Last 3 Encounters:   04/20/22 (!) 161 kg (355 lb)   03/18/22 (!) 159 kg (350 lb)   02/10/22 (!) 158 kg (348 lb)     Appears hypervolemic on exam and weights do seem to be trending upward  Check BNP and BMP for consideration of adding Lasix  I have taken notice that his BNP is usually always within normal limits but this may be falsely normal in the setting of his elevated BMI  Aortic stenosis  Likely contributing to his breathlessness  SCOTT (obstructive sleep apnea)  I reviewed his compliance data made available today in the office  He uses his auto BiPAP on average 8 hours and 17 minutes a night with average AHI 0 3  Recommend he continue BiPAP use during all hours of sleep  Patient reports improvement in symptoms since initiating PAP therapy  Plan:    Diagnoses and all orders for this visit:    COPD, severe ([address] )    RUTHERFORD (dyspnea on exertion)  -     XR chest pa & lateral; Future  -     Basic metabolic panel; Future  -     CBC and differential; Future  -     NT-BNP PRO; Future    Morbid obesity with BMI of 50 0-59 9, adult (HCC)    Chronic hypoxemic respiratory failure (HCC)    SCOTT (obstructive sleep apnea)    Acute on chronic diastolic heart failure ([address] )    Nonrheumatic aortic valve stenosis      Will call patient with results of blood work and imaging  Return in about 6 weeks (around 6/1/2022)  History of Present Illness   HPI:  Gabriela Kawasaki is a 61 y o  male who presents the office today for routine follow-up but also happens to be sick  He follows with us for severe COPD, chronic hypoxic respiratory failure, SCOTT on BiPAP  Also with history of aortic stenosis, CHF, next morbid obesity, nicotine dependence in remission  Patient routine follow-up in February  He reports being in his normal state of health  One month later he called the office reporting that he had increased shortness of breath, cough and some wheezing    Treated with prednisone and azithromycin with reported improvement in symptoms though unfortunately symptoms returned several days after completion of therapies  Patient then sought further medical attention at [facility] Emergency Department  He was noted to be hemodynamically stable without WBC were elevated proBNP  Chest x-ray revealed pulmonary vascular congestion questionable right lower lobe infiltrate  Patient was treated with Levaquin and prednisone burst   Once again, patient felt resolution of symptoms but as soon is therapies ended his symptoms gradually came back and now progressed to the point where he is seeking further medical attention  Complaining of shortness of breath with minimal activities  Patient states he can not walk more than a few feet without having to stop due to his breathlessness  Patient also endorses a cough that is dry in nature  No sputum production or hemoptysis  Denies wheezing  Denies chest pain or palpitations  No fevers or chills  Denies sick contacts  He endorses lower extremity edema and chronic orthopnea  Denies nocturnal wheezing or coughing stating that he sleeps like a baby on his BiPAP  On that note patient reports compliance on his BiPAP  Reports compliance on his inhalers and states he has been using his nebulizer twice a day with symptomatic relief  Patient is accompanied by his wife and daughter who provided additional information  Review of Systems   Constitutional: Negative for appetite change and fever  HENT: Negative for ear pain, postnasal drip, rhinorrhea, sneezing, sore throat and trouble swallowing  Respiratory: Positive for cough and shortness of breath  Cardiovascular: Negative for chest pain  Musculoskeletal: Positive for myalgias  Neurological: Negative for headaches         Historical Information   Past Medical History:   Diagnosis Date    Aortic stenosis     COPD [facility]chronic obstructive pulmonary disease) [facility] [address] )     [facility]     Diabetes mellitus ([facility] )     Hyperlipidemia     Hypertension 7/2/2014    Sleep apnea, obstructive      Past Surgical History:   Procedure Laterality Date    APPENDECTOMY      EYE SURGERY       Family History   Problem Relation Age of Onset    No Known Problems Mother        Social History     Tobacco Use   Smoking Status Former Smoker    Years: 10 00    Types: Cigarettes   Smokeless Tobacco Never Used         Meds/Allergies     Current Outpatient Medications:     albuterol (Ventolin HFA) 90 mcg/act inhaler, Inhale 2 puffs every 6 (six) hours as needed for wheezing, Disp: 1 Inhaler, Rfl: 2    amLODIPine (NORVASC) 10 mg tablet, Take 1 tablet (10 mg total) by mouth daily, Disp: 90 tablet, Rfl: 3    atorvastatin (LIPITOR) 40 mg tablet, Take 1 tablet (40 mg total) by mouth daily, Disp: 90 tablet, Rfl: 3    Blood Glucose Monitoring Suppl (CONTOUR NEXT MONITOR) w/Device KIT, Test blood sugar once daily or as needed for fluctuating blood sugars, Disp: 1 kit, Rfl: 0    carvedilol (COREG) 12 5 mg tablet, Take 1 tablet (12 5 mg total) by mouth 2 (two) times a day with meals, Disp: 180 tablet, Rfl: 3    cetirizine (ZYRTEC ALLERGY) 10 mg tablet, Take 10 mg by mouth, Disp: , Rfl:     cyclobenzaprine (FLEXERIL) 10 mg tablet, Take 10 mg by mouth, Disp: , Rfl:     Dapagliflozin Propanediol (Farxiga) 10 MG TABS, Take 10 mg by mouth daily, Disp: , Rfl:     fluticasone (Flovent HFA) 220 mcg/act inhaler, Inhale 2 puffs 2 (two) times a day Rinse mouth after use, Disp: 12 g, Rfl: 5    gabapentin (NEURONTIN) 300 mg capsule, Take 1 capsule (300 mg total) by mouth 3 (three) times a day, Disp: 90 capsule, Rfl: 2    glimepiride (AMARYL) 4 mg tablet, Take 4 mg by mouth 2 (two) times a day, Disp: , Rfl:     glucose blood (CONTOUR NEXT TEST) test strip, Test blood sugar once daily or as needed for fluctuating blood sugars, Disp: 100 each, Rfl: 3    hydrochlorothiazide (HYDRODIURIL) 25 mg tablet, Take 1 tablet (25 mg total) by mouth daily, Disp: 90 tablet, Rfl: 3    Insulin Pen Needle (BD Pen Needle Dee Dee U/F) 32G X 4 MM MISC, Use 4 a day, Disp: 200 each, Rfl: 5    ipratropium-albuterol (DUO-NEB) 0 5-2 5 mg/3 mL nebulizer solution, Take 1 vial (3 mL total) by nebulization every 6 (six) hours as needed for wheezing or shortness of breath, Disp: 360 mL, Rfl: 2    Lancets MISC, Test blood sugar once daily or as needed for fluctuating blood sugars, Disp: 100 each, Rfl: 0    Levemir FlexTouch 100 units/mL injection pen, Inject 60 Units under the skin daily , Disp: , Rfl:     losartan (COZAAR) 100 MG tablet, Take 1 tablet (100 mg total) by mouth daily, Disp: 90 tablet, Rfl: 3    meloxicam (MOBIC) 15 mg tablet, Take 15 mg by mouth, Disp: , Rfl:     metFORMIN (GLUCOPHAGE) 1000 MG tablet, TAKE 1 TABLET BY MOUTH TWICE DAILY WITH MEALS, Disp: 180 tablet, Rfl: 0    montelukast (SINGULAIR) 10 mg tablet, , Disp: , Rfl:     Multiple Vitamins-Minerals (MENS MULTIVITAMIN PO), Take by mouth, Disp: , Rfl:     Ozempic, 1 MG/DOSE, 4 MG/3ML SOPN injection pen, INJECT 1MG SUBCUTANEOUSLY ONCE A WEEK, Disp: , Rfl:     sertraline (ZOLOFT) 50 mg tablet, Take 1 tablet by mouth once daily, Disp: 30 tablet, Rfl: 0    sertraline (ZOLOFT) 50 mg tablet, Take 1 tablet (50 mg total) by mouth daily, Disp: 30 tablet, Rfl: 2    spironolactone (ALDACTONE) 50 mg tablet, Take 1 tablet (50 mg total) by mouth daily, Disp: 90 tablet, Rfl: 3    umeclidinium-vilanterol (Anoro Ellipta) 62 5-25 MCG/INH inhaler, Inhale 1 puff daily, Disp: 60 blister, Rfl: 3  Allergies   Allergen Reactions    Theophylline Other (See Comments)     Severe headaches  headaches  Severe headaches         Vitals: Blood pressure 118/82, pulse 92, temperature 98 5 °F (36 9 °C), height 5' 9" (1 753 m), weight (!) 161 kg (355 lb), SpO2 (!) 89 %  Body mass index is 52 42 kg/m²  Oxygen Therapy  SpO2: (!) 89 %    Physical Exam  Physical Exam  Vitals reviewed  Constitutional:       Appearance: Normal appearance  He is well-developed  He is obese  HENT:      Head: Normocephalic and atraumatic        Nose: Nose normal       Mouth/Throat:      Mouth: Mucous membranes are moist       Pharynx: Oropharynx is clear  Eyes:      Extraocular Movements: Extraocular movements intact  Cardiovascular:      Rate and Rhythm: Normal rate and regular rhythm  Pulses: Normal pulses  Heart sounds: Murmur heard  Pulmonary:      Effort: Pulmonary effort is normal       Breath sounds: Normal breath sounds  Abdominal:      Palpations: Abdomen is soft  Tenderness: There is no abdominal tenderness  Musculoskeletal:         General: No tenderness  Normal range of motion  Cervical back: Normal range of motion and neck supple  Right lower leg: Edema present  Left lower leg: Edema present  Skin:     General: Skin is warm and dry  Neurological:      General: No focal deficit present  Mental Status: He is alert  Mental status is at baseline  Psychiatric:         Mood and Affect: Mood normal          Behavior: Behavior normal          Labs: I have personally reviewed pertinent lab results  , ABG: No results found for: PHART, NRH0ZDI, PO2ART, GKB6TPS, D9MEILQA, BEART, SOURCE, BNP: No results found for: BNP, CBC: No results found for: WBC, HGB, HCT, MCV, PLT, ADJUSTEDWBC, MCH, MCHC, RDW, MPV, NRBC, CMP: No results found for: SODIUM, K, CL, CO2, ANIONGAP, BUN, CREATININE, GLUCOSE, CALCIUM, AST, ALT, ALKPHOS, PROT, BILITOT, EGFR, PT/INR: No results found for: PT, INR, Troponin: No results found for: TROPONINI  Lab Results   Component Value Date    WBC 9 90 03/18/2022    HGB 14 7 03/18/2022    HCT 42 8 03/18/2022    MCV 83 03/18/2022     03/18/2022     Lab Results   Component Value Date    CALCIUM 8 9 03/18/2022    K 4 7 03/18/2022    CO2 25 03/18/2022     03/18/2022    BUN 28 (H) 03/18/2022    CREATININE 1 54 (H) 03/18/2022     No results found for: IGE  Lab Results   Component Value Date    ALT 41 01/26/2022    AST 17 01/26/2022    ALKPHOS 83 01/26/2022       Imaging and other studies: I have personally reviewed pertinent reports  and I have personally reviewed pertinent films in PACS     Chest x-ray 03/18/2022  Cardiomegaly with prominent vascular congestion and questionable right lower lobe infiltrate    Chest x-ray today per my interpretation  Right basilar atelectasis with possible small right pleural effusion best visualized on lateral   No acute infiltrates or pneumothorax  Pulmonary function testing:  Performed 12/28/2021  FEV1/FVC ratio 63%   FEV1 45% predicted  FVC 55% predicted  no response to bronchodilators  TLC 84 % predicted   % predicted  DLCO corrected for hemoglobin 91 % predicted  Severe obstructive airflow defect  No significant response to bronchodilators  TLC normal with increased RV indicative air trapping  Normal diffusion capacity  Other Studies: I have personally reviewed pertinent reports  Echo 06/14/2021  EF 60%  Moderate aortic stenosis with valve mean gradient 60 mm Hg  Mild tricuspid regurgitation  Answers for HPI/ROS submitted by the patient on 4/19/2022  Do you have difficulty breathing?: Yes  Do you have a hoarse voice?: Yes  Chronicity: recurrent  When did you first notice your symptoms?: 1 to 4 weeks ago  How often do your symptoms occur?: 2 to 4 times per day  Since you first noticed this problem, how has it changed?: unchanged  Do you have shortness of breath that occurs with effort or exertion?: Yes  Do you have ear congestion?: No  Do you have heartburn?: No  Do you have fatigue?: Yes  Do you have nasal congestion?: No  Do you have shortness of breath when lying flat?: No  Do you have shortness of breath when you wake up?: Yes  Do you have sweats?: No  Have you experienced weight loss?: No  Which of the following makes your symptoms worse?: any activity, change in weather, climbing stairs, exercise, exposure to fumes, exposure to smoke  Which of the following makes your symptoms better?: cold air, oral steroids, steroid inhaler

## 2022-04-20 NOTE — ASSESSMENT & PLAN NOTE
Wt Readings from Last 3 Encounters:   04/20/22 (!) 161 kg (355 lb)   03/18/22 (!) 159 kg (350 lb)   02/10/22 (!) 158 kg (348 lb)     Appears hypervolemic on exam and weights do seem to be trending upward  Check BNP and BMP for consideration of adding Lasix  I have taken notice that his BNP is usually always within normal limits but this may be falsely normal in the setting of his elevated BMI

## 2022-04-20 NOTE — ASSESSMENT & PLAN NOTE
Oxygen saturations in the office today are 89% on room air  He does have supplemental oxygen at home that he uses on an as-needed basis  He has related that he has seen desaturations into the low 80s while at home on his home pulse oximeter  I recommended he uses supplemental oxygen at 2 liters/minute continuously while he is acutely ill

## 2022-04-21 ENCOUNTER — APPOINTMENT (OUTPATIENT)
Dept: LAB | Facility: MEDICAL CENTER | Age: 57
End: 2022-04-21
Payer: COMMERCIAL

## 2022-04-21 DIAGNOSIS — I50.33 ACUTE ON CHRONIC DIASTOLIC HEART FAILURE (HCC): Primary | ICD-10-CM

## 2022-04-21 DIAGNOSIS — R06.00 DOE (DYSPNEA ON EXERTION): ICD-10-CM

## 2022-04-21 DIAGNOSIS — J44.9 COPD, SEVERE (HCC): ICD-10-CM

## 2022-04-21 DIAGNOSIS — J45.40 MODERATE PERSISTENT ASTHMA WITHOUT COMPLICATION: ICD-10-CM

## 2022-04-21 LAB
ANION GAP SERPL CALCULATED.3IONS-SCNC: 5 MMOL/L (ref 4–13)
BASOPHILS # BLD AUTO: 0.06 THOUSANDS/ΜL (ref 0–0.1)
BASOPHILS NFR BLD AUTO: 1 % (ref 0–1)
BUN SERPL-MCNC: 44 MG/DL (ref 5–25)
CALCIUM SERPL-MCNC: 9.4 MG/DL (ref 8.3–10.1)
CHLORIDE SERPL-SCNC: 100 MMOL/L (ref 100–108)
CO2 SERPL-SCNC: 25 MMOL/L (ref 21–32)
CREAT SERPL-MCNC: 1.22 MG/DL (ref 0.6–1.3)
EOSINOPHIL # BLD AUTO: 0.2 THOUSAND/ΜL (ref 0–0.61)
EOSINOPHIL NFR BLD AUTO: 2 % (ref 0–6)
ERYTHROCYTE [DISTWIDTH] IN BLOOD BY AUTOMATED COUNT: 14.5 % (ref 11.6–15.1)
GFR SERPL CREATININE-BSD FRML MDRD: 65 ML/MIN/1.73SQ M
GLUCOSE P FAST SERPL-MCNC: 287 MG/DL (ref 65–99)
HCT VFR BLD AUTO: 44.5 % (ref 36.5–49.3)
HGB BLD-MCNC: 14.5 G/DL (ref 12–17)
IMM GRANULOCYTES # BLD AUTO: 0.07 THOUSAND/UL (ref 0–0.2)
IMM GRANULOCYTES NFR BLD AUTO: 1 % (ref 0–2)
LYMPHOCYTES # BLD AUTO: 1.18 THOUSANDS/ΜL (ref 0.6–4.47)
LYMPHOCYTES NFR BLD AUTO: 13 % (ref 14–44)
MCH RBC QN AUTO: 28.5 PG (ref 26.8–34.3)
MCHC RBC AUTO-ENTMCNC: 32.6 G/DL (ref 31.4–37.4)
MCV RBC AUTO: 88 FL (ref 82–98)
MONOCYTES # BLD AUTO: 0.77 THOUSAND/ΜL (ref 0.17–1.22)
MONOCYTES NFR BLD AUTO: 8 % (ref 4–12)
NEUTROPHILS # BLD AUTO: 7.13 THOUSANDS/ΜL (ref 1.85–7.62)
NEUTS SEG NFR BLD AUTO: 75 % (ref 43–75)
NRBC BLD AUTO-RTO: 0 /100 WBCS
NT-PROBNP SERPL-MCNC: 20 PG/ML
PLATELET # BLD AUTO: 284 THOUSANDS/UL (ref 149–390)
PMV BLD AUTO: 10.5 FL (ref 8.9–12.7)
POTASSIUM SERPL-SCNC: 4.8 MMOL/L (ref 3.5–5.3)
RBC # BLD AUTO: 5.08 MILLION/UL (ref 3.88–5.62)
SODIUM SERPL-SCNC: 130 MMOL/L (ref 136–145)
WBC # BLD AUTO: 9.41 THOUSAND/UL (ref 4.31–10.16)

## 2022-04-21 PROCEDURE — 85025 COMPLETE CBC W/AUTO DIFF WBC: CPT

## 2022-04-21 PROCEDURE — 80048 BASIC METABOLIC PNL TOTAL CA: CPT

## 2022-04-21 PROCEDURE — 36415 COLL VENOUS BLD VENIPUNCTURE: CPT

## 2022-04-21 PROCEDURE — 83880 ASSAY OF NATRIURETIC PEPTIDE: CPT

## 2022-04-21 RX ORDER — FUROSEMIDE 20 MG/1
20 TABLET ORAL DAILY
Qty: 30 TABLET | Refills: 0 | Status: SHIPPED | OUTPATIENT
Start: 2022-04-21 | End: 2022-05-19 | Stop reason: SDUPTHER

## 2022-04-21 RX ORDER — UMECLIDINIUM BROMIDE AND VILANTEROL TRIFENATATE 62.5; 25 UG/1; UG/1
1 POWDER RESPIRATORY (INHALATION) DAILY
Qty: 60 BLISTER | Refills: 3 | Status: SHIPPED | OUTPATIENT
Start: 2022-04-21

## 2022-04-21 RX ORDER — FLUTICASONE PROPIONATE 220 UG/1
2 AEROSOL, METERED RESPIRATORY (INHALATION) 2 TIMES DAILY
Qty: 12 G | Refills: 3 | Status: SHIPPED | OUTPATIENT
Start: 2022-04-21

## 2022-04-21 RX ORDER — ALBUTEROL SULFATE 90 UG/1
2 AEROSOL, METERED RESPIRATORY (INHALATION) EVERY 6 HOURS PRN
Qty: 18 G | Refills: 3 | Status: SHIPPED | OUTPATIENT
Start: 2022-04-21

## 2022-04-21 NOTE — PROGRESS NOTES
Called patient to review results of chest x-ray  Per my read it appears that he does have pulmonary vascular congestion and possible small right pleural effusion  I suspect that he acute on chronic diastolic heart failure  Recommended starting Lasix 20 mg p o  Daily and checking echocardiogram   Patient did have his blood work done but is not yet resulted  I also refilled his inhalers at his request and sent to [address] [place] [address]  Patient verbalized understanding and agrees to the plan

## 2022-04-27 ENCOUNTER — TELEPHONE (OUTPATIENT)
Dept: FAMILY MEDICINE CLINIC | Facility: CLINIC | Age: 57
End: 2022-04-27

## 2022-05-02 DIAGNOSIS — I50.33 ACUTE ON CHRONIC DIASTOLIC HEART FAILURE (HCC): Primary | ICD-10-CM

## 2022-05-04 ENCOUNTER — TELEPHONE (OUTPATIENT)
Dept: ADMINISTRATIVE | Facility: OTHER | Age: 57
End: 2022-05-04

## 2022-05-04 ENCOUNTER — OFFICE VISIT (OUTPATIENT)
Dept: FAMILY MEDICINE CLINIC | Facility: CLINIC | Age: 57
End: 2022-05-04
Payer: COMMERCIAL

## 2022-05-04 VITALS
DIASTOLIC BLOOD PRESSURE: 78 MMHG | HEART RATE: 69 BPM | OXYGEN SATURATION: 92 % | HEIGHT: 69 IN | BODY MASS INDEX: 46.65 KG/M2 | TEMPERATURE: 96.5 F | WEIGHT: 315 LBS | SYSTOLIC BLOOD PRESSURE: 124 MMHG

## 2022-05-04 DIAGNOSIS — Z79.4 TYPE 2 DIABETES MELLITUS WITH DIABETIC NEUROPATHY, WITH LONG-TERM CURRENT USE OF INSULIN (HCC): Primary | ICD-10-CM

## 2022-05-04 DIAGNOSIS — N28.9 DECREASED RENAL FUNCTION: ICD-10-CM

## 2022-05-04 DIAGNOSIS — E87.1 HYPONATREMIA: ICD-10-CM

## 2022-05-04 DIAGNOSIS — E11.40 TYPE 2 DIABETES MELLITUS WITH DIABETIC NEUROPATHY, WITH LONG-TERM CURRENT USE OF INSULIN (HCC): Primary | ICD-10-CM

## 2022-05-04 LAB — SL AMB POCT HEMOGLOBIN AIC: 10.2 (ref ?–6.5)

## 2022-05-04 PROCEDURE — 3046F HEMOGLOBIN A1C LEVEL >9.0%: CPT | Performed by: PHYSICIAN ASSISTANT

## 2022-05-04 PROCEDURE — 1036F TOBACCO NON-USER: CPT | Performed by: PHYSICIAN ASSISTANT

## 2022-05-04 PROCEDURE — 3725F SCREEN DEPRESSION PERFORMED: CPT | Performed by: PHYSICIAN ASSISTANT

## 2022-05-04 PROCEDURE — G0439 PPPS, SUBSEQ VISIT: HCPCS | Performed by: PHYSICIAN ASSISTANT

## 2022-05-04 PROCEDURE — 3074F SYST BP LT 130 MM HG: CPT | Performed by: PHYSICIAN ASSISTANT

## 2022-05-04 PROCEDURE — 3078F DIAST BP <80 MM HG: CPT | Performed by: PHYSICIAN ASSISTANT

## 2022-05-04 PROCEDURE — 3008F BODY MASS INDEX DOCD: CPT | Performed by: PHYSICIAN ASSISTANT

## 2022-05-04 PROCEDURE — 99213 OFFICE O/P EST LOW 20 MIN: CPT | Performed by: PHYSICIAN ASSISTANT

## 2022-05-04 PROCEDURE — 83036 HEMOGLOBIN GLYCOSYLATED A1C: CPT | Performed by: PHYSICIAN ASSISTANT

## 2022-05-04 NOTE — PROGRESS NOTES
Assessment/Plan:    Problem List Items Addressed This Visit        Endocrine    Type 2 diabetes mellitus with diabetic neuropathy, with long-term current use of insulin ([facility] ) - Primary    Relevant Orders    POCT hemoglobin A1c (Completed)    Ambulatory Referral to Nephrology      Other Visit Diagnoses     Hyponatremia        Relevant Orders    Ambulatory Referral to Nephrology    Decreased renal function        Relevant Orders    Ambulatory Referral to Nephrology           Diagnoses and all orders for this visit:    Type 2 diabetes mellitus with diabetic neuropathy, with long-term current use of insulin ([facility] )  -     Cancel: IRIS Diabetic eye exam  -     POCT hemoglobin A1c  -     Ambulatory Referral to Nephrology; Future    Hyponatremia  -     Ambulatory Referral to Nephrology; Future    Decreased renal function  -     Ambulatory Referral to Nephrology; Future        No problem-specific Assessment & Plan notes found for this encounter  Subjective:      Patient ID: Neli Hurst is a 61 y o  male  Mark Paredes is here today for 3 month follow up  Unfortunately, A1C was 7 2% 3 months ago and is now 10 2%  Pt had insurance problems and no longer had coverage for Ozempic  Now on a new insurance as of 5/1/22, and has coverage once again  Pt has been in contact with Odalys Jeffrey [facility] who manages his DM  Once back on Ozempic, will stop Levemir as this was added only as an Ozempic replacement  Recent labs show pt again is hyponatremic, and BUN increased at 44  Pt agreeable to see a nephrologist, referral placed  The following portions of the patient's history were reviewed and updated as appropriate:   He has a past medical history of Aortic stenosis, COPD (chronic obstructive pulmonary disease) ([facility] ), Diabetes ([facility] ), Diabetes mellitus ([facility] ), Hyperlipidemia, Hypertension (7/2/2014), and Sleep apnea, obstructive  ,  does not have any pertinent problems on file  ,   has a past surgical history that includes Appendectomy and Eye surgery  ,  family history includes No Known Problems in his mother  ,   reports that he has quit smoking  His smoking use included cigarettes  He quit after 10 00 years of use  He has never used smokeless tobacco  He reports that he does not drink alcohol and does not use drugs  ,  is allergic to theophylline     Current Outpatient Medications   Medication Sig Dispense Refill    albuterol (Ventolin HFA) 90 mcg/act inhaler Inhale 2 puffs every 6 (six) hours as needed for wheezing 18 g 3    amLODIPine (NORVASC) 10 mg tablet Take 1 tablet (10 mg total) by mouth daily 90 tablet 3    atorvastatin (LIPITOR) 40 mg tablet Take 1 tablet (40 mg total) by mouth daily 90 tablet 3    Blood Glucose Monitoring Suppl (CONTOUR NEXT MONITOR) w/Device KIT Test blood sugar once daily or as needed for fluctuating blood sugars 1 kit 0    carvedilol (COREG) 12 5 mg tablet Take 1 tablet (12 5 mg total) by mouth 2 (two) times a day with meals 180 tablet 3    cetirizine (ZYRTEC ALLERGY) 10 mg tablet Take 10 mg by mouth      cyclobenzaprine (FLEXERIL) 10 mg tablet Take 10 mg by mouth      Dapagliflozin Propanediol (Farxiga) 10 MG TABS Take 10 mg by mouth daily      fluticasone (Flovent HFA) 220 mcg/act inhaler Inhale 2 puffs 2 (two) times a day Rinse mouth after use 12 g 3    furosemide (LASIX) 20 mg tablet Take 1 tablet (20 mg total) by mouth daily 30 tablet 0    gabapentin (NEURONTIN) 300 mg capsule Take 1 capsule (300 mg total) by mouth 3 (three) times a day 90 capsule 2    glimepiride (AMARYL) 4 mg tablet Take 4 mg by mouth 2 (two) times a day      glucose blood (CONTOUR NEXT TEST) test strip Test blood sugar once daily or as needed for fluctuating blood sugars 100 each 3    hydrochlorothiazide (HYDRODIURIL) 25 mg tablet Take 1 tablet (25 mg total) by mouth daily 90 tablet 3    Insulin Pen Needle (BD Pen Needle Dee Dee U/F) 32G X 4 MM MISC Use 4 a day 200 each 5    ipratropium-albuterol (DUO-NEB) 0 5-2 5 mg/3 mL nebulizer solution Take 1 vial (3 mL total) by nebulization every 6 (six) hours as needed for wheezing or shortness of breath 360 mL 2    Lancets MISC Test blood sugar once daily or as needed for fluctuating blood sugars 100 each 0    Levemir FlexTouch 100 units/mL injection pen Inject 60 Units under the skin daily       losartan (COZAAR) 100 MG tablet Take 1 tablet (100 mg total) by mouth daily 90 tablet 3    meloxicam (MOBIC) 15 mg tablet Take 15 mg by mouth      metFORMIN (GLUCOPHAGE) 1000 MG tablet TAKE 1 TABLET BY MOUTH TWICE DAILY WITH MEALS 180 tablet 0    Multiple Vitamins-Minerals (MENS MULTIVITAMIN PO) Take by mouth      sertraline (ZOLOFT) 50 mg tablet Take 1 tablet (50 mg total) by mouth daily 30 tablet 2    spironolactone (ALDACTONE) 50 mg tablet Take 1 tablet (50 mg total) by mouth daily 90 tablet 3    umeclidinium-vilanterol (Anoro Ellipta) 62 5-25 MCG/INH inhaler Inhale 1 puff daily 60 blister 3    montelukast (SINGULAIR) 10 mg tablet  (Patient not taking: Reported on 5/4/2022 )      Ozempic, 1 MG/DOSE, 4 MG/3ML SOPN injection pen INJECT 1MG SUBCUTANEOUSLY ONCE A WEEK      sertraline (ZOLOFT) 50 mg tablet Take 1 tablet by mouth once daily 30 tablet 0     No current facility-administered medications for this visit  Review of Systems   Constitutional: Negative for activity change, appetite change, chills, diaphoresis, fatigue, fever and unexpected weight change  HENT: Negative for congestion, ear pain, postnasal drip, rhinorrhea, sinus pressure, sinus pain, sneezing, sore throat, tinnitus and voice change  Eyes: Negative for pain, redness and visual disturbance  Respiratory: Negative for cough, chest tightness, shortness of breath and wheezing  Cardiovascular: Negative for chest pain, palpitations and leg swelling  Gastrointestinal: Negative for abdominal pain, blood in stool, constipation, diarrhea, nausea and vomiting     Genitourinary: Negative for difficulty urinating, dysuria, frequency, hematuria and urgency  Musculoskeletal: Negative for arthralgias, back pain, gait problem, joint swelling, myalgias, neck pain and neck stiffness  Skin: Negative for color change, pallor, rash and wound  Neurological: Negative for dizziness, tremors, weakness, light-headedness and headaches  Psychiatric/Behavioral: Negative for dysphoric mood, self-injury, sleep disturbance and suicidal ideas  The patient is not nervous/anxious  Objective:  Vitals:    05/04/22 0950   BP: 124/78   Pulse: 69   Temp: (!) 96 5 °F (35 8 °C)   SpO2: 92%   Weight: (!) 161 kg (354 lb 12 8 oz)   Height: 5' 9" (1 753 m)     Body mass index is 52 39 kg/m²  Physical Exam  Vitals reviewed  Constitutional:       General: He is not in acute distress  Appearance: He is well-developed  He is obese  He is not diaphoretic  HENT:      Head: Normocephalic and atraumatic  Right Ear: Hearing, tympanic membrane, ear canal and external ear normal       Left Ear: Hearing, tympanic membrane, ear canal and external ear normal       Mouth/Throat:      Pharynx: Uvula midline  No oropharyngeal exudate  Eyes:      General: No scleral icterus  Right eye: No discharge  Left eye: No discharge  Conjunctiva/sclera: Conjunctivae normal    Neck:      Thyroid: No thyromegaly  Vascular: No carotid bruit  Cardiovascular:      Rate and Rhythm: Normal rate and regular rhythm  Heart sounds: Normal heart sounds  No murmur heard  Pulmonary:      Effort: Pulmonary effort is normal  No respiratory distress  Breath sounds: Normal breath sounds  No wheezing  Abdominal:      General: Bowel sounds are normal  There is no distension  Palpations: Abdomen is soft  There is no mass  Tenderness: There is no abdominal tenderness  There is no guarding or rebound  Musculoskeletal:         General: No tenderness  Normal range of motion  Cervical back: Neck supple  Lymphadenopathy:      Cervical: No cervical adenopathy  Skin:     General: Skin is warm and dry  Findings: No erythema or rash  Neurological:      Mental Status: He is alert and oriented to person, place, and time  Psychiatric:         Behavior: Behavior normal          Thought Content:  Thought content normal          Judgment: Judgment normal

## 2022-05-04 NOTE — TELEPHONE ENCOUNTER
----- Message from [facility] sent at 5/4/2022 10:03 AM EDT -----  Regarding: DM EYE Exam  05/04/22 10:03 AM    Hello, our patient Renan Holcomb has had Diabetic Eye Exam completed/performed  Please assist in updating the patient chart by making an External outreach to [facility] facility located in [place], [place]  The date of service is within 2 months      Thank you,  Catalina Lebron   [facility]

## 2022-05-04 NOTE — LETTER
Diabetic Eye Exam Form    Date Requested: 05/10/22  Patient: Anthony Mike  Patient : 1965   Referring Provider: Xavier Macario PA-C    DIABETIC Eye Exam Date _______________________________    Type of Exam MUST be documented for Diabetic Eye Exams  Please CHECK ONE  Retinal Exam       Dilated Retinal Exam       OCT       Optomap-Iris Exam      Fundus Photography     Left Eye - Please check Retinopathy AND Type or No Retinopathy      Exam did show retinopathy    Exam did not show retinopathy         Mild     Proliferative           Moderate    Severe            None         Right Eye - Please check Retinopathy AND Type or No Retinopathy     Exam did show retinopathy    Exam did not show retinopathy         Mild     Proliferative        Moderate    Severe        None       Comments __________________________________________________________    Practice Providing Exam ______________________________________________    Exam Performed By (print name) _______________________________________      Provider Signature ___________________________________________________    These reports are needed for  compliance  Please fax this completed form and a copy of the Diabetic Eye Exam report to our office located at [place] [id number] as soon as possible via [phone] kaykay Carvalho: Phone [phone]  We thank you for your assistance in treating our mutual patient

## 2022-05-04 NOTE — PROGRESS NOTES
Assessment and Plan:     Problem List Items Addressed This Visit        Endocrine    Type 2 diabetes mellitus with diabetic neuropathy, with long-term current use of insulin ([facility] ) - Primary    Relevant Orders    POCT hemoglobin A1c (Completed)    Ambulatory Referral to Nephrology      Other Visit Diagnoses     Hyponatremia        Relevant Orders    Ambulatory Referral to Nephrology    Decreased renal function        Relevant Orders    Ambulatory Referral to Nephrology           Preventive health issues were discussed with patient, and age appropriate screening tests were ordered as noted in patient's After Visit Summary  Personalized health advice and appropriate referrals for health education or preventive services given if needed, as noted in patient's After Visit Summary       History of Present Illness:     Patient presents for Medicare Annual Wellness visit    Patient Care Team:  Pete Beltran PA-C as PCP - General (Physician Assistant)  Jesus Pod, DO as PCP - [address] [place] [address] (RTE)  Jesus Pod, DO as PCP - [facility] (RTE)     Problem List:     Patient Active Problem List   Diagnosis    SCOTT (obstructive sleep apnea)    Asthma, moderate persistent    Diastolic heart failure ([address] )    Morbid obesity with BMI of 50 0-59 9, adult ([address] )    Mixed simple and mucopurulent chronic bronchitis ([address] )    COPD, severe ([address] )    Chronic hypoxemic respiratory failure ([address] )    Dyslipidemia, goal LDL below 100    Essential hypertension    Myopia, bilateral    Presbyopia    Renovascular hypertension with goal blood pressure less than 140/90    Type 2 diabetes mellitus with diabetic neuropathy, with long-term current use of insulin ([address] )    Murmur    Aortic stenosis    Periodic limb movement disorder (PLMD)    RUTHERFORD (dyspnea on exertion)      Past Medical and Surgical History:     Past Medical History:   Diagnosis Date    Aortic stenosis     COPD (chronic obstructive pulmonary disease) ([address] )     [facility]     Diabetes mellitus ([facility] )     Hyperlipidemia     Hypertension 7/2/2014    Sleep apnea, obstructive      Past Surgical History:   Procedure Laterality Date    APPENDECTOMY      EYE SURGERY        Family History:     Family History   Problem Relation Age of Onset    No Known Problems Mother       Social History:     Social History     Socioeconomic History    Marital status: /Civil Union     Spouse name: None    Number of children: None    Years of education: None    Highest education level: None   Occupational History    None   Tobacco Use    Smoking status: Former Smoker     Years: 10 00     Types: Cigarettes    Smokeless tobacco: Never Used   Vaping Use    Vaping Use: Never used   Substance and Sexual Activity    Alcohol use: Never    Drug use: Never    Sexual activity: None   Other Topics Concern    None   Social History Narrative    None     Social Determinants of Health     Financial Resource Strain: Not on file   Food Insecurity: Not on file   Transportation Needs: Not on file   Physical Activity: Not on file   Stress: Not on file   Social Connections: Not on file   Intimate Partner Violence: Not on file   Housing Stability: Not on file      Medications and Allergies:     Current Outpatient Medications   Medication Sig Dispense Refill    albuterol (Ventolin HFA) 90 mcg/act inhaler Inhale 2 puffs every 6 (six) hours as needed for wheezing 18 g 3    amLODIPine (NORVASC) 10 mg tablet Take 1 tablet (10 mg total) by mouth daily 90 tablet 3    atorvastatin (LIPITOR) 40 mg tablet Take 1 tablet (40 mg total) by mouth daily 90 tablet 3    Blood Glucose Monitoring Suppl (CONTOUR NEXT MONITOR) w/Device KIT Test blood sugar once daily or as needed for fluctuating blood sugars 1 kit 0    carvedilol (COREG) 12 5 mg tablet Take 1 tablet (12 5 mg total) by mouth 2 (two) times a day with meals 180 tablet 3    cetirizine (ZYRTEC ALLERGY) 10 mg tablet Take 10 mg by mouth      cyclobenzaprine (FLEXERIL) 10 mg tablet Take 10 mg by mouth      Dapagliflozin Propanediol (Farxiga) 10 MG TABS Take 10 mg by mouth daily      fluticasone (Flovent HFA) 220 mcg/act inhaler Inhale 2 puffs 2 (two) times a day Rinse mouth after use 12 g 3    furosemide (LASIX) 20 mg tablet Take 1 tablet (20 mg total) by mouth daily 30 tablet 0    gabapentin (NEURONTIN) 300 mg capsule Take 1 capsule (300 mg total) by mouth 3 (three) times a day 90 capsule 2    glimepiride (AMARYL) 4 mg tablet Take 4 mg by mouth 2 (two) times a day      glucose blood (CONTOUR NEXT TEST) test strip Test blood sugar once daily or as needed for fluctuating blood sugars 100 each 3    hydrochlorothiazide (HYDRODIURIL) 25 mg tablet Take 1 tablet (25 mg total) by mouth daily 90 tablet 3    Insulin Pen Needle (BD Pen Needle Dee Dee U/F) 32G X 4 MM MISC Use 4 a day 200 each 5    ipratropium-albuterol (DUO-NEB) 0 5-2 5 mg/3 mL nebulizer solution Take 1 vial (3 mL total) by nebulization every 6 (six) hours as needed for wheezing or shortness of breath 360 mL 2    Lancets MISC Test blood sugar once daily or as needed for fluctuating blood sugars 100 each 0    Levemir FlexTouch 100 units/mL injection pen Inject 60 Units under the skin daily       losartan (COZAAR) 100 MG tablet Take 1 tablet (100 mg total) by mouth daily 90 tablet 3    meloxicam (MOBIC) 15 mg tablet Take 15 mg by mouth      metFORMIN (GLUCOPHAGE) 1000 MG tablet TAKE 1 TABLET BY MOUTH TWICE DAILY WITH MEALS 180 tablet 0    Multiple Vitamins-Minerals (MENS MULTIVITAMIN PO) Take by mouth      sertraline (ZOLOFT) 50 mg tablet Take 1 tablet (50 mg total) by mouth daily 30 tablet 2    spironolactone (ALDACTONE) 50 mg tablet Take 1 tablet (50 mg total) by mouth daily 90 tablet 3    umeclidinium-vilanterol (Anoro Ellipta) 62 5-25 MCG/INH inhaler Inhale 1 puff daily 60 blister 3    montelukast (SINGULAIR) 10 mg tablet  (Patient not taking: Reported on 5/4/2022 )      Ozempic, 1 MG/DOSE, 4 MG/3ML SOPN injection pen INJECT 1MG SUBCUTANEOUSLY ONCE A WEEK      sertraline (ZOLOFT) 50 mg tablet Take 1 tablet by mouth once daily 30 tablet 0     No current facility-administered medications for this visit  Allergies   Allergen Reactions    Theophylline Other (See Comments)     Severe headaches  headaches  Severe headaches        Immunizations:     Immunization History   Administered Date(s) Administered    COVID-19 MODERNA VACC 0 5 ML IM 02/06/2021, 03/06/2021, 10/26/2021    INFLUENZA 11/25/2014, 09/19/2016, 11/01/2017, 12/17/2018    Influenza, recombinant, quadrivalent,injectable, preservative free 02/03/2020, 09/02/2020    Influenza, seasonal, injectable 11/25/2014, 08/31/2015    Pneumococcal Polysaccharide PPV23 05/23/2014    Tdap 05/23/2014    influenza, injectable, quadrivalent 12/05/2021      Health Maintenance:         Topic Date Due    Colorectal Cancer Screening  Never done    HIV Screening  Completed    Hepatitis C Screening  Completed     There are no preventive care reminders to display for this patient  Medicare Health Risk Assessment:     /78   Pulse 69   Temp (!) 96 5 °F (35 8 °C)   Ht 5' 9" (1 753 m)   Wt (!) 161 kg (354 lb 12 8 oz)   SpO2 92%   BMI 52 39 kg/m²      Rehana Stanley is here for his Subsequent Wellness visit  Health Risk Assessment:   Patient rates overall health as good  Patient feels that their physical health rating is same  Patient is satisfied with their life  Eyesight was rated as same  Hearing was rated as same  Patient feels that their emotional and mental health rating is same  Patients states they are sometimes angry  Patient states they are sometimes unusually tired/fatigued  Pain experienced in the last 7 days has been some  Patient's pain rating has been 4/10  Patient states that he has experienced no weight loss or gain in last 6 months  Depression Screening:   PHQ-9 Score: 0      Fall Risk Screening:    In the past year, patient has experienced: no history of falling in past year      Home Safety:  Patient has trouble with stairs inside or outside of their home  Patient has working smoke alarms and has working carbon monoxide detector  Home safety hazards include: none  Nutrition:   Current diet is Regular  Medications:   Patient is currently taking over-the-counter supplements  OTC medications include: see medication list  Patient is able to manage medications  Activities of Daily Living (ADLs)/Instrumental Activities of Daily Living (IADLs):   Walk and transfer into and out of bed and chair?: Yes  Dress and groom yourself?: Yes    Bathe or shower yourself?: Yes    Feed yourself? Yes  Do your laundry/housekeeping?: Yes  Manage your money, pay your bills and track your expenses?: Yes  Make your own meals?: Yes    Do your own shopping?: Yes    Previous Hospitalizations:   Any hospitalizations or ED visits within the last 12 months?: Yes    How many hospitalizations have you had in the last year?: 1-2    Advance Care Planning:   Living will: Yes    Durable POA for healthcare:  Yes    Advanced directive: Yes      Cognitive Screening:   Provider or family/friend/caregiver concerned regarding cognition?: No    PREVENTIVE SCREENINGS      Cardiovascular Screening:    General: History Lipid Disorder and Screening Current      Diabetes Screening:     General: History Diabetes      Colorectal Cancer Screening:       Due for: Cologuard      Prostate Cancer Screening:    General: Screening Current      Osteoporosis Screening:    General: Screening Not Indicated      Abdominal Aortic Aneurysm (AAA) Screening:    Risk factors include: tobacco use        General: Screening Not Indicated      Lung Cancer Screening:     General: Screening Not Indicated      Hepatitis C Screening:    General: Screening Current    Screening, Brief Intervention, and Referral to Treatment (SBIRT)    Screening  Typical number of drinks in a day: 0  Typical number of drinks in a week: 0  Interpretation: Low risk drinking behavior      Single Item Drug Screening:  How often have you used an illegal drug (including marijuana) or a prescription medication for non-medical reasons in the past year? never    Single Item Drug Screen Score: 0  Interpretation: Negative screen for possible drug use disorder      Xavier Macario PA-C

## 2022-05-04 NOTE — PATIENT INSTRUCTIONS
Medicare Preventive Visit Patient Instructions  Thank you for completing your Welcome to Medicare Visit or Medicare Annual Wellness Visit today  Your next wellness visit will be due in one year (5/5/2023)  The screening/preventive services that you may require over the next 5-10 years are detailed below  Some tests may not apply to you based off risk factors and/or age  Screening tests ordered at today's visit but not completed yet may show as past due  Also, please note that scanned in results may not display below  Preventive Screenings:  Service Recommendations Previous Testing/Comments   Colorectal Cancer Screening  · Colonoscopy    · Fecal Occult Blood Test (FOBT)/Fecal Immunochemical Test (FIT)  · Fecal DNA/Cologuard Test  · Flexible Sigmoidoscopy Age: 54-65 years old   Colonoscopy: every 10 years (May be performed more frequently if at higher risk)  OR  FOBT/FIT: every 1 year  OR  Cologuard: every 3 years  OR  Sigmoidoscopy: every 5 years  Screening may be recommended earlier than age 48 if at higher risk for colorectal cancer  Also, an individualized decision between you and your healthcare provider will decide whether screening between the ages of 74-80 would be appropriate   Colonoscopy: Not on file  FOBT/FIT: Not on file  Cologuard: Not on file  Sigmoidoscopy: Not on file          Prostate Cancer Screening Individualized decision between patient and health care provider in men between ages of 53-78   Medicare will cover every 12 months beginning on the day after your 50th birthday PSA: 0 1 ng/mL     Screening Current     Hepatitis C Screening Once for adults born between 1945 and 1965  More frequently in patients at high risk for Hepatitis C Hep C Antibody: 02/05/2020    Screening Current   Diabetes Screening 1-2 times per year if you're at risk for diabetes or have pre-diabetes Fasting glucose: 287 mg/dL   A1C: 7 2 %    Screening Not Indicated  History Diabetes   Cholesterol Screening Once every 5 years if you don't have a lipid disorder  May order more often based on risk factors  Lipid panel: 01/26/2022    Screening Not Indicated  History Lipid Disorder      Other Preventive Screenings Covered by Medicare:  1  Abdominal Aortic Aneurysm (AAA) Screening: covered once if your at risk  You're considered to be at risk if you have a family history of AAA or a male between the age of 73-68 who smoking at least 100 cigarettes in your lifetime  2  Lung Cancer Screening: covers low dose CT scan once per year if you meet all of the following conditions: (1) Age 50-69; (2) No signs or symptoms of lung cancer; (3) Current smoker or have quit smoking within the last 15 years; (4) You have a tobacco smoking history of at least 30 pack years (packs per day x number of years you smoked); (5) You get a written order from a healthcare provider  3  Glaucoma Screening: covered annually if you're considered high risk: (1) You have diabetes OR (2) Family history of glaucoma OR (3)  aged 48 and older OR (3)  American aged 72 and older  3  Osteoporosis Screening: covered every 2 years if you meet one of the following conditions: (1) Have a vertebral abnormality; (2) On glucocorticoid therapy for more than 3 months; (3) Have primary hyperparathyroidism; (4) On osteoporosis medications and need to assess response to drug therapy  5  HIV Screening: covered annually if you're between the age of 12-76  Also covered annually if you are younger than 13 and older than 72 with risk factors for HIV infection  For pregnant patients, it is covered up to 3 times per pregnancy      Immunizations:  Immunization Recommendations   Influenza Vaccine Annual influenza vaccination during flu season is recommended for all persons aged >= 6 months who do not have contraindications   Pneumococcal Vaccine (Prevnar and Pneumovax)  * Prevnar = PCV13  * Pneumovax = PPSV23 Adults 25-60 years old: 1-3 doses may be recommended based on certain risk factors  Adults 72 years old: Prevnar (PCV13) vaccine recommended followed by Pneumovax (PPSV23) vaccine  If already received PPSV23 since turning 65, then PCV13 recommended at least one year after PPSV23 dose  Hepatitis B Vaccine 3 dose series if at intermediate or high risk (ex: diabetes, end stage renal disease, liver disease)   Tetanus (Td) Vaccine - COST NOT COVERED BY MEDICARE PART B Following completion of primary series, a booster dose should be given every 10 years to maintain immunity against tetanus  Td may also be given as tetanus wound prophylaxis  Tdap Vaccine - COST NOT COVERED BY MEDICARE PART B Recommended at least once for all adults  For pregnant patients, recommended with each pregnancy  Shingles Vaccine (Shingrix) - COST NOT COVERED BY MEDICARE PART B  2 shot series recommended in those aged 48 and above     Health Maintenance Due:      Topic Date Due    Colorectal Cancer Screening  Never done    HIV Screening  Completed    Hepatitis C Screening  Completed     Immunizations Due:  There are no preventive care reminders to display for this patient  Advance Directives   What are advance directives? Advance directives are legal documents that state your wishes and plans for medical care  These plans are made ahead of time in case you lose your ability to make decisions for yourself  Advance directives can apply to any medical decision, such as the treatments you want, and if you want to donate organs  What are the types of advance directives? There are many types of advance directives, and each state has rules about how to use them  You may choose a combination of any of the following:  · Living will: This is a written record of the treatment you want  You can also choose which treatments you do not want, which to limit, and which to stop at a certain time  This includes surgery, medicine, IV fluid, and tube feedings     · Durable power of  for healthcare [facility]): This is a written record that states who you want to make healthcare choices for you when you are unable to make them for yourself  This person, called a proxy, is usually a family member or a friend  You may choose more than 1 proxy  · Do not resuscitate (DNR) order:  A DNR order is used in case your heart stops beating or you stop breathing  It is a request not to have certain forms of treatment, such as CPR  A DNR order may be included in other types of advance directives  · Medical directive: This covers the care that you want if you are in a coma, near death, or unable to make decisions for yourself  You can list the treatments you want for each condition  Treatment may include pain medicine, surgery, blood transfusions, dialysis, IV or tube feedings, and a ventilator (breathing machine)  · Values history: This document has questions about your views, beliefs, and how you feel and think about life  This information can help others choose the care that you would choose  Why are advance directives important? An advance directive helps you control your care  Although spoken wishes may be used, it is better to have your wishes written down  Spoken wishes can be misunderstood, or not followed  Treatments may be given even if you do not want them  An advance directive may make it easier for your family to make difficult choices about your care  Weight Management   Why it is important to manage your weight:  Being overweight increases your risk of health conditions such as heart disease, high blood pressure, type 2 diabetes, and certain types of cancer  It can also increase your risk for osteoarthritis, sleep apnea, and other respiratory problems  Aim for a slow, steady weight loss  Even a small amount of weight loss can lower your risk of health problems  How to lose weight safely:  A safe and healthy way to lose weight is to eat fewer calories and get regular exercise   You can lose up about 1 pound a week by decreasing the number of calories you eat by 500 calories each day  Healthy meal plan for weight management:  A healthy meal plan includes a variety of foods, contains fewer calories, and helps you stay healthy  A healthy meal plan includes the following:  · Eat whole-grain foods more often  A healthy meal plan should contain fiber  Fiber is the part of grains, fruits, and vegetables that is not broken down by your body  Whole-grain foods are healthy and provide extra fiber in your diet  Some examples of whole-grain foods are whole-wheat breads and pastas, oatmeal, brown rice, and bulgur  · Eat a variety of vegetables every day  Include dark, leafy greens such as spinach, kale, bailee greens, and mustard greens  Eat yellow and orange vegetables such as carrots, sweet potatoes, and winter squash  · Eat a variety of fruits every day  Choose fresh or canned fruit (canned in its own juice or light syrup) instead of juice  Fruit juice has very little or no fiber  · Eat low-fat dairy foods  Drink fat-free (skim) milk or 1% milk  Eat fat-free yogurt and low-fat cottage cheese  Try low-fat cheeses such as mozzarella and other reduced-fat cheeses  · Choose meat and other protein foods that are low in fat  Choose beans or other legumes such as split peas or lentils  Choose fish, skinless poultry (chicken or turkey), or lean cuts of red meat (beef or pork)  Before you cook meat or poultry, cut off any visible fat  · Use less fat and oil  Try baking foods instead of frying them  Add less fat, such as margarine, sour cream, regular salad dressing and mayonnaise to foods  Eat fewer high-fat foods  Some examples of high-fat foods include french fries, doughnuts, ice cream, and cakes  · Eat fewer sweets  Limit foods and drinks that are high in sugar  This includes candy, cookies, regular soda, and sweetened drinks  Exercise:  Exercise at least 30 minutes per day on most days of the week   Some examples of exercise include walking, biking, dancing, and swimming  You can also fit in more physical activity by taking the stairs instead of the elevator or parking farther away from stores  Ask your healthcare provider about the best exercise plan for you  © Copyright [employer] 2018 Information is for End User's use only and may not be sold, redistributed or otherwise used for commercial purposes  All illustrations and images included in CareNotes® are the copyrighted property of A [employer]  or [address]  10% - bad control"> 10% - bad control,Hemoglobin A1c (HbA1c) greater than 10% indicating poor diabetic control,Haemoglobin A1c greater than 10% indicating poor diabetic control">   Diabetes Mellitus Type 2 in Adults, Ambulatory Care   GENERAL INFORMATION:   Diabetes mellitus type 2  is a disease that affects how your body uses glucose (sugar)  Insulin helps move sugar out of the blood so it can be used for energy  Normally, when the blood sugar level increases, the pancreas makes more insulin  Type 2 diabetes develops because either the body cannot make enough insulin, or it cannot use the insulin correctly  After many years, your pancreas may stop making insulin  Common symptoms include the following:   · More hunger or thirst than usual     · Frequent urination     · Weight loss without trying     · Blurred vision  Seek immediate care for the following symptoms:   · Severe abdominal pain, or pain that spreads to your back  You may also be vomiting  · Trouble staying awake or focusing    · Shaking or sweating    · Blurred or double vision    · Breath has a fruity, sweet smell    · Breathing is deep and labored, or rapid and shallow    · Heartbeat is fast and weak  Treatment for diabetes mellitus type 2  includes keeping your blood sugar at a normal level  You must eat the right foods, and exercise regularly   You may also need medicine if you cannot control your blood sugar level with nutrition and exercise  Manage diabetes mellitus type 2:   · Check your blood sugar level  You will be taught how to check a small drop of blood in a glucose monitor  Ask your healthcare provider when and how often to check during the day  Ask your healthcare provider what your blood sugar levels should be when you check them  · Keep track of carbohydrates (sugar and starchy foods)  Your blood sugar level can get too high if you eat too many carbohydrates  Your dietitian will help you plan meals and snacks that have the right amount of carbohydrates  · Eat low-fat foods  Some examples are skinless chicken and low-fat milk  · Eat less sodium (salt)  Some examples of high-sodium foods to limit are soy sauce, potato chips, and soup  Do not add salt to food you cook  Limit your use of table salt  · Eat high-fiber foods  Foods that are a good source of fiber include vegetables, whole grain bread, and beans  · Limit alcohol  Alcohol affects your blood sugar level and can make it harder to manage your diabetes  Women should limit alcohol to 1 drink a day  Men should limit alcohol to 2 drinks a day  A drink of alcohol is 12 ounces of beer, 5 ounces of wine, or 1½ ounces of liquor  · Get regular exercise  Exercise can help keep your blood sugar level steady, decrease your risk of heart disease, and help you lose weight  Exercise for at least 30 minutes, 5 days a week  Include muscle strengthening activities 2 days each week  Work with your healthcare provider to create an exercise plan  · Check your feet each day  for injuries or open sores  Ask your healthcare provider for activities you can do if you have an open sore  · Quit smoking  If you smoke, it is never too late to quit  Smoking can worsen the problems that may occur with diabetes  Ask your healthcare provider for information about how to stop smoking if you are having trouble quitting       · Ask about your weight:  Ask healthcare providers if you need to lose weight, and how much to lose  Ask them to help you with a weight loss program  Even a 10 to 15 pound weight loss can help you manage your blood sugar level  · Carry medical alert identification  Wear medical alert jewelry or carry a card that says you have diabetes  Ask your healthcare provider where to get these items  · Ask about vaccines  Diabetes puts you at risk of serious illness if you get the flu, pneumonia, or hepatitis  Ask your healthcare provider if you should get a flu, pneumonia, or hepatitis B vaccine, and when to get the vaccine  Follow up with your healthcare provider as directed:  Write down your questions so you remember to ask them during your visits  CARE AGREEMENT:   You have the right to help plan your care  Learn about your health condition and how it may be treated  Discuss treatment options with your caregivers to decide what care you want to receive  You always have the right to refuse treatment  The above information is an  only  It is not intended as medical advice for individual conditions or treatments  Talk to your doctor, nurse or pharmacist before following any medical regimen to see if it is safe and effective for you  © 2014 [address] is for End User's use only and may not be sold, redistributed or otherwise used for commercial purposes  All illustrations and images included in CareNotes® are the copyrighted property of A D A M [employer]  or Jakob Richards  Basic Carbohydrate Counting   AMBULATORY CARE:   Carbohydrate counting  is a way to plan your meals by counting the amount of carbohydrate in foods  Carbohydrates are the sugars, starches, and fiber found in fruit, grains, vegetables, and milk products  Carbohydrates increase your blood sugar levels  Carbohydrate counting can help you eat the right amount of carbohydrate to keep your blood sugar levels under control     What you need to know about planning meals using carbohydrate counting:  · A dietitian or healthcare provider will help you develop a healthy meal plan that works best for you  You will be taught how much carbohydrate to eat or drink for each meal and snack  Your meal plan will be based on your age, weight, usual food intake, and physical activity level  If you have diabetes, it will also include your blood sugar levels and diabetes medicine  Once you know how much carbohydrate you should eat, you can decide what type of food you want to eat  · You will need to know what foods contain carbohydrate and how much they contain  Keep track of the amount of carbohydrate in meals and snacks in order to follow your meal plan  Do not avoid carbohydrates or skip meals  Your blood sugar may fall too low if you do not eat enough carbohydrate or you skip meals  Foods that contain carbohydrate:   · Breads:  Each serving of food listed below contains about 15 g of carbohydrate   ? 1 slice of bread (1 ounce) or 1 flour or corn tortilla (6 inch)    ? ½ of a hamburger bun or ¼ of a large bagel (about 1 ounce)    ? 1 pancake (about 4 inches across and ¼ inch thick)    · Cereals and grains:  Serving sizes of ready-to-eat cereals vary  Look at the serving size and the total carbohydrate amount listed on the food label  Each serving of food listed below contains about 15 g of carbohydrate   ? ¾ cup of dry, unsweetened, ready-to-eat cereal or ¼ cup of low-fat granola     ? ½ cup of oatmeal or other cooked cereal     ? ? cup of cooked rice or pasta    · Starchy vegetables and beans:  Each serving of food listed below contains about 15 g of carbohydrate   ? ½ cup of corn, green peas, sweet potatoes, or mashed potatoes    ? ¼ of a large baked potato    ? ½ cup of beans, lentils, and peas (garbanzo, galan, kidney, white, split, black-eyed)    · Crackers and snacks:  Each serving of food listed below contains about 15 g of carbohydrate       ? 3 myrna cracker squares or 8 animal crackers     ? 6 saltine-type crackers    ? 3 cups of popcorn or ¾ ounce of pretzels, potato chips, or tortilla chips    · Fruit:  Each serving of food listed below contains about 15 g of carbohydrate   ? 1 small (4 ounce) piece of fresh fruit or ¾ to 1 cup of fresh fruit    ? ½ cup of canned or frozen fruit, packed in natural juice    ? ½ cup (4 ounces) of unsweetened fruit juice    ? 2 tablespoons of dried fruit    · Desserts or sugary foods:  Each serving of food listed below contains about 15 g of carbohydrate   ? 2-inch square unfrosted cake or brownie     ? 2 small cookies    ? ½ cup of ice cream, frozen yogurt, or nondairy frozen yogurt    ? ¼ cup of sherbet or sorbet    ? 1 tablespoon of regular syrup, jam, or jelly    ? 2 tablespoons of light syrup    · Milk and yogurt:  Foods from the milk group contain about 12 g of carbohydrate per serving  ? 1 cup of fat-free or low-fat milk    ? 1 cup of soy milk    ? ? cup of fat-free, yogurt sweetened with artificial sweetener    · Non-starchy vegetables:  Each serving contains about 5 g of carbohydrate   Three servings of non-starch vegetables count as 1 carbohydrate serving  ? ½ cup of cooked vegetables or 1 cup of raw vegetables  This includes beets, broccoli, cabbage, cauliflower, cucumber, mushrooms, tomatoes, and zucchini    ? ½ cup of vegetable juice    How to use carbohydrate counting to plan meals:   · Count carbohydrate amounts using serving sizes:      ? Pasta dinner example: You plan to have pasta, tossed salad, and an 8-ounce glass of milk  Your healthcare provider tells you that you may have 4 carbohydrate servings for dinner  One carbohydrate serving of pasta is ? cup  One cup of pasta will equal 3 carbohydrate servings  An 8-ounce glass of milk will count as 1 carbohydrate serving  These amounts of food would equal 4 carbohydrate servings   One cup of tossed salad does not count toward your carbohydrate servings  · Count carbohydrate amounts using food labels:  Find the total amount of carbohydrate in a packaged food by reading the food label  Food labels tell you the serving size of the food and the total carbohydrate amount in each serving  Find the serving size on the food label and then decide how many servings you will eat  Multiply the number of servings you plan to eat by the carbohydrate amount per serving  ? Granola bar snack example: Your meal plan allows you to have 2 carbohydrate servings (30 grams) of carbohydrate for a snack  You plan to eat 1 package of granola bars, which contains 2 bars  According to the food label, the serving size of food in this package is 1 bar  Each serving (1 bar) contains 25 grams of carbohydrate  The total amount of carbohydrate in this package of granola bars would be 50 g  Based on your meal plan, you should eat only 1 bar  Follow up with your doctor as directed:  Write down your questions so you remember to ask them during your visits  © Copyright [employer] 2022 Information is for End User's use only and may not be sold, redistributed or otherwise used for commercial purposes  All illustrations and images included in CareNotes® are the copyrighted property of A D A M , Inc  or [address] [place] [address] [facility]  The above information is an  only  It is not intended as medical advice for individual conditions or treatments  Talk to your doctor, nurse or pharmacist before following any medical regimen to see if it is safe and effective for you  Foot Care for People with Diabetes   AMBULATORY CARE:   What you need to know about foot care:   · Foot care helps protect your feet and prevent foot ulcers or sores  Long-term high blood sugar levels can damage the blood vessels and nerves in your legs and feet  This damage makes it hard to feel pressure, pain, temperature, and touch  You may not be able to feel a cut or sore, or shoes that are too tight  Foot care is needed to prevent serious problems, such as an infection or amputation  · Diabetes may cause your toes to become crooked or curved under  These changes may affect the way you walk and can lead to increased pressure on your foot  The pressure can decrease blood flow to your feet  Lack of blood flow increases your risk for a foot ulcer  Do not ignore small problems, such as dry skin or small wounds  These can become life-threatening over time without proper care  Call your care team provider if:   · Your feet become numb, weak, or hard to move  · You have pus draining from a sore on your foot  · You have a wound on your foot that gets bigger, deeper, or does not heal      · You see blisters, cuts, scratches, calluses, or sores on your foot  · You have a fever, and your feet become red, warm, and swollen  · Your toenails become thick, curled, or yellow  · You find it hard to check your feet because your vision is poor  · You have questions or concerns about your condition or care  How to care for your feet:   · Check your feet each day  Look at your whole foot, including the bottom, and between and under your toes  Check for wounds, corns, and calluses  Use a mirror to see the bottom of your feet  The skin on your feet may be shiny, tight, or darker than normal  Your feet may also be cold and pale  Feel your feet by running your hands along the tops, bottoms, sides, and between your toes  Redness, swelling, and warmth are signs of blood flow problems that can lead to a foot ulcer  Do not try to remove corns or calluses yourself  · Wash your feet each day with soap and warm water  Do not use hot water, because this can injure your foot  Dry your feet gently with a towel after you wash them  Dry between and under your toes  · Apply lotion or a moisturizer on your dry feet  Ask your care team provider what lotions are best to use   Do not put lotion or moisturizer between your toes  Moisture between your toes could lead to skin breakdown  · Cut your toenails correctly  File or cut your toenails straight across  Use a soft brush to clean around your toenails  If your toenails are very thick, you may need to have a care team provider or specialist cut them  · Protect your feet  Do not walk barefoot or wear your shoes without socks  Check your shoes for rocks or other objects that can hurt your feet  Wear cotton socks to help keep your feet dry  Wear socks without toe seams, or wear them with the seams inside out  Change your socks each day  Do not wear socks that are dirty or damp  · Wear shoes that fit well  Wear shoes that do not rub against any area of your feet  Your shoes should be ½ to ¾ inch (1 to 2 centimeters) longer than your feet  Your shoes should also have extra space around the widest part of your feet  Walking or athletic shoes with laces or straps that adjust are best  Ask your care team provider for help to choose shoes that fit you best  Ask him or her if you need to wear an insert, orthotic, or bandage on your feet  · Go to your follow-up visits  Your care team provider will do a foot exam at least once a year  You may need a foot exam more often if you have nerve damage, foot deformities, or ulcers  He will check for nerve damage and how well you can feel your feet  He will check your shoes to see if they fit well  · Do not smoke  Smoking can damage your blood vessels and put you at increased risk for foot ulcers  Ask your care team provider for information if you currently smoke and need help to quit  E-cigarettes or smokeless tobacco still contain nicotine  Talk to your care team provider before you use these products  Follow up with your diabetes care team provider or foot specialist as directed: You will need to have your feet checked at least once a year   You may need a foot exam more often if you have nerve damage, foot deformities, or ulcers  Write down your questions so you remember to ask them during your visits  © Copyright [employer] 2022 Information is for End User's use only and may not be sold, redistributed or otherwise used for commercial purposes  All illustrations and images included in CareNotes® are the copyrighted property of [employer]  or Raghav Mathias  The above information is an  only  It is not intended as medical advice for individual conditions or treatments  Talk to your doctor, nurse or pharmacist before following any medical regimen to see if it is safe and effective for you

## 2022-05-04 NOTE — LETTER
Diabetic Eye Exam Form    Date Requested: 22  Patient: Patirck Huntley  Patient : 1965   Referring Provider: Imelda Seymour PA-C    DIABETIC Eye Exam Date _______________________________    Type of Exam MUST be documented for Diabetic Eye Exams  Please CHECK ONE  Retinal Exam       Dilated Retinal Exam       OCT       Optomap-Iris Exam      Fundus Photography     Left Eye - Please check Retinopathy AND Type or No Retinopathy      Exam did show retinopathy    Exam did not show retinopathy         Mild     Proliferative           Moderate    Severe            None         Right Eye - Please check Retinopathy AND Type or No Retinopathy     Exam did show retinopathy    Exam did not show retinopathy         Mild     Proliferative        Moderate    Severe        None       Comments __________________________________________________________    Practice Providing Exam ______________________________________________    Exam Performed By (print name) _______________________________________      Provider Signature ___________________________________________________    These reports are needed for  compliance  Please fax this completed form and a copy of the Diabetic Eye Exam report to our office located at [place] [address] as soon as possible via [phone] attention Oneta Epley: Phone [phone]  We thank you for your assistance in treating our mutual patient

## 2022-05-05 ENCOUNTER — TELEPHONE (OUTPATIENT)
Dept: GASTROENTEROLOGY | Facility: CLINIC | Age: 57
End: 2022-05-05

## 2022-05-05 NOTE — TELEPHONE ENCOUNTER
Called [facility] at [phone]  Patients insurance is active and effective as of 5/1/22  [facility] plan  Patient is not responsible for copays or deductibles [place]  Referrals for specialists are not required  Ref # [id number]

## 2022-05-09 ENCOUNTER — TELEPHONE (OUTPATIENT)
Dept: PULMONOLOGY | Facility: CLINIC | Age: 57
End: 2022-05-09

## 2022-05-09 DIAGNOSIS — J44.1 COPD WITH ACUTE EXACERBATION (HCC): Primary | ICD-10-CM

## 2022-05-09 RX ORDER — AZITHROMYCIN 250 MG/1
TABLET, FILM COATED ORAL
Qty: 6 TABLET | Refills: 0 | Status: SHIPPED | OUTPATIENT
Start: 2022-05-09 | End: 2022-05-13

## 2022-05-09 RX ORDER — PREDNISONE 10 MG/1
40 TABLET ORAL DAILY
Qty: 20 TABLET | Refills: 0 | Status: SHIPPED | OUTPATIENT
Start: 2022-05-09 | End: 2022-08-08

## 2022-05-09 NOTE — TELEPHONE ENCOUNTER
Patient left  stating he would like to get a message to [place]  He states his symptoms are coming back and he is going back to the way he was  Please advise   [phone]

## 2022-05-09 NOTE — TELEPHONE ENCOUNTER
Spoke with patient and he states that he feels the same way he did two months ago  Reports increased dyspnea on exertion, increasing dry cough, wheeze  Will treat with prednisone and Johnny  Explained him that should he feel worse despite these efforts he should go to the emergency department  He verbalized understanding and agrees to the plan

## 2022-05-09 NOTE — TELEPHONE ENCOUNTER
Pierrekasandra Balbuenamiguel would like a return call regarding     What is the reason for the call/chief complaint? COUGH/SOB    What additional symptoms are present or absent? SOB, yes   Are they on O2? Yes, describe: 5L Pulse O2 [id number] When [id number]   chest pain/tightness, yes  wheezing, yes  Cough, yes  mucous production,no  What color is it N/A  fevers/chills, no  weight gain, no  Swelling no  Pain no, does it hurt when breathing or all the time? N/A  LEFT HAND FELT NUMB YESTERDAY    When did they start/how long have they been going on? 5/6/2022    Constant or intermittent; if intermittent describe yes? What makes it better or worse? RESTING MAKES IT BETTER    Have you been exposed to anyone that is sick? No    Check current pulmonary medications INHALERS/NEBS  frequency of use DAILY    Have they had any other treatment or testing for this problem elsewhere? N/A    Recent steroids? No    Recent Antibiotics? No     Last office visit? 4/20/22    [facility] pharmacy?  [facility] [address]    30 or 90 day supply

## 2022-05-10 NOTE — TELEPHONE ENCOUNTER
Upon review of the In Basket request and the patient's chart, initial outreach has been made via fax, please see Contacts section for details       Thank you  Satya Duff

## 2022-05-16 ENCOUNTER — TELEPHONE (OUTPATIENT)
Dept: PULMONOLOGY | Facility: CLINIC | Age: 57
End: 2022-05-16

## 2022-05-16 DIAGNOSIS — G47.33 OSA (OBSTRUCTIVE SLEEP APNEA): Primary | ICD-10-CM

## 2022-05-16 NOTE — TELEPHONE ENCOUNTER
Patient calling saying he has a cpap machine and needs supplies  He would like if Nicholas can place a referral for sleep medicine to see Dr Estela Russo as that is who his wife sees  Please advise

## 2022-05-16 NOTE — TELEPHONE ENCOUNTER
Patient aware and will call his office to set up an appt  He would like for his bipap supplies to be sent to [address] by our office and once he is established with Dr Dany Gupta, he will have that office take care of his supplies  Please advise

## 2022-05-17 NOTE — TELEPHONE ENCOUNTER
As a follow-up, a second attempt has been made for outreach via fax, please see Contacts section for details      Thank you  Dae Forbes

## 2022-05-19 ENCOUNTER — TELEPHONE (OUTPATIENT)
Dept: PULMONOLOGY | Facility: CLINIC | Age: 57
End: 2022-05-19

## 2022-05-19 DIAGNOSIS — I50.33 ACUTE ON CHRONIC DIASTOLIC HEART FAILURE (HCC): ICD-10-CM

## 2022-05-19 RX ORDER — FUROSEMIDE 20 MG/1
20 TABLET ORAL DAILY
Qty: 30 TABLET | Refills: 0 | Status: SHIPPED | OUTPATIENT
Start: 2022-05-19 | End: 2022-06-21 | Stop reason: SDUPTHER

## 2022-05-19 NOTE — TELEPHONE ENCOUNTER
Patient called  He's finishing up his Lasix  Is he supposed to continue taking?   If so he needs a new script sent to Pharmacy

## 2022-05-20 NOTE — TELEPHONE ENCOUNTER
Upon review of the In Basket request we were able to locate, review, and update the patient chart as requested for Diabetic Eye Exam     Any additional questions or concerns should be emailed to the Practice Liaisons via [email or web address] email, please do not reply via In Basket      Thank you  Brock Perez

## 2022-05-23 ENCOUNTER — APPOINTMENT (OUTPATIENT)
Dept: LAB | Facility: MEDICAL CENTER | Age: 57
End: 2022-05-23
Payer: COMMERCIAL

## 2022-05-23 DIAGNOSIS — I50.33 ACUTE ON CHRONIC DIASTOLIC HEART FAILURE (HCC): ICD-10-CM

## 2022-05-23 LAB
ANION GAP SERPL CALCULATED.3IONS-SCNC: 9 MMOL/L (ref 4–13)
BUN SERPL-MCNC: 36 MG/DL (ref 5–25)
CALCIUM SERPL-MCNC: 9 MG/DL (ref 8.3–10.1)
CHLORIDE SERPL-SCNC: 101 MMOL/L (ref 100–108)
CO2 SERPL-SCNC: 24 MMOL/L (ref 21–32)
CREAT SERPL-MCNC: 1.2 MG/DL (ref 0.6–1.3)
GFR SERPL CREATININE-BSD FRML MDRD: 66 ML/MIN/1.73SQ M
GLUCOSE P FAST SERPL-MCNC: 356 MG/DL (ref 65–99)
POTASSIUM SERPL-SCNC: 4.6 MMOL/L (ref 3.5–5.3)
SODIUM SERPL-SCNC: 134 MMOL/L (ref 136–145)

## 2022-05-23 PROCEDURE — 36415 COLL VENOUS BLD VENIPUNCTURE: CPT

## 2022-05-23 PROCEDURE — 80048 BASIC METABOLIC PNL TOTAL CA: CPT

## 2022-05-25 ENCOUNTER — HOSPITAL ENCOUNTER (OUTPATIENT)
Dept: NON INVASIVE DIAGNOSTICS | Facility: CLINIC | Age: 57
Discharge: HOME | End: 2022-05-25
Payer: COMMERCIAL

## 2022-05-25 DIAGNOSIS — I50.33 ACUTE ON CHRONIC DIASTOLIC HEART FAILURE (HCC): ICD-10-CM

## 2022-05-25 LAB
AORTIC ROOT: 3.7 CM
AORTIC VALVE MEAN VELOCITY: 23.5 M/S
AV MEAN GRADIENT: 27 MMHG
AV PEAK GRADIENT: 50 MMHG
DOP CALC AO PEAK VEL: 3.52 M/S
DOP CALC AO VTI: 83.71 CM
DOP CALC LVOT DIAMETER: 2 CM
E WAVE DECELERATION TIME: 234 MS
E/A RATIO: 1.24
FRACTIONAL SHORTENING: 27 (ref 28–44)
INTERVENTRICULAR SEPTUM IN DIASTOLE (PARASTERNAL SHORT AXIS VIEW): 1 CM
INTERVENTRICULAR SEPTUM: 1 CM (ref 0.6–1.1)
LEFT ATRIUM SIZE: 5.3 CM
LEFT ATRIUM VOLUME INDEX (MOD BIPLANE): 20.1
LEFT INTERNAL DIMENSION IN SYSTOLE: 4.6 CM (ref 2.1–4)
LEFT VENTRICULAR INTERNAL DIMENSION IN DIASTOLE: 6.3 CM (ref 3.5–6)
LEFT VENTRICULAR POSTERIOR WALL IN END DIASTOLE: 0.9 CM
LEFT VENTRICULAR STROKE VOLUME: 98 ML
LVSV (TEICH): 98 ML
MV PEAK A VEL: 0.92 M/S
MV PEAK E VEL: 114 CM/S
MV STENOSIS PRESSURE HALF TIME: 68 MS
MV VALVE AREA P 1/2 METHOD: 3.2
SL CV LV EF: 70
SL CV PED ECHO LEFT VENTRICLE DIASTOLIC VOLUME (MOD BIPLANE) 2D: 198 ML
SL CV PED ECHO LEFT VENTRICLE SYSTOLIC VOLUME (MOD BIPLANE) 2D: 99 ML

## 2022-05-25 PROCEDURE — 93321 DOPPLER ECHO F-UP/LMTD STD: CPT | Performed by: INTERNAL MEDICINE

## 2022-05-25 PROCEDURE — 93325 DOPPLER ECHO COLOR FLOW MAPG: CPT | Performed by: INTERNAL MEDICINE

## 2022-05-25 PROCEDURE — 93308 TTE F-UP OR LMTD: CPT | Performed by: INTERNAL MEDICINE

## 2022-05-25 PROCEDURE — C8929 TTE W OR WO FOL WCON,DOPPLER: HCPCS

## 2022-05-27 ENCOUNTER — TELEPHONE (OUTPATIENT)
Dept: PULMONOLOGY | Facility: CLINIC | Age: 57
End: 2022-05-27

## 2022-05-27 NOTE — TELEPHONE ENCOUNTER
[facility] is calling, they are in need of chart notes for this patient stating that he is benefiting from his Cpap  Otherwise he is unable to receive his supply order   Please advise

## 2022-06-07 DIAGNOSIS — Z79.4 TYPE 2 DIABETES MELLITUS WITH DIABETIC NEUROPATHY, WITH LONG-TERM CURRENT USE OF INSULIN (HCC): ICD-10-CM

## 2022-06-07 DIAGNOSIS — E11.40 TYPE 2 DIABETES MELLITUS WITH DIABETIC NEUROPATHY, WITH LONG-TERM CURRENT USE OF INSULIN (HCC): ICD-10-CM

## 2022-06-08 RX ORDER — GABAPENTIN 300 MG/1
300 CAPSULE ORAL 3 TIMES DAILY
Qty: 90 CAPSULE | Refills: 0 | Status: SHIPPED | OUTPATIENT
Start: 2022-06-08 | End: 2022-07-05 | Stop reason: SDUPTHER

## 2022-06-10 ENCOUNTER — TELEPHONE (OUTPATIENT)
Dept: PULMONOLOGY | Facility: CLINIC | Age: 57
End: 2022-06-10

## 2022-06-10 ENCOUNTER — OFFICE VISIT (OUTPATIENT)
Dept: PULMONOLOGY | Facility: CLINIC | Age: 57
End: 2022-06-10
Payer: COMMERCIAL

## 2022-06-10 VITALS
OXYGEN SATURATION: 92 % | HEIGHT: 69 IN | BODY MASS INDEX: 46.65 KG/M2 | HEART RATE: 69 BPM | DIASTOLIC BLOOD PRESSURE: 59 MMHG | SYSTOLIC BLOOD PRESSURE: 111 MMHG | TEMPERATURE: 98 F | WEIGHT: 315 LBS

## 2022-06-10 DIAGNOSIS — E66.01 MORBID OBESITY WITH BMI OF 50.0-59.9, ADULT (HCC): ICD-10-CM

## 2022-06-10 DIAGNOSIS — J44.9 COPD, SEVERE (HCC): ICD-10-CM

## 2022-06-10 DIAGNOSIS — G47.33 OSA (OBSTRUCTIVE SLEEP APNEA): Primary | ICD-10-CM

## 2022-06-10 DIAGNOSIS — I35.0 NONRHEUMATIC AORTIC VALVE STENOSIS: ICD-10-CM

## 2022-06-10 DIAGNOSIS — J96.11 CHRONIC HYPOXEMIC RESPIRATORY FAILURE (HCC): ICD-10-CM

## 2022-06-10 PROCEDURE — 99214 OFFICE O/P EST MOD 30 MIN: CPT | Performed by: PHYSICIAN ASSISTANT

## 2022-06-10 PROCEDURE — 3074F SYST BP LT 130 MM HG: CPT | Performed by: PHYSICIAN ASSISTANT

## 2022-06-10 PROCEDURE — 3078F DIAST BP <80 MM HG: CPT | Performed by: PHYSICIAN ASSISTANT

## 2022-06-10 PROCEDURE — 3008F BODY MASS INDEX DOCD: CPT | Performed by: PHYSICIAN ASSISTANT

## 2022-06-10 PROCEDURE — 1036F TOBACCO NON-USER: CPT | Performed by: PHYSICIAN ASSISTANT

## 2022-06-10 RX ORDER — SEMAGLUTIDE 1.34 MG/ML
INJECTION, SOLUTION SUBCUTANEOUS
COMMUNITY
Start: 2022-06-06

## 2022-06-10 NOTE — ASSESSMENT & PLAN NOTE
No evidence of acute exacerbation at this time  Recommend that he continue Anoro/Flovent for triple therapy  Continue DuoNeb q 6 hours p r n  And albuterol HFA 2 puffs Q 4-6 hours p r n   No no need for systemic steroids at this time

## 2022-06-10 NOTE — ASSESSMENT & PLAN NOTE
Moderate aortic stenosis noted on most recent echocardiogram   I suspect this is likely contributing to his dyspnea exertion as well  Follow-up with Cardiology

## 2022-06-10 NOTE — ASSESSMENT & PLAN NOTE
Oxygen saturations are adequate on room air during office visit today  He does have supplemental oxygen at home for which he uses on an as-needed basis and q h s  Bled into his auto BiPAP

## 2022-06-10 NOTE — PROGRESS NOTES
Pulmonary Follow Up Note   Neli Hurst 61 y o  male MRN: [id number]  6/10/2022      Assessment:    SCOTT (obstructive sleep apnea)  Continue auto CPAP during all hours of sleep  Patient reports compliance on PAP therapy  He also endorses improvement in symptoms since initiating PAP therapy  COPD, severe ([facility] )  No evidence of acute exacerbation at this time  Recommend that he continue Anoro/Flovent for triple therapy  Continue DuoNeb q 6 hours p r n  And albuterol HFA 2 puffs Q 4-6 hours p r n   No no need for systemic steroids at this time  Chronic hypoxemic respiratory failure (HCC)  Oxygen saturations are adequate on room air during office visit today  He does have supplemental oxygen at home for which he uses on an as-needed basis and q h s  Bled into his auto BiPAP  Morbid obesity with BMI of 50 0-59 9, adult ([facility] )  Weight loss encouraged  Recommended lifestyle modifications including decreased caloric intake, albuterol options, increase activity as tolerated  Aortic stenosis  Moderate aortic stenosis noted on most recent echocardiogram   I suspect this is likely contributing to his dyspnea exertion as well  Follow-up with Cardiology  Plan:    Diagnoses and all orders for this visit:    SCOTT (obstructive sleep apnea)    COPD, severe (HCC)    Chronic hypoxemic respiratory failure (HCC)    Morbid obesity with BMI of 50 0-59 9, adult (HCC)    Nonrheumatic aortic valve stenosis    Other orders  -     Ozempic, 0 25 or 0 5 MG/DOSE, 2 MG/1 5ML SOPN; INJECT 0 5 MG UNDER THE SKIN ONCE WEEKLY  Return in about 3 months (around 9/10/2022)  History of Present Illness   HPI:  Neli Hurst is a 61 y o  male who presents to the office today for follow up  Patient complains of dyspnea exertion substernal chest pressure pleurisy  Also endorses dry cough without sputum production  Denies hemoptysis  Denies wheezing  Does endorse lower extremity edema and orthopnea    Patient reports compliance on BiPAP during all hours of sleep  Reports compliance on inhalers  Review of Systems   Constitutional: Negative for appetite change and fever  HENT: Negative for ear pain, postnasal drip, rhinorrhea, sneezing, sore throat and trouble swallowing  Respiratory: Positive for cough, shortness of breath and wheezing  Cardiovascular: Positive for chest pain  Musculoskeletal: Positive for myalgias  Neurological: Positive for headaches  All other systems reviewed and are negative        Historical Information   Past Medical History:   Diagnosis Date    Aortic stenosis     COPD (chronic obstructive pulmonary disease) ([facility] )     Diabetes ([facility] )     Diabetes mellitus ([facility] )     Hyperlipidemia     Hypertension 7/2/2014    Sleep apnea, obstructive      Past Surgical History:   Procedure Laterality Date    APPENDECTOMY      EYE SURGERY       Family History   Problem Relation Age of Onset    No Known Problems Mother        Social History     Tobacco Use   Smoking Status Former Smoker    Years: 10 00    Types: Cigarettes   Smokeless Tobacco Never Used         Meds/Allergies     Current Outpatient Medications:     albuterol (Ventolin HFA) 90 mcg/act inhaler, Inhale 2 puffs every 6 (six) hours as needed for wheezing, Disp: 18 g, Rfl: 3    amLODIPine (NORVASC) 10 mg tablet, Take 1 tablet (10 mg total) by mouth daily, Disp: 90 tablet, Rfl: 3    atorvastatin (LIPITOR) 40 mg tablet, Take 1 tablet (40 mg total) by mouth daily, Disp: 90 tablet, Rfl: 3    Blood Glucose Monitoring Suppl (CONTOUR NEXT MONITOR) w/Device KIT, Test blood sugar once daily or as needed for fluctuating blood sugars, Disp: 1 kit, Rfl: 0    carvedilol (COREG) 12 5 mg tablet, Take 1 tablet (12 5 mg total) by mouth 2 (two) times a day with meals, Disp: 180 tablet, Rfl: 3    cetirizine (ZyrTEC) 10 mg tablet, Take 10 mg by mouth, Disp: , Rfl:     cyclobenzaprine (FLEXERIL) 10 mg tablet, Take 10 mg by mouth, Disp: , Rfl:    Dapagliflozin Propanediol (Farxiga) 10 MG TABS, Take 10 mg by mouth daily, Disp: , Rfl:     fluticasone (Flovent HFA) 220 mcg/act inhaler, Inhale 2 puffs 2 (two) times a day Rinse mouth after use, Disp: 12 g, Rfl: 3    furosemide (LASIX) 20 mg tablet, Take 1 tablet (20 mg total) by mouth in the morning , Disp: 30 tablet, Rfl: 0    gabapentin (NEURONTIN) 300 mg capsule, Take 1 capsule (300 mg total) by mouth 3 (three) times a day, Disp: 90 capsule, Rfl: 0    glimepiride (AMARYL) 4 mg tablet, Take 4 mg by mouth 2 (two) times a day, Disp: , Rfl:     glucose blood (CONTOUR NEXT TEST) test strip, Test blood sugar once daily or as needed for fluctuating blood sugars, Disp: 100 each, Rfl: 3    hydrochlorothiazide (HYDRODIURIL) 25 mg tablet, Take 1 tablet (25 mg total) by mouth daily, Disp: 90 tablet, Rfl: 3    Insulin Pen Needle (BD Pen Needle Dee Dee U/F) 32G X 4 MM MISC, Use 4 a day, Disp: 200 each, Rfl: 5    ipratropium-albuterol (DUO-NEB) 0 5-2 5 mg/3 mL nebulizer solution, Take 1 vial (3 mL total) by nebulization every 6 (six) hours as needed for wheezing or shortness of breath, Disp: 360 mL, Rfl: 2    Lancets MISC, Test blood sugar once daily or as needed for fluctuating blood sugars, Disp: 100 each, Rfl: 0    Levemir FlexTouch 100 units/mL injection pen, Inject 60 Units under the skin daily , Disp: , Rfl:     losartan (COZAAR) 100 MG tablet, Take 1 tablet (100 mg total) by mouth daily, Disp: 90 tablet, Rfl: 3    meloxicam (MOBIC) 15 mg tablet, Take 15 mg by mouth, Disp: , Rfl:     metFORMIN (GLUCOPHAGE) 1000 MG tablet, TAKE 1 TABLET BY MOUTH TWICE DAILY WITH MEALS, Disp: 180 tablet, Rfl: 0    Multiple Vitamins-Minerals (MENS MULTIVITAMIN PO), Take by mouth, Disp: , Rfl:     sertraline (ZOLOFT) 50 mg tablet, Take 1 tablet (50 mg total) by mouth daily, Disp: 30 tablet, Rfl: 2    spironolactone (ALDACTONE) 50 mg tablet, Take 1 tablet (50 mg total) by mouth daily, Disp: 90 tablet, Rfl: 3   umeclidinium-vilanterol (Anoro Ellipta) 62 5-25 MCG/INH inhaler, Inhale 1 puff daily, Disp: 60 blister, Rfl: 3    Ozempic, 0 25 or 0 5 MG/DOSE, 2 MG/1 5ML SOPN, INJECT 0 5 MG UNDER THE SKIN ONCE WEEKLY , Disp: , Rfl:     Ozempic, 1 MG/DOSE, 4 MG/3ML SOPN injection pen, INJECT 1MG SUBCUTANEOUSLY ONCE A WEEK, Disp: , Rfl:     predniSONE 10 mg tablet, Take 4 tablets (40 mg total) by mouth daily (Patient not taking: Reported on 6/10/2022), Disp: 20 tablet, Rfl: 0    sertraline (ZOLOFT) 50 mg tablet, Take 1 tablet by mouth once daily, Disp: 30 tablet, Rfl: 0  Allergies   Allergen Reactions    Theophylline Other (See Comments)     Severe headaches  headaches  Severe headaches         Vitals: Blood pressure 111/59, pulse 69, temperature 98 °F (36 7 °C), temperature source Tympanic, height 5' 9" (1 753 m), weight (!) 160 kg (352 lb 3 2 oz), SpO2 92 %  Body mass index is 52 01 kg/m²  Oxygen Therapy  SpO2: 92 %  Oxygen Therapy: None (Room air)    Physical Exam  Physical Exam  Vitals reviewed  Constitutional:       Appearance: Normal appearance  He is well-developed  He is obese  HENT:      Head: Normocephalic and atraumatic  Nose: Nose normal       Mouth/Throat:      Mouth: Mucous membranes are moist       Pharynx: Oropharynx is clear  Eyes:      Extraocular Movements: Extraocular movements intact  Cardiovascular:      Rate and Rhythm: Normal rate and regular rhythm  Pulses: Normal pulses  Heart sounds: Normal heart sounds  No murmur heard  Pulmonary:      Effort: Pulmonary effort is normal  No respiratory distress  Breath sounds: No wheezing, rhonchi or rales  Abdominal:      Palpations: Abdomen is soft  Tenderness: There is no abdominal tenderness  Musculoskeletal:         General: No swelling or tenderness  Normal range of motion  Cervical back: Normal range of motion and neck supple  Skin:     General: Skin is warm and dry     Neurological:      General: No focal deficit present  Mental Status: He is alert  Mental status is at baseline  Psychiatric:         Mood and Affect: Mood normal          Behavior: Behavior normal          Labs: I have personally reviewed pertinent lab results  , ABG: No results found for: PHART, LYG3NGI, PO2ART, XGO0MHZ, R6HXECGU, BEART, SOURCE, BNP: No results found for: BNP, CBC: No results found for: WBC, HGB, HCT, MCV, PLT, ADJUSTEDWBC, MCH, MCHC, RDW, MPV, NRBC, CMP: No results found for: SODIUM, K, CL, CO2, ANIONGAP, BUN, CREATININE, GLUCOSE, CALCIUM, AST, ALT, ALKPHOS, PROT, BILITOT, EGFR, PT/INR: No results found for: PT, INR, Troponin: No results found for: TROPONINI  Lab Results   Component Value Date    WBC 9 41 04/21/2022    HGB 14 5 04/21/2022    HCT 44 5 04/21/2022    MCV 88 04/21/2022     04/21/2022     Lab Results   Component Value Date    CALCIUM 9 0 05/23/2022    K 4 6 05/23/2022    CO2 24 05/23/2022     05/23/2022    BUN 36 (H) 05/23/2022    CREATININE 1 20 05/23/2022     No results found for: IGE  Lab Results   Component Value Date    ALT 41 01/26/2022    AST 17 01/26/2022    ALKPHOS 83 01/26/2022       Imaging and other studies: I have personally reviewed pertinent reports  and I have personally reviewed pertinent films in PACS     CXR PA and lateral 04/20/2022  Lungs are clear without acute infiltrate, pneumothorax, pleural effusion    Pulmonary function testing:  Performed 12/28/2021  FEV1/FVC ratio 63%   FEV1 45% predicted  FVC 55% predicted  no response to bronchodilators  TLC 84 % predicted   % predicted  DLCO corrected for hemoglobin 91 % predicted  Severe obstructive airflow defect  No significant response to bronchodilators  TLC normal with increased RV indicative air trapping  Normal diffusion capacity  Other Studies: I have personally reviewed pertinent reports  Echo 5/25/22  EF 60%    Diastolic function normal   Right ventricular size and systolic function normal   Trace aortic regurgitation  Moderate aortic stenosis      Answers for HPI/ROS submitted by the patient on 6/5/2022  Do you have difficulty breathing?: Yes  Do you experience frequent throat clearing?: Yes  Do you have a hoarse voice?: Yes  Chronicity: chronic  When did you first notice your symptoms?: more than 1 month ago  How often do your symptoms occur?: daily  Since you first noticed this problem, how has it changed?: unchanged  Do you have shortness of breath that occurs with effort or exertion?: Yes  Do you have ear congestion?: No  Do you have heartburn?: No  Do you have fatigue?: Yes  Do you have nasal congestion?: No  Do you have shortness of breath when lying flat?: No  Do you have shortness of breath when you wake up?: No  Do you have sweats?: No  Have you experienced weight loss?: No  Which of the following makes your symptoms worse?: change in weather, climbing stairs, exercise, exposure to fumes, exposure to smoke, minimal activity, strenuous activity  Which of the following makes your symptoms better?: change in position, cold air, oral steroids, steroid inhaler

## 2022-06-10 NOTE — ASSESSMENT & PLAN NOTE
Continue auto CPAP during all hours of sleep  Patient reports compliance on PAP therapy  He also endorses improvement in symptoms since initiating PAP therapy

## 2022-06-10 NOTE — ASSESSMENT & PLAN NOTE
Weight loss encouraged  Recommended lifestyle modifications including decreased caloric intake, albuterol options, increase activity as tolerated

## 2022-06-21 ENCOUNTER — TELEPHONE (OUTPATIENT)
Dept: PULMONOLOGY | Facility: CLINIC | Age: 57
End: 2022-06-21

## 2022-06-21 DIAGNOSIS — I50.33 ACUTE ON CHRONIC DIASTOLIC HEART FAILURE (HCC): ICD-10-CM

## 2022-06-21 RX ORDER — FUROSEMIDE 20 MG/1
20 TABLET ORAL DAILY
Qty: 30 TABLET | Refills: 2 | Status: SHIPPED | OUTPATIENT
Start: 2022-06-21

## 2022-06-21 NOTE — TELEPHONE ENCOUNTER
Patient called  He wants to know if he should stay on the Lasix? If so, he needs a script sent to [facility]

## 2022-06-21 NOTE — TELEPHONE ENCOUNTER
Discussed with Mabel Solis PA-C  Recommended continuing Lasix and discontinue HCTZ  Reviewed with with patient and he verbalized understanding and agreement  Refill of lasix sent and discontinued HCTZ

## 2022-06-28 ENCOUNTER — TELEPHONE (OUTPATIENT)
Dept: ADMINISTRATIVE | Facility: OTHER | Age: 57
End: 2022-06-28

## 2022-06-28 NOTE — TELEPHONE ENCOUNTER
06/28/22 2:16 PM    The patient was called and reminded about the open Cologuard order  Instructed to call the PCP office if there are any questions about completing the CRC screen  Pateint states will complete kit  Thank you    Ilsa Musa MA  [facility]

## 2022-07-05 DIAGNOSIS — Z79.4 TYPE 2 DIABETES MELLITUS WITH DIABETIC NEUROPATHY, WITH LONG-TERM CURRENT USE OF INSULIN (HCC): ICD-10-CM

## 2022-07-05 DIAGNOSIS — E11.40 TYPE 2 DIABETES MELLITUS WITH DIABETIC NEUROPATHY, WITH LONG-TERM CURRENT USE OF INSULIN (HCC): ICD-10-CM

## 2022-07-06 RX ORDER — GABAPENTIN 300 MG/1
300 CAPSULE ORAL 3 TIMES DAILY
Qty: 90 CAPSULE | Refills: 0 | Status: SHIPPED | OUTPATIENT
Start: 2022-07-06 | End: 2022-08-07 | Stop reason: SDUPTHER

## 2022-07-13 ENCOUNTER — OFFICE VISIT (OUTPATIENT)
Dept: CARDIOLOGY CLINIC | Facility: CLINIC | Age: 57
End: 2022-07-13
Payer: COMMERCIAL

## 2022-07-13 VITALS
SYSTOLIC BLOOD PRESSURE: 130 MMHG | DIASTOLIC BLOOD PRESSURE: 70 MMHG | WEIGHT: 315 LBS | HEIGHT: 69 IN | BODY MASS INDEX: 46.65 KG/M2 | HEART RATE: 80 BPM

## 2022-07-13 DIAGNOSIS — I35.0 NONRHEUMATIC AORTIC VALVE STENOSIS: Primary | ICD-10-CM

## 2022-07-13 PROCEDURE — 99214 OFFICE O/P EST MOD 30 MIN: CPT | Performed by: INTERNAL MEDICINE

## 2022-07-13 NOTE — PATIENT INSTRUCTIONS
Aortic Stenosis   WHAT YOU NEED TO KNOW:   What is aortic stenosis? Aortic stenosis is a condition that makes your aortic valve become narrow and stiff  The narrow, stiff valve causes your heart to work harder to pump blood into the aorta  What causes aortic stenosis? Calcium buildup  can happen as you age  Calcium builds up on the aortic valve walls  The buildup stiffens and thickens the valve  Congenital heart defect  means you were born with a heart problem  Over time, the problem may lead to narrowing or blocking of your aortic valve  Rheumatic fever  can develop after you have a strep throat infection  Rheumatic fever causes your heart valves to thicken with scars  What are the signs and symptoms of aortic stenosis? Chest pain or tightness    Fast, jumpy, or fluttery heartbeat    Shortness of breath during activity or when you lie down    Severe tiredness    Dizziness or feeling faint    How is aortic stenosis diagnosed? Your healthcare provider will ask about your signs and symptoms and listen to your heart  He or she will ask if you have had strep throat or rheumatic fever  Tell your provider if you have a family history of heart disease  You may also need any of the following tests:  Blood tests  may be used to check for an infection or other cause of your aortic stenosis  An echocardiogram  is a type of ultrasound  It is used to show problems with your aortic valve and how blood flows through your heart  It may also show how well your heart is pumping  You may need a transthoracic or transesophageal echocardiogram  Ask your healthcare provider about these types of echocardiogram     A chest x-ray  shows the size of your heart  It may also show if fluid is around your heart and lungs  An EKG  test records the electrical activity of your heart  It is used to check for abnormal heart rhythm caused by aortic stenosis      A stress test  helps healthcare providers see how well your aortic valve works under stress  Healthcare providers may place stress on your aortic valve with exercise or medicine  Cardiac catheterization  is a procedure to check how well your heart is pumping blood  It is also used to measure pressure in different parts of your heart  A catheter (long thin tube) is inserted into your arm, neck, or groin and moved into your heart  An x-ray may be used to guide the tube to the right place  Contrast liquid may be used to help your heart show up better in the pictures  Tell the healthcare provider if you have ever had an allergic reaction to contrast liquid  How is aortic stenosis treated? Medicines  may help decrease your cholesterol levels and your blood pressure  You may also be given medicine to help lower swelling  Valve replacement  is the main treatment for aortic stenosis  It is a surgery to remove part or all of your aortic valve  A new valve is then secured in place  The new valve may be from a donor (another person or animal), or may be an artificial valve  Balloon valvuloplasty  helps widen your aortic valve and allow blood to flow through easier  It is also called a closed valvotomy  How can I manage aortic stenosis? Limit activities  Your healthcare provider may have you limit strenuous activity  Strenuous activity will make your heart work too hard  Ask your healthcare provider what activities are safe for you to do  Take your medicines as directed  You may need medicines to lower your blood pressure  You may also need medicine to help your heart's rhythm  Your healthcare provider will prescribe the medicine that is right for you  Eat heart-healthy foods  Heart-healthy foods include salmon, tuna, walnuts, whole-grain breads, low-fat dairy products, beans, and oils such as olive or canola oil  A dietitian or your provider can give you more information on meal plans such as the DASH (Dietary Approaches to Stop Hypertension) eating plan  The DASH plan is low in sodium, processed sugar, unhealthy fats, and total fat  It is high in potassium, calcium, and fiber  These can be found in vegetables, fruit, and whole-grain foods  Limit sodium (salt) as directed  Too much sodium can affect your fluid balance  Check labels to find low-sodium or no-salt-added foods  You can also make small changes to get less salt  For example, if you add salt while you cook, do not add more salt at the table  Ask your healthcare provider or dietitian for more ways to cut down on salt  Limit or do not drink alcohol  Ask your healthcare provider if it is okay for you to drink alcohol  Alcohol can increase your risk for high blood pressure and coronary artery disease  Your provider can tell you how many drinks are okay to have within 24 hours or within 1 week  A drink of alcohol is 12 ounces of beer, 5 ounces of wine, or 1½ ounces of liquor  Maintain a healthy weight  Being overweight can increase your risk for high blood pressure and coronary artery disease  These conditions can make your symptoms worse  Ask your healthcare provider what a healthy weight is for you  Ask him or her to help you create a weight loss plan if you are overweight  Talk to your healthcare provider about pregnancy  If you are a woman and want to get pregnant, talk to your healthcare provider  You and your baby may need to be monitored by specialists during your pregnancy  Ask about vaccines you may need  Certain diseases are dangerous for a person who has aortic stenosis  Vaccines help prevent infections that can cause some diseases  Get a flu vaccine as soon as recommended each year, usually in September or October  Your healthcare provider can tell you if you also need other vaccines, and when to get them  How can I prevent aortic stenosis? Manage other health conditions  High blood pressure and high cholesterol levels increase your risk for aortic stenosis   Ask your healthcare provider for more information on managing these conditions  Get treatment for strep throat  If strep throat is not treated, it can cause rheumatic fever  Take care of your teeth and gums  Gingivitis, a gum disease, increases your risk for aortic stenosis  See your dental provider regularly to treat problems early  Call your local emergency number (911 in the [address] [facility] [facility]) or have someone call if:   You have any of the following signs of a heart attack:      Squeezing, pressure, or pain in your chest    You may  also have any of the following:     Discomfort or pain in your back, neck, jaw, stomach, or arm    Shortness of breath    Nausea or vomiting    Lightheadedness or a sudden cold sweat    You have any of the following signs of a stroke:      Numbness or drooping on one side of your face     Weakness in an arm or leg    Confusion or difficulty speaking    Dizziness, a severe headache, or vision loss    When should I seek immediate care? You have chest pain when you move around  It goes away when you are still  You have increasing shortness of breath  You faint  When should I call my doctor? The veins in your neck look swollen or are bulging  You have increased swelling in your legs or ankles  Your heart beats faster than usual     You feel your heart flutter often  You have questions or concerns about your condition or care  CARE AGREEMENT:   You have the right to help plan your care  Learn about your health condition and how it may be treated  Discuss treatment options with your healthcare providers to decide what care you want to receive  You always have the right to refuse treatment  The above information is an  only  It is not intended as medical advice for individual conditions or treatments  Talk to your doctor, nurse or pharmacist before following any medical regimen to see if it is safe and effective for you    © Copyright [employer] 2022 Information is for End User's use only and may not be sold, redistributed or otherwise used for commercial purposes   All illustrations and images included in CareNotes® are the copyrighted property of A D A M , Inc  or [facility][note 3] Denise Mathias

## 2022-07-13 NOTE — PROGRESS NOTES
Subjective:        Patient ID: Hilton Page is a 61 y o  male  Chief Complaint:  Denny Murguia is here for aortic stenosis follow-up  May echo revealed mild progression of aortic stenosis to moderate degree, mean gradient approximately 27 mm Hg preserved EF  Four BNP levels within normal limits  Recent chest x-ray negative for CHF  Audible upper airway wheezing heard from across the exam room  Pulmonary notes reviewed  He just took a nebulizer before coming here  No unusual edema orthopnea  His dyspnea is unchanged and chronic  Denies any chest pains tightness or pressure no arm neck limb or jaw pain  No palpitations presyncope or syncope  One day not long ago while preparing supper his wife tells him that he suddenly was stirring a bowl of soup that was not there and called her by a different name  No reported weakness, facial asymmetry nor loss of conscious  This went away as quickly as it came and has not recurred  Checked his sugar any said it was okay  The following portions of the patient's history were reviewed and updated as appropriate: allergies, current medications, past family history, past medical history, past social history, past surgical history and problem list   Review of Systems   Constitutional: Negative for chills, diaphoresis, malaise/fatigue and weight gain  HENT: Negative for nosebleeds and stridor  Eyes: Negative for double vision, vision loss in left eye, vision loss in right eye and visual disturbance  Cardiovascular: Negative for chest pain, claudication, cyanosis, dyspnea on exertion, irregular heartbeat, leg swelling, near-syncope, orthopnea, palpitations, paroxysmal nocturnal dyspnea and syncope  Respiratory: Negative for cough, shortness of breath, snoring and wheezing  Endocrine: Negative for polydipsia, polyphagia and polyuria  Hematologic/Lymphatic: Negative for bleeding problem  Does not bruise/bleed easily     Skin: Negative for flushing and rash  Musculoskeletal: Negative for falls and myalgias  Gastrointestinal: Negative for abdominal pain, heartburn, hematemesis, hematochezia, melena and nausea  Genitourinary: Negative for hematuria  Neurological: Positive for loss of balance, numbness and paresthesias  Negative for brief paralysis, dizziness, focal weakness, headaches, light-headedness and vertigo  Psychiatric/Behavioral: Positive for hallucinations (See HPI for description)  Negative for altered mental status and substance abuse  Allergic/Immunologic: Negative for hives  Objective:      /70   Pulse 80   Ht 5' 9" (1 753 m)   Wt (!) 165 kg (363 lb)   BMI 53 61 kg/m²   Physical Exam  Constitutional:       General: He is not in acute distress  Appearance: He is well-developed  He is not diaphoretic  HENT:      Head: Normocephalic and atraumatic  Eyes:      General: No scleral icterus  Pupils: Pupils are equal, round, and reactive to light  Neck:      Thyroid: No thyromegaly  Vascular: No carotid bruit or JVD  Cardiovascular:      Rate and Rhythm: Normal rate and regular rhythm  Heart sounds: Murmur (Grade 2/6 MARSHALL right upper sternal border diminished S2 ) heard  No friction rub  No gallop  Pulmonary:      Effort: Pulmonary effort is normal  No respiratory distress  Breath sounds: Normal breath sounds  No stridor  No wheezing or rales  Abdominal:      General: Bowel sounds are normal  There is no distension  Palpations: Abdomen is soft  There is no mass  Tenderness: There is no abdominal tenderness  Musculoskeletal:         General: No deformity  Normal range of motion  Cervical back: Normal range of motion and neck supple  Right lower leg: No edema  Left lower leg: No edema  Skin:     General: Skin is warm and dry  Coloration: Skin is not pale  Findings: No erythema     Neurological:      Mental Status: He is alert and oriented to person, place, and time  Coordination: Coordination normal    Psychiatric:         Mood and Affect: Mood normal          Behavior: Behavior normal          Thought Content:  Thought content normal          Judgment: Judgment normal          Lab Review:   Hospital Outpatient Visit on 05/25/2022   Component Date Value    LV EF 05/25/2022 70     LVIDd 05/25/2022 6 30     LVIDS 05/25/2022 4 60     IVSd 05/25/2022 1 00     LVPWd 05/25/2022 0 90     FS 05/25/2022 27     LA Volume Index (BP) 05/25/2022 20 1     E/A ratio 05/25/2022 1 24     E wave deceleration time 05/25/2022 234     MV Peak E Jorge A 05/25/2022 114     MV Peak A Jorge A 05/25/2022 0 92     LA size 05/25/2022 5 3     LVOT diameter 05/25/2022 2 0     Ao peak jorge a retrograde 05/25/2022 3 52     Ao VTI 05/25/2022 83 71     AV mean gradient 05/25/2022 27     AV peak gradient 05/25/2022 50     MV stenosis pressure 1/2* 05/25/2022 68     MV valve area p 1/2 meth* 05/25/2022 3 20     Ao root 05/25/2022 3 70     Aortic valve mean veloci* 05/25/2022 23 50     Left ventricular stroke * 05/25/2022 98 00     IVS 05/25/2022 1     LEFT VENTRICLE SYSTOLIC * 40/65/8044 99     LV DIASTOLIC VOLUME (MOD* 48/21/3490 198     LVSV, 2D 05/25/2022 98    Appointment on 05/23/2022   Component Date Value    Sodium 05/23/2022 134 (A)    Potassium 05/23/2022 4 6     Chloride 05/23/2022 101     CO2 05/23/2022 24     ANION GAP 05/23/2022 9     BUN 05/23/2022 36 (A)    Creatinine 05/23/2022 1 20     Glucose, Fasting 05/23/2022 356 (A)    Calcium 05/23/2022 9 0     eGFR 05/23/2022 66    Telephone on 05/04/2022   Component Date Value    Severity 03/30/2022 Normal     Right Eye Diabetic Retin* 03/30/2022 None     Left Eye Diabetic Retino* 03/30/2022 None    Office Visit on 05/04/2022   Component Date Value    Hemoglobin A1C 05/04/2022 10 2 (A)   Appointment on 04/21/2022   Component Date Value    Sodium 04/21/2022 130 (A)    Potassium 04/21/2022 4 8     Chloride 04/21/2022 100     CO2 04/21/2022 25     ANION GAP 04/21/2022 5     BUN 04/21/2022 44 (A)    Creatinine 04/21/2022 1 22     Glucose, Fasting 04/21/2022 287 (A)    Calcium 04/21/2022 9 4     eGFR 04/21/2022 65     WBC 04/21/2022 9 41     RBC 04/21/2022 5 08     Hemoglobin 04/21/2022 14 5     Hematocrit 04/21/2022 44 5     MCV 04/21/2022 88     MCH 04/21/2022 28 5     MCHC 04/21/2022 32 6     RDW 04/21/2022 14 5     MPV 04/21/2022 10 5     Platelets 83/18/0735 284     nRBC 04/21/2022 0     Neutrophils Relative 04/21/2022 75     Immat GRANS % 04/21/2022 1     Lymphocytes Relative 04/21/2022 13 (A)    Monocytes Relative 04/21/2022 8     Eosinophils Relative 04/21/2022 2     Basophils Relative 04/21/2022 1     Neutrophils Absolute 04/21/2022 7 13     Immature Grans Absolute 04/21/2022 0 07     Lymphocytes Absolute 04/21/2022 1 18     Monocytes Absolute 04/21/2022 0 77     Eosinophils Absolute 04/21/2022 0 20     Basophils Absolute 04/21/2022 0 06     NT-proBNP 04/21/2022 20    Admission on 03/18/2022, Discharged on 03/18/2022   Component Date Value    WBC 03/18/2022 9 90     RBC 03/18/2022 5 18     Hemoglobin 03/18/2022 14 7     Hematocrit 03/18/2022 42 8     MCV 03/18/2022 83     MCH 03/18/2022 28 4     MCHC 03/18/2022 34 3     RDW 03/18/2022 14 9     MPV 03/18/2022 9 8     Platelets 87/68/9938 277     nRBC 03/18/2022 0     Neutrophils Relative 03/18/2022 81 (A)    Immat GRANS % 03/18/2022 1     Lymphocytes Relative 03/18/2022 9 (A)    Monocytes Relative 03/18/2022 7     Eosinophils Relative 03/18/2022 2     Basophils Relative 03/18/2022 0     Neutrophils Absolute 03/18/2022 8 08 (A)    Immature Grans Absolute 03/18/2022 0 08     Lymphocytes Absolute 03/18/2022 0 84     Monocytes Absolute 03/18/2022 0 66     Eosinophils Absolute 03/18/2022 0 20     Basophils Absolute 03/18/2022 0 04     Sodium 03/18/2022 132 (A)    Potassium 03/18/2022 4 7     Chloride 03/18/2022 100     CO2 03/18/2022 25     ANION GAP 03/18/2022 7     BUN 03/18/2022 28 (A)    Creatinine 03/18/2022 1 54 (A)    Glucose 03/18/2022 316 (A)    Calcium 03/18/2022 8 9     eGFR 03/18/2022 49     pH, Anurag 03/18/2022 7 379     pCO2, Anurag 03/18/2022 40 2 (A)    pO2, Anurag 03/18/2022 53 5 (A)    HCO3, Anurag 03/18/2022 23 2 (A)    Base Excess, Anurag 03/18/2022 -1 8     O2 Content, Anurag 03/18/2022 17 7     O2 HGB, VENOUS 03/18/2022 84 3 (A)    NT-proBNP 03/18/2022 74     SARS-CoV-2 03/18/2022 Negative     INFLUENZA A PCR 03/18/2022 Negative     INFLUENZA B PCR 03/18/2022 Negative     RSV PCR 03/18/2022 Negative     Ventricular Rate 03/18/2022 78     Atrial Rate 03/18/2022 78     OR Interval 03/18/2022 160     QRSD Interval 03/18/2022 84     QT Interval 03/18/2022 392     QTC Interval 03/18/2022 446     P Axis 03/18/2022 77     QRS Axis 03/18/2022 18     T Wave Axis 03/18/2022 65    Appointment on 01/26/2022   Component Date Value    WBC 01/26/2022 10 35 (A)    RBC 01/26/2022 5 31     Hemoglobin 01/26/2022 14 6     Hematocrit 01/26/2022 44 8     MCV 01/26/2022 84     MCH 01/26/2022 27 5     MCHC 01/26/2022 32 6     RDW 01/26/2022 14 5     MPV 01/26/2022 9 9     Platelets 62/65/0077 290     nRBC 01/26/2022 0     Neutrophils Relative 01/26/2022 76 (A)    Immat GRANS % 01/26/2022 1     Lymphocytes Relative 01/26/2022 13 (A)    Monocytes Relative 01/26/2022 7     Eosinophils Relative 01/26/2022 2     Basophils Relative 01/26/2022 1     Neutrophils Absolute 01/26/2022 7 95 (A)    Immature Grans Absolute 01/26/2022 0 06     Lymphocytes Absolute 01/26/2022 1 30     Monocytes Absolute 01/26/2022 0 74     Eosinophils Absolute 01/26/2022 0 25     Basophils Absolute 01/26/2022 0 05     Sodium 01/26/2022 137     Potassium 01/26/2022 4 5     Chloride 01/26/2022 106     CO2 01/26/2022 25     ANION GAP 01/26/2022 6     BUN 01/26/2022 18     Creatinine 01/26/2022 0 94     Glucose, Fasting 01/26/2022 182 (A)    Calcium 01/26/2022 9 1     AST 01/26/2022 17     ALT 01/26/2022 41     Alkaline Phosphatase 01/26/2022 83     Total Protein 01/26/2022 7 2     Albumin 01/26/2022 3 6     Total Bilirubin 01/26/2022 0 75     eGFR 01/26/2022 89     Cholesterol 01/26/2022 94     Triglycerides 01/26/2022 292 (A)    HDL, Direct 01/26/2022 22 (A)    LDL Calculated 01/26/2022 14     Non-HDL-Chol (CHOL-HDL) 01/26/2022 72     TSH 3RD GENERATON 01/26/2022 1 870     Free T4 01/26/2022 1 15     T3, Free 01/26/2022 2 61     Vit D, 25-Hydroxy 01/26/2022 34 7     PSA 01/26/2022 0 1     Hemoglobin A1C 01/26/2022 7 2 (A)    EAG 01/26/2022 160    Transcribe Orders on 01/26/2022   Component Date Value    Creatinine, Ur 01/26/2022 112 0     Microalbum  ,U,Random 01/26/2022 15 0     Microalb Creat Ratio 01/26/2022 13      No results found  Assessment:       1  Nonrheumatic aortic valve stenosis  Echo complete w/ contrast if indicated        Plan:       I feel Melvin's aortic stenosis is moderate, wall contributing not likely a significant factor in his dyspnea, this is likely more so to severe deconditioning, morbid obesity, and advanced lung disease  Audible wheezing heard at rest here and he is clearly not in heart failure  He is not having any angina  Clinically examines in sinus rhythm  He is normotensive and euvolemic  Salt restriction, weight loss, and aggressive management of his multiple comorbidities is strongly advised and left in your capable hands  He does not require SBE antibiotic prophylaxis  I am going to repeat an echocardiogram in May and follow up with him shortly thereafter  Let me know if you need me to see him sooner

## 2022-07-19 ENCOUNTER — TELEPHONE (OUTPATIENT)
Dept: PULMONOLOGY | Facility: CLINIC | Age: 57
End: 2022-07-19

## 2022-07-19 DIAGNOSIS — J44.1 COPD EXACERBATION (HCC): Primary | ICD-10-CM

## 2022-07-19 LAB — HBA1C MFR BLD HPLC: 9.4 %

## 2022-07-19 RX ORDER — PREDNISONE 10 MG/1
TABLET ORAL
Qty: 30 TABLET | Refills: 0 | Status: SHIPPED | OUTPATIENT
Start: 2022-07-19 | End: 2022-08-08

## 2022-07-19 NOTE — TELEPHONE ENCOUNTER
Pt called  JOEL, his O2 levels are dropping in the 80's, 85-89, he recovers quickly with the Oxygen  He feels he may have to go to ER  Called patient, he has a cough but his lungs are clear  He had an appointment with Endo this morning and she said his lungs were clear  He just feels SOB especially on exertion  When he walked into endo office his O2 was 84 but after he sat for a little it went up to 90  He doesn't have much symptoms just the SOB

## 2022-07-19 NOTE — TELEPHONE ENCOUNTER
Spoke with patient  Patient states that he has increased shortness of breath especially with exertion  Patient has cough that is nonproductive but increased frequency  No wheezing  Patient reports compliance with his inhalers and nebulizers any does state that the nebulizer helps  As soon as he rests his SpO2 rebounded to 90 quickly within 1 minute  Will treat with prednisone taper but stressed that if hypoxia persists he should go to the emergency department for further evaluation  He verbalized understanding and agrees to the plan

## 2022-07-21 ENCOUNTER — TELEPHONE (OUTPATIENT)
Dept: NEPHROLOGY | Facility: CLINIC | Age: 57
End: 2022-07-21

## 2022-07-21 DIAGNOSIS — I10 ESSENTIAL HYPERTENSION: Primary | ICD-10-CM

## 2022-07-21 PROCEDURE — 3066F NEPHROPATHY DOC TX: CPT | Performed by: INTERNAL MEDICINE

## 2022-08-03 ENCOUNTER — APPOINTMENT (OUTPATIENT)
Dept: LAB | Facility: MEDICAL CENTER | Age: 57
End: 2022-08-03
Payer: COMMERCIAL

## 2022-08-03 LAB
BACTERIA UR QL AUTO: ABNORMAL /HPF
BILIRUB UR QL STRIP: NEGATIVE
CLARITY UR: CLEAR
COLOR UR: ABNORMAL
CREAT UR-MCNC: 33.9 MG/DL
CREAT UR-MCNC: 33.9 MG/DL
GLUCOSE UR STRIP-MCNC: ABNORMAL MG/DL
HGB UR QL STRIP.AUTO: NEGATIVE
KETONES UR STRIP-MCNC: NEGATIVE MG/DL
LEUKOCYTE ESTERASE UR QL STRIP: ABNORMAL
MICROALBUMIN UR-MCNC: <5 MG/L (ref 0–20)
MICROALBUMIN/CREAT 24H UR: <15 MG/G CREATININE (ref 0–30)
NITRITE UR QL STRIP: NEGATIVE
NON-SQ EPI CELLS URNS QL MICRO: ABNORMAL /HPF
PH UR STRIP.AUTO: 6 [PH]
PROT UR STRIP-MCNC: NEGATIVE MG/DL
PROT UR-MCNC: <6 MG/DL
PROT/CREAT UR: <0.18 MG/G{CREAT} (ref 0–0.1)
RBC #/AREA URNS AUTO: ABNORMAL /HPF
SP GR UR STRIP.AUTO: 1.01 (ref 1–1.03)
UROBILINOGEN UR STRIP-ACNC: <2 MG/DL
WBC #/AREA URNS AUTO: ABNORMAL /HPF

## 2022-08-03 PROCEDURE — 82043 UR ALBUMIN QUANTITATIVE: CPT

## 2022-08-03 PROCEDURE — 84156 ASSAY OF PROTEIN URINE: CPT

## 2022-08-03 PROCEDURE — 81001 URINALYSIS AUTO W/SCOPE: CPT

## 2022-08-03 PROCEDURE — 82570 ASSAY OF URINE CREATININE: CPT

## 2022-08-07 DIAGNOSIS — E11.40 TYPE 2 DIABETES MELLITUS WITH DIABETIC NEUROPATHY, WITH LONG-TERM CURRENT USE OF INSULIN (HCC): ICD-10-CM

## 2022-08-07 DIAGNOSIS — Z79.4 TYPE 2 DIABETES MELLITUS WITH DIABETIC NEUROPATHY, WITH LONG-TERM CURRENT USE OF INSULIN (HCC): ICD-10-CM

## 2022-08-08 ENCOUNTER — TELEPHONE (OUTPATIENT)
Dept: PULMONOLOGY | Facility: CLINIC | Age: 57
End: 2022-08-08

## 2022-08-08 DIAGNOSIS — J44.9 COPD, SEVERE (HCC): Primary | ICD-10-CM

## 2022-08-08 RX ORDER — PREDNISONE 10 MG/1
40 TABLET ORAL DAILY
Qty: 20 TABLET | Refills: 0 | Status: SHIPPED | OUTPATIENT
Start: 2022-08-08

## 2022-08-08 RX ORDER — GABAPENTIN 300 MG/1
300 CAPSULE ORAL 3 TIMES DAILY
Qty: 90 CAPSULE | Refills: 0 | Status: SHIPPED | OUTPATIENT
Start: 2022-08-08 | End: 2022-08-10 | Stop reason: SDUPTHER

## 2022-08-08 NOTE — TELEPHONE ENCOUNTER
Luis Holcomb would like a return call regarding     What is the reason for the call/chief complaint? SOB    What additional symptoms are present or absent? SOB, yes   Are they on O2? Yes, describe: 4L Pulse O2 resting N/A When walkingN/A   chest pain/tightness, no  wheezing, yes  Cough, yes  mucous production,no  What color is it N/A  fevers/chills, no  weight gain, no  Swelling FEET  Pain yes, FEET does it hurt when breathing or all the time? NO    When did they start/how long have they been going on? APPROX 1 WEEK AGO    Constant or intermittent; if intermittent describe yes? What makes it better or worse? NEBULIZER AND SITTING    Have you been exposed to anyone that is sick? No    Have you been tested for COVID recently? no    Check current pulmonary medications INHALERS/NEBS   frequency of use DAILY    Have they had any other treatment or testing for this problem elsewhere? Recent steroids? Yes, describe: FINISHED PREDNISONE TAPER LAST WEEK    Recent Antibiotics? No     Last office visit? 06/10/2022    [facility]? [facility] [place] - [address] or 90 day supply    PT WOULD LIKE TO SPEAK TO Riaz Lucas

## 2022-08-08 NOTE — TELEPHONE ENCOUNTER
Called and discussed with patient  He feels that his breathing has worsened stopping hydrochlorothiazide and is thinking that he may need to increase his Lasix  He does however endorse and increased wheeze and occasional cough since finishing prednisone  He reports that his nebulizers help and while he is on prednisone his symptoms are almost completely resolved  I recommended he call Dr Suleman Zapien office regarding his diuretics  Will send prednisone burst into his pharmacy now  Lamont Neighbours, please put him on my schedule on Friday for 1:00 p m  For sick visit  Thank you

## 2022-08-09 ENCOUNTER — TELEPHONE (OUTPATIENT)
Dept: CARDIOLOGY CLINIC | Facility: CLINIC | Age: 57
End: 2022-08-09

## 2022-08-09 NOTE — TELEPHONE ENCOUNTER
Patient called  His HCTZ was stopped recently and he almost immediately felt swelling in his feet and ankles and is having a tough time breathing  He is still on furosemide 20 mg daily  He was told by pulmonary to consult with cardiology  Please advise

## 2022-08-10 ENCOUNTER — OFFICE VISIT (OUTPATIENT)
Dept: FAMILY MEDICINE CLINIC | Facility: CLINIC | Age: 57
End: 2022-08-10
Payer: COMMERCIAL

## 2022-08-10 VITALS
OXYGEN SATURATION: 90 % | SYSTOLIC BLOOD PRESSURE: 118 MMHG | HEIGHT: 69 IN | WEIGHT: 315 LBS | DIASTOLIC BLOOD PRESSURE: 66 MMHG | HEART RATE: 65 BPM | BODY MASS INDEX: 46.65 KG/M2 | TEMPERATURE: 97.6 F

## 2022-08-10 DIAGNOSIS — F32.A ANXIETY AND DEPRESSION: ICD-10-CM

## 2022-08-10 DIAGNOSIS — E11.40 TYPE 2 DIABETES MELLITUS WITH DIABETIC NEUROPATHY, WITH LONG-TERM CURRENT USE OF INSULIN (HCC): ICD-10-CM

## 2022-08-10 DIAGNOSIS — F41.9 ANXIETY AND DEPRESSION: ICD-10-CM

## 2022-08-10 DIAGNOSIS — Z79.4 TYPE 2 DIABETES MELLITUS WITH DIABETIC NEUROPATHY, WITH LONG-TERM CURRENT USE OF INSULIN (HCC): ICD-10-CM

## 2022-08-10 PROCEDURE — 99214 OFFICE O/P EST MOD 30 MIN: CPT | Performed by: PHYSICIAN ASSISTANT

## 2022-08-10 RX ORDER — GABAPENTIN 400 MG/1
400 CAPSULE ORAL 3 TIMES DAILY
Qty: 90 CAPSULE | Refills: 2 | Status: SHIPPED | OUTPATIENT
Start: 2022-08-10 | End: 2022-10-28 | Stop reason: SDUPTHER

## 2022-08-10 NOTE — TELEPHONE ENCOUNTER
Spoke with patient  He is scheduled to see his pcp today and requested an appointment with Dr Diogenes Gallo to discuss edema as well  Scheduled for 8/15/22 with Dr Diogenes Gallo

## 2022-08-10 NOTE — PROGRESS NOTES
Assessment/Plan:    Problem List Items Addressed This Visit        Endocrine    Type 2 diabetes mellitus with diabetic neuropathy, with long-term current use of insulin (HCC)    Relevant Medications    gabapentin (NEURONTIN) 400 mg capsule    Other Relevant Orders    Ambulatory Referral to Podiatry      Other Visit Diagnoses     Anxiety and depression        Relevant Medications    sertraline (ZOLOFT) 50 mg tablet           Diagnoses and all orders for this visit:    Type 2 diabetes mellitus with diabetic neuropathy, with long-term current use of insulin (HCC)  -     gabapentin (NEURONTIN) 400 mg capsule; Take 1 capsule (400 mg total) by mouth 3 (three) times a day  -     Ambulatory Referral to Podiatry; Future    Anxiety and depression  -     sertraline (ZOLOFT) 50 mg tablet; Take 1 tablet (50 mg total) by mouth daily            Subjective:      Patient ID: Lucie Hess is a 61 y o  male  Anna Bones is here today in follow up  Going through a divorce, has custody of his daughter and is living in his mother's home  Requesting referral to podiatry as well as increase in gabapentin has a lot of pain/numbness in feet  The following portions of the patient's history were reviewed and updated as appropriate:   He has a past medical history of Aortic stenosis, COPD (chronic obstructive pulmonary disease) ([address] ), Diabetes ([address] ), Diabetes mellitus ([address] ), Hyperlipidemia, Hypertension (7/2/2014), and Sleep apnea, obstructive  ,  does not have any pertinent problems on file  ,   has a past surgical history that includes Appendectomy and Eye surgery  ,  family history includes No Known Problems in his mother  ,   reports that he has quit smoking  His smoking use included cigarettes  He quit after 10 00 years of use  He has never used smokeless tobacco  He reports that he does not drink alcohol and does not use drugs  ,  is allergic to theophylline     Current Outpatient Medications   Medication Sig Dispense Refill    albuterol (Ventolin HFA) 90 mcg/act inhaler Inhale 2 puffs every 6 (six) hours as needed for wheezing 18 g 3    amLODIPine (NORVASC) 10 mg tablet Take 1 tablet (10 mg total) by mouth daily 90 tablet 3    atorvastatin (LIPITOR) 40 mg tablet Take 1 tablet (40 mg total) by mouth daily 90 tablet 3    Blood Glucose Monitoring Suppl (CONTOUR NEXT MONITOR) w/Device KIT Test blood sugar once daily or as needed for fluctuating blood sugars 1 kit 0    carvedilol (COREG) 12 5 mg tablet Take 1 tablet (12 5 mg total) by mouth 2 (two) times a day with meals 180 tablet 3    cetirizine (ZyrTEC) 10 mg tablet Take 10 mg by mouth      cyclobenzaprine (FLEXERIL) 10 mg tablet Take 10 mg by mouth      Dapagliflozin Propanediol (Farxiga) 10 MG TABS Take 10 mg by mouth daily      fluticasone (Flovent HFA) 220 mcg/act inhaler Inhale 2 puffs 2 (two) times a day Rinse mouth after use 12 g 3    furosemide (LASIX) 20 mg tablet Take 1 tablet (20 mg total) by mouth daily 30 tablet 2    gabapentin (NEURONTIN) 400 mg capsule Take 1 capsule (400 mg total) by mouth 3 (three) times a day 90 capsule 2    glimepiride (AMARYL) 4 mg tablet Take 4 mg by mouth 2 (two) times a day      glucose blood (CONTOUR NEXT TEST) test strip Test blood sugar once daily or as needed for fluctuating blood sugars 100 each 3    Insulin Pen Needle (BD Pen Needle Dee Dee U/F) 32G X 4 MM MISC Use 4 a day 200 each 5    ipratropium-albuterol (DUO-NEB) 0 5-2 5 mg/3 mL nebulizer solution Take 1 vial (3 mL total) by nebulization every 6 (six) hours as needed for wheezing or shortness of breath 360 mL 2    Lancets MISC Test blood sugar once daily or as needed for fluctuating blood sugars 100 each 0    Levemir FlexTouch 100 units/mL injection pen Inject 60 Units under the skin daily       losartan (COZAAR) 100 MG tablet Take 1 tablet (100 mg total) by mouth daily 90 tablet 3    meloxicam (MOBIC) 15 mg tablet Take 15 mg by mouth      metFORMIN (GLUCOPHAGE) 1000 MG tablet TAKE 1 TABLET BY MOUTH TWICE DAILY WITH MEALS 180 tablet 0    Multiple Vitamins-Minerals (MENS MULTIVITAMIN PO) Take by mouth      Ozempic, 0 25 or 0 5 MG/DOSE, 2 MG/1 5ML SOPN INJECT 0 5 MG UNDER THE SKIN ONCE WEEKLY   predniSONE 10 mg tablet Take 4 tablets (40 mg total) by mouth daily 20 tablet 0    sertraline (ZOLOFT) 50 mg tablet Take 1 tablet (50 mg total) by mouth daily 30 tablet 2    spironolactone (ALDACTONE) 50 mg tablet Take 1 tablet (50 mg total) by mouth daily 90 tablet 3    umeclidinium-vilanterol (Anoro Ellipta) 62 5-25 MCG/INH inhaler Inhale 1 puff daily 60 blister 3    Ozempic, 1 MG/DOSE, 4 MG/3ML SOPN injection pen INJECT 1MG SUBCUTANEOUSLY ONCE A WEEK      sertraline (ZOLOFT) 50 mg tablet Take 1 tablet by mouth once daily 30 tablet 0     No current facility-administered medications for this visit  Review of Systems   Constitutional: Negative for activity change, appetite change, chills, diaphoresis, fatigue, fever and unexpected weight change  HENT: Negative for congestion, ear pain, postnasal drip, rhinorrhea, sinus pressure, sinus pain, sneezing, sore throat, tinnitus and voice change  Eyes: Negative for pain, redness and visual disturbance  Respiratory: Positive for cough, chest tightness and shortness of breath  Negative for wheezing  On prednisone, following with pulmonology  Cardiovascular: Negative for chest pain, palpitations and leg swelling  Gastrointestinal: Negative for abdominal pain, blood in stool, constipation, diarrhea, nausea and vomiting  Genitourinary: Negative for difficulty urinating, dysuria, frequency, hematuria and urgency  Musculoskeletal: Negative for arthralgias, back pain, gait problem, joint swelling, myalgias, neck pain and neck stiffness  Pain/numbness in bilateral feet   Skin: Negative for color change, pallor, rash and wound  Neurological: Negative for dizziness, tremors, weakness, light-headedness and headaches  Psychiatric/Behavioral: Negative for dysphoric mood, self-injury, sleep disturbance and suicidal ideas  The patient is not nervous/anxious  Objective:  Vitals:    08/10/22 1550   BP: 118/66   Pulse: 65   Temp: 97 6 °F (36 4 °C)   SpO2: 90%   Weight: (!) 163 kg (358 lb 12 8 oz)   Height: 5' 9" (1 753 m)     Body mass index is 52 99 kg/m²  Physical Exam  Vitals reviewed  Constitutional:       General: He is not in acute distress  Appearance: He is well-developed  He is obese  He is not diaphoretic  HENT:      Head: Normocephalic and atraumatic  Right Ear: Hearing, tympanic membrane, ear canal and external ear normal       Left Ear: Hearing, tympanic membrane, ear canal and external ear normal       Mouth/Throat:      Pharynx: Uvula midline  No oropharyngeal exudate  Eyes:      General: No scleral icterus  Right eye: No discharge  Left eye: No discharge  Conjunctiva/sclera: Conjunctivae normal    Neck:      Thyroid: No thyromegaly  Vascular: No carotid bruit  Cardiovascular:      Rate and Rhythm: Normal rate and regular rhythm  Heart sounds: Murmur heard  Pulmonary:      Effort: Pulmonary effort is normal  No respiratory distress  Breath sounds: Normal breath sounds  No wheezing  Abdominal:      General: Bowel sounds are normal  There is no distension  Palpations: Abdomen is soft  There is no mass  Tenderness: There is no abdominal tenderness  There is no guarding or rebound  Musculoskeletal:         General: No tenderness  Normal range of motion  Cervical back: Neck supple  Lymphadenopathy:      Cervical: No cervical adenopathy  Skin:     General: Skin is warm and dry  Findings: No erythema or rash  Neurological:      Mental Status: He is alert and oriented to person, place, and time  Psychiatric:         Behavior: Behavior normal          Thought Content:  Thought content normal          Judgment: Judgment normal

## 2022-08-11 ENCOUNTER — TELEPHONE (OUTPATIENT)
Dept: ADMINISTRATIVE | Facility: OTHER | Age: 57
End: 2022-08-11

## 2022-08-11 NOTE — TELEPHONE ENCOUNTER
----- Message from Nabil Atkins sent at 8/10/2022  3:58 PM EDT -----  Regarding: care gap request  08/10/22 3:58 PM    Hello, our patient attached above has had Hemoglobin A1c completed/performed  Please assist in updating the patient chart by making an External outreach to [address] [place] [address] located in [facility]  The date of service is THIS PAST MONTH A POCT WAS DONE IN HER OFFICE      Thank you,  Nabil Atkins  [facility] [address]

## 2022-08-11 NOTE — TELEPHONE ENCOUNTER
Upon review of the In Basket request and the patient's chart, initial outreach has been made via fax, please see Contacts section for details       Thank you  Shellie Pallas

## 2022-08-11 NOTE — LETTER
Procedure Request Form: Hemoglobin A1c      Date Requested: 22  Patient: Don Coppersmith  Patient : 1965   Referring Provider: Susannah Clarke, PA-C        Date of Procedure ______________________________       The above patient has informed us that they have completed their   most recent Hemoglobin A1c at your facility  Please complete   this form and attach all corresponding procedure reports/results  Comments __________________________________________________________  ____________________________________________________________________  ____________________________________________________________________  ____________________________________________________________________    Facility Completing Procedure _________________________________________    Form Completed By (print name) _______________________________________      Signature __________________________________________________________      These reports are needed for  compliance  Please fax this completed form and a copy of the procedure report to our office located at [place] [address] as soon as possible to [phone] kaykay Lorena Son: Phone [phone]    We thank you for your assistance in treating our mutual patient

## 2022-08-12 ENCOUNTER — HOSPITAL ENCOUNTER (OUTPATIENT)
Dept: CT IMAGING | Facility: HOSPITAL | Age: 57
Discharge: HOME | End: 2022-08-12
Payer: COMMERCIAL

## 2022-08-12 ENCOUNTER — TELEPHONE (OUTPATIENT)
Dept: PULMONOLOGY | Facility: CLINIC | Age: 57
End: 2022-08-12

## 2022-08-12 ENCOUNTER — OFFICE VISIT (OUTPATIENT)
Dept: PULMONOLOGY | Facility: CLINIC | Age: 57
End: 2022-08-12
Payer: COMMERCIAL

## 2022-08-12 VITALS
HEART RATE: 95 BPM | SYSTOLIC BLOOD PRESSURE: 108 MMHG | WEIGHT: 315 LBS | BODY MASS INDEX: 46.65 KG/M2 | TEMPERATURE: 99.9 F | HEIGHT: 69 IN | OXYGEN SATURATION: 89 % | DIASTOLIC BLOOD PRESSURE: 69 MMHG

## 2022-08-12 DIAGNOSIS — J96.11 CHRONIC HYPOXEMIC RESPIRATORY FAILURE (HCC): ICD-10-CM

## 2022-08-12 DIAGNOSIS — J44.9 COPD, SEVERE (HCC): ICD-10-CM

## 2022-08-12 DIAGNOSIS — R93.89 ABNORMAL CT OF THE CHEST: ICD-10-CM

## 2022-08-12 DIAGNOSIS — I35.0 NONRHEUMATIC AORTIC VALVE STENOSIS: ICD-10-CM

## 2022-08-12 DIAGNOSIS — G47.33 OSA (OBSTRUCTIVE SLEEP APNEA): ICD-10-CM

## 2022-08-12 DIAGNOSIS — R06.09 DOE (DYSPNEA ON EXERTION): ICD-10-CM

## 2022-08-12 DIAGNOSIS — R06.09 DOE (DYSPNEA ON EXERTION): Primary | ICD-10-CM

## 2022-08-12 PROCEDURE — 3074F SYST BP LT 130 MM HG: CPT | Performed by: PHYSICIAN ASSISTANT

## 2022-08-12 PROCEDURE — 71275 CT ANGIOGRAPHY CHEST: CPT

## 2022-08-12 PROCEDURE — G1004 CDSM NDSC: HCPCS

## 2022-08-12 PROCEDURE — 3078F DIAST BP <80 MM HG: CPT | Performed by: PHYSICIAN ASSISTANT

## 2022-08-12 PROCEDURE — 99214 OFFICE O/P EST MOD 30 MIN: CPT | Performed by: PHYSICIAN ASSISTANT

## 2022-08-12 RX ORDER — GUAIFENESIN 600 MG/1
600 TABLET, EXTENDED RELEASE ORAL EVERY 12 HOURS SCHEDULED
Qty: 60 TABLET | Refills: 0 | Status: SHIPPED | OUTPATIENT
Start: 2022-08-12

## 2022-08-12 RX ORDER — CEFUROXIME AXETIL 500 MG/1
500 TABLET ORAL EVERY 12 HOURS SCHEDULED
Qty: 14 TABLET | Refills: 0 | Status: SHIPPED | OUTPATIENT
Start: 2022-08-12 | End: 2022-08-19

## 2022-08-12 RX ADMIN — IOHEXOL 85 ML: 350 INJECTION, SOLUTION INTRAVENOUS at 14:10

## 2022-08-12 NOTE — ASSESSMENT & PLAN NOTE
Respiratory symptoms are not due to COPD exacerbation based on exam today  Ordered CTA chest PE study stat to rule out PE and or infectious process

## 2022-08-12 NOTE — ASSESSMENT & PLAN NOTE
No need for additional systemic steroids as he is not in acute exacerbation  Recommend continued use of Anoro/Flovent for triple therapy  Continue p r n  Use of DuoNeb and albuterol

## 2022-08-12 NOTE — PROGRESS NOTES
Pulmonary Follow Up Note   Marie Perkins 61 y o  male MRN: [id number]  8/12/2022      Assessment:    RUTHERFORD (dyspnea on exertion)  Respiratory symptoms are not due to COPD exacerbation based on exam today  Ordered CTA chest PE study stat to rule out PE and or infectious process  Abnormal CT of the chest  After appointment time was over and CTA was resulted I called the patient to review results  Findings are consistent with an infectious etiology for which I sent Ceftin 500 mg p o  B i d  x7 days to his pharmacy  Also recommended collecting sputum culture  Patient knows to call if he does not note significant improvement  COPD, severe ([address] )  No need for additional systemic steroids as he is not in acute exacerbation  Recommend continued use of Anoro/Flovent for triple therapy  Continue p r n  Use of DuoNeb and albuterol  Chronic hypoxemic respiratory failure (HCC)  Borderline hypoxia noted in the office today  Recommend using his oxygen when he gets home  He knows to maintain saturations greater than 88%  I recommended he obtain a new pulse oximeter he cannot find his own  Continue oxygen q h s  As well bled into his auto CPAP    SCOTT (obstructive sleep apnea)  Continue auto CPAP during all hours of sleep  Patient reports compliance on PAP therapy  Aortic stenosis  Moderate aortic stenosis noted on most recent echocardiogram   This likely contributing to his dyspnea  Follow-up with Cardiology  Plan:    Diagnoses and all orders for this visit:    RUTHERFORD (dyspnea on exertion)  -     Sputum culture and Gram stain; Future  -     guaiFENesin (MUCINEX) 600 mg 12 hr tablet; Take 1 tablet (600 mg total) by mouth every 12 (twelve) hours  -     CTA chest pe study; Future    Abnormal CT of the chest  -     cefuroxime (CEFTIN) 500 mg tablet;  Take 1 tablet (500 mg total) by mouth every 12 (twelve) hours for 7 days    COPD, severe (HCC)    Chronic hypoxemic respiratory failure (HCC)    SCOTT (obstructive sleep apnea)    Nonrheumatic aortic valve stenosis        No follow-ups on file  History of Present Illness   HPI:  Julian Tang is a 61 y o  male who presents the office today for sick visit  Patient follows with Pulmonary for severe COPD, SCOTT compliant on CPAP, morbid obesity, respiratory failure for which he uses 2 L nasal cannula q h s  And CPAP and p r n  Throughout the day  Patient called office 08/08/2022 reporting that he had significantly worsened shortness of breath  Initially was felt to be due to stopping his hydrochlorothiazide  He basis thought off of increased lower extremity edema especially pedal edema  Last month he was treated for COPD exacerbation with prednisone taper which he has completed few weeks ago  He had noticed slight increase in cough and wheeze since finishing prednisone taper  On 8/H we trialed prednisone burst and brought him in today  Today he reports that nebulizers and steroid burst had no effect on his breathing  He still feels very short of breath with minimal activities and even feel short of breath at rest today in the room  Patient also reports a dry hacking cough previously but this morning he coughed up thick green sputum  He does not hear any audible wheezing but feels tightness in his chest   He and his family rec no goals over the weekend where he noticed that he was not able to do the things that he normally does  Denies known sick contacts  He reports that he has appointment with Cardiology next week  He is not on blood thinners  Denies lower extremity pain but does endorse swelling  Review of Systems   Constitutional: Negative for appetite change and fever  HENT: Negative for ear pain, postnasal drip, rhinorrhea, sneezing, sore throat and trouble swallowing  Respiratory: Positive for cough, shortness of breath and wheezing  Cardiovascular: Negative for chest pain  Musculoskeletal: Negative for myalgias     Neurological: Negative for headaches  All other systems reviewed and are negative        Historical Information   Past Medical History:   Diagnosis Date    Aortic stenosis     COPD [facility]chronic obstructive pulmonary disease) ([facility] )     Diabetes ([facility] )     Diabetes mellitus ([facility] )     Hyperlipidemia     Hypertension 7/2/2014    Sleep apnea, obstructive      Past Surgical History:   Procedure Laterality Date    APPENDECTOMY      EYE SURGERY       Family History   Problem Relation Age of Onset    No Known Problems Mother        Social History     Tobacco Use   Smoking Status Former Smoker    Years: 10 00    Types: Cigarettes   Smokeless Tobacco Never Used         Meds/Allergies     Current Outpatient Medications:     cefuroxime (CEFTIN) 500 mg tablet, Take 1 tablet (500 mg total) by mouth every 12 (twelve) hours for 7 days, Disp: 14 tablet, Rfl: 0    guaiFENesin (MUCINEX) 600 mg 12 hr tablet, Take 1 tablet (600 mg total) by mouth every 12 (twelve) hours, Disp: 60 tablet, Rfl: 0    albuterol (Ventolin HFA) 90 mcg/act inhaler, Inhale 2 puffs every 6 (six) hours as needed for wheezing, Disp: 18 g, Rfl: 3    amLODIPine (NORVASC) 10 mg tablet, Take 1 tablet (10 mg total) by mouth daily, Disp: 90 tablet, Rfl: 3    atorvastatin (LIPITOR) 40 mg tablet, Take 1 tablet (40 mg total) by mouth daily, Disp: 90 tablet, Rfl: 3    Blood Glucose Monitoring Suppl (CONTOUR NEXT MONITOR) w/Device KIT, Test blood sugar once daily or as needed for fluctuating blood sugars, Disp: 1 kit, Rfl: 0    carvedilol (COREG) 12 5 mg tablet, Take 1 tablet (12 5 mg total) by mouth 2 (two) times a day with meals, Disp: 180 tablet, Rfl: 3    cetirizine (ZyrTEC) 10 mg tablet, Take 10 mg by mouth, Disp: , Rfl:     cyclobenzaprine (FLEXERIL) 10 mg tablet, Take 10 mg by mouth, Disp: , Rfl:     Dapagliflozin Propanediol (Farxiga) 10 MG TABS, Take 10 mg by mouth daily, Disp: , Rfl:     fluticasone (Flovent HFA) 220 mcg/act inhaler, Inhale 2 puffs 2 (two) times a day Rinse mouth after use, Disp: 12 g, Rfl: 3    furosemide (LASIX) 20 mg tablet, Take 1 tablet (20 mg total) by mouth daily, Disp: 30 tablet, Rfl: 2    gabapentin (NEURONTIN) 400 mg capsule, Take 1 capsule (400 mg total) by mouth 3 (three) times a day, Disp: 90 capsule, Rfl: 2    glimepiride (AMARYL) 4 mg tablet, Take 4 mg by mouth 2 (two) times a day, Disp: , Rfl:     glucose blood (CONTOUR NEXT TEST) test strip, Test blood sugar once daily or as needed for fluctuating blood sugars, Disp: 100 each, Rfl: 3    Insulin Pen Needle (BD Pen Needle Dee Dee U/F) 32G X 4 MM MISC, Use 4 a day, Disp: 200 each, Rfl: 5    ipratropium-albuterol (DUO-NEB) 0 5-2 5 mg/3 mL nebulizer solution, Take 1 vial (3 mL total) by nebulization every 6 (six) hours as needed for wheezing or shortness of breath, Disp: 360 mL, Rfl: 2    Lancets MISC, Test blood sugar once daily or as needed for fluctuating blood sugars, Disp: 100 each, Rfl: 0    Levemir FlexTouch 100 units/mL injection pen, Inject 60 Units under the skin daily , Disp: , Rfl:     losartan (COZAAR) 100 MG tablet, Take 1 tablet (100 mg total) by mouth daily, Disp: 90 tablet, Rfl: 3    meloxicam (MOBIC) 15 mg tablet, Take 15 mg by mouth, Disp: , Rfl:     metFORMIN (GLUCOPHAGE) 1000 MG tablet, TAKE 1 TABLET BY MOUTH TWICE DAILY WITH MEALS, Disp: 180 tablet, Rfl: 0    Multiple Vitamins-Minerals (MENS MULTIVITAMIN PO), Take by mouth, Disp: , Rfl:     Ozempic, 0 25 or 0 5 MG/DOSE, 2 MG/1 5ML SOPN, INJECT 0 5 MG UNDER THE SKIN ONCE WEEKLY , Disp: , Rfl:     Ozempic, 1 MG/DOSE, 4 MG/3ML SOPN injection pen, INJECT 1MG SUBCUTANEOUSLY ONCE A WEEK, Disp: , Rfl:     predniSONE 10 mg tablet, Take 4 tablets (40 mg total) by mouth daily, Disp: 20 tablet, Rfl: 0    sertraline (ZOLOFT) 50 mg tablet, Take 1 tablet by mouth once daily, Disp: 30 tablet, Rfl: 0    sertraline (ZOLOFT) 50 mg tablet, Take 1 tablet (50 mg total) by mouth daily, Disp: 30 tablet, Rfl: 2    spironolactone (ALDACTONE) 50 mg tablet, Take 1 tablet (50 mg total) by mouth daily, Disp: 90 tablet, Rfl: 3    umeclidinium-vilanterol (Anoro Ellipta) 62 5-25 MCG/INH inhaler, Inhale 1 puff daily, Disp: 60 blister, Rfl: 3  No current facility-administered medications for this visit  Allergies   Allergen Reactions    Theophylline Other (See Comments)     Severe headaches  headaches  Severe headaches         Vitals: Blood pressure 108/69, pulse 95, temperature 99 9 °F (37 7 °C), height 5' 9" (1 753 m), weight (!) 162 kg (358 lb), SpO2 (!) 89 %  Body mass index is 52 87 kg/m²  Oxygen Therapy  SpO2: (!) 89 %    Physical Exam  Physical Exam  Vitals reviewed  Constitutional:       Appearance: Normal appearance  He is well-developed  He is obese  HENT:      Head: Normocephalic and atraumatic  Nose: Nose normal       Mouth/Throat:      Mouth: Mucous membranes are moist       Pharynx: Oropharynx is clear  Eyes:      Extraocular Movements: Extraocular movements intact  Cardiovascular:      Rate and Rhythm: Normal rate and regular rhythm  Heart sounds: Murmur heard  Pulmonary:      Effort: Pulmonary effort is normal  No respiratory distress  Breath sounds: No wheezing, rhonchi or rales  Comments: Borderline hypoxic  Musculoskeletal:      Cervical back: Normal range of motion and neck supple  Skin:     General: Skin is warm and dry  Comments: Small area of hyperpigmentation posterior left calf   Neurological:      General: No focal deficit present  Mental Status: He is alert  Mental status is at baseline  Psychiatric:         Mood and Affect: Mood normal          Behavior: Behavior normal          Labs: I have personally reviewed pertinent lab results  , ABG: No results found for: PHART, VBG3ZGC, PO2ART, NLX5IEW, G7OTLXTQ, BEART, SOURCE, BNP: No results found for: BNP, CBC: No results found for: WBC, HGB, HCT, MCV, PLT, ADJUSTEDWBC, MCH, MCHC, RDW, MPV, NRBC, CMP: No results found for: SODIUM, K, CL, CO2, ANIONGAP, BUN, CREATININE, GLUCOSE, CALCIUM, AST, ALT, ALKPHOS, PROT, BILITOT, EGFR, PT/INR: No results found for: PT, INR, Troponin: No results found for: TROPONINI  Lab Results   Component Value Date    WBC 9 41 04/21/2022    HGB 14 5 04/21/2022    HCT 44 5 04/21/2022    MCV 88 04/21/2022     04/21/2022     Lab Results   Component Value Date    CALCIUM 9 0 05/23/2022    K 4 6 05/23/2022    CO2 24 05/23/2022     05/23/2022    BUN 36 (H) 05/23/2022    CREATININE 1 20 05/23/2022     No results found for: IGE  Lab Results   Component Value Date    ALT 41 01/26/2022    AST 17 01/26/2022    ALKPHOS 83 01/26/2022       Imaging and other studies: I have personally reviewed pertinent reports  and I have personally reviewed pertinent films in PACS     CTA chest PE study 08/12/2022  No large filling defect to suggest pulmonary embolism  Diffuse bronchial wall thickening, ground-glass opacities which appear to be distributed along airways and grouping and tree-in-bud nodularity in lingula and left lower lobe likely represents infectious/inflammatory process  5 mm left lower lobe nodule may also be associated with same process  Pulmonary function testing:  Performed 12/28/2021  FEV1/FVC ratio 63%   FEV1 45% predicted  FVC 55% predicted  no response to bronchodilators  TLC 84 % predicted   % predicted  DLCO corrected for hemoglobin 91 % predicted  Severe obstructive airflow defect  No significant response to bronchodilators  No hyperinflation  Borderline air trapping  Normal diffusion capacity  Other Studies: I have personally reviewed pertinent reports  Echocardiogram 05/25/2022  EF 70%  Right ventricular size and systolic function normal   Moderate aortic stenosis  Trace aortic insufficiency  Answers for HPI/ROS submitted by the patient on 8/12/2022  Do you have difficulty breathing?: Yes  Do you experience frequent throat clearing?: Yes  Do you have a hoarse voice?: Yes  Chronicity: new  When did you first notice your symptoms?: 1 to 4 weeks ago  How often do your symptoms occur?: constantly  Since you first noticed this problem, how has it changed?: unchanged  Do you have shortness of breath that occurs with effort or exertion?: Yes  Do you have ear congestion?: No  Do you have heartburn?: No  Do you have fatigue?: Yes  Do you have nasal congestion?: Yes  Do you have shortness of breath when lying flat?: No  Do you have shortness of breath when you wake up?: Yes  Do you have sweats?: No  Have you experienced weight loss?: No  Which of the following makes your symptoms worse?: any activity, climbing stairs, exposure to fumes, exposure to smoke, minimal activity, strenuous activity  Which of the following makes your symptoms better?: cold air, oral steroids, steroid inhaler

## 2022-08-12 NOTE — TELEPHONE ENCOUNTER
Patient is calling, his appt is today at 3pm  He is wondering if you want him to go for a CXR before the appt  Is so, he would need an order placed   Please advise

## 2022-08-12 NOTE — ASSESSMENT & PLAN NOTE
Moderate aortic stenosis noted on most recent echocardiogram   This likely contributing to his dyspnea  Follow-up with Cardiology

## 2022-08-12 NOTE — ASSESSMENT & PLAN NOTE
After appointment time was over and CTA was resulted I called the patient to review results  Findings are consistent with an infectious etiology for which I sent Ceftin 500 mg p o  B i d  x7 days to his pharmacy  Also recommended collecting sputum culture  Patient knows to call if he does not note significant improvement

## 2022-08-12 NOTE — ASSESSMENT & PLAN NOTE
Borderline hypoxia noted in the office today  Recommend using his oxygen when he gets home  He knows to maintain saturations greater than 88%  I recommended he obtain a new pulse oximeter he cannot find his own  Continue oxygen q h s   As well bled into his auto CPAP

## 2022-08-15 ENCOUNTER — OFFICE VISIT (OUTPATIENT)
Dept: CARDIOLOGY CLINIC | Facility: CLINIC | Age: 57
End: 2022-08-15
Payer: COMMERCIAL

## 2022-08-15 VITALS
OXYGEN SATURATION: 93 % | DIASTOLIC BLOOD PRESSURE: 78 MMHG | BODY MASS INDEX: 46.65 KG/M2 | SYSTOLIC BLOOD PRESSURE: 138 MMHG | HEART RATE: 95 BPM | WEIGHT: 315 LBS | HEIGHT: 69 IN

## 2022-08-15 DIAGNOSIS — I25.10 ASCVD (ARTERIOSCLEROTIC CARDIOVASCULAR DISEASE): Primary | ICD-10-CM

## 2022-08-15 DIAGNOSIS — Z79.4 TYPE 2 DIABETES MELLITUS WITH DIABETIC NEUROPATHY, WITH LONG-TERM CURRENT USE OF INSULIN (HCC): ICD-10-CM

## 2022-08-15 DIAGNOSIS — I35.0 NONRHEUMATIC AORTIC VALVE STENOSIS: ICD-10-CM

## 2022-08-15 DIAGNOSIS — I10 ESSENTIAL HYPERTENSION: ICD-10-CM

## 2022-08-15 DIAGNOSIS — R06.09 DYSPNEA ON EXERTION: ICD-10-CM

## 2022-08-15 DIAGNOSIS — E78.49 OTHER HYPERLIPIDEMIA: ICD-10-CM

## 2022-08-15 DIAGNOSIS — E11.40 TYPE 2 DIABETES MELLITUS WITH DIABETIC NEUROPATHY, WITH LONG-TERM CURRENT USE OF INSULIN (HCC): ICD-10-CM

## 2022-08-15 PROCEDURE — 99214 OFFICE O/P EST MOD 30 MIN: CPT | Performed by: INTERNAL MEDICINE

## 2022-08-15 RX ORDER — ASPIRIN 81 MG/1
81 TABLET ORAL DAILY
Qty: 30 TABLET | Refills: 5
Start: 2022-08-15

## 2022-08-15 NOTE — TELEPHONE ENCOUNTER
Upon review of the In Basket request we were able to locate, review, and update the patient chart as requested for Hemoglobin A1c  Any additional questions or concerns should be emailed to the Practice Liaisons via [email or web address] email, please do not reply via In Basket      Thank you  Daniella Garrison

## 2022-08-15 NOTE — PROGRESS NOTES
Subjective:        Patient ID: Violet Kendrick is a 61 y o  male  Chief Complaint:  Tosha Batista is here for follow-up  Off hydrochlorothiazide on Lasix and Aldactone  BMP stable mild pre renal azotemia noted  Edema reasonably well controlled he reports, occasionally they swell up  May echo revealed stable moderate aortic stenosis normal LV function  CT scan recently showed no PE, suggested pulmonary inflammatory/infectious process, mild coronary atherosclerosis  Discussed how this does not quantify the degree of coronary artery disease  He has chronic dyspnea but no chest pains  He had a cough progressive dyspnea and a fever for about the last week in a couple days, as of yesterday he really started to feel , says his fever broke, better after pulmonary started treatment last week  The following portions of the patient's history were reviewed and updated as appropriate: allergies, current medications, past family history, past medical history, past social history, past surgical history and problem list   Review of Systems   Constitutional: Negative for chills, diaphoresis, malaise/fatigue and weight gain  HENT: Negative for nosebleeds and stridor  Eyes: Negative for double vision, vision loss in left eye, vision loss in right eye and visual disturbance  Cardiovascular: Positive for dyspnea on exertion  Negative for chest pain, claudication, cyanosis, irregular heartbeat, leg swelling, near-syncope, orthopnea, palpitations, paroxysmal nocturnal dyspnea and syncope  Respiratory: Negative for cough, shortness of breath, snoring and wheezing  Endocrine: Negative for polydipsia, polyphagia and polyuria  Hematologic/Lymphatic: Negative for bleeding problem  Does not bruise/bleed easily  Skin: Negative for flushing and rash  Musculoskeletal: Negative for falls and myalgias  Gastrointestinal: Negative for abdominal pain, heartburn, hematemesis, hematochezia, melena and nausea     Genitourinary: Negative for hematuria  Neurological: Negative for brief paralysis, dizziness, focal weakness, headaches, light-headedness, loss of balance and vertigo  Psychiatric/Behavioral: Negative for altered mental status and substance abuse  Allergic/Immunologic: Negative for hives  Objective:      /78   Pulse 95   Ht 5' 9" (1 753 m)   Wt (!) 161 kg (354 lb)   SpO2 93%   BMI 52 28 kg/m²   Physical Exam  Constitutional:       General: He is not in acute distress  Appearance: He is well-developed  He is not diaphoretic  HENT:      Head: Normocephalic and atraumatic  Eyes:      General: No scleral icterus  Pupils: Pupils are equal, round, and reactive to light  Neck:      Thyroid: No thyromegaly  Vascular: No carotid bruit or JVD  Cardiovascular:      Rate and Rhythm: Normal rate and regular rhythm  Heart sounds: Normal heart sounds  No murmur heard  No friction rub  No gallop  Pulmonary:      Effort: Pulmonary effort is normal  No respiratory distress  Breath sounds: Normal breath sounds  No stridor  No wheezing or rales  Abdominal:      General: Bowel sounds are normal  There is no distension  Palpations: Abdomen is soft  There is no mass  Tenderness: There is no abdominal tenderness  Musculoskeletal:         General: No deformity  Normal range of motion  Cervical back: Normal range of motion and neck supple  Right lower leg: No edema  Left lower leg: No edema  Skin:     General: Skin is warm and dry  Coloration: Skin is not pale  Findings: No erythema  Neurological:      Mental Status: He is alert and oriented to person, place, and time        Coordination: Coordination normal    Psychiatric:         Mood and Affect: Mood normal          Behavior: Behavior normal          Lab Review:   Telephone on 08/11/2022   Component Date Value    Hemoglobin A1C 07/19/2022 9 4    Telephone on 07/21/2022   Component Date Value    Color, UA 08/03/2022 Light Yellow     Clarity, UA 08/03/2022 Clear     Specific Gravity, UA 08/03/2022 1 014     pH, UA 08/03/2022 6 0     Leukocytes, UA 08/03/2022 Elevated glucose may cause decreased leukocyte values  See urine microscopic for [facility] result/ (A)    Nitrite, UA 08/03/2022 Negative     Protein, UA 08/03/2022 Negative     Glucose, UA 08/03/2022 >=1000 (1%) (A)    Ketones, UA 08/03/2022 Negative     Urobilinogen, UA 08/03/2022 <2 0     Bilirubin, UA 08/03/2022 Negative     Occult Blood, UA 08/03/2022 Negative     RBC, UA 08/03/2022 1-2     WBC, UA 08/03/2022 None Seen     Epithelial Cells 08/03/2022 Occasional     Bacteria, UA 08/03/2022 None Seen     Creatinine, Ur 08/03/2022 33 9     Protein Urine Random 08/03/2022 <6     Prot/Creat Ratio, Ur 08/03/2022 <0 18 (A)    Creatinine, Ur 08/03/2022 33 9     Microalbum  ,U,Random 08/03/2022 <5 0     Microalb Creat Ratio 08/03/2022 <15      CTA chest pe study    Result Date: 8/12/2022  Narrative: CTA - CHEST WITH IV CONTRAST - PULMONARY ANGIOGRAM INDICATION:   R06 00: Dyspnea, unspecified  COMPARISON: 10/20/20 LDCT  TECHNIQUE: CTA examination of the chest was performed using angiographic technique according to a protocol specifically tailored to evaluate for pulmonary embolism  Axial, sagittal, and coronal 2D reformatted images were created from the source data and  submitted for interpretation  In addition, coronal 3D MIP postprocessing was performed on the acquisition scanner  Radiation dose length product (DLP) for this visit:  277 12 mGy-cm   This examination, like all CT scans performed in the [facility], was performed utilizing techniques to minimize radiation dose exposure, including the use of iterative  reconstruction and automated exposure control  IV Contrast:  85 mL of iohexol (OMNIPAQUE)  FINDINGS: IV contrast bolus is suboptimal   Images are degraded by artifact   PULMONARY ARTERIAL TREE:  No large filling defect in the pulmonary trunk or main pulmonary arteries  More peripheral branches are poorly assessed secondary to a combination of poor IV contrast bolus timing and degradation by artifact  Prominent caliber of the left main pulmonary artery can be seen with pulmonary arterial hypertension  LUNGS:  Images are degraded by artifact  There appears to be diffuse bronchial wall thickening  Scattered groundglass opacities suggestive appear to be distributed along the central airways with more convincing evidence of groupings of tree-in-bud nodularity in the lingula and left lower lobe  A 5 mm left lower lobe nodule 3/80 may be part of the same process  Hypoventilatory changes greatest in the right lower lobe  Calcified granulomas  PLEURA:  Unremarkable  HEART/GREAT VESSELS:  Thoracic aortic, annular, and coronary artery calcification  No thoracic aortic aneurysm  MEDIASTINUM AND TESSY:  Similar mediastinal and hilar lymphadenopathy is significantly changed from prior  CHEST WALL AND LOWER NECK:   Unremarkable  VISUALIZED STRUCTURES IN THE UPPER ABDOMEN: Hepatic steatosis  OSSEOUS STRUCTURES:  No acute fracture or destructive osseous lesion  Degenerative changes  Impression: No large filling defect in the pulmonary trunk or main pulmonary arteries  More peripheral branches are poorly assessed and therefore indeterminate  Images are degraded by artifact  Diffuse bronchial wall thickening, groundglass opacities which appear appear to be distributed along the airways and groupings of tree-in-bud nodularity in the lingula and left lower lobe; likely represents an infectious  and/or inflammatory process  5 mm left lower lobe nodule may be part of the same process  Recommend 3 month CT follow-up  Additional findings as above  The study was marked in EPIC for significant notification  Workstation performed: [id number]         Assessment:       1   ASCVD (arteriosclerotic cardiovascular disease)  aspirin (ECOTRIN LOW STRENGTH) 81 mg EC tablet    NM myocardial perfusion spect (rx stress and/or rest)   2  Nonrheumatic aortic valve stenosis  NM myocardial perfusion spect (rx stress and/or rest)   3  Essential hypertension  NM myocardial perfusion spect (rx stress and/or rest)   4  Other hyperlipidemia  NM myocardial perfusion spect (rx stress and/or rest)   5  Type 2 diabetes mellitus with diabetic neuropathy, with long-term current use of insulin (HCC)  NM myocardial perfusion spect (rx stress and/or rest)   6  Dyspnea on exertion  NM myocardial perfusion spect (rx stress and/or rest)        Plan:       In light of some coronary atherosclerosis seen on a recent CT scan, sedentary lifestyle in multiple CAD risk factors further testing warranted  He did not think he could walk on a treadmill, Lexiscan nuclear stress test ordered  Blood pressure lipids reasonably well controlled, he is on the proper statin  Agree with limiting diuretics to only 2 rather than 3 light of some mild prerenal azotemia  Stressed compliance with CPAP importance  Light of some ASCVD on his coronary arteries, advised baby dose aspirin daily, no contraindications exist on extensive questioning  Weight loss and aggressive diabetic management in order  Discussed how his aortic stenosis remains moderate, asymptomatic  For now we are planning on annual echocardiography, will be due in May or June of next year  SBE antibiotic prophylaxis not required  As long as the stress test is nonischemic, aggressive medical therapy for comorbidities is all that I will recommend, and I will keep him on a 6 month scheduled  He understands  non-reactive

## 2022-08-15 NOTE — TELEPHONE ENCOUNTER
[facility]  Prescribed Ceftin 500 mg p o  B i d  x7 days to his pharmacy  Also recommended collecting sputum culture  Patient will call back depending on progression of symptoms

## 2022-08-15 NOTE — PATIENT INSTRUCTIONS
Aortic Stenosis   WHAT YOU NEED TO KNOW:   What is aortic stenosis? Aortic stenosis is a condition that makes your aortic valve become narrow and stiff  The narrow, stiff valve causes your heart to work harder to pump blood into the aorta  What causes aortic stenosis? Calcium buildup  can happen as you age  Calcium builds up on the aortic valve walls  The buildup stiffens and thickens the valve  Congenital heart defect  means you were born with a heart problem  Over time, the problem may lead to narrowing or blocking of your aortic valve  Rheumatic fever  can develop after you have a strep throat infection  Rheumatic fever causes your heart valves to thicken with scars  What are the signs and symptoms of aortic stenosis? Chest pain or tightness    Fast, jumpy, or fluttery heartbeat    Shortness of breath during activity or when you lie down    Severe tiredness    Dizziness or feeling faint    How is aortic stenosis diagnosed? Your healthcare provider will ask about your signs and symptoms and listen to your heart  He or she will ask if you have had strep throat or rheumatic fever  Tell your provider if you have a family history of heart disease  You may also need any of the following tests:  Blood tests  may be used to check for an infection or other cause of your aortic stenosis  An echocardiogram  is a type of ultrasound  It is used to show problems with your aortic valve and how blood flows through your heart  It may also show how well your heart is pumping  You may need a transthoracic or transesophageal echocardiogram  Ask your healthcare provider about these types of echocardiogram     A chest x-ray  shows the size of your heart  It may also show if fluid is around your heart and lungs  An EKG  test records the electrical activity of your heart  It is used to check for abnormal heart rhythm caused by aortic stenosis      A stress test  helps healthcare providers see how well your aortic valve works under stress  Healthcare providers may place stress on your aortic valve with exercise or medicine  Cardiac catheterization  is a procedure to check how well your heart is pumping blood  It is also used to measure pressure in different parts of your heart  A catheter (long thin tube) is inserted into your arm, neck, or groin and moved into your heart  An x-ray may be used to guide the tube to the right place  Contrast liquid may be used to help your heart show up better in the pictures  Tell the healthcare provider if you have ever had an allergic reaction to contrast liquid  How is aortic stenosis treated? Medicines  may help decrease your cholesterol levels and your blood pressure  You may also be given medicine to help lower swelling  Valve replacement  is the main treatment for aortic stenosis  It is a surgery to remove part or all of your aortic valve  A new valve is then secured in place  The new valve may be from a donor (another person or animal), or may be an artificial valve  Balloon valvuloplasty  helps widen your aortic valve and allow blood to flow through easier  It is also called a closed valvotomy  How can I manage aortic stenosis? Limit activities  Your healthcare provider may have you limit strenuous activity  Strenuous activity will make your heart work too hard  Ask your healthcare provider what activities are safe for you to do  Take your medicines as directed  You may need medicines to lower your blood pressure  You may also need medicine to help your heart's rhythm  Your healthcare provider will prescribe the medicine that is right for you  Eat heart-healthy foods  Heart-healthy foods include salmon, tuna, walnuts, whole-grain breads, low-fat dairy products, beans, and oils such as olive or canola oil  A dietitian or your provider can give you more information on meal plans such as the DASH (Dietary Approaches to Stop Hypertension) eating plan  The DASH plan is low in sodium, processed sugar, unhealthy fats, and total fat  It is high in potassium, calcium, and fiber  These can be found in vegetables, fruit, and whole-grain foods  Limit sodium (salt) as directed  Too much sodium can affect your fluid balance  Check labels to find low-sodium or no-salt-added foods  You can also make small changes to get less salt  For example, if you add salt while you cook, do not add more salt at the table  Ask your healthcare provider or dietitian for more ways to cut down on salt  Limit or do not drink alcohol  Ask your healthcare provider if it is okay for you to drink alcohol  Alcohol can increase your risk for high blood pressure and coronary artery disease  Your provider can tell you how many drinks are okay to have within 24 hours or within 1 week  A drink of alcohol is 12 ounces of beer, 5 ounces of wine, or 1½ ounces of liquor  Maintain a healthy weight  Being overweight can increase your risk for high blood pressure and coronary artery disease  These conditions can make your symptoms worse  Ask your healthcare provider what a healthy weight is for you  Ask him or her to help you create a weight loss plan if you are overweight  Talk to your healthcare provider about pregnancy  If you are a woman and want to get pregnant, talk to your healthcare provider  You and your baby may need to be monitored by specialists during your pregnancy  Ask about vaccines you may need  Certain diseases are dangerous for a person who has aortic stenosis  Vaccines help prevent infections that can cause some diseases  Get a flu vaccine as soon as recommended each year, usually in September or October  Your healthcare provider can tell you if you also need other vaccines, and when to get them  How can I prevent aortic stenosis? Manage other health conditions  High blood pressure and high cholesterol levels increase your risk for aortic stenosis   Ask your healthcare provider for more information on managing these conditions  Get treatment for strep throat  If strep throat is not treated, it can cause rheumatic fever  Take care of your teeth and gums  Gingivitis, a gum disease, increases your risk for aortic stenosis  See your dental provider regularly to treat problems early  Call your local emergency number (911 in the [address] [facility]) or have someone call if:   You have any of the following signs of a heart attack:      Squeezing, pressure, or pain in your chest    You may  also have any of the following:     Discomfort or pain in your back, neck, jaw, stomach, or arm    Shortness of breath    Nausea or vomiting    Lightheadedness or a sudden cold sweat    You have any of the following signs of a stroke:      Numbness or drooping on one side of your face     Weakness in an arm or leg    Confusion or difficulty speaking    Dizziness, a severe headache, or vision loss    When should I seek immediate care? You have chest pain when you move around  It goes away when you are still  You have increasing shortness of breath  You faint  When should I call my doctor? The veins in your neck look swollen or are bulging  You have increased swelling in your legs or ankles  Your heart beats faster than usual     You feel your heart flutter often  You have questions or concerns about your condition or care  CARE AGREEMENT:   You have the right to help plan your care  Learn about your health condition and how it may be treated  Discuss treatment options with your healthcare providers to decide what care you want to receive  You always have the right to refuse treatment  The above information is an  only  It is not intended as medical advice for individual conditions or treatments  Talk to your doctor, nurse or pharmacist before following any medical regimen to see if it is safe and effective for you    © Copyright [employer] 2022 Information is for End User's use only and may not be sold, redistributed or otherwise used for commercial purposes   All illustrations and images included in CareNotes® are the copyrighted property of A [employer] A M [employer]  or [facility] Denise Mathias

## 2022-09-01 ENCOUNTER — HOSPITAL ENCOUNTER (OUTPATIENT)
Dept: NUCLEAR MEDICINE | Facility: HOSPITAL | Age: 57
Discharge: HOME | End: 2022-09-01
Attending: INTERNAL MEDICINE
Payer: COMMERCIAL

## 2022-09-01 DIAGNOSIS — E78.49 OTHER HYPERLIPIDEMIA: ICD-10-CM

## 2022-09-01 DIAGNOSIS — I25.10 ASCVD (ARTERIOSCLEROTIC CARDIOVASCULAR DISEASE): ICD-10-CM

## 2022-09-01 DIAGNOSIS — Z79.4 TYPE 2 DIABETES MELLITUS WITH DIABETIC NEUROPATHY, WITH LONG-TERM CURRENT USE OF INSULIN (HCC): ICD-10-CM

## 2022-09-01 DIAGNOSIS — E11.40 TYPE 2 DIABETES MELLITUS WITH DIABETIC NEUROPATHY, WITH LONG-TERM CURRENT USE OF INSULIN (HCC): ICD-10-CM

## 2022-09-01 DIAGNOSIS — I10 ESSENTIAL HYPERTENSION: ICD-10-CM

## 2022-09-01 DIAGNOSIS — I35.0 NONRHEUMATIC AORTIC VALVE STENOSIS: ICD-10-CM

## 2022-09-01 DIAGNOSIS — R06.09 DYSPNEA ON EXERTION: ICD-10-CM

## 2022-09-01 LAB
BASELINE ST DEPRESSION: 0 MM
NUC STRESS EJECTION FRACTION: 62 %
RATE PRESSURE PRODUCT: 9256
SL CV REST NUCLEAR ISOTOPE DOSE: 15.1 MCI
SL CV STRESS NUCLEAR ISOTOPE DOSE: 46.1 MCI
STRESS BASELINE HR: 68 BPM
STRESS PEAK HR: 89 BPM
STRESS POST PEAK BP: 104 MMHG
STRESS ST DEPRESSION: 0 MM
STRESS/REST PERFUSION RATIO: 1.1

## 2022-09-01 PROCEDURE — 78452 HT MUSCLE IMAGE SPECT MULT: CPT | Performed by: INTERNAL MEDICINE

## 2022-09-01 PROCEDURE — G1004 CDSM NDSC: HCPCS

## 2022-09-01 PROCEDURE — A9502 TC99M TETROFOSMIN: HCPCS

## 2022-09-01 PROCEDURE — 93018 CV STRESS TEST I&R ONLY: CPT | Performed by: INTERNAL MEDICINE

## 2022-09-01 PROCEDURE — 78452 HT MUSCLE IMAGE SPECT MULT: CPT

## 2022-09-01 PROCEDURE — 93017 CV STRESS TEST TRACING ONLY: CPT

## 2022-09-01 PROCEDURE — 93016 CV STRESS TEST SUPVJ ONLY: CPT | Performed by: INTERNAL MEDICINE

## 2022-09-01 RX ADMIN — REGADENOSON 0.4 MG: 0.08 INJECTION, SOLUTION INTRAVENOUS at 09:30

## 2022-09-05 DIAGNOSIS — J44.9 COPD, SEVERE (HCC): ICD-10-CM

## 2022-09-06 RX ORDER — FLUTICASONE PROPIONATE 220 UG/1
AEROSOL, METERED RESPIRATORY (INHALATION)
Qty: 12 G | Refills: 0 | Status: SHIPPED | OUTPATIENT
Start: 2022-09-06 | End: 2022-09-12 | Stop reason: SDUPTHER

## 2022-09-09 ENCOUNTER — OFFICE VISIT (OUTPATIENT)
Dept: PODIATRY | Facility: CLINIC | Age: 57
End: 2022-09-09
Payer: COMMERCIAL

## 2022-09-09 VITALS
HEIGHT: 69 IN | DIASTOLIC BLOOD PRESSURE: 76 MMHG | WEIGHT: 315 LBS | SYSTOLIC BLOOD PRESSURE: 138 MMHG | BODY MASS INDEX: 46.65 KG/M2

## 2022-09-09 DIAGNOSIS — L85.3 XEROSIS CUTIS: Primary | ICD-10-CM

## 2022-09-09 DIAGNOSIS — Z79.4 TYPE 2 DIABETES MELLITUS WITH DIABETIC NEUROPATHY, WITH LONG-TERM CURRENT USE OF INSULIN (HCC): ICD-10-CM

## 2022-09-09 DIAGNOSIS — E11.40 TYPE 2 DIABETES MELLITUS WITH DIABETIC NEUROPATHY, WITH LONG-TERM CURRENT USE OF INSULIN (HCC): ICD-10-CM

## 2022-09-09 PROCEDURE — 3078F DIAST BP <80 MM HG: CPT | Performed by: PODIATRIST

## 2022-09-09 PROCEDURE — 3075F SYST BP GE 130 - 139MM HG: CPT | Performed by: PODIATRIST

## 2022-09-09 PROCEDURE — 99203 OFFICE O/P NEW LOW 30 MIN: CPT | Performed by: PODIATRIST

## 2022-09-09 PROCEDURE — 11721 DEBRIDE NAIL 6 OR MORE: CPT | Performed by: PODIATRIST

## 2022-09-09 RX ORDER — AMMONIUM LACTATE 12 G/100G
CREAM TOPICAL AS NEEDED
Qty: 385 G | Refills: 0 | Status: SHIPPED | OUTPATIENT
Start: 2022-09-09

## 2022-09-09 NOTE — PROGRESS NOTES
Diabetic Foot Exam    Patient's shoes and socks removed  Right Foot/Ankle   Right Foot Inspection  Skin Exam: skin normal, skin intact and dry skin  No warmth, no callus, no erythema, no maceration, no abnormal color, no pre-ulcer, no ulcer and no callus  Toe Exam: tenderness  Sensory   Vibration: absent  Proprioception: absent  Monofilament testing: absent    Vascular  Capillary refills: elevated  The right DP pulse is 1+  The right PT pulse is 0  Left Foot/Ankle  Left Foot Inspection  Skin Exam: skin normal, skin intact and dry skin  No warmth, no erythema, no maceration, normal color, no pre-ulcer, no ulcer and no callus  Toe Exam: tenderness  Sensory   Vibration: absent  Proprioception: absent  Monofilament testing: absent    Vascular  Capillary refills: elevated  The left DP pulse is 1+  The left PT pulse is 0  Assign Risk Category  No deformity present  Loss of protective sensation  Weak pulses  Risk: 2                 PATIENT:  Pam Reaves    1965    ASSESSMENT:     1  Xerosis cutis  ammonium lactate (LAC-HYDRIN) 12 % cream   2  Type 2 diabetes mellitus with diabetic neuropathy, with long-term current use of insulin ([facility] )  Ambulatory Referral to Podiatry    Diabetic Shoe    Diabetic Shoe Inserts         PLAN:  1  Patient was counseled on the condition and diagnosis  2   Educated disease prevention and risks related to diabetes  3   Educated proper daily foot care and exam   Instructed proper skin care / protection and footwear  Instructed to identify any signs of infection and related foot problem  4   The recent blood work was reviewed / discussed and the last HbA1c was 9 4  Discussed proper blood glucose control with diet and exercise      5   The patient will return in 12 months for diabetic foot exam      High risk  foot precautions reviewed with patient including the need to wear protective shoegear at all times when walking including in the home, the need to check feet all surfaces daily with a mirror and report and skin breaks to podiatry, the need to apply an emollient to skin of feet daily   Diabetic Foot Ulcer Risks    - Between 10-15% of diabetic foot ulcers do not heal [v]  - Of diabetic foot ulcers that do not heal, 25% will require amputation  [v]    iv Lashawn Morelos , KIRIT Romero , Mary Monroy , and Ulises NGUYEN , Foot Ulcers in the Diabetic Patient, Prevention and Treatment  Vascular Health Risk Management  2007 Feb; 3(1): 65-76  v JODI Stephen , VANCE Soto , WENDY Tavarez , NESSA Solomon , JODI Lopez T , Risk factors for lower extremity amputation in patients with diabetic foot ulcers: a hospital-based case-control study  Diabetic Foot & Ankle, 2015  6:10 [id number]      Procedure: All mycotic toenails were reduced and debrided in length, width, and girth using a nail nipper and dremel  Patient tolerated procedure(s) well without complications   He is having increased pain bilateral feet worse at night which is likely due to a double crush with diabetic neuropathy, he does have Q 8 findings 85201  -Rx given for diabetic shoes and advised on daily pedal examinations and ulceration prevention   -he is to return in 9 weeks for at-risk foot care in 1 year for diabetic foot exam      Imaging: I have personally reviewed pertinent films in PACS  Labs, pathology, and Other Studies: I have personally reviewed pertinent reports  Subjective:          HPI  The patient presents for diabetic foot evaluation  The patient has diabetes for >10 years  The blood glucose is not under control and the last HbA1c was 9 4  The patient denied any history of diabetic foot ulcer, related foot infection, or amputation  significant numbness or paresthesia  Denied weakness or significant functional deficit  The patient presents with increased pain of bilateral feet which is worse at night, he does state he sees Endocrinology and his A1c has been coming down it was originally 12  [place]   He does take a proximally 1200 mg of gabapentin a day  This does provide some substantial relief at night but not fixed his overall pain level  The following portions of the patient's history were reviewed and updated as appropriate: allergies, current medications, past family history, past medical history, past social history, past surgical history and problem list   All pertinent labs and images were reviewed  Past Medical History  Past Medical History:   Diagnosis Date    Aortic stenosis     COPD (chronic obstructive pulmonary disease) ([facility] )     Diabetes ([facility] )     Diabetes mellitus ([facility] )     Hyperlipidemia     Hypertension 7/2/2014    Sleep apnea, obstructive        Past Surgical History  Past Surgical History:   Procedure Laterality Date    APPENDECTOMY      EYE SURGERY          Allergies:   Theophylline    Medications:  Current Outpatient Medications   Medication Sig Dispense Refill    albuterol (Ventolin HFA) 90 mcg/act inhaler Inhale 2 puffs every 6 (six) hours as needed for wheezing 18 g 3    amLODIPine (NORVASC) 10 mg tablet Take 1 tablet (10 mg total) by mouth daily 90 tablet 3    ammonium lactate (LAC-HYDRIN) 12 % cream Apply topically as needed for dry skin 385 g 0    aspirin (ECOTRIN LOW STRENGTH) 81 mg EC tablet Take 1 tablet (81 mg total) by mouth daily 30 tablet 5    atorvastatin (LIPITOR) 40 mg tablet Take 1 tablet (40 mg total) by mouth daily 90 tablet 3    Blood Glucose Monitoring Suppl (CONTOUR NEXT MONITOR) w/Device KIT Test blood sugar once daily or as needed for fluctuating blood sugars 1 kit 0    carvedilol (COREG) 12 5 mg tablet Take 1 tablet (12 5 mg total) by mouth 2 (two) times a day with meals 180 tablet 3    cetirizine (ZyrTEC) 10 mg tablet Take 10 mg by mouth      cyclobenzaprine (FLEXERIL) 10 mg tablet Take 10 mg by mouth      Dapagliflozin Propanediol (Farxiga) 10 MG TABS Take 10 mg by mouth daily      fluticasone (FLOVENT HFA) 220 mcg/act inhaler INHALE 2 PUFFS BY MOUTH TWICE DAILY RINSE MOUTH AFTER USE 12 g 0    furosemide (LASIX) 20 mg tablet Take 1 tablet (20 mg total) by mouth daily 30 tablet 2    gabapentin (NEURONTIN) 400 mg capsule Take 1 capsule (400 mg total) by mouth 3 (three) times a day 90 capsule 2    glimepiride (AMARYL) 4 mg tablet Take 4 mg by mouth 2 (two) times a day      glucose blood (CONTOUR NEXT TEST) test strip Test blood sugar once daily or as needed for fluctuating blood sugars 100 each 3    guaiFENesin (MUCINEX) 600 mg 12 hr tablet Take 1 tablet (600 mg total) by mouth every 12 (twelve) hours 60 tablet 0    Insulin Pen Needle (BD Pen Needle Dee Dee U/F) 32G X 4 MM MISC Use 4 a day 200 each 5    ipratropium-albuterol (DUO-NEB) 0 5-2 5 mg/3 mL nebulizer solution Take 1 vial (3 mL total) by nebulization every 6 (six) hours as needed for wheezing or shortness of breath 360 mL 2    Lancets MISC Test blood sugar once daily or as needed for fluctuating blood sugars 100 each 0    Levemir FlexTouch 100 units/mL injection pen Inject 60 Units under the skin daily       losartan (COZAAR) 100 MG tablet Take 1 tablet (100 mg total) by mouth daily 90 tablet 3    meloxicam (MOBIC) 15 mg tablet Take 15 mg by mouth      metFORMIN (GLUCOPHAGE) 1000 MG tablet TAKE 1 TABLET BY MOUTH TWICE DAILY WITH MEALS 180 tablet 0    Multiple Vitamins-Minerals (MENS MULTIVITAMIN PO) Take by mouth      Ozempic, 0 25 or 0 5 MG/DOSE, 2 MG/1 5ML SOPN INJECT 0 5 MG UNDER THE SKIN ONCE WEEKLY       Ozempic, 1 MG/DOSE, 4 MG/3ML SOPN injection pen INJECT 1MG SUBCUTANEOUSLY ONCE A WEEK      sertraline (ZOLOFT) 50 mg tablet Take 1 tablet (50 mg total) by mouth daily 30 tablet 2    spironolactone (ALDACTONE) 50 mg tablet Take 1 tablet (50 mg total) by mouth daily 90 tablet 3    umeclidinium-vilanterol (Anoro Ellipta) 62 5-25 MCG/INH inhaler Inhale 1 puff daily 60 blister 3    predniSONE 10 mg tablet Take 4 tablets (40 mg total) by mouth daily (Patient not taking: No sig reported) 20 tablet 0    sertraline (ZOLOFT) 50 mg tablet Take 1 tablet by mouth once daily (Patient not taking: No sig reported) 30 tablet 0     No current facility-administered medications for this visit  Social History:  Social History     Socioeconomic History    Marital status: /Civil Union     Spouse name: None    Number of children: None    Years of education: None    Highest education level: None   Occupational History    None   Tobacco Use    Smoking status: Former Smoker     Years: 10 00     Types: Cigarettes    Smokeless tobacco: Never Used   Vaping Use    Vaping Use: Never used   Substance and Sexual Activity    Alcohol use: Never    Drug use: Never    Sexual activity: None   Other Topics Concern    None   Social History Narrative    None     Social Determinants of Health     Financial Resource Strain: Not on file   Food Insecurity: Not on file   Transportation Needs: Not on file   Physical Activity: Not on file   Stress: Not on file   Social Connections: Not on file   Intimate Partner Violence: Not on file   Housing Stability: Not on file        Review of Systems   Constitutional: Negative for chills and fever  HENT: Negative for ear pain and sore throat  Eyes: Negative for pain and visual disturbance  Respiratory: Negative for cough and shortness of breath  Cardiovascular: Negative for chest pain and palpitations  Gastrointestinal: Negative for abdominal pain and vomiting  Genitourinary: Negative for dysuria and hematuria  Musculoskeletal: Negative for arthralgias and back pain  Skin: Negative for color change and rash  Neurological: Negative for seizures and syncope  All other systems reviewed and are negative  Objective:      /76   Ht 5' 9" (1 753 m)   Wt (!) 161 kg (354 lb)   BMI 52 28 kg/m²          Physical Exam  Vitals reviewed  Constitutional:       Appearance: Normal appearance  He is obese     HENT:      Head: Normocephalic and atraumatic  Nose: Nose normal       Mouth/Throat:      Mouth: Mucous membranes are moist    Eyes:      Pupils: Pupils are equal, round, and reactive to light  Cardiovascular:      Pulses: Pulses are weak  Dorsalis pedis pulses are 1+ on the right side and 1+ on the left side  Posterior tibial pulses are 0 on the right side and 0 on the left side  Pulmonary:      Effort: Pulmonary effort is normal    Musculoskeletal:         General: Swelling present  Right lower leg: Edema present  Left lower leg: Edema present  Feet:       Comments: Positive paresthesias bilateral lower extremities, PT pulses are nonpalpable skin is shiny and atrophic  Nails 1 through 5 bilaterally are thickened elongated with notable subungual debris  Significant loss of protective sensation to the ankle  Significant xerosis bilateral feet   Feet:      Right foot:      Skin integrity: Dry skin present  No ulcer, skin breakdown, erythema, warmth or callus  Left foot:      Skin integrity: Dry skin present  No ulcer, skin breakdown, erythema, warmth or callus  Skin:     Capillary Refill: Capillary refill takes more than 3 seconds  Neurological:      General: No focal deficit present  Mental Status: He is alert and oriented to person, place, and time  No

## 2022-09-09 NOTE — PATIENT INSTRUCTIONS
Capsaicin    Diabetic Foot Ulcers   AMBULATORY CARE:   A diabetic foot ulcer  can be redness over a bony area or an open sore  The ulcer can develop anywhere on your foot or toes  Ulcers usually develop on the bottom of the foot  You may not know you have an ulcer until you notice drainage on your sock  Drainage is fluid that may be yellow, brown, or red  The fluid may also contain pus or blood  Call your local emergency number (911 in the [address] [facility]) if:  You have a fever with chills  You begin vomiting  You feel faint or become confused  Call your doctor if:   You see new drainage on your sock  Your foot becomes red, warm, and swollen  Your foot ulcer has a bad smell or is draining pus  You feel pain in a foot that used to have little or no feeling  You see black or dead tissue in or around your ulcer  Your ulcer becomes bigger, deeper, or does not heal      You have questions or concerns about your condition or care  Treatment  may include a hospital stay and any of the following:  Debridement (removal) of dead tissue  may be done in and around your foot ulcer  This procedure may be done in your hospital room  A bandage  help keep your wound area moist and free from infection  The bandage may contain medicines to help your ulcer heal and prevent growth of unhealthy tissue  Offloading (taking the pressure off) the area  can help it heal  Healthcare providers may use cushions or braces, or custom foot wear may be ordered  You may be asked to stay in bed or use a wheelchair or crutches  These devices help decrease the amount of weight and pressure placed on your foot  Surgery  may be needed if you have an infection and decreased blood flow to your foot  Care for your wound as directed:  A bandage will be put on your ulcer  Your healthcare provider will give you instructions on changing your bandage  You may need to clean the wound and change the bandage daily   The bandage may contain medicines to help your ulcer heal  You may be asked to put medicine on your foot ulcer before putting on the bandage  The medicine may also prevent growth of tissue that is not healthy  You may need to cover your wound with a plastic bag while you bathe  Ask your healthcare provider for instructions on bathing until your foot heals  Prevent diabetic foot ulcers:  Good foot care may help prevent ulcers, or keep them from getting worse  Ask someone to help you if you are not able to check your feet by yourself  You or another person may need to do any of the following:  Keep your blood sugar levels under control  Continue the plan for your diabetes that you and your healthcare provider have discussed  Healthy food choices and taking your medicines as directed may help control blood sugars  Contact your healthcare provider if your blood sugar levels are higher than directed  Wash your feet each day with soap and warm water  Do not use hot water, because this can injure your foot  Dry your feet gently with a towel after you wash them  Dry between and under your toes  Apply lotion or a moisturizer on your dry feet  Ask your healthcare provider what lotions are best to use  Do not put lotion or moisturizer between your toes  Moisture between your toes could lead to skin breakdown  Check your feet each day  Look at your whole foot, including the bottom, and between and under your toes  Check for wounds, corns, and calluses  Feel your feet by running your hands along the tops, bottoms, sides, and between your toes  Use a nonbreakable mirror to check your feet if you have trouble seeing the bottoms  Do not try to remove corns or calluses yourself  File or cut your toenails straight across  Protect your feet  Do not walk barefoot or wear your shoes without socks  Check your shoes for rocks or other objects that can hurt your feet  Wear cotton socks to help keep your feet dry   Wear socks without toe seams, or wear them with the seams inside out  Change your socks each day  Do not wear socks that are dirty or damp  Wear shoes that fit well  Wear shoes that do not rub against any area of your feet  Your shoes should be ½ to ¾ inch (1 to 2 centimeters) longer than your feet  Your shoes should also have extra space around the widest part of your feet  Walking or athletic shoes with laces or straps that adjust are best  Ask your healthcare provider for help to choose shoes that fit you best  Ask him or her if you need to wear an insert, orthotic, or bandage on your feet  Do not smoke  Nicotine can cause damage to your blood vessels and increases your risk for foot ulcers  Do not use e-cigarettes or smokeless tobacco in place of cigarettes or to help you quit  They still contain nicotine  Ask your healthcare provider for information if you currently smoke and need help quitting  Know the risks if you choose to drink alcohol  Alcohol can cause your blood sugar levels to be low if you use insulin  Alcohol can cause high blood sugar levels and weight gain if you drink too much  A drink of alcohol is 12 ounces of beer, 5 ounces of wine, or 1½ ounces of liquor  Maintain a healthy weight  Ask your healthcare provider how much you should weigh  A healthy weight can help you control your diabetes  Ask him or her to help you create a weight loss plan if you are overweight  Even a 10 to 15 pound weight loss can help you better manage your blood sugar level  Follow up with your healthcare provider or foot specialist as directed: You may need to return often to have your wound checked  Your wound may be measured to see if it is getting smaller  Bring any offloading devices or footwear to your follow-up visits so your healthcare provider or specialist can check them  Write down your questions so you remember to ask them during your visits     © Copyright [employer] 2022 Information is for End User's use only and may not be sold, redistributed or otherwise used for commercial purposes  All illustrations and images included in CareNotes® are the copyrighted property of A D A M [employer] Inc  or Raghav Mathias  The above information is an  only  It is not intended as medical advice for individual conditions or treatments  Talk to your doctor, nurse or pharmacist before following any medical regimen to see if it is safe and effective for you

## 2022-09-12 ENCOUNTER — OFFICE VISIT (OUTPATIENT)
Dept: PULMONOLOGY | Facility: CLINIC | Age: 57
End: 2022-09-12
Payer: COMMERCIAL

## 2022-09-12 VITALS
DIASTOLIC BLOOD PRESSURE: 59 MMHG | HEART RATE: 75 BPM | TEMPERATURE: 98.9 F | SYSTOLIC BLOOD PRESSURE: 99 MMHG | OXYGEN SATURATION: 94 % | HEIGHT: 69 IN | BODY MASS INDEX: 46.65 KG/M2 | WEIGHT: 315 LBS

## 2022-09-12 DIAGNOSIS — R93.89 ABNORMAL CT OF THE CHEST: ICD-10-CM

## 2022-09-12 DIAGNOSIS — E66.01 MORBID OBESITY WITH BMI OF 50.0-59.9, ADULT (HCC): ICD-10-CM

## 2022-09-12 DIAGNOSIS — I50.33 ACUTE ON CHRONIC DIASTOLIC HEART FAILURE (HCC): ICD-10-CM

## 2022-09-12 DIAGNOSIS — R91.1 PULMONARY NODULE: ICD-10-CM

## 2022-09-12 DIAGNOSIS — G47.33 OSA (OBSTRUCTIVE SLEEP APNEA): ICD-10-CM

## 2022-09-12 DIAGNOSIS — J96.11 CHRONIC HYPOXEMIC RESPIRATORY FAILURE (HCC): ICD-10-CM

## 2022-09-12 DIAGNOSIS — J44.9 COPD, SEVERE (HCC): Primary | ICD-10-CM

## 2022-09-12 PROCEDURE — 99214 OFFICE O/P EST MOD 30 MIN: CPT | Performed by: PHYSICIAN ASSISTANT

## 2022-09-12 RX ORDER — SEMAGLUTIDE 2.68 MG/ML
INJECTION, SOLUTION SUBCUTANEOUS
COMMUNITY
Start: 2022-09-06

## 2022-09-12 RX ORDER — FUROSEMIDE 20 MG/1
20 TABLET ORAL DAILY
Qty: 30 TABLET | Refills: 2 | Status: SHIPPED | OUTPATIENT
Start: 2022-09-12

## 2022-09-12 RX ORDER — FLUTICASONE PROPIONATE 220 UG/1
2 AEROSOL, METERED RESPIRATORY (INHALATION) 2 TIMES DAILY
Qty: 12 G | Refills: 5 | Status: SHIPPED | OUTPATIENT
Start: 2022-09-12 | End: 2022-10-10 | Stop reason: SDUPTHER

## 2022-09-12 NOTE — ASSESSMENT & PLAN NOTE
No need for systemic steroids at this time  No exacerbation at present time  Recommend he continue Anoro and Flovent as prescribed  Continue prn DuoNeb and albuterol HFA as well

## 2022-09-12 NOTE — ASSESSMENT & PLAN NOTE
Symptoms improved after a week of Ceftin  Will monitor off ABX for now  Repeat CT chest in 3 months

## 2022-09-12 NOTE — PROGRESS NOTES
Pulmonary Follow Up Note   Barb Phipps 61 y o  male MRN: [id number]  9/12/2022      Assessment:    COPD, severe ([facility] )  No need for systemic steroids at this time  No exacerbation at present time  Recommend he continue Anoro and Flovent as prescribed  Continue prn DuoNeb and albuterol HFA as well  SCOTT (obstructive sleep apnea)  Continue auto BiPAP during all hours of sleep  Reviewed compliance and it shows that he is doing very well with current settings getting on average 9 hr 17 min/day and AHI is 0 3  Chronic hypoxemic respiratory failure (HCC)  Continue O2 bled into auto BiPAP qHS and prn during the day to maintain saturations greater than 88%  Morbid obesity with BMI of 50 0-59 9, adult ([facility] )  Weight loss encouraged  Abnormal CT of the chest  Symptoms improved after a week of Ceftin  Will monitor off ABX for now  Repeat CT chest in 3 months  Pulmonary nodule  Repeat CT lung nodule follow-up in 3 months to readdressed 5 mm nodule left lower lobe  Plan:    Diagnoses and all orders for this visit:    COPD, severe ([facility] )  -     fluticasone (FLOVENT HFA) 220 mcg/act inhaler; Inhale 2 puffs 2 (two) times a day Rinse mouth after use  -     umeclidinium-vilanterol (Anoro Ellipta) 62 5-25 MCG/INH inhaler; Inhale 1 puff daily    Acute on chronic diastolic heart failure (HCC)  -     furosemide (LASIX) 20 mg tablet; Take 1 tablet (20 mg total) by mouth daily    Pulmonary nodule  -     CT lung nodule follow-up; Future    SCOTT (obstructive sleep apnea)    Chronic hypoxemic respiratory failure (HCC)    Morbid obesity with BMI of 50 0-59 9, adult (HCC)    Abnormal CT of the chest    Other orders  -     Ozempic, 2 MG/DOSE, 8 MG/3ML SOPN        Return in about 3 months (around 12/12/2022)  History of Present Illness   HPI:  Barb Phipps is a 61 y o  male who presents the office today for 1 month follow-up    Patient's past medical history positive for severe COPD, SCOTT compliant on auto BiPAP, morbid obesity, respiratory failure requiring 2 L nasal cannula q h s  And p r n  Rosaura Rincon Patient seen last month for ongoing dyspnea and borderline hypoxia were CTA chest PE study was obtained  Negative for pulmonary embolism but did show inflammatory changes which may be infectious in nature  Treated with a week of Ceftin and patient reported significant improvement after completion of this regimen  He now feels that he is back to his baseline  No fevers or chills  Denies worsening cough, sputum production, hemoptysis or wheeze  Lower extremity edema also improved  Patient saw Dr Brooklyn Miles who ordered stress test and this was within normal limits  Review of Systems   Constitutional: Negative for appetite change and fever  HENT: Negative for ear pain, postnasal drip, rhinorrhea, sneezing, sore throat and trouble swallowing  Respiratory: Positive for cough  Cardiovascular: Negative for chest pain  Musculoskeletal: Negative for myalgias  Neurological: Negative for headaches  All other systems reviewed and are negative        Historical Information   Past Medical History:   Diagnosis Date    Aortic stenosis     COPD (chronic obstructive pulmonary disease) ([facility] )     Diabetes ([facility] )     Diabetes mellitus ([facility] )     Hyperlipidemia     Hypertension 7/2/2014    Sleep apnea, obstructive      Past Surgical History:   Procedure Laterality Date    APPENDECTOMY      EYE SURGERY       Family History   Problem Relation Age of Onset    No Known Problems Mother        Social History     Tobacco Use   Smoking Status Former Smoker    Years: 10 00    Types: Cigarettes   Smokeless Tobacco Never Used         Meds/Allergies     Current Outpatient Medications:     albuterol (Ventolin HFA) 90 mcg/act inhaler, Inhale 2 puffs every 6 (six) hours as needed for wheezing, Disp: 18 g, Rfl: 3    amLODIPine (NORVASC) 10 mg tablet, Take 1 tablet (10 mg total) by mouth daily, Disp: 90 tablet, Rfl: 3    ammonium lactate (LAC-HYDRIN) 12 % cream, Apply topically as needed for dry skin, Disp: 385 g, Rfl: 0    aspirin (ECOTRIN LOW STRENGTH) 81 mg EC tablet, Take 1 tablet (81 mg total) by mouth daily, Disp: 30 tablet, Rfl: 5    atorvastatin (LIPITOR) 40 mg tablet, Take 1 tablet (40 mg total) by mouth daily, Disp: 90 tablet, Rfl: 3    Blood Glucose Monitoring Suppl (CONTOUR NEXT MONITOR) w/Device KIT, Test blood sugar once daily or as needed for fluctuating blood sugars, Disp: 1 kit, Rfl: 0    carvedilol (COREG) 12 5 mg tablet, Take 1 tablet (12 5 mg total) by mouth 2 (two) times a day with meals, Disp: 180 tablet, Rfl: 3    cetirizine (ZyrTEC) 10 mg tablet, Take 10 mg by mouth, Disp: , Rfl:     cyclobenzaprine (FLEXERIL) 10 mg tablet, Take 10 mg by mouth, Disp: , Rfl:     Dapagliflozin Propanediol (Farxiga) 10 MG TABS, Take 10 mg by mouth daily, Disp: , Rfl:     fluticasone (FLOVENT HFA) 220 mcg/act inhaler, Inhale 2 puffs 2 (two) times a day Rinse mouth after use, Disp: 12 g, Rfl: 5    furosemide (LASIX) 20 mg tablet, Take 1 tablet (20 mg total) by mouth daily, Disp: 30 tablet, Rfl: 2    gabapentin (NEURONTIN) 400 mg capsule, Take 1 capsule (400 mg total) by mouth 3 (three) times a day, Disp: 90 capsule, Rfl: 2    glimepiride (AMARYL) 4 mg tablet, Take 4 mg by mouth 2 (two) times a day, Disp: , Rfl:     glucose blood (CONTOUR NEXT TEST) test strip, Test blood sugar once daily or as needed for fluctuating blood sugars, Disp: 100 each, Rfl: 3    guaiFENesin (MUCINEX) 600 mg 12 hr tablet, Take 1 tablet (600 mg total) by mouth every 12 (twelve) hours, Disp: 60 tablet, Rfl: 0    Insulin Pen Needle (BD Pen Needle Dee Dee U/F) 32G X 4 MM MISC, Use 4 a day, Disp: 200 each, Rfl: 5    ipratropium-albuterol (DUO-NEB) 0 5-2 5 mg/3 mL nebulizer solution, Take 1 vial (3 mL total) by nebulization every 6 (six) hours as needed for wheezing or shortness of breath, Disp: 360 mL, Rfl: 2    Lancets MISC, Test blood sugar once daily or as needed for fluctuating blood sugars, Disp: 100 each, Rfl: 0    Levemir FlexTouch 100 units/mL injection pen, Inject 60 Units under the skin daily , Disp: , Rfl:     losartan (COZAAR) 100 MG tablet, Take 1 tablet (100 mg total) by mouth daily, Disp: 90 tablet, Rfl: 3    meloxicam (MOBIC) 15 mg tablet, Take 15 mg by mouth, Disp: , Rfl:     metFORMIN (GLUCOPHAGE) 1000 MG tablet, TAKE 1 TABLET BY MOUTH TWICE DAILY WITH MEALS, Disp: 180 tablet, Rfl: 0    Multiple Vitamins-Minerals (MENS MULTIVITAMIN PO), Take by mouth, Disp: , Rfl:     Ozempic, 2 MG/DOSE, 8 MG/3ML SOPN, , Disp: , Rfl:     sertraline (ZOLOFT) 50 mg tablet, Take 1 tablet (50 mg total) by mouth daily, Disp: 30 tablet, Rfl: 2    spironolactone (ALDACTONE) 50 mg tablet, Take 1 tablet (50 mg total) by mouth daily, Disp: 90 tablet, Rfl: 3    umeclidinium-vilanterol (Anoro Ellipta) 62 5-25 MCG/INH inhaler, Inhale 1 puff daily, Disp: 60 blister, Rfl: 5    Ozempic, 0 25 or 0 5 MG/DOSE, 2 MG/1 5ML SOPN, INJECT 0 5 MG UNDER THE SKIN ONCE WEEKLY  (Patient not taking: Reported on 9/12/2022), Disp: , Rfl:     Ozempic, 1 MG/DOSE, 4 MG/3ML SOPN injection pen, INJECT 1MG SUBCUTANEOUSLY ONCE A WEEK (Patient not taking: Reported on 9/12/2022), Disp: , Rfl:     predniSONE 10 mg tablet, Take 4 tablets (40 mg total) by mouth daily (Patient not taking: No sig reported), Disp: 20 tablet, Rfl: 0    sertraline (ZOLOFT) 50 mg tablet, Take 1 tablet by mouth once daily (Patient not taking: No sig reported), Disp: 30 tablet, Rfl: 0  Allergies   Allergen Reactions    Theophylline Other (See Comments)     Severe headaches  headaches  Severe headaches         Vitals: Blood pressure 99/59, pulse 75, temperature 98 9 °F (37 2 °C), temperature source Tympanic, height 5' 9" (1 753 m), weight (!) 161 kg (355 lb 3 2 oz), SpO2 94 %  Body mass index is 52 45 kg/m²  Oxygen Therapy  SpO2: 94 %  Oxygen Therapy: None (Room air)    Physical Exam  Physical Exam  Vitals reviewed  Constitutional:       Appearance: Normal appearance  He is well-developed  He is obese  HENT:      Head: Normocephalic and atraumatic  Nose: Nose normal       Mouth/Throat:      Mouth: Mucous membranes are moist       Pharynx: Oropharynx is clear  Eyes:      Extraocular Movements: Extraocular movements intact  Cardiovascular:      Rate and Rhythm: Normal rate and regular rhythm  Heart sounds: Murmur heard  Pulmonary:      Effort: Pulmonary effort is normal  No respiratory distress  Breath sounds: No wheezing, rhonchi or rales  Musculoskeletal:      Cervical back: Normal range of motion and neck supple  Skin:     General: Skin is warm and dry  Neurological:      General: No focal deficit present  Mental Status: He is alert  Mental status is at baseline  Psychiatric:         Mood and Affect: Mood normal          Behavior: Behavior normal          Labs: I have personally reviewed pertinent lab results  , ABG: No results found for: PHART, AXJ8MLC, PO2ART, IFF1YSJ, D4OICBNQ, BEART, SOURCE, BNP: No results found for: BNP, CBC: No results found for: WBC, HGB, HCT, MCV, PLT, ADJUSTEDWBC, MCH, MCHC, RDW, MPV, NRBC, CMP: No results found for: SODIUM, K, CL, CO2, ANIONGAP, BUN, CREATININE, GLUCOSE, CALCIUM, AST, ALT, ALKPHOS, PROT, BILITOT, EGFR, PT/INR: No results found for: PT, INR, Troponin: No results found for: TROPONINI  Lab Results   Component Value Date    WBC 9 41 04/21/2022    HGB 14 5 04/21/2022    HCT 44 5 04/21/2022    MCV 88 04/21/2022     04/21/2022     Lab Results   Component Value Date    CALCIUM 9 0 05/23/2022    K 4 6 05/23/2022    CO2 24 05/23/2022     05/23/2022    BUN 36 (H) 05/23/2022    CREATININE 1 20 05/23/2022     No results found for: IGE  Lab Results   Component Value Date    ALT 41 01/26/2022    AST 17 01/26/2022    ALKPHOS 83 01/26/2022       Imaging and other studies: I have personally reviewed pertinent reports     and I have personally reviewed pertinent films in PACS     IMPRESSION:     No large filling defect in the pulmonary trunk or main pulmonary arteries  More peripheral branches are poorly assessed and therefore indeterminate      Images are degraded by artifact  Diffuse bronchial wall thickening, groundglass opacities which appear appear to be distributed along the airways and groupings of tree-in-bud nodularity in the lingula and left lower lobe; likely represents an infectious   and/or inflammatory process  5 mm left lower lobe nodule may be part of the same process  Recommend 3 month CT follow-up  Pulmonary function testing:  Performed 12/28/2021  FEV1/FVC ratio 63%   FEV1 45% predicted  FVC 55% predicted  no response to bronchodilators  TLC 84 % predicted   % predicted  DLCO corrected for hemoglobin 91 % predicted  Severe obstructive airflow defect  No significant response to bronchodilators  Lung volumes indicate air trapping  Normal diffusion        Answers for HPI/ROS submitted by the patient on 9/5/2022  Do you have difficulty breathing?: Yes  Do you experience frequent throat clearing?: Yes  Chronicity: chronic  When did you first notice your symptoms?: more than 1 month ago  How often do your symptoms occur?: daily  Since you first noticed this problem, how has it changed?: unchanged  Do you have shortness of breath that occurs with effort or exertion?: Yes  Do you have ear congestion?: No  Do you have heartburn?: No  Do you have fatigue?: Yes  Do you have nasal congestion?: No  Do you have shortness of breath when lying flat?: No  Do you have shortness of breath when you wake up?: No  Do you have sweats?: No  Have you experienced weight loss?: No  Which of the following makes your symptoms worse?: any activity, change in weather, climbing stairs, exercise, exposure to fumes, exposure to smoke, minimal activity, strenuous activity  Which of the following makes your symptoms better?: cold air, oral steroids, steroid inhaler

## 2022-09-12 NOTE — ASSESSMENT & PLAN NOTE
Continue O2 bled into auto BiPAP qHS and prn during the day to maintain saturations greater than 88%

## 2022-09-12 NOTE — ASSESSMENT & PLAN NOTE
Continue auto BiPAP during all hours of sleep  Reviewed compliance and it shows that he is doing very well with current settings getting on average 9 hr 17 min/day and AHI is 0 3

## 2022-09-27 DIAGNOSIS — J45.40 MODERATE PERSISTENT ASTHMA WITHOUT COMPLICATION: ICD-10-CM

## 2022-09-27 RX ORDER — IPRATROPIUM BROMIDE AND ALBUTEROL SULFATE 2.5; .5 MG/3ML; MG/3ML
SOLUTION RESPIRATORY (INHALATION)
Qty: 360 ML | Refills: 0 | Status: SHIPPED | OUTPATIENT
Start: 2022-09-27

## 2022-09-30 ENCOUNTER — TELEPHONE (OUTPATIENT)
Dept: PODIATRY | Facility: CLINIC | Age: 57
End: 2022-09-30

## 2022-09-30 ENCOUNTER — TELEPHONE (OUTPATIENT)
Dept: OBGYN CLINIC | Facility: HOSPITAL | Age: 57
End: 2022-09-30

## 2022-10-04 ENCOUNTER — CONSULT (OUTPATIENT)
Dept: NEPHROLOGY | Facility: CLINIC | Age: 57
End: 2022-10-04
Payer: COMMERCIAL

## 2022-10-04 VITALS
HEIGHT: 69 IN | OXYGEN SATURATION: 92 % | BODY MASS INDEX: 46.65 KG/M2 | HEART RATE: 98 BPM | WEIGHT: 315 LBS | SYSTOLIC BLOOD PRESSURE: 110 MMHG | DIASTOLIC BLOOD PRESSURE: 62 MMHG

## 2022-10-04 DIAGNOSIS — Z79.4 TYPE 2 DIABETES MELLITUS WITH DIABETIC NEUROPATHY, WITH LONG-TERM CURRENT USE OF INSULIN (HCC): ICD-10-CM

## 2022-10-04 DIAGNOSIS — N18.2 STAGE 2 CHRONIC KIDNEY DISEASE: Primary | ICD-10-CM

## 2022-10-04 DIAGNOSIS — E87.1 HYPONATREMIA: ICD-10-CM

## 2022-10-04 DIAGNOSIS — I10 ESSENTIAL HYPERTENSION: ICD-10-CM

## 2022-10-04 DIAGNOSIS — E11.40 TYPE 2 DIABETES MELLITUS WITH DIABETIC NEUROPATHY, WITH LONG-TERM CURRENT USE OF INSULIN (HCC): ICD-10-CM

## 2022-10-04 PROCEDURE — 99204 OFFICE O/P NEW MOD 45 MIN: CPT | Performed by: INTERNAL MEDICINE

## 2022-10-04 NOTE — PROGRESS NOTES
[facility]  Consultation - Nephrology    Cony Rice 61 y o  male MRN: [id number]      A/P:  1      1  Stage 2 chronic kidney disease  -     Comprehensive metabolic panel; Future; Expected date: 11/22/2022  -     Magnesium; Future; Expected date: 11/22/2022  -     Phosphorus; Future; Expected date: 11/22/2022  -     PTH, intact; Future; Expected date: 11/22/2022  -     Uric acid; Future; Expected date: 11/22/2022  -     Urinalysis with microscopic; Future; Expected date: 11/22/2022  -     CLYDE Screen w/ Reflex to Titer/Pattern; Future; Expected date: 11/22/2022  -     Protein electrophoresis, serum; Future; Expected date: 11/22/2022  -     US kidney and bladder; Future; Expected date: 11/22/2022    2  Hyponatremia  -     Comprehensive metabolic panel; Future; Expected date: 11/22/2022  -     Magnesium; Future; Expected date: 11/22/2022  -     Phosphorus; Future; Expected date: 11/22/2022  -     PTH, intact; Future; Expected date: 11/22/2022  -     Uric acid; Future; Expected date: 11/22/2022  -     Urinalysis with microscopic; Future; Expected date: 11/22/2022  -     CLYDE Screen w/ Reflex to Titer/Pattern; Future; Expected date: 11/22/2022  -     Protein electrophoresis, serum; Future; Expected date: 11/22/2022  -     US kidney and bladder; Future; Expected date: 11/22/2022    3  Type 2 diabetes mellitus with diabetic neuropathy, with long-term current use of insulin ([facility] [address] )    4  Essential hypertension         I have reviewed pertinent labs and imaging with patient  1  CKD2A1 :  -Baseline Cr:1 2 from May 2022    -Etiology:prerenal with diuretic, vasomotor effect of arb, HTN nephrosclerosis, obesity related, nonproteinuric  DM, previous NSAID use       -BP: 110/62, HR 98     -Electrolyte/acid-base: Cl 101, TCO2 24 WNL   -K 4 6 WNL  -sodium; 134 but when corrected for glucose 356 closer to 137   -CKD MBD:check PTH, Ca 9 0 WNL   -hgb 14 range, hold on further workup    Ua 8/3 > 1000 glucose, no protein  Recommend  1  Reviewed CKD etiologies, Cr and eGFR trends  2 need to repeat chemistry, last obtained in May  3  Check renal US for baseline anatomy scan   4  Check UA,spep,gisele ,PTH for CKD eval    5 FU in 2 months  2  Hypertension  -- staging moot point tx, goals the same  -- Body mass index is 52 57 kg/m²  -- appears euvolemic  -- Etiology:hold sec workup at this time as well controlled   -- Target Goal: < 130/80 (ACC/AHA, CKD with proteinuria, CKD in diabetics) ; if tolerated can target SBP < 120 mmHg in high cardiovascular risk ( Age > 76, CKD, CVD, No Diabetics SPRINT)  -- Lifestyle Modifications: DASH Diet, Weight Loss for ideal body weight, 45 mins of cardiovascular exercise 3 times a week as tolerated, and if no contraindications exist, smoking cessation, limit alcohol use, avoid NSAIDS, monitor blood pressure at home  -- Status: Blood pressure 110/62  No changes made to medication at this time  Repeat labs and reassess  3  Hyponatremia, when Na corrects for glucose in May, Na 137-138  Mild in past potentially related to HCTZ, aldactone, ARB  Repeat chem  Continue off HCTZ  4  DM type 2 with neuropathy  Management per primary  Continue SGLT-2i  Most recent A1C 9 4  Encourage weight loss  5  Morbid obesity, BMI 52 57, encourage weight loss        The patient and any family members present were counseled today on risks benefits and alternatives of any medications, and were agreeable to the treatment plan  They were counseled on diagnostic testing if necessary, and projected outcomes as are available and medically indicated  All questions were asked and answered during the encounter  If more questions are to arise the patient will call the office  Alarm and return precautions discussed and accepted  Thank you for allowing us to participate in the care of your patient          History of Present Illness     HPI: Rolanda Colón is a 61y o  year old male who presents for CKD eval at discretion of PCP or cardiologist  Previously taking HCTZ but stopped  He was on three diuretics at one point with lasix, aldactone and HCTZ  Currently on two only given mild prerenal azotemia  Most recent labs from May 2022  Cr 1 2 with glucose of 354 and Na 134  Cr did rise to 1 5 March 2022  He had mild hyponatremia in the spring  In 2021, he was hospitalized for COVID and had mild hyperkalemia and mild hyponatremia as well  Denies hematuria,proteinuria, nephrolithiasis,pyelo/UTI  Denies family hx renal disease  Uses aleve 200mg 4-5 times a day for several months  Previously taking 8-9 gram of tylenol a day  Denies taking tylenol in 2 months  He has been on meloxicam for years since 2017  He does not think he is taking anymore  Reports hx Dm type 2 10 years  Takes glimiperide, farxiga, insulin, metformin and ozempic  Most recent A1C 9 4 from July  Has hx neuropathy in feet due to Dm  Denies DM retinopathy  Follows with podiatry  HTN--- since 2014   - current regimen: lasix 20mg/day , norvasc 10mg/day, losartan 100g/day, coreg 12 5mg BID, aldactone 50mg/day  Denies changes to regimen, has been on several years  - adherence:good compliance   - adverse effects: denies   - blood pressure ranges:denies taking   - headache: denies   - blurry vision: denies  - focal neurological deficits: denies   - sleep apnea symptoms: wears BIpap for sleep apnea   - diet: reports low sodium diet   - 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   - smoking: denies   - alcohol: moderate at a party   - substance use:  denies    Reports 12 lb weight loss in one month  He has been going through a divorce  He is trying to eat at more scheduled times  He had COVID in July  Follows with pulmonology  Reports chronic SOB and cough due to asthma  Follows with pulmonology  Denies seeing urology  Denies LUTS  Drinks 1 galloon of water per day  Constitutional ROS- Denies fatigue, fever, chills, night sweats, +12 lb weight loss  HEENT ROS- Denies headaches or history of trauma, blurred vision     Endocrine ROS- + DM   Cardiovascular ROS- Denies chest pain, palpitation, dyspnea exertion, orthopnea, claudication  Pulmonary ROS-  + cough asthma  + sob asthma   GI ROS- Denies abdominal pain, diarrhea, nausea, swallowing problems, vomiting, constipation, blood in stools, fecal incontinence  Hematological ROS- Denies history of easy bruising, blood clots, bleeding or blood transfusions  Genitourinary ROS- Denies recent hematuria, pyuria, flank pain, change in urinary stream, decreased urinary output, increased urinary frequency, nocturia, foamy urine, or urinary incontinence  Lymphatic ROS- Denies lymphadenopathy  Musculoskeletal ROS- Denies history of muscle weakness, joint pain  Dermatological ROS- Denies rash, wounds, ulcers, itching, jaundice  Psychiatric ROS- Denies hallucinations, disorientation    Neurological ROS- potentially TIA     Historical Information   Past Medical History:   Diagnosis Date    Aortic stenosis     [facility]     Diabetes [facility]     Diabetes mellitus ([facility] )     Hyperlipidemia     Hypertension 7/2/2014    Sleep apnea, obstructive      Past Surgical History:   Procedure Laterality Date    APPENDECTOMY      EYE SURGERY       Social History   Social History     Substance and Sexual Activity   Alcohol Use Never     Social History     Substance and Sexual Activity   Drug Use Never     Social History     Tobacco Use   Smoking Status Former Smoker    Years: 10 00    Types: Cigarettes   Smokeless Tobacco Never Used     Family History   Problem Relation Age of Onset    No Known Problems Mother        Meds/Allergies   all current active meds have been reviewed, current meds:     Current Outpatient Medications:     albuterol (Ventolin HFA) 90 mcg/act inhaler, Inhale 2 puffs every 6 (six) hours as needed for wheezing, Disp: 18 g, Rfl: 3    amLODIPine (NORVASC) 10 mg tablet, Take 1 tablet (10 mg total) by mouth daily, Disp: 90 tablet, Rfl: 3    ammonium lactate (LAC-HYDRIN) 12 % cream, Apply topically as needed for dry skin, Disp: 385 g, Rfl: 0    aspirin (ECOTRIN LOW STRENGTH) 81 mg EC tablet, Take 1 tablet (81 mg total) by mouth daily, Disp: 30 tablet, Rfl: 5    atorvastatin (LIPITOR) 40 mg tablet, Take 1 tablet (40 mg total) by mouth daily, Disp: 90 tablet, Rfl: 3    Blood Glucose Monitoring Suppl (CONTOUR NEXT MONITOR) w/Device KIT, Test blood sugar once daily or as needed for fluctuating blood sugars, Disp: 1 kit, Rfl: 0    carvedilol (COREG) 12 5 mg tablet, Take 1 tablet (12 5 mg total) by mouth 2 (two) times a day with meals, Disp: 180 tablet, Rfl: 3    cetirizine (ZyrTEC) 10 mg tablet, Take 10 mg by mouth, Disp: , Rfl:     cyclobenzaprine (FLEXERIL) 10 mg tablet, Take 10 mg by mouth, Disp: , Rfl:     Dapagliflozin Propanediol (Farxiga) 10 MG TABS, Take 10 mg by mouth daily, Disp: , Rfl:     fluticasone (FLOVENT HFA) 220 mcg/act inhaler, Inhale 2 puffs 2 (two) times a day Rinse mouth after use, Disp: 12 g, Rfl: 5    furosemide (LASIX) 20 mg tablet, Take 1 tablet (20 mg total) by mouth daily, Disp: 30 tablet, Rfl: 2    glimepiride (AMARYL) 4 mg tablet, Take 4 mg by mouth 2 (two) times a day, Disp: , Rfl:     glucose blood (CONTOUR NEXT TEST) test strip, Test blood sugar once daily or as needed for fluctuating blood sugars, Disp: 100 each, Rfl: 3    guaiFENesin (MUCINEX) 600 mg 12 hr tablet, Take 1 tablet (600 mg total) by mouth every 12 (twelve) hours, Disp: 60 tablet, Rfl: 0    Insulin Pen Needle (BD Pen Needle Dee Dee U/F) 32G X 4 MM MISC, Use 4 a day, Disp: 200 each, Rfl: 5    ipratropium-albuterol (DUO-NEB) 0 5-2 5 mg/3 mL nebulizer solution, USE 1 AMPULE IN NEBULIZER EVERY 6 HOURS AS NEEDED FOR WHEEZING OR SHORTNESS OF BREATH, Disp: 360 mL, Rfl: 0    Lancets MISC, Test blood sugar once daily or as needed for fluctuating blood sugars, Disp: 100 each, Rfl: 0    Levemir FlexTouch 100 units/mL injection pen, Inject 60 Units under the skin daily , Disp: , Rfl:     losartan (COZAAR) 100 MG tablet, Take 1 tablet (100 mg total) by mouth daily, Disp: 90 tablet, Rfl: 3    metFORMIN (GLUCOPHAGE) 1000 MG tablet, TAKE 1 TABLET BY MOUTH TWICE DAILY WITH MEALS, Disp: 180 tablet, Rfl: 0    Multiple Vitamins-Minerals (MENS MULTIVITAMIN PO), Take by mouth, Disp: , Rfl:     Ozempic, 2 MG/DOSE, 8 MG/3ML SOPN, , Disp: , Rfl:     sertraline (ZOLOFT) 50 mg tablet, Take 1 tablet by mouth once daily, Disp: 30 tablet, Rfl: 0    spironolactone (ALDACTONE) 50 mg tablet, Take 1 tablet (50 mg total) by mouth daily, Disp: 90 tablet, Rfl: 3    umeclidinium-vilanterol (Anoro Ellipta) 62 5-25 MCG/INH inhaler, Inhale 1 puff daily, Disp: 60 blister, Rfl: 5    gabapentin (NEURONTIN) 400 mg capsule, Take 1 capsule (400 mg total) by mouth 3 (three) times a day, Disp: 90 capsule, Rfl: 2    Ozempic, 0 25 or 0 5 MG/DOSE, 2 MG/1 5ML SOPN, INJECT 0 5 MG UNDER THE SKIN ONCE WEEKLY  (Patient not taking: No sig reported), Disp: , Rfl:     Ozempic, 1 MG/DOSE, 4 MG/3ML SOPN injection pen, INJECT 1MG SUBCUTANEOUSLY ONCE A WEEK (Patient not taking: No sig reported), Disp: , Rfl:     predniSONE 10 mg tablet, Take 4 tablets (40 mg total) by mouth daily (Patient not taking: No sig reported), Disp: 20 tablet, Rfl: 0    sertraline (ZOLOFT) 50 mg tablet, Take 1 tablet (50 mg total) by mouth daily (Patient not taking: Reported on 10/4/2022), Disp: 30 tablet, Rfl: 2     Allergies   Allergen Reactions    Theophylline Other (See Comments)     Severe headaches  headaches  Severe headaches         Objective     Vitals:    10/04/22 1303   BP: 110/62   Pulse: 98   SpO2: 92%       General Appearance:    No acute distress  Cooperative  Appears stated age  Obese    Head:    Normocephalic  Atraumatic      Eyes:    Lids, conjunctiva normal  No scleral icterus  Ears:    Normal external ears  Nose:   Nares normal  No drainage  Mouth:   Lips, tongue normal  Mucosa normal     Neck:   Supple  Symmetrical    Back:     Symmetric  No CVA tenderness  Lungs:     Normal respiratory effort  Clear to auscultation bilaterally  Chest wall:    No tenderness or deformity  Heart:    Regular rate and rhythm  Normal S1 and S2  + murmur No JVD  Trace edema BL LE    Abdomen:     Soft  Non-tender  Bowel sounds active  Genitourinary:   No Swanson catheter present  Extremities:   Extremities normal  Atraumatic  No cyanosis  Skin:   Warm and dry  No pallor, jaundice, rash, ecchymoses  Neurologic:   Alert and oriented to person, place, time  No focal deficit  Current Weight: Weight - Scale: (!) 161 kg (356 lb)    First Weight:    Wt Readings from Last 3 Encounters:   10/04/22 (!) 161 kg (356 lb)   09/12/22 (!) 161 kg (355 lb 3 2 oz)   09/09/22 (!) 161 kg (354 lb)        Lab Results:  I have personally reviewed pertinent labs  5/23/22  Na 134 K 4 6 Cl 101 TCO2 24 BUN 36 Cr 1 2  glucose 356       Radiology review:  No results found  Counseling / Coordination of Care  Greater than 50% of total time was spent with the patient and / or family counseling and / or coordination of care  chart prep 15 min, direct patient care 30 min    Colleencarolina      This consultation note was produced in part using a dictation device which may document imprecise wording from author's original intent

## 2022-10-04 NOTE — PATIENT INSTRUCTIONS
Avoid motrin, advil, aleve as best you can  2  For the prevention of kidney injury: If you are sick and not eating well or drinking well OR vomiting or having diarrhea, temporarily hold your ACE-inhibitor / ARB / diuretic while sick and call office for further instructions  Otherwise, please take medications as prescribed  3  Check blood and urine tests in next 1-2  months  Follow up in 1 month

## 2022-10-06 ENCOUNTER — OFFICE VISIT (OUTPATIENT)
Dept: SLEEP CENTER | Facility: CLINIC | Age: 57
End: 2022-10-06
Payer: COMMERCIAL

## 2022-10-06 VITALS
OXYGEN SATURATION: 98 % | WEIGHT: 315 LBS | TEMPERATURE: 98.2 F | BODY MASS INDEX: 46.65 KG/M2 | HEIGHT: 69 IN | SYSTOLIC BLOOD PRESSURE: 122 MMHG | HEART RATE: 100 BPM | DIASTOLIC BLOOD PRESSURE: 70 MMHG

## 2022-10-06 DIAGNOSIS — G47.09 OTHER INSOMNIA: ICD-10-CM

## 2022-10-06 DIAGNOSIS — J96.11 CHRONIC HYPOXEMIC RESPIRATORY FAILURE (HCC): ICD-10-CM

## 2022-10-06 DIAGNOSIS — J44.9 COPD, SEVERE (HCC): ICD-10-CM

## 2022-10-06 DIAGNOSIS — G47.19 EXCESSIVE DAYTIME SLEEPINESS: ICD-10-CM

## 2022-10-06 DIAGNOSIS — I50.33 ACUTE ON CHRONIC DIASTOLIC HEART FAILURE (HCC): ICD-10-CM

## 2022-10-06 DIAGNOSIS — E11.40 TYPE 2 DIABETES MELLITUS WITH DIABETIC NEUROPATHY, WITH LONG-TERM CURRENT USE OF INSULIN (HCC): ICD-10-CM

## 2022-10-06 DIAGNOSIS — I35.0 NONRHEUMATIC AORTIC VALVE STENOSIS: ICD-10-CM

## 2022-10-06 DIAGNOSIS — I10 ESSENTIAL HYPERTENSION: ICD-10-CM

## 2022-10-06 DIAGNOSIS — E66.01 MORBID OBESITY WITH BMI OF 50.0-59.9, ADULT (HCC): ICD-10-CM

## 2022-10-06 DIAGNOSIS — G47.33 OSA (OBSTRUCTIVE SLEEP APNEA): Primary | ICD-10-CM

## 2022-10-06 DIAGNOSIS — Z79.4 TYPE 2 DIABETES MELLITUS WITH DIABETIC NEUROPATHY, WITH LONG-TERM CURRENT USE OF INSULIN (HCC): ICD-10-CM

## 2022-10-06 PROCEDURE — 99204 OFFICE O/P NEW MOD 45 MIN: CPT | Performed by: INTERNAL MEDICINE

## 2022-10-06 NOTE — PROGRESS NOTES
Review of Systems      Genitourinary none   Cardiology ankle/leg swelling   Gastrointestinal none   Neurology frequent headaches, need to move extremities, muscle weakness, numbness/tingling of an extremity, forgetfulness and balance problems   Constitutional claustrophobia and fatigue   Integumentary none   Psychiatry aggressiveness or irritability   Musculoskeletal joint pain, muscle aches, legs twitching/jerking and leg cramps   Pulmonary shortness of breath with activity, wheezing, frequent cough and difficulty breathing when lying flat    ENT throat clearing   Endocrine none   Hematological none

## 2022-10-06 NOTE — PATIENT INSTRUCTIONS

## 2022-10-06 NOTE — PROGRESS NOTES
Consultation - [address]  61 y o  male  :1965  [facility]  DOS:10/6/2022    Physician Requesting Consult: Flori Johansen PA-C             Reason for Consult : At your kind request I saw Kolton Bennett for initial sleep evaluation today  He was diagnosed with obstructive sleep apnea in the past and used CPAP for several decades  He had recent repeat studies and BiPAP was initiated  He is here to review results and adjust therapy  He had repeat studies in  interpreted by Dr Brady Chin  The diagnostic study demonstrated an AHI of 41 8 per hour, minimum oxygen saturation was 59% and 153 minutes of time asleep spent with saturations below 90%  Severity may be under stated since he had poor sleep efficiency at 41% and no stage REM  During the subsequent therapeutic study, sleep disordered breathing was sufficiently remediated with BiPAP at 20/14 cm H2O  The AHI at this setting was 0 5 per hour with mean oxygen saturation of 91 4% with brief desaturations to a d[place] of 87%  PFSH, Problem List, Medications & Allergies were reviewed in EMR  Roni Pedraza  has a past medical history of Aortic stenosis, COPD (chronic obstructive pulmonary disease) ([facility] [address] ), Diabetes ([facility] ), Diabetes mellitus ([facility] ), Hyperlipidemia, Hypertension (2014), and Sleep apnea, obstructive  He has a current medication list which includes the following prescription(s): albuterol, amlodipine, ammonium lactate, aspirin, atorvastatin, contour next monitor, carvedilol, cetirizine, cyclobenzaprine, farxiga, fluticasone, furosemide, glimepiride, glucose blood, guaifenesin, insulin pen needle, ipratropium-albuterol, lancets, levemir flextouch, losartan, metformin, multiple vitamins-minerals, ozempic (2 mg/dose), sertraline, spironolactone, anoro ellipta, gabapentin, ozempic (0 25 or 0 5 mg/dose), ozempic (1 mg/dose), prednisone, and sertraline        HPI:  He is using a [employer] machine that he got in 2021  He is benefiting from use and experiencing no adverse effects  Patient reports has not been using [employer] to sanitize the machine  Compliance data shows use for more than 4 hours 100% of the time  Respiratory event index is 0 4 per hour at 25/14 cm H2O with pressure support of 6 cm H2O  Other Complaints: none  Restless Leg Syndrome: reports no suggestive symptoms but reports paresthesias and pain due to diabetic peripheral neuropathy       Parasomnia: no features reported    Sleep Routine (on average):   Typical Bedtime:  11:00 p m  Gets OOB:  6:30 a m  TIB:7 5 hrs  Sleep latency: upto 60 minutes  He has racing thoughts and is on his phone or watches TV  Sleep Interruptions:3-4/nite able to fall back asleep  Awakens: needing an alarm  Upon awakening: feels more refreshed since on Rx   He estimates getting 5 hrs sleep  Kennane Jacki reports Excessive Daytime Sleepiness has improved, is not dozing off unintentionally but dozes off when sedentary at home that he attributes to boredom  He rated himself at Total score: 21 /24 on the [place] Sleepiness Scale  Habits:  reports that he has quit smoking  His smoking use included cigarettes  He quit after 10 00 years of use  He has never used smokeless tobacco  ;   E-Cigarette/Vaping    E-Cigarette Use Never User     ;  reports no history of drug use ;  reports no history of alcohol use  ; Caffeine use:excessive until bedtime; Exercise routine: regular through walking as part of his job  Family History:  Suspects brother has obstructive sleep apnea and mother is confirmed  ROS - reviewed and as attached  Significant for around 10 lb weight reduction in the past month since he started a new job  He has nasal and respiratory symptoms for which she is on treatment  He has some swelling of his legs but reported no other cardiac symptoms       EXAM:  /70 (BP Location: Left arm, Cuff Size: Large)   Pulse 100   Temp 98 2 °F (36 8 °C) (Temporal)   Ht 5' 9" (1 753 m)   Wt (!) 162 kg (358 lb)   SpO2 98%   BMI 52 87 kg/m²    General: Well groomed male, well appearing, in no apparent distress  Neurological: Alert and orientated;  cooperative; Cranial nerves intact;    Psychiatric: Speech:clear and coherent;  Normal mood, affect & thought   Skin: warm and dry; Color& Hydration good; no facial rashes or lesions   HEENT:  Craniofacial anatomy: retrognathia and maxillary hypoplasia Sinuses: non- tender  TMJ: Normal    Eyes: EOM's intact;  conjunctiva/corneas clear   Ears: Externallyappear normal     Nasal Airway: assymetric nares Septum:  Deviated; Mucosa: normal; Turbinates: normal; Rhinorrhea: None   Mouth: Lips: normal posture; Dentition: poor dentition and irregular  Mucosa:moist  ; Hard Palate:narrow and short    Oropharryx: crowded and AP narrowing Tongue: Mallampati:Class IV, Mobile and MacroglossiaSoft Palate:  redundant  Tonsils: absent  Neck:is thick and excess fatty tissue; Neck Circumference: 22 "; Supple; no abnormal masses; Thyroid:normal  Trachea:central     Lymph: No Cervical or Submandibular Lymhadenopathy  Heart: S1,S2 normal; RRR; no gallop; 3/6 ejection systolic murmur   Lungs: Respiratory Effort:normal  Air entry good bilaterally  No wheezes  No rales  Abdomen: Obese, Soft & non-tender    Extremities: No pedal edema  No clubbing or cyanosis  Musculoskeletal:  Motor normal; Gait:normal        IMPRESSION: Primary/Secondary Sleep Diagnoses (to Medical or Psych conditions) & Comorbidities   1  SCOTT (obstructive sleep apnea)  Ambulatory Referral to Sleep Medicine    PAP DME Resupply/Reorder    Ambulatory referral to Weight Management   2  Other insomnia     3  Excessive daytime sleepiness     4  COPD, severe ([address] )     5  Chronic hypoxemic respiratory failure (HCC)     6  Acute on chronic diastolic heart failure ([address] )     7  Essential hypertension     8  Nonrheumatic aortic valve stenosis     9   Morbid obesity with BMI of 50 0-59 9, adult ([address] ) Ambulatory referral to Weight Management   10  Type 2 diabetes mellitus with diabetic neuropathy, with long-term current use of insulin ([facility] )          PLAN:   1  I reviewed results of the Sleep study with the patient  2  With respect to above conditions, I counseled on pathophysiology, diagnosis, treatment options, risks and benefits; inter-relationship and effects on symptoms and comorbidities; risks of no treatment; costs and insurance aspects  3  Patient elected to continue positive airway pressure therapy and it is medically necessary  4  Multi component Cognitive behavioral therapy for Insomnia undertaken - Sleep Restriction, Stimulus control, Relaxation techniques and Sleep hygiene were discussed  5  I also advised on weight reduction and referred to weight management program   6  Follow-up to be scheduled in a year or sooner if needed to monitor progress and adjust therapy  Sincerely,      Authenticated electronically on 56/08/42   Board Certified Specialist     Portions of the record may have been created with voice recognition software  Occasional wrong word or "sound a like" substitutions may have occurred due to the inherent limitations of voice recognition software  There may also be notations and random deletions of words or characters from malfunctioning software  Read the chart carefully and recognize, using context, where substitutions/deletions have occurred

## 2022-10-07 ENCOUNTER — TELEPHONE (OUTPATIENT)
Dept: SLEEP CENTER | Facility: CLINIC | Age: 57
End: 2022-10-07

## 2022-10-07 NOTE — TELEPHONE ENCOUNTER
Rx for resupply and clinicals sent to [facility] via [address] [place] [address]

## 2022-10-10 DIAGNOSIS — J44.9 COPD, SEVERE (HCC): ICD-10-CM

## 2022-10-10 RX ORDER — FLUTICASONE PROPIONATE 220 UG/1
2 AEROSOL, METERED RESPIRATORY (INHALATION) 2 TIMES DAILY
Qty: 12 G | Refills: 5 | Status: SHIPPED | OUTPATIENT
Start: 2022-10-10

## 2022-10-13 ENCOUNTER — TELEPHONE (OUTPATIENT)
Dept: PULMONOLOGY | Facility: CLINIC | Age: 57
End: 2022-10-13

## 2022-10-13 DIAGNOSIS — I51.89 DIASTOLIC DYSFUNCTION WITHOUT HEART FAILURE: ICD-10-CM

## 2022-10-13 RX ORDER — CARVEDILOL 12.5 MG/1
12.5 TABLET ORAL 2 TIMES DAILY WITH MEALS
Qty: 180 TABLET | Refills: 3 | Status: SHIPPED | OUTPATIENT
Start: 2022-10-13

## 2022-10-13 NOTE — TELEPHONE ENCOUNTER
[facility]  Genesis Ortiz called he needs his carvedilol 12 5 mg filled     Carvedilol 12 5 mg  1 tab twice daily  180 with 3 refills     Dr Winston Acosta in [place]

## 2022-10-13 NOTE — TELEPHONE ENCOUNTER
Patient called  He has lung infection, wheezing, bringing up green phlegm, requesting antibiotic and steroids

## 2022-10-14 ENCOUNTER — OFFICE VISIT (OUTPATIENT)
Dept: PULMONOLOGY | Facility: CLINIC | Age: 57
End: 2022-10-14
Payer: COMMERCIAL

## 2022-10-14 VITALS
OXYGEN SATURATION: 91 % | DIASTOLIC BLOOD PRESSURE: 66 MMHG | WEIGHT: 315 LBS | HEIGHT: 69 IN | BODY MASS INDEX: 46.65 KG/M2 | HEART RATE: 85 BPM | TEMPERATURE: 98.3 F | SYSTOLIC BLOOD PRESSURE: 109 MMHG

## 2022-10-14 DIAGNOSIS — J44.9 COPD, SEVERE (HCC): Primary | ICD-10-CM

## 2022-10-14 DIAGNOSIS — R06.09 DOE (DYSPNEA ON EXERTION): ICD-10-CM

## 2022-10-14 DIAGNOSIS — G47.33 OSA (OBSTRUCTIVE SLEEP APNEA): ICD-10-CM

## 2022-10-14 PROCEDURE — 99214 OFFICE O/P EST MOD 30 MIN: CPT | Performed by: INTERNAL MEDICINE

## 2022-10-14 RX ORDER — PREDNISONE 20 MG/1
40 TABLET ORAL DAILY
Qty: 14 TABLET | Refills: 1 | Status: SHIPPED | OUTPATIENT
Start: 2022-10-14 | End: 2022-10-21

## 2022-10-14 RX ORDER — AZITHROMYCIN 250 MG/1
TABLET, FILM COATED ORAL
Qty: 6 TABLET | Refills: 0 | Status: SHIPPED | OUTPATIENT
Start: 2022-10-14 | End: 2022-10-18

## 2022-10-14 NOTE — ASSESSMENT & PLAN NOTE
Patient has underlying severe COPD  He presents to the office today for sick visit with evidence of mild COPD exacerbation as manifest by increased nebulizer treatments, shortness of breath, chest tightness and wheezing  This is similar to previous exacerbations  Lungs demonstrate mild wheezing  No other systemic symptoms  Plan  Treat for mild COPD exacerbation  Prednisone    40 mg p o  X7 days  Z-Melvin  Cont duonebs  Cont anoro, flovent

## 2022-10-14 NOTE — PROGRESS NOTES
Answers for HPI/ROS submitted by the patient on 10/13/2022  Do you have chest tightness?: Yes  Do you have a cough?: Yes  Do you have difficulty breathing?: Yes  Do you experience frequent throat clearing?: Yes  Do you have a hoarse voice?: Yes  Do you have shortness of breath?: Yes  Do you have a wet cough?: Yes  Do you have wheezing?: Yes  Chronicity: new  When did you first notice your symptoms?: in the past 7 days  How often do your symptoms occur?: constantly  Since you first noticed this problem, how has it changed?: gradually worsening  Have you had a change in appetite?: No  Do you have chest pain?: No  Do you have shortness of breath that occurs with effort or exertion?: Yes  Do you have ear congestion?: No  Do you have ear pain?: No  Do you have a fever?: No  Do you have headaches?: Yes  Do you have heartburn?: No  Do you have fatigue?: No  Do you have muscle pain?: No  Do you have nasal congestion?: No  Do you have shortness of breath when lying flat?: Yes  Do you have shortness of breath when you wake up?: Yes  Do you have post-nasal drip?: No  Do you have a runny nose?: No  Do you have sneezing?: No  Do you have a sore throat?: No  Do you have sweats?: No  Do you have trouble swallowing?: No  Have you experienced weight loss?: No  Which of the following makes your symptoms worse?: any activity, change in weather, climbing stairs, exercise, exposure to fumes, exposure to smoke, minimal activity, strenuous activity, URI  Which of the following makes your symptoms better?: oral steroids, steroid inhaler    Pulmonary Follow Up Note   Hilton Page 61 y o  male MRN: [id number]  10/14/2022      Assessment:    Chronic hypoxemic respiratory failure (HCC)  Continue 2 L supplemental O2 q h s  With BiPAP  COPD, severe ([facility] [address] )  Patient has underlying severe COPD    He presents to the office today for sick visit with evidence of mild COPD exacerbation as manifest by increased nebulizer treatments, shortness of breath, chest tightness and wheezing  This is similar to previous exacerbations  Lungs demonstrate mild wheezing  No other systemic symptoms  Plan  Treat for mild COPD exacerbation  Prednisone  40 mg p o  X7 days  Z-Melvin  Cont duonebs  Cont anoro, flovent       SCOTT (obstructive sleep apnea)  Continue auto titrating BiPAP for sleep      Plan:    There are no diagnoses linked to this encounter  No follow-ups on file  History of Present Illness   HPI:  Ninfa Gan is a 61 y o  male with a history of severe COPD, obstructive sleep apnea (on auto BiPAP), chronic hypoxic respiratory failure, uses 2 L q h s , morbid obesity, history of 5 mm left lower lobe pulmonary nodule  Patient was last seen in September by JORGE A Pickett  He presents today for sick visit  For the past 7 days has increased shortness of breath, cough, chest tightness and wheezing  He is not experiencing any fevers, chills or sweats  Denies hemoptysis  Denies sinonasal congestion  States that his current symptoms are similar to a episodes of COPD exacerbation/bronchitis that he has had previously that typically occurs seasonally  No other unusual symptoms with this episode  He does not have other acute issues today          Answers for HPI/ROS submitted by the patient on 10/13/2022  Do you have chest tightness?: Yes  Do you have difficulty breathing?: Yes  Do you experience frequent throat clearing?: Yes  Do you have a hoarse voice?: Yes  Do you have a wet cough?: Yes  Chronicity: new  When did you first notice your symptoms?: in the past 7 days  How often do your symptoms occur?: constantly  Since you first noticed this problem, how has it changed?: gradually worsening  Do you have shortness of breath that occurs with effort or exertion?: Yes  Do you have ear congestion?: No  Do you have heartburn?: No  Do you have fatigue?: No  Do you have nasal congestion?: No  Do you have shortness of breath when lying flat?: Yes  Do you have shortness of breath when you wake up?: Yes  Do you have sweats?: No  Have you experienced weight loss?: No  Which of the following makes your symptoms worse?: any activity, change in weather, climbing stairs, exercise, exposure to fumes, exposure to smoke, minimal activity, strenuous activity, URI  Which of the following makes your symptoms better?: oral steroids, steroid inhaler        Review of Systems   Constitutional: Negative for appetite change, chills, fatigue and fever  HENT: Negative for congestion, ear pain, nosebleeds, postnasal drip, rhinorrhea, sinus pressure, sneezing, sore throat and trouble swallowing  Eyes: Negative for discharge, redness and itching  Respiratory: Positive for cough, shortness of breath and wheezing  Negative for choking, chest tightness and stridor  Cardiovascular: Negative for chest pain, palpitations and leg swelling  Gastrointestinal: Negative for blood in stool  Genitourinary: Negative for difficulty urinating and dysuria  Musculoskeletal: Negative for arthralgias, joint swelling and myalgias  Skin: Negative for color change and rash  Neurological: Positive for headaches  Negative for light-headedness  Hematological: Negative for adenopathy         Historical Information   Past Medical History:   Diagnosis Date   • Aortic stenosis    • COPD (chronic obstructive pulmonary disease) ([facility])    • Diabetes ([facility] )    • Diabetes mellitus ([facility] )    • Hyperlipidemia    • Hypertension 7/2/2014   • Sleep apnea, obstructive      Past Surgical History:   Procedure Laterality Date   • APPENDECTOMY     • EYE SURGERY       Family History   Problem Relation Age of Onset   • No Known Problems Mother          Meds/Allergies     Current Outpatient Medications:   •  albuterol (Ventolin HFA) 90 mcg/act inhaler, Inhale 2 puffs every 6 (six) hours as needed for wheezing, Disp: 18 g, Rfl: 3  •  amLODIPine (NORVASC) 10 mg tablet, Take 1 tablet (10 mg total) by mouth daily, Disp: 90 tablet, Rfl: 3  •  ammonium lactate (LAC-HYDRIN) 12 % cream, Apply topically as needed for dry skin, Disp: 385 g, Rfl: 0  •  aspirin (ECOTRIN LOW STRENGTH) 81 mg EC tablet, Take 1 tablet (81 mg total) by mouth daily, Disp: 30 tablet, Rfl: 5  •  atorvastatin (LIPITOR) 40 mg tablet, Take 1 tablet (40 mg total) by mouth daily, Disp: 90 tablet, Rfl: 3  •  azithromycin (ZITHROMAX) 250 mg tablet, Take 2 tablets today then 1 tablet daily x 4 days, Disp: 6 tablet, Rfl: 0  •  Blood Glucose Monitoring Suppl (CONTOUR NEXT MONITOR) w/Device KIT, Test blood sugar once daily or as needed for fluctuating blood sugars, Disp: 1 kit, Rfl: 0  •  carvedilol (COREG) 12 5 mg tablet, Take 1 tablet (12 5 mg total) by mouth 2 (two) times a day with meals, Disp: 180 tablet, Rfl: 3  •  cetirizine (ZyrTEC) 10 mg tablet, Take 10 mg by mouth, Disp: , Rfl:   •  cyclobenzaprine (FLEXERIL) 10 mg tablet, Take 10 mg by mouth, Disp: , Rfl:   •  Dapagliflozin Propanediol (Farxiga) 10 MG TABS, Take 10 mg by mouth daily, Disp: , Rfl:   •  fluticasone (FLOVENT HFA) 220 mcg/act inhaler, Inhale 2 puffs 2 (two) times a day Rinse mouth after use, Disp: 12 g, Rfl: 5  •  furosemide (LASIX) 20 mg tablet, Take 1 tablet (20 mg total) by mouth daily, Disp: 30 tablet, Rfl: 2  •  gabapentin (NEURONTIN) 400 mg capsule, Take 1 capsule (400 mg total) by mouth 3 (three) times a day, Disp: 90 capsule, Rfl: 2  •  glimepiride (AMARYL) 4 mg tablet, Take 4 mg by mouth 2 (two) times a day, Disp: , Rfl:   •  glucose blood (CONTOUR NEXT TEST) test strip, Test blood sugar once daily or as needed for fluctuating blood sugars, Disp: 100 each, Rfl: 3  •  guaiFENesin (MUCINEX) 600 mg 12 hr tablet, Take 1 tablet (600 mg total) by mouth every 12 (twelve) hours, Disp: 60 tablet, Rfl: 0  •  Insulin Pen Needle (BD Pen Needle Dee Dee U/F) 32G X 4 MM MISC, Use 4 a day, Disp: 200 each, Rfl: 5  •  ipratropium-albuterol (DUO-NEB) 0 5-2 5 mg/3 mL nebulizer solution, USE 1 AMPULE IN NEBULIZER EVERY 6 HOURS AS NEEDED FOR WHEEZING OR SHORTNESS OF BREATH, Disp: 360 mL, Rfl: 0  •  Lancets MISC, Test blood sugar once daily or as needed for fluctuating blood sugars, Disp: 100 each, Rfl: 0  •  Levemir FlexTouch 100 units/mL injection pen, Inject 60 Units under the skin daily , Disp: , Rfl:   •  losartan (COZAAR) 100 MG tablet, Take 1 tablet (100 mg total) by mouth daily, Disp: 90 tablet, Rfl: 3  •  metFORMIN (GLUCOPHAGE) 1000 MG tablet, TAKE 1 TABLET BY MOUTH TWICE DAILY WITH MEALS, Disp: 180 tablet, Rfl: 0  •  Multiple Vitamins-Minerals (MENS MULTIVITAMIN PO), Take by mouth, Disp: , Rfl:   •  Ozempic, 2 MG/DOSE, 8 MG/3ML SOPN, , Disp: , Rfl:   •  predniSONE 20 mg tablet, Take 2 tablets (40 mg total) by mouth daily for 7 days, Disp: 14 tablet, Rfl: 1  •  sertraline (ZOLOFT) 50 mg tablet, Take 1 tablet by mouth once daily, Disp: 30 tablet, Rfl: 0  •  spironolactone (ALDACTONE) 50 mg tablet, Take 1 tablet (50 mg total) by mouth daily, Disp: 90 tablet, Rfl: 3  •  umeclidinium-vilanterol (Anoro Ellipta) 62 5-25 MCG/INH inhaler, Inhale 1 puff daily, Disp: 60 blister, Rfl: 5  •  Ozempic, 0 25 or 0 5 MG/DOSE, 2 MG/1 5ML SOPN, INJECT 0 5 MG UNDER THE SKIN ONCE WEEKLY  (Patient not taking: No sig reported), Disp: , Rfl:   •  Ozempic, 1 MG/DOSE, 4 MG/3ML SOPN injection pen, INJECT 1MG SUBCUTANEOUSLY ONCE A WEEK (Patient not taking: No sig reported), Disp: , Rfl:   •  predniSONE 10 mg tablet, Take 4 tablets (40 mg total) by mouth daily (Patient not taking: No sig reported), Disp: 20 tablet, Rfl: 0  •  sertraline (ZOLOFT) 50 mg tablet, Take 1 tablet (50 mg total) by mouth daily (Patient not taking: No sig reported), Disp: 30 tablet, Rfl: 2  Allergies   Allergen Reactions   • Theophylline Other (See Comments)     Severe headaches  headaches  Severe headaches         Vitals: Blood pressure 109/66, pulse 85, temperature 98 3 °F (36 8 °C), temperature source Tympanic, height 5' 9" (1 753 m), weight (!) 162 kg (358 lb), SpO2 91 %  Body mass index is 52 87 kg/m²  Oxygen Therapy  SpO2: 91 %  Oxygen Therapy: None (Room air)      Physical Exam  Physical Exam  Vitals reviewed  Constitutional:       General: He is not in acute distress  Appearance: He is well-developed  He is not ill-appearing, toxic-appearing or diaphoretic  HENT:      Head: Normocephalic and atraumatic  Right Ear: External ear normal       Left Ear: External ear normal       Nose: Nose normal  No congestion or rhinorrhea  Mouth/Throat:      Pharynx: No oropharyngeal exudate or posterior oropharyngeal erythema  Eyes:      General: No scleral icterus  Right eye: No discharge  Left eye: No discharge  Conjunctiva/sclera: Conjunctivae normal       Pupils: Pupils are equal, round, and reactive to light  Neck:      Trachea: No tracheal deviation  Cardiovascular:      Rate and Rhythm: Normal rate and regular rhythm  Heart sounds: Normal heart sounds  No murmur heard  No friction rub  No gallop  Comments: Systolic ejection murmur  Pulmonary:      Effort: Pulmonary effort is normal       Breath sounds: Normal breath sounds  No stridor  No wheezing or rales  Comments: Scant wheezes  Musculoskeletal:      Cervical back: Normal range of motion  Right lower leg: No edema  Left lower leg: No edema  Lymphadenopathy:      Head:      Right side of head: No submental, submandibular, preauricular or posterior auricular adenopathy  Left side of head: No submental, submandibular, preauricular or posterior auricular adenopathy  Cervical: No cervical adenopathy  Skin:     General: Skin is warm and dry  Findings: No lesion or rash  Neurological:      Mental Status: He is alert and oriented to person, place, and time  Labs: I have personally reviewed pertinent lab results    Lab Results   Component Value Date    WBC 9 41 04/21/2022    HGB 14 5 04/21/2022    HCT 44 5 04/21/2022    MCV 88 04/21/2022    PLT 284 04/21/2022     Lab Results   Component Value Date    CALCIUM 9 0 05/23/2022    K 4 6 05/23/2022    CO2 24 05/23/2022     05/23/2022    BUN 36 (H) 05/23/2022    CREATININE 1 20 05/23/2022     No results found for: IGE  Lab Results   Component Value Date    ALT 41 01/26/2022    AST 17 01/26/2022    ALKPHOS 83 01/26/2022        Imaging and other studies: I have personally reviewed pertinent reports  and I have personally reviewed pertinent films in PACS    CT angiogram August 2022  IMPRESSION:     No large filling defect in the pulmonary trunk or main pulmonary arteries  More peripheral branches are poorly assessed and therefore indeterminate      Images are degraded by artifact  Diffuse bronchial wall thickening, groundglass opacities which appear appear to be distributed along the airways and groupings of tree-in-bud nodularity in the lingula and left lower lobe; likely represents an infectious   and/or inflammatory process  5 mm left lower lobe nodule may be part of the same process  Recommend 3 month CT follow-up  MARIEL Jarrett [facility]

## 2022-10-17 ENCOUNTER — OFFICE VISIT (OUTPATIENT)
Dept: BARIATRICS | Facility: CLINIC | Age: 57
End: 2022-10-17
Payer: COMMERCIAL

## 2022-10-17 VITALS
HEIGHT: 69 IN | OXYGEN SATURATION: 95 % | BODY MASS INDEX: 46.65 KG/M2 | WEIGHT: 315 LBS | DIASTOLIC BLOOD PRESSURE: 70 MMHG | SYSTOLIC BLOOD PRESSURE: 100 MMHG | TEMPERATURE: 97.8 F | HEART RATE: 90 BPM

## 2022-10-17 DIAGNOSIS — G47.33 OSA (OBSTRUCTIVE SLEEP APNEA): ICD-10-CM

## 2022-10-17 DIAGNOSIS — E11.40 TYPE 2 DIABETES MELLITUS WITH DIABETIC NEUROPATHY, WITH LONG-TERM CURRENT USE OF INSULIN (HCC): ICD-10-CM

## 2022-10-17 DIAGNOSIS — Z79.4 TYPE 2 DIABETES MELLITUS WITH DIABETIC NEUROPATHY, WITH LONG-TERM CURRENT USE OF INSULIN (HCC): ICD-10-CM

## 2022-10-17 DIAGNOSIS — E66.01 MORBID OBESITY WITH BMI OF 50.0-59.9, ADULT (HCC): Primary | ICD-10-CM

## 2022-10-17 PROCEDURE — 99204 OFFICE O/P NEW MOD 45 MIN: CPT | Performed by: PHYSICIAN ASSISTANT

## 2022-10-17 NOTE — PATIENT INSTRUCTIONS
Goals: Food log (ie ) www myfitnesspal com,sparkpeople  com,loseit com,calorieking  com,etc  baritastic; NOOM  No sugary beverages  At least 64oz of water daily  Increase physical activity by 10 minutes daily  Gradually increase physical activity to a goal of 5 days per week for 30 minutes of MODERATE intensity PLUS 2 days per week of FULL BODY resistance training  5-10 servings of fruits and vegetables per day and 25-35 grams of dietary fiber per day, gradually increasing  Monitor blood pressure and notify the provider/Cardiologist if consistently around 100/60 or you have symptoms of low blood pressure (lightheadedness/dizziness)  Recommend checking your blood sugar one-three times a day  Goal is    Notify the provider if consistently outside of this range   Measure all portions: creamers, sugars, cooking oils/butters, condiments, dressings, etc and log calories   If choosing starch pick whole grain/complex carbs and keep to 3/4 cup: oatmeal, brown rice, quinoa, whole wheat pasta, potatoes, sweet potato, chickpea noodles, red lentil noodles  Add lunch even if more of a snack like yogurt and fruit or 1/4 c of nuts and veg/fruit  6818-8245 calories

## 2022-10-17 NOTE — ASSESSMENT & PLAN NOTE
Lab Results   Component Value Date    HGBA1C 9 4 07/19/2022     -c/w lifestyle changes/weight loss  -On OZempic   Metformin, farxiga, glimiperide

## 2022-10-17 NOTE — PROGRESS NOTES
Assessment/Plan: Morbid obesity with BMI of 50 0-59 9, adult ([facility] [address] )  -Discussed options of Healthy[facility]-Intensive Lifestyle Intervention Program, Very Low Calorie Diet-VLCD, Conservative Program, Anjel-En-Y Gastric Bypass and Vertical Sleeve Gastrectomy and the role of weight loss medications   -Initial weight loss goal of 5-10% weight loss for improved health  -Not interested in bariatric surgery  -AVOID phentermine/Qsymia due to cardiac history  -On Ozempic   -Screening labs: BMP reviewed from 5/23/22, A1c reviewed from 7/19/22, TSH reviewed from 1/22/22  -Patient is interested in pursuing Conservative Program   -Single parent  -Has seen RD in past, not interested at this point  -Calorie goals, sample menu, portion size guidelines, and food logging reviewed with the patient  Increase water, decrease diet tea; bfast recs provided, add lunch- suggestions provided    Type 2 diabetes mellitus with diabetic neuropathy, with long-term current use of insulin ([facility])    Lab Results   Component Value Date    HGBA1C 9 4 07/19/2022     -c/w lifestyle changes/weight loss  -On OZempic  Metformin, farxiga, glimiperide      Follow up in approximately 3 months with Non-Surgical Physician/Advanced Practitioner  Goals:  Food log (ie ) www myfitnesspal com,sparkpeople  com,loseit com,calorieking  com,etc  baritastic; NOOM  No sugary beverages  At least 64oz of water daily  Increase physical activity by 10 minutes daily  Gradually increase physical activity to a goal of 5 days per week for 30 minutes of MODERATE intensity PLUS 2 days per week of FULL BODY resistance training  5-10 servings of fruits and vegetables per day and 25-35 grams of dietary fiber per day, gradually increasing  Monitor blood pressure and notify the provider/Cardiologist if consistently around 100/60 or you have symptoms of low blood pressure (lightheadedness/dizziness)  Recommend checking your blood sugar one-three times a day  Goal is    Notify the provider if consistently outside of this range   Measure all portions: creamers, sugars, cooking oils/butters, condiments, dressings, etc and log calories   If choosing starch pick whole grain/complex carbs and keep to 3/4 cup: oatmeal, brown rice, quinoa, whole wheat pasta, potatoes, sweet potato, chickpea noodles, red lentil noodles  Add lunch even if more of a snack like yogurt and fruit or 1/4 c of nuts and veg/fruit  2715-4345 calories    Diagnoses and all orders for this visit:    Morbid obesity with BMI of 50 0-59 9, adult ([place] [address] )  -     Ambulatory referral to Weight Management    SCOTT (obstructive sleep apnea)  -     Ambulatory referral to Weight Management    Type 2 diabetes mellitus with diabetic neuropathy, with long-term current use of insulin ([place] [address] )        Subjective:   Chief Complaint   Patient presents with   • Consult     Already uses a bipap machine  Patient ID: Jessica Plaza  is a 61 y o  male with excess weight/obesity here to pursue weight management    Past Medical History:   Diagnosis Date   • Aortic stenosis    • COPD (chronic obstructive pulmonary disease) ([facility])    • Diabetes ([place] [address] )    • Diabetes mellitus ([place] [address] )    • Hyperlipidemia    • Hypertension 7/2/2014   • Sleep apnea, obstructive      HPI:  Obesity/Excess Weight:  Severity: Very Severe  Onset:  Entire life, worsened in the past 10 years after quitting smoking    Modifiers: no serious previous attempts  Contributing factors:   Poor choices/poor habits set from previous generations; 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), unable to exercise, quitting smoking, stress eat, boredom  Associated symptoms: comorbid conditions, decreased exercise capacity and increased shortness of breath    Goals: into 200s  Hydration: minimal water, mostly diet tea; drinks coffee in the winter  Alcohol: denies other than special occasions  Has cut back on dining out- now more like once per once  Exercise: recently started walking  Occupation: retired , 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    Has noticed some weight loss- attributes to walking and change in cooking with now living with his mom, also improved meal timing  Using air fryer    B: 4 slices wheat toast with butter  S: none  L: skips (not hungry or busy)   S: not usually  D: protein + veg + starch  S: not usually    The following portions of the patient's history were reviewed and updated as appropriate: allergies, current medications, past family history, past medical history, past social history, past surgical history and problem list     Review of Systems   Constitutional: Negative for chills and fever  HENT: Negative for sore throat  Respiratory: Positive for shortness of breath (attributed to COPD/asthma and recent URI- has seen pulmonary and management through them, sx improving; would like to se emore improvement with weight loss)  Negative for cough  Cardiovascular: Negative for chest pain and palpitations  Gastrointestinal: Negative for constipation and diarrhea  Genitourinary: Negative for dysuria  Musculoskeletal: Negative for arthralgias  Skin: Negative for rash  Neurological: Negative for headaches  Psychiatric/Behavioral: Negative  Objective:    /70 (BP Location: Left arm, Cuff Size: Large)   Pulse 90   Temp 97 8 °F (36 6 °C) (Temporal)   Ht 5' 9" (1 753 m)   Wt (!) 161 kg (354 lb)   SpO2 95%   BMI 52 28 kg/m²     Physical Exam  Vitals and nursing note reviewed  Constitutional   General appearance: Abnormal   well developed and morbidly obese  Eyes No conjunctival pallor  Ears, Nose, Mouth, and Throat Oral mucosa moist    Pulmonary   Respiratory effort: No increased work of breathing or signs of respiratory distress  Auscultation of lungs: Clear to auscultation, equal breath sounds bilaterally, no wheezes, no rales, no rhonci  Cardiovascular   Auscultation of heart: Normal rate and rhythm, normal S1 and S2, with grade III/VI murmurs  Examination of extremities for edema and/or varicosities: +1 edema  Abdomen   Abdomen: Abnormal   The abdomen was obese  Bowel sounds were normal  The abdomen was soft and nontender     Musculoskeletal   Gait and station: Normal     Psychiatric   Orientation to person, place and time: Normal     Affect: appropriate

## 2022-10-17 NOTE — ASSESSMENT & PLAN NOTE
-Discussed options of HealthyCORE-Intensive Lifestyle Intervention Program, Very Low Calorie Diet-VLCD, Conservative Program, Anjel-En-Y Gastric Bypass and Vertical Sleeve Gastrectomy and the role of weight loss medications   -Initial weight loss goal of 5-10% weight loss for improved health  -Not interested in bariatric surgery  -AVOID phentermine/Qsymia due to cardiac history  -On Ozempic   -Screening labs: BMP reviewed from 5/23/22, A1c reviewed from 7/19/22, TSH reviewed from 1/22/22  -Patient is interested in pursuing Conservative Program   -Single parent  -Has seen RD in past, not interested at this point  -Calorie goals, sample menu, portion size guidelines, and food logging reviewed with the patient   Increase water, decrease diet tea; bfast recs provided, add lunch- suggestions provided

## 2022-10-19 ENCOUNTER — TELEPHONE (OUTPATIENT)
Dept: PULMONOLOGY | Facility: CLINIC | Age: 57
End: 2022-10-19

## 2022-10-19 DIAGNOSIS — J20.9 ACUTE BRONCHITIS: Primary | ICD-10-CM

## 2022-10-19 RX ORDER — AMOXICILLIN AND CLAVULANATE POTASSIUM 875; 125 MG/1; MG/1
1 TABLET, FILM COATED ORAL EVERY 12 HOURS SCHEDULED
Qty: 14 TABLET | Refills: 0 | Status: SHIPPED | OUTPATIENT
Start: 2022-10-19 | End: 2022-10-26

## 2022-10-19 NOTE — TELEPHONE ENCOUNTER
Patient called, he was seen by Dr Rehana Escoto last week  He gave him an antibiotic and told him if he's not feeling better to call the office  He finished the antibiotic and not feeling better, maybe a little worse  Please advise

## 2022-10-28 DIAGNOSIS — E11.40 TYPE 2 DIABETES MELLITUS WITH DIABETIC NEUROPATHY, WITH LONG-TERM CURRENT USE OF INSULIN (HCC): ICD-10-CM

## 2022-10-28 DIAGNOSIS — Z79.4 TYPE 2 DIABETES MELLITUS WITH DIABETIC NEUROPATHY, WITH LONG-TERM CURRENT USE OF INSULIN (HCC): ICD-10-CM

## 2022-10-28 RX ORDER — GABAPENTIN 400 MG/1
400 CAPSULE ORAL 3 TIMES DAILY
Qty: 90 CAPSULE | Refills: 0 | Status: SHIPPED | OUTPATIENT
Start: 2022-10-28 | End: 2022-11-27

## 2022-11-03 ENCOUNTER — TELEPHONE (OUTPATIENT)
Dept: PULMONOLOGY | Facility: CLINIC | Age: 57
End: 2022-11-03

## 2022-11-03 DIAGNOSIS — J44.9 COPD, SEVERE (HCC): Primary | ICD-10-CM

## 2022-11-03 RX ORDER — PREDNISONE 10 MG/1
TABLET ORAL
Qty: 30 TABLET | Refills: 0 | Status: SHIPPED | OUTPATIENT
Start: 2022-11-03 | End: 2022-11-11 | Stop reason: SDUPTHER

## 2022-11-03 NOTE — TELEPHONE ENCOUNTER
Patient called  He was seen by [facility] recently  He was on antibiotic and prednisone  He is still not feeling better , wheezing and bringing up green phlegm  Please advise

## 2022-11-03 NOTE — TELEPHONE ENCOUNTER
Called patient to review symptoms  Sent prednisone taper to his pharmacy  Please arrange follow-up in the office with me next week

## 2022-11-08 ENCOUNTER — TELEPHONE (OUTPATIENT)
Dept: PULMONOLOGY | Facility: CLINIC | Age: 57
End: 2022-11-08

## 2022-11-08 NOTE — TELEPHONE ENCOUNTER
Marita Das, from [facility], called re: they need the office visit notes from 10/14/22 faxed to them @ [phone]  Please advise Becca Anderson): [phone]

## 2022-11-11 ENCOUNTER — OFFICE VISIT (OUTPATIENT)
Dept: PULMONOLOGY | Facility: CLINIC | Age: 57
End: 2022-11-11

## 2022-11-11 ENCOUNTER — APPOINTMENT (OUTPATIENT)
Dept: LAB | Facility: HOSPITAL | Age: 57
End: 2022-11-11
Attending: INTERNAL MEDICINE

## 2022-11-11 ENCOUNTER — HOSPITAL ENCOUNTER (OUTPATIENT)
Dept: RADIOLOGY | Facility: HOSPITAL | Age: 57
Discharge: HOME | End: 2022-11-11

## 2022-11-11 VITALS
SYSTOLIC BLOOD PRESSURE: 130 MMHG | DIASTOLIC BLOOD PRESSURE: 82 MMHG | OXYGEN SATURATION: 93 % | HEART RATE: 86 BPM | HEIGHT: 69 IN | BODY MASS INDEX: 46.65 KG/M2 | TEMPERATURE: 96.9 F | WEIGHT: 315 LBS

## 2022-11-11 DIAGNOSIS — J44.0 COPD WITH ACUTE BRONCHITIS (HCC): ICD-10-CM

## 2022-11-11 DIAGNOSIS — E66.01 MORBID OBESITY WITH BMI OF 50.0-59.9, ADULT (HCC): ICD-10-CM

## 2022-11-11 DIAGNOSIS — F32.A ANXIETY AND DEPRESSION: ICD-10-CM

## 2022-11-11 DIAGNOSIS — J20.9 COPD WITH ACUTE BRONCHITIS (HCC): ICD-10-CM

## 2022-11-11 DIAGNOSIS — F41.9 ANXIETY AND DEPRESSION: ICD-10-CM

## 2022-11-11 DIAGNOSIS — G47.33 OSA (OBSTRUCTIVE SLEEP APNEA): ICD-10-CM

## 2022-11-11 DIAGNOSIS — E87.1 HYPONATREMIA: ICD-10-CM

## 2022-11-11 DIAGNOSIS — R91.1 PULMONARY NODULE: ICD-10-CM

## 2022-11-11 DIAGNOSIS — J20.9 COPD WITH ACUTE BRONCHITIS (HCC): Primary | ICD-10-CM

## 2022-11-11 DIAGNOSIS — J44.0 COPD WITH ACUTE BRONCHITIS (HCC): Primary | ICD-10-CM

## 2022-11-11 DIAGNOSIS — J96.11 CHRONIC HYPOXEMIC RESPIRATORY FAILURE (HCC): ICD-10-CM

## 2022-11-11 DIAGNOSIS — N18.2 STAGE 2 CHRONIC KIDNEY DISEASE: ICD-10-CM

## 2022-11-11 DIAGNOSIS — J44.9 COPD, SEVERE (HCC): ICD-10-CM

## 2022-11-11 LAB
ALBUMIN SERPL BCP-MCNC: 3.6 G/DL (ref 3.5–5)
ALP SERPL-CCNC: 79 U/L (ref 46–116)
ALT SERPL W P-5'-P-CCNC: 45 U/L (ref 12–78)
ANION GAP SERPL CALCULATED.3IONS-SCNC: 6 MMOL/L (ref 4–13)
AST SERPL W P-5'-P-CCNC: 15 U/L (ref 5–45)
BACTERIA UR QL AUTO: ABNORMAL /HPF
BASOPHILS # BLD AUTO: 0.02 THOUSANDS/ÂΜL (ref 0–0.1)
BASOPHILS NFR BLD AUTO: 0 % (ref 0–1)
BILIRUB SERPL-MCNC: 0.64 MG/DL (ref 0.2–1)
BILIRUB UR QL STRIP: NEGATIVE
BUN SERPL-MCNC: 27 MG/DL (ref 5–25)
CALCIUM SERPL-MCNC: 9.5 MG/DL (ref 8.3–10.1)
CHLORIDE SERPL-SCNC: 104 MMOL/L (ref 96–108)
CLARITY UR: CLEAR
CO2 SERPL-SCNC: 26 MMOL/L (ref 21–32)
COLOR UR: YELLOW
CREAT SERPL-MCNC: 1.07 MG/DL (ref 0.6–1.3)
EOSINOPHIL # BLD AUTO: 0.01 THOUSAND/ÂΜL (ref 0–0.61)
EOSINOPHIL NFR BLD AUTO: 0 % (ref 0–6)
ERYTHROCYTE [DISTWIDTH] IN BLOOD BY AUTOMATED COUNT: 14.2 % (ref 11.6–15.1)
GFR SERPL CREATININE-BSD FRML MDRD: 76 ML/MIN/1.73SQ M
GLUCOSE P FAST SERPL-MCNC: 282 MG/DL (ref 65–99)
GLUCOSE UR STRIP-MCNC: ABNORMAL MG/DL
HCT VFR BLD AUTO: 47.7 % (ref 36.5–49.3)
HGB BLD-MCNC: 15.6 G/DL (ref 12–17)
HGB UR QL STRIP.AUTO: NEGATIVE
IMM GRANULOCYTES # BLD AUTO: 0.09 THOUSAND/UL (ref 0–0.2)
IMM GRANULOCYTES NFR BLD AUTO: 1 % (ref 0–2)
KETONES UR STRIP-MCNC: NEGATIVE MG/DL
LEUKOCYTE ESTERASE UR QL STRIP: ABNORMAL
LYMPHOCYTES # BLD AUTO: 0.59 THOUSANDS/ÂΜL (ref 0.6–4.47)
LYMPHOCYTES NFR BLD AUTO: 6 % (ref 14–44)
MAGNESIUM SERPL-MCNC: 2.1 MG/DL (ref 1.6–2.6)
MCH RBC QN AUTO: 27.7 PG (ref 26.8–34.3)
MCHC RBC AUTO-ENTMCNC: 32.7 G/DL (ref 31.4–37.4)
MCV RBC AUTO: 85 FL (ref 82–98)
MONOCYTES # BLD AUTO: 0.36 THOUSAND/ÂΜL (ref 0.17–1.22)
MONOCYTES NFR BLD AUTO: 4 % (ref 4–12)
NEUTROPHILS # BLD AUTO: 9.29 THOUSANDS/ÂΜL (ref 1.85–7.62)
NEUTS SEG NFR BLD AUTO: 89 % (ref 43–75)
NITRITE UR QL STRIP: NEGATIVE
NON-SQ EPI CELLS URNS QL MICRO: ABNORMAL /HPF
NRBC BLD AUTO-RTO: 0 /100 WBCS
PH UR STRIP.AUTO: 6.5 [PH]
PHOSPHATE SERPL-MCNC: 3 MG/DL (ref 2.7–4.5)
PLATELET # BLD AUTO: 266 THOUSANDS/UL (ref 149–390)
PMV BLD AUTO: 9.4 FL (ref 8.9–12.7)
POTASSIUM SERPL-SCNC: 4.8 MMOL/L (ref 3.5–5.3)
PROT SERPL-MCNC: 7.3 G/DL (ref 6.4–8.4)
PROT UR STRIP-MCNC: NEGATIVE MG/DL
PTH-INTACT SERPL-MCNC: 16.9 PG/ML (ref 18.4–80.1)
RBC # BLD AUTO: 5.64 MILLION/UL (ref 3.88–5.62)
RBC #/AREA URNS AUTO: ABNORMAL /HPF
SODIUM SERPL-SCNC: 136 MMOL/L (ref 135–147)
SP GR UR STRIP.AUTO: 1.01 (ref 1–1.03)
URATE SERPL-MCNC: 5 MG/DL (ref 3.5–8.5)
UROBILINOGEN UR QL STRIP.AUTO: 0.2 E.U./DL
WBC # BLD AUTO: 10.36 THOUSAND/UL (ref 4.31–10.16)
WBC #/AREA URNS AUTO: ABNORMAL /HPF

## 2022-11-11 RX ORDER — LEVOFLOXACIN 750 MG/1
750 TABLET ORAL EVERY 24 HOURS
Qty: 7 TABLET | Refills: 0 | Status: SHIPPED | OUTPATIENT
Start: 2022-11-11 | End: 2022-11-18

## 2022-11-11 RX ORDER — PREDNISONE 10 MG/1
TABLET ORAL
Qty: 30 TABLET | Refills: 0 | Status: SHIPPED | OUTPATIENT
Start: 2022-11-11

## 2022-11-11 NOTE — ASSESSMENT & PLAN NOTE
Persistent symptoms despite multiple courses of antibiotics and steroids  Check chest x-ray, CBC and sputum culture  Represcribed prednisone taper and trial Levaquin x7 days

## 2022-11-11 NOTE — ASSESSMENT & PLAN NOTE
Trial of Jules Hyman 100/62 5/20 5 mcg 1 puff daily  Samples provided in the office today  Side effects reviewed  Proper inhaler technique reinforced  Asked patient to call me in a month if he does not notice any improvement will resume Anoro/Flovent combination  Continue albuterol HFA as needed  Encouraged increase use of nebulizers while acutely ill to q 6 hours

## 2022-11-11 NOTE — PROGRESS NOTES
Pulmonary Follow Up Note   Jules Latif 61 y o  male MRN: [id number]  11/11/2022      Assessment:    COPD with acute bronchitis ([place] [address] )  Persistent symptoms despite multiple courses of antibiotics and steroids  Check chest x-ray, CBC and sputum culture  Represcribed prednisone taper and trial Levaquin x7 days  COPD, severe (HCC)  Trial of Trelegy Ellipta 100/62 5/20 5 mcg 1 puff daily  Samples provided in the office today  Side effects reviewed  Proper inhaler technique reinforced  Asked patient to call me in a month if he does not notice any improvement will resume Anoro/Flovent combination  Continue albuterol HFA as needed  Encouraged increase use of nebulizers while acutely ill to q 6 hours  SCOTT (obstructive sleep apnea)  Continue auto titrating BiPAP during all hours of sleep  Chronic hypoxemic respiratory failure (HCC)  Uses 2 L supplemental oxygen with BiPAP at night    Pulmonary nodule  CT lung nodule follow-up scheduled for December    Morbid obesity with BMI of 50 0-59 9, adult ([place] [address] )  Weight loss encouraged      Plan:    Diagnoses and all orders for this visit:    COPD with acute bronchitis ([place] [address] )  -     Sputum culture and Gram stain; Future  -     levofloxacin (LEVAQUIN) 750 mg tablet; Take 1 tablet (750 mg total) by mouth every 24 hours for 7 days  -     CBC and differential; Future  -     XR chest pa & lateral; Future    COPD, severe (HCC)  -     predniSONE 10 mg tablet; Take 4 tablets daily for 3 days then 3 tablets daily for 3 days then 2 tablets daily for 3 days then 1 tablet daily for 3 days    SCOTT (obstructive sleep apnea)    Chronic hypoxemic respiratory failure ([place] [address] )    Pulmonary nodule    Morbid obesity with BMI of 50 0-59 9, adult ([place] [address] )        Return for Next scheduled follow up  History of Present Illness   HPI:  Jules Latif is a 61 y o  male who the office today for sick visit    Patient has ongoing shortness breath, cough productive of mucopurulent sputum and increased wheeze  Nebulizer helps but patient reports that he only uses it 1 to 2 times a day  Recently finished prednisone and also seem to be helping but once completion symptoms return  Patient has not been on antibiotics in approximately 1 month  Patient reports compliance on his inhalers  Review of Systems   Constitutional: Negative for appetite change and fever  HENT: Negative for ear pain, postnasal drip, rhinorrhea, sneezing, sore throat and trouble swallowing  Respiratory: Positive for cough, shortness of breath and wheezing  Cardiovascular: Negative for chest pain  Musculoskeletal: Negative for myalgias  Neurological: Negative for headaches         Historical Information   Past Medical History:   Diagnosis Date   • Aortic stenosis    • COPD (chronic obstructive pulmonary disease) ([facility] )    • Diabetes ([facility] )    • Diabetes mellitus ([facility] )    • Hyperlipidemia    • Hypertension 7/2/2014   • Sleep apnea, obstructive      Past Surgical History:   Procedure Laterality Date   • APPENDECTOMY     • EYE SURGERY       Family History   Problem Relation Age of Onset   • No Known Problems Mother    • Heart disease Father    • Lung cancer Father    • Diabetes Maternal Grandfather    • Stomach cancer Maternal Grandfather        Social History     Tobacco Use   Smoking Status Former Smoker   • Years: 10 00   • Types: Cigarettes   Smokeless Tobacco Never Used         Meds/Allergies     Current Outpatient Medications:   •  albuterol (Ventolin HFA) 90 mcg/act inhaler, Inhale 2 puffs every 6 (six) hours as needed for wheezing, Disp: 18 g, Rfl: 3  •  amLODIPine (NORVASC) 10 mg tablet, Take 1 tablet (10 mg total) by mouth daily, Disp: 90 tablet, Rfl: 3  •  ammonium lactate (LAC-HYDRIN) 12 % cream, Apply topically as needed for dry skin, Disp: 385 g, Rfl: 0  •  aspirin (ECOTRIN LOW STRENGTH) 81 mg EC tablet, Take 1 tablet (81 mg total) by mouth daily, Disp: 30 tablet, Rfl: 5  •  atorvastatin (LIPITOR) 40 mg tablet, Take 1 tablet (40 mg total) by mouth daily, Disp: 90 tablet, Rfl: 3  •  Blood Glucose Monitoring Suppl (CONTOUR NEXT MONITOR) w/Device KIT, Test blood sugar once daily or as needed for fluctuating blood sugars, Disp: 1 kit, Rfl: 0  •  carvedilol (COREG) 12 5 mg tablet, Take 1 tablet (12 5 mg total) by mouth 2 (two) times a day with meals, Disp: 180 tablet, Rfl: 3  •  cetirizine (ZyrTEC) 10 mg tablet, Take 10 mg by mouth, Disp: , Rfl:   •  cyclobenzaprine (FLEXERIL) 10 mg tablet, Take 10 mg by mouth, Disp: , Rfl:   •  Dapagliflozin Propanediol (Farxiga) 10 MG TABS, Take 10 mg by mouth daily, Disp: , Rfl:   •  fluticasone (FLOVENT HFA) 220 mcg/act inhaler, Inhale 2 puffs 2 (two) times a day Rinse mouth after use, Disp: 12 g, Rfl: 5  •  furosemide (LASIX) 20 mg tablet, Take 1 tablet (20 mg total) by mouth daily, Disp: 30 tablet, Rfl: 2  •  gabapentin (NEURONTIN) 400 mg capsule, Take 1 capsule (400 mg total) by mouth 3 (three) times a day, Disp: 90 capsule, Rfl: 0  •  glimepiride (AMARYL) 4 mg tablet, Take 4 mg by mouth 2 (two) times a day, Disp: , Rfl:   •  glucose blood (CONTOUR NEXT TEST) test strip, Test blood sugar once daily or as needed for fluctuating blood sugars, Disp: 100 each, Rfl: 3  •  guaiFENesin (MUCINEX) 600 mg 12 hr tablet, Take 1 tablet (600 mg total) by mouth every 12 (twelve) hours, Disp: 60 tablet, Rfl: 0  •  Insulin Pen Needle (BD Pen Needle Dee Dee U/F) 32G X 4 MM MISC, Use 4 a day, Disp: 200 each, Rfl: 5  •  ipratropium-albuterol (DUO-NEB) 0 5-2 5 mg/3 mL nebulizer solution, USE 1 AMPULE IN NEBULIZER EVERY 6 HOURS AS NEEDED FOR WHEEZING OR SHORTNESS OF BREATH, Disp: 360 mL, Rfl: 0  •  Lancets MISC, Test blood sugar once daily or as needed for fluctuating blood sugars, Disp: 100 each, Rfl: 0  •  Levemir FlexTouch 100 units/mL injection pen, Inject 60 Units under the skin daily , Disp: , Rfl:   •  levofloxacin (LEVAQUIN) 750 mg tablet, Take 1 tablet (750 mg total) by mouth every 24 hours for 7 days, Disp: 7 tablet, Rfl: 0  •  losartan (COZAAR) 100 MG tablet, Take 1 tablet (100 mg total) by mouth daily, Disp: 90 tablet, Rfl: 3  •  metFORMIN (GLUCOPHAGE) 1000 MG tablet, TAKE 1 TABLET BY MOUTH TWICE DAILY WITH MEALS, Disp: 180 tablet, Rfl: 0  •  Multiple Vitamins-Minerals (MENS MULTIVITAMIN PO), Take by mouth, Disp: , Rfl:   •  Ozempic, 2 MG/DOSE, 8 MG/3ML SOPN, , Disp: , Rfl:   •  predniSONE 10 mg tablet, Take 4 tablets daily for 3 days then 3 tablets daily for 3 days then 2 tablets daily for 3 days then 1 tablet daily for 3 days, Disp: 30 tablet, Rfl: 0  •  sertraline (ZOLOFT) 50 mg tablet, Take 1 tablet by mouth once daily, Disp: 30 tablet, Rfl: 0  •  spironolactone (ALDACTONE) 50 mg tablet, Take 1 tablet (50 mg total) by mouth daily, Disp: 90 tablet, Rfl: 3  •  umeclidinium-vilanterol (Anoro Ellipta) 62 5-25 MCG/INH inhaler, Inhale 1 puff daily, Disp: 60 blister, Rfl: 5  •  Ozempic, 0 25 or 0 5 MG/DOSE, 2 MG/1 5ML SOPN, INJECT 0 5 MG UNDER THE SKIN ONCE WEEKLY  (Patient not taking: No sig reported), Disp: , Rfl:   •  Ozempic, 1 MG/DOSE, 4 MG/3ML SOPN injection pen, INJECT 1MG SUBCUTANEOUSLY ONCE A WEEK (Patient not taking: No sig reported), Disp: , Rfl:   •  predniSONE 10 mg tablet, Take 4 tablets (40 mg total) by mouth daily (Patient not taking: No sig reported), Disp: 20 tablet, Rfl: 0  •  sertraline (ZOLOFT) 50 mg tablet, Take 1 tablet (50 mg total) by mouth daily (Patient not taking: No sig reported), Disp: 30 tablet, Rfl: 2  Allergies   Allergen Reactions   • Theophylline Other (See Comments)     Severe headaches  headaches  Severe headaches         Vitals: Blood pressure 130/82, pulse 86, temperature (!) 96 9 °F (36 1 °C), temperature source Tympanic, height 5' 9" (1 753 m), weight (!) 159 kg (351 lb 8 oz), SpO2 93 %  Body mass index is 51 91 kg/m²  Oxygen Therapy  SpO2: 93 %  Oxygen Therapy: None (Room air)    Physical Exam  Physical Exam  Vitals reviewed  Constitutional:       Appearance: Normal appearance  He is well-developed  He is obese  HENT:      Head: Normocephalic and atraumatic  Nose: Nose normal       Mouth/Throat:      Mouth: Mucous membranes are moist    Eyes:      Extraocular Movements: Extraocular movements intact  Cardiovascular:      Rate and Rhythm: Normal rate and regular rhythm  Heart sounds: Normal heart sounds  Pulmonary:      Effort: Pulmonary effort is normal  No respiratory distress  Breath sounds: No wheezing, rhonchi or rales  Musculoskeletal:         General: No swelling  Cervical back: Normal range of motion and neck supple  Skin:     General: Skin is warm and dry  Neurological:      General: No focal deficit present  Mental Status: He is alert  Mental status is at baseline  Psychiatric:         Mood and Affect: Mood normal          Behavior: Behavior normal          Labs: I have personally reviewed pertinent lab results  Lab Results   Component Value Date    WBC 10 36 (H) 11/11/2022    HGB 15 6 11/11/2022    HCT 47 7 11/11/2022    MCV 85 11/11/2022     11/11/2022     Lab Results   Component Value Date    CALCIUM 9 0 05/23/2022    K 4 6 05/23/2022    CO2 24 05/23/2022     05/23/2022    BUN 36 (H) 05/23/2022    CREATININE 1 20 05/23/2022     No results found for: IGE  Lab Results   Component Value Date    ALT 41 01/26/2022    AST 17 01/26/2022    ALKPHOS 83 01/26/2022       Imaging and other studies: I have personally reviewed pertinent reports  and I have personally reviewed pertinent films in PACS     Chest x-ray from today per my interpretation reveals no acute infiltrates, pneumothorax, pleural effusion      CTA chest PE study 08/12/2022  IMPRESSION:     No large filling defect in the pulmonary trunk or main pulmonary arteries  More peripheral branches are poorly assessed and therefore indeterminate      Images are degraded by artifact    Diffuse bronchial wall thickening, groundglass opacities which appear appear to be distributed along the airways and groupings of tree-in-bud nodularity in the lingula and left lower lobe; likely represents an infectious   and/or inflammatory process  5 mm left lower lobe nodule may be part of the same process  Recommend 3 month CT follow-up      Additional findings as above  Pulmonary function testing:  Performed 12/28/2021  FEV1/FVC ratio 63%   FEV1 45% predicted  FVC 58% predicted  no response to bronchodilators  TLC 84 % predicted   % predicted  DLCO corrected for hemoglobin 91 % predicted  Severe obstructive airflow defect  No significant response to bronchodilators  No hyperinflation  Borderline air trapping  Normal diffusion capacity        Answers for HPI/ROS submitted by the patient on 11/10/2022  Do you have difficulty breathing?: Yes  Do you experience frequent throat clearing?: Yes  Do you have a wet cough?: Yes  Chronicity: recurrent  When did you first notice your symptoms?: 1 to 4 weeks ago  How often do your symptoms occur?: daily  Since you first noticed this problem, how has it changed?: gradually worsening  Do you have shortness of breath that occurs with effort or exertion?: Yes  Do you have ear congestion?: No  Do you have heartburn?: No  Do you have fatigue?: Yes  Do you have nasal congestion?: No  Do you have shortness of breath when lying flat?: No  Do you have shortness of breath when you wake up?: Yes  Do you have sweats?: No  Have you experienced weight loss?: No  Which of the following makes your symptoms worse?: any activity, change in weather, climbing stairs  Which of the following makes your symptoms better?: cold air, oral steroids, steroid inhaler

## 2022-11-13 LAB
BACTERIA SPT RESP CULT: ABNORMAL
GRAM STN SPEC: ABNORMAL
GRAM STN SPEC: ABNORMAL

## 2022-11-14 LAB
ALBUMIN SERPL ELPH-MCNC: 4.25 G/DL (ref 3.5–5)
ALBUMIN SERPL ELPH-MCNC: 59.9 % (ref 52–65)
ALPHA1 GLOB SERPL ELPH-MCNC: 0.33 G/DL (ref 0.1–0.4)
ALPHA1 GLOB SERPL ELPH-MCNC: 4.6 % (ref 2.5–5)
ALPHA2 GLOB SERPL ELPH-MCNC: 0.94 G/DL (ref 0.4–1.2)
ALPHA2 GLOB SERPL ELPH-MCNC: 13.2 % (ref 7–13)
BACTERIA SPT RESP CULT: ABNORMAL
BETA GLOB ABNORMAL SERPL ELPH-MCNC: 0.5 G/DL (ref 0.4–0.8)
BETA1 GLOB SERPL ELPH-MCNC: 7 % (ref 5–13)
BETA2 GLOB SERPL ELPH-MCNC: 6 % (ref 2–8)
BETA2+GAMMA GLOB SERPL ELPH-MCNC: 0.43 G/DL (ref 0.2–0.5)
DME PARACHUTE DELIVERY DATE ACTUAL: NORMAL
DME PARACHUTE DELIVERY DATE REQUESTED: NORMAL
DME PARACHUTE ITEM DESCRIPTION: NORMAL
DME PARACHUTE ORDER STATUS: NORMAL
DME PARACHUTE SUPPLIER NAME: NORMAL
DME PARACHUTE SUPPLIER PHONE: NORMAL
GAMMA GLOB ABNORMAL SERPL ELPH-MCNC: 0.66 G/DL (ref 0.5–1.6)
GAMMA GLOB SERPL ELPH-MCNC: 9.3 % (ref 12–22)
GRAM STN SPEC: ABNORMAL
GRAM STN SPEC: ABNORMAL
IGG/ALB SER: 1.49 {RATIO} (ref 1.1–1.8)
PROT PATTERN SERPL ELPH-IMP: ABNORMAL
PROT SERPL-MCNC: 7.1 G/DL (ref 6.4–8.2)

## 2022-11-16 LAB
ANA HOMOGEN SER QL IF: NORMAL
ANA HOMOGEN TITR SER: NORMAL {TITER}
ANA SER QL IA: POSITIVE
DSDNA AB SER-ACNC: >10 IU/ML
ENA RNP AB SER IA-ACNC: NEGATIVE
ENA SM AB SER IA-ACNC: NEGATIVE
ENA SM+RNP IGG SER-ACNC: NEGATIVE
ENA SS-A AB SER IA-ACNC: NEGATIVE
ENA SS-B AB SER IA-ACNC: NEGATIVE

## 2022-11-18 ENCOUNTER — TELEPHONE (OUTPATIENT)
Dept: NEPHROLOGY | Facility: CLINIC | Age: 57
End: 2022-11-18

## 2022-11-18 DIAGNOSIS — N18.2 STAGE 2 CHRONIC KIDNEY DISEASE: Primary | ICD-10-CM

## 2022-11-22 ENCOUNTER — HOSPITAL ENCOUNTER (OUTPATIENT)
Dept: ULTRASOUND IMAGING | Facility: HOSPITAL | Age: 57
Discharge: HOME | End: 2022-11-22

## 2022-11-22 DIAGNOSIS — N18.2 STAGE 2 CHRONIC KIDNEY DISEASE: ICD-10-CM

## 2022-11-22 DIAGNOSIS — E87.1 HYPONATREMIA: ICD-10-CM

## 2022-11-27 DIAGNOSIS — Z79.4 TYPE 2 DIABETES MELLITUS WITH DIABETIC NEUROPATHY, WITH LONG-TERM CURRENT USE OF INSULIN (HCC): ICD-10-CM

## 2022-11-27 DIAGNOSIS — E11.40 TYPE 2 DIABETES MELLITUS WITH DIABETIC NEUROPATHY, WITH LONG-TERM CURRENT USE OF INSULIN (HCC): ICD-10-CM

## 2022-11-28 ENCOUNTER — RA CDI HCC (OUTPATIENT)
Dept: OTHER | Facility: HOSPITAL | Age: 57
End: 2022-11-28

## 2022-11-28 RX ORDER — GABAPENTIN 400 MG/1
400 CAPSULE ORAL 3 TIMES DAILY
Qty: 90 CAPSULE | Refills: 0 | Status: SHIPPED | OUTPATIENT
Start: 2022-11-28 | End: 2022-12-28

## 2022-11-28 NOTE — PROGRESS NOTES
[facility]  coding opportunities     I13 0, E11 22 and E11 65     Chart Reviewed number of suggestions sent to Provider: 3     Patients Insurance     Medicare Insurance: [address] [place] [address]

## 2022-11-30 ENCOUNTER — OFFICE VISIT (OUTPATIENT)
Dept: FAMILY MEDICINE CLINIC | Facility: CLINIC | Age: 57
End: 2022-11-30

## 2022-11-30 ENCOUNTER — TELEPHONE (OUTPATIENT)
Dept: PULMONOLOGY | Facility: CLINIC | Age: 57
End: 2022-11-30

## 2022-11-30 VITALS
SYSTOLIC BLOOD PRESSURE: 118 MMHG | WEIGHT: 315 LBS | BODY MASS INDEX: 46.65 KG/M2 | HEART RATE: 86 BPM | DIASTOLIC BLOOD PRESSURE: 68 MMHG | HEIGHT: 69 IN | OXYGEN SATURATION: 88 % | TEMPERATURE: 97 F

## 2022-11-30 DIAGNOSIS — Z23 NEED FOR INFLUENZA VACCINATION: ICD-10-CM

## 2022-11-30 DIAGNOSIS — J96.11 CHRONIC HYPOXEMIC RESPIRATORY FAILURE (HCC): ICD-10-CM

## 2022-11-30 DIAGNOSIS — Z79.4 TYPE 2 DIABETES MELLITUS WITH DIABETIC NEUROPATHY, WITH LONG-TERM CURRENT USE OF INSULIN (HCC): Primary | ICD-10-CM

## 2022-11-30 DIAGNOSIS — E11.40 TYPE 2 DIABETES MELLITUS WITH DIABETIC NEUROPATHY, WITH LONG-TERM CURRENT USE OF INSULIN (HCC): Primary | ICD-10-CM

## 2022-11-30 LAB — SL AMB POCT HEMOGLOBIN AIC: 8.1 (ref ?–6.5)

## 2022-11-30 NOTE — PROGRESS NOTES
Assessment/Plan:    Problem List Items Addressed This Visit        Endocrine    Type 2 diabetes mellitus with diabetic neuropathy, with long-term current use of insulin ([facility] ) - Primary    Relevant Orders    POCT hemoglobin A1c (Completed)       Respiratory    Chronic hypoxemic respiratory failure (HCC)   Other Visit Diagnoses     Need for influenza vaccination        Relevant Orders    influenza vaccine, quadrivalent, recombinant, PF, 0 5 mL, for patients 18 yr+ (FLUBLOK) (Completed)           Diagnoses and all orders for this visit:    Type 2 diabetes mellitus with diabetic neuropathy, with long-term current use of insulin ([facility] )  Comments:  A1C with some improvement, now at 8 1%  Pt to continue making dietary changes  Orders:  -     POCT hemoglobin A1c    Chronic hypoxemic respiratory failure ([facility] )  Comments:  Pt following with pulmonology, currently is not wearing his supplemental oxygen  Need for influenza vaccination  -     influenza vaccine, quadrivalent, recombinant, PF, 0 5 mL, for patients 18 yr+ (FLU[place])        Subjective:      Patient ID: Samy Angel is a 61 y o  male  Keriangel Mooneyarturoa is here today for routine follow up  A1C slightly improved, now at 8 1%  He continues to follow with pulmonology, has chronic SOB/RUTHERFORD  He is supposed to be wearing supplemental oxygen, currently not wearing states it is in the car  Had some recent labs done through nephrology and was told that he has lupus, pt needs to follow up with nephrology and likely will need to see rheumatology  The following portions of the patient's history were reviewed and updated as appropriate:   He has a past medical history of Aortic stenosis, COPD (chronic obstructive pulmonary disease) ([facility] ), Diabetes ([facility] ), Diabetes mellitus ([facility] ), Hyperlipidemia, Hypertension (7/2/2014), and Sleep apnea, obstructive  ,  does not have any pertinent problems on file  ,   has a past surgical history that includes Appendectomy and Eye surgery  ,  family history includes Diabetes in his maternal grandfather; Heart disease in his father; Lung cancer in his father; No Known Problems in his mother; Stomach cancer in his maternal grandfather  ,   reports that he has quit smoking  His smoking use included cigarettes  He has never used smokeless tobacco  He reports that he does not drink alcohol and does not use drugs  ,  is allergic to theophylline     Current Outpatient Medications   Medication Sig Dispense Refill   • albuterol (Ventolin HFA) 90 mcg/act inhaler Inhale 2 puffs every 6 (six) hours as needed for wheezing 18 g 3   • amLODIPine (NORVASC) 10 mg tablet Take 1 tablet (10 mg total) by mouth daily 90 tablet 3   • ammonium lactate (LAC-HYDRIN) 12 % cream Apply topically as needed for dry skin 385 g 0   • aspirin (ECOTRIN LOW STRENGTH) 81 mg EC tablet Take 1 tablet (81 mg total) by mouth daily 30 tablet 5   • atorvastatin (LIPITOR) 40 mg tablet Take 1 tablet (40 mg total) by mouth daily 90 tablet 3   • carvedilol (COREG) 12 5 mg tablet Take 1 tablet (12 5 mg total) by mouth 2 (two) times a day with meals 180 tablet 3   • cetirizine (ZyrTEC) 10 mg tablet Take 10 mg by mouth     • cyclobenzaprine (FLEXERIL) 10 mg tablet Take 10 mg by mouth     • Dapagliflozin Propanediol (Farxiga) 10 MG TABS Take 10 mg by mouth daily     • fluticasone (FLOVENT HFA) 220 mcg/act inhaler Inhale 2 puffs 2 (two) times a day Rinse mouth after use 12 g 5   • furosemide (LASIX) 20 mg tablet Take 1 tablet (20 mg total) by mouth daily 30 tablet 2   • gabapentin (NEURONTIN) 400 mg capsule Take 1 capsule (400 mg total) by mouth 3 (three) times a day 90 capsule 0   • glimepiride (AMARYL) 4 mg tablet Take 4 mg by mouth 2 (two) times a day     • glucose blood (CONTOUR NEXT TEST) test strip Test blood sugar once daily or as needed for fluctuating blood sugars 100 each 3   • guaiFENesin (MUCINEX) 600 mg 12 hr tablet Take 1 tablet (600 mg total) by mouth every 12 (twelve) hours 60 tablet 0   • Insulin Pen Needle (BD Pen Needle Dee Dee U/F) 32G X 4 MM MISC Use 4 a day 200 each 5   • ipratropium-albuterol (DUO-NEB) 0 5-2 5 mg/3 mL nebulizer solution USE 1 AMPULE IN NEBULIZER EVERY 6 HOURS AS NEEDED FOR WHEEZING OR SHORTNESS OF BREATH 360 mL 0   • Lancets MISC Test blood sugar once daily or as needed for fluctuating blood sugars 100 each 0   • Levemir FlexTouch 100 units/mL injection pen Inject 60 Units under the skin daily      • losartan (COZAAR) 100 MG tablet Take 1 tablet (100 mg total) by mouth daily 90 tablet 3   • metFORMIN (GLUCOPHAGE) 1000 MG tablet TAKE 1 TABLET BY MOUTH TWICE DAILY WITH MEALS 180 tablet 0   • Multiple Vitamins-Minerals (MENS MULTIVITAMIN PO) Take by mouth     • Ozempic, 2 MG/DOSE, 8 MG/3ML SOPN      • sertraline (ZOLOFT) 50 mg tablet Take 1 tablet (50 mg total) by mouth daily 30 tablet 0   • spironolactone (ALDACTONE) 50 mg tablet Take 1 tablet (50 mg total) by mouth daily 90 tablet 3   • umeclidinium-vilanterol (Anoro Ellipta) 62 5-25 MCG/INH inhaler Inhale 1 puff daily 60 blister 5   • Blood Glucose Monitoring Suppl (CONTOUR NEXT MONITOR) w/Device KIT Test blood sugar once daily or as needed for fluctuating blood sugars 1 kit 0   • Ozempic, 0 25 or 0 5 MG/DOSE, 2 MG/1 5ML SOPN INJECT 0 5 MG UNDER THE SKIN ONCE WEEKLY  (Patient not taking: No sig reported)     • Ozempic, 1 MG/DOSE, 4 MG/3ML SOPN injection pen INJECT 1MG SUBCUTANEOUSLY ONCE A WEEK (Patient not taking: No sig reported)     • predniSONE 10 mg tablet Take 4 tablets (40 mg total) by mouth daily (Patient not taking: Reported on 8/15/2022) 20 tablet 0   • sertraline (ZOLOFT) 50 mg tablet Take 1 tablet by mouth once daily (Patient not taking: Reported on 11/30/2022) 30 tablet 0     No current facility-administered medications for this visit  Review of Systems   Constitutional: Negative for activity change, appetite change, chills, diaphoresis, fatigue, fever and unexpected weight change     HENT: Negative for congestion, ear pain, postnasal drip, rhinorrhea, sinus pressure, sinus pain, sneezing, sore throat, tinnitus and voice change  Eyes: Negative for pain, redness and visual disturbance  Respiratory: Positive for shortness of breath (RUTHERFORD, chronic)  Negative for cough, chest tightness and wheezing  Cardiovascular: Negative for chest pain, palpitations and leg swelling  Gastrointestinal: Negative for abdominal pain, blood in stool, constipation, diarrhea, nausea and vomiting  Genitourinary: Negative for difficulty urinating, dysuria, frequency, hematuria and urgency  Musculoskeletal: Negative for arthralgias, back pain, gait problem, joint swelling, myalgias, neck pain and neck stiffness  Skin: Negative for color change, pallor, rash and wound  Neurological: Negative for dizziness, tremors, weakness, light-headedness and headaches  Psychiatric/Behavioral: Negative for dysphoric mood, self-injury, sleep disturbance and suicidal ideas  The patient is not nervous/anxious  Objective:  Vitals:    11/30/22 1029   BP: 118/68   Pulse: 86   Temp: (!) 97 °F (36 1 °C)   SpO2: (!) 88%   Weight: (!) 162 kg (356 lb 12 8 oz)   Height: 5' 9" (1 753 m)     Body mass index is 52 69 kg/m²  Physical Exam  Vitals reviewed  Constitutional:       General: He is not in acute distress  Appearance: He is well-developed and well-nourished  He is obese  He is not diaphoretic  HENT:      Head: Normocephalic and atraumatic  Right Ear: Hearing, tympanic membrane, ear canal and external ear normal       Left Ear: Hearing, tympanic membrane, ear canal and external ear normal       Mouth/Throat:      Mouth: Oropharynx is clear and moist and mucous membranes are normal       Pharynx: Uvula midline  No oropharyngeal exudate  Eyes:      General: No scleral icterus  Right eye: No discharge  Left eye: No discharge        Conjunctiva/sclera: Conjunctivae normal    Neck:      Thyroid: No thyromegaly  Vascular: No carotid bruit  Cardiovascular:      Rate and Rhythm: Normal rate and regular rhythm  Heart sounds: Murmur heard  Pulmonary:      Effort: Pulmonary effort is normal  No respiratory distress  Breath sounds: Normal breath sounds  No wheezing  Abdominal:      General: Bowel sounds are normal  There is no distension  Palpations: Abdomen is soft  There is no mass  Tenderness: There is no abdominal tenderness  There is no guarding or rebound  Musculoskeletal:         General: No tenderness or edema  Normal range of motion  Cervical back: Neck supple  Lymphadenopathy:      Cervical: No cervical adenopathy  Skin:     General: Skin is warm and dry  Findings: No erythema or rash  Neurological:      Mental Status: He is alert and oriented to person, place, and time  Psychiatric:         Mood and Affect: Mood and affect normal          Behavior: Behavior normal          Thought Content:  Thought content normal          Judgment: Judgment normal

## 2022-11-30 NOTE — TELEPHONE ENCOUNTER
Called and reviewed with patient  Lab work looks positive for lupus  Given increased SOB, cough, phlegm, and low O2 levels in PCP office recommended he take prednisone he had on hand  Will plan to follow up CT chest next week and call him with results and follow up in office 12/12  He verbalized understanding and agrees to the plan

## 2022-11-30 NOTE — TELEPHONE ENCOUNTER
Patient called  He was just seen by his PCP and his O2 was 88 %  His doctor asked if you would look at his recent blood work that he had done  Should he start Prednisone that he has on hand? Patient has non productive cough and SOB

## 2022-12-05 ENCOUNTER — TELEPHONE (OUTPATIENT)
Dept: PULMONOLOGY | Facility: CLINIC | Age: 57
End: 2022-12-05

## 2022-12-05 ENCOUNTER — HOSPITAL ENCOUNTER (OUTPATIENT)
Dept: CT IMAGING | Facility: HOSPITAL | Age: 57
Discharge: HOME | End: 2022-12-05

## 2022-12-05 DIAGNOSIS — R91.1 PULMONARY NODULE: ICD-10-CM

## 2022-12-05 DIAGNOSIS — J45.40 MODERATE PERSISTENT ASTHMA WITHOUT COMPLICATION: ICD-10-CM

## 2022-12-05 DIAGNOSIS — I50.33 ACUTE ON CHRONIC DIASTOLIC HEART FAILURE (HCC): ICD-10-CM

## 2022-12-05 RX ORDER — FUROSEMIDE 20 MG/1
20 TABLET ORAL DAILY
Qty: 30 TABLET | Refills: 2 | Status: SHIPPED | OUTPATIENT
Start: 2022-12-05

## 2022-12-05 RX ORDER — IPRATROPIUM BROMIDE AND ALBUTEROL SULFATE 2.5; .5 MG/3ML; MG/3ML
3 SOLUTION RESPIRATORY (INHALATION) EVERY 6 HOURS PRN
Qty: 360 ML | Refills: 3 | Status: SHIPPED | OUTPATIENT
Start: 2022-12-05

## 2022-12-05 NOTE — TELEPHONE ENCOUNTER
Patient called  He needs refill on his Lasix and nebulizer solution  Send to [facility]  He also wanted you to know that the Anoro and Flovent Inhalers are working better for him than Trelegy

## 2022-12-06 ENCOUNTER — OFFICE VISIT (OUTPATIENT)
Dept: NEPHROLOGY | Facility: CLINIC | Age: 57
End: 2022-12-06

## 2022-12-06 VITALS
HEIGHT: 69 IN | WEIGHT: 315 LBS | BODY MASS INDEX: 46.65 KG/M2 | SYSTOLIC BLOOD PRESSURE: 120 MMHG | OXYGEN SATURATION: 92 % | DIASTOLIC BLOOD PRESSURE: 60 MMHG | HEART RATE: 78 BPM

## 2022-12-06 DIAGNOSIS — I15.0 RENOVASCULAR HYPERTENSION WITH GOAL BLOOD PRESSURE LESS THAN 140/90: ICD-10-CM

## 2022-12-06 DIAGNOSIS — E66.01 MORBID OBESITY WITH BMI OF 50.0-59.9, ADULT (HCC): ICD-10-CM

## 2022-12-06 DIAGNOSIS — E87.1 HYPONATREMIA: ICD-10-CM

## 2022-12-06 DIAGNOSIS — N18.2 STAGE 2 CHRONIC KIDNEY DISEASE: ICD-10-CM

## 2022-12-06 DIAGNOSIS — R76.8 POSITIVE DOUBLE STRANDED DNA ANTIBODY TEST: Primary | ICD-10-CM

## 2022-12-06 DIAGNOSIS — Z79.4 TYPE 2 DIABETES MELLITUS WITH DIABETIC NEUROPATHY, WITH LONG-TERM CURRENT USE OF INSULIN (HCC): ICD-10-CM

## 2022-12-06 DIAGNOSIS — I10 ESSENTIAL HYPERTENSION: ICD-10-CM

## 2022-12-06 DIAGNOSIS — E11.40 TYPE 2 DIABETES MELLITUS WITH DIABETIC NEUROPATHY, WITH LONG-TERM CURRENT USE OF INSULIN (HCC): ICD-10-CM

## 2022-12-06 NOTE — PROGRESS NOTES
Assessment & Plan:    1  Positive double stranded DNA antibody test  -     Ambulatory Referral to Rheumatology; Future    2  Stage 2 chronic kidney disease  -     CBC and differential; Future; Expected date: 02/27/2023  -     Comprehensive metabolic panel; Future; Expected date: 02/27/2023  -     Magnesium; Future; Expected date: 02/27/2023  -     Microalbumin / creatinine urine ratio; Future; Expected date: 02/27/2023  -     Phosphorus; Future; Expected date: 02/27/2023  -     Protein / creatinine ratio, urine; Future; Expected date: 02/27/2023  -     PTH, intact; Future; Expected date: 02/27/2023  -     Urinalysis with microscopic; Future; Expected date: 02/27/2023    3  Type 2 diabetes mellitus with diabetic neuropathy, with long-term current use of insulin ([facility] )  Assessment & Plan:    Lab Results   Component Value Date    HGBA1C 8 1 (A) 11/30/2022   Glycemic control  Continue SGLT2 inhibitor and ARB  4  Essential hypertension  Assessment & Plan:  Blood pressure at goal   Continue current hypertensive regimen and low-sodium diet  5  Renovascular hypertension with goal blood pressure less than 140/90    6  Hyponatremia    7  Morbid obesity with BMI of 50 0-59 9, adult (HCC)  Assessment & Plan:  BMI greater than 40 is associated with greater risk of progression of renal disease secondary to glomerular hyperfiltration  Recommend weight loss  The benefits, risks and alternatives to the treatment plan were discussed at this visit  Patient was advised of common adverse effects of any medical therapies prescribed  All questions were answered and discussed with the patient and any accompanying family members or caretakers  Subjective:      Patient ID: Pam Reaves is a 61 y o  male seen in the [facility] office  HPI     Today, patient presents for routine follow-up without acute complaints relating to blood pressure, edema or nephrological concerns    Patient denies any changes in health, medication changes, hospitalizations, surgeries or trip to the emergency room, falls or bone fractures since last visit  Blood pressure is 120/60 with a heart rate of 78  Patient does monitor blood pressures at home and reports amlodipine 10 mg daily, carvedilol 12 5 mg twice daily, furosemide 20 mg daily  Denies headaches, lightheadedness, dizziness  Patient reports adherence with antihypertensive regimen  Patient denies lower extremity swelling  Reviewed and discussed the results of labs feels stable renal function, positive CLYDE and positive double-stranded DNA  Patient denies any family history of autoimmune, rheumatological diseases, lupus, rheumatoid arthritis  He denies any joint pain, myalgias, fatigue or face rash  History is obtained from patient  The following portions of the patient's history were reviewed and updated as appropriate: allergies, current medications, past family history, past medical history, past social history, past surgical history, and problem list     Review of Systems   Constitutional: Negative for activity change, chills, diaphoresis, fatigue and fever  HENT: Negative for mouth sores and trouble swallowing  Respiratory: Negative for apnea, cough, chest tightness, shortness of breath and wheezing  Cardiovascular: Negative for chest pain, palpitations and leg swelling  Gastrointestinal: Negative for abdominal distention, abdominal pain, blood in stool, constipation, diarrhea and nausea  Genitourinary: Negative for decreased urine volume, difficulty urinating, dysuria, enuresis, frequency, hematuria and urgency  Musculoskeletal: Negative for arthralgias, back pain and joint swelling  Skin: Negative for pallor, rash and wound  Neurological: Negative for dizziness, seizures, light-headedness, numbness and headaches  Hematological: Does not bruise/bleed easily  Psychiatric/Behavioral: Negative for agitation, behavioral problems and confusion   The patient is not nervous/anxious  Objective:      /60 (BP Location: Right arm, Patient Position: Sitting)   Pulse 78   Ht 5' 9" (1 753 m)   Wt (!) 161 kg (356 lb)   SpO2 92%   BMI 52 57 kg/m²          Physical Exam  Vitals and nursing note reviewed  Constitutional:       General: He is awake  He is not in acute distress  Appearance: Normal appearance  He is well-developed  He is morbidly obese  He is not ill-appearing, toxic-appearing or diaphoretic  HENT:      Head: Normocephalic and atraumatic  Jaw: There is normal jaw occlusion  Nose: Nose normal       Mouth/Throat:      Mouth: Mucous membranes are moist       Pharynx: Oropharynx is clear  No oropharyngeal exudate or posterior oropharyngeal erythema  Eyes:      General: Lids are normal  Vision grossly intact  Gaze aligned appropriately  No scleral icterus  Right eye: No discharge  Left eye: No discharge  Extraocular Movements: Extraocular movements intact  Conjunctiva/sclera: Conjunctivae normal       Pupils: Pupils are equal, round, and reactive to light  Neck:      Thyroid: No thyroid mass or thyromegaly  Trachea: Trachea and phonation normal    Cardiovascular:      Rate and Rhythm: Normal rate and regular rhythm  Heart sounds: Normal heart sounds, S1 normal and S2 normal  No murmur heard  No friction rub  No gallop  Pulmonary:      Effort: Pulmonary effort is normal  No respiratory distress  Breath sounds: Normal breath sounds  No stridor  No wheezing, rhonchi or rales  Abdominal:      General: Abdomen is flat  Bowel sounds are normal  There is no distension  Palpations: Abdomen is soft  There is no mass  Tenderness: There is no abdominal tenderness  There is no guarding  Hernia: No hernia is present  Musculoskeletal:         General: Normal range of motion  Cervical back: Normal range of motion and neck supple  No rigidity or tenderness        Right lower leg: No edema  Left lower leg: No edema  Lymphadenopathy:      Cervical: No cervical adenopathy  Skin:     General: Skin is warm and dry  Coloration: Skin is not jaundiced  Findings: No bruising  Nails: There is no clubbing  Neurological:      General: No focal deficit present  Mental Status: He is alert and oriented to person, place, and time  Mental status is at baseline  Psychiatric:         Attention and Perception: Attention and perception normal          Mood and Affect: Mood and affect normal          Speech: Speech normal          Behavior: Behavior normal  Behavior is cooperative  Thought Content: Thought content normal          Judgment: Judgment normal              Lab Results   Component Value Date    SODIUM 136 11/11/2022    K 4 8 11/11/2022     11/11/2022    CO2 26 11/11/2022    AGAP 6 11/11/2022    BUN 27 (H) 11/11/2022    CREATININE 1 07 11/11/2022    GLUC 316 (H) 03/18/2022    GLUF 282 (H) 11/11/2022    CALCIUM 9 5 11/11/2022    AST 15 11/11/2022    ALT 45 11/11/2022    ALKPHOS 79 11/11/2022    TP 7 1 11/11/2022    TBILI 0 64 11/11/2022    EGFR 76 11/11/2022      Lab Results   Component Value Date    CREATININE 1 07 11/11/2022    CREATININE 1 20 05/23/2022    CREATININE 1 22 04/21/2022    CREATININE 1 54 (H) 03/18/2022    CREATININE 0 94 01/26/2022    CREATININE 0 82 08/30/2021    CREATININE 0 97 08/02/2021    CREATININE 0 98 08/01/2021    CREATININE 0 97 07/31/2021    CREATININE 1 00 07/30/2021    CREATININE 0 83 07/29/2021    CREATININE 0 86 07/29/2021    CREATININE 1 01 07/28/2021    CREATININE 1 06 07/22/2021    CREATININE 0 81 03/25/2021      Lab Results   Component Value Date    COLORU Yellow 11/11/2022    CLARITYU Clear 11/11/2022    SPECGRAV 1 010 11/11/2022    PHUR 6 5 11/11/2022    LEUKOCYTESUR (A) 11/11/2022     Elevated glucose may cause decreased leukocyte values   See urine microscopic for [facility] result/    NITRITE Negative 11/11/2022 PROTEIN UA Negative 11/11/2022    GLUCOSEU >=1000 (1%) (A) 11/11/2022    KETONESU Negative 11/11/2022    UROBILINOGEN 0 2 11/11/2022    BILIRUBINUR Negative 11/11/2022    BLOODU Negative 11/11/2022    RBCUA 0-1 (A) 11/11/2022    WBCUA 0-1 (A) 11/11/2022    EPIS None Seen 11/11/2022    BACTERIA None Seen 11/11/2022      No results found for: LABPROT  No results found for: [facility] [place] Results   Component Value Date    WBC 10 36 (H) 11/11/2022    HGB 15 6 11/11/2022    HCT 47 7 11/11/2022    MCV 85 11/11/2022     11/11/2022      Lab Results   Component Value Date    HGB 15 6 11/11/2022    HGB 14 5 04/21/2022    HGB 14 7 03/18/2022    HGB 14 6 01/26/2022    HGB 14 0 08/02/2021      Lab Results   Component Value Date    IRON 48 (L) 10/26/2021    TIBC 282 10/26/2021    FERRITIN 267 10/26/2021      No results found for: PTHCALCIUM, [id number], PHOSPHORUS   Lab Results   Component Value Date    CHOLESTEROL 94 01/26/2022    HDL 22 (L) 01/26/2022    LDLCALC 14 01/26/2022    TRIG 292 (H) 01/26/2022      Lab Results   Component Value Date    URICACID 5 0 11/11/2022      Lab Results   Component Value Date    HGBA1C 8 1 (A) 11/30/2022      Lab Results   Component Value Date    FREET4 1 15 01/26/2022      Lab Results   Component Value Date    CLYDE Positive (A) 11/11/2022      No results found for: PROT, UPEP, IMMUNOFIX, KAPPALAMBDA, KAPPALIGHT     Portions of the record may have been created with voice recognition software  Occasional wrong word or "sound a like" substitutions may have occurred due to the inherent limitations of voice recognition software  Read the chart carefully and recognize, using context, where substitutions have occurred  If you have any questions, please contact the dictating provider

## 2022-12-07 PROBLEM — R76.8 POSITIVE DOUBLE STRANDED DNA ANTIBODY TEST: Status: ACTIVE | Noted: 2022-12-07

## 2022-12-07 NOTE — ASSESSMENT & PLAN NOTE
Lab Results   Component Value Date    HGBA1C 8 1 (A) 11/30/2022   Glycemic control  Continue SGLT2 inhibitor and ARB

## 2022-12-12 ENCOUNTER — OFFICE VISIT (OUTPATIENT)
Dept: PULMONOLOGY | Facility: CLINIC | Age: 57
End: 2022-12-12

## 2022-12-12 VITALS
HEIGHT: 69 IN | DIASTOLIC BLOOD PRESSURE: 64 MMHG | BODY MASS INDEX: 46.65 KG/M2 | HEART RATE: 72 BPM | OXYGEN SATURATION: 90 % | TEMPERATURE: 96.8 F | WEIGHT: 315 LBS | SYSTOLIC BLOOD PRESSURE: 112 MMHG

## 2022-12-12 DIAGNOSIS — R91.1 PULMONARY NODULE: ICD-10-CM

## 2022-12-12 DIAGNOSIS — G47.33 OSA (OBSTRUCTIVE SLEEP APNEA): ICD-10-CM

## 2022-12-12 DIAGNOSIS — R93.89 ABNORMAL CT OF THE CHEST: ICD-10-CM

## 2022-12-12 DIAGNOSIS — J96.11 CHRONIC HYPOXEMIC RESPIRATORY FAILURE (HCC): ICD-10-CM

## 2022-12-12 DIAGNOSIS — E66.01 MORBID OBESITY WITH BMI OF 50.0-59.9, ADULT (HCC): ICD-10-CM

## 2022-12-12 DIAGNOSIS — J44.9 COPD, SEVERE (HCC): Primary | ICD-10-CM

## 2022-12-12 RX ORDER — PREDNISONE 10 MG/1
TABLET ORAL
Qty: 30 TABLET | Refills: 0 | Status: SHIPPED | OUTPATIENT
Start: 2022-12-12

## 2022-12-12 NOTE — ASSESSMENT & PLAN NOTE
Previous left lower lobe nodule resolved favoring inflammatory process  Fever tiny calcified and noncalcified granulomas have been stable and do not require further work-up

## 2022-12-12 NOTE — PROGRESS NOTES
Pulmonary Follow Up Note   Shelly Calderon 61 y o  male MRN: [id number]  12/12/2022      Assessment:    COPD, severe ([facility] )  Edmond Horta did not do well on Trelegy  He prefers to stay on Anoro/Flovent combination  Continue this  No refills needed at this time  Continue DuoNeb every 6 hours as needed  Continue albuterol HFA 2 puffs every 6 hours as needed  Provided patient with prednisone taper prescription to have on hand in case of emergency  Chronic hypoxemic respiratory failure (HCC)  Uses 2 L nasal cannula as needed and nightly with BiPAP as well  Pulmonary nodule  Previous left lower lobe nodule resolved favoring inflammatory process  Fever tiny calcified and noncalcified granulomas have been stable and do not require further work-up  Morbid obesity with BMI of 50 0-59 9, adult ([facility] )  Weight loss encouraged  SCOTT (obstructive sleep apnea)  Continue auto titrating BiPAP during all hours of sleep  Patient reports good compliance on BiPAP and benefit since initiating Pap therapy  No compliance data for me to review today  Abnormal CT of the chest  Left lower lobe tree-in-bud nodularity and a 5 mm left lower lobe nodule resolved favoring a inflammatory process  There is chronic but stable groundglass opacity in right lower lobe that may be related to chronic aspiration vs unilateral airway process  Will continue to monitor  If persistent, can refer video barium  Plan:    Diagnoses and all orders for this visit:    COPD, severe ([facility] )  -     Ambulatory Referral to Pulmonary Rehabilitation; Future  -     predniSONE 10 mg tablet; Take 4 tablets daily for 3 days then 3 tablets daily for 3 days then 2 tablets daily for 3 days then 1 tablet daily for 3 days    Chronic hypoxemic respiratory failure (HCC)    Pulmonary nodule    Morbid obesity with BMI of 50 0-59 9, adult (HCC)    SCOTT (obstructive sleep apnea)    Abnormal CT of the chest        Return in about 3 months (around 3/12/2023)      History of Present Illness   HPI:  Erica Xavier is a 61 y o  male who presents the office today for routine follow-up to go over CT chest   Patient has past medical history positive for severe COPD, chronic hypoxic respiratory failure, SCOTT on BiPAP, morbid obesity, diabetes, hypertension, hyperlipidemia, aortic stenosis, suspected lupus nephritis, CKD  Patient was last seen in our office a month ago for COPD exacerbation treated with prednisone and Levaquin  He reports resolution of symptoms and is now back to baseline  She denies worsening cough, sputum reduction, hemoptysis  No worsening shortness of breath  No chest pain  No fevers  Patient is maintained on Anoro and Flovent  He has not needed to use his rescue inhaler/nebulizer in a few weeks  Patient reports compliance on BiPAP  Has not needed to use supplemental oxygen during the day  Review of Systems   Constitutional: Negative for appetite change and fever  HENT: Negative for ear pain, postnasal drip, rhinorrhea, sneezing, sore throat and trouble swallowing  Respiratory: Positive for cough, shortness of breath and wheezing  Cardiovascular: Negative for chest pain  Musculoskeletal: Positive for myalgias  Neurological: Positive for headaches  All other systems reviewed and are negative        Historical Information   Past Medical History:   Diagnosis Date   • Aortic stenosis    • COPD (chronic obstructive pulmonary disease) (HCC)    • Diabetes ([facility] )    • Diabetes mellitus ([facility] )    • Hyperlipidemia    • Hypertension 7/2/2014   • Sleep apnea, obstructive      Past Surgical History:   Procedure Laterality Date   • APPENDECTOMY     • EYE SURGERY       Family History   Problem Relation Age of Onset   • No Known Problems Mother    • Heart disease Father    • Lung cancer Father    • Diabetes Maternal Grandfather    • Stomach cancer Maternal Grandfather        Social History     Tobacco Use   Smoking Status Former   • Years: 10 00   • Types: Cigarettes   Smokeless Tobacco Never         Meds/Allergies     Current Outpatient Medications:   •  albuterol (Ventolin HFA) 90 mcg/act inhaler, Inhale 2 puffs every 6 (six) hours as needed for wheezing, Disp: 18 g, Rfl: 3  •  amLODIPine (NORVASC) 10 mg tablet, Take 1 tablet (10 mg total) by mouth daily, Disp: 90 tablet, Rfl: 3  •  ammonium lactate (LAC-HYDRIN) 12 % cream, Apply topically as needed for dry skin, Disp: 385 g, Rfl: 0  •  aspirin (ECOTRIN LOW STRENGTH) 81 mg EC tablet, Take 1 tablet (81 mg total) by mouth daily, Disp: 30 tablet, Rfl: 5  •  atorvastatin (LIPITOR) 40 mg tablet, Take 1 tablet (40 mg total) by mouth daily, Disp: 90 tablet, Rfl: 3  •  Blood Glucose Monitoring Suppl (CONTOUR NEXT MONITOR) w/Device KIT, Test blood sugar once daily or as needed for fluctuating blood sugars, Disp: 1 kit, Rfl: 0  •  carvedilol (COREG) 12 5 mg tablet, Take 1 tablet (12 5 mg total) by mouth 2 (two) times a day with meals, Disp: 180 tablet, Rfl: 3  •  cetirizine (ZyrTEC) 10 mg tablet, Take 10 mg by mouth, Disp: , Rfl:   •  cyclobenzaprine (FLEXERIL) 10 mg tablet, Take 10 mg by mouth, Disp: , Rfl:   •  Dapagliflozin Propanediol (Farxiga) 10 MG TABS, Take 10 mg by mouth daily, Disp: , Rfl:   •  fluticasone (FLOVENT HFA) 220 mcg/act inhaler, Inhale 2 puffs 2 (two) times a day Rinse mouth after use, Disp: 12 g, Rfl: 5  •  furosemide (LASIX) 20 mg tablet, Take 1 tablet (20 mg total) by mouth daily, Disp: 30 tablet, Rfl: 2  •  gabapentin (NEURONTIN) 400 mg capsule, Take 1 capsule (400 mg total) by mouth 3 (three) times a day, Disp: 90 capsule, Rfl: 0  •  glimepiride (AMARYL) 4 mg tablet, Take 4 mg by mouth 2 (two) times a day, Disp: , Rfl:   •  glucose blood (CONTOUR NEXT TEST) test strip, Test blood sugar once daily or as needed for fluctuating blood sugars, Disp: 100 each, Rfl: 3  •  guaiFENesin (MUCINEX) 600 mg 12 hr tablet, Take 1 tablet (600 mg total) by mouth every 12 (twelve) hours, Disp: 60 tablet, Rfl: 0  • Insulin Pen Needle (BD Pen Needle Dee Dee U/F) 32G X 4 MM MISC, Use 4 a day, Disp: 200 each, Rfl: 5  •  ipratropium-albuterol (DUO-NEB) 0 5-2 5 mg/3 mL nebulizer solution, Take 3 mL by nebulization every 6 (six) hours as needed for wheezing or shortness of breath, Disp: 360 mL, Rfl: 3  •  Lancets MISC, Test blood sugar once daily or as needed for fluctuating blood sugars, Disp: 100 each, Rfl: 0  •  Levemir FlexTouch 100 units/mL injection pen, Inject 60 Units under the skin daily , Disp: , Rfl:   •  losartan (COZAAR) 100 MG tablet, Take 1 tablet (100 mg total) by mouth daily, Disp: 90 tablet, Rfl: 3  •  metFORMIN (GLUCOPHAGE) 1000 MG tablet, TAKE 1 TABLET BY MOUTH TWICE DAILY WITH MEALS, Disp: 180 tablet, Rfl: 0  •  Multiple Vitamins-Minerals (MENS MULTIVITAMIN PO), Take by mouth, Disp: , Rfl:   •  Ozempic, 0 25 or 0 5 MG/DOSE, 2 MG/1 5ML SOPN, , Disp: , Rfl:   •  Ozempic, 1 MG/DOSE, 4 MG/3ML SOPN injection pen, , Disp: , Rfl:   •  Ozempic, 2 MG/DOSE, 8 MG/3ML SOPN, , Disp: , Rfl:   •  predniSONE 10 mg tablet, Take 4 tablets daily for 3 days then 3 tablets daily for 3 days then 2 tablets daily for 3 days then 1 tablet daily for 3 days, Disp: 30 tablet, Rfl: 0  •  sertraline (ZOLOFT) 50 mg tablet, Take 1 tablet by mouth once daily, Disp: 30 tablet, Rfl: 0  •  sertraline (ZOLOFT) 50 mg tablet, Take 1 tablet (50 mg total) by mouth daily, Disp: 30 tablet, Rfl: 0  •  spironolactone (ALDACTONE) 50 mg tablet, Take 1 tablet (50 mg total) by mouth daily, Disp: 90 tablet, Rfl: 3  •  umeclidinium-vilanterol (Anoro Ellipta) 62 5-25 MCG/INH inhaler, Inhale 1 puff daily, Disp: 60 blister, Rfl: 5  Allergies   Allergen Reactions   • Theophylline Other (See Comments)     Severe headaches  headaches  Severe headaches         Vitals: Blood pressure 112/64, pulse 72, temperature (!) 96 8 °F (36 °C), temperature source Tympanic, height 5' 9" (1 753 m), weight (!) 163 kg (359 lb 9 6 oz), SpO2 90 %  Body mass index is 53 1 kg/m²   Oxygen Therapy  SpO2: 90 %    Physical Exam  Physical Exam  Vitals reviewed  Constitutional:       Appearance: Normal appearance  He is well-developed  HENT:      Head: Normocephalic and atraumatic  Nose: Nose normal       Mouth/Throat:      Mouth: Mucous membranes are moist    Eyes:      Extraocular Movements: Extraocular movements intact  Cardiovascular:      Rate and Rhythm: Normal rate and regular rhythm  Heart sounds: Normal heart sounds  Pulmonary:      Effort: Pulmonary effort is normal  No respiratory distress  Breath sounds: No wheezing, rhonchi or rales  Musculoskeletal:         General: No swelling  Cervical back: Normal range of motion and neck supple  Skin:     General: Skin is warm and dry  Neurological:      General: No focal deficit present  Mental Status: He is alert  Mental status is at baseline  Psychiatric:         Mood and Affect: Mood normal          Behavior: Behavior normal          Labs: I have personally reviewed pertinent lab results  Lab Results   Component Value Date    WBC 10 36 (H) 11/11/2022    HGB 15 6 11/11/2022    HCT 47 7 11/11/2022    MCV 85 11/11/2022     11/11/2022     Lab Results   Component Value Date    CALCIUM 9 5 11/11/2022    K 4 8 11/11/2022    CO2 26 11/11/2022     11/11/2022    BUN 27 (H) 11/11/2022    CREATININE 1 07 11/11/2022     No results found for: IGE  Lab Results   Component Value Date    ALT 45 11/11/2022    AST 15 11/11/2022    ALKPHOS 79 11/11/2022       Imaging and other studies: I have personally reviewed pertinent reports  and I have personally reviewed pertinent films in PACS     CT lung nodule follow-up 12/5/2022  Resolution of left lower lobe tree-in-bud nodularity and 5 mm left lower lobe nodule  2 years of stability noted in tiny calcified and noncalcified granulomas  2-year stability and enlargement of subcarinal lymph node also noted    Nonspecific unilateral right basilar groundglass opacification concerning for chronic aspiration  Pulmonary function testing:  Performed 12/28/2021   FEV1/FVC ratio 63%   FEV1 45% predicted  FVC 55% predicted  no response to bronchodilators  TLC 84 % predicted   % predicted  DLCO corrected for hemoglobin 91 % predicted  Severe obstructive airflow defect  No severe response of bronchodilators  Increase RV indicative of air trapping  Normal diffusion capacity  Other Studies: I have personally reviewed pertinent reports  Echocardiogram 5/25/2022  EF 70%  Moderate aortic stenosis  Trace aortic regurgitation      Answers for HPI/ROS submitted by the patient on 12/5/2022  Do you have difficulty breathing?: Yes  Do you experience frequent throat clearing?: Yes  Do you have a hoarse voice?: Yes  Do you have a wet cough?: Yes  Chronicity: recurrent  When did you first notice your symptoms?: more than 1 month ago  How often do your symptoms occur?: constantly  Since you first noticed this problem, how has it changed?: unchanged  Do you have shortness of breath that occurs with effort or exertion?: Yes  Do you have ear congestion?: No  Do you have heartburn?: No  Do you have fatigue?: Yes  Do you have nasal congestion?: No  Do you have shortness of breath when lying flat?: No  Do you have shortness of breath when you wake up?: No  Do you have sweats?: No  Have you experienced weight loss?: No  Which of the following makes your symptoms worse?: any activity, change in weather, climbing stairs, exercise, exposure to fumes, exposure to smoke, minimal activity, strenuous activity  Which of the following makes your symptoms better?: cold air, oral steroids, steroid inhaler

## 2022-12-12 NOTE — ASSESSMENT & PLAN NOTE
Sahra Bull did not do well on Trelegy  He prefers to stay on Anoro/Flovent combination  Continue this  No refills needed at this time  Continue DuoNeb every 6 hours as needed  Continue albuterol HFA 2 puffs every 6 hours as needed  Provided patient with prednisone taper prescription to have on hand in case of emergency

## 2022-12-12 NOTE — ASSESSMENT & PLAN NOTE
Continue auto titrating BiPAP during all hours of sleep  Patient reports good compliance on BiPAP and benefit since initiating Pap therapy  No compliance data for me to review today

## 2022-12-12 NOTE — ASSESSMENT & PLAN NOTE
Left lower lobe tree-in-bud nodularity and a 5 mm left lower lobe nodule resolved favoring a inflammatory process  There is chronic but stable groundglass opacity in right lower lobe that may be related to chronic aspiration vs unilateral airway process  Will continue to monitor  If persistent, can refer video barium

## 2022-12-19 ENCOUNTER — TELEPHONE (OUTPATIENT)
Dept: PULMONOLOGY | Facility: CLINIC | Age: 57
End: 2022-12-19

## 2022-12-19 ENCOUNTER — TELEPHONE (OUTPATIENT)
Dept: FAMILY MEDICINE CLINIC | Facility: CLINIC | Age: 57
End: 2022-12-19

## 2022-12-19 DIAGNOSIS — D72.829 LEUKOCYTOSIS, UNSPECIFIED TYPE: Primary | ICD-10-CM

## 2022-12-19 NOTE — TELEPHONE ENCOUNTER
Patient called  He was off Prednisone 1 week and all symptoms came back, SOB, wheezing  He's going to start next series of steroids

## 2022-12-19 NOTE — TELEPHONE ENCOUNTER
Pt states that pulmonology is suggesting he sees a hematologist due to his white blood cell count being high   He also wanted to let you know he is seeing a Rheumatologist

## 2022-12-19 NOTE — TELEPHONE ENCOUNTER
I believe seeing a hematologist at this time is slightly premature, would rather repeat the CBC in 1 month and see how it is first  If pt agreeable, let me know

## 2022-12-20 ENCOUNTER — TELEPHONE (OUTPATIENT)
Dept: PULMONOLOGY | Facility: CLINIC | Age: 57
End: 2022-12-20

## 2022-12-20 DIAGNOSIS — J44.1 COPD EXACERBATION (HCC): Primary | ICD-10-CM

## 2022-12-20 RX ORDER — AZITHROMYCIN 250 MG/1
TABLET, FILM COATED ORAL
Qty: 6 TABLET | Refills: 0 | Status: SHIPPED | OUTPATIENT
Start: 2022-12-20 | End: 2022-12-24

## 2022-12-20 NOTE — TELEPHONE ENCOUNTER
Patient called  He's requestng an antibiotic, he now has a lung infection, brining up green colored phlegm  He did start the Prednisone but he feels he needs antibiotic too

## 2022-12-27 ENCOUNTER — TELEPHONE (OUTPATIENT)
Dept: PULMONOLOGY | Facility: CLINIC | Age: 57
End: 2022-12-27

## 2022-12-27 NOTE — TELEPHONE ENCOUNTER
Patient called, finished antibiotic and steroids and there is no change in his condition  He can't walk up stairs without stopping or feeling very SOB  Patient called back today  He didn't hear anything back from us  He's still not feeling well, moving around a little bit more but still has same symptoms  Please advise

## 2022-12-30 ENCOUNTER — TELEPHONE (OUTPATIENT)
Dept: CARDIAC REHAB | Facility: HOSPITAL | Age: 57
End: 2022-12-30

## 2023-01-02 ENCOUNTER — APPOINTMENT (EMERGENCY)
Dept: RADIOLOGY | Facility: HOSPITAL | Age: 58
End: 2023-01-02

## 2023-01-02 ENCOUNTER — HOSPITAL ENCOUNTER (EMERGENCY)
Facility: HOSPITAL | Age: 58
Discharge: HOME | End: 2023-01-03
Attending: EMERGENCY MEDICINE

## 2023-01-02 VITALS
HEIGHT: 69 IN | SYSTOLIC BLOOD PRESSURE: 135 MMHG | TEMPERATURE: 98.4 F | HEART RATE: 92 BPM | RESPIRATION RATE: 19 BRPM | BODY MASS INDEX: 46.65 KG/M2 | WEIGHT: 315 LBS | OXYGEN SATURATION: 92 % | DIASTOLIC BLOOD PRESSURE: 94 MMHG

## 2023-01-02 DIAGNOSIS — J44.1 COPD WITH ACUTE EXACERBATION (HCC): Primary | ICD-10-CM

## 2023-01-02 DIAGNOSIS — J18.9 PNEUMONIA: ICD-10-CM

## 2023-01-02 DIAGNOSIS — R06.00 DYSPNEA: ICD-10-CM

## 2023-01-02 LAB
ALBUMIN SERPL BCP-MCNC: 4 G/DL (ref 3.5–5)
ALP SERPL-CCNC: 94 U/L (ref 46–116)
ALT SERPL W P-5'-P-CCNC: 42 U/L (ref 12–78)
ANION GAP SERPL CALCULATED.3IONS-SCNC: 8 MMOL/L (ref 4–13)
APTT PPP: 28 SECONDS (ref 23–37)
AST SERPL W P-5'-P-CCNC: 21 U/L (ref 5–45)
BASOPHILS # BLD AUTO: 0.05 THOUSANDS/ÂΜL (ref 0–0.1)
BASOPHILS NFR BLD AUTO: 0 % (ref 0–1)
BILIRUB SERPL-MCNC: 0.72 MG/DL (ref 0.2–1)
BUN SERPL-MCNC: 18 MG/DL (ref 5–25)
CALCIUM SERPL-MCNC: 9.8 MG/DL (ref 8.3–10.1)
CHLORIDE SERPL-SCNC: 97 MMOL/L (ref 96–108)
CO2 SERPL-SCNC: 30 MMOL/L (ref 21–32)
CREAT SERPL-MCNC: 1.04 MG/DL (ref 0.6–1.3)
EOSINOPHIL # BLD AUTO: 0.21 THOUSAND/ÂΜL (ref 0–0.61)
EOSINOPHIL NFR BLD AUTO: 1 % (ref 0–6)
ERYTHROCYTE [DISTWIDTH] IN BLOOD BY AUTOMATED COUNT: 14.6 % (ref 11.6–15.1)
GFR SERPL CREATININE-BSD FRML MDRD: 78 ML/MIN/1.73SQ M
GLUCOSE SERPL-MCNC: 161 MG/DL (ref 65–140)
HCT VFR BLD AUTO: 52.6 % (ref 36.5–49.3)
HGB BLD-MCNC: 17.2 G/DL (ref 12–17)
IMM GRANULOCYTES # BLD AUTO: 0.1 THOUSAND/UL (ref 0–0.2)
IMM GRANULOCYTES NFR BLD AUTO: 1 % (ref 0–2)
INR PPP: 0.96 (ref 0.84–1.19)
LACTATE SERPL-SCNC: 0.7 MMOL/L (ref 0.5–2)
LYMPHOCYTES # BLD AUTO: 1.09 THOUSANDS/ÂΜL (ref 0.6–4.47)
LYMPHOCYTES NFR BLD AUTO: 7 % (ref 14–44)
MCH RBC QN AUTO: 27.9 PG (ref 26.8–34.3)
MCHC RBC AUTO-ENTMCNC: 32.7 G/DL (ref 31.4–37.4)
MCV RBC AUTO: 85 FL (ref 82–98)
MONOCYTES # BLD AUTO: 0.9 THOUSAND/ÂΜL (ref 0.17–1.22)
MONOCYTES NFR BLD AUTO: 5 % (ref 4–12)
NEUTROPHILS # BLD AUTO: 14.41 THOUSANDS/ÂΜL (ref 1.85–7.62)
NEUTS SEG NFR BLD AUTO: 86 % (ref 43–75)
NRBC BLD AUTO-RTO: 0 /100 WBCS
PLATELET # BLD AUTO: 355 THOUSANDS/UL (ref 149–390)
PMV BLD AUTO: 9.3 FL (ref 8.9–12.7)
POTASSIUM SERPL-SCNC: 4.1 MMOL/L (ref 3.5–5.3)
PROCALCITONIN SERPL-MCNC: 0.11 NG/ML
PROT SERPL-MCNC: 7.9 G/DL (ref 6.4–8.4)
PROTHROMBIN TIME: 13 SECONDS (ref 11.6–14.5)
RBC # BLD AUTO: 6.17 MILLION/UL (ref 3.88–5.62)
SODIUM SERPL-SCNC: 135 MMOL/L (ref 135–147)
WBC # BLD AUTO: 16.76 THOUSAND/UL (ref 4.31–10.16)

## 2023-01-02 RX ORDER — ALBUTEROL SULFATE 2.5 MG/3ML
5 SOLUTION RESPIRATORY (INHALATION) ONCE
Status: COMPLETED | OUTPATIENT
Start: 2023-01-02 | End: 2023-01-02

## 2023-01-02 RX ADMIN — ALBUTEROL SULFATE 5 MG: 2.5 SOLUTION RESPIRATORY (INHALATION) at 21:45

## 2023-01-02 RX ADMIN — IPRATROPIUM BROMIDE 0.5 MG: 0.5 SOLUTION RESPIRATORY (INHALATION) at 21:45

## 2023-01-03 ENCOUNTER — TELEPHONE (OUTPATIENT)
Dept: PULMONOLOGY | Facility: CLINIC | Age: 58
End: 2023-01-03

## 2023-01-03 DIAGNOSIS — Z79.4 TYPE 2 DIABETES MELLITUS WITH DIABETIC NEUROPATHY, WITH LONG-TERM CURRENT USE OF INSULIN (HCC): ICD-10-CM

## 2023-01-03 DIAGNOSIS — E11.40 TYPE 2 DIABETES MELLITUS WITH DIABETIC NEUROPATHY, WITH LONG-TERM CURRENT USE OF INSULIN (HCC): ICD-10-CM

## 2023-01-03 RX ORDER — PREDNISONE 20 MG/1
40 TABLET ORAL ONCE
Status: COMPLETED | OUTPATIENT
Start: 2023-01-03 | End: 2023-01-03

## 2023-01-03 RX ORDER — PREDNISONE 10 MG/1
TABLET ORAL
Qty: 26 TABLET | Refills: 0 | Status: SHIPPED | OUTPATIENT
Start: 2023-01-03 | End: 2023-01-14

## 2023-01-03 RX ORDER — LEVOFLOXACIN 500 MG/1
500 TABLET, FILM COATED ORAL EVERY 24 HOURS
Qty: 10 TABLET | Refills: 0 | Status: SHIPPED | OUTPATIENT
Start: 2023-01-03 | End: 2023-01-09

## 2023-01-03 RX ORDER — GABAPENTIN 400 MG/1
400 CAPSULE ORAL 3 TIMES DAILY
Qty: 90 CAPSULE | Refills: 0 | Status: SHIPPED | OUTPATIENT
Start: 2023-01-03 | End: 2023-02-02

## 2023-01-03 RX ADMIN — LEVOFLOXACIN 750 MG: 500 TABLET, FILM COATED ORAL at 00:23

## 2023-01-03 RX ADMIN — PREDNISONE 40 MG: 20 TABLET ORAL at 00:23

## 2023-01-03 NOTE — ED PROVIDER NOTES
History  Chief Complaint   Patient presents with   • Shortness of Breath     Patient has been short of breath for a while,  has been on steroids     58-year-old male presents for evaluation of dyspnea, cough  Patient reports being diagnosed with the flu several weeks ago and has been suffering from dyspnea since  Patient's wife reports around Mary time is when it began to worsen up until today  Patient does have history of COPD, he reports being trialed on steroids as well as azithromycin  Patient reports significant dyspnea with exertion  He reports coughing however is unable to express mucus  He denies fevers, chest pain  He has resumed eating and drinking normally  Patient did try breathing treatments at home, last treatment was this morning without relief in symptoms  Per review it appears patient has supplemental oxygen to use as needed, BIPAP at home  Prior to Admission Medications   Prescriptions Last Dose Informant Patient Reported? Taking?    Blood Glucose Monitoring Suppl (CONTOUR NEXT MONITOR) w/Device KIT 1/2/2023  No Yes   Sig: Test blood sugar once daily or as needed for fluctuating blood sugars   Dapagliflozin Propanediol (Farxiga) 10 MG TABS 1/2/2023  Yes Yes   Sig: Take 10 mg by mouth daily   Insulin Pen Needle (BD Pen Needle Dee Dee U/F) 32G X 4 MM MISC 1/2/2023  No Yes   Sig: Use 4 a day   Lancets MISC 1/2/2023  No Yes   Sig: Test blood sugar once daily or as needed for fluctuating blood sugars   Levemir FlexTouch 100 units/mL injection pen 1/2/2023  Yes Yes   Sig: Inject 60 Units under the skin daily    Multiple Vitamins-Minerals (MENS MULTIVITAMIN PO) 1/2/2023  Yes Yes   Sig: Take by mouth   Ozempic, 0 25 or 0 5 MG/DOSE, 2 MG/1 5ML SOPN 1/2/2023  Yes Yes   Ozempic, 1 MG/DOSE, 4 MG/3ML SOPN injection pen 1/2/2023  Yes Yes   Ozempic, 2 MG/DOSE, 8 MG/3ML SOPN 1/2/2023  Yes Yes   albuterol (Ventolin HFA) 90 mcg/act inhaler 1/2/2023  No Yes   Sig: Inhale 2 puffs every 6 (six) hours as needed for wheezing   amLODIPine (NORVASC) 10 mg tablet 1/2/2023  No Yes   Sig: Take 1 tablet (10 mg total) by mouth daily   ammonium lactate (LAC-HYDRIN) 12 % cream 1/2/2023  No Yes   Sig: Apply topically as needed for dry skin   aspirin (ECOTRIN LOW STRENGTH) 81 mg EC tablet 1/2/2023  No Yes   Sig: Take 1 tablet (81 mg total) by mouth daily   atorvastatin (LIPITOR) 40 mg tablet 1/2/2023  No Yes   Sig: Take 1 tablet (40 mg total) by mouth daily   carvedilol (COREG) 12 5 mg tablet 1/2/2023  No Yes   Sig: Take 1 tablet (12 5 mg total) by mouth 2 (two) times a day with meals   cetirizine (ZyrTEC) 10 mg tablet 1/2/2023  Yes Yes   Sig: Take 10 mg by mouth   cyclobenzaprine (FLEXERIL) 10 mg tablet 1/2/2023  Yes Yes   Sig: Take 10 mg by mouth   fluticasone (FLOVENT HFA) 220 mcg/act inhaler 1/2/2023  No Yes   Sig: Inhale 2 puffs 2 (two) times a day Rinse mouth after use   furosemide (LASIX) 20 mg tablet 1/2/2023  No Yes   Sig: Take 1 tablet (20 mg total) by mouth daily   gabapentin (NEURONTIN) 400 mg capsule   No No   Sig: Take 1 capsule (400 mg total) by mouth 3 (three) times a day   glimepiride (AMARYL) 4 mg tablet 1/2/2023  Yes Yes   Sig: Take 4 mg by mouth 2 (two) times a day   glucose blood (CONTOUR NEXT TEST) test strip 1/2/2023  No Yes   Sig: Test blood sugar once daily or as needed for fluctuating blood sugars   guaiFENesin (MUCINEX) 600 mg 12 hr tablet 1/2/2023  No Yes   Sig: Take 1 tablet (600 mg total) by mouth every 12 (twelve) hours   ipratropium-albuterol (DUO-NEB) 0 5-2 5 mg/3 mL nebulizer solution 1/2/2023  No Yes   Sig: Take 3 mL by nebulization every 6 (six) hours as needed for wheezing or shortness of breath   losartan (COZAAR) 100 MG tablet 1/2/2023  No Yes   Sig: Take 1 tablet (100 mg total) by mouth daily   metFORMIN (GLUCOPHAGE) 1000 MG tablet 1/2/2023  No Yes   Sig: TAKE 1 TABLET BY MOUTH TWICE DAILY WITH MEALS   predniSONE 10 mg tablet 1/2/2023  No Yes   Sig: Take 4 tablets daily for 3 days then 3 tablets daily for 3 days then 2 tablets daily for 3 days then 1 tablet daily for 3 days   sertraline (ZOLOFT) 50 mg tablet 1/2/2023  No Yes   Sig: Take 1 tablet by mouth once daily   sertraline (ZOLOFT) 50 mg tablet 1/2/2023  No Yes   Sig: Take 1 tablet (50 mg total) by mouth daily   spironolactone (ALDACTONE) 50 mg tablet 1/2/2023  No Yes   Sig: Take 1 tablet (50 mg total) by mouth daily   umeclidinium-vilanterol (Anoro Ellipta) 62 5-25 MCG/INH inhaler 1/2/2023  No Yes   Sig: Inhale 1 puff daily      Facility-Administered Medications: None       Past Medical History:   Diagnosis Date   • Aortic stenosis    • COPD (chronic obstructive pulmonary disease) ([facility])    • Diabetes ([facility] [address] )    • Diabetes mellitus ([facility] )    • Hyperlipidemia    • Hypertension 7/2/2014   • Sleep apnea, obstructive        Past Surgical History:   Procedure Laterality Date   • APPENDECTOMY     • EYE SURGERY         Family History   Problem Relation Age of Onset   • No Known Problems Mother    • Heart disease Father    • Lung cancer Father    • Diabetes Maternal Grandfather    • Stomach cancer Maternal Grandfather      I have reviewed and agree with the history as documented  E-Cigarette/Vaping   • E-Cigarette Use Never User      E-Cigarette/Vaping Substances   • Nicotine No    • THC No    • CBD No    • Flavoring No    • Other No    • Unknown No      Social History     Tobacco Use   • Smoking status: Former     Years: 10 00     Types: Cigarettes   • Smokeless tobacco: Never   Vaping Use   • Vaping Use: Never used   Substance Use Topics   • Alcohol use: Never   • Drug use: Never       Review of Systems   Constitutional: Negative for appetite change and fever  Respiratory: Positive for cough and shortness of breath  Cardiovascular: Negative for chest pain and leg swelling  Gastrointestinal: Negative for abdominal pain, diarrhea, nausea and vomiting  Musculoskeletal: Negative for myalgias  Neurological: Negative for headaches     All other systems reviewed and are negative  Physical Exam  Physical Exam  Vitals reviewed  Constitutional:       General: He is not in acute distress  Appearance: He is well-developed  He is obese  He is not ill-appearing, toxic-appearing or diaphoretic  HENT:      Head: Normocephalic and atraumatic  Right Ear: External ear normal       Left Ear: External ear normal       Nose: Nose normal    Eyes:      General:         Right eye: No discharge  Left eye: No discharge  Extraocular Movements: Extraocular movements intact  Cardiovascular:      Rate and Rhythm: Normal rate and regular rhythm  Pulmonary:      Effort: Pulmonary effort is normal       Breath sounds: Examination of the right-middle field reveals rhonchi  Examination of the right-lower field reveals rhonchi  Wheezing and rhonchi present  Abdominal:      General: There is no distension  Palpations: Abdomen is soft  Tenderness: There is no abdominal tenderness  There is no guarding or rebound  Musculoskeletal:         General: No deformity or signs of injury  Right lower leg: No edema  Left lower leg: No edema  Skin:     General: Skin is warm  Coloration: Skin is not jaundiced or pale  Neurological:      General: No focal deficit present  Mental Status: He is alert  Mental status is at baseline           Vital Signs  ED Triage Vitals [01/02/23 2035]   Temperature Pulse Respirations Blood Pressure SpO2   98 4 °F (36 9 °C) 99 18 126/77 (!) 88 %      Temp Source Heart Rate Source Patient Position - Orthostatic VS BP Location FiO2 (%)   Tympanic Monitor Sitting Right arm --      Pain Score       No Pain           Vitals:    01/02/23 2130 01/02/23 2200 01/02/23 2230 01/02/23 2330   BP: 143/90 144/79 149/84 135/94   Pulse: 93 85 92 92   Patient Position - Orthostatic VS: Lying Lying Lying Lying         Visual Acuity      ED Medications  Medications   albuterol inhalation solution 5 mg (5 mg Nebulization Given 1/2/23 2145)   ipratropium (ATROVENT) 0 02 % inhalation solution 0 5 mg (0 5 mg Nebulization Given 1/2/23 2145)   predniSONE tablet 40 mg (40 mg Oral Given 1/3/23 0023)   levofloxacin (LEVAQUIN) tablet 750 mg (750 mg Oral Given 1/3/23 0023)       Diagnostic Studies  Results Reviewed     Procedure Component Value Units Date/Time    Lactic acid [[id number]]  (Normal) Collected: 01/02/23 2143    Lab Status: Final result Specimen: Blood from Arm, Right Updated: 01/02/23 2214     LACTIC ACID 0 7 mmol/L     Narrative:      Result may be elevated if tourniquet was used during collection      Procalcitonin [[id number]]  (Normal) Collected: 01/02/23 2126    Lab Status: Final result Specimen: Blood from Arm, Right Updated: 01/02/23 2205     Procalcitonin 0 11 ng/ml     Comprehensive metabolic panel [[id number]]  (Abnormal) Collected: 01/02/23 2126    Lab Status: Final result Specimen: Blood from Arm, Right Updated: 01/02/23 2154     Sodium 135 mmol/L      Potassium 4 1 mmol/L      Chloride 97 mmol/L      CO2 30 mmol/L      ANION GAP 8 mmol/L      BUN 18 mg/dL      Creatinine 1 04 mg/dL      Glucose 161 mg/dL      Calcium 9 8 mg/dL      AST 21 U/L      ALT 42 U/L      Alkaline Phosphatase 94 U/L      Total Protein 7 9 g/dL      Albumin 4 0 g/dL      Total Bilirubin 0 72 mg/dL      eGFR 78 ml/min/1 73sq m     Narrative:      Juanjose guidelines for Chronic Kidney Disease (CKD):   •  Stage 1 with normal or high GFR (GFR > 90 mL/min/1 73 square meters)  •  Stage 2 Mild CKD (GFR = 60-89 mL/min/1 73 square meters)  •  Stage 3A Moderate CKD (GFR = 45-59 mL/min/1 73 square meters)  •  Stage 3B Moderate CKD (GFR = 30-44 mL/min/1 73 square meters)  •  Stage 4 Severe CKD (GFR = 15-29 mL/min/1 73 square meters)  •  Stage 5 End Stage CKD (GFR <15 mL/min/1 73 square meters)  Note: GFR calculation is accurate only with a steady state creatinine    Blood culture #1 [[id number]] Collected: 01/02/23 2143    Lab Status: In process Specimen: Blood from Arm, Left Updated: 01/02/23 2154    Blood culture #2 [[id number]] Collected: 01/02/23 2143    Lab Status:  In process Specimen: Blood from Arm, Right Updated: 01/02/23 2154    Protime-INR [[id number]]  (Normal) Collected: 01/02/23 2126    Lab Status: Final result Specimen: Blood from Arm, Right Updated: 01/02/23 2143     Protime 13 0 seconds      INR 0 96    APTT [[id number]]  (Normal) Collected: 01/02/23 2126    Lab Status: Final result Specimen: Blood from Arm, Right Updated: 01/02/23 2143     PTT 28 seconds     CBC and differential [[id number]]  (Abnormal) Collected: 01/02/23 2126    Lab Status: Final result Specimen: Blood from Arm, Right Updated: 01/02/23 2132     WBC 16 76 Thousand/uL      RBC 6 17 Million/uL      Hemoglobin 17 2 g/dL      Hematocrit 52 6 %      MCV 85 fL      MCH 27 9 pg      MCHC 32 7 g/dL      RDW 14 6 %      MPV 9 3 fL      Platelets 676 Thousands/uL      nRBC 0 /100 WBCs      Neutrophils Relative 86 %      Immat GRANS % 1 %      Lymphocytes Relative 7 %      Monocytes Relative 5 %      Eosinophils Relative 1 %      Basophils Relative 0 %      Neutrophils Absolute 14 41 Thousands/µL      Immature Grans Absolute 0 10 Thousand/uL      Lymphocytes Absolute 1 09 Thousands/µL      Monocytes Absolute 0 90 Thousand/µL      Eosinophils Absolute 0 21 Thousand/µL      Basophils Absolute 0 05 Thousands/µL     UA w Reflex to Microscopic w Reflex to Culture [[id number]]     Lab Status: No result Specimen: Urine     Sputum culture and Gram stain [[id number]]     Lab Status: No result Specimen: Sputum                  XR chest portable    (Results Pending)              Procedures  Procedures         ED Course  ED Course as of 01/03/23 0032   Mon Jan 02, 2023 2148 WBC(!): 16 76   2218 LACTIC ACID: 0 7   2218 WBC(!): 16 76   2220 Per review, has supplemental oxygen at home for use PRN and at night with BIPAP   2237 XR chest portable  Possible infiltrate to R   2237 Patient just started breathing treatment, reports difficulties with first one not working right  Will re-assess after treatment  Can possibly treat for pna as an outpatient, patient has oxygen and nebs at home  Family does not know where their pulse ox is, we can supply one on discharge  Patient asking for steroids if discharged, will provide taper  Tue Jan 03, 2023   0011 Pt ambulated on room air, dip to 88-89% then recovered in bed to 90-92  88-89 generally tolerated in COPD  During our conversation, patient staying above 90%  We discussed disposition options, patient would like to trial home  Advised to return if symptoms are not improving, if his oxygen saturations are routinely below 88  Medical Decision Making  62-year-old male presents for evaluation of dyspnea, cough  Patient admits to suffering from flu as well as chronic cough and dyspnea, over the last week symptoms have worsened  On arrival to room after ambulating he was saturating 88% on room air  Patient was supplied 2 L supplemental oxygen with improvement into the 90s  On examination he does have different lung sounds in right posterior than other areas, he does have diffuse wheezing  Will administer breathing treatment and assess for improvement, will evaluate with infectious labs to rule out serious infection, will obtain a chest x-ray to rule out pneumonia  COPD with acute exacerbation ([facility] [address] ): acute illness or injury  Dyspnea: chronic illness or injury  Pneumonia: acute illness or injury  Amount and/or Complexity of Data Reviewed  Independent Historian: spouse     Details: Patient and wife provide history  External Data Reviewed: labs, radiology, ECG and notes  Labs: ordered  Decision-making details documented in ED Course  Radiology: ordered and independent interpretation performed  Decision-making details documented in ED Course  ECG/medicine tests: ordered   Decision-making details documented in ED Course  Risk  Prescription drug management  Decision regarding hospitalization  Disposition  Final diagnoses:   COPD with acute exacerbation ([facility] [address] )   Pneumonia   Dyspnea     Time reflects when diagnosis was documented in both MDM as applicable and the Disposition within this note     Time User Action Codes Description Comment    1/3/2023 12:15 AM Palmira Croft Add [J44 1] COPD with acute exacerbation ([facility] [address] )     1/3/2023 12:15 AM Palmira Croft Add [J18 9] Pneumonia     1/3/2023 12:15 AM Palmira Croft Add [R06 00] Dyspnea       ED Disposition     ED Disposition   Discharge    Condition   Stable    Date/Time   Tue Isaac 3, 2023 12:15 AM    Comment   Pam Reaves discharge to home/self care  Follow-up Information     Follow up With Specialties Details Why [place] [id number] Emergency Department Emergency Medicine  If symptoms worsen [place] [phone]  [address] [place] [address] Emergency Department, [address], [address]          Patient's Medications   Discharge Prescriptions    LEVOFLOXACIN (LEVAQUIN) 500 MG TABLET    Take 1 tablet (500 mg total) by mouth every 24 hours for 6 days       Start Date: 1/3/2023  End Date: 1/9/2023       Order Dose: 500 mg       Quantity: 10 tablet    Refills: 0    PREDNISONE 10 MG TABLET    Take 4 tablets (40 mg total) by mouth daily for 2 days, THEN 3 tablets (30 mg total) daily for 3 days, THEN 2 tablets (20 mg total) daily for 3 days, THEN 1 tablet (10 mg total) daily for 3 days  Start Date: 1/3/2023  End Date: 1/14/2023       Order Dose: --       Quantity: 26 tablet    Refills: 0       No discharge procedures on file      PDMP Review     None          ED Provider  Electronically Signed by           Unruly Kenny DO  01/03/23 1095

## 2023-01-03 NOTE — ED NOTES
Ambulatory pulse ox check was 89  Patient then sat down and it came up to 93  patient told doc he feels better and would like to go home       Bernadette Stacy RN  01/03/23 2414

## 2023-01-03 NOTE — TELEPHONE ENCOUNTER
1. PROCEDURE: Botox injections      2. INDICATION: Intractable Migraine      3. TIME OUT: The procedure was discussed in details with the patient and the consent form was signed. The patient verbalized understanding of the procedure . I discussed the risks that may include serious immune reaction and distant spread that could results in loss of breathing. Other side effects may include droopy eyelids, trouble swallowing and cardiac arrhythmias. It was also stressed that it is contraindicated in pregnancy.      3. COURSE:   The standard protocol was followed. the procedure was very smooth.      4. COMPLICATIONS: None. The patient tolerated the procedure very well.      5. AFTERCARE: I encouraged the patient to use ice if the sites of injection get tender and call me with any problems or complications.               As per the standard protocol, a total 155 units were injected in 31 sites.            RT  5 units in 1 site     LT  5 units in 1 site      Procerus 5 units in 1 site      RT Frontalis 10 units in 2 sites      LT Frontalis 10 units in 2 site      RT Temporalis 20 units in 4 sites     LT Temporalis 20 units in 4 sites     RT Occipitalis 15 units in 3 sites     LT Occipitalis 15  units in 3 sites      RT Cervical Paraspinals 10 units in 2 sites     LT Cervical Paraspinals 10 units in 2 sites     RT Trapezius 15 units in 3 sites     LT Trapezius 15 units in 3 sites         Per the standard of care protocol we use 155 units and waste 45 units. I gave the patient the option of following the standard protocol vs.using the extra 45 units to target areas where the pain is maximum which could potentially provide extra help with headache control. I explained to the patient that this will be an off-label use, not the standard protocol, not evidence-based and could result in more pronounced side effects. The patient verbalized full understanding of all the facts and the risks and benefits and  FYI-  Patient finally went to ED yesterday  He was given stronger antibiotic, and steroids  He was told he had Pneumonia in the right lung  He had blood work and chest xray done  elected to use the extra 45 units for the following sites:                 Procerus 5 units in 1 site      RT Frontalis 10 units in 2 sites      LT Frontalis 10 units in 2 site      RT Temporalis 10 units in 2 sites

## 2023-01-04 LAB
ATRIAL RATE: 92 BPM
P AXIS: 75 DEGREES
PR INTERVAL: 152 MS
QRS AXIS: -1 DEGREES
QRSD INTERVAL: 92 MS
QT INTERVAL: 356 MS
QTC INTERVAL: 440 MS
T WAVE AXIS: 74 DEGREES
VENTRICULAR RATE: 92 BPM

## 2023-01-05 ENCOUNTER — OFFICE VISIT (OUTPATIENT)
Dept: SLEEP CENTER | Facility: CLINIC | Age: 58
End: 2023-01-05

## 2023-01-05 VITALS
WEIGHT: 315 LBS | HEIGHT: 69 IN | HEART RATE: 86 BPM | DIASTOLIC BLOOD PRESSURE: 62 MMHG | SYSTOLIC BLOOD PRESSURE: 100 MMHG | OXYGEN SATURATION: 90 % | TEMPERATURE: 97.6 F | BODY MASS INDEX: 46.65 KG/M2

## 2023-01-05 DIAGNOSIS — I35.0 NONRHEUMATIC AORTIC VALVE STENOSIS: ICD-10-CM

## 2023-01-05 DIAGNOSIS — G47.09 OTHER INSOMNIA: ICD-10-CM

## 2023-01-05 DIAGNOSIS — G47.33 OSA (OBSTRUCTIVE SLEEP APNEA): Primary | ICD-10-CM

## 2023-01-05 DIAGNOSIS — Z79.4 TYPE 2 DIABETES MELLITUS WITH DIABETIC NEUROPATHY, WITH LONG-TERM CURRENT USE OF INSULIN (HCC): ICD-10-CM

## 2023-01-05 DIAGNOSIS — J44.9 COPD, SEVERE (HCC): ICD-10-CM

## 2023-01-05 DIAGNOSIS — J96.11 CHRONIC HYPOXEMIC RESPIRATORY FAILURE (HCC): ICD-10-CM

## 2023-01-05 DIAGNOSIS — I50.33 ACUTE ON CHRONIC DIASTOLIC HEART FAILURE (HCC): ICD-10-CM

## 2023-01-05 DIAGNOSIS — I10 ESSENTIAL HYPERTENSION: ICD-10-CM

## 2023-01-05 DIAGNOSIS — E66.01 MORBID OBESITY WITH BMI OF 50.0-59.9, ADULT (HCC): ICD-10-CM

## 2023-01-05 DIAGNOSIS — E11.40 TYPE 2 DIABETES MELLITUS WITH DIABETIC NEUROPATHY, WITH LONG-TERM CURRENT USE OF INSULIN (HCC): ICD-10-CM

## 2023-01-05 DIAGNOSIS — R09.82 ALLERGIC RHINITIS WITH POSTNASAL DRIP: ICD-10-CM

## 2023-01-05 DIAGNOSIS — J30.9 ALLERGIC RHINITIS WITH POSTNASAL DRIP: ICD-10-CM

## 2023-01-05 NOTE — PROGRESS NOTES
Follow-Up Note - Sleep Center   Magalis Goff  61 y o  male  :1965  LEVY:[id number]  DOS:2023    CC: I saw this patient for follow-up in clinic today for Sleep disordered breathing, Coexisting Sleep and Medical Problems  He had repeat studies in  interpreted by Dr Damaso Perez  The diagnostic study demonstrated an AHI of 41 8 per hour, minimum oxygen saturation was 59% and 153 minutes of time asleep spent with saturations below 90%  Severity may be under stated since he had poor sleep efficiency at 41% and no stage REM  During the subsequent therapeutic study, sleep disordered breathing was sufficiently remediated with BiPAP at 20/14 cm H2O  The AHI at this setting was 0 5 per hour with mean oxygen saturation of 91 4% with brief desaturations to a d[place] of 87%  PFSH, Problem List, Medications & Allergies were reviewed in EMR  Interval changes: None reported  He  has a past medical history of Aortic stenosis, COPD (chronic obstructive pulmonary disease) ([facility] ), Diabetes ([facility] ), Diabetes mellitus ([facility] ), Hyperlipidemia, Hypertension (2014), and Sleep apnea, obstructive  He has a current medication list which includes the following prescription(s): albuterol, amlodipine, ammonium lactate, aspirin, atorvastatin, contour next monitor, carvedilol, cetirizine, cyclobenzaprine, farxiga, fluticasone, furosemide, gabapentin, glimepiride, glucose blood, guaifenesin, insulin pen needle, ipratropium-albuterol, lancets, levemir flextouch, levofloxacin, losartan, metformin, multiple vitamins-minerals, ozempic (0 25 or 0 5 mg/dose), ozempic (1 mg/dose), ozempic (2 mg/dose), prednisone, prednisone, sertraline, sertraline, spironolactone, and anoro ellipta  PHYSIOLOGICAL DATA REVIEW AND INTERPRETATION: Using PAP > 4 hours/night 97%  Estimated ZAC 0 2/hour with pressure of 25/14cm Karlaim@Neul percentile; Patient has not been using ozone based devices to sanitize the machine    He is now also needing supplemental oxygen during sleep has prescribed by his pulmonologist    SUBJECTIVE: Regarding use of PAP, Vinh Reardon reports:   · no adverse effects:  has not noticed any fibres or foreign material in air line  · He is benefiting from use: sleeping better   Sleep Routine: Vinh Reardon reports getting 6-8 hrs sleep; he has difficulty initiating sleep, but not maintaining sleep   He attributes to mind racing and watches TV in bed    He arises needing an alarm and feels more refreshed since on Rx  Vinh Reardon denies excessive daytime sleepiness, no napping  He rated [himself] at Total score: 8 /24 on the [place] Sleepiness Scale  Habits:[ reports that he has quit smoking  His smoking use included cigarettes  He has never used smokeless tobacco ], [ reports no history of alcohol use ], [ reports no history of drug use ], Caffeine use:excessive; Exercise routine: regular  ROS: reviewed & significant for weight has been fluctuating  He reports some postnasal drip, coughing and wheezing and shortness of breath  He reported no cardiac symptoms  He has musculoskeletal aches and pains that he can delay sleep  [place]imeon EXAM: /62 (BP Location: Right arm, Cuff Size: Large)   Pulse 86   Temp 97 6 °F (36 4 °C) (Temporal)   Ht 5' 9" (1 753 m)   Wt (!) 158 kg (347 lb 12 8 oz)   SpO2 90%   BMI 51 36 kg/m²     Wt Readings from Last 3 Encounters:   01/05/23 (!) 158 kg (347 lb 12 8 oz)   01/02/23 (!) 163 kg (360 lb)   12/12/22 (!) 163 kg (359 lb 9 6 oz)      Patient is well groomed; well appearing  CNS: Alert, orientated, clear & coherent speech  Psych: cooperative and in no distress  Mental state: Appears normal   H&N: EOMI; NC/AT: No facial pressure marks, no rashes  Skin/Extrem: col & hydration normal; no edema  Resp: Respiratory effort is normal  Physical findings otherwise essentially unchanged from previous  IMPRESSION: Problem List Items & Comorbidities Addressed this Visit    1   SCOTT (obstructive sleep apnea)  PAP DME Resupply/Reorder      2  Other insomnia        3  Chronic hypoxemic respiratory failure (HCC)        4  COPD, severe ([facility] )        5  Allergic rhinitis with postnasal drip        6  Acute on chronic diastolic heart failure ([facility] )        7  Essential hypertension        8  Nonrheumatic aortic valve stenosis        9  Morbid obesity with BMI of 50 0-59 9, adult ([facility] )        10  Type 2 diabetes mellitus with diabetic neuropathy, with long-term current use of insulin ([facility] )            PLAN:  1  I reviewed results of prior studies and physiologic data with the patient  2  I discussed treatment options with risks and benefits  3  Treatment with  PAP is medically necessary and Teryl Given is agreable to continue use  4  Care of equipment, methods to improve comfort using PAP and importance of compliance with therapy were discussed  5  Pressure setting: continue 25/14 cmH2O together with oxygen at 4 L/min  6  Rx provided to replace supplies and Care coordinated with DME provider  7  Nasal symptoms may improve with regular nasal saline rinse up to twice a day, followed by topical nasal steroid (e g  OTC Flonase, Nasacort) once a day if necessary  8  He is under care of pulmonary and prescribed supplemental oxygen at 2 L/min during sleep and as needed  9  Multi component Cognitive behavioral therapy for Insomnia undertaken - Sleep Restriction, Stimulus control, Relaxation techniques and Sleep hygiene were discussed  10  Discussed strategies for weight reduction  11  In view of chronic pain and anxiety, he may benefit from Cymbalta in place of Zoloft  12  Follow-up is advised in 1 year or sooner if needed to monitor progress, compliance and to adjust therapy  Thank you for allowing me to participate in the care of this patient  Sincerely,     Authenticated electronically on 68/89/23   Board Certified Specialist     Portions of the record may have been created with voice recognition software  Occasional wrong word or "sound a like" substitutions may have occurred due to the inherent limitations of voice recognition software  There may also be notations and random deletions of words or characters from malfunctioning software  Read the chart carefully and recognize, using context, where substitutions/deletions have occurred

## 2023-01-05 NOTE — PROGRESS NOTES
Review of Systems      Genitourinary none   Cardiology ankle/leg swelling   Gastrointestinal none   Neurology need to move extremities, muscle weakness, numbness/tingling of an extremity and balance problems   Constitutional fatigue and weight change   Integumentary none   Psychiatry none   Musculoskeletal joint pain, muscle aches, legs twitching/jerking and leg cramps   Pulmonary shortness of breath with activity, wheezing and frequent cough   ENT throat clearing   Endocrine none   Hematological none

## 2023-01-05 NOTE — PATIENT INSTRUCTIONS

## 2023-01-06 ENCOUNTER — TELEPHONE (OUTPATIENT)
Dept: SLEEP CENTER | Facility: CLINIC | Age: 58
End: 2023-01-06

## 2023-01-06 LAB

## 2023-01-06 NOTE — TELEPHONE ENCOUNTER
Rx for CPAP supplies and clinicals sent to [address] via [address] [place] [address]

## 2023-01-08 LAB
BACTERIA BLD CULT: NORMAL
BACTERIA BLD CULT: NORMAL

## 2023-01-12 ENCOUNTER — TELEPHONE (OUTPATIENT)
Dept: PULMONOLOGY | Facility: CLINIC | Age: 58
End: 2023-01-12

## 2023-01-12 LAB
DME PARACHUTE DELIVERY DATE ACTUAL: NORMAL
DME PARACHUTE DELIVERY DATE REQUESTED: NORMAL
DME PARACHUTE ITEM DESCRIPTION: NORMAL
DME PARACHUTE ORDER STATUS: NORMAL
DME PARACHUTE SUPPLIER NAME: NORMAL
DME PARACHUTE SUPPLIER PHONE: NORMAL

## 2023-01-12 NOTE — TELEPHONE ENCOUNTER
Scheduled pt for rheum/pulm clinic on 2/8  That was the earliest avail  Date  Dr Francisco Reyes schedule is not out, so im thinking they never put it out since her baby is coming soon

## 2023-01-27 ENCOUNTER — TELEPHONE (OUTPATIENT)
Dept: PULMONOLOGY | Facility: HOSPITAL | Age: 58
End: 2023-01-27

## 2023-01-31 ENCOUNTER — TELEPHONE (OUTPATIENT)
Dept: PULMONOLOGY | Facility: CLINIC | Age: 58
End: 2023-01-31

## 2023-01-31 DIAGNOSIS — Z79.4 TYPE 2 DIABETES MELLITUS WITH DIABETIC NEUROPATHY, WITH LONG-TERM CURRENT USE OF INSULIN (HCC): ICD-10-CM

## 2023-01-31 DIAGNOSIS — E11.40 TYPE 2 DIABETES MELLITUS WITH DIABETIC NEUROPATHY, WITH LONG-TERM CURRENT USE OF INSULIN (HCC): ICD-10-CM

## 2023-01-31 RX ORDER — GABAPENTIN 400 MG/1
400 CAPSULE ORAL 3 TIMES DAILY
Qty: 90 CAPSULE | Refills: 0 | Status: SHIPPED | OUTPATIENT
Start: 2023-01-31 | End: 2023-03-02

## 2023-01-31 NOTE — TELEPHONE ENCOUNTER
Patient called  He was trying to hold off calling but he's feeling lousy  He finished Antibiotic and steroids from the ER  His appointment at [facility] isn't until 02/08/2023  He doesn't think he needs steroids just maybe an antibiotic  He's bringing up green phlegm, not sleeping,resting his O2 is 90 or above, going up steps it drops to 85  The other day he accidentally took double his Lasix and he felt wonderful for two days, not sure if that means anything or not  Please advise

## 2023-02-02 DIAGNOSIS — J44.1 COPD EXACERBATION (HCC): Primary | ICD-10-CM

## 2023-02-02 RX ORDER — PREDNISONE 10 MG/1
40 TABLET ORAL DAILY
Qty: 20 TABLET | Refills: 0 | Status: SHIPPED | OUTPATIENT
Start: 2023-02-02

## 2023-02-02 NOTE — TELEPHONE ENCOUNTER
Discussed with patient  He complains of chest tightness, wheezing, increased shortness of breath, cough productive of green sputum  Previously diagnosed with pneumonia in the ED last month and was treated with Levaquin but feels that it had no benefit to him  Last time he had steroids was mid December  Trial 5 days of prednisone 40 mg p o  daily with instructions to follow-up Rheum pulmonary clinic 2/8/2023  Verbalized understanding and agrees to the plan

## 2023-02-07 ENCOUNTER — APPOINTMENT (OUTPATIENT)
Dept: LAB | Facility: MEDICAL CENTER | Age: 58
End: 2023-02-07

## 2023-02-07 DIAGNOSIS — D72.829 LEUKOCYTOSIS, UNSPECIFIED TYPE: ICD-10-CM

## 2023-02-07 LAB
ANISOCYTOSIS BLD QL SMEAR: PRESENT
BASOPHILS # BLD MANUAL: 0.22 THOUSAND/UL (ref 0–0.1)
BASOPHILS NFR MAR MANUAL: 2 % (ref 0–1)
EOSINOPHIL # BLD MANUAL: 0.11 THOUSAND/UL (ref 0–0.4)
EOSINOPHIL NFR BLD MANUAL: 1 % (ref 0–6)
ERYTHROCYTE [DISTWIDTH] IN BLOOD BY AUTOMATED COUNT: 14.4 % (ref 11.6–15.1)
HCT VFR BLD AUTO: 44.1 % (ref 36.5–49.3)
HGB BLD-MCNC: 14.7 G/DL (ref 12–17)
LYMPHOCYTES # BLD AUTO: 1.65 THOUSAND/UL (ref 0.6–4.47)
LYMPHOCYTES # BLD AUTO: 15 % (ref 14–44)
MCH RBC QN AUTO: 28.2 PG (ref 26.8–34.3)
MCHC RBC AUTO-ENTMCNC: 33.3 G/DL (ref 31.4–37.4)
MCV RBC AUTO: 85 FL (ref 82–98)
MICROCYTES BLD QL AUTO: PRESENT
MONOCYTES # BLD AUTO: 0.44 THOUSAND/UL (ref 0–1.22)
MONOCYTES NFR BLD: 4 % (ref 4–12)
NEUTROPHILS # BLD MANUAL: 8.48 THOUSAND/UL (ref 1.85–7.62)
NEUTS BAND NFR BLD MANUAL: 4 % (ref 0–8)
NEUTS SEG NFR BLD AUTO: 73 % (ref 43–75)
PLATELET # BLD AUTO: 334 THOUSANDS/UL (ref 149–390)
PLATELET BLD QL SMEAR: ADEQUATE
PMV BLD AUTO: 9.8 FL (ref 8.9–12.7)
POLYCHROMASIA BLD QL SMEAR: PRESENT
RBC # BLD AUTO: 5.21 MILLION/UL (ref 3.88–5.62)
RBC MORPH BLD: PRESENT
VARIANT LYMPHS # BLD AUTO: 1 %
WBC # BLD AUTO: 11.01 THOUSAND/UL (ref 4.31–10.16)
WBC TOXIC VACUOLES BLD QL SMEAR: PRESENT

## 2023-02-08 ENCOUNTER — CONSULT (OUTPATIENT)
Dept: PULMONOLOGY | Facility: CLINIC | Age: 58
End: 2023-02-08

## 2023-02-08 VITALS
HEIGHT: 69 IN | RESPIRATION RATE: 18 BRPM | BODY MASS INDEX: 46.65 KG/M2 | SYSTOLIC BLOOD PRESSURE: 100 MMHG | TEMPERATURE: 98 F | OXYGEN SATURATION: 92 % | DIASTOLIC BLOOD PRESSURE: 60 MMHG | WEIGHT: 315 LBS | HEART RATE: 80 BPM

## 2023-02-08 DIAGNOSIS — R06.02 SHORTNESS OF BREATH: ICD-10-CM

## 2023-02-08 DIAGNOSIS — D72.829 LEUKOCYTOSIS, UNSPECIFIED TYPE: Primary | ICD-10-CM

## 2023-02-08 DIAGNOSIS — M32.9 LUPUS (HCC): Primary | ICD-10-CM

## 2023-02-08 NOTE — PROGRESS NOTES
Progress Note - Pulmonary   Jose Curtis 61 y o  male MRN: [id number]   Encounter: [id number]      Assessment/Plan:  Patient is a 50-year-old male with past history significant for morbid obesity who presents for routine follow up  Given the patient's autoimmune panel, there is significant concern for lupus  We will check updated autoimmune panel  Additionally, the patient does appear significantly volume overloaded therefore we will increase patient's Lasix to 40 mg twice daily  Encouraged patient to weigh himself daily  The patient has follow-up with pulmonary in approximately 1 month to address further diuresis as well as follow-up on results of autoimmune blood work  1  Lupus (HCC)  -     UA (URINE) with reflex to Scope  -     CLYDE Multiplex With Reflex To dsDNA; Future  -     Sedimentation rate, automated; Future  -     Ferritin; Future  -     CK (with reflex to MB); Future  -     Aldolase; Future  -     C3 complement; Future  -     C4 complement; Future  -     CT chest high resolution; Future; Expected date: 03/08/2023  -     IgG, IgA, IgM; Future    2  Shortness of breath  -     NT-BNP PRO; Future    Patient may follow up in 1-2 months or sooner as necessary  Greater than 50% of the total visit lasting 50 minutes was spent counseling patient on the evaluation and management of his lung disease  Orders:  Orders Placed This Encounter   Procedures   • CT chest high resolution     Standing Status:   Future     Standing Expiration Date:   2/8/2027     Scheduling Instructions: There is no prep for this study  Please bring your insurance cards, a form of photo ID and a list of your medications with you  Arrive 15 minutes prior to your appointment time to register  On the day of your test, please bring any prior CT or MRI studies of this area with you that were not performed at a [facility]  To schedule this appointment, please contact Central Scheduling at [phone]  Order Specific Question:   What is the patient's sedation requirement? Answer:   No Sedation     Order Specific Question:   Release to patient through Mychart     Answer:   Immediate     Order Specific Question:   Reason for Exam (FREE TEXT)     Answer:   lupus     Order Specific Question:   When should the test be performed? Answer:   in 1 month   • UA (URINE) with reflex to Scope   • CLYDE Multiplex With Reflex To dsDNA     Standing Status:   Future     Standing Expiration Date:   2/8/2024   • Sedimentation rate, automated     Standing Status:   Future     Standing Expiration Date:   2/8/2024   • Ferritin     Standing Status:   Future     Standing Expiration Date:   2/8/2024   • CK (with reflex to MB)     Standing Status:   Future     Standing Expiration Date:   2/8/2024   • Aldolase     Standing Status:   Future     Standing Expiration Date:   2/8/2024   • C3 complement     Standing Status:   Future     Standing Expiration Date:   2/8/2024   • C4 complement     Standing Status:   Future     Standing Expiration Date:   2/8/2024   • NT-BNP PRO     Standing Status:   Future     Standing Expiration Date:   2/8/2024   • IgG, IgA, IgM     Standing Status:   Future     Standing Expiration Date:   2/8/2024       Subjective: The patient notes significant mucus production which is green in color  He had sweats and cold chills  He had COVID in July of 2021  He was admitted for 5 [facility] at [facility]  His breathing was relatively stable prior to this  He would note difficulty with the change of weather  He would get a seasonal course of abx and prednisone with resolution  For the past 1 5 years, the patient has had 6-7 courses of prednisone and abx  The course have lasted about 5-12 days  His last course of prednisone completed yesterday  He has profound wheezing  He had no benefit from Trelegy  He is currently Anoro  He has a rescue inhaler  He is using his nebulizer about 2-4x daily       He is using bipap for sleep apnea  He will notice cramping in his legs  He will notice weakness in his lower extremity  He was on disability for breathing  He previously worked in the [employer]  The patient has lost about 20lbs in the past 2-3 months  He is taking ozempic for diabetes  He has a dog at home  He denies birds  He denies any rashes/lesions/skin problems  The patient took an extra dose of lasix which is 40mg  Inhaler Regimen:  Anoro  Ventolin -  Albuterol/ipratropium nebulizer     Remainder of review of systems negative except as described in HPI  The following portions of the patient's history were reviewed and updated as appropriate: allergies, current medications, past family history, past medical history, past social history, past surgical history and problem list      Objective:   Vitals: Blood pressure 100/60, pulse 80, temperature 98 °F (36 7 °C), temperature source Tympanic, resp  rate 18, height 5' 9" (1 753 m), weight (!) 157 kg (347 lb), SpO2 92 % , RA, Body mass index is 51 24 kg/m²  Physical Exam  Gen: Pleasant, awake, alert, oriented x 3, no acute distress  HEENT: Mucous membranes moist, no oral lesions, no thrush  NECK: No accessory muscle use, JVP not elevated  Cardiac: RRR, single S1, single S2, no murmurs, no rubs, no gallops  Lungs: decreased breath sounds  Abdomen: normoactive bowel sounds, soft nontender, nondistended, no rebound or rigidity, no guarding, obese  Extremities: no cyanosis, no clubbing, + LE edema  MSK:  Strength equal in all extremities  Derm:  No rashes/lesions noted  Neuro:  Appropriate mood/affect    Labs: I have personally reviewed pertinent lab results  Lab Results   Component Value Date    WBC 11 01 (H) 02/07/2023    HGB 14 7 02/07/2023     02/07/2023     Lab Results   Component Value Date    CREATININE 1 04 01/02/2023     Imaging and other studies: I have personally reviewed pertinent reports     and I have personally reviewed pertinent films in PACS  CT lung nodules 12/5/2022: My interpretation:  Calcified nodules; right base ground glass    Radiology findings:  LUNGS:  Resolution of the previous left lower lobe tree-in-bud nodularity with associated small nodularity, likely an infectious etiology in retrospect  Scattered tiny calcified and noncalcified pulmonary nodules are stable from studies dating back until October 2020, thus benign (all of which are annotated on series 3)  No new concerning pulmonary nodule  No lung consolidation  Unchanged right basilar diffuse unilateral groundglass opacification since October 2020, nonspecific, but favoring a unilateral airway process or possible chronic aspiration  No associated solid nodularity  Airways are normal   PLEURA:  Unremarkable  HEART/GREAT VESSELS:  Normal cardiac size  Mild pericardial thickening  Nodular valvular and coronary arterial atherosclerotic calcifications  Advanced aortic valvular calcifications  MEDIASTINUM AND TESSY:  Enlargement of a subcarinal lymph node, measuring 16 mm in short axis (series 2, image 32), stable since October 2020, nonspecific  CHEST WALL AND LOWER NECK:  Unremarkable  Pulmonary Function Testing: I have personally reviewed pertinent reports  and I have personally reviewed pertinent films in PACS    FVC  FEV1  FEV1/FVC DLCOcor  9/4/2019 2 54 55% 1 36 41% 53%  66%  12/28/2021 2 57 55% 1 62 45% 63%  91%    Mohsen Melara [facility]

## 2023-02-10 ENCOUNTER — APPOINTMENT (OUTPATIENT)
Dept: LAB | Facility: HOSPITAL | Age: 58
End: 2023-02-10
Attending: INTERNAL MEDICINE

## 2023-02-10 DIAGNOSIS — R06.02 SHORTNESS OF BREATH: ICD-10-CM

## 2023-02-10 DIAGNOSIS — M32.9 LUPUS (HCC): ICD-10-CM

## 2023-02-10 LAB
BACTERIA UR QL AUTO: NORMAL /HPF
BILIRUB UR QL STRIP: NEGATIVE
BNP SERPL-MCNC: 25 PG/ML (ref 0–100)
C3 SERPL-MCNC: 131 MG/DL (ref 90–180)
C4 SERPL-MCNC: 28 MG/DL (ref 10–40)
CK SERPL-CCNC: 84 U/L (ref 39–308)
CLARITY UR: CLEAR
COLOR UR: YELLOW
ERYTHROCYTE [SEDIMENTATION RATE] IN BLOOD: 16 MM/HOUR (ref 0–19)
FERRITIN SERPL-MCNC: 206 NG/ML (ref 8–388)
GLUCOSE UR STRIP-MCNC: ABNORMAL MG/DL
HGB UR QL STRIP.AUTO: NEGATIVE
IGA SERPL-MCNC: 319 MG/DL (ref 70–400)
IGG SERPL-MCNC: 661 MG/DL (ref 700–1600)
IGM SERPL-MCNC: 22 MG/DL (ref 40–230)
KETONES UR STRIP-MCNC: NEGATIVE MG/DL
LEUKOCYTE ESTERASE UR QL STRIP: ABNORMAL
NITRITE UR QL STRIP: NEGATIVE
NON-SQ EPI CELLS URNS QL MICRO: NORMAL /HPF
PH UR STRIP.AUTO: 6 [PH]
PROT UR STRIP-MCNC: NEGATIVE MG/DL
RBC #/AREA URNS AUTO: NORMAL /HPF
SP GR UR STRIP.AUTO: 1.02 (ref 1–1.03)
UROBILINOGEN UR QL STRIP.AUTO: 0.2 E.U./DL
WBC #/AREA URNS AUTO: NORMAL /HPF

## 2023-02-11 LAB — ANA SER QL IA: POSITIVE

## 2023-02-13 ENCOUNTER — TELEPHONE (OUTPATIENT)
Dept: PULMONOLOGY | Facility: CLINIC | Age: 58
End: 2023-02-13

## 2023-02-13 DIAGNOSIS — E78.49 OTHER HYPERLIPIDEMIA: ICD-10-CM

## 2023-02-13 DIAGNOSIS — I10 ESSENTIAL HYPERTENSION: ICD-10-CM

## 2023-02-13 DIAGNOSIS — J44.9 COPD, SEVERE (HCC): ICD-10-CM

## 2023-02-13 LAB
ALDOLASE SERPL-CCNC: 5.1 U/L (ref 3.3–10.3)
ANA HOMOGEN SER QL IF: NORMAL
ANA HOMOGEN TITR SER: NORMAL {TITER}
CHROMATIN AB SERPL-ACNC: NEGATIVE
DSDNA AB SER-ACNC: >10 IU/ML
ENA SS-A AB SER IA-ACNC: NEGATIVE
ENA SS-B AB SER IA-ACNC: NEGATIVE

## 2023-02-13 RX ORDER — AMLODIPINE BESYLATE 10 MG/1
10 TABLET ORAL DAILY
Qty: 90 TABLET | Refills: 3 | Status: SHIPPED | OUTPATIENT
Start: 2023-02-13

## 2023-02-13 RX ORDER — ATORVASTATIN CALCIUM 40 MG/1
40 TABLET, FILM COATED ORAL DAILY
Qty: 90 TABLET | Refills: 3 | Status: SHIPPED | OUTPATIENT
Start: 2023-02-13

## 2023-02-13 NOTE — TELEPHONE ENCOUNTER
Norberto Jama would like a return call regarding     What is the reason for the call/chief complaint? SOB    What additional symptoms are present or absent? SOB, yes   Are they on O2? Yes, describe: 5L Pulse O2 restingN/AWhen walking N/A  chest pain/tightness, no  wheezing, yes  Cough, yes  mucous production,yes  What color is it GREEN  fevers/chills, no  weight gain, no  Swelling no  Pain no, does it hurt when breathing or all the time? N/A    When did they start/how long have they been going on? 2/11/2023    Constant or intermittent; if intermittent describe yes? What makes it better or worse? N/A    Have you been exposed to anyone that is sick? No    Have you been tested for COVID recently? no    Check current pulmonary medications INHALERS/NEBS  frequency of use DAILY    Have they had any other treatment or testing for this problem elsewhere? N/A    Recent steroids? Yes, describe: PRESCRIBED ON 2/2/2023    Recent Antibiotics? No     Last office visit? 2/8/2023    Patient pharmacy?  [facility] - [address] [place] [id number]   Phone:  [phone]  Fax:  [phone]    30 or 90 day supply

## 2023-02-13 NOTE — TELEPHONE ENCOUNTER
Yaneth GUTIERREZ  [facility]  Amlodipine 10 mg 1 tab daily   90 with 3 refills     Atorvastatin 40 mg 1 tab daily  90 with 3 refills     Dr Jenni Fulton in [address] [facility]

## 2023-02-14 RX ORDER — PREDNISONE 10 MG/1
TABLET ORAL
Qty: 30 TABLET | Refills: 0 | Status: SHIPPED | OUTPATIENT
Start: 2023-02-14

## 2023-02-14 NOTE — TELEPHONE ENCOUNTER
Called and spoke with patient  Denies fevers and chills  State's that he felt better on the prednisone he had in the beginning of the month  Discussed with Dr Perlita Echevarria and a longer prednisone taper was ordered  He verbalized understanding

## 2023-02-15 ENCOUNTER — TELEPHONE (OUTPATIENT)
Dept: HEMATOLOGY ONCOLOGY | Facility: CLINIC | Age: 58
End: 2023-02-15

## 2023-02-15 NOTE — TELEPHONE ENCOUNTER
Patient has a referral on file - Made an attempt to schedule a new patient consultation  A voicemail was left making patient aware of their referral and instructing them to call back at the [facility] phone number in order to schedule their consultation

## 2023-02-22 ENCOUNTER — RA CDI HCC (OUTPATIENT)
Dept: OTHER | Facility: HOSPITAL | Age: 58
End: 2023-02-22

## 2023-02-22 NOTE — PROGRESS NOTES
[facility]  coding opportunities     I13 0, E11 22 and E11 65     Chart Reviewed number of suggestions sent to Provider: 3     Patients Insurance     Medicare Insurance: [address] [place] [address]

## 2023-02-28 ENCOUNTER — TELEPHONE (OUTPATIENT)
Dept: PULMONOLOGY | Facility: CLINIC | Age: 58
End: 2023-02-28

## 2023-02-28 DIAGNOSIS — I50.33 ACUTE ON CHRONIC DIASTOLIC HEART FAILURE (HCC): Primary | ICD-10-CM

## 2023-02-28 NOTE — TELEPHONE ENCOUNTER
Pt called stating that Dr Anson Cloud increased his Lasix  To 40 Mg  But he didn't sent a new script to the RX since this wass twice the amount he was taking and what he ran out  on Saturday  Pls   Send a new script to the RX on file

## 2023-03-01 ENCOUNTER — TELEPHONE (OUTPATIENT)
Dept: PULMONOLOGY | Facility: CLINIC | Age: 58
End: 2023-03-01

## 2023-03-01 DIAGNOSIS — I50.33 ACUTE ON CHRONIC DIASTOLIC HEART FAILURE (HCC): Primary | ICD-10-CM

## 2023-03-01 DIAGNOSIS — R06.09 DOE (DYSPNEA ON EXERTION): Primary | ICD-10-CM

## 2023-03-01 DIAGNOSIS — Z79.4 TYPE 2 DIABETES MELLITUS WITH DIABETIC NEUROPATHY, WITH LONG-TERM CURRENT USE OF INSULIN (HCC): ICD-10-CM

## 2023-03-01 DIAGNOSIS — E11.40 TYPE 2 DIABETES MELLITUS WITH DIABETIC NEUROPATHY, WITH LONG-TERM CURRENT USE OF INSULIN (HCC): ICD-10-CM

## 2023-03-01 RX ORDER — GABAPENTIN 400 MG/1
400 CAPSULE ORAL 3 TIMES DAILY
Qty: 90 CAPSULE | Refills: 0 | Status: SHIPPED | OUTPATIENT
Start: 2023-03-01 | End: 2023-03-09 | Stop reason: SDUPTHER

## 2023-03-01 RX ORDER — FUROSEMIDE 40 MG/1
40 TABLET ORAL DAILY
Qty: 30 TABLET | Refills: 2 | Status: SHIPPED | OUTPATIENT
Start: 2023-03-01

## 2023-03-01 NOTE — TELEPHONE ENCOUNTER
Called patient but no answer  He did call the office earlier today requesting new script for the higher dose which I sent so I'm assuming it is helping  I left message instrcuting him to have BNP done prior to next appt

## 2023-03-01 NOTE — TELEPHONE ENCOUNTER
Patient called, he needs refill on his Lasix  Dr Chuckie Ross increased it to 40 mg so he needs this dose  Please send to [facility]

## 2023-03-02 ENCOUNTER — OFFICE VISIT (OUTPATIENT)
Dept: FAMILY MEDICINE CLINIC | Facility: CLINIC | Age: 58
End: 2023-03-02

## 2023-03-02 VITALS
HEIGHT: 69 IN | HEART RATE: 87 BPM | DIASTOLIC BLOOD PRESSURE: 74 MMHG | SYSTOLIC BLOOD PRESSURE: 112 MMHG | TEMPERATURE: 97.3 F | WEIGHT: 315 LBS | BODY MASS INDEX: 46.65 KG/M2 | OXYGEN SATURATION: 95 %

## 2023-03-02 DIAGNOSIS — Z12.12 SCREENING FOR COLORECTAL CANCER: ICD-10-CM

## 2023-03-02 DIAGNOSIS — Z79.4 TYPE 2 DIABETES MELLITUS WITH DIABETIC NEUROPATHY, WITH LONG-TERM CURRENT USE OF INSULIN (HCC): Primary | ICD-10-CM

## 2023-03-02 DIAGNOSIS — E11.40 TYPE 2 DIABETES MELLITUS WITH DIABETIC NEUROPATHY, WITH LONG-TERM CURRENT USE OF INSULIN (HCC): Primary | ICD-10-CM

## 2023-03-02 DIAGNOSIS — F32.A ANXIETY AND DEPRESSION: ICD-10-CM

## 2023-03-02 DIAGNOSIS — J44.9 COPD, SEVERE (HCC): ICD-10-CM

## 2023-03-02 DIAGNOSIS — F41.9 ANXIETY AND DEPRESSION: ICD-10-CM

## 2023-03-02 DIAGNOSIS — Z12.11 SCREENING FOR COLORECTAL CANCER: ICD-10-CM

## 2023-03-02 DIAGNOSIS — J45.40 MODERATE PERSISTENT ASTHMA, UNSPECIFIED WHETHER COMPLICATED: ICD-10-CM

## 2023-03-02 LAB — SL AMB POCT HEMOGLOBIN AIC: 9.1 (ref ?–6.5)

## 2023-03-02 NOTE — PROGRESS NOTES
Assessment/Plan:    Problem List Items Addressed This Visit        Endocrine    Type 2 diabetes mellitus with diabetic neuropathy, with long-term current use of insulin ([facility] [address] ) - Primary    Relevant Orders    POCT hemoglobin A1c (Completed)       Respiratory    Asthma, moderate persistent    COPD, severe (HCC)       Other    Anxiety and depression   Other Visit Diagnoses     Screening for colorectal cancer        Cologuard ordered  Relevant Orders    Cologuard           Diagnoses and all orders for this visit:    Type 2 diabetes mellitus with diabetic neuropathy, with long-term current use of insulin ([facility] [address] [address] )  Comments:  Pt will call Merissa Seymour to make aware of poor DM control, will call her today, his next OV with her is next month  Pt needs to try and stay off prednisone  Orders:  -     Cancel: Hemoglobin A1C; Future  -     POCT hemoglobin A1c    Screening for colorectal cancer  Comments:  Cologuard ordered  Orders:  -     Cologuard  -     Cancel: Hemoglobin A1C; Future    COPD, severe ([facility] )  Comments:  Continue seeing pulmonology, has further testing ordered and seeing them next week  Moderate persistent asthma, unspecified whether complicated  Comments:  See above  Anxiety and depression  Comments:  Stable, continue sertraline  Other orders  -     MELATONIN PO; Take 10 mg by mouth          Subjective:      Patient ID: Donovan Magallon is a 61 y o  male  Lloyd Bauer is here today for routine follow up  States that he feels he gets respiratory infections constantly, continues to see pulmonology  He is on prednisone often and in fact is finishing a course today  Unfortunately, this has worsened his DM as his A1C today is 9 1%  Pt sees Merissa Seymour, states does not see her til next month but will call her today about changing his medication  He is aware that prednisone increases his glucose, however COPD/asthma are poorly controlled and this is the treatment given by pulmonology   He had some indepth labs done recently for pulm and is also scheduled for more imaging, we are hoping that he get get a better control on respiratory health so that he does not need to keep using prednisone  Pt reminded that he is due for colon CA screening  States cannot be put under as it causes bronchial spasms so he needs to complete Cologuard, Cologuard was ordered 2/2022 but states has daily diarrhea so was unable to complete  Will give new order, I've asked him to keep kit by toilet so that when he has a formed stool he can complete the test right away  He also states that specialist increased Lasix to 40 mg daily as some of his respiratory symptoms may be due to a fluid overload, he is going to start the new dose and is following up with their office in the near future  Cough  This is a chronic problem  The current episode started more than 1 month ago  The problem has been waxing and waning  The problem occurs constantly  The cough is productive of sputum  Associated symptoms include headaches, shortness of breath and wheezing  Pertinent negatives include no chest pain, chills, ear congestion, ear pain, fever, heartburn, hemoptysis, myalgias, nasal congestion, postnasal drip, rash, rhinorrhea, sore throat, sweats or weight loss  The symptoms are aggravated by dust, exercise, fumes and stress  The following portions of the patient's history were reviewed and updated as appropriate:   He has a past medical history of Aortic stenosis, Asthma (1964), COPD (chronic obstructive pulmonary disease) ([facility] [address] ), Coronary artery disease (2000), Diabetes ([facility] ), Diabetes mellitus ([facility] ), Hyperlipidemia, Hypertension (07/02/2014), Pneumonia (2015), and Sleep apnea, obstructive  ,  does not have any pertinent problems on file  ,   has a past surgical history that includes Appendectomy;  Eye surgery; and Tonsillectomy (No) ,  family history includes Cancer in his father; Diabetes in his maternal grandfather; Heart disease in his father; Hypertension in his father; Lung cancer in his father; No Known Problems in his mother; Stomach cancer in his maternal grandfather  ,   reports that he quit smoking about 17 years ago  His smoking use included cigarettes  He started smoking about 38 years ago  He has a 90 00 pack-year smoking history  He has never used smokeless tobacco  He reports that he does not drink alcohol and does not use drugs  ,  is allergic to theophylline     Current Outpatient Medications   Medication Sig Dispense Refill   • albuterol (Ventolin HFA) 90 mcg/act inhaler Inhale 2 puffs every 6 (six) hours as needed for wheezing 18 g 3   • amLODIPine (NORVASC) 10 mg tablet Take 1 tablet (10 mg total) by mouth daily 90 tablet 3   • ammonium lactate (LAC-HYDRIN) 12 % cream Apply topically as needed for dry skin 385 g 0   • aspirin (ECOTRIN LOW STRENGTH) 81 mg EC tablet Take 1 tablet (81 mg total) by mouth daily 30 tablet 5   • atorvastatin (LIPITOR) 40 mg tablet Take 1 tablet (40 mg total) by mouth daily 90 tablet 3   • Blood Glucose Monitoring Suppl (CONTOUR NEXT MONITOR) w/Device KIT Test blood sugar once daily or as needed for fluctuating blood sugars 1 kit 0   • carvedilol (COREG) 12 5 mg tablet Take 1 tablet (12 5 mg total) by mouth 2 (two) times a day with meals 180 tablet 3   • cetirizine (ZyrTEC) 10 mg tablet Take 10 mg by mouth     • cyclobenzaprine (FLEXERIL) 10 mg tablet Take 10 mg by mouth     • Dapagliflozin Propanediol (Farxiga) 10 MG TABS Take 10 mg by mouth daily     • fluticasone (FLOVENT HFA) 220 mcg/act inhaler Inhale 2 puffs 2 (two) times a day Rinse mouth after use 12 g 5   • furosemide (LASIX) 40 mg tablet Take 1 tablet (40 mg total) by mouth daily 30 tablet 2   • gabapentin (NEURONTIN) 400 mg capsule Take 1 capsule (400 mg total) by mouth 3 (three) times a day 90 capsule 0   • glimepiride (AMARYL) 4 mg tablet Take 4 mg by mouth 2 (two) times a day     • glucose blood (CONTOUR NEXT TEST) test strip Test blood sugar once daily or as needed for fluctuating blood sugars 100 each 3   • guaiFENesin (MUCINEX) 600 mg 12 hr tablet Take 1 tablet (600 mg total) by mouth every 12 (twelve) hours 60 tablet 0   • Insulin Pen Needle (BD Pen Needle Dee Dee U/F) 32G X 4 MM MISC Use 4 a day 200 each 5   • ipratropium-albuterol (DUO-NEB) 0 5-2 5 mg/3 mL nebulizer solution Take 3 mL by nebulization every 6 (six) hours as needed for wheezing or shortness of breath 360 mL 3   • Lancets MISC Test blood sugar once daily or as needed for fluctuating blood sugars 100 each 0   • Levemir FlexTouch 100 units/mL injection pen Inject 60 Units under the skin daily      • losartan (COZAAR) 100 MG tablet Take 1 tablet (100 mg total) by mouth daily 90 tablet 3   • MELATONIN PO Take 10 mg by mouth     • metFORMIN (GLUCOPHAGE) 1000 MG tablet TAKE 1 TABLET BY MOUTH TWICE DAILY WITH MEALS 180 tablet 0   • Multiple Vitamins-Minerals (MENS MULTIVITAMIN PO) Take by mouth     • Ozempic, 2 MG/DOSE, 8 MG/3ML SOPN      • predniSONE 10 mg tablet Take 4 tablets daily for 3 days then 3 tablets daily for 3 days then 2 tablets daily for 3 days then 1 tablet daily for 3 days 30 tablet 0   • sertraline (ZOLOFT) 50 mg tablet Take 1 tablet (50 mg total) by mouth daily 90 tablet 0   • spironolactone (ALDACTONE) 50 mg tablet Take 1 tablet (50 mg total) by mouth daily 90 tablet 3   • umeclidinium-vilanterol (Anoro Ellipta) 62 5-25 MCG/INH inhaler Inhale 1 puff daily 60 blister 5     No current facility-administered medications for this visit  Review of Systems   Constitutional: Negative for chills, fever and weight loss  HENT: Negative for ear pain, postnasal drip, rhinorrhea and sore throat  Respiratory: Positive for cough, shortness of breath and wheezing  Negative for hemoptysis  Cardiovascular: Negative for chest pain  Gastrointestinal: Negative for heartburn  Musculoskeletal: Negative for myalgias  Skin: Negative for rash  Neurological: Positive for headaches  Objective:  Vitals:    03/02/23 1034   BP: 112/74   Pulse: 87   Temp: (!) 97 3 °F (36 3 °C)   SpO2: 95%   Weight: (!) 159 kg (350 lb 6 4 oz)   Height: 5' 9" (1 753 m)     Body mass index is 51 75 kg/m²  Physical Exam  Vitals reviewed  Constitutional:       General: He is not in acute distress  Appearance: He is well-developed  He is obese  He is not diaphoretic  HENT:      Head: Normocephalic and atraumatic  Right Ear: Hearing, tympanic membrane, ear canal and external ear normal       Left Ear: Hearing, tympanic membrane, ear canal and external ear normal       Mouth/Throat:      Pharynx: Uvula midline  No oropharyngeal exudate  Eyes:      General: No scleral icterus  Right eye: No discharge  Left eye: No discharge  Conjunctiva/sclera: Conjunctivae normal    Neck:      Thyroid: No thyromegaly  Vascular: No carotid bruit  Cardiovascular:      Rate and Rhythm: Normal rate and regular rhythm  Heart sounds: Murmur heard  Pulmonary:      Effort: Pulmonary effort is normal  No respiratory distress  Breath sounds: Normal breath sounds  No wheezing  Abdominal:      General: Bowel sounds are normal  There is no distension  Palpations: Abdomen is soft  There is no mass  Tenderness: There is no abdominal tenderness  There is no guarding or rebound  Musculoskeletal:         General: No tenderness  Normal range of motion  Cervical back: Neck supple  Lymphadenopathy:      Cervical: No cervical adenopathy  Skin:     General: Skin is warm and dry  Findings: No erythema or rash  Neurological:      Mental Status: He is alert and oriented to person, place, and time  Psychiatric:         Behavior: Behavior normal          Thought Content: Thought content normal          Judgment: Judgment normal          BMI Counseling: Body mass index is 51 75 kg/m²   The BMI is above normal  Nutrition recommendations include decreasing portion sizes, encouraging healthy choices of fruits and vegetables, decreasing fast food intake, consuming healthier snacks, limiting drinks that contain sugar, moderation in carbohydrate intake, increasing intake of lean protein, reducing intake of saturated and trans fat and reducing intake of cholesterol  Exercise recommendations include moderate physical activity 150 minutes/week  Rationale for BMI follow-up plan is due to patient being overweight or obese  Depression Screening and Follow-up Plan: Patient's depression screening was positive with a PHQ-2 score of 4  Their PHQ-9 score was 15  Patient assessed for underlying major depression  Brief counseling provided and recommend additional follow-up/re-evaluation next office visit

## 2023-03-06 DIAGNOSIS — I10 ESSENTIAL HYPERTENSION: ICD-10-CM

## 2023-03-06 RX ORDER — LOSARTAN POTASSIUM 100 MG/1
100 TABLET ORAL DAILY
Qty: 90 TABLET | Refills: 3 | Status: SHIPPED | OUTPATIENT
Start: 2023-03-06 | End: 2023-03-09 | Stop reason: SDUPTHER

## 2023-03-08 ENCOUNTER — OFFICE VISIT (OUTPATIENT)
Dept: PULMONOLOGY | Facility: CLINIC | Age: 58
End: 2023-03-08

## 2023-03-08 VITALS
HEART RATE: 84 BPM | OXYGEN SATURATION: 92 % | SYSTOLIC BLOOD PRESSURE: 123 MMHG | TEMPERATURE: 97.5 F | HEIGHT: 69 IN | WEIGHT: 315 LBS | DIASTOLIC BLOOD PRESSURE: 63 MMHG | BODY MASS INDEX: 46.65 KG/M2

## 2023-03-08 DIAGNOSIS — E66.01 CLASS 3 SEVERE OBESITY DUE TO EXCESS CALORIES WITH SERIOUS COMORBIDITY AND BODY MASS INDEX (BMI) OF 50.0 TO 59.9 IN ADULT (HCC): ICD-10-CM

## 2023-03-08 DIAGNOSIS — J44.9 COPD, SEVERE (HCC): ICD-10-CM

## 2023-03-08 DIAGNOSIS — I50.33 ACUTE ON CHRONIC DIASTOLIC HEART FAILURE (HCC): Primary | ICD-10-CM

## 2023-03-08 DIAGNOSIS — R06.09 DOE (DYSPNEA ON EXERTION): ICD-10-CM

## 2023-03-08 DIAGNOSIS — G47.33 OSA (OBSTRUCTIVE SLEEP APNEA): ICD-10-CM

## 2023-03-08 DIAGNOSIS — R93.89 ABNORMAL CT OF THE CHEST: ICD-10-CM

## 2023-03-08 PROBLEM — E66.813 CLASS 3 SEVERE OBESITY WITH SERIOUS COMORBIDITY AND BODY MASS INDEX (BMI) OF 50.0 TO 59.9 IN ADULT (HCC): Status: ACTIVE | Noted: 2019-08-20

## 2023-03-08 NOTE — PROGRESS NOTES
Pulmonary Follow Up Note   Micha Carrasco 61 y o  male MRN: [id number]  3/8/2023      Assessment:    COPD, severe ([address] )  Able without recent exacerbation  Lungs are clear to auscultation today  He is doing well with Anoro and Flovent  Most recent CT of the chest without new nodular disease  He no longer smokes  Plan  Continue Anoro/Flovent, albuterol  I have asked him to follow-up with pulmonary rehab  He was has been a bit reluctant because he states that he has days where he is very short of breath  Reassured him that the pulmonary rehab facility will work with him  SCOTT (obstructive sleep apnea)  States he is compliant with auto titrating BiPAP device  Abnormal CT of the chest  Most recent CT images from December 2022 were reviewed  Tree-in-bud nodularity has resolved  There is persistent right basilar groundglass opacification potentially secondary to chronic aspiration though patient did have previous diagnosis of COVID as well  There is a stable subcarinal lymph node  He has a pending high-resolution CT scan in setting of what is felt to be likely underlying lupus erythematosus  Class 3 severe obesity with serious comorbidity and body mass index (BMI) of 50 0 to 59 9 in adult [facility])  We discussed dietary modification today  I encouraged weight loss to promote overall improvement in health  States he follows with weight management  He is not interested in bariatrics  RUTHERFORD (dyspnea on exertion)  Multifactorial and secondary to COPD, obesity, deconditioning  Nuclear medicine stress test without evidence of ischemia  Echocardiogram did not show evidence of pulmonary hypertension  However has positive CLYDE and double-stranded DNA titers  Possibly has underlying SLE  Will attend combined pulmonary rheumatology clinic in May, 2023 to see if he meets other ACR criteria for lupus        Plan  Continue COPD medications  Continue efforts at weight loss  Paced exercise  Pulmonary rehab        Plan:     There are no diagnoses linked to this encounter  No follow-ups on file  History of Present Illness   HPI:  Jason Nava is a 61 y o  male  With a history of severe COPD, obstructive sleep apnea (on auto BiPAP), chronic hypoxic respiratory failure (uses 2 L at night), morbid obesity, history of 5 mm left lower lobe pulmonary nodule  Patient has a history of COVID diagnosed in July 2021 requiring admission  Patient was last seen by Dr Aneudy Cruz  He was worked up for lupus  At that time felt to have some evidence of volume overload and his Lasix was increased to 40 mg twice daily  Autoimmune labs were ordered  CLYDE was positive in February 2023 with positive DDS    However he is taking Lasix only once per day  Still retaining fluid  He is only intermittently weighting himself  Reports sob with exertion  Other symptoms include a cough--w/ production of phlegm  He also wheezes intermittently though today feels good  He is using anoro and flovent  Did not benefit from trelegy previously  He has a pending rheumatology appointment in August  His most recent CT chest in 12/2022 showed resolution of tree-in-bud changes, a 2-year stability of right basilar groundglass opacification, 2-year stability of an enlarged subcarinal lymph node  Also has aortic valvular calcifications  High-resolution CT scan is pending    He is not  any experiencing systemic symptoms to include fevers, chills sweats, myalgias, arthralgias  Denies swallowing difficulty, abdominal pain, skin rashes  Does not have any acute complaints today  He is here with his wife  Review of Systems   Constitutional: Positive for appetite change  Negative for chills, fatigue and fever  HENT: Negative for congestion, ear pain, nosebleeds, postnasal drip, rhinorrhea, sinus pressure, sneezing, sore throat and trouble swallowing  Eyes: Negative for discharge, redness and itching     Respiratory: Positive for cough, shortness of breath and wheezing  Negative for choking, chest tightness and stridor  Cardiovascular: Negative for chest pain, palpitations and leg swelling  Gastrointestinal: Negative for blood in stool  Genitourinary: Negative for difficulty urinating and dysuria  Musculoskeletal: Negative for arthralgias, joint swelling and myalgias  Skin: Negative for color change and rash  Neurological: Positive for headaches  Negative for light-headedness  Hematological: Negative for adenopathy         Historical Information   Past Medical History:   Diagnosis Date   • Aortic stenosis    • Asthma 1964   • COPD (chronic obstructive pulmonary disease) ([facility] )    • Coronary artery disease 2000    Aortic stenosis   • Diabetes ([facility] )    • Diabetes mellitus ([facility] )    • Hyperlipidemia    • Hypertension 07/02/2014   • Pneumonia 2015   • Sleep apnea, obstructive      Past Surgical History:   Procedure Laterality Date   • APPENDECTOMY     • EYE SURGERY     • TONSILLECTOMY  No     Family History   Problem Relation Age of Onset   • No Known Problems Mother    • Heart disease Father    • Lung cancer Father         Passed away 1997   • Cancer Father         Lung cancer   • Hypertension Father    • Diabetes Maternal Grandfather    • Stomach cancer Maternal Grandfather          Meds/Allergies     Current Outpatient Medications:   •  albuterol (Ventolin HFA) 90 mcg/act inhaler, Inhale 2 puffs every 6 (six) hours as needed for wheezing, Disp: 18 g, Rfl: 3  •  amLODIPine (NORVASC) 10 mg tablet, Take 1 tablet (10 mg total) by mouth daily, Disp: 90 tablet, Rfl: 3  •  ammonium lactate (LAC-HYDRIN) 12 % cream, Apply topically as needed for dry skin, Disp: 385 g, Rfl: 0  •  aspirin (ECOTRIN LOW STRENGTH) 81 mg EC tablet, Take 1 tablet (81 mg total) by mouth daily, Disp: 30 tablet, Rfl: 5  •  atorvastatin (LIPITOR) 40 mg tablet, Take 1 tablet (40 mg total) by mouth daily, Disp: 90 tablet, Rfl: 3  •  Blood Glucose Monitoring Suppl (CONTOUR NEXT MONITOR) w/Device KIT, Test blood sugar once daily or as needed for fluctuating blood sugars, Disp: 1 kit, Rfl: 0  •  carvedilol (COREG) 12 5 mg tablet, Take 1 tablet (12 5 mg total) by mouth 2 (two) times a day with meals, Disp: 180 tablet, Rfl: 3  •  cetirizine (ZyrTEC) 10 mg tablet, Take 10 mg by mouth, Disp: , Rfl:   •  cyclobenzaprine (FLEXERIL) 10 mg tablet, Take 10 mg by mouth, Disp: , Rfl:   •  Dapagliflozin Propanediol (Farxiga) 10 MG TABS, Take 10 mg by mouth daily, Disp: , Rfl:   •  fluticasone (FLOVENT HFA) 220 mcg/act inhaler, Inhale 2 puffs 2 (two) times a day Rinse mouth after use, Disp: 12 g, Rfl: 5  •  furosemide (LASIX) 40 mg tablet, Take 1 tablet (40 mg total) by mouth daily, Disp: 30 tablet, Rfl: 2  •  gabapentin (NEURONTIN) 400 mg capsule, Take 1 capsule (400 mg total) by mouth 3 (three) times a day, Disp: 90 capsule, Rfl: 0  •  glimepiride (AMARYL) 4 mg tablet, Take 4 mg by mouth 2 (two) times a day, Disp: , Rfl:   •  glucose blood (CONTOUR NEXT TEST) test strip, Test blood sugar once daily or as needed for fluctuating blood sugars, Disp: 100 each, Rfl: 3  •  guaiFENesin (MUCINEX) 600 mg 12 hr tablet, Take 1 tablet (600 mg total) by mouth every 12 (twelve) hours, Disp: 60 tablet, Rfl: 0  •  Insulin Pen Needle (BD Pen Needle Dee Dee U/F) 32G X 4 MM MISC, Use 4 a day, Disp: 200 each, Rfl: 5  •  ipratropium-albuterol (DUO-NEB) 0 5-2 5 mg/3 mL nebulizer solution, Take 3 mL by nebulization every 6 (six) hours as needed for wheezing or shortness of breath, Disp: 360 mL, Rfl: 3  •  Lancets MISC, Test blood sugar once daily or as needed for fluctuating blood sugars, Disp: 100 each, Rfl: 0  •  Levemir FlexTouch 100 units/mL injection pen, Inject 60 Units under the skin daily , Disp: , Rfl:   •  losartan (COZAAR) 100 MG tablet, Take 1 tablet (100 mg total) by mouth daily, Disp: 90 tablet, Rfl: 3  •  MELATONIN PO, Take 10 mg by mouth, Disp: , Rfl:   •  metFORMIN (GLUCOPHAGE) 1000 MG tablet, TAKE 1 TABLET BY MOUTH TWICE DAILY WITH MEALS, Disp: 180 tablet, Rfl: 0  •  Multiple Vitamins-Minerals (MENS MULTIVITAMIN PO), Take by mouth, Disp: , Rfl:   •  Ozempic, 2 MG/DOSE, 8 MG/3ML SOPN, , Disp: , Rfl:   •  sertraline (ZOLOFT) 50 mg tablet, Take 1 tablet (50 mg total) by mouth daily, Disp: 90 tablet, Rfl: 0  •  spironolactone (ALDACTONE) 50 mg tablet, Take 1 tablet (50 mg total) by mouth daily, Disp: 90 tablet, Rfl: 3  •  umeclidinium-vilanterol (Anoro Ellipta) 62 5-25 MCG/INH inhaler, Inhale 1 puff daily, Disp: 60 blister, Rfl: 5  •  predniSONE 10 mg tablet, Take 4 tablets daily for 3 days then 3 tablets daily for 3 days then 2 tablets daily for 3 days then 1 tablet daily for 3 days (Patient not taking: Reported on 3/8/2023), Disp: 30 tablet, Rfl: 0  Allergies   Allergen Reactions   • Theophylline Other (See Comments)     Severe headaches  headaches  Severe headaches         Vitals: Blood pressure 123/63, pulse 84, temperature 97 5 °F (36 4 °C), temperature source Tympanic, height 5' 9" (1 753 m), weight (!) 157 kg (346 lb 9 6 oz), SpO2 92 %  Body mass index is 51 18 kg/m²  Oxygen Therapy  SpO2: 92 %  Oxygen Therapy: None (Room air)      Physical Exam  Physical Exam  Vitals reviewed  Constitutional:       General: He is not in acute distress  Appearance: He is well-developed  He is obese  He is not ill-appearing, toxic-appearing or diaphoretic  HENT:      Head: Normocephalic and atraumatic  Right Ear: External ear normal       Left Ear: External ear normal       Nose: Nose normal  No congestion or rhinorrhea  Mouth/Throat:      Pharynx: No oropharyngeal exudate or posterior oropharyngeal erythema  Eyes:      General: No scleral icterus  Right eye: No discharge  Left eye: No discharge  Conjunctiva/sclera: Conjunctivae normal       Pupils: Pupils are equal, round, and reactive to light  Neck:      Trachea: No tracheal deviation     Cardiovascular:      Rate and Rhythm: Normal rate and regular rhythm  Heart sounds: Normal heart sounds  No murmur heard  No friction rub  No gallop  Pulmonary:      Effort: Pulmonary effort is normal       Breath sounds: Normal breath sounds  No stridor  No wheezing or rales  Musculoskeletal:      Cervical back: Normal range of motion  Right lower leg: No edema  Left lower leg: No edema  Lymphadenopathy:      Head:      Right side of head: No submental, submandibular, preauricular or posterior auricular adenopathy  Left side of head: No submental, submandibular, preauricular or posterior auricular adenopathy  Cervical: No cervical adenopathy  Skin:     General: Skin is warm and dry  Findings: No lesion or rash  Neurological:      Mental Status: He is alert and oriented to person, place, and time  Labs: I have personally reviewed pertinent lab results  Lab Results   Component Value Date    WBC 11 01 (H) 02/07/2023    HGB 14 7 02/07/2023    HCT 44 1 02/07/2023    MCV 85 02/07/2023     02/07/2023     Lab Results   Component Value Date    CALCIUM 9 8 01/02/2023    K 4 1 01/02/2023    CO2 30 01/02/2023    CL 97 01/02/2023    BUN 18 01/02/2023    CREATININE 1 04 01/02/2023     No results found for: IGE  Lab Results   Component Value Date    ALT 42 01/02/2023    AST 21 01/02/2023    ALKPHOS 94 01/02/2023       Imaging and other studies: I have personally reviewed pertinent reports  and I have personally reviewed pertinent films in PACS      CT chest  12/2022   IMPRESSION:     1  Resolution of left lower lobe tree-in-bud nodularity, likely infectious or inflammatory in retrospect  2   2 year stability of unilateral right basilar groundglass opacification, nonspecific, but favoring either a unilateral airway process or sequela of chronic aspiration  3   At least 2 year size stability of enlargement of a subcarinal lymph node  4   Advanced aortic valvular calcifications   Consider correlation with echocardiography      PFT 8/2021   Pulmonary function testing:     Results:  FEV1/FVC Ratio: 63 %  Forced Vital Capacity: 2 57 L    55 % predicted  FEV1: 1 62 L     45 % predicted     After administration of bronchodilator   FVC: 2 68 L, 58% predicted, 4% change  FEV1: 1 69 L, 47% predicted, 4% change     Lung volumes by body plethysmography:   Total Lung Capacity 84 % predicted   Residual volume 138 % predicted     DLCO corrected for patients hemoglobin level: 91 %        Interpretation:     • Severe obstructive airflow defect      • No significant response to the administration to bronchodilator per ATS Standards     • No hyperinflation  Borderline air trapping     • Normal diffusion capacity     EKG, Pathology, and Other Studies: I have personally reviewed pertinent reports  and I have personally reviewed pertinent films in PACS    NM stress 9/2022   Stress ECG: No ST deviation is noted  The ECG was not diagnostic due to pharmacological (vasodilator) stress  The stress ECG is equivocal for ischemia after pharmacologic vasodilation  •  Perfusion: There are no perfusion defects  •  Stress Function: Left ventricular function post-stress is normal  Post-stress ejection fraction is 62 %  •  Stress Combined Conclusion: Left ventricular perfusion is normal      Normal pharmacologic nuclear stress test without evidence of ischemia or scar  Normal ejection fraction calculated at 62%  Echocardiogram May 2022     Left Ventricle: Left ventricular cavity size is normal  Wall thickness is normal  The left ventricular ejection fraction is 70%  Systolic function is normal  Wall motion is normal   •  Right Ventricle: Systolic function is normal   •  Aortic Valve: The aortic valve is trileaflet  The leaflets are moderately thickened  The leaflets are not calcified  There is moderately reduced mobility  There is trace regurgitation  There is moderate stenosis  •  Pericardium: There is no pericardial effusion    •  Mean AV gradient unchanged from prior study              MARIEL Mcfarland    [facility]  Answers for HPI/ROS submitted by the patient on 3/6/2023  Do you have difficulty breathing?: Yes  Do you experience frequent throat clearing?: Yes  Do you have a hoarse voice?: Yes  Do you have a wet cough?: Yes  Chronicity: recurrent  When did you first notice your symptoms?: more than 1 month ago  How often do your symptoms occur?: constantly  Since you first noticed this problem, how has it changed?: unchanged  Do you have shortness of breath that occurs with effort or exertion?: Yes  Do you have ear congestion?: No  Do you have heartburn?: No  Do you have fatigue?: Yes  Do you have nasal congestion?: No  Do you have shortness of breath when lying flat?: No  Do you have shortness of breath when you wake up?: No  Do you have sweats?: No  Have you experienced weight loss?: No  Which of the following makes your symptoms worse?: any activity, change in weather, climbing stairs, exercise, exposure to smoke  Which of the following makes your symptoms better?: oral steroids, steroid inhaler

## 2023-03-08 NOTE — ASSESSMENT & PLAN NOTE
Most recent CT images from December 2022 were reviewed  Tree-in-bud nodularity has resolved  There is persistent right basilar groundglass opacification potentially secondary to chronic aspiration though patient did have previous diagnosis of COVID as well  There is a stable subcarinal lymph node  He has a pending high-resolution CT scan in setting of what is felt to be likely underlying lupus erythematosus

## 2023-03-08 NOTE — ASSESSMENT & PLAN NOTE
Able without recent exacerbation  Lungs are clear to auscultation today  He is doing well with Anoro and Flovent  Most recent CT of the chest without new nodular disease  He no longer smokes  Plan  Continue Anoro/Flovent, albuterol  I have asked him to follow-up with pulmonary rehab  He was has been a bit reluctant because he states that he has days where he is very short of breath  Reassured him that the pulmonary rehab facility will work with him

## 2023-03-08 NOTE — ASSESSMENT & PLAN NOTE
Multifactorial and secondary to COPD, obesity, deconditioning  Nuclear medicine stress test without evidence of ischemia  Echocardiogram did not show evidence of pulmonary hypertension  However has positive CLYDE and double-stranded DNA titers  Possibly has underlying SLE  Will attend combined pulmonary rheumatology clinic in May, 2023 to see if he meets other ACR criteria for lupus  We will increase Lasix to 40 mg twice daily    Ordered BMP for him to have collected on March 13    Plan  Continue COPD medications  Continue efforts at weight loss  Paced exercise  Pulmonary rehab

## 2023-03-08 NOTE — ASSESSMENT & PLAN NOTE
We discussed dietary modification today  I encouraged weight loss to promote overall improvement in health  States he follows with weight management  He is not interested in bariatrics

## 2023-03-09 ENCOUNTER — TELEPHONE (OUTPATIENT)
Dept: PULMONOLOGY | Facility: CLINIC | Age: 58
End: 2023-03-09

## 2023-03-09 DIAGNOSIS — I10 ESSENTIAL HYPERTENSION: ICD-10-CM

## 2023-03-09 DIAGNOSIS — Z99.89 OSA ON CPAP: ICD-10-CM

## 2023-03-09 DIAGNOSIS — Z79.4 TYPE 2 DIABETES MELLITUS WITH DIABETIC NEUROPATHY, WITH LONG-TERM CURRENT USE OF INSULIN (HCC): ICD-10-CM

## 2023-03-09 DIAGNOSIS — R09.02 HYPOXEMIA REQUIRING SUPPLEMENTAL OXYGEN: ICD-10-CM

## 2023-03-09 DIAGNOSIS — E11.40 TYPE 2 DIABETES MELLITUS WITH DIABETIC NEUROPATHY, WITH LONG-TERM CURRENT USE OF INSULIN (HCC): ICD-10-CM

## 2023-03-09 DIAGNOSIS — F41.9 ANXIETY AND DEPRESSION: ICD-10-CM

## 2023-03-09 DIAGNOSIS — E78.49 OTHER HYPERLIPIDEMIA: ICD-10-CM

## 2023-03-09 DIAGNOSIS — I50.33 ACUTE ON CHRONIC DIASTOLIC HEART FAILURE (HCC): ICD-10-CM

## 2023-03-09 DIAGNOSIS — G47.33 OSA ON CPAP: ICD-10-CM

## 2023-03-09 DIAGNOSIS — Z99.81 HYPOXEMIA REQUIRING SUPPLEMENTAL OXYGEN: ICD-10-CM

## 2023-03-09 DIAGNOSIS — F32.A ANXIETY AND DEPRESSION: ICD-10-CM

## 2023-03-09 RX ORDER — ATORVASTATIN CALCIUM 40 MG/1
40 TABLET, FILM COATED ORAL DAILY
Qty: 90 TABLET | Refills: 3 | Status: SHIPPED | OUTPATIENT
Start: 2023-03-09

## 2023-03-09 RX ORDER — AMLODIPINE BESYLATE 10 MG/1
10 TABLET ORAL DAILY
Qty: 90 TABLET | Refills: 3 | Status: SHIPPED | OUTPATIENT
Start: 2023-03-09

## 2023-03-09 RX ORDER — FUROSEMIDE 40 MG/1
40 TABLET ORAL DAILY
Qty: 90 TABLET | Refills: 0 | OUTPATIENT
Start: 2023-03-09

## 2023-03-09 RX ORDER — LOSARTAN POTASSIUM 100 MG/1
100 TABLET ORAL DAILY
Qty: 90 TABLET | Refills: 3 | Status: SHIPPED | OUTPATIENT
Start: 2023-03-09

## 2023-03-09 RX ORDER — SPIRONOLACTONE 50 MG/1
50 TABLET, FILM COATED ORAL DAILY
Qty: 90 TABLET | Refills: 3 | Status: SHIPPED | OUTPATIENT
Start: 2023-03-09

## 2023-03-09 RX ORDER — GABAPENTIN 400 MG/1
400 CAPSULE ORAL 3 TIMES DAILY
Qty: 90 CAPSULE | Refills: 0 | Status: SHIPPED | OUTPATIENT
Start: 2023-03-09 | End: 2023-04-08

## 2023-03-09 NOTE — TELEPHONE ENCOUNTER
[facility] called  Patient needs new 90 day scripts sent to their Pharmacy for Flovent 220 mcg, Anoro Inhaler 62 5 mcg and DUONeb Inhalation solution 0 5-2 5 mg/3ml

## 2023-03-09 NOTE — TELEPHONE ENCOUNTER
Received fax for patient to have refills through mail order, [facility]  Patient gave authorization to have filled

## 2023-03-14 DIAGNOSIS — I51.89 DIASTOLIC DYSFUNCTION WITHOUT HEART FAILURE: ICD-10-CM

## 2023-03-14 DIAGNOSIS — J44.9 COPD, SEVERE (HCC): ICD-10-CM

## 2023-03-14 DIAGNOSIS — I50.33 ACUTE ON CHRONIC DIASTOLIC HEART FAILURE (HCC): ICD-10-CM

## 2023-03-14 RX ORDER — UMECLIDINIUM BROMIDE AND VILANTEROL TRIFENATATE 62.5; 25 UG/1; UG/1
1 POWDER RESPIRATORY (INHALATION) DAILY
Qty: 60 BLISTER | Refills: 3 | Status: SHIPPED | OUTPATIENT
Start: 2023-03-14 | End: 2023-04-13

## 2023-03-14 RX ORDER — FUROSEMIDE 40 MG/1
40 TABLET ORAL DAILY
Qty: 90 TABLET | Refills: 3 | Status: SHIPPED | OUTPATIENT
Start: 2023-03-14 | End: 2023-03-20

## 2023-03-14 RX ORDER — IPRATROPIUM BROMIDE AND ALBUTEROL SULFATE 2.5; .5 MG/3ML; MG/3ML
3 SOLUTION RESPIRATORY (INHALATION) 4 TIMES DAILY
Qty: 180 ML | Refills: 3 | Status: SHIPPED | OUTPATIENT
Start: 2023-03-14

## 2023-03-14 RX ORDER — CARVEDILOL 12.5 MG/1
12.5 TABLET ORAL 2 TIMES DAILY WITH MEALS
Qty: 180 TABLET | Refills: 3 | Status: SHIPPED | OUTPATIENT
Start: 2023-03-14

## 2023-03-14 RX ORDER — FLUTICASONE PROPIONATE 220 UG/1
2 AEROSOL, METERED RESPIRATORY (INHALATION) 2 TIMES DAILY
Qty: 12 G | Refills: 3 | Status: SHIPPED | OUTPATIENT
Start: 2023-03-14

## 2023-03-20 ENCOUNTER — HOSPITAL ENCOUNTER (OUTPATIENT)
Dept: CT IMAGING | Facility: HOSPITAL | Age: 58
Discharge: HOME | End: 2023-03-20
Attending: INTERNAL MEDICINE

## 2023-03-20 DIAGNOSIS — M32.9 LUPUS (HCC): ICD-10-CM

## 2023-03-20 RX ORDER — FUROSEMIDE 40 MG/1
40 TABLET ORAL 2 TIMES DAILY
Qty: 180 TABLET | Refills: 1 | Status: SHIPPED | OUTPATIENT
Start: 2023-03-20 | End: 2023-03-24 | Stop reason: SDUPTHER

## 2023-03-20 NOTE — TELEPHONE ENCOUNTER
Patient is calling, he said at his last appointment he was directed to continue on the Lasix 40mg twice a day  He is now out of his script and needs a refill sent in to [facility]   Please advise

## 2023-03-21 ENCOUNTER — APPOINTMENT (OUTPATIENT)
Dept: LAB | Facility: MEDICAL CENTER | Age: 58
End: 2023-03-21

## 2023-03-21 ENCOUNTER — TELEPHONE (OUTPATIENT)
Dept: PULMONOLOGY | Facility: CLINIC | Age: 58
End: 2023-03-21

## 2023-03-21 ENCOUNTER — CONSULT (OUTPATIENT)
Dept: HEMATOLOGY ONCOLOGY | Facility: CLINIC | Age: 58
End: 2023-03-21

## 2023-03-21 VITALS
DIASTOLIC BLOOD PRESSURE: 68 MMHG | SYSTOLIC BLOOD PRESSURE: 107 MMHG | HEART RATE: 90 BPM | BODY MASS INDEX: 46.65 KG/M2 | OXYGEN SATURATION: 90 % | HEIGHT: 69 IN | WEIGHT: 315 LBS | TEMPERATURE: 97.2 F

## 2023-03-21 DIAGNOSIS — D50.9 IRON DEFICIENCY ANEMIA, UNSPECIFIED IRON DEFICIENCY ANEMIA TYPE: ICD-10-CM

## 2023-03-21 DIAGNOSIS — D72.829 LEUKOCYTOSIS, UNSPECIFIED TYPE: ICD-10-CM

## 2023-03-21 DIAGNOSIS — E53.8 VITAMIN B 12 DEFICIENCY: ICD-10-CM

## 2023-03-21 DIAGNOSIS — I50.33 ACUTE ON CHRONIC DIASTOLIC HEART FAILURE (HCC): ICD-10-CM

## 2023-03-21 DIAGNOSIS — R06.09 DOE (DYSPNEA ON EXERTION): ICD-10-CM

## 2023-03-21 DIAGNOSIS — J44.9 COPD, SEVERE (HCC): ICD-10-CM

## 2023-03-21 DIAGNOSIS — J44.9 COPD, SEVERE (HCC): Primary | ICD-10-CM

## 2023-03-21 DIAGNOSIS — N18.2 STAGE 2 CHRONIC KIDNEY DISEASE: ICD-10-CM

## 2023-03-21 LAB
BASOPHILS # BLD AUTO: 0.06 THOUSANDS/ÂΜL (ref 0–0.1)
BASOPHILS NFR BLD AUTO: 1 % (ref 0–1)
CRP SERPL QL: 9 MG/L
EOSINOPHIL # BLD AUTO: 0.25 THOUSAND/ÂΜL (ref 0–0.61)
EOSINOPHIL NFR BLD AUTO: 3 % (ref 0–6)
ERYTHROCYTE [DISTWIDTH] IN BLOOD BY AUTOMATED COUNT: 15 % (ref 11.6–15.1)
FERRITIN SERPL-MCNC: 254 NG/ML (ref 8–388)
HCT VFR BLD AUTO: 45.7 % (ref 36.5–49.3)
HGB BLD-MCNC: 14.7 G/DL (ref 12–17)
IMM GRANULOCYTES # BLD AUTO: 0.09 THOUSAND/UL (ref 0–0.2)
IMM GRANULOCYTES NFR BLD AUTO: 1 % (ref 0–2)
IRON SATN MFR SERPL: 23 % (ref 20–50)
IRON SERPL-MCNC: 58 UG/DL (ref 65–175)
LYMPHOCYTES # BLD AUTO: 1.04 THOUSANDS/ÂΜL (ref 0.6–4.47)
LYMPHOCYTES NFR BLD AUTO: 12 % (ref 14–44)
MCH RBC QN AUTO: 27.9 PG (ref 26.8–34.3)
MCHC RBC AUTO-ENTMCNC: 32.2 G/DL (ref 31.4–37.4)
MCV RBC AUTO: 87 FL (ref 82–98)
MONOCYTES # BLD AUTO: 0.61 THOUSAND/ÂΜL (ref 0.17–1.22)
MONOCYTES NFR BLD AUTO: 7 % (ref 4–12)
NEUTROPHILS # BLD AUTO: 6.66 THOUSANDS/ÂΜL (ref 1.85–7.62)
NEUTS SEG NFR BLD AUTO: 76 % (ref 43–75)
NRBC BLD AUTO-RTO: 0 /100 WBCS
PLATELET # BLD AUTO: 318 THOUSANDS/UL (ref 149–390)
PMV BLD AUTO: 10 FL (ref 8.9–12.7)
RBC # BLD AUTO: 5.26 MILLION/UL (ref 3.88–5.62)
TIBC SERPL-MCNC: 256 UG/DL (ref 250–450)
VIT B12 SERPL-MCNC: 1419 PG/ML (ref 100–900)
WBC # BLD AUTO: 8.71 THOUSAND/UL (ref 4.31–10.16)

## 2023-03-21 NOTE — TELEPHONE ENCOUNTER
Patient was here today to have a visit with [facility]  Pt c/o SOB, decreased pulse ox, today was 89-90  He's requesting a script for Prednisone  Can you send this for him?

## 2023-03-21 NOTE — TELEPHONE ENCOUNTER
Patient returning Maeve's call  He asked if she would be bowen to call back tomorrow  Please advise

## 2023-03-21 NOTE — PROGRESS NOTES
22 [facility] HEMATOLOGY ONCOLOGY [address] [facility]   [address] [facility] [address] [address] [phone]  Hematology Ambulatory Consult  Seth Field, 1965, [id number]  3/21/2023      Assessment and Plan   1  Leukocytosis, unspecified type  2  COPD, severe ([address] )  3  Stage 2 chronic kidney disease  4  Iron deficiency anemia, unspecified iron deficiency anemia type  5  Vitamin B 12 deficiency   Patient is a 70-year-old male with a history of asthma, type 2 diabetes, morbid obesity, anxiety COPD, hypertension, hyperlipidemia, sleep apnea, andLeukocytosis who was referred to us for further work-up  Per chart review patient has had intermittent leukocytosis since at least March 2021  In July 2021 patient had COVID likely attributing to leukocytosis  The remainder of CBC shows normal hemoglobin, normal platelet count, differential is relatively stable however CLYDE elevated correlating with leukocytosis  Most recent CBC from 2/7/2023 shows a white blood cell count of 11 01 wise normal   Patient states he has been worked up for lupus and has a positive CLYDE with double-stranded DNA titers  Per pulmonary note there is a combined pulmonary rheumatologic clinic in May 2023 that may be attended to see if he needs other ACR criteria  Patient has had an abnormal CT scan with tree-in-bud nodularity that has resolved however there is persistent right basilar groundglass opacification potentially secondary to chronic aspiration versus COVID  There is also a stable subcarinal lymph node  He had a high-resolution CT scan on 3/20/2023 that is not read  We discussed etiology of leukocytosis is broad including nutritional deficiencies, inflammatory conditions, infections, versus an underlying bone marrow dysfunction  I do not suspect a primary bone marrow process however feel leukocytosis is likely secondary to underlying comorbidities  We will request the following labs    Patient had been iron deficient in the past with a saturation of 17% with a ferritin of 267 in 2021  We will see him in 1 month patient and his wife verbalized understanding and are in agreement with plan  - Ambulatory referral to Hematology / Oncology  - CBC and differential; Future  - C-reactive protein; Future  - Iron Panel (Includes Ferritin, Iron Sat%, Iron, and TIBC); Future  - Vitamin B12; Future    Barrier(s) to care: None  The patient is  able to self care  [address] [place] [address]    Subjective   No chief complaint on file  Referring provider    BAIRON Grijalva [address],  [address], [address]    History of present illness:  Patient is a 50-year-old male with a history of asthma, type 2 diabetes, morbid obesity, anxiety COPD, hypertension, hyperlipidemia, sleep apnea, andLeukocytosis who was referred to us for further work-up     03/21/23: Clinically stable    Review of Systems   Constitutional: Positive for fatigue  Negative for activity change, appetite change, fever and unexpected weight change  Respiratory: Positive for cough and shortness of breath  Cardiovascular: Negative for chest pain and leg swelling  Gastrointestinal: Negative for abdominal pain, constipation, diarrhea and nausea  Endocrine: Negative for cold intolerance and heat intolerance  Musculoskeletal: Negative for arthralgias and myalgias  Skin: Negative  Neurological: Negative for dizziness, weakness and headaches  Hematological: Negative for adenopathy  Does not bruise/bleed easily       Past Medical History:   Diagnosis Date   • Aortic stenosis    • Asthma 1964   • COPD (chronic obstructive pulmonary disease) [address] )    • Coronary artery disease 2000    Aortic stenosis   • [facility]    • Diabetes mellitus ([facility] )    • Hyperlipidemia    • Hypertension 07/02/2014   • Pneumonia 2015   • Sleep apnea, obstructive      Past Surgical History:   Procedure Laterality Date   • APPENDECTOMY     • EYE SURGERY     • TONSILLECTOMY  No     Family History   Problem Relation Age of Onset   • No Known Problems Mother    • Heart disease Father    • Lung cancer Father         Passed away 0   • Cancer Father         Lung cancer   • Hypertension Father    • Diabetes Maternal Grandfather    • Stomach cancer Maternal Grandfather      Social History     Socioeconomic History   • Marital status: /Civil Union     Spouse name: None   • Number of children: None   • Years of education: None   • Highest education level: None   Occupational History   • None   Tobacco Use   • Smoking status: Former     Packs/day: 3 00     Years: 30 00     Pack years: 90 00     Types: Cigarettes     Start date: 3/15/1984     Quit date: 7/15/2005     Years since quittin 6   • Smokeless tobacco: Never   Vaping Use   • Vaping Use: Never used   Substance and Sexual Activity   • Alcohol use: Never   • Drug use: Never   • Sexual activity: Yes     Partners: Female     Birth control/protection: None   Other Topics Concern   • None   Social History Narrative   • None     Social Determinants of Health     Financial Resource Strain: Not on file   Food Insecurity: Not on file   Transportation Needs: Not on file   Physical Activity: Not on file   Stress: Not on file   Social Connections: Not on file   Intimate Partner Violence: Not on file   Housing Stability: Not on file       Current Outpatient Medications:   •  albuterol (Ventolin HFA) 90 mcg/act inhaler, Inhale 2 puffs every 6 (six) hours as needed for wheezing, Disp: 18 g, Rfl: 3  •  amLODIPine (NORVASC) 10 mg tablet, Take 1 tablet (10 mg total) by mouth daily, Disp: 90 tablet, Rfl: 3  •  ammonium lactate (LAC-HYDRIN) 12 % cream, Apply topically as needed for dry skin, Disp: 385 g, Rfl: 0  •  aspirin (ECOTRIN LOW STRENGTH) 81 mg EC tablet, Take 1 tablet (81 mg total) by mouth daily, Disp: 30 tablet, Rfl: 5  •  atorvastatin (LIPITOR) 40 mg tablet, Take 1 tablet (40 mg total) by mouth daily, Disp: 90 tablet, Rfl: 3  •  Blood Glucose Monitoring Suppl (CONTOUR NEXT MONITOR) w/Device KIT, Test blood sugar once daily or as needed for fluctuating blood sugars, Disp: 1 kit, Rfl: 0  •  carvedilol (COREG) 12 5 mg tablet, Take 1 tablet (12 5 mg total) by mouth 2 (two) times a day with meals, Disp: 180 tablet, Rfl: 3  •  cetirizine (ZyrTEC) 10 mg tablet, Take 10 mg by mouth, Disp: , Rfl:   •  cyclobenzaprine (FLEXERIL) 10 mg tablet, Take 10 mg by mouth, Disp: , Rfl:   •  Dapagliflozin Propanediol (Farxiga) 10 MG TABS, Take 10 mg by mouth daily, Disp: , Rfl:   •  fluticasone (FLOVENT HFA) 220 mcg/act inhaler, Inhale 2 puffs 2 (two) times a day Rinse mouth after use, Disp: 12 g, Rfl: 3  •  furosemide (LASIX) 40 mg tablet, Take 1 tablet (40 mg total) by mouth 2 (two) times a day, Disp: 180 tablet, Rfl: 1  •  gabapentin (NEURONTIN) 400 mg capsule, Take 1 capsule (400 mg total) by mouth 3 (three) times a day, Disp: 90 capsule, Rfl: 0  •  glimepiride (AMARYL) 4 mg tablet, Take 4 mg by mouth 2 (two) times a day, Disp: , Rfl:   •  glucose blood (CONTOUR NEXT TEST) test strip, Test blood sugar once daily or as needed for fluctuating blood sugars, Disp: 100 each, Rfl: 3  •  guaiFENesin (MUCINEX) 600 mg 12 hr tablet, Take 1 tablet (600 mg total) by mouth every 12 (twelve) hours, Disp: 60 tablet, Rfl: 0  •  Insulin Pen Needle (BD Pen Needle Dee Dee U/F) 32G X 4 MM MISC, Use 4 a day, Disp: 200 each, Rfl: 5  •  ipratropium-albuterol (DUO-NEB) 0 5-2 5 mg/3 mL nebulizer solution, Take 3 mL by nebulization every 6 (six) hours as needed for wheezing or shortness of breath, Disp: 360 mL, Rfl: 3  •  ipratropium-albuterol (DUO-NEB) 0 5-2 5 mg/3 mL nebulizer solution, Take 3 mL by nebulization 4 (four) times a day, Disp: 180 mL, Rfl: 3  •  Lancets MISC, Test blood sugar once daily or as needed for fluctuating blood sugars, Disp: 100 each, Rfl: 0  •  Levemir FlexTouch 100 units/mL injection pen, Inject 60 Units under the skin daily , Disp: , Rfl:   •  losartan (COZAAR) 100 MG tablet, Take 1 tablet (100 mg total) by mouth daily, Disp: 90 tablet, Rfl: 3  •  MELATONIN PO, Take 10 mg by mouth, Disp: , Rfl:   •  metFORMIN (GLUCOPHAGE) 1000 MG tablet, TAKE 1 TABLET BY MOUTH TWICE DAILY WITH MEALS, Disp: 180 tablet, Rfl: 0  •  Multiple Vitamins-Minerals (MENS MULTIVITAMIN PO), Take by mouth, Disp: , Rfl:   •  Ozempic, 2 MG/DOSE, 8 MG/3ML SOPN, , Disp: , Rfl:   •  sertraline (ZOLOFT) 50 mg tablet, Take 1 tablet (50 mg total) by mouth daily, Disp: 90 tablet, Rfl: 0  •  spironolactone (ALDACTONE) 50 mg tablet, Take 1 tablet (50 mg total) by mouth daily, Disp: 90 tablet, Rfl: 3  •  umeclidinium-vilanterol (Anoro Ellipta) 62 5-25 mcg/actuation inhaler, Inhale 1 puff daily, Disp: 60 blister, Rfl: 3  •  umeclidinium-vilanterol (Anoro Ellipta) 62 5-25 MCG/INH inhaler, Inhale 1 puff daily, Disp: 60 blister, Rfl: 5  •  predniSONE 10 mg tablet, Take 4 tablets daily for 3 days then 3 tablets daily for 3 days then 2 tablets daily for 3 days then 1 tablet daily for 3 days (Patient not taking: Reported on 3/8/2023), Disp: 30 tablet, Rfl: 0  Allergies   Allergen Reactions   • Theophylline Other (See Comments)     Severe headaches  headaches  Severe headaches       Objective   /68   Pulse 90   Temp (!) 97 2 °F (36 2 °C) (Tympanic)   Ht 5' 9" (1 753 m)   Wt (!) 159 kg (350 lb 6 4 oz)   SpO2 90%   BMI 51 75 kg/m²   Physical Exam  Vitals reviewed  Constitutional:       Appearance: Normal appearance  He is well-developed  HENT:      Head: Normocephalic and atraumatic  Eyes:      Pupils: Pupils are equal, round, and reactive to light  Pulmonary:      Effort: Pulmonary effort is normal  No respiratory distress  Musculoskeletal:         General: Normal range of motion  Cervical back: Normal range of motion  Lymphadenopathy:      Cervical: No cervical adenopathy  Skin:     General: Skin is dry     Neurological: Mental Status: He is alert and oriented to person, place, and time  Psychiatric:         Behavior: Behavior normal      Result Review  Labs:  Office Visit on 03/02/2023   Component Date Value Ref Range Status   • Hemoglobin A1C 03/02/2023 9 1 (A)  6 5 Final   Appointment on 02/10/2023   Component Date Value Ref Range Status   • Sed Rate 02/10/2023 16  0 - 19 mm/hour Final   • Ferritin 02/10/2023 206  8 - 388 ng/mL Final   • Total CK 02/10/2023 84  39 - 308 U/L Final   • Aldolase 02/10/2023 5 1  3 3 - 10 3 U/L Final   • C3 Complement 02/10/2023 131 0  90 0 - 180 0 mg/dL Final   • C4, COMPLEMENT 02/10/2023 28 0  10 0 - 40 0 mg/dL Final   • IGA 02/10/2023 319 0  70 0 - 400 0 mg/dL Final   • IGG 02/10/2023 661 0 (L)  700 0 - 1,600 0 mg/dL Final   • IGM 02/10/2023 22 0 (L)  40 0 - 230 0 mg/dL Final   • BNP 02/10/2023 25  0 - 100 pg/mL Final   • CLYDE 02/10/2023 Positive (A)  Negative Final   • CLYDE Titer 1 02/10/2023 Titer of 80   Final   • CLYDE Pattern 1 02/10/2023 Homogeneous pattern   Final   • Anti-dsDNA Quant 02/10/2023 >10 (H)  4 - 10 IU/mL Final    Negative      <=4  IU/mL  Indeterminate 5-9  IU/mL  Positive      >=10 IU/mL   • Sjogren's Anti-SS-A 02/10/2023 Negative  Negative Final   • Sjogren's Anti-SS-B 02/10/2023 Negative  Negative Final   • Antichromatin Antibodies 02/10/2023 Negative  Negative Final   Consult on 02/08/2023   Component Date Value Ref Range Status   • Color, UA 02/10/2023 Yellow   Final   • Clarity, UA 02/10/2023 Clear   Final   • Specific Gravity, UA 02/10/2023 1 020  1 003 - 1 030 Final   • pH, UA 02/10/2023 6 0  4 5, 5 0, 5 5, 6 0, 6 5, 7 0, 7 5, 8 0 Final   • Leukocytes, UA 02/10/2023 Elevated glucose may cause decreased leukocyte values   See urine microscopic for [facility] result/ (A)  Negative Final   • Nitrite, UA 02/10/2023 Negative  Negative Final   • Protein, UA 02/10/2023 Negative  Negative mg/dl Final   • Glucose, UA 02/10/2023 >=1000 (1%) (A)  Negative mg/dl Final   • Ketones, UA 02/10/2023 Negative  Negative mg/dl Final   • Urobilinogen, UA 02/10/2023 0 2  0 2, 1 0 E U /dl E U /dl Final   • Bilirubin, UA 02/10/2023 Negative  Negative Final   • Occult Blood, UA 02/10/2023 Negative  Negative Final   • RBC, UA 02/10/2023 0-1  None Seen, 0-1, 1-2, 2-4, 0-5 /hpf Final   • WBC, UA 02/10/2023 0-1  None Seen, 0-1, 1-2, 0-5, 2-4 /hpf Final   • Epithelial Cells 02/10/2023 Occasional  None Seen, Occasional /hpf Final   • Bacteria, UA 02/10/2023 Occasional  None Seen, Occasional /hpf Final   Appointment on 02/07/2023   Component Date Value Ref Range Status   • WBC 02/07/2023 11 01 (H)  4 31 - 10 16 Thousand/uL Final   • RBC 02/07/2023 5 21  3 88 - 5 62 Million/uL Final   • Hemoglobin 02/07/2023 14 7  12 0 - 17 0 g/dL Final   • Hematocrit 02/07/2023 44 1  36 5 - 49 3 % Final   • MCV 02/07/2023 85  82 - 98 fL Final   • MCH 02/07/2023 28 2  26 8 - 34 3 pg Final   • MCHC 02/07/2023 33 3  31 4 - 37 4 g/dL Final   • RDW 02/07/2023 14 4  11 6 - 15 1 % Final   • MPV 02/07/2023 9 8  8 9 - 12 7 fL Final   • Platelets 67/68/1428 334  149 - 390 Thousands/uL Final   • Segmented % 02/07/2023 73  43 - 75 % Final   • Bands % 02/07/2023 4  0 - 8 % Final   • Lymphocytes % 02/07/2023 15  14 - 44 % Final   • Monocytes % 02/07/2023 4  4 - 12 % Final   • Eosinophils, % 02/07/2023 1  0 - 6 % Final   • Basophils % 02/07/2023 2 (H)  0 - 1 % Final   • Atypical Lymphocytes % 02/07/2023 1 (H)  <=0 % Final   • Absolute Neutrophils 02/07/2023 8 48 (H)  1 85 - 7 62 Thousand/uL Final   • Lymphocytes Absolute 02/07/2023 1 65  0 60 - 4 47 Thousand/uL Final   • Monocytes Absolute 02/07/2023 0 44  0 00 - 1 22 Thousand/uL Final   • Eosinophils Absolute 02/07/2023 0 11  0 00 - 0 40 Thousand/uL Final   • Basophils Absolute 02/07/2023 0 22 (H)  0 00 - 0 10 Thousand/uL Final   • Toxic Vacuolated Neutrophils 02/07/2023 Present   Final   • RBC Morphology 02/07/2023 Present   Final   • Anisocytosis 02/07/2023 Present   Final   • Microcytes 02/07/2023 Present   Final   • Polychromasia 02/07/2023 Present   Final   • Platelet Estimate 77/56/7802 Adequate  Adequate Final     Please note: This report has been generated by a voice recognition software system  Therefore there may be syntax, spelling, and/or grammatical errors  Please call if you have any questions

## 2023-03-22 DIAGNOSIS — Z79.4 TYPE 2 DIABETES MELLITUS WITH DIABETIC NEUROPATHY, WITH LONG-TERM CURRENT USE OF INSULIN (HCC): ICD-10-CM

## 2023-03-22 DIAGNOSIS — E11.40 TYPE 2 DIABETES MELLITUS WITH DIABETIC NEUROPATHY, WITH LONG-TERM CURRENT USE OF INSULIN (HCC): ICD-10-CM

## 2023-03-22 RX ORDER — GABAPENTIN 400 MG/1
CAPSULE ORAL
Qty: 90 CAPSULE | Refills: 10 | Status: SHIPPED | OUTPATIENT
Start: 2023-03-22

## 2023-03-22 NOTE — TELEPHONE ENCOUNTER
Radha Cannon would like a return call regarding     What is the reason for the call/chief complaint? SOB    What additional symptoms are present or absent? SOB, yes   Are they on O2? Yes, describe: 5L Pulse O2 [id number] When walking N/A  chest pain/tightness, no  wheezing, yes  Cough, yes  mucous production,yes  What color is it GREEN  fevers/chills, no  weight gain, no  Swelling no  Pain no, does it hurt when breathing or all the time? N/A    When did they start/how long have they been going on? 1 WEEK    Constant or intermittent; if intermittent describe yes? What makes it better or worse? ACTIVITY MAKES IT WORST    Have you been exposed to anyone that is sick? No    Have you been tested for COVID recently? no    Check current pulmonary medications INHALERS/NEBS  frequency of use DAILY    Have they had any other treatment or testing for this problem elsewhere? N/A    Recent steroids? No    Recent Antibiotics? No     Last office visit? 3/8/2023    Patient pharmacy?    [address], [address] [facility] Phone:  [phone]   Fax:  [phone] or 90 day supply

## 2023-03-23 DIAGNOSIS — I50.33 ACUTE ON CHRONIC DIASTOLIC HEART FAILURE (HCC): ICD-10-CM

## 2023-03-23 DIAGNOSIS — J20.9 ACUTE BRONCHITIS: Primary | ICD-10-CM

## 2023-03-23 DIAGNOSIS — J44.9 COPD, SEVERE (HCC): ICD-10-CM

## 2023-03-23 RX ORDER — CEFUROXIME AXETIL 500 MG/1
500 TABLET ORAL EVERY 12 HOURS SCHEDULED
Qty: 10 TABLET | Refills: 0 | Status: SHIPPED | OUTPATIENT
Start: 2023-03-23 | End: 2023-03-28

## 2023-03-23 RX ORDER — PREDNISONE 10 MG/1
TABLET ORAL
Qty: 30 TABLET | Refills: 0 | Status: SHIPPED | OUTPATIENT
Start: 2023-03-23

## 2023-03-24 RX ORDER — FUROSEMIDE 40 MG/1
40 TABLET ORAL 2 TIMES DAILY
Qty: 180 TABLET | Refills: 3 | Status: SHIPPED | OUTPATIENT
Start: 2023-03-24

## 2023-03-24 RX ORDER — FUROSEMIDE 40 MG/1
TABLET ORAL
Qty: 30 TABLET | Refills: 10 | OUTPATIENT
Start: 2023-03-24

## 2023-03-29 LAB — COLOGUARD RESULT REPORTABLE: POSITIVE

## 2023-04-17 PROBLEM — D72.829 LEUKOCYTOSIS: Status: ACTIVE | Noted: 2023-04-17

## 2023-04-25 ENCOUNTER — TELEPHONE (OUTPATIENT)
Dept: ENDOCRINOLOGY | Facility: CLINIC | Age: 58
End: 2023-04-25

## 2023-04-26 ENCOUNTER — TELEPHONE (OUTPATIENT)
Dept: ENDOCRINOLOGY | Facility: CLINIC | Age: 58
End: 2023-04-26

## 2023-04-26 ENCOUNTER — APPOINTMENT (EMERGENCY)
Dept: CT IMAGING | Facility: HOSPITAL | Age: 58
End: 2023-04-26

## 2023-04-26 ENCOUNTER — HOSPITAL ENCOUNTER (EMERGENCY)
Facility: HOSPITAL | Age: 58
Discharge: HOME | End: 2023-04-26
Attending: EMERGENCY MEDICINE

## 2023-04-26 ENCOUNTER — APPOINTMENT (EMERGENCY)
Dept: RADIOLOGY | Facility: HOSPITAL | Age: 58
End: 2023-04-26

## 2023-04-26 VITALS
SYSTOLIC BLOOD PRESSURE: 118 MMHG | OXYGEN SATURATION: 92 % | TEMPERATURE: 97.4 F | DIASTOLIC BLOOD PRESSURE: 66 MMHG | RESPIRATION RATE: 23 BRPM | HEART RATE: 76 BPM

## 2023-04-26 DIAGNOSIS — R04.2 COUGH WITH HEMOPTYSIS: ICD-10-CM

## 2023-04-26 DIAGNOSIS — J44.1 COPD WITH ACUTE EXACERBATION (HCC): Primary | ICD-10-CM

## 2023-04-26 DIAGNOSIS — J18.9 PNEUMONIA: ICD-10-CM

## 2023-04-26 DIAGNOSIS — J96.01 ACUTE RESPIRATORY FAILURE WITH HYPOXIA (HCC): ICD-10-CM

## 2023-04-26 LAB
2HR DELTA HS TROPONIN: -2 NG/L
ALBUMIN SERPL BCP-MCNC: 3.9 G/DL (ref 3.5–5)
ALP SERPL-CCNC: 66 U/L (ref 34–104)
ALT SERPL W P-5'-P-CCNC: 20 U/L (ref 7–52)
ANION GAP SERPL CALCULATED.3IONS-SCNC: 11 MMOL/L (ref 4–13)
APTT PPP: 31 SECONDS (ref 23–37)
AST SERPL W P-5'-P-CCNC: 10 U/L (ref 13–39)
BASOPHILS # BLD AUTO: 0.06 THOUSANDS/ΜL (ref 0–0.1)
BASOPHILS NFR BLD AUTO: 0 % (ref 0–1)
BILIRUB SERPL-MCNC: 0.85 MG/DL (ref 0.2–1)
BUN SERPL-MCNC: 28 MG/DL (ref 5–25)
CALCIUM SERPL-MCNC: 9.4 MG/DL (ref 8.4–10.2)
CARDIAC TROPONIN I PNL SERPL HS: 5 NG/L
CARDIAC TROPONIN I PNL SERPL HS: 7 NG/L
CHLORIDE SERPL-SCNC: 96 MMOL/L (ref 96–108)
CO2 SERPL-SCNC: 29 MMOL/L (ref 21–32)
CREAT SERPL-MCNC: 1.16 MG/DL (ref 0.6–1.3)
D DIMER PPP FEU-MCNC: 0.31 UG/ML FEU
EOSINOPHIL # BLD AUTO: 0.06 THOUSAND/ΜL (ref 0–0.61)
EOSINOPHIL NFR BLD AUTO: 0 % (ref 0–6)
ERYTHROCYTE [DISTWIDTH] IN BLOOD BY AUTOMATED COUNT: 14.5 % (ref 11.6–15.1)
GFR SERPL CREATININE-BSD FRML MDRD: 69 ML/MIN/1.73SQ M
GLUCOSE SERPL-MCNC: 176 MG/DL (ref 65–140)
HCT VFR BLD AUTO: 45.1 % (ref 36.5–49.3)
HGB BLD-MCNC: 14.6 G/DL (ref 12–17)
IMM GRANULOCYTES # BLD AUTO: 0.12 THOUSAND/UL (ref 0–0.2)
IMM GRANULOCYTES NFR BLD AUTO: 1 % (ref 0–2)
INR PPP: 1.06 (ref 0.84–1.19)
LACTATE SERPL-SCNC: 0.8 MMOL/L (ref 0.5–2)
LACTATE SERPL-SCNC: 2.4 MMOL/L (ref 0.5–2)
LYMPHOCYTES # BLD AUTO: 1.01 THOUSANDS/ΜL (ref 0.6–4.47)
LYMPHOCYTES NFR BLD AUTO: 6 % (ref 14–44)
MAGNESIUM SERPL-MCNC: 2.1 MG/DL (ref 1.9–2.7)
MCH RBC QN AUTO: 28.1 PG (ref 26.8–34.3)
MCHC RBC AUTO-ENTMCNC: 32.4 G/DL (ref 31.4–37.4)
MCV RBC AUTO: 87 FL (ref 82–98)
MONOCYTES # BLD AUTO: 1.31 THOUSAND/ΜL (ref 0.17–1.22)
MONOCYTES NFR BLD AUTO: 8 % (ref 4–12)
NEUTROPHILS # BLD AUTO: 14.85 THOUSANDS/ΜL (ref 1.85–7.62)
NEUTS SEG NFR BLD AUTO: 85 % (ref 43–75)
NRBC BLD AUTO-RTO: 0 /100 WBCS
PLATELET # BLD AUTO: 320 THOUSANDS/UL (ref 149–390)
PMV BLD AUTO: 9.5 FL (ref 8.9–12.7)
POTASSIUM SERPL-SCNC: 3.9 MMOL/L (ref 3.5–5.3)
PROCALCITONIN SERPL-MCNC: 0.28 NG/ML
PROT SERPL-MCNC: 6.7 G/DL (ref 6.4–8.4)
PROTHROMBIN TIME: 14 SECONDS (ref 11.6–14.5)
RBC # BLD AUTO: 5.19 MILLION/UL (ref 3.88–5.62)
SODIUM SERPL-SCNC: 136 MMOL/L (ref 135–147)
WBC # BLD AUTO: 17.41 THOUSAND/UL (ref 4.31–10.16)

## 2023-04-26 RX ORDER — MAGNESIUM SULFATE HEPTAHYDRATE 40 MG/ML
2 INJECTION, SOLUTION INTRAVENOUS ONCE
Status: COMPLETED | OUTPATIENT
Start: 2023-04-26 | End: 2023-04-26

## 2023-04-26 RX ORDER — METHYLPREDNISOLONE SODIUM SUCCINATE 125 MG/2ML
80 INJECTION, POWDER, LYOPHILIZED, FOR SOLUTION INTRAMUSCULAR; INTRAVENOUS ONCE
Status: DISCONTINUED | OUTPATIENT
Start: 2023-04-26 | End: 2023-04-26 | Stop reason: HOSPADM

## 2023-04-26 RX ORDER — LEVOFLOXACIN 750 MG/1
750 TABLET ORAL EVERY 24 HOURS
Qty: 5 TABLET | Refills: 0 | Status: SHIPPED | OUTPATIENT
Start: 2023-04-26 | End: 2023-05-01

## 2023-04-26 RX ORDER — CEFTRIAXONE 1 G/50ML
1000 INJECTION, SOLUTION INTRAVENOUS ONCE
Status: COMPLETED | OUTPATIENT
Start: 2023-04-26 | End: 2023-04-26

## 2023-04-26 RX ORDER — ALBUTEROL SULFATE 2.5 MG/3ML
10 SOLUTION RESPIRATORY (INHALATION) ONCE
Status: COMPLETED | OUTPATIENT
Start: 2023-04-26 | End: 2023-04-26

## 2023-04-26 RX ADMIN — SODIUM CHLORIDE 1000 ML: 0.9 INJECTION, SOLUTION INTRAVENOUS at 18:15

## 2023-04-26 RX ADMIN — CEFTRIAXONE 1000 MG: 1 INJECTION, SOLUTION INTRAVENOUS at 18:54

## 2023-04-26 RX ADMIN — ALBUTEROL SULFATE 10 MG: 2.5 SOLUTION RESPIRATORY (INHALATION) at 17:10

## 2023-04-26 RX ADMIN — IPRATROPIUM BROMIDE 1 MG: 0.5 SOLUTION RESPIRATORY (INHALATION) at 17:10

## 2023-04-26 RX ADMIN — AZITHROMYCIN MONOHYDRATE 500 MG: 500 INJECTION, POWDER, LYOPHILIZED, FOR SOLUTION INTRAVENOUS at 19:34

## 2023-04-26 RX ADMIN — MAGNESIUM SULFATE HEPTAHYDRATE 2 G: 40 INJECTION, SOLUTION INTRAVENOUS at 16:45

## 2023-04-26 NOTE — TELEPHONE ENCOUNTER
[facility] contact [facility] to schedule Consult/New Patient Appointment

## 2023-04-26 NOTE — ED PROVIDER NOTES
History  Chief Complaint   Patient presents with    Shortness of Breath     Patient states he started with SOB and wheezing today  Patient denies any chest pain  Patient has also been coughing up blood     27-year-old male presents for evaluation of dyspnea  Patient does have history of asthma and COPD, he believes symptoms acutely worsened today however possibly had a flare building  Prior to arrival patient went to a coughing fit and had a small amount of hematemesis  Patient has albuterol inhaler however has been unable to use his nebulizer due to younger family member playing with the machine  Patient reports wheezing and dyspnea, denies chest pain  Denies fevers, other cold symptoms  Has been eating and drinking normally, denies abdominal pain, previous history of VTE or lower extremity swelling or pain  Prior to Admission Medications   Prescriptions Last Dose Informant Patient Reported? Taking?    Blood Glucose Monitoring Suppl (CONTOUR NEXT MONITOR) w/Device KIT   No No   Sig: Test blood sugar once daily or as needed for fluctuating blood sugars   Dapagliflozin Propanediol (Farxiga) 10 MG TABS   Yes No   Sig: Take 10 mg by mouth daily   Insulin Pen Needle (BD Pen Needle Dee Dee U/F) 32G X 4 MM MISC   No No   Sig: Use 4 a day   Lancets MISC   No No   Sig: Test blood sugar once daily or as needed for fluctuating blood sugars   Levemir FlexTouch 100 units/mL injection pen   Yes No   Sig: Inject 70 Units under the skin daily   MELATONIN PO   Yes No   Sig: Take 10 mg by mouth   Multiple Vitamins-Minerals (MENS MULTIVITAMIN PO)   Yes No   Sig: Take by mouth   Ozempic, 2 MG/DOSE, 8 MG/3ML SOPN   Yes No   albuterol (Ventolin HFA) 90 mcg/act inhaler   No No   Sig: Inhale 2 puffs every 6 (six) hours as needed for wheezing   amLODIPine (NORVASC) 10 mg tablet   No No   Sig: Take 1 tablet (10 mg total) by mouth daily   ammonium lactate (LAC-HYDRIN) 12 % cream   No No   Sig: Apply topically as needed for dry skin   aspirin (ECOTRIN LOW STRENGTH) 81 mg EC tablet   No No   Sig: Take 1 tablet (81 mg total) by mouth daily   atorvastatin (LIPITOR) 40 mg tablet   No No   Sig: Take 1 tablet (40 mg total) by mouth daily   carvedilol (COREG) 12 5 mg tablet   No No   Sig: Take 1 tablet (12 5 mg total) by mouth 2 (two) times a day with meals   cetirizine (ZyrTEC) 10 mg tablet   Yes No   Sig: Take 10 mg by mouth   cyclobenzaprine (FLEXERIL) 10 mg tablet   Yes No   Sig: Take 10 mg by mouth   fluticasone (FLOVENT HFA) 220 mcg/act inhaler   No No   Sig: Inhale 2 puffs 2 (two) times a day Rinse mouth after use   furosemide (LASIX) 40 mg tablet   No No   Sig: Take 1 tablet (40 mg total) by mouth 2 (two) times a day   gabapentin (NEURONTIN) 400 mg capsule   No No   Sig: TAKE 1 CAPSULE BY MOUTH THREE TIMES A DAY   glimepiride (AMARYL) 4 mg tablet   Yes No   Sig: Take 4 mg by mouth 2 (two) times a day   glucose blood (CONTOUR NEXT TEST) test strip   No No   Sig: Test blood sugar once daily or as needed for fluctuating blood sugars   guaiFENesin (MUCINEX) 600 mg 12 hr tablet   No No   Sig: Take 1 tablet (600 mg total) by mouth every 12 (twelve) hours   Patient not taking: Reported on 2023   ipratropium-albuterol (DUO-NEB) 0 5-2 5 mg/3 mL nebulizer solution   No No   Sig: Take 3 mL by nebulization 4 (four) times a day   losartan (COZAAR) 100 MG tablet   No No   Sig: Take 1 tablet (100 mg total) by mouth daily   metFORMIN (GLUCOPHAGE) 1000 MG tablet   No No   Sig: TAKE 1 TABLET BY MOUTH TWICE DAILY WITH MEALS   predniSONE 10 mg tablet   No No   Si tablets daily for 3 days, then 3 tablets daily for 3 days, then 2 tablets daily for 3 days, then 1 tablet daily for 3 days   sertraline (ZOLOFT) 50 mg tablet   No No   Sig: Take 1 tablet (50 mg total) by mouth daily   spironolactone (ALDACTONE) 50 mg tablet   No No   Sig: Take 1 tablet (50 mg total) by mouth daily   umeclidinium-vilanterol (Anoro Ellipta) 62 5-25 mcg/actuation inhaler   No No   Sig: Inhale 1 puff daily      Facility-Administered Medications: None       Past Medical History:   Diagnosis Date    Aortic stenosis     Asthma 1964    COPD (chronic obstructive pulmonary disease) (HCC)     Coronary artery disease     Aortic stenosis    Diabetes ([facility] )     Diabetes mellitus ([facility] )     Hyperlipidemia     Hypertension 2014    Pneumonia 2015    Sleep apnea, obstructive        Past Surgical History:   Procedure Laterality Date    APPENDECTOMY      EYE SURGERY      TONSILLECTOMY  No       Family History   Problem Relation Age of Onset    No Known Problems Mother     Heart disease Father     Lung cancer Father         Passed away 0    Cancer Father         Lung cancer    Hypertension Father     Diabetes Maternal Grandfather     Stomach cancer Maternal Grandfather      I have reviewed and agree with the history as documented  E-Cigarette/Vaping    E-Cigarette Use Never User      E-Cigarette/Vaping Substances    Nicotine No     THC No     CBD No     Flavoring No     Other No     Unknown No      Social History     Tobacco Use    Smoking status: Former     Packs/day: 3 00     Years: 30 00     Pack years: 90 00     Types: Cigarettes     Start date: 3/15/1984     Quit date: 7/15/2005     Years since quittin 7    Smokeless tobacco: Never   Vaping Use    Vaping Use: Never used   Substance Use Topics    Alcohol use: Never    Drug use: Never       Review of Systems   Constitutional: Negative for appetite change, chills and fever  HENT: Negative for congestion  Respiratory: Positive for cough, shortness of breath and wheezing  Cardiovascular: Negative for chest pain and leg swelling  Gastrointestinal: Negative for abdominal pain, nausea and vomiting  Genitourinary: Negative for dysuria  All other systems reviewed and are negative  Physical Exam  Physical Exam  Vitals reviewed     Constitutional:       General: He is in acute distress (Tachypnea)  Appearance: He is well-developed  He is not ill-appearing or diaphoretic  HENT:      Head: Normocephalic and atraumatic  Right Ear: External ear normal       Left Ear: External ear normal       Nose: Nose normal       Mouth/Throat:      Mouth: Mucous membranes are moist    Eyes:      General:         Right eye: No discharge  Left eye: No discharge  Extraocular Movements: Extraocular movements intact  Cardiovascular:      Rate and Rhythm: Normal rate and regular rhythm  Pulmonary:      Effort: Tachypnea present  Breath sounds: Decreased breath sounds (Diffusely) and wheezing (Diffusely however somewhat worse in the mid and upper lung fields) present  No rhonchi or rales  Abdominal:      General: There is no distension  Palpations: Abdomen is soft  Tenderness: There is no abdominal tenderness  There is no guarding or rebound  Musculoskeletal:         General: No deformity or signs of injury  Right lower leg: No edema  Left lower leg: No edema  Skin:     General: Skin is warm  Coloration: Skin is not jaundiced or pale  Neurological:      General: No focal deficit present  Mental Status: He is alert  Mental status is at baseline           Vital Signs  ED Triage Vitals   Temperature Pulse Respirations Blood Pressure SpO2   04/26/23 1625 04/26/23 1625 04/26/23 1625 04/26/23 1628 04/26/23 1625   (!) 97 4 °F (36 3 °C) 91 (!) 24 119/59 (!) 82 %      Temp Source Heart Rate Source Patient Position - Orthostatic VS BP Location FiO2 (%)   04/26/23 1625 04/26/23 1625 04/26/23 1800 04/26/23 1800 --   Temporal Monitor Sitting Right arm       Pain Score       04/26/23 1625       No Pain           Vitals:    04/26/23 1800 04/26/23 1815 04/26/23 1926 04/26/23 2130   BP: 95/60  125/60 118/66   Pulse: 78 78  76   Patient Position - Orthostatic VS: Sitting            Visual Acuity      ED Medications  Medications   albuterol inhalation solution 10 mg (10 mg Nebulization Given 4/26/23 1710)   ipratropium (ATROVENT) 0 02 % inhalation solution 1 mg (1 mg Nebulization Given 4/26/23 1710)   magnesium sulfate 2 g/50 mL IVPB (premix) 2 g (0 g Intravenous Stopped 4/26/23 1751)   sodium chloride 0 9 % bolus 1,000 mL (0 mL Intravenous Stopped 4/26/23 1935)   cefTRIAXone (ROCEPHIN) IVPB (premix in dextrose) 1,000 mg 50 mL (0 mg Intravenous Stopped 4/26/23 1935)   azithromycin (ZITHROMAX) 500 mg in sodium chloride 0 9% 250mL IVPB 500 mg (0 mg Intravenous Stopped 4/26/23 2142)       Diagnostic Studies  Results Reviewed     Procedure Component Value Units Date/Time    Blood culture #2 [[id number]] Collected: 04/26/23 1646    Lab Status: Preliminary result Specimen: Blood from Arm, Left Updated: 04/26/23 2301     Blood Culture Received in Microbiology Lab  Culture in Progress  Lactic acid 2 Hours [[id number]]  (Normal) Collected: 04/26/23 1945    Lab Status: Final result Specimen: Blood from Arm, Right Updated: 04/26/23 2041     LACTIC ACID 0 8 mmol/L     Narrative:      Result may be elevated if tourniquet was used during collection  HS Troponin I 2hr [[id number]]  (Normal) Collected: 04/26/23 1945    Lab Status: Final result Specimen: Blood from Arm, Right Updated: 04/26/23 2014     hs TnI 2hr 5 ng/L      Delta 2hr hsTnI -2 ng/L     HS Troponin 0hr (reflex protocol) [[id number]]  (Normal) Collected: 04/26/23 1646    Lab Status: Final result Specimen: Blood from Arm, Left Updated: 04/26/23 1841     hs TnI 0hr 7 ng/L     Lactic acid [[id number]]  (Abnormal) Collected: 04/26/23 1646    Lab Status: Final result Specimen: Blood from Arm, Left Updated: 04/26/23 1749     LACTIC ACID 2 4 mmol/L     Narrative:      Result may be elevated if tourniquet was used during collection      Procalcitonin [[id number]]  (Abnormal) Collected: 04/26/23 1646    Lab Status: Final result Specimen: Blood from Arm, Left Updated: 04/26/23 2934     Procalcitonin 0 28 ng/ml     Blood culture #1 [[id number]] Collected: 04/26/23 1718    Lab Status: In process Specimen: Blood from Hand, Right Updated: 04/26/23 1726    Comprehensive metabolic panel [[id number]]  (Abnormal) Collected: 04/26/23 1646    Lab Status: Final result Specimen: Blood from Arm, Left Updated: 04/26/23 1716     Sodium 136 mmol/L      Potassium 3 9 mmol/L      Chloride 96 mmol/L      CO2 29 mmol/L      ANION GAP 11 mmol/L      BUN 28 mg/dL      Creatinine 1 16 mg/dL      Glucose 176 mg/dL      Calcium 9 4 mg/dL      AST 10 U/L      ALT 20 U/L      Alkaline Phosphatase 66 U/L      Total Protein 6 7 g/dL      Albumin 3 9 g/dL      Total Bilirubin 0 85 mg/dL      eGFR 69 ml/min/1 73sq m     Narrative:      [place] guidelines for Chronic Kidney Disease (CKD):     Stage 1 with normal or high GFR (GFR > 90 mL/min/1 73 square meters)    Stage 2 Mild CKD (GFR = 60-89 mL/min/1 73 square meters)    Stage 3A Moderate CKD (GFR = 45-59 mL/min/1 73 square meters)    Stage 3B Moderate CKD (GFR = 30-44 mL/min/1 73 square meters)    Stage 4 Severe CKD (GFR = 15-29 mL/min/1 73 square meters)    Stage 5 End Stage CKD (GFR <15 mL/min/1 73 square meters)  Note: GFR calculation is accurate only with a steady state creatinine    Magnesium [[id number]]  (Normal) Collected: 04/26/23 1646    Lab Status: Final result Specimen: Blood from Arm, Left Updated: 04/26/23 1716     Magnesium 2 1 mg/dL     D-dimer, quantitative [[id number]]  (Normal) Collected: 04/26/23 1646    Lab Status: Final result Specimen: Blood from Arm, Left Updated: 04/26/23 1710     D-Dimer, Quant 0 31 ug/ml FEU     Narrative: In the evaluation for possible pulmonary embolism, in the appropriate (Well's Score of 4 or less) patient, the age adjusted d-dimer cutoff for this patient can be calculated as:    Age x 0 01 (in ug/mL) for Age-adjusted D-dimer exclusion threshold for a patient over 50 years      [place] [[id number]]  (Normal) Collected: 04/26/23 1646    Lab Status: Final result Specimen: Blood from Arm, Left Updated: 04/26/23 1708     Protime 14 0 seconds      INR 1 06    APTT [[id number]]  (Normal) Collected: 04/26/23 1646    Lab Status: Final result Specimen: Blood from Arm, Left Updated: 04/26/23 1708     PTT 31 seconds     CBC and differential [[id number]]  (Abnormal) Collected: 04/26/23 1646    Lab Status: Final result Specimen: Blood from Arm, Left Updated: 04/26/23 1655     WBC 17 41 Thousand/uL      RBC 5 19 Million/uL      Hemoglobin 14 6 g/dL      Hematocrit 45 1 %      MCV 87 fL      MCH 28 1 pg      MCHC 32 4 g/dL      RDW 14 5 %      MPV 9 5 fL      Platelets 598 Thousands/uL      nRBC 0 /100 WBCs      Neutrophils Relative 85 %      Immat GRANS % 1 %      Lymphocytes Relative 6 %      Monocytes Relative 8 %      Eosinophils Relative 0 %      Basophils Relative 0 %      Neutrophils Absolute 14 85 Thousands/µL      Immature Grans Absolute 0 12 Thousand/uL      Lymphocytes Absolute 1 01 Thousands/µL      Monocytes Absolute 1 31 Thousand/µL      Eosinophils Absolute 0 06 Thousand/µL      Basophils Absolute 0 06 Thousands/µL                  CT chest without contrast   Final Result by Юлия Vee MD (04/26 1922)      Extensive consolidation and groundglass opacity involving the entire right lower lobe, increased from 3/20/2023  This could be due to pneumonia, but given hemoptysis, pulmonary hemorrhage is possible  Recommend follow-up with a chest CT in 3 months to    assure resolution  This study was marked in Epic for immediate notification and follow-up           Workstation performed: [id number]         XR chest portable    (Results Pending)              Procedures  CriticalCare Time  Performed by: Tu Reis DO  Authorized by: Tu Reis DO     Critical care provider statement:     Critical care time (minutes):  30    Critical care was necessary to treat or prevent imminent or life-threatening deterioration of the following conditions: Respiratory failure    Critical care was time spent personally by me on the following activities:  Obtaining history from patient or surrogate, development of treatment plan with patient or surrogate, discussions with consultants, evaluation of patient's response to treatment, examination of patient, ordering and performing treatments and interventions, ordering and review of laboratory studies, ordering and review of radiographic studies, re-evaluation of patient's condition and review of old charts    I assumed direction of critical care for this patient from another provider in my specialty: no               ED Course  ED Course as of 04/26/23 2359   Wed Apr 26, 2023   1631 Low 80s on RA, 3L with improvement to low 90s   1640 Procedure Note: EKG  Date/Time: 04/26/23 4:40 PM   Interpreted by: Jethro Ye  Indications / Diagnosis: dyspnea  ECG reviewed by me, the ED Provider: yes   The EKG demonstrates:  Rhythm: normal sinus  Intervals: normal intervals  Axis: normal axis  QRS/Blocks: normal QRS  ST Changes: No STD/[place]  Poor baseline artifact  1648 XR chest portable  Interpretation, questionable infiltrate with small effusion right lower   1656 WBC(!): 17 41  Elevated, may be in setting of infection however patient is also on steroid taper at the moment   1713 D-Dimer, Quant: 0 31  negative   1731 Procalcitonin(!): 0 28  Elevated >0 25   1752 LACTIC ACID(!!): 2 4   1754 Will provide 1L IVF given elevated lactic though this may be from tachypnea  Saturations improved on 3L, likely acute COPD/asthma exacerbation with diffuse wheezing, will hold on 30cc/kg bolus until further imaging  Will avoid volume overloading  [phone] CT chest without contrast  On my interpretation, R lobar pneumonia, will order abx   1852 Pt on 3LNC saturating 91-92, feels improved  Discussed admission given extensive lobar pneumonia, oxygen requirements   He is accepting of this   1926 CT chest without contrast  IMPRESSION:     Extensive consolidation and groundglass opacity involving the entire right lower lobe, increased from 3/20/2023  This could be due to pneumonia, but given hemoptysis, pulmonary hemorrhage is possible  Recommend follow-up with a chest CT in 3 months to assure resolution  1930 Reached out to [facility] for admission   1950 [facility] requesting pul clearance for admission here  Will reach out soon  2033 Discussed with Dr Mattie Farnsworth from pul, states pt can be transferred for bronch/stabilization if worsens  Agrees likely infectious in nature but since being worked up for lupus cannot r/o DAH  States can go to almost any other campus as med [facility] and they will see in consult  2040 Pt accepting of transfer, will try [facility] for proximity to home  2044 LACTIC ACID: 0 8  Cleared   2128 Notified by nursing patient wants to go home and leave AMA  We discussed findings again of pneumonia and why he was was being transferred  I showed him the CT results showing large portion of right lung with infiltrate/possible hemorrhage  He does not want to stay, states he will call Dr Sara York, his pulmonologist in the AM  We discussed if this is hemorrhage, his breathing may worsen to the point he stops breathing and dies  He is understanding of this  Will treat with abx and send rx to [facility] in [facility]  Medical Decision Making  14-year-old male presents for evaluation of dyspnea, patient was hypoxic on room air on arrival, 3 L bumped him into the low 90s  Patient does have some tachypnea without accessory muscle usage, he is diffusely decreased breath sounds with wheezing  Will treat as COPD or asthma exacerbation with heart neb, magnesium, steroids  Will additionally evaluate for infection such as pneumonia  Patient's wife does states she had bronchitis about 2 weeks ago    Patient notes 1 episode of hemoptysis prior to arrival, states he has not had that again since, denies history of VTE, lower extremity swelling or pain  Will evaluate with D-dimer, additionally assess for ACS with troponin and EKG  Will obtain chest x-ray to rule out pneumonia  We will also evaluate for anemia, electrolyte abnormality, renal dysfunction  Amount and/or Complexity of Data Reviewed  Labs: ordered  Decision-making details documented in ED Course  Radiology: ordered  Decision-making details documented in ED Course  Risk  Prescription drug management  Disposition  Final diagnoses:   COPD with acute exacerbation ([facility] )   Pneumonia   Ac[facility] respiratory failure with hypoxia ([facility] )   Cough with hemoptysis     Time reflects when diagnosis was documented in both MDM as applicable and the Disposition within this note     Time User Action Codes Description Comment    4/26/2023  8:41 PM Kristen  [J44 1] COPD with acute exacerbation ([facility] )     4/26/2023  8:41 PM Andra Overall Add [J18 9] Pneumonia     4/26/2023  8:41 PM Andra Overall Add [J96 01] Acute respiratory failure with hypoxia ([facility] )     4/26/2023  9:34 PM Andra Overall Add [R04 2] Cough with hemoptysis       ED Disposition     ED Disposition   AMA    Condition   --    Date/Time   Wed Apr 26, 2023  9:32 PM    Comment   Kimberly Jimenez should be transferred out             Follow-up Information     Follow up With Specialties Details Why Contact Info Additional [address] [facility] Emergency Department Emergency Medicine  If symptoms worsen [facility] [id number] [facility] Emergency Department, [address], [place] Critical Care Medicine, Pulmonary Disease, Pulmonology, Intensive Care Call   Cheryl 19 [phone]             Discharge Medication List as of 4/26/2023  9:35 PM      START taking these medications    Details   levofloxacin (Levaquin) 750 mg tablet Take 1 tablet (750 mg total) by mouth every 24 hours for 5 days, Starting Wed 4/26/2023, Until Mon 5/1/2023, Normal         CONTINUE these medications which have NOT CHANGED    Details   albuterol (Ventolin HFA) 90 mcg/act inhaler Inhale 2 puffs every 6 (six) hours as needed for wheezing, Starting Thu 4/21/2022, Normal      amLODIPine (NORVASC) 10 mg tablet Take 1 tablet (10 mg total) by mouth daily, Starting Thu 3/9/2023, Normal      ammonium lactate (LAC-HYDRIN) 12 % cream Apply topically as needed for dry skin, Starting Fri 9/9/2022, Normal      aspirin (ECOTRIN LOW STRENGTH) 81 mg EC tablet Take 1 tablet (81 mg total) by mouth daily, Starting Mon 8/15/2022, No Print      atorvastatin (LIPITOR) 40 mg tablet Take 1 tablet (40 mg total) by mouth daily, Starting Thu 3/9/2023, Normal      Blood Glucose Monitoring Suppl (CONTOUR NEXT MONITOR) w/Device KIT Test blood sugar once daily or as needed for fluctuating blood sugars, Normal      carvedilol (COREG) 12 5 mg tablet Take 1 tablet (12 5 mg total) by mouth 2 (two) times a day with meals, Starting Tue 3/14/2023, Normal      cetirizine (ZyrTEC) 10 mg tablet Take 10 mg by mouth, Starting Mon 12/10/2018, Historical Med      cyclobenzaprine (FLEXERIL) 10 mg tablet Take 10 mg by mouth, Starting Mon 2/6/2017, Historical Med      Dapagliflozin Propanediol (Farxiga) 10 MG TABS Take 10 mg by mouth daily, Historical Med      fluticasone (FLOVENT HFA) 220 mcg/act inhaler Inhale 2 puffs 2 (two) times a day Rinse mouth after use, Starting Tue 3/14/2023, Normal      furosemide (LASIX) 40 mg tablet Take 1 tablet (40 mg total) by mouth 2 (two) times a day, Starting Fri 3/24/2023, Normal      gabapentin (NEURONTIN) 400 mg capsule TAKE 1 CAPSULE BY MOUTH THREE TIMES A DAY, Normal      glimepiride (AMARYL) 4 mg tablet Take 4 mg by mouth 2 (two) times a day, Starting Tue 7/20/2021, Historical Med      glucose blood (CONTOUR NEXT TEST) test strip Test blood sugar once daily or as needed for fluctuating blood sugars, Normal      guaiFENesin (MUCINEX) 600 mg 12 hr tablet Take 1 tablet (600 mg total) by mouth every 12 (twelve) hours, Starting Fri 8/12/2022, Normal      Insulin Pen Needle (BD Pen Needle Dee Dee U/F) 32G X 4 MM MISC Use 4 a day, Normal      ipratropium-albuterol (DUO-NEB) 0 5-2 5 mg/3 mL nebulizer solution Take 3 mL by nebulization 4 (four) times a day, Starting Tue 3/14/2023, Normal      Lancets MISC Test blood sugar once daily or as needed for fluctuating blood sugars, Normal      Levemir FlexTouch 100 units/mL injection pen Inject 70 Units under the skin daily, Starting Mon 10/18/2021, Historical Med      losartan (COZAAR) 100 MG tablet Take 1 tablet (100 mg total) by mouth daily, Starting Thu 3/9/2023, Normal      MELATONIN PO Take 10 mg by mouth, Historical Med      metFORMIN (GLUCOPHAGE) 1000 MG tablet TAKE 1 TABLET BY MOUTH TWICE DAILY WITH MEALS, Normal      Multiple Vitamins-Minerals (MENS MULTIVITAMIN PO) Take by mouth, Historical Med      Ozempic, 2 MG/DOSE, 8 MG/3ML SOPN Starting Tue 9/6/2022, Historical Med      predniSONE 10 mg tablet 4 tablets daily for 3 days, then 3 tablets daily for 3 days, then 2 tablets daily for 3 days, then 1 tablet daily for 3 days, Normal      sertraline (ZOLOFT) 50 mg tablet Take 1 tablet (50 mg total) by mouth daily, Starting Thu 3/9/2023, Normal      spironolactone (ALDACTONE) 50 mg tablet Take 1 tablet (50 mg total) by mouth daily, Starting Thu 3/9/2023, Normal      umeclidinium-vilanterol (Anoro Ellipta) 62 5-25 mcg/actuation inhaler Inhale 1 puff daily, Starting Tue 3/14/2023, Until Fri 4/21/2023, Normal             No discharge procedures on file      PDMP Review     None          ED Provider  Electronically Signed by           Rafael Kilpatrick,   04/26/23 3076

## 2023-04-27 ENCOUNTER — TELEPHONE (OUTPATIENT)
Dept: PULMONOLOGY | Facility: CLINIC | Age: 58
End: 2023-04-27

## 2023-04-27 ENCOUNTER — TELEPHONE (OUTPATIENT)
Dept: OTHER | Facility: OTHER | Age: 58
End: 2023-04-27

## 2023-04-27 LAB
ATRIAL RATE: 84 BPM
P AXIS: 56 DEGREES
PR INTERVAL: 158 MS
QRS AXIS: 8 DEGREES
QRSD INTERVAL: 90 MS
QT INTERVAL: 356 MS
QTC INTERVAL: 420 MS
T WAVE AXIS: 120 DEGREES
VENTRICULAR RATE: 84 BPM

## 2023-04-27 NOTE — TELEPHONE ENCOUNTER
Patient reports he was at the ED yesterday and would like Dr Deepika Calderon to review his notes, X-rays, CAT scans, etc and patient would like a call back with care advice

## 2023-04-27 NOTE — ED NOTES
Pt stated he wants to leave AMA and will follow up with his PCP  Explained risks of leaving AMA and brought provider to bedside to explain risks of signing out  Pt understood and ultimately decided to sign out AMA   AMA form provided      Antonio Guzmán RN  04/26/23 1140

## 2023-04-27 NOTE — TELEPHONE ENCOUNTER
Patient called  He was in the ER Wednesday because he was coughing up blood  The ER doc wanted to transfer him to [address] [place] [address] but he deferred  He wants [place] and Dr Bhanu Bush to look over his CT Scan and give him advice on what to do

## 2023-04-28 ENCOUNTER — CONSULT (OUTPATIENT)
Dept: ENDOCRINOLOGY | Facility: CLINIC | Age: 58
End: 2023-04-28

## 2023-04-28 VITALS
WEIGHT: 315 LBS | HEART RATE: 69 BPM | DIASTOLIC BLOOD PRESSURE: 58 MMHG | BODY MASS INDEX: 46.65 KG/M2 | SYSTOLIC BLOOD PRESSURE: 88 MMHG | HEIGHT: 69 IN

## 2023-04-28 DIAGNOSIS — E66.01 MORBID OBESITY (HCC): ICD-10-CM

## 2023-04-28 DIAGNOSIS — E78.2 MIXED HYPERLIPIDEMIA: ICD-10-CM

## 2023-04-28 DIAGNOSIS — E11.40 TYPE 2 DIABETES MELLITUS WITH DIABETIC NEUROPATHY, WITH LONG-TERM CURRENT USE OF INSULIN (HCC): Primary | ICD-10-CM

## 2023-04-28 DIAGNOSIS — Z79.4 TYPE 2 DIABETES MELLITUS WITH DIABETIC NEUROPATHY, WITH LONG-TERM CURRENT USE OF INSULIN (HCC): Primary | ICD-10-CM

## 2023-04-28 DIAGNOSIS — I10 ESSENTIAL HYPERTENSION: ICD-10-CM

## 2023-04-28 NOTE — PROGRESS NOTES
Krys Doty 61 y o  male MRN: [id number]    Encounter: [id number]      Assessment/Plan     1  Type 2 diabetes c/b DPN - We discussed the pathophysiology of diabetes and its associations with negative health outcomes  I counseled the patient on various goals of diabetes management, including healthy lifestyle and behaviors, glycemic targets, preventative health care, and the role of pharmacotherapies  No meter or log today  Jas Neff is on an intensive anti-diabetic program with suboptimal control based on a1c  DM care is complicated by intermittent use of systemic glucocorticoids  He may need escalation of his insulin treatment to include prandial insulin coverage, perhaps starting with largest meal, which is supper  We discussed importance of SMBG monitoring for treatment decisions  He is encouraged to intensify BG monitoring to at least twice daily, scattered times preferred  He may also be eligible for CGM based on current use of basal insulin  We discussed this option, Jas Neff wishes to defer until future encounter  I will refer to CDE for MNT/DSME  He will be asked to RTC in 4-6 weeks with BG data for review  He may call earlier with any concerns  2  Hypertension - blood pressure reading was low today  He is without any concerning signs or symptoms  If this remains persistent, he may require de-escalation of anti-hypertensive regimen    3  Hyperlipidemia - currently on high intensity statin  Last lipids were poorly controlled  These will need to be followed  I believe he can also benefit from optimization of nutrition  4  Morbid obesity - referral to CDE       Problem List Items Addressed This Visit        Endocrine    Type 2 diabetes mellitus with diabetic neuropathy, with long-term current use of insulin ([address] ) - Primary    Relevant Orders    Ambulatory referral to Diabetic Education       Cardiovascular and Mediastinum    Essential hypertension   Other Visit Diagnoses     Mixed hyperlipidemia Morbid obesity ([facility] [address] )            RTC 4-6 weeks    CC: Diabetes    History of Present Illness     HPI:    Yesi Siddiqui presents today for evaluation of diabetes  He is joined by his wife who is contributing to elements of the history  Diabetes History:  Year of diabetes diagnosis and symptoms: 15 years  Is patient on insulin? yes  If yes, how long on insulin? Couple of months      List Current Medications for Glycemic control and the doses:  1-         Levemir 70 units  2-         Metformin 1000 mg bid  3-        Ozempic 2 mg weekly  4-  Glimepiride 4 mg bid  5- Farxiga 10 mg daily     Other Medications:   Statin? lipitor 40 mg  ACE inhibitor? Losartan 100 mg daily  Also takes Carvedilol 12 5 mg bid, amlodipine 10 mg and spironolactone 50 mg daily for hypertension  He is on prednisone for acute bronchitis  Diabetic Complications:   Microvascular:   - Eyes: Retinopathy? Denies [facility])          When was the last fully dilated eye exam?              - Feet:  Neuropathy? Yes, painful (Gabapentin 400 mg tid)           Foot Ulcers? When was the last time patient saw a Podiatrist?    Dr Esther Nunez       - Kidneys: Nephropathy? CKD2      Macrovascular:  - CAD? Denies  - CVD? Denies  - PVD? Denies  - Smoker? Former  Quit 10 years      SMBG (self-monitored blood glucose) readings:   - Name of glucometer:            - How often does the patient check BG?             infrequent  - Does the patient keep a log? No detailed record is available, overall estimated range: 151    Does patient get Hypoglycemic episodes? No  If yes how frequent? How low do the BG readings reach? When do most of those episodes occur? What symptoms does the patient get during those episodes?            Education: once, remote    Diet: review patient's diet:        Breakfast: denies   Lunch: left over night before  Dinner (biggest meal): fish & chicken, white rice (brown rice when I can), vegetables  Bedtime Snack: not usually  Once in a while  Iced Tea: 1-3 gallons diet iced tea    Exercise: will park far away, can make it a block or two     Review of Systems   Constitutional: Negative for diaphoresis  HENT: Negative for voice change  Cardiovascular: Negative for chest pain and palpitations  Gastrointestinal: Negative for nausea and vomiting  Endocrine: Negative for polydipsia and polyuria  Neurological: Negative for tremors  Psychiatric/Behavioral: Negative for agitation  All other systems reviewed and are negative        Historical Information   Past Medical History:   Diagnosis Date    Aortic stenosis     Asthma     [facility]     Coronary artery disease     Aortic stenosis    Diabetes [facility] [address] )     Diabetes mellitus ([facility] )     Hyperlipidemia     Hypertension 2014    Pneumonia 2015    Sleep apnea, obstructive      Past Surgical History:   Procedure Laterality Date    APPENDECTOMY      EYE SURGERY      TONSILLECTOMY  No     Social History   Social History     Substance and Sexual Activity   Alcohol Use Never     Social History     Substance and Sexual Activity   Drug Use Never     Social History     Tobacco Use   Smoking Status Former    Packs/day: 3 00    Years: 30 00    Pack years: 90 00    Types: Cigarettes    Start date: 3/15/1984   Angélica Hoyosroe Quit date: 7/15/2005    Years since quittin 8   Smokeless Tobacco Never     Family History:   Family History   Problem Relation Age of Onset    No Known Problems Mother     Heart disease Father     Lung cancer Father         Passed away 0    Cancer Father         Lung cancer    Hypertension Father     Diabetes Maternal Grandfather     Stomach cancer Maternal Grandfather        Meds/Allergies   Current Outpatient Medications   Medication Sig Dispense Refill    albuterol (Ventolin HFA) 90 mcg/act inhaler Inhale 2 puffs every 6 (six) hours as needed for wheezing 18 g 3    amLODIPine (NORVASC) 10 mg tablet Take 1 tablet (10 mg total) by mouth daily 90 tablet 3    ammonium lactate (LAC-HYDRIN) 12 % cream Apply topically as needed for dry skin 385 g 0    aspirin (ECOTRIN LOW STRENGTH) 81 mg EC tablet Take 1 tablet (81 mg total) by mouth daily 30 tablet 5    atorvastatin (LIPITOR) 40 mg tablet Take 1 tablet (40 mg total) by mouth daily 90 tablet 3    Blood Glucose Monitoring Suppl (CONTOUR NEXT MONITOR) w/Device KIT Test blood sugar once daily or as needed for fluctuating blood sugars 1 kit 0    carvedilol (COREG) 12 5 mg tablet Take 1 tablet (12 5 mg total) by mouth 2 (two) times a day with meals 180 tablet 3    cetirizine (ZyrTEC) 10 mg tablet Take 10 mg by mouth      cyclobenzaprine (FLEXERIL) 10 mg tablet Take 10 mg by mouth      Dapagliflozin Propanediol (Farxiga) 10 MG TABS Take 10 mg by mouth daily      fluticasone (FLOVENT HFA) 220 mcg/act inhaler Inhale 2 puffs 2 (two) times a day Rinse mouth after use 12 g 3    furosemide (LASIX) 40 mg tablet Take 1 tablet (40 mg total) by mouth 2 (two) times a day 180 tablet 3    gabapentin (NEURONTIN) 400 mg capsule TAKE 1 CAPSULE BY MOUTH THREE TIMES A DAY 90 capsule 10    glimepiride (AMARYL) 4 mg tablet Take 4 mg by mouth 2 (two) times a day      glucose blood (CONTOUR NEXT TEST) test strip Test blood sugar once daily or as needed for fluctuating blood sugars 100 each 3    Insulin Pen Needle (BD Pen Needle Dee [employer] U/F) 32G X 4 MM MISC Use 4 a day 200 each 5    ipratropium-albuterol (DUO-NEB) 0 5-2 5 mg/3 mL nebulizer solution Take 3 mL by nebulization 4 (four) times a day 180 mL 3    Lancets MISC Test blood sugar once daily or as needed for fluctuating blood sugars 100 each 0    Levemir FlexTouch 100 units/mL injection pen Inject 70 Units under the skin daily      levofloxacin (Levaquin) 750 mg tablet Take 1 tablet (750 mg total) by mouth "every 24 hours for 5 days 5 tablet 0    losartan (COZAAR) 100 MG tablet Take 1 tablet (100 mg total) by mouth daily 90 tablet 3    MELATONIN PO Take 10 mg by mouth      metFORMIN (GLUCOPHAGE) 1000 MG tablet TAKE 1 TABLET BY MOUTH TWICE DAILY WITH MEALS 180 tablet 0    Multiple Vitamins-Minerals (MENS MULTIVITAMIN PO) Take by mouth      Ozempic, 2 MG/DOSE, 8 MG/3ML SOPN 2 mg every 7 days      predniSONE 10 mg tablet 4 tablets daily for 3 days, then 3 tablets daily for 3 days, then 2 tablets daily for 3 days, then 1 tablet daily for 3 days 30 tablet 0    sertraline (ZOLOFT) 50 mg tablet Take 1 tablet (50 mg total) by mouth daily 90 tablet 0    spironolactone (ALDACTONE) 50 mg tablet Take 1 tablet (50 mg total) by mouth daily 90 tablet 3    umeclidinium-vilanterol (Anoro Ellipta) 62 5-25 mcg/actuation inhaler Inhale 1 puff daily 60 blister 3    guaiFENesin (MUCINEX) 600 mg 12 hr tablet Take 1 tablet (600 mg total) by mouth every 12 (twelve) hours (Patient not taking: Reported on 4/21/2023) 60 tablet 0     No current facility-administered medications for this visit  Allergies   Allergen Reactions    Theophylline Other (See Comments)     Severe headaches  headaches  Severe headaches         Objective   Vitals: Blood pressure (!) 88/58, pulse 69, height 5' 9\" (1 753 m), weight (!) 154 kg (339 lb 9 6 oz)  Physical Exam  Vitals reviewed  Constitutional:       Appearance: Normal appearance  HENT:      Head: Normocephalic and atraumatic  Eyes:      General: No scleral icterus  Conjunctiva/sclera: Conjunctivae normal    Cardiovascular:      Rate and Rhythm: Normal rate and regular rhythm  Pulmonary:      Effort: Pulmonary effort is normal  No respiratory distress  Abdominal:      Palpations: Abdomen is soft  Tenderness: There is no abdominal tenderness  Musculoskeletal:      Cervical back: Normal range of motion  Skin:     General: Skin is warm and dry     Neurological:      General: No " focal deficit present  Mental Status: He is alert  Psychiatric:         Mood and Affect: Mood normal          Behavior: Behavior normal          The history was obtained from the review of the chart, patient and family      Lab Results:   Lab Results   Component Value Date/Time    Hemoglobin A1C 9 1 (A) 03/02/2023 11:19 AM    Hemoglobin A1C 8 1 (A) 11/30/2022 10:40 AM    Hemoglobin A1C 9 4 07/19/2022 12:00 AM    Hemoglobin A1C 10 2 (A) 05/04/2022 10:02 AM    WBC 17 41 (H) 04/26/2023 04:46 PM    WBC 8 71 03/21/2023 10:21 AM    WBC 11 01 (H) 02/07/2023 10:00 AM    Hemoglobin 14 6 04/26/2023 04:46 PM    Hemoglobin 14 7 03/21/2023 10:21 AM    Hemoglobin 14 7 02/07/2023 10:00 AM    Hematocrit 45 1 04/26/2023 04:46 PM    Hematocrit 45 7 03/21/2023 10:21 AM    Hematocrit 44 1 02/07/2023 10:00 AM    MCV 87 04/26/2023 04:46 PM    MCV 87 03/21/2023 10:21 AM    MCV 85 02/07/2023 10:00 AM    Platelets 543 01/09/8467 04:46 PM    Platelets 999 19/46/3039 10:21 AM    Platelets 729 74/55/0524 10:00 AM    BUN 28 (H) 04/26/2023 04:46 PM    BUN 18 01/02/2023 09:26 PM    BUN 27 (H) 11/11/2022 09:22 AM    Potassium 3 9 04/26/2023 04:46 PM    Potassium 4 1 01/02/2023 09:26 PM    Potassium 4 8 11/11/2022 09:22 AM    Chloride 96 04/26/2023 04:46 PM    Chloride 97 01/02/2023 09:26 PM    Chloride 104 11/11/2022 09:22 AM    CO2 29 04/26/2023 04:46 PM    CO2 30 01/02/2023 09:26 PM    CO2 26 11/11/2022 09:22 AM    Creatinine 1 16 04/26/2023 04:46 PM    Creatinine 1 04 01/02/2023 09:26 PM    Creatinine 1 07 11/11/2022 09:22 AM    AST 10 (L) 04/26/2023 04:46 PM    AST 21 01/02/2023 09:26 PM    AST 15 11/11/2022 09:22 AM    ALT 20 04/26/2023 04:46 PM    ALT 42 01/02/2023 09:26 PM    ALT 45 11/11/2022 09:22 AM    Albumin 3 9 04/26/2023 04:46 PM    Albumin 4 0 01/02/2023 09:26 PM    Albumin 3 6 11/11/2022 09:22 AM     Lab Results   Component Value Date    CHOLESTEROL 94 01/26/2022    CHOLESTEROL 108 09/02/2020    CHOLESTEROL 97 02/05/2020 "    Lab Results   Component Value Date    HDL 22 (L) 01/26/2022    HDL 23 (L) 09/02/2020    HDL 27 (L) 02/05/2020     Lab Results   Component Value Date    TRIG 292 (H) 01/26/2022    TRIG 130 07/28/2021    TRIG 307 (H) 09/02/2020     Lab Results   Component Value Date    NONHDLC 72 01/26/2022    [place] 80 07/19/2019           Imaging Studies: I have personally reviewed pertinent reports  Portions of the record may have been created with voice recognition software  Occasional wrong word or \"sound a like\" substitutions may have occurred due to the inherent limitations of voice recognition software  Read the chart carefully and recognize, using context, where substitutions have occurred    "

## 2023-04-29 ENCOUNTER — NURSE TRIAGE (OUTPATIENT)
Dept: OTHER | Facility: OTHER | Age: 58
End: 2023-04-29

## 2023-04-29 NOTE — TELEPHONE ENCOUNTER
"Regarding: cughing up blood clot  ----- Message from Willi Navarrete sent at 4/29/2023  3:07 PM EDT -----  \" I was coughing up blood last week  Today I am coughing up blood clots  My breathing is ok and I don't have any other symptoms  \"    "

## 2023-04-29 NOTE — TELEPHONE ENCOUNTER
"  Reason for Disposition  • [1] Caller requesting NON-URGENT health information AND [2] PCP's office is the best resource    Answer Assessment - Initial Assessment Questions  1  REASON FOR CALL or QUESTION: \"What is your reason for calling today? \" or \"How can I best help you? \" or \"What question do you have that I can help answer? \"      Can you please tell Dr Keny Rosales I coughed up a blood clot    Protocols used: INFORMATION ONLY CALL - NO TRIAGE-ADULT-    "

## 2023-05-01 ENCOUNTER — HOSPITAL ENCOUNTER (OUTPATIENT)
Dept: NON INVASIVE DIAGNOSTICS | Facility: HOSPITAL | Age: 58
Discharge: HOME | End: 2023-05-01
Attending: INTERNAL MEDICINE

## 2023-05-01 VITALS
DIASTOLIC BLOOD PRESSURE: 75 MMHG | SYSTOLIC BLOOD PRESSURE: 118 MMHG | WEIGHT: 315 LBS | BODY MASS INDEX: 46.65 KG/M2 | HEART RATE: 73 BPM | HEIGHT: 69 IN

## 2023-05-01 DIAGNOSIS — I35.0 NONRHEUMATIC AORTIC VALVE STENOSIS: ICD-10-CM

## 2023-05-01 LAB
AORTIC ROOT: 3.2 CM
AORTIC VALVE MEAN VELOCITY: 32.4 M/S
AV AREA BY CONTINUOUS VTI: 0.6 CM2
AV AREA PEAK VELOCITY: 0.7 CM2
AV LVOT MEAN GRADIENT: 2 MMHG
AV LVOT PEAK GRADIENT: 4 MMHG
AV MEAN GRADIENT: 46 MMHG
AV PEAK GRADIENT: 85 MMHG
AV VALVE AREA: 0.57 CM2
AV VELOCITY RATIO: 0.21
BACTERIA BLD CULT: NORMAL
BACTERIA BLD CULT: NORMAL
DOP CALC AO PEAK VEL: 4.62 M/S
DOP CALC AO VTI: 99.82 CM
DOP CALC LVOT AREA: 3.14 CM2
DOP CALC LVOT DIAMETER: 2 CM
DOP CALC LVOT PEAK VEL VTI: 17.99 CM
DOP CALC LVOT PEAK VEL: 0.96 M/S
DOP CALC LVOT STROKE INDEX: 22 ML/M2
DOP CALC LVOT STROKE VOLUME: 56.49 CM3
E WAVE DECELERATION TIME: 321 MS
LAAS-AP2: 29.4 CM2
LAAS-AP4: 23.1 CM2
LEFT ATRIUM SIZE: 4.1 CM
MV E'TISSUE VEL-LAT: 9 CM/S
MV E'TISSUE VEL-SEP: 6 CM/S
MV PEAK A VEL: 0.75 M/S
MV PEAK E VEL: 75 CM/S
MV STENOSIS PRESSURE HALF TIME: 93 MS
MV VALVE AREA P 1/2 METHOD: 2.37 CM2
RIGHT ATRIUM AREA SYSTOLE A4C: 16.4 CM2
RIGHT VENTRICLE ID DIMENSION: 3.3 CM
SL CV LEFT ATRIUM LENGTH A2C: 5.7 CM
SL CV LV EF: 65
TR MAX PG: 6 MMHG
TR PEAK VELOCITY: 1.3 M/S
TRICUSPID VALVE PEAK REGURGITATION VELOCITY: 1.26 M/S

## 2023-05-01 RX ADMIN — PERFLUTREN 0.4 ML/MIN: 6.52 INJECTION, SUSPENSION INTRAVENOUS at 11:00

## 2023-05-04 ENCOUNTER — TELEPHONE (OUTPATIENT)
Dept: PULMONOLOGY | Facility: CLINIC | Age: 58
End: 2023-05-04

## 2023-05-04 NOTE — TELEPHONE ENCOUNTER
JOEL, patient called, he had an appointment with Dr Burt Maier and his Aortic Stenosis has gotten worse  At his next appointment 05/22/2023, surgery options are going to be discussed

## 2023-05-11 ENCOUNTER — OFFICE VISIT (OUTPATIENT)
Dept: CARDIOLOGY CLINIC | Facility: CLINIC | Age: 58
End: 2023-05-11

## 2023-05-11 ENCOUNTER — TELEPHONE (OUTPATIENT)
Dept: CARDIOLOGY CLINIC | Facility: CLINIC | Age: 58
End: 2023-05-11

## 2023-05-11 ENCOUNTER — TELEPHONE (OUTPATIENT)
Dept: PULMONOLOGY | Facility: CLINIC | Age: 58
End: 2023-05-11

## 2023-05-11 VITALS
DIASTOLIC BLOOD PRESSURE: 60 MMHG | WEIGHT: 315 LBS | HEART RATE: 84 BPM | SYSTOLIC BLOOD PRESSURE: 120 MMHG | BODY MASS INDEX: 46.65 KG/M2 | HEIGHT: 69 IN

## 2023-05-11 DIAGNOSIS — E78.5 DYSLIPIDEMIA ASSOCIATED WITH TYPE 2 DIABETES MELLITUS (HCC): ICD-10-CM

## 2023-05-11 DIAGNOSIS — I35.0 NONRHEUMATIC AORTIC VALVE STENOSIS: Primary | ICD-10-CM

## 2023-05-11 DIAGNOSIS — I10 ESSENTIAL HYPERTENSION: ICD-10-CM

## 2023-05-11 DIAGNOSIS — J44.9 COPD, SEVERE (HCC): ICD-10-CM

## 2023-05-11 DIAGNOSIS — I50.33 ACUTE ON CHRONIC DIASTOLIC HEART FAILURE (HCC): ICD-10-CM

## 2023-05-11 DIAGNOSIS — E11.69 DYSLIPIDEMIA ASSOCIATED WITH TYPE 2 DIABETES MELLITUS (HCC): ICD-10-CM

## 2023-05-11 DIAGNOSIS — G47.33 OSA (OBSTRUCTIVE SLEEP APNEA): ICD-10-CM

## 2023-05-11 DIAGNOSIS — E66.01 CLASS 3 SEVERE OBESITY DUE TO EXCESS CALORIES WITH SERIOUS COMORBIDITY AND BODY MASS INDEX (BMI) OF 50.0 TO 59.9 IN ADULT (HCC): ICD-10-CM

## 2023-05-11 NOTE — PATIENT INSTRUCTIONS
Aortic Stenosis   WHAT YOU NEED TO KNOW:   What is aortic stenosis? Aortic stenosis is a condition that makes your aortic valve become narrow and stiff  The narrow, stiff valve causes your heart to work harder to pump blood into the aorta  What causes aortic stenosis? Calcium buildup  can happen as you age  Calcium builds up on the aortic valve walls  The buildup stiffens and thickens the valve  Congenital heart defect  means you were born with a heart problem  Over time, the problem may lead to narrowing or blocking of your aortic valve  Rheumatic fever  can develop after you have a strep throat infection  Rheumatic fever causes your heart valves to thicken with scars  What are the signs and symptoms of aortic stenosis? Chest pain or tightness    Fast, jumpy, or fluttery heartbeat    Shortness of breath during activity or when you lie down    Severe tiredness    Dizziness or feeling faint    How is aortic stenosis diagnosed? Your healthcare provider will ask about your signs and symptoms and listen to your heart  He or she will ask if you have had strep throat or rheumatic fever  Tell your provider if you have a family history of heart disease  You may also need any of the following tests:  Blood tests  may be used to check for an infection or other cause of your aortic stenosis  An echocardiogram  is a type of ultrasound  It is used to show problems with your aortic valve and how blood flows through your heart  It may also show how well your heart is pumping  You may need a transthoracic or transesophageal echocardiogram  Ask your healthcare provider about these types of echocardiogram     A chest x-ray  shows the size of your heart  It may also show if fluid is around your heart and lungs  An EKG  test records the electrical activity of your heart  It is used to check for abnormal heart rhythm caused by aortic stenosis      A stress test  helps healthcare providers see how well your aortic valve works under stress  Healthcare providers may place stress on your aortic valve with exercise or medicine  Cardiac catheterization  is a procedure to check how well your heart is pumping blood  It is also used to measure pressure in different parts of your heart  A catheter (long thin tube) is inserted into your arm, neck, or groin and moved into your heart  An x-ray may be used to guide the tube to the right place  Contrast liquid may be used to help your heart show up better in the pictures  Tell the healthcare provider if you have ever had an allergic reaction to contrast liquid  How is aortic stenosis treated? Medicines  may help decrease your cholesterol levels and your blood pressure  You may also be given medicine to help lower swelling  Valve replacement  is the main treatment for aortic stenosis  It is a surgery to remove part or all of your aortic valve  A new valve is then secured in place  The new valve may be from a donor (another person or animal), or may be an artificial valve  Balloon valvuloplasty  helps widen your aortic valve and allow blood to flow through easier  It is also called a closed valvotomy  How can I manage aortic stenosis? Limit activities  Your healthcare provider may have you limit strenuous activity  Strenuous activity will make your heart work too hard  Ask your healthcare provider what activities are safe for you to do  Take your medicines as directed  You may need medicines to lower your blood pressure  You may also need medicine to help your heart's rhythm  Your healthcare provider will prescribe the medicine that is right for you  Eat heart-healthy foods  Heart-healthy foods include salmon, tuna, walnuts, whole-grain breads, low-fat dairy products, beans, and oils such as olive or canola oil  A dietitian or your provider can give you more information on meal plans such as the DASH (Dietary Approaches to Stop Hypertension) eating plan  The DASH plan is low in sodium, processed sugar, unhealthy fats, and total fat  It is high in potassium, calcium, and fiber  These can be found in vegetables, fruit, and whole-grain foods  Limit sodium (salt) as directed  Too much sodium can affect your fluid balance  Check labels to find low-sodium or no-salt-added foods  You can also make small changes to get less salt  For example, if you add salt while you cook, do not add more salt at the table  Ask your healthcare provider or dietitian for more ways to cut down on salt  Limit or do not drink alcohol  Ask your healthcare provider if it is okay for you to drink alcohol  Alcohol can increase your risk for high blood pressure and coronary artery disease  Your provider can tell you how many drinks are okay to have within 24 hours or within 1 week  A drink of alcohol is 12 ounces of beer, 5 ounces of wine, or 1½ ounces of liquor  Maintain a healthy weight  Being overweight can increase your risk for high blood pressure and coronary artery disease  These conditions can make your symptoms worse  Ask your healthcare provider what a healthy weight is for you  Ask him or her to help you create a weight loss plan if you are overweight  Talk to your healthcare provider about pregnancy  If you are a woman and want to get pregnant, talk to your healthcare provider  You and your baby may need to be monitored by specialists during your pregnancy  Ask about vaccines you may need  Certain diseases are dangerous for a person who has aortic stenosis  Vaccines help prevent infections that can cause some diseases  Get a flu vaccine as soon as recommended each year, usually in September or October  Your healthcare provider can tell you if you also need other vaccines, and when to get them  How can I prevent aortic stenosis? Manage other health conditions  High blood pressure and high cholesterol levels increase your risk for aortic stenosis   Ask your healthcare provider for more information on managing these conditions  Get treatment for strep throat  If strep throat is not treated, it can cause rheumatic fever  Take care of your teeth and gums  Gingivitis, a gum disease, increases your risk for aortic stenosis  See your dental provider regularly to treat problems early  Call your local emergency number (911 in the [address] [facility]) or have someone call if:   You have any of the following signs of a heart attack:      Squeezing, pressure, or pain in your chest    You may  also have any of the following:     Discomfort or pain in your back, neck, jaw, stomach, or arm    Shortness of breath    Nausea or vomiting    Lightheadedness or a sudden cold sweat    You have any of the following signs of a stroke:      Numbness or drooping on one side of your face     Weakness in an arm or leg    Confusion or difficulty speaking    Dizziness, a severe headache, or vision loss    When should I seek immediate care? You have chest pain when you move around  It goes away when you are still  You have increasing shortness of breath  You faint  When should I call my doctor? The veins in your neck look swollen or are bulging  You have increased swelling in your legs or ankles  Your heart beats faster than usual     You feel your heart flutter often  You have questions or concerns about your condition or care  CARE AGREEMENT:   You have the right to help plan your care  Learn about your health condition and how it may be treated  Discuss treatment options with your healthcare providers to decide what care you want to receive  You always have the right to refuse treatment  The above information is an  only  It is not intended as medical advice for individual conditions or treatments  Talk to your doctor, nurse or pharmacist before following any medical regimen to see if it is safe and effective for you    © Copyright [employer] 2022 Information is for End User's use only and may not be sold, redistributed or otherwise used for commercial purposes

## 2023-05-11 NOTE — H&P (VIEW-ONLY)
Subjective:        Patient ID: Veronica Valle is a 60 y o  male  Chief Complaint:  Blanca Amos recently signed out AMA  Clearly had respiratory distress/failure, with hemoptysis and chest x-ray findings possible pulmonary edema  Echo showed significant progression of aortic stenosis mean gradient now over 45  Has quite a bit of dyspnea on exertion, remains morbidly obese, has severe COPD  No chest pains  No palpitations presyncope or syncope  Lengthy discussion with patient today about his risk of traditional surgery  The following portions of the patient's history were reviewed and updated as appropriate: allergies, current medications, past family history, past medical history, past social history, past surgical history and problem list   Review of Systems   Constitutional: Negative for chills, diaphoresis, malaise/fatigue and weight gain  HENT: Negative for nosebleeds and stridor  Eyes: Negative for double vision, vision loss in left eye, vision loss in right eye and visual disturbance  Cardiovascular: Positive for dyspnea on exertion  Negative for chest pain, claudication, cyanosis, irregular heartbeat, leg swelling, near-syncope, orthopnea, palpitations, paroxysmal nocturnal dyspnea and syncope  Respiratory: Positive for shortness of breath  Negative for cough, snoring and wheezing  Endocrine: Negative for polydipsia, polyphagia and polyuria  Hematologic/Lymphatic: Negative for bleeding problem  Does not bruise/bleed easily  Skin: Negative for flushing and rash  Musculoskeletal: Negative for falls and myalgias  Gastrointestinal: Negative for abdominal pain, heartburn, hematemesis, hematochezia, melena and nausea  Genitourinary: Negative for hematuria  Neurological: Negative for brief paralysis, dizziness, focal weakness, headaches, light-headedness, loss of balance and vertigo  Psychiatric/Behavioral: Negative for altered mental status and substance abuse  "  Allergic/Immunologic: Negative for hives  Objective:      /60 (BP Location: Left arm, Patient Position: Sitting)   Pulse 84   Ht 5' 9\" (1 753 m)   Wt (!) 160 kg (353 lb)   BMI 52 13 kg/m²   Physical Exam  Constitutional:       General: He is not in acute distress  Appearance: He is well-developed  He is not diaphoretic  HENT:      Head: Normocephalic and atraumatic  Eyes:      General: No scleral icterus  Pupils: Pupils are equal, round, and reactive to light  Neck:      Thyroid: No thyromegaly  Vascular: No carotid bruit or JVD  Cardiovascular:      Rate and Rhythm: Normal rate and regular rhythm  Heart sounds: Murmur heard  No friction rub  No gallop  Pulmonary:      Effort: Pulmonary effort is normal  No respiratory distress  Breath sounds: Normal breath sounds  No stridor  No wheezing or rales  Abdominal:      General: Bowel sounds are normal  There is no distension  Palpations: Abdomen is soft  There is no mass  Tenderness: There is no abdominal tenderness  Musculoskeletal:      Cervical back: Normal range of motion and neck supple  Right lower leg: No edema  Left lower leg: No edema  Skin:     General: Skin is warm and dry  Coloration: Skin is not pale  Findings: No erythema  Neurological:      Mental Status: He is alert and oriented to person, place, and time        Coordination: Coordination normal    Psychiatric:         Mood and Affect: Mood normal          Behavior: Behavior normal          Lab Review:   Hospital Outpatient Visit on 05/01/2023   Component Date Value   • AV area peak brad 05/01/2023 0 7    • LA size 05/01/2023 4 1    • Aortic valve mean veloci* 05/01/2023 32 40    • Triscuspid Valve Regurgi* 05/01/2023 6 0    • Tricuspid valve peak reg* 05/01/2023 1 26    • Left Atrium Area-systoli* 05/01/2023 29 4    • Left Atrium Area-systoli* 05/01/2023 23 1    • MV E' Tissue Velocity Se* 05/01/2023 6    • MV " E' Tissue Velocity La* 05/01/2023 9    • TR Peak Jorge A 05/01/2023 1 3    • LA length (A2C) 05/01/2023 5 70    • Ao root 05/01/2023 3 20    • RVID d 05/01/2023 3 3    • LVOT mn grad 05/01/2023 2 0    • AV area by cont VTI 05/01/2023 0 6    • AV mean gradient 05/01/2023 46    • AV LVOT peak gradient 05/01/2023 4    • MV valve area p 1/2 meth* 05/01/2023 2 37    • E wave deceleration time 05/01/2023 321    • LVOT diameter 05/01/2023 2 0    • LVOT peak jorge a 05/01/2023 0 96    • LVOT peak VTI 05/01/2023 17 99    • Aortic valve peak veloci* 05/01/2023 4 62    • Ao VTI 05/01/2023 99 82    • LVOT stroke volume 05/01/2023 56 49    • AV peak gradient 05/01/2023 85    • MV Peak E Jorge A 05/01/2023 75    • MV Peak A Jorge A 05/01/2023 0 75    • RAA A4C 05/01/2023 16 4    • MV stenosis pressure 1/2* 05/01/2023 93    • LVOT stroke volume index 05/01/2023 22 00    • LVOT area 05/01/2023 3 14    • DVI 05/01/2023 0 21    • AV valve area 05/01/2023 0 57    • LV EF 05/01/2023 65    Admission on 04/26/2023, Discharged on 04/26/2023   Component Date Value   • Ventricular Rate 04/26/2023 84    • Atrial Rate 04/26/2023 84    • AL Interval 04/26/2023 158    • QRSD Interval 04/26/2023 90    • QT Interval 04/26/2023 356    • QTC Interval 04/26/2023 420    • P Axis 04/26/2023 56    • QRS Axis 04/26/2023 8    • T Wave Axis 04/26/2023 120    • WBC 04/26/2023 17 41 (H)    • RBC 04/26/2023 5 19    • Hemoglobin 04/26/2023 14 6    • Hematocrit 04/26/2023 45 1    • MCV 04/26/2023 87    • MCH 04/26/2023 28 1    • MCHC 04/26/2023 32 4    • RDW 04/26/2023 14 5    • MPV 04/26/2023 9 5    • Platelets 69/78/7408 320    • nRBC 04/26/2023 0    • Neutrophils Relative 04/26/2023 85 (H)    • Immat GRANS % 04/26/2023 1    • Lymphocytes Relative 04/26/2023 6 (L)    • Monocytes Relative 04/26/2023 8    • Eosinophils Relative 04/26/2023 0    • Basophils Relative 04/26/2023 0    • Neutrophils Absolute 04/26/2023 14 85 (H)    • Immature Grans Absolute 04/26/2023 0 12    • Lymphocytes Absolute 04/26/2023 1 01    • Monocytes Absolute 04/26/2023 1 31 (H)    • Eosinophils Absolute 04/26/2023 0 06    • Basophils Absolute 04/26/2023 0 06    • Sodium 04/26/2023 136    • Potassium 04/26/2023 3 9    • Chloride 04/26/2023 96    • CO2 04/26/2023 29    • ANION GAP 04/26/2023 11    • BUN 04/26/2023 28 (H)    • Creatinine 04/26/2023 1 16    • Glucose 04/26/2023 176 (H)    • Calcium 04/26/2023 9 4    • AST 04/26/2023 10 (L)    • ALT 04/26/2023 20    • Alkaline Phosphatase 04/26/2023 66    • Total Protein 04/26/2023 6 7    • Albumin 04/26/2023 3 9    • Total Bilirubin 04/26/2023 0 85    • eGFR 04/26/2023 69    • LACTIC ACID 04/26/2023 2 4 (HH)    • Procalcitonin 04/26/2023 0 28 (H)    • Protime 04/26/2023 14 0    • INR 04/26/2023 1 06    • PTT 04/26/2023 31    • Blood Culture 04/26/2023 No Growth After 5 Days  • Blood Culture 04/26/2023 No Growth After 5 Days      • hs TnI 0hr 04/26/2023 7    • Magnesium 04/26/2023 2 1    • D-Dimer, Quant 04/26/2023 0 31    • LACTIC ACID 04/26/2023 0 8    • hs TnI 2hr 04/26/2023 5    • Delta 2hr hsTnI 04/26/2023 -2    Appointment on 03/21/2023   Component Date Value   • WBC 03/21/2023 8 71    • RBC 03/21/2023 5 26    • Hemoglobin 03/21/2023 14 7    • Hematocrit 03/21/2023 45 7    • MCV 03/21/2023 87    • MCH 03/21/2023 27 9    • MCHC 03/21/2023 32 2    • RDW 03/21/2023 15 0    • MPV 03/21/2023 10 0    • Platelets 28/33/3942 318    • nRBC 03/21/2023 0    • Neutrophils Relative 03/21/2023 76 (H)    • Immat GRANS % 03/21/2023 1    • Lymphocytes Relative 03/21/2023 12 (L)    • Monocytes Relative 03/21/2023 7    • Eosinophils Relative 03/21/2023 3    • Basophils Relative 03/21/2023 1    • Neutrophils Absolute 03/21/2023 6 66    • Immature Grans Absolute 03/21/2023 0 09    • Lymphocytes Absolute 03/21/2023 1 04    • Monocytes Absolute 03/21/2023 0 61    • Eosinophils Absolute 03/21/2023 0 25    • Basophils Absolute 03/21/2023 0 06    • CRP 03/21/2023 9 0 (H)    • Vitamin B-12 03/21/2023 1,419 (H)    • Iron Saturation 03/21/2023 23    • TIBC 03/21/2023 256    • Iron 03/21/2023 58 (L)    • Ferritin 03/21/2023 254      XR chest portable    Result Date: 4/27/2023  Narrative: CHEST INDICATION:   r/o pna  COMPARISON: 1/2/2023 EXAM PERFORMED/VIEWS:  XR CHEST PORTABLE 2 views FINDINGS: Cardiomediastinal silhouette appears unremarkable  Right lower lobe infiltrate  No pneumothorax or pleural effusion  Osseous structures appear within normal limits for patient age  Impression: Development of right lower lobe infiltrate Workstation performed: [id number]     CT chest without contrast    Result Date: 4/26/2023  Narrative: CT CHEST WITHOUT IV CONTRAST INDICATION:   Pneumonia, effusion or abscess suspected, xray done r/o pna  Shortness of breath, wheezing, asthma, coughing up blood  COMPARISON: CXR from today, chest CT 3/20/2023 and 10/20/2020  Lynell Kawasaki TECHNIQUE: Chest CT without intravenous contrast   Axial, sagittal, coronal 2D reformats and coronal MIPS from source data  Radiation dose length product (DLP):  1205 38 mGy-cm   Radiation dose exposure minimized using iterative reconstruction and automated exposure control  FINDINGS: LUNGS: Extensive consolidation and groundglass opacity involving the right lower lobe, increased since 3/20/2023  Benign calcified granulomas  AIRWAYS: No significant filling defects  PLEURA:  Unremarkable  HEART/GREAT VESSELS: Normal heart size  Mild coronary artery calcification indicating atherosclerotic heart disease  Moderate calcification of the aortic valve leaflets which can contribute to aortic stenosis  Stable trace pericardial effusion  Stable left pulmonary artery enlargement since October 2020  MEDIASTINUM AND TESSY: Stable slightly enlarged subcarinal nodes since 2020  Stable paratracheal and prevascular nodes which are slightly increased in number but have fatty tessy  CHEST WALL AND LOWER NECK: Unremarkable  UPPER ABDOMEN: Hepatic steatosis   OSSEOUS STRUCTURES: Mild degenerative disease in the spine  Impression: Extensive consolidation and groundglass opacity involving the entire right lower lobe, increased from 3/20/2023  This could be due to pneumonia, but given hemoptysis, pulmonary hemorrhage is possible  Recommend follow-up with a chest CT in 3 months to assure resolution  This study was marked in Epic for immediate notification and follow-up  Workstation performed: [id number]     Echo complete w/ contrast if indicated    Result Date: 5/1/2023  Narrative: •  Technically dificult study  Limited information obtained  •  Left Ventricle: Left ventricular cavity size is normal  Wall thickness is increased  There is mild concentric hypertrophy  The left ventricular ejection fraction is 65%  Systolic function is normal  Although no diagnostic regional wall motion abnormality was identified, this possibility cannot be completely excluded on the basis of this study  •  Aortic Valve: The aortic valve was not well visualized  There is severe stenosis Peak gradient 85 mmHg, mean gradient 46 mmHg  Assessment:       1  Nonrheumatic aortic valve stenosis  Case Request Cath Lab: Cardiac catheterization    Case Request Cath Lab: Cardiac catheterization    Ambulatory referral to Cardiovascular Surgery      2  Acute on chronic diastolic heart failure ([address] )        3  SCOTT (obstructive sleep apnea)        4  Essential hypertension        5  COPD, severe ([address] )        6  Class 3 severe obesity due to excess calories with serious comorbidity and body mass index (BMI) of 50 0 to 59 9 in adult ([address] )        7  Dyslipidemia associated with type 2 diabetes mellitus ([address] )             Plan:       I believe he has symptomatic severe aortic stenosis likely recently had acute pulmonary edema with his episode of hemoptysis recently  Peak gradient 85, mean 46  Complicating the issue is his morbid obesity, sleep apnea, and advanced COPD    This puts him at very high risk for traditional open AVR/surgery  Lengthily discussion had regarding options and timing of aortic valve surgery which I recommend in the near term, TAVR may be an option  First we will proceed with left heart catheterization and then I will order a CT surgical evaluation to get their opinion after his coronary anatomy is defined  Evans Mcgill is in agreement  He does not think he will want open AVR surgery and is very interested in TAVR, aware of limited longevity of bioprosthetic valves which was discussed today  Happy to hear however that these do not require long-term anticoagulation  He asked if he thinks he needs another round of steroids, he is not wheezing on my exam today, so I will defer this to pulmonary medicine  He is relatively euvolemic, no changes in diuretic recommended today  He is not having any angina, fortunately EF preserved at 65%  I will see him back in about a month or so after the aforementioned testing

## 2023-05-11 NOTE — PROGRESS NOTES
Subjective:        Patient ID: Krama Silverio is a 60 y o  male  Chief Complaint:  Leslie Hardin recently signed out AMA  Clearly had respiratory distress/failure, with hemoptysis and chest x-ray findings possible pulmonary edema  Echo showed significant progression of aortic stenosis mean gradient now over 45  Has quite a bit of dyspnea on exertion, remains morbidly obese, has severe COPD  No chest pains  No palpitations presyncope or syncope  Lengthy discussion with patient today about his risk of traditional surgery  The following portions of the patient's history were reviewed and updated as appropriate: allergies, current medications, past family history, past medical history, past social history, past surgical history and problem list   Review of Systems   Constitutional: Negative for chills, diaphoresis, malaise/fatigue and weight gain  HENT: Negative for nosebleeds and stridor  Eyes: Negative for double vision, vision loss in left eye, vision loss in right eye and visual disturbance  Cardiovascular: Positive for dyspnea on exertion  Negative for chest pain, claudication, cyanosis, irregular heartbeat, leg swelling, near-syncope, orthopnea, palpitations, paroxysmal nocturnal dyspnea and syncope  Respiratory: Positive for shortness of breath  Negative for cough, snoring and wheezing  Endocrine: Negative for polydipsia, polyphagia and polyuria  Hematologic/Lymphatic: Negative for bleeding problem  Does not bruise/bleed easily  Skin: Negative for flushing and rash  Musculoskeletal: Negative for falls and myalgias  Gastrointestinal: Negative for abdominal pain, heartburn, hematemesis, hematochezia, melena and nausea  Genitourinary: Negative for hematuria  Neurological: Negative for brief paralysis, dizziness, focal weakness, headaches, light-headedness, loss of balance and vertigo  Psychiatric/Behavioral: Negative for altered mental status and substance abuse  "  Allergic/Immunologic: Negative for hives  Objective:      /60 (BP Location: Left arm, Patient Position: Sitting)   Pulse 84   Ht 5' 9\" (1 753 m)   Wt (!) 160 kg (353 lb)   BMI 52 13 kg/m²   Physical Exam  Constitutional:       General: He is not in acute distress  Appearance: He is well-developed  He is not diaphoretic  HENT:      Head: Normocephalic and atraumatic  Eyes:      General: No scleral icterus  Pupils: Pupils are equal, round, and reactive to light  Neck:      Thyroid: No thyromegaly  Vascular: No carotid bruit or JVD  Cardiovascular:      Rate and Rhythm: Normal rate and regular rhythm  Heart sounds: Murmur heard  No friction rub  No gallop  Pulmonary:      Effort: Pulmonary effort is normal  No respiratory distress  Breath sounds: Normal breath sounds  No stridor  No wheezing or rales  Abdominal:      General: Bowel sounds are normal  There is no distension  Palpations: Abdomen is soft  There is no mass  Tenderness: There is no abdominal tenderness  Musculoskeletal:      Cervical back: Normal range of motion and neck supple  Right lower leg: No edema  Left lower leg: No edema  Skin:     General: Skin is warm and dry  Coloration: Skin is not pale  Findings: No erythema  Neurological:      Mental Status: He is alert and oriented to person, place, and time        Coordination: Coordination normal    Psychiatric:         Mood and Affect: Mood normal          Behavior: Behavior normal          Lab Review:   Hospital Outpatient Visit on 05/01/2023   Component Date Value   • AV area peak brad 05/01/2023 0 7    • LA size 05/01/2023 4 1    • Aortic valve mean veloci* 05/01/2023 32 40    • Triscuspid Valve Regurgi* 05/01/2023 6 0    • Tricuspid valve peak reg* 05/01/2023 1 26    • Left Atrium Area-systoli* 05/01/2023 29 4    • Left Atrium Area-systoli* 05/01/2023 23 1    • MV E' Tissue Velocity Se* 05/01/2023 6    • MV " E' Tissue Velocity La* 05/01/2023 9    • TR Peak Jorge A 05/01/2023 1 3    • LA length (A2C) 05/01/2023 5 70    • Ao root 05/01/2023 3 20    • RVID d 05/01/2023 3 3    • LVOT mn grad 05/01/2023 2 0    • AV area by cont VTI 05/01/2023 0 6    • AV mean gradient 05/01/2023 46    • AV LVOT peak gradient 05/01/2023 4    • MV valve area p 1/2 meth* 05/01/2023 2 37    • E wave deceleration time 05/01/2023 321    • LVOT diameter 05/01/2023 2 0    • LVOT peak jorge a 05/01/2023 0 96    • LVOT peak VTI 05/01/2023 17 99    • Aortic valve peak veloci* 05/01/2023 4 62    • Ao VTI 05/01/2023 99 82    • LVOT stroke volume 05/01/2023 56 49    • AV peak gradient 05/01/2023 85    • MV Peak E Jorge A 05/01/2023 75    • MV Peak A Jorge A 05/01/2023 0 75    • RAA A4C 05/01/2023 16 4    • MV stenosis pressure 1/2* 05/01/2023 93    • LVOT stroke volume index 05/01/2023 22 00    • LVOT area 05/01/2023 3 14    • DVI 05/01/2023 0 21    • AV valve area 05/01/2023 0 57    • LV EF 05/01/2023 65    Admission on 04/26/2023, Discharged on 04/26/2023   Component Date Value   • Ventricular Rate 04/26/2023 84    • Atrial Rate 04/26/2023 84    • SD Interval 04/26/2023 158    • QRSD Interval 04/26/2023 90    • QT Interval 04/26/2023 356    • QTC Interval 04/26/2023 420    • P Axis 04/26/2023 56    • QRS Axis 04/26/2023 8    • T Wave Axis 04/26/2023 120    • WBC 04/26/2023 17 41 (H)    • RBC 04/26/2023 5 19    • Hemoglobin 04/26/2023 14 6    • Hematocrit 04/26/2023 45 1    • MCV 04/26/2023 87    • MCH 04/26/2023 28 1    • MCHC 04/26/2023 32 4    • RDW 04/26/2023 14 5    • MPV 04/26/2023 9 5    • Platelets 43/55/6155 320    • nRBC 04/26/2023 0    • Neutrophils Relative 04/26/2023 85 (H)    • Immat GRANS % 04/26/2023 1    • Lymphocytes Relative 04/26/2023 6 (L)    • Monocytes Relative 04/26/2023 8    • Eosinophils Relative 04/26/2023 0    • Basophils Relative 04/26/2023 0    • Neutrophils Absolute 04/26/2023 14 85 (H)    • Immature Grans Absolute 04/26/2023 0 12    • Lymphocytes Absolute 04/26/2023 1 01    • Monocytes Absolute 04/26/2023 1 31 (H)    • Eosinophils Absolute 04/26/2023 0 06    • Basophils Absolute 04/26/2023 0 06    • Sodium 04/26/2023 136    • Potassium 04/26/2023 3 9    • Chloride 04/26/2023 96    • CO2 04/26/2023 29    • ANION GAP 04/26/2023 11    • BUN 04/26/2023 28 (H)    • Creatinine 04/26/2023 1 16    • Glucose 04/26/2023 176 (H)    • Calcium 04/26/2023 9 4    • AST 04/26/2023 10 (L)    • ALT 04/26/2023 20    • Alkaline Phosphatase 04/26/2023 66    • Total Protein 04/26/2023 6 7    • Albumin 04/26/2023 3 9    • Total Bilirubin 04/26/2023 0 85    • eGFR 04/26/2023 69    • LACTIC ACID 04/26/2023 2 4 (HH)    • Procalcitonin 04/26/2023 0 28 (H)    • Protime 04/26/2023 14 0    • INR 04/26/2023 1 06    • PTT 04/26/2023 31    • Blood Culture 04/26/2023 No Growth After 5 Days  • Blood Culture 04/26/2023 No Growth After 5 Days      • hs TnI 0hr 04/26/2023 7    • Magnesium 04/26/2023 2 1    • D-Dimer, Quant 04/26/2023 0 31    • LACTIC ACID 04/26/2023 0 8    • hs TnI 2hr 04/26/2023 5    • Delta 2hr hsTnI 04/26/2023 -2    Appointment on 03/21/2023   Component Date Value   • WBC 03/21/2023 8 71    • RBC 03/21/2023 5 26    • Hemoglobin 03/21/2023 14 7    • Hematocrit 03/21/2023 45 7    • MCV 03/21/2023 87    • MCH 03/21/2023 27 9    • MCHC 03/21/2023 32 2    • RDW 03/21/2023 15 0    • MPV 03/21/2023 10 0    • Platelets 91/62/7478 318    • nRBC 03/21/2023 0    • Neutrophils Relative 03/21/2023 76 (H)    • Immat GRANS % 03/21/2023 1    • Lymphocytes Relative 03/21/2023 12 (L)    • Monocytes Relative 03/21/2023 7    • Eosinophils Relative 03/21/2023 3    • Basophils Relative 03/21/2023 1    • Neutrophils Absolute 03/21/2023 6 66    • Immature Grans Absolute 03/21/2023 0 09    • Lymphocytes Absolute 03/21/2023 1 04    • Monocytes Absolute 03/21/2023 0 61    • Eosinophils Absolute 03/21/2023 0 25    • Basophils Absolute 03/21/2023 0 06    • CRP 03/21/2023 9 0 (H)    • Vitamin B-12 03/21/2023 1,419 (H)    • Iron Saturation 03/21/2023 23    • TIBC 03/21/2023 256    • Iron 03/21/2023 58 (L)    • Ferritin 03/21/2023 254      XR chest portable    Result Date: 4/27/2023  Narrative: CHEST INDICATION:   r/o pna  COMPARISON: 1/2/2023 EXAM PERFORMED/VIEWS:  XR CHEST PORTABLE 2 views FINDINGS: Cardiomediastinal silhouette appears unremarkable  Right lower lobe infiltrate  No pneumothorax or pleural effusion  Osseous structures appear within normal limits for patient age  Impression: Development of right lower lobe infiltrate Workstation performed: [id number]     CT chest without contrast    Result Date: 4/26/2023  Narrative: CT CHEST WITHOUT IV CONTRAST INDICATION:   Pneumonia, effusion or abscess suspected, xray done r/o pna  Shortness of breath, wheezing, asthma, coughing up blood  COMPARISON: CXR from today, chest CT 3/20/2023 and 10/20/2020  Leonor Boot TECHNIQUE: Chest CT without intravenous contrast   Axial, sagittal, coronal 2D reformats and coronal MIPS from source data  Radiation dose length product (DLP):  1205 38 mGy-cm   Radiation dose exposure minimized using iterative reconstruction and automated exposure control  FINDINGS: LUNGS: Extensive consolidation and groundglass opacity involving the right lower lobe, increased since 3/20/2023  Benign calcified granulomas  AIRWAYS: No significant filling defects  PLEURA:  Unremarkable  HEART/GREAT VESSELS: Normal heart size  Mild coronary artery calcification indicating atherosclerotic heart disease  Moderate calcification of the aortic valve leaflets which can contribute to aortic stenosis  Stable trace pericardial effusion  Stable left pulmonary artery enlargement since October 2020  MEDIASTINUM AND TESSY: Stable slightly enlarged subcarinal nodes since 2020  Stable paratracheal and prevascular nodes which are slightly increased in number but have fatty tessy  CHEST WALL AND LOWER NECK: Unremarkable  UPPER ABDOMEN: Hepatic steatosis   OSSEOUS STRUCTURES: Mild degenerative disease in the spine  Impression: Extensive consolidation and groundglass opacity involving the entire right lower lobe, increased from 3/20/2023  This could be due to pneumonia, but given hemoptysis, pulmonary hemorrhage is possible  Recommend follow-up with a chest CT in 3 months to assure resolution  This study was marked in Epic for immediate notification and follow-up  Workstation performed: [id number]     Echo complete w/ contrast if indicated    Result Date: 5/1/2023  Narrative: •  Technically dificult study  Limited information obtained  •  Left Ventricle: Left ventricular cavity size is normal  Wall thickness is increased  There is mild concentric hypertrophy  The left ventricular ejection fraction is 65%  Systolic function is normal  Although no diagnostic regional wall motion abnormality was identified, this possibility cannot be completely excluded on the basis of this study  •  Aortic Valve: The aortic valve was not well visualized  There is severe stenosis Peak gradient 85 mmHg, mean gradient 46 mmHg  Assessment:       1  Nonrheumatic aortic valve stenosis  Case Request Cath Lab: Cardiac catheterization    Case Request Cath Lab: Cardiac catheterization    Ambulatory referral to Cardiovascular Surgery      2  Acute on chronic diastolic heart failure ([address] )        3  SCOTT (obstructive sleep apnea)        4  Essential hypertension        5  COPD, severe ([address] )        6  Class 3 severe obesity due to excess calories with serious comorbidity and body mass index (BMI) of 50 0 to 59 9 in adult ([address] )        7  Dyslipidemia associated with type 2 diabetes mellitus ([address] )             Plan:       I believe he has symptomatic severe aortic stenosis likely recently had acute pulmonary edema with his episode of hemoptysis recently  Peak gradient 85, mean 46  Complicating the issue is his morbid obesity, sleep apnea, and advanced COPD    This puts him at very high risk for traditional open AVR/surgery  Lengthily discussion had regarding options and timing of aortic valve surgery which I recommend in the near term, TAVR may be an option  First we will proceed with left heart catheterization and then I will order a CT surgical evaluation to get their opinion after his coronary anatomy is defined  Polly Ramey is in agreement  He does not think he will want open AVR surgery and is very interested in TAVR, aware of limited longevity of bioprosthetic valves which was discussed today  Happy to hear however that these do not require long-term anticoagulation  He asked if he thinks he needs another round of steroids, he is not wheezing on my exam today, so I will defer this to pulmonary medicine  He is relatively euvolemic, no changes in diuretic recommended today  He is not having any angina, fortunately EF preserved at 65%  I will see him back in about a month or so after the aforementioned testing

## 2023-05-11 NOTE — TELEPHONE ENCOUNTER
Patient scheduled for C on 5/25/23 at [facility] with Dr Aliya Francis  Instructions sent to patient through [address]  Medication hold Ozempic, Levemir, Glimepiride, Metformin, Farxiga, Lasix, and Spironolactone  Can I have auth?

## 2023-05-17 NOTE — TELEPHONE ENCOUNTER
[facility] 5/25/2023 @ [facility] is Approved thru Lisa Roman [id number] valid 5/17/23 thru 11/13/23

## 2023-05-22 ENCOUNTER — TELEPHONE (OUTPATIENT)
Dept: PULMONOLOGY | Facility: CLINIC | Age: 58
End: 2023-05-22

## 2023-05-22 NOTE — TELEPHONE ENCOUNTER
Patient called  JOEL, he's having craterization on Thursday 5/25/2023 and the Aortic Valve Replacement on 05/31/2023

## 2023-05-24 ENCOUNTER — TELEPHONE (OUTPATIENT)
Dept: CARDIOLOGY CLINIC | Facility: CLINIC | Age: 58
End: 2023-05-24

## 2023-05-24 ENCOUNTER — APPOINTMENT (OUTPATIENT)
Dept: LAB | Facility: MEDICAL CENTER | Age: 58
End: 2023-05-24

## 2023-05-24 LAB
ALBUMIN SERPL BCP-MCNC: 4.1 G/DL (ref 3.5–5)
ALP SERPL-CCNC: 87 U/L (ref 46–116)
ALT SERPL W P-5'-P-CCNC: 40 U/L (ref 12–78)
ANION GAP SERPL CALCULATED.3IONS-SCNC: 6 MMOL/L (ref 4–13)
AST SERPL W P-5'-P-CCNC: 17 U/L (ref 5–45)
BASOPHILS # BLD AUTO: 0.04 THOUSANDS/ÂΜL (ref 0–0.1)
BASOPHILS NFR BLD AUTO: 0 % (ref 0–1)
BILIRUB SERPL-MCNC: 0.64 MG/DL (ref 0.2–1)
BUN SERPL-MCNC: 39 MG/DL (ref 5–25)
CALCIUM SERPL-MCNC: 9.6 MG/DL (ref 8.3–10.1)
CHLORIDE SERPL-SCNC: 102 MMOL/L (ref 96–108)
CO2 SERPL-SCNC: 25 MMOL/L (ref 21–32)
CREAT SERPL-MCNC: 1.39 MG/DL (ref 0.6–1.3)
EOSINOPHIL # BLD AUTO: 0.26 THOUSAND/ÂΜL (ref 0–0.61)
EOSINOPHIL NFR BLD AUTO: 3 % (ref 0–6)
ERYTHROCYTE [DISTWIDTH] IN BLOOD BY AUTOMATED COUNT: 14.4 % (ref 11.6–15.1)
GFR SERPL CREATININE-BSD FRML MDRD: 55 ML/MIN/1.73SQ M
GLUCOSE SERPL-MCNC: 255 MG/DL (ref 65–140)
HCT VFR BLD AUTO: 44.4 % (ref 36.5–49.3)
HGB BLD-MCNC: 14.3 G/DL (ref 12–17)
IMM GRANULOCYTES # BLD AUTO: 0.07 THOUSAND/UL (ref 0–0.2)
IMM GRANULOCYTES NFR BLD AUTO: 1 % (ref 0–2)
LYMPHOCYTES # BLD AUTO: 1.03 THOUSANDS/ÂΜL (ref 0.6–4.47)
LYMPHOCYTES NFR BLD AUTO: 10 % (ref 14–44)
MCH RBC QN AUTO: 27.8 PG (ref 26.8–34.3)
MCHC RBC AUTO-ENTMCNC: 32.2 G/DL (ref 31.4–37.4)
MCV RBC AUTO: 86 FL (ref 82–98)
MONOCYTES # BLD AUTO: 0.67 THOUSAND/ÂΜL (ref 0.17–1.22)
MONOCYTES NFR BLD AUTO: 7 % (ref 4–12)
NEUTROPHILS # BLD AUTO: 7.96 THOUSANDS/ÂΜL (ref 1.85–7.62)
NEUTS SEG NFR BLD AUTO: 79 % (ref 43–75)
NRBC BLD AUTO-RTO: 0 /100 WBCS
PLATELET # BLD AUTO: 295 THOUSANDS/UL (ref 149–390)
PMV BLD AUTO: 10.1 FL (ref 8.9–12.7)
POTASSIUM SERPL-SCNC: 4.3 MMOL/L (ref 3.5–5.3)
PROT SERPL-MCNC: 7.5 G/DL (ref 6.4–8.4)
RBC # BLD AUTO: 5.14 MILLION/UL (ref 3.88–5.62)
SODIUM SERPL-SCNC: 133 MMOL/L (ref 135–147)
WBC # BLD AUTO: 10.03 THOUSAND/UL (ref 4.31–10.16)

## 2023-05-24 NOTE — TELEPHONE ENCOUNTER
I spoke to diallo  He is going to [facility] lab now

## 2023-05-25 ENCOUNTER — HOSPITAL ENCOUNTER (OUTPATIENT)
Facility: HOSPITAL | Age: 58
Setting detail: OUTPATIENT SURGERY
Discharge: HOME | End: 2023-05-25
Attending: INTERNAL MEDICINE | Admitting: INTERNAL MEDICINE

## 2023-05-25 VITALS
HEIGHT: 69 IN | WEIGHT: 315 LBS | HEART RATE: 72 BPM | BODY MASS INDEX: 46.65 KG/M2 | DIASTOLIC BLOOD PRESSURE: 67 MMHG | RESPIRATION RATE: 17 BRPM | TEMPERATURE: 97.5 F | OXYGEN SATURATION: 90 % | SYSTOLIC BLOOD PRESSURE: 104 MMHG

## 2023-05-25 DIAGNOSIS — I35.0 NONRHEUMATIC AORTIC VALVE STENOSIS: ICD-10-CM

## 2023-05-25 LAB
ANION GAP SERPL CALCULATED.3IONS-SCNC: 2 MMOL/L (ref 4–13)
BASOPHILS # BLD AUTO: 0.04 THOUSANDS/ÂΜL (ref 0–0.1)
BASOPHILS NFR BLD AUTO: 1 % (ref 0–1)
BUN SERPL-MCNC: 36 MG/DL (ref 5–25)
CALCIUM SERPL-MCNC: 9.3 MG/DL (ref 8.3–10.1)
CHLORIDE SERPL-SCNC: 104 MMOL/L (ref 96–108)
CO2 SERPL-SCNC: 24 MMOL/L (ref 21–32)
CREAT SERPL-MCNC: 1.09 MG/DL (ref 0.6–1.3)
EOSINOPHIL # BLD AUTO: 0.28 THOUSAND/ÂΜL (ref 0–0.61)
EOSINOPHIL NFR BLD AUTO: 4 % (ref 0–6)
ERYTHROCYTE [DISTWIDTH] IN BLOOD BY AUTOMATED COUNT: 14.5 % (ref 11.6–15.1)
GFR SERPL CREATININE-BSD FRML MDRD: 73 ML/MIN/1.73SQ M
GLUCOSE P FAST SERPL-MCNC: 227 MG/DL (ref 65–99)
GLUCOSE SERPL-MCNC: 227 MG/DL (ref 65–140)
HCT VFR BLD AUTO: 44.8 % (ref 36.5–49.3)
HGB BLD-MCNC: 15.2 G/DL (ref 12–17)
IMM GRANULOCYTES # BLD AUTO: 0.06 THOUSAND/UL (ref 0–0.2)
IMM GRANULOCYTES NFR BLD AUTO: 1 % (ref 0–2)
LYMPHOCYTES # BLD AUTO: 1.02 THOUSANDS/ÂΜL (ref 0.6–4.47)
LYMPHOCYTES NFR BLD AUTO: 14 % (ref 14–44)
MCH RBC QN AUTO: 28.4 PG (ref 26.8–34.3)
MCHC RBC AUTO-ENTMCNC: 33.9 G/DL (ref 31.4–37.4)
MCV RBC AUTO: 84 FL (ref 82–98)
MONOCYTES # BLD AUTO: 0.64 THOUSAND/ÂΜL (ref 0.17–1.22)
MONOCYTES NFR BLD AUTO: 9 % (ref 4–12)
NEUTROPHILS # BLD AUTO: 5.51 THOUSANDS/ÂΜL (ref 1.85–7.62)
NEUTS SEG NFR BLD AUTO: 71 % (ref 43–75)
NRBC BLD AUTO-RTO: 0 /100 WBCS
PLATELET # BLD AUTO: 285 THOUSANDS/UL (ref 149–390)
PMV BLD AUTO: 9.6 FL (ref 8.9–12.7)
POTASSIUM SERPL-SCNC: 4.1 MMOL/L (ref 3.5–5.3)
RBC # BLD AUTO: 5.36 MILLION/UL (ref 3.88–5.62)
SODIUM SERPL-SCNC: 130 MMOL/L (ref 135–147)
WBC # BLD AUTO: 7.55 THOUSAND/UL (ref 4.31–10.16)

## 2023-05-25 RX ORDER — MIDAZOLAM HYDROCHLORIDE 2 MG/2ML
INJECTION, SOLUTION INTRAMUSCULAR; INTRAVENOUS CODE/TRAUMA/SEDATION MEDICATION
Status: DISCONTINUED | OUTPATIENT
Start: 2023-05-25 | End: 2023-05-25 | Stop reason: HOSPADM

## 2023-05-25 RX ORDER — LIDOCAINE HYDROCHLORIDE 10 MG/ML
INJECTION, SOLUTION EPIDURAL; INFILTRATION; INTRACAUDAL; PERINEURAL CODE/TRAUMA/SEDATION MEDICATION
Status: DISCONTINUED | OUTPATIENT
Start: 2023-05-25 | End: 2023-05-25 | Stop reason: HOSPADM

## 2023-05-25 RX ORDER — NITROGLYCERIN 20 MG/100ML
INJECTION INTRAVENOUS CODE/TRAUMA/SEDATION MEDICATION
Status: DISCONTINUED | OUTPATIENT
Start: 2023-05-25 | End: 2023-05-25 | Stop reason: HOSPADM

## 2023-05-25 RX ORDER — SODIUM CHLORIDE 9 MG/ML
100 INJECTION, SOLUTION INTRAVENOUS CONTINUOUS
Status: DISCONTINUED | OUTPATIENT
Start: 2023-05-25 | End: 2023-05-25 | Stop reason: HOSPADM

## 2023-05-25 RX ORDER — SODIUM CHLORIDE 9 MG/ML
125 INJECTION, SOLUTION INTRAVENOUS CONTINUOUS
Status: DISCONTINUED | OUTPATIENT
Start: 2023-05-25 | End: 2023-05-25 | Stop reason: HOSPADM

## 2023-05-25 RX ORDER — ASPIRIN 81 MG/1
324 TABLET, CHEWABLE ORAL ONCE
Status: COMPLETED | OUTPATIENT
Start: 2023-05-25 | End: 2023-05-25

## 2023-05-25 RX ORDER — HEPARIN SODIUM 1000 [USP'U]/ML
INJECTION, SOLUTION INTRAVENOUS; SUBCUTANEOUS CODE/TRAUMA/SEDATION MEDICATION
Status: DISCONTINUED | OUTPATIENT
Start: 2023-05-25 | End: 2023-05-25 | Stop reason: HOSPADM

## 2023-05-25 RX ORDER — FENTANYL CITRATE 50 UG/ML
INJECTION, SOLUTION INTRAMUSCULAR; INTRAVENOUS CODE/TRAUMA/SEDATION MEDICATION
Status: DISCONTINUED | OUTPATIENT
Start: 2023-05-25 | End: 2023-05-25 | Stop reason: HOSPADM

## 2023-05-25 RX ADMIN — SODIUM CHLORIDE 125 ML/HR: 0.9 INJECTION, SOLUTION INTRAVENOUS at 08:46

## 2023-05-25 RX ADMIN — ASPIRIN 81 MG CHEWABLE TABLET 324 MG: 81 TABLET CHEWABLE at 08:46

## 2023-05-25 NOTE — INTERVAL H&P NOTE
H&P reviewed  After examining the patient, I find no changed to the H&P since it had been written  Patient re-evaluated   Accept as history and physical     Anh Amos MD/May 25, 2023/9:58 AM

## 2023-05-25 NOTE — DISCHARGE INSTR - AVS FIRST PAGE
1  Please see the post cardiac catheterization dishcarge instructions  No heavy lifting, greater than 10 lbs  or strenuous  activity for 48 hrs  2 Remove band aid tomorrow  Shower and wash area- wrist gently with soap and water- beginning tomorrow  Rinse and pat dry  Apply new water seal band aid  Repeat this process for 5 days  No powders, creams lotions or antibiotic ointments  for 5 days  No tub baths, hot tubs or swimming for 5 days  3  Please call our office ([phone]) if you have any fever, redness, swelling, discharge from your wrist access site  4 No driving for 5 days    5   Follow up with Cardiothoracic surgery

## 2023-05-25 NOTE — Clinical Note
Post procedure Shari Quevedo is fully awake alert responsive and appropriate, asking quesitons freely, speech clear  Without complaints, readied for transfer    Spoke with Gene Eng MD and plan of care with findings discussed, questions answered

## 2023-05-25 NOTE — Clinical Note
Received by stretcher with central transporter and family to CCL holding area for elective study  Cooperative and pleasant spoke with Pamella Tay MD and informed consent obtained  Without complaints

## 2023-05-26 LAB
ATRIAL RATE: 71 BPM
P AXIS: 74 DEGREES
PR INTERVAL: 160 MS
QRS AXIS: 17 DEGREES
QRSD INTERVAL: 98 MS
QT INTERVAL: 412 MS
QTC INTERVAL: 447 MS
T WAVE AXIS: 28 DEGREES
VENTRICULAR RATE: 71 BPM

## 2023-05-31 ENCOUNTER — OFFICE VISIT (OUTPATIENT)
Dept: CARDIAC SURGERY | Facility: CLINIC | Age: 58
End: 2023-05-31

## 2023-05-31 VITALS
HEART RATE: 89 BPM | OXYGEN SATURATION: 94 % | DIASTOLIC BLOOD PRESSURE: 60 MMHG | BODY MASS INDEX: 49.44 KG/M2 | SYSTOLIC BLOOD PRESSURE: 106 MMHG | HEIGHT: 67 IN | WEIGHT: 315 LBS | TEMPERATURE: 97.9 F

## 2023-05-31 DIAGNOSIS — Z87.891 FORMER SMOKER: ICD-10-CM

## 2023-05-31 DIAGNOSIS — Z13.6 ENCOUNTER FOR SCREENING FOR STENOSIS OF CAROTID ARTERY: ICD-10-CM

## 2023-05-31 DIAGNOSIS — I50.33 ACUTE ON CHRONIC DIASTOLIC HEART FAILURE (HCC): ICD-10-CM

## 2023-05-31 DIAGNOSIS — I35.0 NONRHEUMATIC AORTIC VALVE STENOSIS: Primary | ICD-10-CM

## 2023-05-31 NOTE — PROGRESS NOTES
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, HTN, HLD, SCOTT (BiPAP), morbid obesity (BMI 53), CKD stage 2 who has been following with Dr Patricia Walters for several years  He recently was seen in the [address] [place] [address] ED at the end of April of this year for shortness of breath and hemoptysis  Full work up including CT chest was obtained  CT chest demonstrated significant consolidations and pulmonary edema  Patient was going to be transferred to [facility] for stabilization, however patient signed out AMA due to frustration with process  He followed up with Dr Patricia Walters shortly after, and repeat ECHO obtained on 5/1 demonstrated severe aortic stenosis, which had progressed from the year prior  Dr Patricia Walters suspected that the dyspnea was related to AS, so he initiated TAVR work up with cardiac catheterization and consult to cardiac surgery  Cardiac cath on 5/25 demonstrated no significant obstructive CAD  Upon interview, patient confirms above events  He is accompanied by his wife today  He reports that he has been experiencing extreme fatigue (has been known to fall asleep standing up), dyspnea on exertion and b/l LE edema  Denies PND, orthopnea, dizziness, chest pain  He has been on steroids on and off for the last several years due to his COPD - notes that he needs steroids especially with weather and season changes  He uses both BiPAP and 4L NC O2 at night   He expresses concern for general anesthesia due to history of bronchospasm after having ear surgery about 25-30 years ago  It appears that this is when he found out that he had COPD  He is a former smoker, smoked 3 ppd and quit in   He does not drink or use recreational drugs  Reports that his dad had a valve replacement in his 19's or 30's  Unsure which valve, but states that it was mechanical  No history of rheumatic fever  He drives, does not use an assist device for ambulation  Lives with his wife and daughter  Past Medical History:  Past Medical History:   Diagnosis Date   • Aortic stenosis    • Asthma    • COPD (chronic obstructive pulmonary disease) ([facility] )    • Coronary artery disease     Aortic stenosis   • Diabetes ([facility] )    • Diabetes mellitus ([facility] )    • Hyperlipidemia    • Hypertension 2014   • Pneumonia    • Sleep apnea, obstructive          Past Surgical History:   Past Surgical History:   Procedure Laterality Date   • APPENDECTOMY     • CARDIAC CATHETERIZATION N/A 2023    Procedure: Cardiac Coronary Angiogram;  Surgeon: Alison Olivares MD;  Location: [facility]; Service: Cardiology   • EYE SURGERY     • TONSILLECTOMY  No         Family History:  Family History   Problem Relation Age of Onset   • No Known Problems Mother    • Heart disease Father    • Lung cancer Father         Passed away 0   • Cancer Father         Lung cancer   • Hypertension Father    • Diabetes Maternal Grandfather    • Stomach cancer Maternal Grandfather          Social History:    Social History     Substance and Sexual Activity   Alcohol Use Never     Social History     Substance and Sexual Activity   Drug Use Never     Social History     Tobacco Use   Smoking Status Former   • Packs/day: 3 00   • Years: 30 00   • Total pack years: 90 00   • Types: Cigarettes   • Start date: 3/15/1984   • Quit date: 7/15/2005   • Years since quittin 8   Smokeless Tobacco Never         Home Medications:   Prior to Admission medications    Medication Sig Start Date End Date Taking? Authorizing Provider   albuterol (Ventolin HFA) 90 mcg/act inhaler Inhale 2 puffs every 6 (six) hours as needed for wheezing 4/21/22   Lj Pickett PA-C   amLODIPine (NORVASC) 10 mg tablet Take 1 tablet (10 mg total) by mouth daily 3/9/23   Veronica Cordero DO   ammonium lactate (LAC-HYDRIN) 12 % cream Apply topically as needed for dry skin 9/9/22   Army Speak, DPM   aspirin (ECOTRIN LOW STRENGTH) 81 mg EC tablet Take 1 tablet (81 mg total) by mouth daily 8/15/22   Veronica Cordero DO   atorvastatin (LIPITOR) 40 mg tablet Take 1 tablet (40 mg total) by mouth daily 3/9/23   Veronica Cordero DO   Blood Glucose Monitoring Suppl (CONTOUR NEXT MONITOR) w/Device KIT Test blood sugar once daily or as needed for fluctuating blood sugars 2/7/20   Shannon Gomez PA-C   carvedilol (COREG) 12 5 mg tablet Take 1 tablet (12 5 mg total) by mouth 2 (two) times a day with meals 3/14/23   Veronica Cordero DO   cetirizine (ZyrTEC) 10 mg tablet Take 10 mg by mouth 12/10/18   Historical Provider, MD   cyclobenzaprine (FLEXERIL) 10 mg tablet Take 10 mg by mouth 2/6/17   Historical Provider, MD   Dapagliflozin Propanediol (Farxiga) 10 MG TABS Take 10 mg by mouth daily    Historical Provider, MD   fluticasone (FLOVENT HFA) 220 mcg/act inhaler Inhale 2 puffs 2 (two) times a day Rinse mouth after use 3/14/23   Hellen Myers MD   furosemide (LASIX) 40 mg tablet Take 1 tablet (40 mg total) by mouth 2 (two) times a day 3/24/23   Veronica Cordero DO   gabapentin (NEURONTIN) 400 mg capsule TAKE 1 CAPSULE BY MOUTH THREE TIMES A DAY 3/22/23   Shannon Gomez PA-C   glimepiride (AMARYL) 4 mg tablet Take 4 mg by mouth 2 (two) times a day 7/20/21   Historical Provider, MD   glucose blood (CONTOUR NEXT TEST) test strip Test blood sugar once daily or as needed for fluctuating blood sugars 2/7/20   Shannon Gomez PA-C   Insulin Pen Needle (BD Pen Needle Dee Dee U/F) 32G X 4 MM MISC Use 4 a day 7/1/20   Collie Mazin, DO   ipratropium-albuterol (DUO-NEB) 0 5-2 5 mg/3 mL nebulizer solution Take 3 mL by nebulization 4 (four) times a day 3/14/23   Gino Jackman MD   Lancets MISC Test blood sugar once daily or as needed for fluctuating blood sugars 6/22/20   Le Rose PA-C   Levemir FlexTouch 100 units/mL injection pen Inject 70 Units under the skin daily 10/18/21   Historical Provider, MD   losartan (COZAAR) 100 MG tablet Take 1 tablet (100 mg total) by mouth daily 3/9/23   Juve Long,    MELATONIN PO Take 10 mg by mouth    Historical Provider, MD   metFORMIN (GLUCOPHAGE) 1000 MG tablet TAKE 1 TABLET BY MOUTH TWICE DAILY WITH MEALS 1/31/21   Brendan Coronado, DO   Multiple Vitamins-Minerals (MENS MULTIVITAMIN PO) Take by mouth    Historical Provider, MD   Ozempic, 2 MG/DOSE, 8 MG/3ML SOPN 2 mg every 7 days 9/6/22   Historical Provider, MD   sertraline (ZOLOFT) 50 mg tablet TAKE 1 TABLET BY MOUTH DAILY 5/12/23   Ondina Zamora DO   spironolactone (ALDACTONE) 50 mg tablet Take 1 tablet (50 mg total) by mouth daily 3/9/23   Juve Long, DO   umeclidinium-vilanterol (Anoro Ellipta) 62 5-25 mcg/actuation inhaler Inhale 1 puff daily 3/14/23 5/25/23  Gino Jackman MD       Allergies:   Allergies   Allergen Reactions   • Theophylline Other (See Comments)     Severe headaches  headaches  Severe headaches         Review of Systems:  Review of Systems - History obtained from the patient  General ROS: positive for  - fatigue  Psychological ROS: negative  Ophthalmic ROS: positive for - uses glasses  ENT ROS: negative  Allergy and Immunology ROS: negative  Hematological and Lymphatic ROS: negative  Endocrine ROS: negative  Respiratory ROS: positive for - shortness of breath  Cardiovascular ROS: positive for - dyspnea on exertion, edema, murmur and shortness of breath  Gastrointestinal ROS: no abdominal pain, change in bowel habits, or black or bloody stools  Genito-Urinary ROS: no dysuria, trouble voiding, or hematuria  Musculoskeletal ROS: "negative  Neurological ROS: positive for - numbness/tingling b/l feet   Dermatological ROS: negative    Vital Signs:     Vitals:    05/31/23 1338 05/31/23 1343   BP: 100/58 106/60   BP Location: Left arm Right arm   Patient Position: Sitting Sitting   Cuff Size: Standard Standard   Pulse: 89    Temp: 97 9 °F (36 6 °C)    TempSrc: Tympanic    SpO2: 94%    Weight: (!) 155 kg (342 lb 1 6 oz)    Height: 5' 7\" (1 702 m)        Physical Exam:    General: alert, oriented, NAD  HEENT/NECK:  PERRL  No jugular venous distention  Cardiac:Regular rate and rhythm, grade III/VI Murmur   Carotid arteries: 1+, no bruits  Pulmonary:  Breath sounds clear bilaterally  Abdomen:  Non-tender, Non-distended  Positive bowel sounds  Upper extremities: 2+ radial pulses; brisk capillary refill  Lower extremities: Extremities warm/dry  PT/DP pulses 2+ bilaterally  1+ edema B/L  Neuro: Alert and oriented X 3  Sensation is grossly intact  No focal deficits  Musculoskeletal: BEE  Skin: Warm/Dry, without rashes or lesions  Lab Results:     Results from last 7 days   Lab Units 05/25/23  0846   HEMATOCRIT % 44 8   HEMOGLOBIN g/dL 15 2   PLATELETS Thousands/uL 285   WBC Thousand/uL 7 55     Results from last 7 days   Lab Units 05/25/23  0846   BUN mg/dL 36*   CALCIUM mg/dL 9 3   CHLORIDE mmol/L 104   CO2 mmol/L 24   CREATININE mg/dL 1 09   POTASSIUM mmol/L 4 1         Lab Results   Component Value Date    HGBA1C 9 1 (A) 03/02/2023     Lab Results   Component Value Date    CKMB 1 0 07/29/2021    CKMBINDEX <1 0 07/29/2021    CKTOTAL 84 02/10/2023    TROPONINI <0 02 07/28/2021       Imaging Studies:     Echocardiogram: 5/1/23  Interpretation Summary       •  Technically dificult study  Limited information obtained  •  Left Ventricle: Left ventricular cavity size is normal  Wall thickness is increased  There is mild concentric hypertrophy  The left ventricular ejection fraction is 65%   Systolic function is normal  Although no diagnostic " regional wall motion abnormality was identified, this possibility cannot be completely excluded on the basis of this study  •  Aortic Valve: The aortic valve was not well visualized  There is severe stenosis Peak gradient 85 mmHg, mean gradient 46 mmHg      Findings    Left Ventricle Left ventricular cavity size is normal  Wall thickness is increased  There is mild concentric hypertrophy  The left ventricular ejection fraction is 65%  Systolic function is normal   Although no diagnostic regional wall motion abnormality was identified, this possibility cannot be completely excluded on the basis of this study  Right Ventricle Right ventricular cavity size is normal       Left Atrium The atrium is normal in size  Right Atrium The atrium is normal in size  Aortic Valve The aortic valve was not well visualized  There is severe stenosis Peak gradient 85 mmHg, mean gradient 46 mmHg          Left Ventricle Measurements    Function/Volumes   LVOT stroke volume 56 49 cm3         LVOT stroke volume index 22 ml/m2         Dimensions   LVOT area 3 14 cm2         Diastolic Filling   MV E' Tissue Velocity Septal 6 cm/s         MV E' Tissue Velocity Lateral 9 cm/s         E wave deceleration time 321 ms         MV Peak E Jorge A 75 cm/s         MV Peak A Jorge A 0 75 m/s          Report Measurements   AV LVOT peak gradient 4 mmHg              Interventricular Septum Measurements    Shunt Ratio   LVOT peak VTI 17 99 cm         LVOT peak jorge a 0 96 m/s              Right Ventricle Measurements    Dimensions   RVID d 3 3 cm               Left Atrium Measurements    Dimensions   LA size 4 1 cm         LA length (A2C) 5 7 cm               Right Atrium Measurements    Dimensions   RAA A4C 16 4 cm2               Atrial Septum Measurements    Shunt Ratio   LVOT peak VTI 17 99 cm         LVOT peak jorge a 0 96 m/s               Aortic Valve Measurements    Stenosis   Aortic valve peak velocity 4 62 m/s         LVOT peak jorge a 0 96 m/s Ao VTI 99 82 cm         LVOT peak VTI 17 99 cm         AV mean gradient 46 mmHg         LVOT mn grad 2 mmHg         AV peak gradient 85 mmHg         AV LVOT peak gradient 4 mmHg         Area/Dimensions   DVI 0 21          AV valve area 0 57 cm2         AV area by cont VTI 0 6 cm2         AV area peak jorge a 0 7 cm2         LVOT diameter 2 cm         LVOT area 3 14 cm2               Mitral Valve Measurements    Stenosis   MV stenosis pressure 1/2 time 93 ms         MV valve area p 1/2 method 2 37 cm2               Tricuspid Valve Measurements    RVSP Parameters   TR Peak Jorge A 1 3 m/s         Triscuspid Valve Regurgitation Peak Gradient 6 mmHg               Aorta Measurements    Aortic Dimensions   Ao root 3 2 cm              Cardiac Catheterization 5/25/23  •  Few focal luminal irregularities w/ no evidence for obstructive disease      I have personally reviewed pertinent reports        TAVR evaluation Assessment:     [address] 122: III    Aortic Stenosis Stage: D1    5 Meter Walk:   1  6 sec  2  6 sec  3  7 sec     STS risk score (preliminary): 5 2%    KCCQ-12 completed    Assessment:  Patient Active Problem List    Diagnosis Date Noted   • Leukocytosis 04/17/2023   • Anxiety and depression 03/02/2023   • Positive double stranded DNA antibody test 12/07/2022   • Hyponatremia 10/04/2022   • Stage 2 chronic kidney disease 10/04/2022   • Pulmonary nodule 09/12/2022   • Abnormal CT of the chest 08/12/2022   • RUTHERFORD (dyspnea on exertion) 04/20/2022   • Periodic limb movement disorder (PLMD) 10/22/2021   • Nonrheumatic aortic valve stenosis 05/28/2020   • Murmur 05/15/2020   • COPD with acute bronchitis ([facility] ) 05/04/2020   • Presbyopia 02/03/2020   • COPD, severe ([facility] ) 12/20/2019   • Chronic hypoxemic respiratory failure ([facility] ) 23/95/4780   • Diastolic heart failure ([facility] ) 08/20/2019   • Class 3 severe obesity with serious comorbidity and body mass index (BMI) of 50 0 to 59 9 in adult ([facility] ) 08/20/2019   • Mixed simple and mucopurulent chronic bronchitis ([facility] ) 08/20/2019   • Myopia, bilateral 07/30/2019   • Renovascular hypertension with goal blood pressure less than 140/90 06/20/2016   • Type 2 diabetes mellitus with diabetic neuropathy, with long-term current use of insulin ([facility] ) 08/31/2015   • SCOTT (obstructive sleep apnea) 10/08/2014   • Asthma, moderate persistent 08/26/2014   • Dyslipidemia, goal LDL below 100 08/26/2014   • Essential hypertension 07/02/2014     Severe aortic stenosis; Ongoing TAVR workup    Plan:    Mikayla Rudolph has symptomatic severe aortic stenosis  They will undergo the following testing for transcatheter aortic valve replacement: Gated CTA of the chest/abdomen/pelvis, 3D BROOKE, , dental clearance, and carotid artery ultrasound  Once these studies have been completed, Mikayla Rudolph will follow up in our office to review the results and to be evaluated to confirm the suitability of proceeding with transcatheter aortic valve replacment  Mikayla Rudolph was comfortable with our recommendations, and their questions were answered to their satisfaction  Thank you for allowing us to participate in the care of this patient  Patient recently had Cologuard, prescribed by PCP       SIGNATURE: Elda Harding PA-C  DATE: May 31, 2023  TIME: 3:50 PM

## 2023-05-31 NOTE — LETTER
May 31, 2023     José Miguel Don DO  [address]    Patient: Mikayla Rudolph   YOB: 1965   Date of Visit: 5/31/2023       Dear Dr Roman Zhao: Thank you for referring Mikayla Rudolph to me for evaluation  Below are my notes for this consultation  If you have questions, please do not hesitate to call me  I look forward to following your patient along with you  Sincerely,        Johnna Nunez DO        CC: Sylvain Lewis, [address] [facility], PA-C  5/31/2023  3:57 PM  Attested  Consultation - Cardiothoracic Surgery   Mikayla Rudolph 60 y o  male MRN: [id number]    Physician Requesting Consult: No att  providers found    Reason for Consult / Principal Problem: Aortic stenosis, Non-Rheumatic    History of Present Illness: Mikayla Rudolph is a 60y o  year old male who presents for initial outpatient surgical consultation for symptomatic severe aortic stenosis  He has a PMHx aortic stenosis, advanced COPD (4L NC O2 at night), CAD, diastolic heart failure, 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, HTN, HLD, SCOTT (BiPAP), morbid obesity (BMI 53), CKD stage 2 who has been following with Dr Roman Zhao for several years  He recently was seen in the [address] [place] [address] ED at the end of April of this year for shortness of breath and hemoptysis  Full work up including CT chest was obtained  CT chest demonstrated significant consolidations and pulmonary edema  Patient was going to be transferred to [facility] for stabilization, however patient signed out AMA due to frustration with process  He followed up with Dr Roman Zhao shortly after, and repeat ECHO obtained on 5/1 demonstrated severe aortic stenosis, which had progressed from the year prior  Dr Roman Zhao suspected that the dyspnea was related to AS, so he initiated TAVR work up with cardiac catheterization and consult to cardiac surgery  Cardiac cath on 5/25 demonstrated no significant obstructive CAD       Upon interview, patient confirms above events  He is accompanied by his wife today  He reports that he has been experiencing extreme fatigue (has been known to fall asleep standing up), dyspnea on exertion and b/l LE edema  Denies PND, orthopnea, dizziness, chest pain  He has been on steroids on and off for the last several years due to his COPD - notes that he needs steroids especially with weather and season changes  He uses both BiPAP and 4L NC O2 at night  He expresses concern for general anesthesia due to history of bronchospasm after having ear surgery about 25-30 years ago  It appears that this is when he found out that he had COPD  He is a former smoker, smoked 3 ppd and quit in 2005  He does not drink or use recreational drugs  Reports that his dad had a valve replacement in his 19's or 30's  Unsure which valve, but states that it was mechanical  No history of rheumatic fever  He drives, does not use an assist device for ambulation  Lives with his wife and daughter  Past Medical History:  Past Medical History:   Diagnosis Date   • Aortic stenosis    • Asthma 1965   • COPD (chronic obstructive pulmonary disease) ([facility] )    • Coronary artery disease 2000    Aortic stenosis   • Diabetes ([facility] )    • Diabetes mellitus ([facility] )    • Hyperlipidemia    • Hypertension 07/02/2014   • Pneumonia 2015   • Sleep apnea, obstructive          Past Surgical History:   Past Surgical History:   Procedure Laterality Date   • APPENDECTOMY     • CARDIAC CATHETERIZATION N/A 5/25/2023    Procedure: Cardiac Coronary Angiogram;  Surgeon: Sandrine Donovan MD;  Location: [facility];   Service: Cardiology   • EYE SURGERY     • TONSILLECTOMY  No         Family History:  Family History   Problem Relation Age of Onset   • No Known Problems Mother    • Heart disease Father    • Lung cancer Father         Passed away 0   • Cancer Father         Lung cancer   • Hypertension Father    • Diabetes Maternal Grandfather    • Stomach cancer Maternal Grandfather          Social History:    Social History     Substance and Sexual Activity   Alcohol Use Never     Social History     Substance and Sexual Activity   Drug Use Never     Social History     Tobacco Use   Smoking Status Former   • Packs/day: 3 00   • Years: 30 00   • Total pack years: 90 00   • Types: Cigarettes   • Start date: 3/15/1984   • Quit date: 7/15/2005   • Years since quittin 8   Smokeless Tobacco Never         Home Medications:   Prior to Admission medications    Medication Sig Start Date End Date Taking?  Authorizing Provider   albuterol (Ventolin HFA) 90 mcg/act inhaler Inhale 2 puffs every 6 (six) hours as needed for wheezing 22   Lj Pickett PA-C   amLODIPine (NORVASC) 10 mg tablet Take 1 tablet (10 mg total) by mouth daily 3/9/23   Leilani Burger DO   ammonium lactate (LAC-HYDRIN) 12 % cream Apply topically as needed for dry skin 22   Trish Carlson DPM   aspirin (ECOTRIN LOW STRENGTH) 81 mg EC tablet Take 1 tablet (81 mg total) by mouth daily 8/15/22   Leilani Burger DO   atorvastatin (LIPITOR) 40 mg tablet Take 1 tablet (40 mg total) by mouth daily 3/9/23   Leilani Burger DO   Blood Glucose Monitoring Suppl (CONTOUR NEXT MONITOR) w/Device KIT Test blood sugar once daily or as needed for fluctuating blood sugars 20   Nathanael Rider PA-C   carvedilol (COREG) 12 5 mg tablet Take 1 tablet (12 5 mg total) by mouth 2 (two) times a day with meals 3/14/23   Leilani Burger DO   cetirizine (ZyrTEC) 10 mg tablet Take 10 mg by mouth 12/10/18   Historical Provider, MD   cyclobenzaprine (FLEXERIL) 10 mg tablet Take 10 mg by mouth 17   Historical Provider, MD   Dapagliflozin Propanediol (Farxiga) 10 MG TABS Take 10 mg by mouth daily    Historical Provider, MD   fluticasone (FLOVENT HFA) 220 mcg/act inhaler Inhale 2 puffs 2 (two) times a day Rinse mouth after use 3/14/23   Aleksander Parra MD   furosemide (LASIX) 40 mg tablet Take 1 tablet (40 mg total) by mouth 2 (two) times a day 3/24/23   Juve Long DO   gabapentin (NEURONTIN) 400 mg capsule TAKE 1 CAPSULE BY MOUTH THREE TIMES A DAY 3/22/23   Le Rose PA-C   glimepiride (AMARYL) 4 mg tablet Take 4 mg by mouth 2 (two) times a day 7/20/21   Historical Provider, MD   glucose blood (CONTOUR NEXT TEST) test strip Test blood sugar once daily or as needed for fluctuating blood sugars 2/7/20   Le Rose PA-C   Insulin Pen Needle (BD Pen Needle Dee Dee U/F) 32G X 4 MM MISC Use 4 a day 7/1/20   Brendan Coronado DO   ipratropium-albuterol (DUO-NEB) 0 5-2 5 mg/3 mL nebulizer solution Take 3 mL by nebulization 4 (four) times a day 3/14/23   Gino Jackman MD   Lancets MISC Test blood sugar once daily or as needed for fluctuating blood sugars 6/22/20   Le Rose PA-C   Levemir FlexTouch 100 units/mL injection pen Inject 70 Units under the skin daily 10/18/21   Historical Provider, MD   losartan (COZAAR) 100 MG tablet Take 1 tablet (100 mg total) by mouth daily 3/9/23   Juve Long DO   MELATONIN PO Take 10 mg by mouth    Historical Provider, MD   metFORMIN (GLUCOPHAGE) 1000 MG tablet TAKE 1 TABLET BY MOUTH TWICE DAILY WITH MEALS 1/31/21   Brendan Coronado, DO   Multiple Vitamins-Minerals (MENS MULTIVITAMIN PO) Take by mouth    Historical Provider, MD   Ozempic, 2 MG/DOSE, 8 MG/3ML SOPN 2 mg every 7 days 9/6/22   Historical Provider, MD   sertraline (ZOLOFT) 50 mg tablet TAKE 1 TABLET BY MOUTH DAILY 5/12/23   Ondina Zamora,    spironolactone (ALDACTONE) 50 mg tablet Take 1 tablet (50 mg total) by mouth daily 3/9/23   Juve Long DO   umeclidinium-vilanterol (Anoro Ellipta) 62 5-25 mcg/actuation inhaler Inhale 1 puff daily 3/14/23 5/25/23  Gino Jackman MD       Allergies:   Allergies   Allergen Reactions   • Theophylline Other (See Comments)     Severe headaches  headaches  Severe headaches         Review of Systems:  Review of Systems - History obtained from the patient  General ROS: positive "for  - fatigue  Psychological ROS: negative  Ophthalmic ROS: positive for - uses glasses  ENT ROS: negative  Allergy and Immunology ROS: negative  Hematological and Lymphatic ROS: negative  Endocrine ROS: negative  Respiratory ROS: positive for - shortness of breath  Cardiovascular ROS: positive for - dyspnea on exertion, edema, murmur and shortness of breath  Gastrointestinal ROS: no abdominal pain, change in bowel habits, or black or bloody stools  Genito-Urinary ROS: no dysuria, trouble voiding, or hematuria  Musculoskeletal ROS: negative  Neurological ROS: positive for - numbness/tingling b/l feet   Dermatological ROS: negative    Vital Signs:     Vitals:    05/31/23 1338 05/31/23 1343   BP: 100/58 106/60   BP Location: Left arm Right arm   Patient Position: Sitting Sitting   Cuff Size: Standard Standard   Pulse: 89    Temp: 97 9 °F (36 6 °C)    TempSrc: Tympanic    SpO2: 94%    Weight: (!) 155 kg (342 lb 1 6 oz)    Height: 5' 7\" (1 702 m)        Physical Exam:    General: alert, oriented, NAD  HEENT/NECK:  PERRL  No jugular venous distention  Cardiac:Regular rate and rhythm, grade III/VI Murmur   Carotid arteries: 1+, no bruits  Pulmonary:  Breath sounds clear bilaterally  Abdomen:  Non-tender, Non-distended  Positive bowel sounds  Upper extremities: 2+ radial pulses; brisk capillary refill  Lower extremities: Extremities warm/dry  PT/DP pulses 2+ bilaterally  1+ edema B/L  Neuro: Alert and oriented X 3  Sensation is grossly intact  No focal deficits  Musculoskeletal: BEE  Skin: Warm/Dry, without rashes or lesions        Lab Results:     Results from last 7 days   Lab Units 05/25/23  0846   HEMATOCRIT % 44 8   HEMOGLOBIN g/dL 15 2   PLATELETS Thousands/uL 285   WBC Thousand/uL 7 55     Results from last 7 days   Lab Units 05/25/23  0846   BUN mg/dL 36*   CALCIUM mg/dL 9 3   CHLORIDE mmol/L 104   CO2 mmol/L 24   CREATININE mg/dL 1 09   POTASSIUM mmol/L 4 1         Lab Results   Component Value Date    " HGBA1C 9 1 (A) 03/02/2023     Lab Results   Component Value Date    CKMB 1 0 07/29/2021    CKMBINDEX <1 0 07/29/2021    CKTOTAL 84 02/10/2023    TROPONINI <0 02 07/28/2021       Imaging Studies:     Echocardiogram: 5/1/23  Interpretation Summary       •  Technically dificult study  Limited information obtained  •  Left Ventricle: Left ventricular cavity size is normal  Wall thickness is increased  There is mild concentric hypertrophy  The left ventricular ejection fraction is 65%  Systolic function is normal  Although no diagnostic regional wall motion abnormality was identified, this possibility cannot be completely excluded on the basis of this study  •  Aortic Valve: The aortic valve was not well visualized  There is severe stenosis Peak gradient 85 mmHg, mean gradient 46 mmHg  Findings    Left Ventricle Left ventricular cavity size is normal  Wall thickness is increased  There is mild concentric hypertrophy  The left ventricular ejection fraction is 65%  Systolic function is normal   Although no diagnostic regional wall motion abnormality was identified, this possibility cannot be completely excluded on the basis of this study  Right Ventricle Right ventricular cavity size is normal       Left Atrium The atrium is normal in size  Right Atrium The atrium is normal in size  Aortic Valve The aortic valve was not well visualized  There is severe stenosis Peak gradient 85 mmHg, mean gradient 46 mmHg          Left Ventricle Measurements    Function/Volumes   LVOT stroke volume 56 49 cm3         LVOT stroke volume index 22 ml/m2         Dimensions   LVOT area 3 14 cm2         Diastolic Filling   MV E' Tissue Velocity Septal 6 cm/s         MV E' Tissue Velocity Lateral 9 cm/s         E wave deceleration time 321 ms         MV Peak E Jorge A 75 cm/s         MV Peak A Jorge A 0 75 m/s          Report Measurements   AV LVOT peak gradient 4 mmHg              Interventricular Septum Measurements    Shunt Ratio LVOT peak VTI 17 99 cm         LVOT peak jorge a 0 96 m/s              Right Ventricle Measurements    Dimensions   RVID d 3 3 cm               Left Atrium Measurements    Dimensions   LA size 4 1 cm         LA length (A2C) 5 7 cm               Right Atrium Measurements    Dimensions   RAA A4C 16 4 cm2               Atrial Septum Measurements    Shunt Ratio   LVOT peak VTI 17 99 cm         LVOT peak jorge a 0 96 m/s               Aortic Valve Measurements    Stenosis   Aortic valve peak velocity 4 62 m/s         LVOT peak jorge a 0 96 m/s         Ao VTI 99 82 cm         LVOT peak VTI 17 99 cm         AV mean gradient 46 mmHg         LVOT mn grad 2 mmHg         AV peak gradient 85 mmHg         AV LVOT peak gradient 4 mmHg         Area/Dimensions   DVI 0 21          AV valve area 0 57 cm2         AV area by cont VTI 0 6 cm2         AV area peak jorge a 0 7 cm2         LVOT diameter 2 cm         LVOT area 3 14 cm2               Mitral Valve Measurements    Stenosis   MV stenosis pressure 1/2 time 93 ms         MV valve area p 1/2 method 2 37 cm2               Tricuspid Valve Measurements    RVSP Parameters   TR Peak Jorge A 1 3 m/s         Triscuspid Valve Regurgitation Peak Gradient 6 mmHg               Aorta Measurements    Aortic Dimensions   Ao root 3 2 cm              Cardiac Catheterization 5/25/23  •  Few focal luminal irregularities w/ no evidence for obstructive disease      I have personally reviewed pertinent reports        TAVR evaluation Assessment:     [address]: III    Aortic Stenosis Stage: D1    5 Meter Walk:   1  6 sec  2  6 sec  3  7 sec     STS risk score (preliminary): 5 2%    KCCQ-12 completed    Assessment:  Patient Active Problem List    Diagnosis Date Noted   • Leukocytosis 04/17/2023   • Anxiety and depression 03/02/2023   • Positive double stranded DNA antibody test 12/07/2022   • Hyponatremia 10/04/2022   • Stage 2 chronic kidney disease 10/04/2022   • Pulmonary nodule 09/12/2022   • Abnormal CT of the chest 08/12/2022   • RUTHERFORD (dyspnea on exertion) 04/20/2022   • Periodic limb movement disorder (PLMD) 10/22/2021   • Nonrheumatic aortic valve stenosis 05/28/2020   • Murmur 05/15/2020   • COPD with acute bronchitis ([facility] ) 05/04/2020   • Presbyopia 02/03/2020   • COPD, severe ([facility] ) 12/20/2019   • Chronic hypoxemic respiratory failure ([facility] ) 97/29/7468   • Diastolic heart failure ([facility] ) 08/20/2019   • Class 3 severe obesity with serious comorbidity and body mass index (BMI) of 50 0 to 59 9 in adult [facility]) 08/20/2019   • Mixed simple and mucopurulent chronic bronchitis [facility] ) 08/20/2019   • Myopia, bilateral 07/30/2019   • Renovascular hypertension with goal blood pressure less than 140/90 06/20/2016   • Type 2 diabetes mellitus with diabetic neuropathy, with long-term current use of insulin ([facility] ) 08/31/2015   • SCOTT (obstructive sleep apnea) 10/08/2014   • Asthma, moderate persistent 08/26/2014   • Dyslipidemia, goal LDL below 100 08/26/2014   • Essential hypertension 07/02/2014     Severe aortic stenosis; Ongoing TAVR workup    Plan:    Radha Manzanares has symptomatic severe aortic stenosis  They will undergo the following testing for transcatheter aortic valve replacement: Gated CTA of the chest/abdomen/pelvis, 3D BROOKE, , dental clearance, and carotid artery ultrasound  Once these studies have been completed, Radha Manzanares will follow up in our office to review the results and to be evaluated to confirm the suitability of proceeding with transcatheter aortic valve replacment  Radha Manzanares was comfortable with our recommendations, and their questions were answered to their satisfaction  Thank you for allowing us to participate in the care of this patient  Patient recently had Cologuard, prescribed by PCP  SIGNATURE: Alessandra Bill PA-C  DATE: May 31, 2023  TIME: 3:50 PM  Attestation signed by Cuauhtemoc Monroy DO at 5/31/2023  4:10 PM:  I supervised the Advanced Practitioner  ? I performed, in its entirety, the assessment/plan component of the visit  I agree with the Advanced Practitioner's note with the following additions/exceptions:      Mr Rodger Garcia seen in the office today for evaluation of his aortic stenosis  His echocardiogram was reviewed by myself personally and findings shared with him  This demonstrates severe aortic stenosis  His echo however is somewhat limited study and image quality  I have recommended a BROOKE to further delineate the severity of his aortic stenosis  He will also require evaluation for TAVR  The risks, benefits, and alternatives to TAVR been discussed  He will return after this testing is completed for review of the findings      Johnna Nunez DO 05/31/23

## 2023-06-05 ENCOUNTER — TELEPHONE (OUTPATIENT)
Dept: PULMONOLOGY | Facility: CLINIC | Age: 58
End: 2023-06-05

## 2023-06-05 NOTE — TELEPHONE ENCOUNTER
Patient called and lvm stating he has a lung infection, he is bringing up green phlegm, and is requesting an antibiotic and steroid  He has been having difficulty breathing   Please advise

## 2023-06-05 NOTE — TELEPHONE ENCOUNTER
Michell Franks would like a return call regarding     What is the reason for the call/chief complaint? Sob/cough    What additional symptoms are present or absent? SOB, yes   Are they on O2? Yes, describe: 4l Pulse O2 restingn/a When [id number]  chest pain/tightness, no  wheezing, yes  Cough, yes  mucous production,yes  What color is it Green  fevers/chills, no  weight gain, no  Swelling no  Pain no, does it hurt when breathing or all the time? n/a    When did they start/how long have they been going on? 1 week    Constant or intermittent; if intermittent describe yes? What makes it better or worse? Not doing anything makes him feel better    Have you been exposed to anyone that is sick? No    Have you been tested for COVID recently? no    Check current pulmonary medications inhalers/nebs  frequency of use daily    Have they had any other treatment or testing for this problem elsewhere? n/a    Recent steroids? No    Recent Antibiotics? No     Last office visit? 3/8/2023    Patient pharmacy?    [address] [facility] Phone:  [phone]   Fax:  [phone] or 90 day supply

## 2023-06-06 DIAGNOSIS — J44.0 COPD WITH ACUTE BRONCHITIS (HCC): Primary | ICD-10-CM

## 2023-06-06 DIAGNOSIS — J20.9 COPD WITH ACUTE BRONCHITIS (HCC): Primary | ICD-10-CM

## 2023-06-06 RX ORDER — PREDNISONE 10 MG/1
TABLET ORAL
Qty: 30 TABLET | Refills: 0 | Status: SHIPPED | OUTPATIENT
Start: 2023-06-06

## 2023-06-06 NOTE — TELEPHONE ENCOUNTER
Patient called back regarding his sick call from yesterday  He states that he never heard back from anyone after speaking with Jeffrey Shirley and wants an update  Please advise

## 2023-06-06 NOTE — TELEPHONE ENCOUNTER
Sal Higgins seems to be having more symptoms  If he is acutely ill he needs to go to Er or urgent care  However, I know that he is also due to see Dr Betty Garcia and combined rheum/pulm clinic for chronic issues

## 2023-06-06 NOTE — TELEPHONE ENCOUNTER
I called patient  We decided to try prednisone taper  I do not think he is acutely infected (Ie no fevers, sick contacts) so will defer ABX at this time to prevent antibiotic resistance  I did say that if he do not feel improved on this he should go to the ED and he verbalized understanding

## 2023-06-07 DIAGNOSIS — J44.9 COPD, SEVERE (HCC): ICD-10-CM

## 2023-06-08 ENCOUNTER — OFFICE VISIT (OUTPATIENT)
Dept: ENDOCRINOLOGY | Facility: CLINIC | Age: 58
End: 2023-06-08
Payer: COMMERCIAL

## 2023-06-08 VITALS
OXYGEN SATURATION: 93 % | HEIGHT: 67 IN | SYSTOLIC BLOOD PRESSURE: 102 MMHG | BODY MASS INDEX: 49.44 KG/M2 | DIASTOLIC BLOOD PRESSURE: 68 MMHG | WEIGHT: 315 LBS | HEART RATE: 74 BPM

## 2023-06-08 DIAGNOSIS — I10 ESSENTIAL HYPERTENSION: ICD-10-CM

## 2023-06-08 DIAGNOSIS — E78.2 MIXED HYPERLIPIDEMIA: ICD-10-CM

## 2023-06-08 DIAGNOSIS — E11.40 TYPE 2 DIABETES MELLITUS WITH DIABETIC NEUROPATHY, WITH LONG-TERM CURRENT USE OF INSULIN (HCC): Primary | ICD-10-CM

## 2023-06-08 DIAGNOSIS — Z79.4 TYPE 2 DIABETES MELLITUS WITH DIABETIC NEUROPATHY, WITH LONG-TERM CURRENT USE OF INSULIN (HCC): Primary | ICD-10-CM

## 2023-06-08 DIAGNOSIS — E66.01 MORBID OBESITY (HCC): ICD-10-CM

## 2023-06-08 PROCEDURE — 99214 OFFICE O/P EST MOD 30 MIN: CPT | Performed by: STUDENT IN AN ORGANIZED HEALTH CARE EDUCATION/TRAINING PROGRAM

## 2023-06-08 RX ORDER — INSULIN GLARGINE 300 U/ML
70 INJECTION, SOLUTION SUBCUTANEOUS
Qty: 24 ML | Refills: 1 | Status: SHIPPED | OUTPATIENT
Start: 2023-06-08 | End: 2023-09-06

## 2023-06-08 NOTE — PROGRESS NOTES
Lazarus Mcmanus 60 y o  male MRN: [id number]    Encounter: [id number]      Assessment/Plan     1  Type 2 diabetes c/b DPN, steroid induced hyperglycemia - not at goal  Ancipiate need for introduction of prandial insulin  Patient advised of such  He is already on sufficient basal insulin dosing  Would dc glimepiride with introduction of meal insulin  Would benefit from concentrated insulin, more stable basal analog insulin  Recommend dc levemir, start toujeo 70 units nightly  Recommend ACHS fingerstick monitoring  The patient is a candidate for CGM use  He has insulin treated type 2 diabetes  Cgm data will be used to make adjustments to his insulin regimen  2  Hypertension - stable  3  Hyperlipidemia - currently on high intensity statin  Last lipids were poorly controlled  These will need to be followed  I believe he can also benefit from optimization of nutrition  4  Morbid obesity - referral to CDE  Problem List Items Addressed This Visit        Endocrine    Type 2 diabetes mellitus with diabetic neuropathy, with long-term current use of insulin (HCC) - Primary    Relevant Medications    insulin glargine (Toujeo Max SoloStar) 300 units/mL CONCENTRATED U-300 injection pen (2-unit dial)    Other Relevant Orders    Basic metabolic panel Lab Collect    HEMOGLOBIN A1C W/ EAG ESTIMATION Lab Collect    Lipid Panel with Direct LDL reflex Lab Collect    Albumin / creatinine urine ratio       Cardiovascular and Mediastinum    Essential hypertension   Other Visit Diagnoses     Mixed hyperlipidemia        Morbid obesity (HCC)            RTC 3-mo    CC: Diabetes    History of Present Illness     HPI:    Biagio Sacks returns today in follow up of diabetes  He is joined today by his wife  Recent history has been active, notable for eval of severe aortic stenosis  He has underwent cardiac cath, and is planned to have BROOKE as well in anticipation of possible TAVR       For diabetes, Biagio Sacks remains on levemir 70 units nightly, glimepiride 4 mg bid, farxiga 10 mg daily, ozempic 2 mg weekly, and metformin 2g daily  He reports hesitancy of continuing metformin due to a paper he read online stating its adverse effects, which he identified with, including myalgias  He mentions plan to bring in article for my review  Lynnie Severe is also back on prednisone, which he takes for lung disease  No meter or log today  Reports 1-2x daily testing  States values often in high 100 - mid 200s when checks  For hyperlipidemia he takes lipitor 40 mg  For hypertension he takes losartan 100 mg daily, carvedilol 12 5 mg bid, amlodipine 10 mg and spironolactone 50 mg  Review of Systems   Constitutional: Negative for diaphoresis  HENT: Negative for voice change  Cardiovascular: Negative for chest pain and palpitations  Gastrointestinal: Negative for nausea and vomiting  Endocrine: Negative for polydipsia and polyuria  Neurological: Negative for tremors  Psychiatric/Behavioral: Negative for agitation  All other systems reviewed and are negative  Historical Information   Past Medical History:   Diagnosis Date   • Aortic stenosis    • Asthma 1964   • COPD (chronic obstructive pulmonary disease) ([facility] [address] )    • Coronary artery disease 2000    Aortic stenosis   • Diabetes ([facility] [address] )    • [facility]    • Hyperlipidemia    • Hypertension 07/02/2014   • Pneumonia 2015   • Sleep apnea, obstructive      Past Surgical History:   Procedure Laterality Date   • ACCESSORY NAVICULAR EXCISION Right    • APPENDECTOMY     • CARDIAC CATHETERIZATION N/A 05/25/2023    Procedure: Cardiac Coronary Angiogram;  Surgeon: Deja Sarabia MD;  Location: [facility]; Service: Cardiology   • EAR FOREIGN BODY REMOVAL      cyst removal  bronchospasms after general anesthesia     • EYE SURGERY Left     traumatic injury at age 6-9     Social History   Social History     Substance and Sexual Activity   Alcohol Use Never     Social History     Substance and Sexual Activity   Drug Use Never     Social History     Tobacco Use   Smoking Status Former   • Packs/day: 3 00   • Years: 30 00   • Total pack years: 90 00   • Types: Cigarettes   • Start date: 3/15/1984   • Quit date: 7/15/2005   • Years since quittin 9   Smokeless Tobacco Never     Family History:   Family History   Problem Relation Age of Onset   • No Known Problems Mother    • Heart disease Father    • Lung cancer Father         Passed away    • Cancer Father         Lung cancer   • Hypertension Father    • Diabetes Maternal Grandfather    • Stomach cancer Maternal Grandfather        Meds/Allergies   Current Outpatient Medications   Medication Sig Dispense Refill   • albuterol (Ventolin HFA) 90 mcg/act inhaler Inhale 2 puffs every 6 (six) hours as needed for wheezing 18 g 3   • amLODIPine (NORVASC) 10 mg tablet Take 1 tablet (10 mg total) by mouth daily 90 tablet 3   • ammonium lactate (LAC-HYDRIN) 12 % cream Apply topically as needed for dry skin (Patient taking differently: Apply topically as needed for dry skin As needed) 385 g 0   • aspirin (ECOTRIN LOW STRENGTH) 81 mg EC tablet Take 1 tablet (81 mg total) by mouth daily 30 tablet 5   • atorvastatin (LIPITOR) 40 mg tablet Take 1 tablet (40 mg total) by mouth daily 90 tablet 3   • carvedilol (COREG) 12 5 mg tablet Take 1 tablet (12 5 mg total) by mouth 2 (two) times a day with meals 180 tablet 3   • cetirizine (ZyrTEC) 10 mg tablet Take 10 mg by mouth     • cyclobenzaprine (FLEXERIL) 10 mg tablet Take 10 mg by mouth     • Dapagliflozin Propanediol (Farxiga) 10 MG TABS Take 10 mg by mouth daily     • fluticasone (FLOVENT HFA) 220 mcg/act inhaler Inhale 2 puffs 2 (two) times a day Rinse mouth after use 12 g 3   • furosemide (LASIX) 40 mg tablet Take 1 tablet (40 mg total) by mouth 2 (two) times a day 180 tablet 3   • gabapentin (NEURONTIN) 400 mg capsule TAKE 1 CAPSULE BY MOUTH THREE TIMES A DAY 90 capsule 10   • glimepiride (AMARYL) 4 mg tablet "Take 4 mg by mouth 2 (two) times a day     • glucose blood (CONTOUR NEXT TEST) test strip Test blood sugar once daily or as needed for fluctuating blood sugars 100 each 3   • insulin glargine (Toujeo Max SoloStar) 300 units/mL CONCENTRATED U-300 injection pen (2-unit dial) Inject 70 Units under the skin daily at bedtime 24 mL 1   • Insulin Pen Needle (BD Pen Needle Dee Dee U/F) 32G X 4 MM MISC Use 4 a day 200 each 5   • ipratropium-albuterol (DUO-NEB) 0 5-2 5 mg/3 mL nebulizer solution Take 3 mL by nebulization 4 (four) times a day 180 mL 3   • Lancets MISC Test blood sugar once daily or as needed for fluctuating blood sugars 100 each 0   • losartan (COZAAR) 100 MG tablet Take 1 tablet (100 mg total) by mouth daily 90 tablet 3   • MELATONIN PO Take 10 mg by mouth     • metFORMIN (GLUCOPHAGE) 1000 MG tablet TAKE 1 TABLET BY MOUTH TWICE DAILY WITH MEALS 180 tablet 0   • Multiple Vitamins-Minerals (MENS MULTIVITAMIN PO) Take by mouth     • Ozempic, 2 MG/DOSE, 8 MG/3ML SOPN 2 mg every 7 days     • predniSONE 10 mg tablet Take 4 tabs daily for 3 days then 3 tabs daily for 3 days then 2 tabs daily for 3 days then 1 tab daily for 3 days 30 tablet 0   • sertraline (ZOLOFT) 50 mg tablet TAKE 1 TABLET BY MOUTH DAILY 30 tablet 3   • spironolactone (ALDACTONE) 50 mg tablet Take 1 tablet (50 mg total) by mouth daily 90 tablet 3   • umeclidinium-vilanterol (Anoro Ellipta) 62 5-25 mcg/actuation inhaler Inhale 1 puff daily 60 blister 3   • Blood Glucose Monitoring Suppl (CONTOUR NEXT MONITOR) w/Device KIT Test blood sugar once daily or as needed for fluctuating blood sugars (Patient not taking: Reported on 6/8/2023) 1 kit 0     No current facility-administered medications for this visit       Allergies   Allergen Reactions   • Theophylline Other (See Comments)     Severe headaches  headaches  Severe headaches         Objective   Vitals: Blood pressure 102/68, pulse 74, height 5' 7\" (1 702 m), weight (!) 155 kg (341 lb), SpO2 93 " %     Physical Exam  Vitals reviewed  Constitutional:       Appearance: Normal appearance  HENT:      Head: Normocephalic and atraumatic  Eyes:      General: No scleral icterus  Conjunctiva/sclera: Conjunctivae normal    Cardiovascular:      Rate and Rhythm: Normal rate and regular rhythm  Pulmonary:      Effort: Pulmonary effort is normal  No respiratory distress  Abdominal:      Palpations: Abdomen is soft  Tenderness: There is no abdominal tenderness  Musculoskeletal:      Cervical back: Normal range of motion  Skin:     General: Skin is warm and dry  Neurological:      General: No focal deficit present  Mental Status: He is alert  Psychiatric:         Mood and Affect: Mood normal          Behavior: Behavior normal          The history was obtained from the review of the chart, patient and family      Lab Results:   Lab Results   Component Value Date/Time    Albumin 4 1 05/24/2023 01:26 PM    Albumin 3 9 04/26/2023 04:46 PM    Albumin 4 0 01/02/2023 09:26 PM    ALT 40 05/24/2023 01:26 PM    ALT 20 04/26/2023 04:46 PM    ALT 42 01/02/2023 09:26 PM    AST 17 05/24/2023 01:26 PM    AST 10 (L) 04/26/2023 04:46 PM    AST 21 01/02/2023 09:26 PM    BUN 36 (H) 05/25/2023 08:46 AM    BUN 39 (H) 05/24/2023 01:26 PM    BUN 28 (H) 04/26/2023 04:46 PM    Chloride 104 05/25/2023 08:46 AM    Chloride 102 05/24/2023 01:26 PM    Chloride 96 04/26/2023 04:46 PM    CO2 24 05/25/2023 08:46 AM    CO2 25 05/24/2023 01:26 PM    CO2 29 04/26/2023 04:46 PM    Creatinine 1 09 05/25/2023 08:46 AM    Creatinine 1 39 (H) 05/24/2023 01:26 PM    Creatinine 1 16 04/26/2023 04:46 PM    Hematocrit 44 8 05/25/2023 08:46 AM    Hematocrit 44 4 05/24/2023 01:26 PM    Hematocrit 45 1 04/26/2023 04:46 PM    Hemoglobin 15 2 05/25/2023 08:46 AM    Hemoglobin 14 3 05/24/2023 01:26 PM    Hemoglobin 14 6 04/26/2023 04:46 PM    Hemoglobin A1C 9 1 (A) 03/02/2023 11:19 AM    Hemoglobin A1C 8 1 (A) 11/30/2022 10:40 AM "Hemoglobin A1C 9 4 07/19/2022 12:00 AM    Potassium 4 1 05/25/2023 08:46 AM    Potassium 4 3 05/24/2023 01:26 PM    Potassium 3 9 04/26/2023 04:46 PM    MCV 84 05/25/2023 08:46 AM    MCV 86 05/24/2023 01:26 PM    MCV 87 04/26/2023 04:46 PM    Platelets 644 94/59/9333 08:46 AM    Platelets 473 50/30/3198 01:26 PM    Platelets 894 17/68/1875 04:46 PM    Total Protein 7 5 05/24/2023 01:26 PM    Total Protein 6 7 04/26/2023 04:46 PM    Total Protein 7 9 01/02/2023 09:26 PM    WBC 7 55 05/25/2023 08:46 AM    WBC 10 03 05/24/2023 01:26 PM    WBC 17 41 (H) 04/26/2023 04:46 PM     Lab Results   Component Value Date    CHOLESTEROL 94 01/26/2022    CHOLESTEROL 108 09/02/2020    CHOLESTEROL 97 02/05/2020     Lab Results   Component Value Date    HDL 22 (L) 01/26/2022    HDL 23 (L) 09/02/2020    HDL 27 (L) 02/05/2020     Lab Results   Component Value Date    TRIG 292 (H) 01/26/2022    TRIG 130 07/28/2021    TRIG 307 (H) 09/02/2020     Lab Results   Component Value Date    NONHDLC 72 01/26/2022    [place] 80 07/19/2019     Lab Results   Component Value Date    LDLCALC 14 01/26/2022         Imaging Studies: I have personally reviewed pertinent reports  Portions of the record may have been created with voice recognition software  Occasional wrong word or \"sound a like\" substitutions may have occurred due to the inherent limitations of voice recognition software  Read the chart carefully and recognize, using context, where substitutions have occurred    "

## 2023-06-09 ENCOUNTER — HOSPITAL ENCOUNTER (OUTPATIENT)
Dept: RADIOLOGY | Facility: HOSPITAL | Age: 58
Discharge: HOME | End: 2023-06-09
Payer: COMMERCIAL

## 2023-06-09 ENCOUNTER — HOSPITAL ENCOUNTER (OUTPATIENT)
Dept: NON INVASIVE DIAGNOSTICS | Facility: HOSPITAL | Age: 58
Discharge: HOME | End: 2023-06-09
Payer: COMMERCIAL

## 2023-06-09 DIAGNOSIS — I35.0 NONRHEUMATIC AORTIC VALVE STENOSIS: ICD-10-CM

## 2023-06-09 DIAGNOSIS — I50.33 ACUTE ON CHRONIC DIASTOLIC HEART FAILURE (HCC): ICD-10-CM

## 2023-06-09 DIAGNOSIS — Z13.6 ENCOUNTER FOR SCREENING FOR STENOSIS OF CAROTID ARTERY: ICD-10-CM

## 2023-06-09 DIAGNOSIS — Z87.891 FORMER SMOKER: ICD-10-CM

## 2023-06-09 PROCEDURE — G1004 CDSM NDSC: HCPCS

## 2023-06-09 PROCEDURE — 93880 EXTRACRANIAL BILAT STUDY: CPT

## 2023-06-09 PROCEDURE — 75572 CT HRT W/3D IMAGE: CPT

## 2023-06-09 PROCEDURE — 74174 CTA ABD&PLVS W/CONTRAST: CPT

## 2023-06-09 PROCEDURE — 93880 EXTRACRANIAL BILAT STUDY: CPT | Performed by: SURGERY

## 2023-06-09 RX ORDER — IODIXANOL 320 MG/ML
240 INJECTION, SOLUTION INTRAVASCULAR
Status: COMPLETED | OUTPATIENT
Start: 2023-06-09 | End: 2023-06-09

## 2023-06-09 RX ADMIN — IODIXANOL 240 ML: 320 INJECTION, SOLUTION INTRAVASCULAR at 11:58

## 2023-06-15 ENCOUNTER — TELEPHONE (OUTPATIENT)
Dept: CARDIOLOGY CLINIC | Facility: CLINIC | Age: 58
End: 2023-06-15

## 2023-06-15 NOTE — TELEPHONE ENCOUNTER
Daniel Ng has noticed his BP running on the low side the last couple visits  102-109 and wants to know if that is ok to have up until his valve replacement

## 2023-06-16 RX ORDER — FLUTICASONE PROPIONATE 220 UG/1
AEROSOL, METERED RESPIRATORY (INHALATION)
Qty: 12 G | Refills: 10 | Status: SHIPPED | OUTPATIENT
Start: 2023-06-16

## 2023-06-16 RX ORDER — UMECLIDINIUM BROMIDE AND VILANTEROL TRIFENATATE 62.5; 25 UG/1; UG/1
POWDER RESPIRATORY (INHALATION)
Qty: 60 EACH | Refills: 10 | Status: SHIPPED | OUTPATIENT
Start: 2023-06-16

## 2023-06-16 RX ORDER — IPRATROPIUM BROMIDE AND ALBUTEROL SULFATE 2.5; .5 MG/3ML; MG/3ML
SOLUTION RESPIRATORY (INHALATION)
Qty: 180 ML | Refills: 10 | Status: SHIPPED | OUTPATIENT
Start: 2023-06-16

## 2023-06-16 NOTE — TELEPHONE ENCOUNTER
Left Message on patients phone with Eufemia Viveros  And told to call the office with any questions

## 2023-06-19 ENCOUNTER — TELEPHONE (OUTPATIENT)
Dept: PULMONOLOGY | Facility: CLINIC | Age: 58
End: 2023-06-19

## 2023-06-19 NOTE — TELEPHONE ENCOUNTER
I called the pt and he has another appt at 3 and is not able to travel from this appt to the next   He would like to now if he can do a virtual  Please advise

## 2023-06-19 NOTE — TELEPHONE ENCOUNTER
Patient called wanting Dr Joshua Treviño opinion on if he should cancel his appt with him and Dr Janna Mercer on 6/21  He wants to know if he should cancel because his BROOKE testing is not until 6/22 which is after seeing Dr Julita Ashley so he doesn't know if anything else could be done and he doesn't want to have to drive down here 2 days in a row, but if he needs to keep the appointment he will  Please advise

## 2023-06-21 ENCOUNTER — ANESTHESIA EVENT (OUTPATIENT)
Dept: PERIOP | Facility: HOSPITAL | Age: 58
End: 2023-06-21
Payer: COMMERCIAL

## 2023-06-22 ENCOUNTER — HOSPITAL ENCOUNTER (OUTPATIENT)
Dept: NON INVASIVE DIAGNOSTICS | Facility: HOSPITAL | Age: 58
Discharge: HOME | End: 2023-06-22
Payer: COMMERCIAL

## 2023-06-22 ENCOUNTER — ANESTHESIA (OUTPATIENT)
Dept: PERIOP | Facility: HOSPITAL | Age: 58
End: 2023-06-22
Payer: COMMERCIAL

## 2023-06-22 ENCOUNTER — HOSPITAL ENCOUNTER (OUTPATIENT)
Facility: HOSPITAL | Age: 58
Setting detail: OUTPATIENT SURGERY
Discharge: HOME | End: 2023-06-22
Attending: INTERNAL MEDICINE | Admitting: INTERNAL MEDICINE
Payer: COMMERCIAL

## 2023-06-22 VITALS
HEIGHT: 67 IN | DIASTOLIC BLOOD PRESSURE: 64 MMHG | BODY MASS INDEX: 49.44 KG/M2 | RESPIRATION RATE: 16 BRPM | SYSTOLIC BLOOD PRESSURE: 103 MMHG | OXYGEN SATURATION: 92 % | HEART RATE: 77 BPM | WEIGHT: 315 LBS | TEMPERATURE: 97 F

## 2023-06-22 VITALS
DIASTOLIC BLOOD PRESSURE: 58 MMHG | HEIGHT: 67 IN | HEART RATE: 75 BPM | SYSTOLIC BLOOD PRESSURE: 103 MMHG | BODY MASS INDEX: 49.44 KG/M2 | WEIGHT: 315 LBS

## 2023-06-22 DIAGNOSIS — I35.0 NONRHEUMATIC AORTIC VALVE STENOSIS: ICD-10-CM

## 2023-06-22 DIAGNOSIS — I50.33 ACUTE ON CHRONIC DIASTOLIC HEART FAILURE (HCC): ICD-10-CM

## 2023-06-22 LAB
AORTIC ROOT: 2.8 CM
ASCENDING AORTA: 3 CM
AV AREA BY CONTINUOUS VTI: 1.4 CM2
AV MEAN GRADIENT: 47.3 MMHG
AV PEAK GRADIENT: 67.1 MMHG
AV VALVE AREA: 1.4 CM2
AVA (PLAN): 1.6 CM2
DOP CALC AO PEAK VEL: 4.1 M/S
DOP CALC AO VTI: 84.8 CM
DOP CALC LVOT AREA: 3.8 CM2
DOP CALC LVOT DIAMETER: 2.2 CM
DOP CALC LVOT PEAK VEL VTI: 31.2 CM
DOP CALC LVOT STROKE VOLUME: 118.54 CM3
GLUCOSE SERPL-MCNC: 241 MG/DL (ref 65–140)
SL CV LV EF: 68

## 2023-06-22 PROCEDURE — 76376 3D RENDER W/INTRP POSTPROCES: CPT

## 2023-06-22 PROCEDURE — 93325 DOPPLER ECHO COLOR FLOW MAPG: CPT | Performed by: INTERNAL MEDICINE

## 2023-06-22 PROCEDURE — 93321 DOPPLER ECHO F-UP/LMTD STD: CPT | Performed by: INTERNAL MEDICINE

## 2023-06-22 PROCEDURE — 93312 ECHO TRANSESOPHAGEAL: CPT

## 2023-06-22 PROCEDURE — 93320 DOPPLER ECHO COMPLETE: CPT | Performed by: INTERNAL MEDICINE

## 2023-06-22 PROCEDURE — 76376 3D RENDER W/INTRP POSTPROCES: CPT | Performed by: INTERNAL MEDICINE

## 2023-06-22 PROCEDURE — 82948 REAGENT STRIP/BLOOD GLUCOSE: CPT

## 2023-06-22 RX ORDER — MIDAZOLAM HYDROCHLORIDE 2 MG/2ML
INJECTION, SOLUTION INTRAMUSCULAR; INTRAVENOUS AS NEEDED
Status: DISCONTINUED | OUTPATIENT
Start: 2023-06-22 | End: 2023-06-22

## 2023-06-22 RX ORDER — PROPOFOL 10 MG/ML
INJECTION, EMULSION INTRAVENOUS AS NEEDED
Status: DISCONTINUED | OUTPATIENT
Start: 2023-06-22 | End: 2023-06-22

## 2023-06-22 RX ORDER — SODIUM CHLORIDE, SODIUM LACTATE, POTASSIUM CHLORIDE, CALCIUM CHLORIDE 600; 310; 30; 20 MG/100ML; MG/100ML; MG/100ML; MG/100ML
50 INJECTION, SOLUTION INTRAVENOUS CONTINUOUS
Status: DISCONTINUED | OUTPATIENT
Start: 2023-06-22 | End: 2023-06-22 | Stop reason: HOSPADM

## 2023-06-22 RX ORDER — KETAMINE HCL IN NACL, ISO-OSM 100MG/10ML
SYRINGE (ML) INJECTION AS NEEDED
Status: DISCONTINUED | OUTPATIENT
Start: 2023-06-22 | End: 2023-06-22

## 2023-06-22 RX ORDER — PROPOFOL 10 MG/ML
INJECTION, EMULSION INTRAVENOUS CONTINUOUS PRN
Status: DISCONTINUED | OUTPATIENT
Start: 2023-06-22 | End: 2023-06-22

## 2023-06-22 RX ORDER — FENTANYL CITRATE 50 UG/ML
INJECTION, SOLUTION INTRAMUSCULAR; INTRAVENOUS AS NEEDED
Status: DISCONTINUED | OUTPATIENT
Start: 2023-06-22 | End: 2023-06-22

## 2023-06-22 RX ADMIN — PROPOFOL 20 MG: 10 INJECTION, EMULSION INTRAVENOUS at 11:51

## 2023-06-22 RX ADMIN — PHENYLEPHRINE HYDROCHLORIDE 100 MCG/MIN: 10 INJECTION INTRAVENOUS at 11:40

## 2023-06-22 RX ADMIN — FENTANYL CITRATE 25 MCG: 50 INJECTION, SOLUTION INTRAMUSCULAR; INTRAVENOUS at 11:41

## 2023-06-22 RX ADMIN — Medication 10 MG: at 11:52

## 2023-06-22 RX ADMIN — Medication 20 MG: at 11:43

## 2023-06-22 RX ADMIN — PROPOFOL 20 MG: 10 INJECTION, EMULSION INTRAVENOUS at 11:42

## 2023-06-22 RX ADMIN — FENTANYL CITRATE 25 MCG: 50 INJECTION, SOLUTION INTRAMUSCULAR; INTRAVENOUS at 11:50

## 2023-06-22 RX ADMIN — PROPOFOL 50 MCG/KG/MIN: 10 INJECTION, EMULSION INTRAVENOUS at 11:42

## 2023-06-22 RX ADMIN — MIDAZOLAM 2 MG: 1 INJECTION INTRAMUSCULAR; INTRAVENOUS at 11:41

## 2023-06-22 RX ADMIN — SODIUM CHLORIDE, SODIUM LACTATE, POTASSIUM CHLORIDE, AND CALCIUM CHLORIDE 50 ML/HR: .6; .31; .03; .02 INJECTION, SOLUTION INTRAVENOUS at 10:09

## 2023-06-22 NOTE — H&P
H&P Exam - Crystal  60 y o  male MRN: [id number]    Unit/Bed#:  Encounter: [id number]    Assessment:  Severe aortic stenosis  With progressive dyspnea on exertion and dizziness  TTE on 5/1/2023: very limited study with peak gradient of 85, mean gradient 46, DVI 0 22, LOLIS 0 77  EF 65%  Seen by CT surgery: awaiting TAVR, undergoing pre TAVR work up    COPD  On home inhalers  Currently stable, not in acute exacerbation    CAD  [facility] on 5/25/2023 for pre TAVR work up showed no obstructive CAD    DM  On insulin glargine 70U HS, and ozempic    Hypertension  /60 todat    Hyperlipidemia      Plan:  1  BROOKE to evaluate degree of AS  2  Continue home antihypertensives: coreg 12 5 mg bid, spironolactone 50 mg daily, losartan 100 mg daily, and amlodipine 10 mg daily  3  Continue lipitor 40 mg daily  4  Furosemide 40 mg daily  5  History of Present Illness   Mr Juanita Gomez, a 60year old male with hypertension, hyperlipidemia, DM2, COPD, and severe AS presented today for an elective BROOKE  He has severe symptomatic aortic stenosis  Symptoms include dyspnea on exertion, dizziness and near syncope  No chest pain or angina  Currently denies shortness of breath at rest, no orthopnea, PND  Has mild B/L lower extremity swelling  He has a history of bronchospasms intra op requiring intubation  Review of Systems   Constitutional: Positive for fatigue  Negative for fever  HENT: Negative  Eyes: Negative  Respiratory: Positive for shortness of breath  Negative for wheezing  Cardiovascular: Positive for leg swelling  Negative for chest pain and palpitations  Gastrointestinal: Negative  Endocrine: Negative  Genitourinary: Negative  Musculoskeletal: Negative  Skin: Negative  Neurological: Positive for dizziness and light-headedness  Negative for syncope  Psychiatric/Behavioral: Negative  All other systems reviewed and are negative        Historical Information   Past Medical History:   Diagnosis Date   • Aortic stenosis    • Asthma    • COPD (chronic obstructive pulmonary disease) ([facility] [address] )    • Coronary artery disease     Aortic stenosis   • Diabetes ([facility] [address] )    • Diabetes mellitus ([facility] )    • Hyperlipidemia    • Hypertension 2014   • Pneumonia    • Sleep apnea, obstructive      Past Surgical History:   Procedure Laterality Date   • ACCESSORY NAVICULAR EXCISION Right    • APPENDECTOMY     • CARDIAC CATHETERIZATION N/A 2023    Procedure: Cardiac Coronary Angiogram;  Surgeon: Edwardo Garcia MD;  Location:  CARDIAC CATH LAB; Service: Cardiology   • EAR FOREIGN BODY REMOVAL      cyst removal  bronchospasms after general anesthesia  • EYE SURGERY Left     traumatic injury at age 6-9     Social History   Social History     Substance and Sexual Activity   Alcohol Use Never     Social History     Substance and Sexual Activity   Drug Use Never     Social History     Tobacco Use   Smoking Status Former   • Packs/day: 3 00   • Years: 30 00   • Total pack years: 90 00   • Types: Cigarettes   • Start date: 3/15/1984   • Quit date: 7/15/2005   • Years since quittin 9   Smokeless Tobacco Never     E-Cigarette Use: Never User     E-Cigarette/Vaping Substances   • Nicotine No    • THC No    • CBD No    • Flavoring No    • Other No    • Unknown No        Family History:   Family History   Problem Relation Age of Onset   • No Known Problems Mother    • Heart disease Father    • Lung cancer Father         Passed away    • Cancer Father         Lung cancer   • Hypertension Father    • Diabetes Maternal Grandfather    • Stomach cancer Maternal Grandfather        Meds/Allergies   PTA meds:   Cannot display prior to admission medications because the patient has not been admitted in this contact       Allergies   Allergen Reactions   • Theophylline Other (See Comments)     Severe headaches  headaches  Severe headaches         Objective   First Vitals:        Current Vitals:        No intake or output data in the 24 hours ending 06/22/23 1030    Invasive Devices     Peripheral Intravenous Line  Duration           Peripheral IV 06/22/23 Right Hand <1 day                Physical Exam  Vitals and nursing note reviewed  Constitutional:       Comments: BMI 53 25   HENT:      Head: Normocephalic  Nose: Nose normal       Mouth/Throat:      Mouth: Mucous membranes are moist    Eyes:      Pupils: Pupils are equal, round, and reactive to light  Neck:      Comments: Unable to identify JVD  Cardiovascular:      Rate and Rhythm: Normal rate  Heart sounds: Murmur heard  Pulmonary:      Breath sounds: Normal breath sounds  No rales  Comments: Reduced air entry b/l  Musculoskeletal:      Right lower leg: Edema (1+ pitting edema) present  Left lower leg: Edema (1+ pitting edema) present  Neurological:      Mental Status: He is alert             Code Status: Prior  Advance Directive and Living Will:      Power of :    POLST:

## 2023-06-22 NOTE — ANESTHESIA PREPROCEDURE EVALUATION
Procedure:  TRANSESOPHAGEAL ECHOCARDIOGRAM (BROOKE) (Esophagus)    Relevant Problems   CARDIO   (+) RUTHERFORD (dyspnea on exertion)   (+) Dyslipidemia, goal LDL below 100   (+) Essential hypertension   (+) Murmur   (+) Nonrheumatic aortic valve stenosis   (+) Renovascular hypertension with goal blood pressure less than 140/90      ENDO   (+) Type 2 diabetes mellitus with diabetic neuropathy, with long-term current use of insulin (HCC)      /RENAL   (+) Stage 2 chronic kidney disease      NEURO/PSYCH   (+) Anxiety and depression      PULMONARY   (+) Asthma, moderate persistent   (+) COPD with acute bronchitis (HCC)   (+) COPD, severe (HCC)   (+) Chronic hypoxemic respiratory failure (HCC)   (+) RUTHERFORD (dyspnea on exertion)   (+) Mixed simple and mucopurulent chronic bronchitis (HCC)   (+) SCOTT (obstructive sleep apnea)        Physical Exam    Airway    Mallampati score: IV         Dental       Cardiovascular  Rhythm: regular,     Pulmonary      Other Findings        Anesthesia Plan  ASA Score- 4     Anesthesia Type- IV sedation with anesthesia with ASA Monitors  Additional Monitors:   Airway Plan:           Plan Factors-    Chart reviewed  EKG reviewed  Patient summary reviewed  Induction-     Postoperative Plan-     Informed Consent- Anesthetic plan and risks discussed with patient and spouse

## 2023-06-26 ENCOUNTER — TELEPHONE (OUTPATIENT)
Dept: CARDIAC SURGERY | Facility: CLINIC | Age: 58
End: 2023-06-26

## 2023-06-26 NOTE — TELEPHONE ENCOUNTER
Spoke with patient's wife and informed her Dr Ward Paulino reviewed BROOKE done on 6/22/23  Explained patient has moderate AS and does not need surgical intervention at this time  Informed her we recommend patient continue to follow with Dr Thelma Ng and that we would update Dr Thelma Ng of same

## 2023-07-12 ENCOUNTER — OFFICE VISIT (OUTPATIENT)
Dept: PULMONOLOGY | Facility: CLINIC | Age: 58
End: 2023-07-12
Payer: COMMERCIAL

## 2023-07-12 VITALS
SYSTOLIC BLOOD PRESSURE: 112 MMHG | OXYGEN SATURATION: 90 % | HEIGHT: 67 IN | WEIGHT: 315 LBS | TEMPERATURE: 98.2 F | BODY MASS INDEX: 49.44 KG/M2 | DIASTOLIC BLOOD PRESSURE: 60 MMHG

## 2023-07-12 DIAGNOSIS — R76.8 POSITIVE ANA (ANTINUCLEAR ANTIBODY): Primary | ICD-10-CM

## 2023-07-12 DIAGNOSIS — R06.09 DOE (DYSPNEA ON EXERTION): ICD-10-CM

## 2023-07-12 DIAGNOSIS — M32.9 LUPUS (HCC): ICD-10-CM

## 2023-07-12 DIAGNOSIS — J45.40 MODERATE PERSISTENT ASTHMA, UNSPECIFIED WHETHER COMPLICATED: ICD-10-CM

## 2023-07-12 DIAGNOSIS — R76.8 POSITIVE DOUBLE STRANDED DNA ANTIBODY TEST: ICD-10-CM

## 2023-07-12 DIAGNOSIS — G47.33 OSA (OBSTRUCTIVE SLEEP APNEA): Primary | ICD-10-CM

## 2023-07-12 DIAGNOSIS — J96.11 CHRONIC HYPOXEMIC RESPIRATORY FAILURE (HCC): ICD-10-CM

## 2023-07-12 DIAGNOSIS — J44.9 COPD, SEVERE (HCC): ICD-10-CM

## 2023-07-12 DIAGNOSIS — I35.0 AORTIC STENOSIS, MODERATE: ICD-10-CM

## 2023-07-12 PROCEDURE — 99215 OFFICE O/P EST HI 40 MIN: CPT | Performed by: INTERNAL MEDICINE

## 2023-07-12 PROCEDURE — 99204 OFFICE O/P NEW MOD 45 MIN: CPT | Performed by: INTERNAL MEDICINE

## 2023-07-12 RX ORDER — PREDNISONE 10 MG/1
TABLET ORAL
Qty: 30 TABLET | Refills: 0 | Status: SHIPPED | OUTPATIENT
Start: 2023-07-12 | End: 2023-07-24

## 2023-07-12 NOTE — PATIENT INSTRUCTIONS
Do labs in 6 weeks or prior to next steroids    Follow-up with Dr. Yamil Fields in 2 months LMOM per hippa in detail and [employer] message sent

## 2023-07-12 NOTE — PROGRESS NOTES
Assessment and Plan:   Saritha Johnson is a 60 y.o.  male who presents to rheum-pulm clinic for evaluation of possible connective tissue disease related lung disease given steroid-responsive breathing and positive CLYDE of 1:80 and positive anti-dsDNA. Overall, patient's symptoms do not seem consistent with lupus or any other connective tissue disease, and in evaluation with pulmonary, seems that his dyspnea is more due to uncontrolled asthma, which is steroid-responsive. Dyspnea could also be related to moderate aortic stenosis. Do lupus activity labs in 6 weeks or prior to next steroids; quantitative anti-dsDNA ordered    Follow-up with Dr. Joanie Hathaway in 2 months    Plan:  Diagnoses and all orders for this visit:    Positive CLYDE (antinuclear antibody)    Positive double stranded DNA antibody test    Lupus (HCC)  -     Anti-DNA antibody, double-stranded  -     Sedimentation rate, automated  -     C-reactive protein    Moderate persistent asthma, unspecified whether complicated    Aortic stenosis, moderate    RUTHERFORD (dyspnea on exertion)  -     Complete PFT with post bronchodilator; Future  -     CT chest wo contrast; Future    COPD, severe ([address])  -     [facility] Allergy Panel, Adult; Future  -     CBC and differential; Future  -     predniSONE 10 mg tablet; Take 4 tablets (40 mg total) by mouth daily for 3 days, THEN 3 tablets (30 mg total) daily for 3 days, THEN 2 tablets (20 mg total) daily for 3 days, THEN 1 tablet (10 mg total) daily for 3 days. Follow-up plan: Return to rheumatology clinic as needed        HPI  Saritha Johnson is a 60 y.o.  male who presents as a Rheumatology consult referred by No ref. provider found for evaluation of possible connective tissue disease related lung disease given steroid-responsive breathing and positive CLYDE of 1:80. Admits that his hands feel tight, discomfort is worse later in the day. Knees also stiff later in the day, worse with sitting, better with movement.  Feet hurt, sharp pain, throbbing. Gets dry mouth at night (exercise or heavy breathing). Gets a tongue ulcer once in a while. Cold bothers more. Has moderate aortic stenosis. Uses oxygen at night. Last prednisone course was 10 days ago. Review of Systems  Review of Systems   Constitutional: Positive for appetite change. Negative for fever. HENT: Positive for rhinorrhea. Negative for ear pain, postnasal drip, sneezing, sore throat and trouble swallowing. Respiratory: Positive for cough, shortness of breath and wheezing. Cardiovascular: Positive for chest pain. Musculoskeletal: Positive for myalgias. Neurological: Positive for headaches. Answers for HPI/ROS submitted by the patient on 7/5/2023  Do you have chest tightness?: Yes  Do you have difficulty breathing?: Yes  Do you experience frequent throat clearing?: Yes  Do you have a hoarse voice?: Yes  Do you have a wet cough?: Yes  Chronicity: chronic  When did you first notice your symptoms?: more than 1 month ago  How often do your symptoms occur?: constantly  Since you first noticed this problem, how has it changed?: unchanged  Do you have shortness of breath that occurs with effort or exertion?: Yes  Do you have ear congestion?: No  Do you have heartburn?: No  Do you have fatigue?: Yes  Do you have nasal congestion?: Yes  Do you have shortness of breath when lying flat?: No  Do you have shortness of breath when you wake up?: No  Do you have sweats?: No  Have you experienced weight loss?: No  Which of the following makes your symptoms worse?: any activity, change in weather, climbing stairs, emotional stress, exercise, exposure to fumes, exposure to smoke, minimal activity, strenuous activity, URI  Which of the following makes your symptoms better?: cold air, oral steroids, steroid inhaler      Reviewed and agree.     Allergies  Allergies   Allergen Reactions   • Theophylline Other (See Comments)     Severe headaches  headaches  Severe headaches         Home Medications    Current Outpatient Medications:   •  albuterol (Ventolin HFA) 90 mcg/act inhaler, Inhale 2 puffs every 6 (six) hours as needed for wheezing, Disp: 18 g, Rfl: 3  •  amLODIPine (NORVASC) 10 mg tablet, Take 1 tablet (10 mg total) by mouth daily, Disp: 90 tablet, Rfl: 3  •  ammonium lactate (LAC-HYDRIN) 12 % cream, Apply topically as needed for dry skin (Patient taking differently: Apply topically as needed for dry skin As needed), Disp: 385 g, Rfl: 0  •  Anoro Ellipta 62.5-25 MCG/ACT inhaler, INHALE 1 PUFF BY MOUTH DAILY, Disp: 60 each, Rfl: 10  •  aspirin (ECOTRIN LOW STRENGTH) 81 mg EC tablet, Take 1 tablet (81 mg total) by mouth daily, Disp: 30 tablet, Rfl: 5  •  atorvastatin (LIPITOR) 40 mg tablet, Take 1 tablet (40 mg total) by mouth daily, Disp: 90 tablet, Rfl: 3  •  Blood Glucose Monitoring Suppl (CONTOUR NEXT MONITOR) w/Device KIT, Test blood sugar once daily or as needed for fluctuating blood sugars, Disp: 1 kit, Rfl: 0  •  carvedilol (COREG) 12.5 mg tablet, Take 1 tablet (12.5 mg total) by mouth 2 (two) times a day with meals, Disp: 180 tablet, Rfl: 3  •  cyclobenzaprine (FLEXERIL) 10 mg tablet, Take 10 mg by mouth, Disp: , Rfl:   •  Dapagliflozin Propanediol (Farxiga) 10 MG TABS, Take 10 mg by mouth daily, Disp: , Rfl:   •  Flovent  MCG/ACT inhaler, INHALE 2 PUFFS BY MOUTH TWICE DAILY *RINSE MOUTH AFTER USE*, Disp: 12 g, Rfl: 10  •  furosemide (LASIX) 40 mg tablet, Take 1 tablet (40 mg total) by mouth 2 (two) times a day, Disp: 180 tablet, Rfl: 3  •  gabapentin (NEURONTIN) 400 mg capsule, TAKE 1 CAPSULE BY MOUTH THREE TIMES A DAY, Disp: 90 capsule, Rfl: 10  •  glimepiride (AMARYL) 4 mg tablet, Take 4 mg by mouth 2 (two) times a day, Disp: , Rfl:   •  glucose blood (CONTOUR NEXT TEST) test strip, Test blood sugar once daily or as needed for fluctuating blood sugars, Disp: 100 each, Rfl: 3  •  insulin glargine (Toujeo Max SoloStar) 300 units/mL CONCENTRATED U-300 injection pen (2-unit dial), Inject 70 Units under the skin daily at bedtime, Disp: 24 mL, Rfl: 1  •  Insulin Pen Needle (BD Pen Needle Dee Dee U/F) 32G X 4 MM MISC, Use 4 a day, Disp: 200 each, Rfl: 5  •  ipratropium-albuterol (DUO-NEB) 0.5-2.5 mg/3 mL nebulizer solution, INHALE CONTENTS OF 1 VIAL VIA NEBULIZER FOUR TIMES A DAY, Disp: 180 mL, Rfl: 10  •  Lancets MISC, Test blood sugar once daily or as needed for fluctuating blood sugars, Disp: 100 each, Rfl: 0  •  losartan (COZAAR) 100 MG tablet, Take 1 tablet (100 mg total) by mouth daily, Disp: 90 tablet, Rfl: 3  •  MELATONIN PO, Take 10 mg by mouth, Disp: , Rfl:   •  metFORMIN (GLUCOPHAGE) 1000 MG tablet, TAKE 1 TABLET BY MOUTH TWICE DAILY WITH MEALS, Disp: 180 tablet, Rfl: 0  •  Multiple Vitamins-Minerals (MENS MULTIVITAMIN PO), Take by mouth, Disp: , Rfl:   •  Ozempic, 2 MG/DOSE, 8 MG/3ML SOPN, 2 mg every 7 days, Disp: , Rfl:   •  sertraline (ZOLOFT) 50 mg tablet, TAKE 1 TABLET BY MOUTH DAILY, Disp: 30 tablet, Rfl: 3  •  spironolactone (ALDACTONE) 50 mg tablet, Take 1 tablet (50 mg total) by mouth daily, Disp: 90 tablet, Rfl: 3    Past Medical History  Past Medical History:   Diagnosis Date   • Aortic stenosis    • Asthma 1964   • COPD (chronic obstructive pulmonary disease) ([facility])    • Coronary artery disease 2000    Aortic stenosis   • Diabetes ([facility])    • Diabetes mellitus ([address] [facility])    • Hyperlipidemia    • Hypertension 07/02/2014   • Pneumonia 2015   • Sleep apnea, obstructive        Past Surgical History   Past Surgical History:   Procedure Laterality Date   • ACCESSORY NAVICULAR EXCISION Right    • APPENDECTOMY     • CARDIAC CATHETERIZATION N/A 05/25/2023    Procedure: Cardiac Coronary Angiogram;  Surgeon: Mariel Terrell MD;  Location: [facility]; Service: Cardiology   • EAR FOREIGN BODY REMOVAL      cyst removal. bronchospasms after general anesthesia.    • EYE SURGERY Left     traumatic injury at age 6-11       Family History    Family History   Problem Relation Age of Onset   • No Known Problems Mother    • Heart disease Father    • Lung cancer Father         Passed away 0   • Cancer Father         Lung cancer   • Hypertension Father    • Diabetes Maternal Grandfather    • Stomach cancer Maternal Grandfather      No known family history of autoimmune or inflammatory diseases. Social History  Occupation:   Social History     Substance and Sexual Activity   Alcohol Use Never     Social History     Substance and Sexual Activity   Drug Use Never     Social History     Tobacco Use   Smoking Status Former   • Packs/day: 3.00   • Years: 30.00   • Total pack years: 90.00   • Types: Cigarettes   • Start date: 3/15/1984   • Quit date: 7/15/2005   • Years since quittin.0   Smokeless Tobacco Never       Objective:  Vitals:    23 1702   BP: 112/60   Temp: 98.2 °F (36.8 °C)   SpO2: 90%   Weight: (!) 155 kg (342 lb)   Height: 5' 7" (1.702 m)       Physical Exam  Constitutional:       General: He is not in acute distress. HENT:      Head: Normocephalic and atraumatic. Eyes:      Conjunctiva/sclera: Conjunctivae normal.   Cardiovascular:      Rate and Rhythm: Normal rate and regular rhythm. Heart sounds: S1 normal and S2 normal. Murmur heard. No friction rub. Comments: Systolic murmur  Pulmonary:      Effort: Pulmonary effort is normal. No respiratory distress. Breath sounds: Normal breath sounds. No wheezing, rhonchi or rales. Musculoskeletal:      Cervical back: Neck supple. Skin:     General: Skin is warm and dry. Coloration: Skin is not pale. Findings: No rash. Neurological:      Mental Status: He is alert. Mental status is at baseline. Psychiatric:         Mood and Affect: Mood normal.         Behavior: Behavior normal.       Reviewed labs and imaging. Imaging:   [facility]    Result Date: 2023  Narrative: •  Left Ventricle: Left ventricular cavity size is normal. Wall thickness is mildly increased.  There is mild concentric hypertrophy. The left ventricular ejection fraction is 68%. Systolic function is vigorous. Wall motion is normal. •  Atrial Septum: No patent foramen ovale detected. •  Left Atrial Appendage: There is normal function. There is no thrombus. •  Aortic Valve: The aortic valve is trileaflet. The leaflets are moderately thickened. The leaflets are moderately calcified. There is moderately reduced mobility. There is moderate stenosis. The aortic valve peak velocity is 4.1 m/s. The aortic valve peak gradient is 67.1 mmHg. The aortic valve mean gradient is 47.3 mmHg. The aortic valve area is 1.40 cm2. •  Mitral Valve: There is mild annular calcification. 3D was performed for further investigation, better visualization, and additional quantification of the aortic valve. Results from the utilization of 3D are listed in the report below. CTA Chest Abdomen Pelvis w/ and w/o Contrast 6/9/23   Near complete resolution of consolidative process in the right lower lobe. Small amount of groundglass opacity persists. CT Chest w/o Contrast 4/26/23  IMPRESSION:  Extensive consolidation and groundglass opacity involving the entire right lower lobe, increased from 3/20/2023. This could be due to pneumonia, but given hemoptysis, pulmonary hemorrhage is possible. Recommend follow-up with a chest CT in 3 months to assure resolution.     Labs:   Hospital Outpatient Visit on 06/22/2023   Component Date Value Ref Range Status   • LVOT stroke volume 06/22/2023 118.54  cm3 Final   • LVOT area 06/22/2023 3.80  cm2 Final   • LV EF 06/22/2023 68   Final   • LVOT peak VTI 06/22/2023 31.2  cm Final   • LVOT diameter 06/22/2023 2.2  cm Final   • Aortic valve peak velocity 06/22/2023 4.1  m/s Final   • Ao VTI 06/22/2023 84.8  cm Final   • AV mean gradient 06/22/2023 47.3  mmHg Final   • AV peak gradient 06/22/2023 67.1  mmHg Final   • AV valve area 06/22/2023 1.40  cm2 Final   • AV area by cont VTI 06/22/2023 1.4  cm2 Final   • Aortic Valve Area by Planimetry 06/22/2023 1.6  cm2 Final   • Ao root 06/22/2023 2.80  cm Final   • Asc Ao 06/22/2023 3.0  cm Final   Admission on 06/22/2023, Discharged on 06/22/2023   Component Date Value Ref Range Status   • POC Glucose 06/22/2023 241 (H)  65 - 140 mg/dl Final   Admission on 05/25/2023, Discharged on 05/25/2023   Component Date Value Ref Range Status   • Sodium 05/25/2023 130 (L)  135 - 147 mmol/L Final   • Potassium 05/25/2023 4.1  3.5 - 5.3 mmol/L Final   • Chloride 05/25/2023 104  96 - 108 mmol/L Final   • CO2 05/25/2023 24  21 - 32 mmol/L Final   • ANION GAP 05/25/2023 2 (L)  4 - 13 mmol/L Final   • BUN 05/25/2023 36 (H)  5 - 25 mg/dL Final   • Creatinine 05/25/2023 1.09  0.60 - 1.30 mg/dL Final    Standardized to IDMS reference method   • Glucose 05/25/2023 227 (H)  65 - 140 mg/dL Final    Specimen collection should occur prior to Sulfasalazine administration due to the potential for falsely depressed results. Specimen collection should occur prior to Sulfapyridine administration due to the potential for falsely elevated results. If the patient is fasting, the ADA then defines impaired fasting glucose as > 100 mg/dL and diabetes as > or equal to 123 mg/dL. • Glucose, Fasting 05/25/2023 227 (H)  65 - 99 mg/dL Final    Specimen collection should occur prior to Sulfasalazine administration due to the potential for falsely depressed results. Specimen collection should occur prior to Sulfapyridine administration due to the potential for falsely elevated results.    • Calcium 05/25/2023 9.3  8.3 - 10.1 mg/dL Final   • eGFR 05/25/2023 73  ml/min/1.73sq m Final   • WBC 05/25/2023 7.55  4.31 - 10.16 Thousand/uL Final   • RBC 05/25/2023 5.36  3.88 - 5.62 Million/uL Final   • Hemoglobin 05/25/2023 15.2  12.0 - 17.0 g/dL Final   • Hematocrit 05/25/2023 44.8  36.5 - 49.3 % Final   • MCV 05/25/2023 84  82 - 98 fL Final   • MCH 05/25/2023 28.4  26.8 - 34.3 pg Final   • MCHC 05/25/2023 33.9  31.4 - 37.4 g/dL Final   • RDW 05/25/2023 14.5  11.6 - 15.1 % Final   • MPV 05/25/2023 9.6  8.9 - 12.7 fL Final   • Platelets 81/49/8417 285  149 - 390 Thousands/uL Final   • nRBC 05/25/2023 0  /100 WBCs Final   • Neutrophils Relative 05/25/2023 71  43 - 75 % Final   • Immat GRANS % 05/25/2023 1  0 - 2 % Final   • Lymphocytes Relative 05/25/2023 14  14 - 44 % Final   • Monocytes Relative 05/25/2023 9  4 - 12 % Final   • Eosinophils Relative 05/25/2023 4  0 - 6 % Final   • Basophils Relative 05/25/2023 1  0 - 1 % Final   • Neutrophils Absolute 05/25/2023 5.51  1.85 - 7.62 Thousands/µL Final   • Immature Grans Absolute 05/25/2023 0.06  0.00 - 0.20 Thousand/uL Final   • Lymphocytes Absolute 05/25/2023 1.02  0.60 - 4.47 Thousands/µL Final   • Monocytes Absolute 05/25/2023 0.64  0.17 - 1.22 Thousand/µL Final   • Eosinophils Absolute 05/25/2023 0.28  0.00 - 0.61 Thousand/µL Final   • Basophils Absolute 05/25/2023 0.04  0.00 - 0.10 Thousands/µL Final   • Ventricular Rate 05/25/2023 71  BPM Final   • Atrial Rate 05/25/2023 71  BPM Final   • IL Interval 05/25/2023 160  ms Final   • QRSD Interval 05/25/2023 98  ms Final   • QT Interval 05/25/2023 412  ms Final   • QTC Interval 05/25/2023 447  ms Final   • P Axis 05/25/2023 74  degrees Final   • QRS Axis 05/25/2023 17  degrees Final   • T Wave Axis 05/25/2023 28  degrees Final   Telephone on 05/11/2023   Component Date Value Ref Range Status   • WBC 05/24/2023 10.03  4.31 - 10.16 Thousand/uL Final   • RBC 05/24/2023 5.14  3.88 - 5.62 Million/uL Final   • Hemoglobin 05/24/2023 14.3  12.0 - 17.0 g/dL Final   • Hematocrit 05/24/2023 44.4  36.5 - 49.3 % Final   • MCV 05/24/2023 86  82 - 98 fL Final   • MCH 05/24/2023 27.8  26.8 - 34.3 pg Final   • MCHC 05/24/2023 32.2  31.4 - 37.4 g/dL Final   • RDW 05/24/2023 14.4  11.6 - 15.1 % Final   • MPV 05/24/2023 10.1  8.9 - 12.7 fL Final   • Platelets 57/79/0375 295  149 - 390 Thousands/uL Final   • nRBC 05/24/2023 0  /100 WBCs Final   • Neutrophils Relative 05/24/2023 79 (H)  43 - 75 % Final   • Immat GRANS % 05/24/2023 1  0 - 2 % Final   • Lymphocytes Relative 05/24/2023 10 (L)  14 - 44 % Final   • Monocytes Relative 05/24/2023 7  4 - 12 % Final   • Eosinophils Relative 05/24/2023 3  0 - 6 % Final   • Basophils Relative 05/24/2023 0  0 - 1 % Final   • Neutrophils Absolute 05/24/2023 7.96 (H)  1.85 - 7.62 Thousands/µL Final   • Immature Grans Absolute 05/24/2023 0.07  0.00 - 0.20 Thousand/uL Final   • Lymphocytes Absolute 05/24/2023 1.03  0.60 - 4.47 Thousands/µL Final   • Monocytes Absolute 05/24/2023 0.67  0.17 - 1.22 Thousand/µL Final   • Eosinophils Absolute 05/24/2023 0.26  0.00 - 0.61 Thousand/µL Final   • Basophils Absolute 05/24/2023 0.04  0.00 - 0.10 Thousands/µL Final   • Sodium 05/24/2023 133 (L)  135 - 147 mmol/L Final   • Potassium 05/24/2023 4.3  3.5 - 5.3 mmol/L Final   • Chloride 05/24/2023 102  96 - 108 mmol/L Final   • CO2 05/24/2023 25  21 - 32 mmol/L Final   • ANION GAP 05/24/2023 6  4 - 13 mmol/L Final   • BUN 05/24/2023 39 (H)  5 - 25 mg/dL Final   • Creatinine 05/24/2023 1.39 (H)  0.60 - 1.30 mg/dL Final    Standardized to IDMS reference method   • Glucose 05/24/2023 255 (H)  65 - 140 mg/dL Final    If the patient is fasting, the ADA then defines impaired fasting glucose as > 100 mg/dL and diabetes as > or equal to 123 mg/dL. Specimen collection should occur prior to Sulfasalazine administration due to the potential for falsely depressed results. Specimen collection should occur prior to Sulfapyridine administration due to the potential for falsely elevated results. • Calcium 05/24/2023 9.6  8.3 - 10.1 mg/dL Final   • AST 05/24/2023 17  5 - 45 U/L Final    Specimen collection should occur prior to Sulfasalazine administration due to the potential for falsely depressed results.     • ALT 05/24/2023 40  12 - 78 U/L Final    Specimen collection should occur prior to Sulfasalazine and/or Sulfapyridine administration due to the potential for falsely depressed results. • Alkaline Phosphatase 05/24/2023 87  46 - 116 U/L Final   • Total Protein 05/24/2023 7.5  6.4 - 8.4 g/dL Final   • Albumin 05/24/2023 4.1  3.5 - 5.0 g/dL Final   • Total Bilirubin 05/24/2023 0.64  0.20 - 1.00 mg/dL Final    Use of this assay is not recommended for patients undergoing treatment with eltrombopag due to the potential for falsely elevated results.    • eGFR 05/24/2023 55  ml/min/1.73sq m Final   Hospital Outpatient Visit on 05/01/2023   Component Date Value Ref Range Status   • AV area peak jorge a 05/01/2023 0.7  cm2 Final   • LA size 05/01/2023 4.1  cm Final   • Aortic valve mean velocity 05/01/2023 32.40  m/s Final   • Triscuspid Valve Regurgitation Pea* 05/01/2023 6.0  mmHg Final   • Tricuspid valve peak regurgitation* 05/01/2023 1.26  m/s Final   • Left Atrium Area-systolic Apical T* 61/46/3215 29.4  cm2 Final   • Left Atrium Area-systolic [place]* 79/80/2178 23.1  cm2 Final   • MV E' Tissue Velocity Septal 05/01/2023 6  cm/s Final   • MV E' Tissue Velocity Lateral 05/01/2023 9  cm/s Final   • TR Peak Jorge A 05/01/2023 1.3  m/s Final   • LA length (A2C) 05/01/2023 5.70  cm Final   • Ao root 05/01/2023 3.20  cm Final   • RVID d 05/01/2023 3.3  cm Final   • LVOT mn grad 05/01/2023 2.0  mmHg Final   • AV area by cont VTI 05/01/2023 0.6  cm2 Final   • AV mean gradient 05/01/2023 46  mmHg Final   • AV LVOT peak gradient 05/01/2023 4  mmHg Final   • MV valve area p 1/2 method 05/01/2023 2.37  cm2 Final   • E wave deceleration time 05/01/2023 321  ms Final   • LVOT diameter 05/01/2023 2.0  cm Final   • LVOT peak jorge a 05/01/2023 0.96  m/s Final   • LVOT peak VTI 05/01/2023 17.99  cm Final   • Aortic valve peak velocity 05/01/2023 4.62  m/s Final   • Ao VTI 05/01/2023 99.82  cm Final   • LVOT stroke volume 05/01/2023 56.49  cm3 Final   • AV peak gradient 05/01/2023 85  mmHg Final   • MV Peak E Jorge A 05/01/2023 75  cm/s Final   • MV Peak A Jorge A 05/01/2023 0.75  m/s Final   • RAA A4C 05/01/2023 16.4  cm2 Final   • MV stenosis pressure 1/2 time 05/01/2023 93  ms Final   • LVOT stroke volume index 05/01/2023 22.00  ml/m2 Final   • LVOT area 05/01/2023 3.14  cm2 Final   • DVI 05/01/2023 0.21   Final   • AV valve area 05/01/2023 0.57  cm2 Final   • LV EF 05/01/2023 65   Final   Admission on 04/26/2023, Discharged on 04/26/2023   Component Date Value Ref Range Status   • Ventricular Rate 04/26/2023 84  BPM Final   • Atrial Rate 04/26/2023 84  BPM Final   • WI Interval 04/26/2023 158  ms Final   • QRSD Interval 04/26/2023 90  ms Final   • QT Interval 04/26/2023 356  ms Final   • QTC Interval 04/26/2023 420  ms Final   • P Axis 04/26/2023 56  degrees Final   • QRS Axis 04/26/2023 8  degrees Final   • T Wave Axis 04/26/2023 120  degrees Final   • WBC 04/26/2023 17.41 (H)  4.31 - 10.16 Thousand/uL Final   • RBC 04/26/2023 5.19  3.88 - 5.62 Million/uL Final   • Hemoglobin 04/26/2023 14.6  12.0 - 17.0 g/dL Final   • Hematocrit 04/26/2023 45.1  36.5 - 49.3 % Final   • MCV 04/26/2023 87  82 - 98 fL Final   • MCH 04/26/2023 28.1  26.8 - 34.3 pg Final   • MCHC 04/26/2023 32.4  31.4 - 37.4 g/dL Final   • RDW 04/26/2023 14.5  11.6 - 15.1 % Final   • MPV 04/26/2023 9.5  8.9 - 12.7 fL Final   • Platelets 43/36/3692 320  149 - 390 Thousands/uL Final   • nRBC 04/26/2023 0  /100 WBCs Final   • Neutrophils Relative 04/26/2023 85 (H)  43 - 75 % Final   • Immat GRANS % 04/26/2023 1  0 - 2 % Final   • Lymphocytes Relative 04/26/2023 6 (L)  14 - 44 % Final   • Monocytes Relative 04/26/2023 8  4 - 12 % Final   • Eosinophils Relative 04/26/2023 0  0 - 6 % Final   • Basophils Relative 04/26/2023 0  0 - 1 % Final   • Neutrophils Absolute 04/26/2023 14.85 (H)  1.85 - 7.62 Thousands/µL Final   • Immature Grans Absolute 04/26/2023 0.12  0.00 - 0.20 Thousand/uL Final   • Lymphocytes Absolute 04/26/2023 1.01  0.60 - 4.47 Thousands/µL Final   • Monocytes Absolute 04/26/2023 1.31 (H)  0.17 - 1.22 Thousand/µL Final   • Eosinophils Absolute 04/26/2023 0.06  0.00 - 0.61 Thousand/µL Final   • Basophils Absolute 04/26/2023 0.06  0.00 - 0.10 Thousands/µL Final   • Sodium 04/26/2023 136  135 - 147 mmol/L Final   • Potassium 04/26/2023 3.9  3.5 - 5.3 mmol/L Final   • Chloride 04/26/2023 96  96 - 108 mmol/L Final   • CO2 04/26/2023 29  21 - 32 mmol/L Final   • ANION GAP 04/26/2023 11  4 - 13 mmol/L Final   • BUN 04/26/2023 28 (H)  5 - 25 mg/dL Final   • Creatinine 04/26/2023 1.16  0.60 - 1.30 mg/dL Final    Standardized to IDMS reference method   • Glucose 04/26/2023 176 (H)  65 - 140 mg/dL Final    If the patient is fasting, the ADA then defines impaired fasting glucose as > 100 mg/dL and diabetes as > or equal to 123 mg/dL. • Calcium 04/26/2023 9.4  8.4 - 10.2 mg/dL Final   • AST 04/26/2023 10 (L)  13 - 39 U/L Final   • ALT 04/26/2023 20  7 - 52 U/L Final    Specimen collection should occur prior to Sulfasalazine administration due to the potential for falsely depressed results. • Alkaline Phosphatase 04/26/2023 66  34 - 104 U/L Final   • Total Protein 04/26/2023 6.7  6.4 - 8.4 g/dL Final   • Albumin 04/26/2023 3.9  3.5 - 5.0 g/dL Final   • Total Bilirubin 04/26/2023 0.85  0.20 - 1.00 mg/dL Final    Use of this assay is not recommended for patients undergoing treatment with eltrombopag due to the potential for falsely elevated results. N-acetyl-p-benzoquinone imine (metabolite of Acetaminophen) will generate erroneously low results in samples for patients that have taken an overdose of Acetaminophen. • eGFR 04/26/2023 69  ml/min/1.73sq m Final   • LACTIC ACID 04/26/2023 2.4 (HH)  0.5 - 2.0 mmol/L Final   • Procalcitonin 04/26/2023 0.28 (H)  <=0.25 ng/ml Final    Comment: Suspected Lower Respiratory Tract Infection (LRTI):  - LESS than or EQUAL to 0.25 ng/mL:   low likelihood for bacterial LRTI; antibiotics DISCOURAGED.  - GREATER than 0.25 ng/mL:   increased likelihood for bacterial LRTI; antibiotics ENCOURAGED.     Suspected Sepsis:  - Strongly consider initiating antibiotics in ALL UNSTABLE patients. - LESS than or EQUAL to 0.5 ng/mL:   low likelihood for bacterial sepsis; antibiotics DISCOURAGED.  - GREATER than 0.5 ng/mL:   increased likelihood for bacterial sepsis; antibiotics ENCOURAGED.  - GREATER than 2 ng/mL:   high risk for severe sepsis / septic shock; antibiotics strongly ENCOURAGED. Decisions on antibiotic use should not be based solely on Procalcitonin (PCT) levels. If PCT is low but uncertainty exists with stopping antibiotics, repeat PCT in 6-24 hours to confirm the low level. If antibiotics are administered (regardless if initial PCT was high or low), repeat PCT every 1-2 days to consider early antibiotic cessation (when GREATER                            than 80% decrease from the peak OR when PCT drops below designated cutoffs, whichever comes first), so long as the infection is NOT one that typically requires prolonged treatment durations (e.g., bone/joint infections, endocarditis, Staph. aureus bacteremia).     Situations of FALSE-POSITIVE Procalcitonin values:  1) Newborns < 67 hours old  2) Massive stress from severe trauma / burns, major surgery, acute pancreatitis, cardiogenic / hemorrhagic shock, sickle cell crisis, or other organ perfusion abnormalities  3) Malaria and some Candidal infections  4) Treatment with agents that stimulate cytokines (e.g., OKT3, anti-lymphocyte globulins, alemtuzumab, IL-2, granulocyte transfusion [NOT GCSFs])  5) Chronic renal disease causes elevated baseline levels (consider GREATER than 0.75 ng/mL as an abnormal cut-off); initiating HD/CRRT may cause transient decreases  6) Paraneoplastic syndromes from medullary thyroid or SCLC, some forms of vasculitis, and acute jmrox-oz-fosf                            disease    Situations of FALSE-NEGATIVE Procalcitonin values:  1) Too early in clinical course for PCT to have reached its peak (may repeat in 6-24 hours to confirm low level)  2) Localized infection WITHOUT systemic (SIRS / sepsis) response (e.g., an abscess, osteomyelitis, cystitis)  3) Mycobacteria (e.g., Tuberculosis, MAC)  4) Cystic fibrosis exacerbations     • Protime 04/26/2023 14.0  11.6 - 14.5 seconds Final   • INR 04/26/2023 1.06  0.84 - 1.19 Final   • PTT 04/26/2023 31  23 - 37 seconds Final    Therapeutic Heparin Range =  60-90 seconds   • Blood Culture 04/26/2023 No Growth After 5 Days. Final   • Blood Culture 04/26/2023 No Growth After 5 Days. Final   • hs TnI 0hr 04/26/2023 7  "Refer to ACS Flowchart"- see link ng/L Final    Comment:                                              Initial (time 0) result  If >=50 ng/L, Myocardial injury suggested ;  Type of myocardial injury and treatment strategy  to be determined. If 5-49 ng/L, a delta result at 2 hours and or 4 hours will be needed to further evaluate. If <4 ng/L, and chest pain has been >3 hours since onset, patient may qualify for discharge based on the HEART score in the ED. If <5 ng/L and <3hours since onset of chest pain, a delta result at 2 hours will be needed to further evaluate. HS Troponin 99th Percentile URL of a Health Population=12 ng/L with a 95% Confidence Interval of 8-18 ng/L. Second Troponin (time 2 hours)  If calculated delta >= 20 ng/L,  Myocardial injury suggested ; Type of myocardial injury and treatment strategy to be determined. If 5-49 ng/L and the calculated delta is 5-19 ng/L, consult medical service for evaluation. Continue evaluation for ischemia on ecg and other possible etiology and repeat hs troponin at 4 hours. If delta                            is <5 ng/L at 2 hours, consider discharge based on risk stratification via the HEART score (if in ED), or CHRISTINA risk score in IP/Observation. HS Troponin 99th Percentile URL of a Health Population=12 ng/L with a 95% Confidence Interval of 8-18 ng/L.    • Magnesium 04/26/2023 2.1  1.9 - 2.7 mg/dL Final   • D-Dimer, Quant 04/26/2023 0.31 <0.50 ug/ml FEU Final    Reference and upper limits to exclude DVT and PE are the same. Do not use to exclude if clinical symptoms are present. • LACTIC ACID 04/26/2023 0.8  0.5 - 2.0 mmol/L Final   • hs TnI 2hr 04/26/2023 5  "Refer to ACS Flowchart"- see link ng/L Final    Comment:                                              Initial (time 0) result  If >=50 ng/L, Myocardial injury suggested ;  Type of myocardial injury and treatment strategy  to be determined. If 5-49 ng/L, a delta result at 2 hours and or 4 hours will be needed to further evaluate. If <4 ng/L, and chest pain has been >3 hours since onset, patient may qualify for discharge based on the HEART score in the ED. If <5 ng/L and <3hours since onset of chest pain, a delta result at 2 hours will be needed to further evaluate. HS Troponin 99th Percentile URL of a Health Population=12 ng/L with a 95% Confidence Interval of 8-18 ng/L. Second Troponin (time 2 hours)  If calculated delta >= 20 ng/L,  Myocardial injury suggested ; Type of myocardial injury and treatment strategy to be determined. If 5-49 ng/L and the calculated delta is 5-19 ng/L, consult medical service for evaluation. Continue evaluation for ischemia on ecg and other possible etiology and repeat hs troponin at 4 hours. If delta                            is <5 ng/L at 2 hours, consider discharge based on risk stratification via the HEART score (if in ED), or CHRISTINA risk score in IP/Observation. HS Troponin 99th Percentile URL of a Health Population=12 ng/L with a 95% Confidence Interval of 8-18 ng/L.    • Delta 2hr hsTnI 04/26/2023 -2  <20 ng/L Final   Appointment on 03/21/2023   Component Date Value Ref Range Status   • WBC 03/21/2023 8.71  4.31 - 10.16 Thousand/uL Final   • RBC 03/21/2023 5.26  3.88 - 5.62 Million/uL Final   • Hemoglobin 03/21/2023 14.7  12.0 - 17.0 g/dL Final   • Hematocrit 03/21/2023 45.7  36.5 - 49.3 % Final   • MCV 03/21/2023 87  82 - 98 fL Final   • MCH 03/21/2023 27.9  26.8 - 34.3 pg Final   • MCHC 03/21/2023 32.2  31.4 - 37.4 g/dL Final   • RDW 03/21/2023 15.0  11.6 - 15.1 % Final   • MPV 03/21/2023 10.0  8.9 - 12.7 fL Final   • Platelets 37/26/1645 318  149 - 390 Thousands/uL Final   • nRBC 03/21/2023 0  /100 WBCs Final   • Neutrophils Relative 03/21/2023 76 (H)  43 - 75 % Final   • Immat GRANS % 03/21/2023 1  0 - 2 % Final   • Lymphocytes Relative 03/21/2023 12 (L)  14 - 44 % Final   • Monocytes Relative 03/21/2023 7  4 - 12 % Final   • Eosinophils Relative 03/21/2023 3  0 - 6 % Final   • Basophils Relative 03/21/2023 1  0 - 1 % Final   • Neutrophils Absolute 03/21/2023 6.66  1.85 - 7.62 Thousands/µL Final   • Immature Grans Absolute 03/21/2023 0.09  0.00 - 0.20 Thousand/uL Final   • Lymphocytes Absolute 03/21/2023 1.04  0.60 - 4.47 Thousands/µL Final   • Monocytes Absolute 03/21/2023 0.61  0.17 - 1.22 Thousand/µL Final   • Eosinophils Absolute 03/21/2023 0.25  0.00 - 0.61 Thousand/µL Final   • Basophils Absolute 03/21/2023 0.06  0.00 - 0.10 Thousands/µL Final   • CRP 03/21/2023 9.0 (H)  <3.0 mg/L Final   • Vitamin B-12 03/21/2023 1,419 (H)  100 - 900 pg/mL Final   • Iron Saturation 03/21/2023 23  20 - 50 % Final   • TIBC 03/21/2023 256  250 - 450 ug/dL Final   • Iron 03/21/2023 58 (L)  65 - 175 ug/dL Final    Patients treated with metal-binding drugs (ie. Deferoxamine) may have depressed iron values. • Ferritin 03/21/2023 254  8 - 388 ng/mL Final   Office Visit on 03/02/2023   Component Date Value Ref Range Status   • Cologuard Result 03/22/2023 Positive (A)  Negative Final    Comment: POSITIVE TEST RESULT. A positive Cologuard result should be followed with a colonoscopy or visual examination of the colon. The normal value (reference range) for this assay is negative. TEST DESCRIPTION: Composite algorithmic analysis of stool DNA-biomarkers with hemoglobin immunoassay.    Quantitative values of individual biomarkers are not reportable and are not associated with individual biomarker result reference ranges. Cologuard is   intended for colorectal cancer screening of adults of either sex, 39 years or older, who are at average-risk for colorectal cancer (CRC). Cologuard has been approved for use by the [place] FDA. The performance of Cologuard was established in a cross   sectional study of average-risk adults aged 51-80. Cologuard performance in patients ages 39 to 52 years was estimated by sub-group analysis of near-age groups. Colonoscopies performed for a positive result may find as the most clinically significant   lesion: colorectal cancer [4.0%], advanced adenoma (i                           ncluding sessile serrated polyps greater than or equal to 1cm diameter) [20%] or non- advanced adenoma [31%]; or no colorectal neoplasia [45%]. These estimates are derived from a prospective   cross-sectional screening study of 10,000 individuals at average risk for colorectal cancer who were screened with both Cologuard and colonoscopy. (Andreina GARCIA et al, [facility] J Med 2014;370(14):0881-9132.) Cologuard may produce a false negative or false   positive result (no colorectal cancer or precancerous polyp present at colonoscopy follow up). A negative Cologuard test result does not guarantee the absence of CRC or advanced adenoma (pre-cancer). The current Cologuard screening interval is every 3   years. ([address] [facility] Task Force). Cologuard performance data in a 10,000 patient pivotal study using colonoscopy as the reference method can be accessed at the following location: www.[employer]/results. Additional   description of the Cologuard test process,                            warnings and precautions can be found at [email or web address].     • Hemoglobin A1C 03/02/2023 9.1 (A)  6.5 Final   Appointment on 02/10/2023   Component Date Value Ref Range Status   • Sed Rate 02/10/2023 16  0 - 19 mm/hour Final   • Ferritin 02/10/2023 206  8 - 388 ng/mL Final   • Total CK 02/10/2023 84  39 - 308 U/L Final   • Aldolase 02/10/2023 5.1  3.3 - 10.3 U/L Final   • C3 Complement 02/10/2023 131.0  90.0 - 180.0 mg/dL Final   • C4, COMPLEMENT 02/10/2023 28.0  10.0 - 40.0 mg/dL Final   • IGA 02/10/2023 319.0  70.0 - 400.0 mg/dL Final   • IGG 02/10/2023 661.0 (L)  700.0 - 1,600.0 mg/dL Final   • IGM 02/10/2023 22.0 (L)  40.0 - 230.0 mg/dL Final   • BNP 02/10/2023 25  0 - 100 pg/mL Final   • CLYDE 02/10/2023 Positive (A)  Negative Final   • CLYDE Titer 1 02/10/2023 Titer of 80   Final   • CLYDE Pattern 1 02/10/2023 Homogeneous pattern   Final   • Anti-dsDNA Quant 02/10/2023 >10 (H)  4 - 10 IU/mL Final    Negative      <=4  IU/mL  Indeterminate 5-9  IU/mL  Positive      >=10 IU/mL   • Sjogren's Anti-SS-A 02/10/2023 Negative  Negative Final   • Sjogren's Anti-SS-B 02/10/2023 Negative  Negative Final   • Antichromatin Antibodies 02/10/2023 Negative  Negative Final   Consult on 02/08/2023   Component Date Value Ref Range Status   • Color, UA 02/10/2023 Yellow   Final   • Clarity, UA 02/10/2023 Clear   Final   • Specific Gravity, UA 02/10/2023 1.020  1.003 - 1.030 Final   • pH, UA 02/10/2023 6.0  4.5, 5.0, 5.5, 6.0, 6.5, 7.0, 7.5, 8.0 Final   • Leukocytes, UA 02/10/2023 Elevated glucose may cause decreased leukocyte values.  See urine microscopic for [facility] result/ (A)  Negative Final   • Nitrite, UA 02/10/2023 Negative  Negative Final   • Protein, UA 02/10/2023 Negative  Negative mg/dl Final   • Glucose, UA 02/10/2023 >=1000 (1%) (A)  Negative mg/dl Final   • Ketones, UA 02/10/2023 Negative  Negative mg/dl Final   • Urobilinogen, UA 02/10/2023 0.2  0.2, 1.0 E.U./dl E.U./dl Final   • Bilirubin, UA 02/10/2023 Negative  Negative Final   • Occult Blood, UA 02/10/2023 Negative  Negative Final   • RBC, UA 02/10/2023 0-1  None Seen, 0-1, 1-2, 2-4, 0-5 /hpf Final   • WBC, UA 02/10/2023 0-1  None Seen, 0-1, 1-2, 0-5, 2-4 /hpf Final   • Epithelial Cells 02/10/2023 Occasional  None Seen, Occasional /hpf Final   • Bacteria, UA 02/10/2023 Occasional  None Seen, Occasional /hpf Final   There may be more visits with results that are not included.

## 2023-07-20 NOTE — TELEPHONE ENCOUNTER
Normal for race Pt doing well  No appt's available today    Pt will schedule for f/u virtual on Monday

## 2023-07-24 ENCOUNTER — RA CDI HCC (OUTPATIENT)
Dept: OTHER | Facility: HOSPITAL | Age: 58
End: 2023-07-24

## 2023-07-24 NOTE — PROGRESS NOTES
[address] [facility] coding opportunities          Chart Reviewed number of suggestions sent to Provider: 2   I11.0  E11.65    Patients Insurance     Medicare Insurance: [employer] Advantage

## 2023-07-27 ENCOUNTER — OFFICE VISIT (OUTPATIENT)
Dept: FAMILY MEDICINE CLINIC | Facility: CLINIC | Age: 58
End: 2023-07-27
Payer: COMMERCIAL

## 2023-07-27 VITALS
HEART RATE: 83 BPM | SYSTOLIC BLOOD PRESSURE: 130 MMHG | HEIGHT: 67 IN | TEMPERATURE: 97.4 F | WEIGHT: 315 LBS | OXYGEN SATURATION: 90 % | BODY MASS INDEX: 49.44 KG/M2 | DIASTOLIC BLOOD PRESSURE: 70 MMHG

## 2023-07-27 DIAGNOSIS — R19.5 POSITIVE COLORECTAL CANCER SCREENING USING COLOGUARD TEST: ICD-10-CM

## 2023-07-27 DIAGNOSIS — Z79.4 TYPE 2 DIABETES MELLITUS WITH DIABETIC NEUROPATHY, WITH LONG-TERM CURRENT USE OF INSULIN (HCC): ICD-10-CM

## 2023-07-27 DIAGNOSIS — Z00.00 MEDICARE ANNUAL WELLNESS VISIT, SUBSEQUENT: Primary | ICD-10-CM

## 2023-07-27 DIAGNOSIS — E11.40 TYPE 2 DIABETES MELLITUS WITH DIABETIC NEUROPATHY, WITH LONG-TERM CURRENT USE OF INSULIN (HCC): ICD-10-CM

## 2023-07-27 LAB — SL AMB POCT HEMOGLOBIN AIC: 7.7 (ref ?–6.5)

## 2023-07-27 PROCEDURE — G0439 PPPS, SUBSEQ VISIT: HCPCS | Performed by: FAMILY MEDICINE

## 2023-07-27 PROCEDURE — 83036 HEMOGLOBIN GLYCOSYLATED A1C: CPT | Performed by: FAMILY MEDICINE

## 2023-07-27 NOTE — PATIENT INSTRUCTIONS
Medicare Preventive Visit Patient Instructions  Thank you for completing your Welcome to Medicare Visit or Medicare Annual Wellness Visit today. Your next wellness visit will be due in one year (7/27/2024). The screening/preventive services that you may require over the next 5-10 years are detailed below. Some tests may not apply to you based off risk factors and/or age. Screening tests ordered at today's visit but not completed yet may show as past due. Also, please note that scanned in results may not display below. Preventive Screenings:  Service Recommendations Previous Testing/Comments   Colorectal Cancer Screening  · Colonoscopy    · Fecal Occult Blood Test (FOBT)/Fecal Immunochemical Test (FIT)  · Fecal DNA/Cologuard Test  · Flexible Sigmoidoscopy Age: 43-73 years old   Colonoscopy: every 10 years (May be performed more frequently if at higher risk)  OR  FOBT/FIT: every 1 year  OR  Cologuard: every 3 years  OR  Sigmoidoscopy: every 5 years  Screening may be recommended earlier than age 39 if at higher risk for colorectal cancer. Also, an individualized decision between you and your healthcare provider will decide whether screening between the ages of 77-80 would be appropriate.  Colonoscopy: Not on file  FOBT/FIT: Not on file  Cologuard: 03/22/2023  Sigmoidoscopy: Not on file    Screening Current     Prostate Cancer Screening Individualized decision between patient and health care provider in men between ages of 53-66   Medicare will cover every 12 months beginning on the day after your 50th birthday PSA: 0.1 ng/mL           Hepatitis C Screening Once for adults born between 1945 and 1965  More frequently in patients at high risk for Hepatitis C Hep C Antibody: 02/05/2020    Screening Current   Diabetes Screening 1-2 times per year if you're at risk for diabetes or have pre-diabetes Fasting glucose: 227 mg/dL (5/25/2023)  A1C: 9.1 (3/2/2023)  Screening Not Indicated  History Diabetes   Cholesterol Screening Once every 5 years if you don't have a lipid disorder. May order more often based on risk factors. Lipid panel: 01/26/2022  Screening Not Indicated  History Lipid Disorder      Other Preventive Screenings Covered by Medicare:  1. Abdominal Aortic Aneurysm (AAA) Screening: covered once if your at risk. You're considered to be at risk if you have a family history of AAA or a male between the age of 70-76 who smoking at least 100 cigarettes in your lifetime. 2. Lung Cancer Screening: covers low dose CT scan once per year if you meet all of the following conditions: (1) Age 48-67; (2) No signs or symptoms of lung cancer; (3) Current smoker or have quit smoking within the last 15 years; (4) You have a tobacco smoking history of at least 20 pack years (packs per day x number of years you smoked); (5) You get a written order from a healthcare provider. 3. Glaucoma Screening: covered annually if you're considered high risk: (1) You have diabetes OR (2) Family history of glaucoma OR (3)  aged 48 and older OR (3)  American aged 72 and older  3. Osteoporosis Screening: covered every 2 years if you meet one of the following conditions: (1) Have a vertebral abnormality; (2) On glucocorticoid therapy for more than 3 months; (3) Have primary hyperparathyroidism; (4) On osteoporosis medications and need to assess response to drug therapy. 5. HIV Screening: covered annually if you're between the age of 14-79. Also covered annually if you are younger than 13 and older than 72 with risk factors for HIV infection. For pregnant patients, it is covered up to 3 times per pregnancy.     Immunizations:  Immunization Recommendations   Influenza Vaccine Annual influenza vaccination during flu season is recommended for all persons aged >= 6 months who do not have contraindications   Pneumococcal Vaccine   * Pneumococcal conjugate vaccine = PCV13 (Prevnar 13), PCV15 (Vaxneuvance), PCV20 (Prevnar 20)  * Pneumococcal polysaccharide vaccine = PPSV23 (Pneumovax) Adults 2364 years old: 1-3 doses may be recommended based on certain risk factors  Adults 72 years old: 1-2 doses may be recommended based off what pneumonia vaccine you previously received   Hepatitis B Vaccine 3 dose series if at intermediate or high risk (ex: diabetes, end stage renal disease, liver disease)   Tetanus (Td) Vaccine - COST NOT COVERED BY MEDICARE PART B Following completion of primary series, a booster dose should be given every 10 years to maintain immunity against tetanus. Td may also be given as tetanus wound prophylaxis. Tdap Vaccine - COST NOT COVERED BY MEDICARE PART B Recommended at least once for all adults. For pregnant patients, recommended with each pregnancy. Shingles Vaccine (Shingrix) - COST NOT COVERED BY MEDICARE PART B  2 shot series recommended in those aged 48 and above     Health Maintenance Due:      Topic Date Due   • Colorectal Cancer Screening  03/22/2026   • HIV Screening  Completed   • Hepatitis C Screening  Completed     Immunizations Due:      Topic Date Due   • COVID-19 Vaccine (4 - Moderna series) 12/21/2021   • Influenza Vaccine (1) 09/01/2023     Advance Directives   What are advance directives? Advance directives are legal documents that state your wishes and plans for medical care. These plans are made ahead of time in case you lose your ability to make decisions for yourself. Advance directives can apply to any medical decision, such as the treatments you want, and if you want to donate organs. What are the types of advance directives? There are many types of advance directives, and each state has rules about how to use them. You may choose a combination of any of the following:  · Living will: This is a written record of the treatment you want. You can also choose which treatments you do not want, which to limit, and which to stop at a certain time.  This includes surgery, medicine, IV fluid, and tube feedings. · Durable power of  for healthcare [facility]): This is a written record that states who you want to make healthcare choices for you when you are unable to make them for yourself. This person, called a proxy, is usually a family member or a friend. You may choose more than 1 proxy. · Do not resuscitate (DNR) order:  A DNR order is used in case your heart stops beating or you stop breathing. It is a request not to have certain forms of treatment, such as CPR. A DNR order may be included in other types of advance directives. · Medical directive: This covers the care that you want if you are in a coma, near death, or unable to make decisions for yourself. You can list the treatments you want for each condition. Treatment may include pain medicine, surgery, blood transfusions, dialysis, IV or tube feedings, and a ventilator (breathing machine). · Values history: This document has questions about your views, beliefs, and how you feel and think about life. This information can help others choose the care that you would choose. Why are advance directives important? An advance directive helps you control your care. Although spoken wishes may be used, it is better to have your wishes written down. Spoken wishes can be misunderstood, or not followed. Treatments may be given even if you do not want them. An advance directive may make it easier for your family to make difficult choices about your care. Weight Management   Why it is important to manage your weight:  Being overweight increases your risk of health conditions such as heart disease, high blood pressure, type 2 diabetes, and certain types of cancer. It can also increase your risk for osteoarthritis, sleep apnea, and other respiratory problems. Aim for a slow, steady weight loss. Even a small amount of weight loss can lower your risk of health problems.   How to lose weight safely:  A safe and healthy way to lose weight is to eat fewer calories and get regular exercise. You can lose up about 1 pound a week by decreasing the number of calories you eat by 500 calories each day. Healthy meal plan for weight management:  A healthy meal plan includes a variety of foods, contains fewer calories, and helps you stay healthy. A healthy meal plan includes the following:  · Eat whole-grain foods more often. A healthy meal plan should contain fiber. Fiber is the part of grains, fruits, and vegetables that is not broken down by your body. Whole-grain foods are healthy and provide extra fiber in your diet. Some examples of whole-grain foods are whole-wheat breads and pastas, oatmeal, brown rice, and bulgur. · Eat a variety of vegetables every day. Include dark, leafy greens such as spinach, kale, bailee greens, and mustard greens. Eat yellow and orange vegetables such as carrots, sweet potatoes, and winter squash. · Eat a variety of fruits every day. Choose fresh or canned fruit (canned in its own juice or light syrup) instead of juice. Fruit juice has very little or no fiber. · Eat low-fat dairy foods. Drink fat-free (skim) milk or 1% milk. Eat fat-free yogurt and low-fat cottage cheese. Try low-fat cheeses such as mozzarella and other reduced-fat cheeses. · Choose meat and other protein foods that are low in fat. Choose beans or other legumes such as split peas or lentils. Choose fish, skinless poultry (chicken or turkey), or lean cuts of red meat (beef or pork). Before you cook meat or poultry, cut off any visible fat. · Use less fat and oil. Try baking foods instead of frying them. Add less fat, such as margarine, sour cream, regular salad dressing and mayonnaise to foods. Eat fewer high-fat foods. Some examples of high-fat foods include french fries, doughnuts, ice cream, and cakes. · Eat fewer sweets. Limit foods and drinks that are high in sugar. This includes candy, cookies, regular soda, and sweetened drinks.   Exercise:  Exercise at least 30 minutes per day on most days of the week. Some examples of exercise include walking, biking, dancing, and swimming. You can also fit in more physical activity by taking the stairs instead of the elevator or parking farther away from stores. Ask your healthcare provider about the best exercise plan for you. © Copyright [employer] 2018 Information is for End User's use only and may not be sold, redistributed or otherwise used for commercial purposes. All illustrations and images included in CareNotes® are the copyrighted property of [employer] or [facility] Preventive Visit Patient Instructions  Thank you for completing your Welcome to Medicare Visit or Medicare Annual Wellness Visit today. Your next wellness visit will be due in one year (7/27/2024). The screening/preventive services that you may require over the next 5-10 years are detailed below. Some tests may not apply to you based off risk factors and/or age. Screening tests ordered at today's visit but not completed yet may show as past due. Also, please note that scanned in results may not display below. Preventive Screenings:  Service Recommendations Previous Testing/Comments   Colorectal Cancer Screening  · Colonoscopy    · Fecal Occult Blood Test (FOBT)/Fecal Immunochemical Test (FIT)  · Fecal DNA/Cologuard Test  · Flexible Sigmoidoscopy Age: 43-73 years old   Colonoscopy: every 10 years (May be performed more frequently if at higher risk)  OR  FOBT/FIT: every 1 year  OR  Cologuard: every 3 years  OR  Sigmoidoscopy: every 5 years  Screening may be recommended earlier than age 39 if at higher risk for colorectal cancer. Also, an individualized decision between you and your healthcare provider will decide whether screening between the ages of 77-80 would be appropriate.  Colonoscopy: Not on file  FOBT/FIT: Not on file  Cologuard: 03/22/2023  Sigmoidoscopy: Not on file    Screening Current     Prostate Cancer Screening Individualized decision between patient and health care provider in men between ages of 53-66   Medicare will cover every 12 months beginning on the day after your 50th birthday PSA: 0.1 ng/mL           Hepatitis C Screening Once for adults born between 1945 and 1965  More frequently in patients at high risk for Hepatitis C Hep C Antibody: 02/05/2020    Screening Current   Diabetes Screening 1-2 times per year if you're at risk for diabetes or have pre-diabetes Fasting glucose: 227 mg/dL (5/25/2023)  A1C: 7.7 (7/27/2023)  Screening Not Indicated  History Diabetes   Cholesterol Screening Once every 5 years if you don't have a lipid disorder. May order more often based on risk factors. Lipid panel: 01/26/2022  Screening Not Indicated  History Lipid Disorder      Other Preventive Screenings Covered by Medicare:  6. Abdominal Aortic Aneurysm (AAA) Screening: covered once if your at risk. You're considered to be at risk if you have a family history of AAA or a male between the age of 70-76 who smoking at least 100 cigarettes in your lifetime. 7. Lung Cancer Screening: covers low dose CT scan once per year if you meet all of the following conditions: (1) Age 48-67; (2) No signs or symptoms of lung cancer; (3) Current smoker or have quit smoking within the last 15 years; (4) You have a tobacco smoking history of at least 20 pack years (packs per day x number of years you smoked); (5) You get a written order from a healthcare provider. 8. Glaucoma Screening: covered annually if you're considered high risk: (1) You have diabetes OR (2) Family history of glaucoma OR (3)  aged 48 and older OR (3)  American aged 72 and older  5.  Osteoporosis Screening: covered every 2 years if you meet one of the following conditions: (1) Have a vertebral abnormality; (2) On glucocorticoid therapy for more than 3 months; (3) Have primary hyperparathyroidism; (4) On osteoporosis medications and need to assess response to drug therapy. 10. HIV Screening: covered annually if you're between the age of 14-79. Also covered annually if you are younger than 13 and older than 72 with risk factors for HIV infection. For pregnant patients, it is covered up to 3 times per pregnancy. Immunizations:  Immunization Recommendations   Influenza Vaccine Annual influenza vaccination during flu season is recommended for all persons aged >= 6 months who do not have contraindications   Pneumococcal Vaccine   * Pneumococcal conjugate vaccine = PCV13 (Prevnar 13), PCV15 (Vaxneuvance), PCV20 (Prevnar 20)  * Pneumococcal polysaccharide vaccine = PPSV23 (Pneumovax) Adults 20-63 years old: 1-3 doses may be recommended based on certain risk factors  Adults 72 years old: 1-2 doses may be recommended based off what pneumonia vaccine you previously received   Hepatitis B Vaccine 3 dose series if at intermediate or high risk (ex: diabetes, end stage renal disease, liver disease)   Tetanus (Td) Vaccine - COST NOT COVERED BY MEDICARE PART B Following completion of primary series, a booster dose should be given every 10 years to maintain immunity against tetanus. Td may also be given as tetanus wound prophylaxis. Tdap Vaccine - COST NOT COVERED BY MEDICARE PART B Recommended at least once for all adults. For pregnant patients, recommended with each pregnancy. Shingles Vaccine (Shingrix) - COST NOT COVERED BY MEDICARE PART B  2 shot series recommended in those aged 48 and above     Health Maintenance Due:      Topic Date Due   • Colorectal Cancer Screening  03/22/2026   • HIV Screening  Completed   • Hepatitis C Screening  Completed     Immunizations Due:      Topic Date Due   • COVID-19 Vaccine (4 - Moderna series) 12/21/2021   • Influenza Vaccine (1) 09/01/2023     Advance Directives   What are advance directives? Advance directives are legal documents that state your wishes and plans for medical care.  These plans are made ahead of time in case you lose your ability to make decisions for yourself. Advance directives can apply to any medical decision, such as the treatments you want, and if you want to donate organs. What are the types of advance directives? There are many types of advance directives, and each state has rules about how to use them. You may choose a combination of any of the following:  · Living will: This is a written record of the treatment you want. You can also choose which treatments you do not want, which to limit, and which to stop at a certain time. This includes surgery, medicine, IV fluid, and tube feedings. · Durable power of  for healthcare [facility]): This is a written record that states who you want to make healthcare choices for you when you are unable to make them for yourself. This person, called a proxy, is usually a family member or a friend. You may choose more than 1 proxy. · Do not resuscitate (DNR) order:  A DNR order is used in case your heart stops beating or you stop breathing. It is a request not to have certain forms of treatment, such as CPR. A DNR order may be included in other types of advance directives. · Medical directive: This covers the care that you want if you are in a coma, near death, or unable to make decisions for yourself. You can list the treatments you want for each condition. Treatment may include pain medicine, surgery, blood transfusions, dialysis, IV or tube feedings, and a ventilator (breathing machine). · Values history: This document has questions about your views, beliefs, and how you feel and think about life. This information can help others choose the care that you would choose. Why are advance directives important? An advance directive helps you control your care. Although spoken wishes may be used, it is better to have your wishes written down. Spoken wishes can be misunderstood, or not followed. Treatments may be given even if you do not want them.  An advance directive may make it easier for your family to make difficult choices about your care. Weight Management   Why it is important to manage your weight:  Being overweight increases your risk of health conditions such as heart disease, high blood pressure, type 2 diabetes, and certain types of cancer. It can also increase your risk for osteoarthritis, sleep apnea, and other respiratory problems. Aim for a slow, steady weight loss. Even a small amount of weight loss can lower your risk of health problems. How to lose weight safely:  A safe and healthy way to lose weight is to eat fewer calories and get regular exercise. You can lose up about 1 pound a week by decreasing the number of calories you eat by 500 calories each day. Healthy meal plan for weight management:  A healthy meal plan includes a variety of foods, contains fewer calories, and helps you stay healthy. A healthy meal plan includes the following:  · Eat whole-grain foods more often. A healthy meal plan should contain fiber. Fiber is the part of grains, fruits, and vegetables that is not broken down by your body. Whole-grain foods are healthy and provide extra fiber in your diet. Some examples of whole-grain foods are whole-wheat breads and pastas, oatmeal, brown rice, and bulgur. · Eat a variety of vegetables every day. Include dark, leafy greens such as spinach, kale, bailee greens, and mustard greens. Eat yellow and orange vegetables such as carrots, sweet potatoes, and winter squash. · Eat a variety of fruits every day. Choose fresh or canned fruit (canned in its own juice or light syrup) instead of juice. Fruit juice has very little or no fiber. · Eat low-fat dairy foods. Drink fat-free (skim) milk or 1% milk. Eat fat-free yogurt and low-fat cottage cheese. Try low-fat cheeses such as mozzarella and other reduced-fat cheeses. · Choose meat and other protein foods that are low in fat. Choose beans or other legumes such as split peas or lentils.  Choose fish, skinless poultry (chicken or turkey), or lean cuts of red meat (beef or pork). Before you cook meat or poultry, cut off any visible fat. · Use less fat and oil. Try baking foods instead of frying them. Add less fat, such as margarine, sour cream, regular salad dressing and mayonnaise to foods. Eat fewer high-fat foods. Some examples of high-fat foods include french fries, doughnuts, ice cream, and cakes. · Eat fewer sweets. Limit foods and drinks that are high in sugar. This includes candy, cookies, regular soda, and sweetened drinks. Exercise:  Exercise at least 30 minutes per day on most days of the week. Some examples of exercise include walking, biking, dancing, and swimming. You can also fit in more physical activity by taking the stairs instead of the elevator or parking farther away from stores. Ask your healthcare provider about the best exercise plan for you. © Copyright [employer] 2018 Information is for End User's use only and may not be sold, redistributed or otherwise used for commercial purposes.  All illustrations and images included in CareNotes® are the copyrighted property of [employer] or  [place]

## 2023-07-27 NOTE — PROGRESS NOTES
Assessment and Plan:   Hemoglobin A1c was much improved at 7.7. Continue to follow-up with endocrinology as scheduled. Referral put in again to GI for follow-up on his positive Cologuard test.  Continue same medications. Follow-up here in 4 months or as needed. Problem List Items Addressed This Visit        Endocrine    Type 2 diabetes mellitus with diabetic neuropathy, with long-term current use of insulin ([address])    Relevant Orders    POCT hemoglobin A1c (Completed)   Other Visit Diagnoses     Medicare annual wellness visit, subsequent    -  Primary    Positive colorectal cancer screening using Cologuard test        Relevant Orders    Ambulatory Referral to Gastroenterology           Preventive health issues were discussed with patient, and age appropriate screening tests were ordered as noted in patient's After Visit Summary. Personalized health advice and appropriate referrals for health education or preventive services given if needed, as noted in patient's After Visit Summary. History of Present Illness:     Patient presents for a Medicare Wellness Visit    Medicare well visit. Patient denies any chest pain or any increased shortness of breath. Patient recently had a BROOKE which showed that he had moderate aortic stenosis. Does not need surgery for that yet. Patient has seen endocrinology and her sugars have greatly improved. Unfortunately patient did not hear from GI the last time I referred him for his positive Cologuard. He still wants to follow-up with that. Patient Care Team:  Nenita Teague, DO as PCP - General (Family Medicine)  Niranjan Rojo, DO as PCP - Blaine More (RTE)  Niranjan Rojo, DO as PCP - PCP-Ida (RTE)  Cecilia Cortez, DO as PCP - Endocrinology (Endocrinology)     Review of Systems:     Review of Systems   Constitutional: Negative. Respiratory: Negative. Cardiovascular: Negative. Gastrointestinal: Negative. Genitourinary: Negative. Problem List:     Patient Active Problem List   Diagnosis   • SCOTT (obstructive sleep apnea)   • Asthma, moderate persistent   • Diastolic heart failure (HCC)   • Class 3 severe obesity with serious comorbidity and body mass index (BMI) of 50.0 to 59.9 in adult [facility])   • Mixed simple and mucopurulent chronic bronchitis (HCC)   • COPD, severe (HCC)   • Chronic hypoxemic respiratory failure (HCC)   • Dyslipidemia, goal LDL below 100   • Essential hypertension   • Myopia, bilateral   • Presbyopia   • Renovascular hypertension with goal blood pressure less than 140/90   • Type 2 diabetes mellitus with diabetic neuropathy, with long-term current use of insulin (HCC)   • COPD with acute bronchitis (HCC)   • Murmur   • Nonrheumatic aortic (valve) stenosis   • Periodic limb movement disorder (PLMD)   • RUTHERFORD (dyspnea on exertion)   • Abnormal CT of the chest   • Pulmonary nodule   • Hyponatremia   • Stage 2 chronic kidney disease   • Positive double stranded DNA antibody test   • Anxiety and depression   • Leukocytosis      Past Medical and Surgical History:     Past Medical History:   Diagnosis Date   • Aortic stenosis    • Asthma 1964   • COPD (chronic obstructive pulmonary disease) ([address])    • Coronary artery disease 2000    Aortic stenosis   • Diabetes ([address])    • Diabetes mellitus ([address])    • Hyperlipidemia    • Hypertension 07/02/2014   • Pneumonia 2015   • Sleep apnea, obstructive      Past Surgical History:   Procedure Laterality Date   • ACCESSORY NAVICULAR EXCISION Right    • APPENDECTOMY     • CARDIAC CATHETERIZATION N/A 05/25/2023    Procedure: Cardiac Coronary Angiogram;  Surgeon: Eric Peralta MD;  Location: [facility]; Service: Cardiology   • EAR FOREIGN BODY REMOVAL      cyst removal. bronchospasms after general anesthesia.    • EYE SURGERY Left     traumatic injury at age 6-11      Family History:     Family History   Problem Relation Age of Onset   • No Known Problems Mother    • Heart disease Father • Lung cancer Father         Passed away    • Cancer Father         Lung cancer   • Hypertension Father    • Diabetes Maternal Grandfather    • Stomach cancer Maternal Grandfather       Social History:     Social History     Socioeconomic History   • Marital status: /Civil Union     Spouse name: None   • Number of children: None   • Years of education: None   • Highest education level: None   Occupational History   • None   Tobacco Use   • Smoking status: Former     Packs/day: 3.00     Years: 30.00     Total pack years: 90.00     Types: Cigarettes     Start date: 3/15/1984     Quit date: 7/15/2005     Years since quittin.0   • Smokeless tobacco: Never   Vaping Use   • Vaping Use: Never used   Substance and Sexual Activity   • Alcohol use: Never   • Drug use: Never   • Sexual activity: Yes     Partners: Female     Birth control/protection: None   Other Topics Concern   • None   Social History Narrative   • None     Social Determinants of Health     Financial Resource Strain: Unknown (2023)    Overall Financial Resource Strain (CARDIA)    • Difficulty of Paying Living Expenses: Patient refused   Food Insecurity: Not on file   Transportation Needs: No Transportation Needs (2023)    PRAPARE - Transportation    • Lack of Transportation (Medical): No    • Lack of Transportation (Non-Medical):  No   Physical Activity: Not on file   Stress: Not on file   Social Connections: Not on file   Intimate Partner Violence: Not on file   Housing Stability: Not on file      Medications and Allergies:     Current Outpatient Medications   Medication Sig Dispense Refill   • albuterol (Ventolin HFA) 90 mcg/act inhaler Inhale 2 puffs every 6 (six) hours as needed for wheezing 18 g 3   • amLODIPine (NORVASC) 10 mg tablet Take 1 tablet (10 mg total) by mouth daily 90 tablet 3   • ammonium lactate (LAC-HYDRIN) 12 % cream Apply topically as needed for dry skin (Patient taking differently: Apply topically as needed for dry skin As needed) 385 g 0   • Anoro Ellipta 62.5-25 MCG/ACT inhaler INHALE 1 PUFF BY MOUTH DAILY 60 each 10   • aspirin (ECOTRIN LOW STRENGTH) 81 mg EC tablet Take 1 tablet (81 mg total) by mouth daily 30 tablet 5   • atorvastatin (LIPITOR) 40 mg tablet Take 1 tablet (40 mg total) by mouth daily 90 tablet 3   • Blood Glucose Monitoring Suppl (CONTOUR NEXT MONITOR) w/Device KIT Test blood sugar once daily or as needed for fluctuating blood sugars 1 kit 0   • carvedilol (COREG) 12.5 mg tablet Take 1 tablet (12.5 mg total) by mouth 2 (two) times a day with meals 180 tablet 3   • cyclobenzaprine (FLEXERIL) 10 mg tablet Take 10 mg by mouth     • Dapagliflozin Propanediol (Farxiga) 10 MG TABS Take 10 mg by mouth daily     • Flovent  MCG/ACT inhaler INHALE 2 PUFFS BY MOUTH TWICE DAILY *RINSE MOUTH AFTER USE* 12 g 10   • furosemide (LASIX) 40 mg tablet Take 1 tablet (40 mg total) by mouth 2 (two) times a day 180 tablet 3   • gabapentin (NEURONTIN) 400 mg capsule TAKE 1 CAPSULE BY MOUTH THREE TIMES A DAY 90 capsule 10   • glimepiride (AMARYL) 4 mg tablet Take 4 mg by mouth 2 (two) times a day     • glucose blood (CONTOUR NEXT TEST) test strip Test blood sugar once daily or as needed for fluctuating blood sugars 100 each 3   • insulin glargine (Toujeo Max SoloStar) 300 units/mL CONCENTRATED U-300 injection pen (2-unit dial) Inject 70 Units under the skin daily at bedtime 24 mL 1   • Insulin Pen Needle (BD Pen Needle Dee Dee U/F) 32G X 4 MM MISC Use 4 a day 200 each 5   • ipratropium-albuterol (DUO-NEB) 0.5-2.5 mg/3 mL nebulizer solution INHALE CONTENTS OF 1 VIAL VIA NEBULIZER FOUR TIMES A  mL 10   • Lancets MISC Test blood sugar once daily or as needed for fluctuating blood sugars 100 each 0   • losartan (COZAAR) 100 MG tablet Take 1 tablet (100 mg total) by mouth daily 90 tablet 3   • MELATONIN PO Take 10 mg by mouth     • metFORMIN (GLUCOPHAGE) 1000 MG tablet TAKE 1 TABLET BY MOUTH TWICE DAILY WITH MEALS 180 tablet 0   • Multiple Vitamins-Minerals (MENS MULTIVITAMIN PO) Take by mouth     • Ozempic, 2 MG/DOSE, 8 MG/3ML SOPN 2 mg every 7 days     • sertraline (ZOLOFT) 50 mg tablet TAKE 1 TABLET BY MOUTH DAILY 30 tablet 3   • spironolactone (ALDACTONE) 50 mg tablet Take 1 tablet (50 mg total) by mouth daily 90 tablet 3     No current facility-administered medications for this visit. Allergies   Allergen Reactions   • Theophylline Other (See Comments)     Severe headaches  headaches  Severe headaches        Immunizations:     Immunization History   Administered Date(s) Administered   • COVID-19 MODERNA VACC 0.5 ML IM 02/06/2021, 03/06/2021, 10/26/2021   • INFLUENZA 11/25/2014, 09/19/2016, 11/01/2017, 12/17/2018   • Influenza, recombinant, quadrivalent,injectable, preservative free 02/03/2020, 09/02/2020, 11/30/2022   • Influenza, seasonal, injectable 11/25/2014, 08/31/2015   • Pneumococcal Conjugate Vaccine 20-valent (Pcv20), Polysace 04/21/2023   • Pneumococcal Polysaccharide PPV23 05/23/2014   • Tdap 05/23/2014   • influenza, injectable, quadrivalent 12/05/2021      Health Maintenance:         Topic Date Due   • Colorectal Cancer Screening  03/22/2026   • HIV Screening  Completed   • Hepatitis C Screening  Completed         Topic Date Due   • COVID-19 Vaccine (4 - Jennetta Nicely series) 12/21/2021   • Influenza Vaccine (1) 09/01/2023      Medicare Screening Tests and Risk Assessments:     Minesh Antoine is here for his Subsequent Wellness visit. Last Medicare Wellness visit information reviewed, patient interviewed and updates made to the record today. Health Risk Assessment:   Patient rates overall health as fair. Patient feels that their physical health rating is same. Patient is satisfied with their life. Eyesight was rated as same. Hearing was rated as same. Patient feels that their emotional and mental health rating is same. Patients states they are never, rarely angry.  Patient states they are always unusually tired/fatigued. Pain experienced in the last 7 days has been a lot. Patient's pain rating has been 7/10. Patient states that he has experienced weight loss or gain in last 6 months. Fall Risk Screening: In the past year, patient has experienced: history of falling in past year      Home Safety:  Patient does not have trouble with stairs inside or outside of their home. Patient has working smoke alarms and has working carbon monoxide detector. Home safety hazards include: none. Nutrition:   Current diet is Diabetic, Frequent junk food and Unhealthy. Medications:   Patient is currently taking over-the-counter supplements. OTC medications include: see medication list. Patient is able to manage medications. Activities of Daily Living (ADLs)/Instrumental Activities of Daily Living (IADLs):   Walk and transfer into and out of bed and chair?: Yes  Dress and groom yourself?: Yes    Bathe or shower yourself?: Yes    Feed yourself? Yes  Do your laundry/housekeeping?: Yes  Manage your money, pay your bills and track your expenses?: Yes  Make your own meals?: Yes    Do your own shopping?: Yes    Previous Hospitalizations:   Any hospitalizations or ED visits within the last 12 months?: Yes    How many hospitalizations have you had in the last year?: 1-2    Advance Care Planning:   Living will: Yes    Durable POA for healthcare:  Yes    Advanced directive: Yes    Advanced directive counseling given: Yes      Cognitive Screening:   Provider or family/friend/caregiver concerned regarding cognition?: No    PREVENTIVE SCREENINGS      Cardiovascular Screening:    General: Screening Not Indicated and History Lipid Disorder      Diabetes Screening:     General: Screening Not Indicated and History Diabetes      Colorectal Cancer Screening:     General: Screening Current      Abdominal Aortic Aneurysm (AAA) Screening:    Risk factors include: tobacco use        Lung Cancer Screening:     General: Screening Not Indicated      Hepatitis C Screening:    General: Screening Current    Screening, Brief Intervention, and Referral to Treatment (SBIRT)    Screening  Typical number of drinks in a day: 0  Typical number of drinks in a week: 0  Interpretation: Low risk drinking behavior. AUDIT-C Screenin) How often did you have a drink containing alcohol in the past year? never  2) How many drinks did you have on a typical day when you were drinking in the past year? 0  3) How often did you have 6 or more drinks on one occasion in the past year? never    AUDIT-C Score: 0  Interpretation: Score 0-3 (male): Negative screen for alcohol misuse    Single Item Drug Screening:  How often have you used an illegal drug (including marijuana) or a prescription medication for non-medical reasons in the past year? never    Single Item Drug Screen Score: 0  Interpretation: Negative screen for possible drug use disorder    No results found. Physical Exam:     /70   Pulse 83   Temp (!) 97.4 °F (36.3 °C)   Ht 5' 7" (1.702 m)   Wt (!) 155 kg (340 lb 12.8 oz)   SpO2 90%   BMI 53.38 kg/m²     Physical Exam  Vitals reviewed. Constitutional:       General: He is not in acute distress. Appearance: Normal appearance. He is well-developed. He is not diaphoretic. HENT:      Head: Normocephalic and atraumatic. Eyes:      Conjunctiva/sclera: Conjunctivae normal.   Cardiovascular:      Rate and Rhythm: Normal rate and regular rhythm. Heart sounds: Normal heart sounds. No murmur heard. No friction rub. No gallop. Pulmonary:      Effort: Pulmonary effort is normal. No respiratory distress. Breath sounds: Normal breath sounds. No wheezing, rhonchi or rales. Musculoskeletal:      Right lower leg: No edema. Left lower leg: No edema. Neurological:      General: No focal deficit present. Mental Status: He is alert and oriented to person, place, and time.    Psychiatric:         Mood and Affect: Mood normal. Behavior: Behavior normal.         Thought Content:  Thought content normal.         Judgment: Judgment normal.          Ny Ng, DO

## 2023-07-31 ENCOUNTER — TELEPHONE (OUTPATIENT)
Dept: FAMILY MEDICINE CLINIC | Facility: CLINIC | Age: 58
End: 2023-07-31

## 2023-08-08 ENCOUNTER — HOSPITAL ENCOUNTER (OUTPATIENT)
Dept: CT IMAGING | Facility: HOSPITAL | Age: 58
Discharge: HOME | End: 2023-08-08
Attending: INTERNAL MEDICINE
Payer: COMMERCIAL

## 2023-08-08 ENCOUNTER — APPOINTMENT (OUTPATIENT)
Dept: LAB | Facility: MEDICAL CENTER | Age: 58
End: 2023-08-08
Payer: COMMERCIAL

## 2023-08-08 ENCOUNTER — HOSPITAL ENCOUNTER (OUTPATIENT)
Dept: PULMONOLOGY | Facility: HOSPITAL | Age: 58
Discharge: HOME | End: 2023-08-08
Attending: INTERNAL MEDICINE
Payer: COMMERCIAL

## 2023-08-08 DIAGNOSIS — R06.09 DOE (DYSPNEA ON EXERTION): ICD-10-CM

## 2023-08-08 DIAGNOSIS — J44.9 COPD, SEVERE (HCC): ICD-10-CM

## 2023-08-08 DIAGNOSIS — Z79.4 TYPE 2 DIABETES MELLITUS WITH DIABETIC NEUROPATHY, WITH LONG-TERM CURRENT USE OF INSULIN (HCC): ICD-10-CM

## 2023-08-08 DIAGNOSIS — E11.40 TYPE 2 DIABETES MELLITUS WITH DIABETIC NEUROPATHY, WITH LONG-TERM CURRENT USE OF INSULIN (HCC): ICD-10-CM

## 2023-08-08 PROCEDURE — 83036 HEMOGLOBIN GLYCOSYLATED A1C: CPT

## 2023-08-08 PROCEDURE — 83735 ASSAY OF MAGNESIUM: CPT

## 2023-08-08 PROCEDURE — 71250 CT THORAX DX C-: CPT

## 2023-08-08 PROCEDURE — 85652 RBC SED RATE AUTOMATED: CPT | Performed by: INTERNAL MEDICINE

## 2023-08-08 PROCEDURE — 94726 PLETHYSMOGRAPHY LUNG VOLUMES: CPT | Performed by: INTERNAL MEDICINE

## 2023-08-08 PROCEDURE — G1004 CDSM NDSC: HCPCS

## 2023-08-08 PROCEDURE — 94060 EVALUATION OF WHEEZING: CPT | Performed by: INTERNAL MEDICINE

## 2023-08-08 PROCEDURE — 94729 DIFFUSING CAPACITY: CPT | Performed by: INTERNAL MEDICINE

## 2023-08-08 PROCEDURE — 94760 N-INVAS EAR/PLS OXIMETRY 1: CPT

## 2023-08-08 PROCEDURE — 94729 DIFFUSING CAPACITY: CPT

## 2023-08-08 PROCEDURE — 84100 ASSAY OF PHOSPHORUS: CPT

## 2023-08-08 PROCEDURE — 86225 DNA ANTIBODY NATIVE: CPT | Performed by: INTERNAL MEDICINE

## 2023-08-08 PROCEDURE — 94060 EVALUATION OF WHEEZING: CPT

## 2023-08-08 PROCEDURE — 83880 ASSAY OF NATRIURETIC PEPTIDE: CPT

## 2023-08-08 PROCEDURE — 83970 ASSAY OF PARATHORMONE: CPT

## 2023-08-08 PROCEDURE — 85025 COMPLETE CBC W/AUTO DIFF WBC: CPT

## 2023-08-08 PROCEDURE — 36415 COLL VENOUS BLD VENIPUNCTURE: CPT | Performed by: INTERNAL MEDICINE

## 2023-08-08 PROCEDURE — 94726 PLETHYSMOGRAPHY LUNG VOLUMES: CPT

## 2023-08-08 PROCEDURE — 86140 C-REACTIVE PROTEIN: CPT | Performed by: INTERNAL MEDICINE

## 2023-08-08 PROCEDURE — 80053 COMPREHEN METABOLIC PANEL: CPT

## 2023-08-08 RX ORDER — ALBUTEROL SULFATE 2.5 MG/3ML
2.5 SOLUTION RESPIRATORY (INHALATION) ONCE AS NEEDED
Status: COMPLETED | OUTPATIENT
Start: 2023-08-08 | End: 2023-08-08

## 2023-08-08 RX ADMIN — ALBUTEROL SULFATE 2.5 MG: 2.5 SOLUTION RESPIRATORY (INHALATION) at 10:51

## 2023-08-09 LAB
ALBUMIN SERPL BCP-MCNC: 3.9 G/DL (ref 3.5–5)
ALP SERPL-CCNC: 96 U/L (ref 46–116)
ALT SERPL W P-5'-P-CCNC: 38 U/L (ref 12–78)
ANION GAP SERPL CALCULATED.3IONS-SCNC: 4 MMOL/L
AST SERPL W P-5'-P-CCNC: 17 U/L (ref 5–45)
BASOPHILS # BLD AUTO: 0.05 THOUSANDS/ÂΜL (ref 0–0.1)
BASOPHILS NFR BLD AUTO: 1 % (ref 0–1)
BILIRUB SERPL-MCNC: 0.52 MG/DL (ref 0.2–1)
BNP SERPL-MCNC: 18 PG/ML (ref 0–100)
BUN SERPL-MCNC: 21 MG/DL (ref 5–25)
CALCIUM SERPL-MCNC: 9.4 MG/DL (ref 8.3–10.1)
CHLORIDE SERPL-SCNC: 107 MMOL/L (ref 96–108)
CO2 SERPL-SCNC: 29 MMOL/L (ref 21–32)
CREAT SERPL-MCNC: 1.01 MG/DL (ref 0.6–1.3)
CRP SERPL QL: 8.3 MG/L
EOSINOPHIL # BLD AUTO: 0.29 THOUSAND/ÂΜL (ref 0–0.61)
EOSINOPHIL NFR BLD AUTO: 4 % (ref 0–6)
ERYTHROCYTE [DISTWIDTH] IN BLOOD BY AUTOMATED COUNT: 14 % (ref 11.6–15.1)
ERYTHROCYTE [SEDIMENTATION RATE] IN BLOOD: 26 MM/HOUR (ref 0–19)
EST. AVERAGE GLUCOSE BLD GHB EST-MCNC: 174 MG/DL
GFR SERPL CREATININE-BSD FRML MDRD: 81 ML/MIN/1.73SQ M
GLUCOSE P FAST SERPL-MCNC: 143 MG/DL (ref 65–99)
HBA1C MFR BLD: 7.7 %
HCT VFR BLD AUTO: 45.5 % (ref 36.5–49.3)
HGB BLD-MCNC: 14.2 G/DL (ref 12–17)
IMM GRANULOCYTES # BLD AUTO: 0.05 THOUSAND/UL (ref 0–0.2)
IMM GRANULOCYTES NFR BLD AUTO: 1 % (ref 0–2)
LYMPHOCYTES # BLD AUTO: 0.9 THOUSANDS/ÂΜL (ref 0.6–4.47)
LYMPHOCYTES NFR BLD AUTO: 11 % (ref 14–44)
MAGNESIUM SERPL-MCNC: 2.7 MG/DL (ref 1.6–2.6)
MCH RBC QN AUTO: 27.5 PG (ref 26.8–34.3)
MCHC RBC AUTO-ENTMCNC: 31.2 G/DL (ref 31.4–37.4)
MCV RBC AUTO: 88 FL (ref 82–98)
MONOCYTES # BLD AUTO: 0.62 THOUSAND/ÂΜL (ref 0.17–1.22)
MONOCYTES NFR BLD AUTO: 7 % (ref 4–12)
NEUTROPHILS # BLD AUTO: 6.45 THOUSANDS/ÂΜL (ref 1.85–7.62)
NEUTS SEG NFR BLD AUTO: 76 % (ref 43–75)
NRBC BLD AUTO-RTO: 0 /100 WBCS
PHOSPHATE SERPL-MCNC: 4.1 MG/DL (ref 2.7–4.5)
PLATELET # BLD AUTO: 273 THOUSANDS/UL (ref 149–390)
PMV BLD AUTO: 9.9 FL (ref 8.9–12.7)
POTASSIUM SERPL-SCNC: 4.6 MMOL/L (ref 3.5–5.3)
PROT SERPL-MCNC: 7 G/DL (ref 6.4–8.4)
PTH-INTACT SERPL-MCNC: 31.2 PG/ML (ref 12–88)
RBC # BLD AUTO: 5.17 MILLION/UL (ref 3.88–5.62)
SODIUM SERPL-SCNC: 140 MMOL/L (ref 135–147)
WBC # BLD AUTO: 8.36 THOUSAND/UL (ref 4.31–10.16)

## 2023-08-10 LAB — DSDNA AB SER-ACNC: 20 IU/ML (ref 0–9)

## 2023-08-11 DIAGNOSIS — E11.9 TYPE 2 DIABETES MELLITUS WITH HEMOGLOBIN A1C GOAL OF LESS THAN 8.0% (HCC): ICD-10-CM

## 2023-08-14 RX ORDER — LANCETS 33 GAUGE
EACH MISCELLANEOUS
Qty: 100 EACH | Refills: 10 | Status: SHIPPED | OUTPATIENT
Start: 2023-08-14

## 2023-08-14 RX ORDER — BLOOD SUGAR DIAGNOSTIC
STRIP MISCELLANEOUS 3 TIMES DAILY
Qty: 100 EACH | Refills: 10 | Status: SHIPPED | OUTPATIENT
Start: 2023-08-14

## 2023-08-31 ENCOUNTER — OFFICE VISIT (OUTPATIENT)
Dept: CARDIOLOGY CLINIC | Facility: CLINIC | Age: 58
End: 2023-08-31
Payer: COMMERCIAL

## 2023-08-31 VITALS
DIASTOLIC BLOOD PRESSURE: 80 MMHG | WEIGHT: 315 LBS | HEIGHT: 67 IN | OXYGEN SATURATION: 93 % | HEART RATE: 74 BPM | SYSTOLIC BLOOD PRESSURE: 120 MMHG | RESPIRATION RATE: 22 BRPM | BODY MASS INDEX: 49.44 KG/M2

## 2023-08-31 DIAGNOSIS — J44.9 COPD, SEVERE (HCC): ICD-10-CM

## 2023-08-31 DIAGNOSIS — G47.33 OSA (OBSTRUCTIVE SLEEP APNEA): ICD-10-CM

## 2023-08-31 DIAGNOSIS — I35.0 AORTIC VALVE STENOSIS, ETIOLOGY OF CARDIAC VALVE DISEASE UNSPECIFIED: Primary | ICD-10-CM

## 2023-08-31 DIAGNOSIS — J96.11 CHRONIC HYPOXEMIC RESPIRATORY FAILURE (HCC): ICD-10-CM

## 2023-08-31 DIAGNOSIS — E66.01 CLASS 3 SEVERE OBESITY WITH SERIOUS COMORBIDITY AND BODY MASS INDEX (BMI) OF 50.0 TO 59.9 IN ADULT, UNSPECIFIED OBESITY TYPE (HCC): ICD-10-CM

## 2023-08-31 DIAGNOSIS — I10 ESSENTIAL HYPERTENSION: ICD-10-CM

## 2023-08-31 PROCEDURE — 99213 OFFICE O/P EST LOW 20 MIN: CPT | Performed by: INTERNAL MEDICINE

## 2023-08-31 NOTE — PROGRESS NOTES
Subjective:        Patient ID: Lloyd Gill is a 60 y.o. male. Chief Complaint:  Balwinder Lassiter is here for routine follow-up, was happy he did not need valve surgery yet, 3D echo and work-up suggested more moderate than severe aortic stenosis. He is not having any concerning exertional symptoms, his dyspnea is stable now off the steroids he is happy with. No unusual edema taking his usual 80 mg of Lasix a day which she feels is working keep his weight stable. He had 1 episode of chest pressure for about 20 minutes while at rest, this has not recurred. He will let us know if this becomes the case. No cath site discomfort, we reviewed the fact that he had only minimal CAD via cath. The following portions of the patient's history were reviewed and updated as appropriate: allergies, current medications, past family history, past medical history, past social history, past surgical history and problem list.  Review of Systems   Constitutional: Negative for chills, diaphoresis, malaise/fatigue and weight gain. HENT: Negative for nosebleeds and stridor. Eyes: Negative for double vision, vision loss in left eye, vision loss in right eye and visual disturbance. Cardiovascular: Negative for chest pain, claudication, cyanosis, dyspnea on exertion, irregular heartbeat, leg swelling, near-syncope, orthopnea, palpitations, paroxysmal nocturnal dyspnea and syncope. Respiratory: Negative for cough, shortness of breath, snoring and wheezing. Endocrine: Negative for polydipsia, polyphagia and polyuria. Hematologic/Lymphatic: Negative for bleeding problem. Does not bruise/bleed easily. Skin: Negative for flushing and rash. Musculoskeletal: Negative for falls and myalgias. Gastrointestinal: Negative for abdominal pain, heartburn, hematemesis, hematochezia, melena and nausea. Genitourinary: Negative for hematuria.    Neurological: Negative for brief paralysis, dizziness, focal weakness, headaches, light-headedness, loss of balance and vertigo. Psychiatric/Behavioral: Negative for altered mental status and substance abuse. Allergic/Immunologic: Negative for hives. Objective:      /80 (BP Location: Left arm, Patient Position: Sitting, Cuff Size: Large)   Pulse 74   Resp 22   Ht 5' 7" (1.702 m)   Wt (!) 156 kg (344 lb)   SpO2 93%   BMI 53.88 kg/m²   Physical Exam  Constitutional:       General: He is not in acute distress. Appearance: Normal appearance. He is well-developed. He is not diaphoretic. HENT:      Head: Normocephalic and atraumatic. Eyes:      General: No scleral icterus. Pupils: Pupils are equal, round, and reactive to light. Neck:      Thyroid: No thyromegaly. Vascular: No JVD. Cardiovascular:      Rate and Rhythm: Normal rate and regular rhythm. Heart sounds: Murmur heard. No friction rub. No gallop. Pulmonary:      Effort: Pulmonary effort is normal. No respiratory distress. Breath sounds: Normal breath sounds. No stridor. No wheezing or rales. Abdominal:      General: Bowel sounds are normal. There is no distension. Palpations: Abdomen is soft. There is no mass. Tenderness: There is no abdominal tenderness. Musculoskeletal:      Cervical back: Normal range of motion and neck supple. Right lower leg: Edema present. Left lower leg: Edema present. Skin:     General: Skin is warm and dry. Coloration: Skin is not pale. Findings: No erythema. Neurological:      Mental Status: He is alert and oriented to person, place, and time. Mental status is at baseline.       Coordination: Coordination normal.   Psychiatric:         Mood and Affect: Mood normal.         Behavior: Behavior normal.         Lab Review:   Appointment on 08/08/2023   Component Date Value   • Hemoglobin A1C 08/08/2023 7.7 (H)    • EAG 08/08/2023 174    Office Visit on 07/27/2023   Component Date Value   • Hemoglobin A1C 07/27/2023 7.7 (A) Office Visit on 07/12/2023   Component Date Value   • ds DNA Ab 08/08/2023 20 (H)    • Sed Rate 08/08/2023 26 (H)    • CRP 08/08/2023 8.3 (H)      CT chest wo contrast    Result Date: 8/15/2023  Narrative: CT CHEST WITHOUT IV CONTRAST INDICATION:  R06.09: Other forms of dyspnea. COMPARISON: CTA chest, abdomen and pelvis 6/9/2023, CT chest 4/26/2023 TECHNIQUE: CT examination of the chest was performed without intravenous contrast. Multiplanar 2D reformatted images were created from the source data. Coronal and axial MIP images of the chest were also provided. This examination, like all CT scans performed in the [facility], was performed utilizing techniques to minimize radiation dose exposure, including the use of iterative reconstruction and automated exposure control. Radiation dose length product (DLP) for this visit:  1978.48 mGy-cm FINDINGS: LUNGS: No consolidation. Persistent mild groundglass density and interstitial reticular thickening seen in the right lower lobe, may be sequela of previous infection. 2 adjacent juxtapleural right lower lobe nodules measuring up to 4 mm (series 5 image 95). Numerous calcified granulomas both lungs. AIRWAYS: No significant filling defects. PLEURA:  Unremarkable. HEART/GREAT VESSELS: Heart is unremarkable for patient's age. Coarse calcification aortic valve annulus. Mild coronary artery calcifications. No thoracic aortic aneurysm. Unchanged left pulmonary artery enlargement. MEDIASTINUM AND TESSY: Mediastinal lipomatosis and mild mediastinal adenopathy, similar. The esophagus is unremarkable. CHEST WALL AND LOWER NECK: Mild bilateral gynecomastia. VISUALIZED STRUCTURES IN THE UPPER ABDOMEN: Mild adenopathy gastrohepatic ligament and celiac axis, unchanged. Exophytic upper pole left renal cyst suggested. OSSEOUS STRUCTURES:  No acute fracture or destructive osseous lesion. Degenerative changes of the spine.      Impression: Persistent mild groundglass density and interstitial reticular thickening right lower lobe, may be sequela of previous infection. No consolidation. 2 adjacent juxtapleural right lower lobe nodules measuring up to 4 mm. Based on current Fleischner Society 2017 Guidelines on incidental pulmonary nodule, optional follow-up CT at 12 months can be considered. Stable mild mediastinal and upper abdominal adenopathy. Workstation performed: [id number]     Complete PFT with post bronchodilator    Result Date: 2023  Narrative: Pulmonary Function Test Report Caroline Ace 60 y.o. male MRN: [id number] Date of Testin2023 Requesting Physician: Cam Murillo MD [address] [facility],  [address] [facility] Reason for Testing: Dyspnea on exertion Reference set for interpretation:  [id number] Procedure: The patient was taken to pulmonary function testing laboratory. The patient demonstrated good effort and cooperation. The results of this test meet ATS standards for acceptability and repeatability. Data set appears appropriate for interpretation. Post bronchodilator testing performed after the administration of 2.5mg albuterol in 3cc normal saline administered via nebulizer per bronchodilator protocol. Results: FEV1/FVC Ratio: 62%, FEV1 1.66 L / 47%, FVC 2.69 L / 59% Lung volumes by body plethysmography: TLC 93%, %, DLCO 82% Interpretation: · Spirometer shows a severe obstructive ventilatory defect · No significant response after administration of bronchodilator according to the ats standards · Increased lung volumes consistent with a severe air trapping · Normal diffusion capacity · Increased airway resistance Clinical correlation is recommend Richard Chavis M.D. [facility]         Assessment:       1. Aortic valve stenosis, etiology of cardiac valve disease unspecified        2. COPD, severe ([address])        3. Chronic hypoxemic respiratory failure (HCC)        4. SCOTT (obstructive sleep apnea)        5. Class 3 severe obesity with serious comorbidity and body mass index (BMI) of 50.0 to 59.9 in adult, unspecified obesity type ([address])        6. Essential hypertension             Plan:       Felt not to be ready for AVR at this time by TAVR team.    With recent essentially normal cath atypical chest pain at rest of unclear etiology, he will call if this becomes recurrent, but I told him I be more concerned with any exertional symptoms with respect to his aortic valve disease. He understands and will report. He is euvolemic, normotensive, rhythm stable on exam, murmur unchanged, I made no significant medication changes in his regimen today. I did tell him he could either cut amlodipine or losartan in half daily only as needed for systolic blood pressure approaching or below 100, he understands. Occasionally low blood pressure readings have been an issue for him. We will see him back in 3 months, anticipate following up with echocardiography after that visit. He will call sooner with any concerning potential cardiac symptoms in the interim.

## 2023-09-06 DIAGNOSIS — F32.A ANXIETY AND DEPRESSION: ICD-10-CM

## 2023-09-06 DIAGNOSIS — F41.9 ANXIETY AND DEPRESSION: ICD-10-CM

## 2023-09-08 ENCOUNTER — OFFICE VISIT (OUTPATIENT)
Dept: ENDOCRINOLOGY | Facility: CLINIC | Age: 58
End: 2023-09-08
Payer: COMMERCIAL

## 2023-09-08 ENCOUNTER — TELEPHONE (OUTPATIENT)
Dept: OBGYN CLINIC | Facility: CLINIC | Age: 58
End: 2023-09-08

## 2023-09-08 VITALS
SYSTOLIC BLOOD PRESSURE: 96 MMHG | HEART RATE: 97 BPM | WEIGHT: 315 LBS | BODY MASS INDEX: 49.44 KG/M2 | DIASTOLIC BLOOD PRESSURE: 62 MMHG | HEIGHT: 67 IN

## 2023-09-08 DIAGNOSIS — E66.01 MORBID OBESITY (HCC): ICD-10-CM

## 2023-09-08 DIAGNOSIS — Z79.4 TYPE 2 DIABETES MELLITUS WITH DIABETIC NEUROPATHY, WITH LONG-TERM CURRENT USE OF INSULIN (HCC): Primary | ICD-10-CM

## 2023-09-08 DIAGNOSIS — I10 ESSENTIAL HYPERTENSION: ICD-10-CM

## 2023-09-08 DIAGNOSIS — E78.2 MIXED HYPERLIPIDEMIA: ICD-10-CM

## 2023-09-08 DIAGNOSIS — E11.40 PAINFUL DIABETIC NEUROPATHY (HCC): ICD-10-CM

## 2023-09-08 DIAGNOSIS — E11.40 TYPE 2 DIABETES MELLITUS WITH DIABETIC NEUROPATHY, WITH LONG-TERM CURRENT USE OF INSULIN (HCC): Primary | ICD-10-CM

## 2023-09-08 PROCEDURE — 99214 OFFICE O/P EST MOD 30 MIN: CPT | Performed by: STUDENT IN AN ORGANIZED HEALTH CARE EDUCATION/TRAINING PROGRAM

## 2023-09-08 PROCEDURE — 95251 CONT GLUC MNTR ANALYSIS I&R: CPT | Performed by: STUDENT IN AN ORGANIZED HEALTH CARE EDUCATION/TRAINING PROGRAM

## 2023-09-08 NOTE — TELEPHONE ENCOUNTER
Left message for patient to call and reschedule his appointment due to scheduling conflict. Call back number was provided.

## 2023-09-08 NOTE — PROGRESS NOTES
Oh Byrnes 60 y.o. male MRN: [id number]    Encounter: [id number]      Assessment/Plan     1. Type 2 diabetes c/b DPN, steroid induced hyperglycemia - improving control. Dipika Ortiz is benefiting from is-CGM in improving biofeedback. We are now connected via [employer]. No changes to therapy plan today. Dipika Ortiz is contemplative of change in metformin due to personal concerns, but is not inclined to change treatment now on account of progress. I agree. Will arrange for labs & follow up in 3-mo, call sooner with concerns. 2. Hypertension - stable. 3. Hyperlipidemia - currently on high intensity statin. Last lipids were poorly controlled. These will need to be followed. I believe he can also benefit from optimization of nutrition. 4. Morbid obesity - referral to CDE. Problem List Items Addressed This Visit        Endocrine    Type 2 diabetes mellitus with diabetic neuropathy, with long-term current use of insulin ([address]) - Primary    Relevant Orders    Basic metabolic panel Lab Collect    HEMOGLOBIN A1C W/ EAG ESTIMATION Lab Collect       Cardiovascular and Mediastinum    Essential hypertension   Other Visit Diagnoses     Mixed hyperlipidemia        Morbid obesity ([address])        Painful diabetic neuropathy ([address])        Relevant Orders    Ambulatory referral to Spine & Pain Management        RTC 3-mo    CC: Diabetes    History of Present Illness     HPI:    Dipika Ortiz returns today in follow up of diabetes. He is joined today by his wife. Recent history has been active, notable for eval of severe aortic stenosis. He has underwent cardiac cath, and is planned to have BROOKE as well in anticipation of possible TAVR. For diabetes, Dipika Ortiz remains on toujeo 70 units nightly, glimepiride 4 mg bid, farxiga 10 mg daily, ozempic 2 mg weekly, and metformin 2g daily. Oh Byrnes   Device used [employer] use     Indication   Type 2 Diabetes    More than 72 hours of data was reviewed. Report to be scanned to chart. Date Range: Aug 12 - Sep 28, 2023    Analysis of data:   % time CGM used: 59%  Average Glucose: 156 mg/dL  Coefficient of Variation: 32.1%     Time in Target Range: 71%   Time Above Range: 27%   Time Below Range: 2%     Interpretation of data: fair glycemic control. TIR is at an acceptable level. No significant burden of hypoglycemia. For hyperlipidemia he takes lipitor 40 mg. For hypertension he takes losartan 100 mg daily, carvedilol 12.5 mg bid, amlodipine 10 mg and spironolactone 50 mg. Review of Systems   Constitutional: Negative for diaphoresis. HENT: Negative for voice change. Cardiovascular: Negative for chest pain and palpitations. Gastrointestinal: Negative for nausea and vomiting. Endocrine: Negative for polydipsia and polyuria. Neurological: Negative for tremors. Psychiatric/Behavioral: Negative for agitation. All other systems reviewed and are negative. Historical Information   Past Medical History:   Diagnosis Date   • Aortic stenosis    • Asthma 1964   • COPD (chronic obstructive pulmonary disease) ([facility])    • Coronary artery disease 2000    Aortic stenosis   • Diabetes ([address])    • Diabetes mellitus ([address])    • Hyperlipidemia    • Hypertension 07/02/2014   • Pneumonia 2015   • Sleep apnea, obstructive      Past Surgical History:   Procedure Laterality Date   • ACCESSORY NAVICULAR EXCISION Right    • APPENDECTOMY     • CARDIAC CATHETERIZATION N/A 05/25/2023    Procedure: Cardiac Coronary Angiogram;  Surgeon: Ana Laura Kamara MD;  Location: [facility]; Service: Cardiology   • EAR FOREIGN BODY REMOVAL      cyst removal. bronchospasms after general anesthesia.    • EYE SURGERY Left     traumatic injury at age 6-11     Social History   Social History     Substance and Sexual Activity   Alcohol Use Never     Social History     Substance and Sexual Activity   Drug Use Never     Social History     Tobacco Use   Smoking Status Former   • Packs/day: 3.00   • Years: 30.00   • Total pack years: 90.00   • Types: Cigarettes   • Start date: 3/15/1984   • Quit date: 7/15/2005   • Years since quittin.1   Smokeless Tobacco Never     Family History:   Family History   Problem Relation Age of Onset   • No Known Problems Mother    • Heart disease Father    • Lung cancer Father         Passed away    • Cancer Father         Lung cancer   • Hypertension Father    • Diabetes Maternal Grandfather    • Stomach cancer Maternal Grandfather        Meds/Allergies   Current Outpatient Medications   Medication Sig Dispense Refill   • albuterol (Ventolin HFA) 90 mcg/act inhaler Inhale 2 puffs every 6 (six) hours as needed for wheezing 18 g 3   • amLODIPine (NORVASC) 10 mg tablet Take 1 tablet (10 mg total) by mouth daily 90 tablet 3   • Anoro Ellipta 62.5-25 MCG/ACT inhaler INHALE 1 PUFF BY MOUTH DAILY 60 each 10   • aspirin (ECOTRIN LOW STRENGTH) 81 mg EC tablet Take 1 tablet (81 mg total) by mouth daily 30 tablet 5   • atorvastatin (LIPITOR) 40 mg tablet Take 1 tablet (40 mg total) by mouth daily 90 tablet 3   • Blood Glucose Monitoring Suppl (CONTOUR NEXT MONITOR) w/Device KIT Test blood sugar once daily or as needed for fluctuating blood sugars 1 kit 0   • carvedilol (COREG) 12.5 mg tablet Take 1 tablet (12.5 mg total) by mouth 2 (two) times a day with meals 180 tablet 3   • cyclobenzaprine (FLEXERIL) 10 mg tablet Take 10 mg by mouth     • Dapagliflozin Propanediol (Farxiga) 10 MG TABS Take 10 mg by mouth daily     • Flovent  MCG/ACT inhaler INHALE 2 PUFFS BY MOUTH TWICE DAILY *RINSE MOUTH AFTER USE* 12 g 10   • furosemide (LASIX) 40 mg tablet Take 1 tablet (40 mg total) by mouth 2 (two) times a day 180 tablet 3   • gabapentin (NEURONTIN) 400 mg capsule TAKE 1 CAPSULE BY MOUTH THREE TIMES A DAY 90 capsule 10   • glimepiride (AMARYL) 4 mg tablet Take 4 mg by mouth 2 (two) times a day     • insulin glargine (Toujeo Max SoloStar) 300 units/mL CONCENTRATED U-300 injection pen (2-unit dial) Inject 70 Units under the skin daily at bedtime 24 mL 1   • Insulin Pen Needle (BD Pen Needle Dee Dee U/F) 32G X 4 MM MISC Use 4 a day 200 each 5   • ipratropium-albuterol (DUO-NEB) 0.5-2.5 mg/3 mL nebulizer solution INHALE CONTENTS OF 1 VIAL VIA NEBULIZER FOUR TIMES A  mL 10   • losartan (COZAAR) 100 MG tablet Take 1 tablet (100 mg total) by mouth daily 90 tablet 3   • MELATONIN PO Take 10 mg by mouth     • metFORMIN (GLUCOPHAGE) 1000 MG tablet TAKE 1 TABLET BY MOUTH TWICE DAILY WITH MEALS 180 tablet 0   • Multiple Vitamins-Minerals (MENS MULTIVITAMIN PO) Take by mouth     • Ozempic, 2 MG/DOSE, 8 MG/3ML SOPN 2 mg every 7 days     • sertraline (ZOLOFT) 50 mg tablet TAKE 1 TABLET BY MOUTH DAILY 30 tablet 10   • spironolactone (ALDACTONE) 50 mg tablet Take 1 tablet (50 mg total) by mouth daily 90 tablet 3   • ammonium lactate (LAC-HYDRIN) 12 % cream Apply topically as needed for dry skin (Patient taking differently: Apply topically as needed for dry skin As needed) 385 g 0   • Lancets (OneTouch Delica Plus [id number]) MISC USE TO TEST BLOOD SUGAR THREE TIMES A  each 10   • OneTouch Ultra test strip USE TO TEST BLOOD SUGAR THREE TIMES A  each 10     No current facility-administered medications for this visit. Allergies   Allergen Reactions   • Theophylline Other (See Comments)     Severe headaches  headaches  Severe headaches         Objective   Vitals: Blood pressure 96/62, pulse 97, height 5' 7" (1.702 m), weight (!) 155 kg (341 lb). Physical Exam  Vitals reviewed. Constitutional:       Appearance: Normal appearance. HENT:      Head: Normocephalic and atraumatic. Eyes:      General: No scleral icterus. Conjunctiva/sclera: Conjunctivae normal.   Cardiovascular:      Rate and Rhythm: Normal rate and regular rhythm. Pulses: no weak pulses          Dorsalis pedis pulses are 2+ on the right side and 2+ on the left side.    Pulmonary:      Effort: Pulmonary effort is normal. No respiratory distress. Abdominal:      Palpations: Abdomen is soft. Tenderness: There is no abdominal tenderness. Musculoskeletal:      Cervical back: Normal range of motion. Feet:      Right foot:      Skin integrity: No ulcer, skin breakdown, erythema, warmth, callus or dry skin. Left foot:      Skin integrity: No ulcer, skin breakdown, erythema, warmth, callus or dry skin. Skin:     General: Skin is warm and dry. Neurological:      General: No focal deficit present. Mental Status: He is alert. Psychiatric:         Mood and Affect: Mood normal.         Behavior: Behavior normal.     Diabetic Foot Exam    Patient's shoes and socks removed. Right Foot/Ankle   Right Foot Inspection  Skin Exam: skin normal and skin intact. No dry skin, no warmth, no callus, no erythema, no maceration, no abnormal color, no pre-ulcer, no ulcer and no callus. Sensory   Vibration: intact  Monofilament testing: intact    Vascular  The right DP pulse is 2+. Left Foot/Ankle  Left Foot Inspection  Skin Exam: skin normal and skin intact. No dry skin, no warmth, no erythema, no maceration, normal color, no pre-ulcer, no ulcer and no callus. Sensory   Vibration: diminished  Monofilament testing: diminished    Vascular  The left DP pulse is 2+. Assign Risk Category  No deformity present  No loss of protective sensation  No weak pulses  Risk: 0      The history was obtained from the review of the chart, patient and family.     Lab Results:   Lab Results   Component Value Date/Time    Hemoglobin A1C 7.7 (H) 08/08/2023 12:08 PM    Hemoglobin A1C 7.7 (A) 07/27/2023 02:38 PM    Hemoglobin A1C 9.1 (A) 03/02/2023 11:19 AM    Hemoglobin A1C 8.1 (A) 11/30/2022 10:40 AM    WBC 8.36 08/08/2023 12:08 PM    WBC 7.55 05/25/2023 08:46 AM    WBC 10.03 05/24/2023 01:26 PM    Hemoglobin 14.2 08/08/2023 12:08 PM    Hemoglobin 15.2 05/25/2023 08:46 AM    Hemoglobin 14.3 05/24/2023 01:26 PM    Hematocrit 45.5 08/08/2023 12:08 PM    Hematocrit 44.8 05/25/2023 08:46 AM    Hematocrit 44.4 05/24/2023 01:26 PM    MCV 88 08/08/2023 12:08 PM    MCV 84 05/25/2023 08:46 AM    MCV 86 05/24/2023 01:26 PM    Platelets 179 15/02/8053 12:08 PM    Platelets 505 35/68/3556 08:46 AM    Platelets 475 31/46/1992 01:26 PM    BUN 21 08/08/2023 12:08 PM    BUN 36 (H) 05/25/2023 08:46 AM    BUN 39 (H) 05/24/2023 01:26 PM    Potassium 4.6 08/08/2023 12:08 PM    Potassium 4.1 05/25/2023 08:46 AM    Potassium 4.3 05/24/2023 01:26 PM    Chloride 107 08/08/2023 12:08 PM    Chloride 104 05/25/2023 08:46 AM    Chloride 102 05/24/2023 01:26 PM    CO2 29 08/08/2023 12:08 PM    CO2 24 05/25/2023 08:46 AM    CO2 25 05/24/2023 01:26 PM    Creatinine 1.01 08/08/2023 12:08 PM    Creatinine 1.09 05/25/2023 08:46 AM    Creatinine 1.39 (H) 05/24/2023 01:26 PM    AST 17 08/08/2023 12:08 PM    AST 17 05/24/2023 01:26 PM    AST 10 (L) 04/26/2023 04:46 PM    ALT 38 08/08/2023 12:08 PM    ALT 40 05/24/2023 01:26 PM    ALT 20 04/26/2023 04:46 PM    Total Protein 7.0 08/08/2023 12:08 PM    Total Protein 7.5 05/24/2023 01:26 PM    Total Protein 6.7 04/26/2023 04:46 PM    Albumin 3.9 08/08/2023 12:08 PM    Albumin 4.1 05/24/2023 01:26 PM    Albumin 3.9 04/26/2023 04:46 PM    HDL, Direct 25 (L) 08/08/2023 12:08 PM    Triglycerides 218 (H) 08/08/2023 12:08 PM     Lab Results   Component Value Date    CHOLESTEROL 90 08/08/2023    CHOLESTEROL 94 01/26/2022    CHOLESTEROL 108 09/02/2020     Lab Results   Component Value Date    HDL 25 (L) 08/08/2023    HDL 22 (L) 01/26/2022    HDL 23 (L) 09/02/2020     Lab Results   Component Value Date    TRIG 218 (H) 08/08/2023    TRIG 292 (H) 01/26/2022    TRIG 130 07/28/2021     Lab Results   Component Value Date    NONHDLC 72 01/26/2022    [address] [facility] 80 07/19/2019     Lab Results   Component Value Date    LDLCALC 21 08/08/2023         Imaging Studies: I have personally reviewed pertinent reports.       Portions of the record may have been created with voice recognition software. Occasional wrong word or "sound a like" substitutions may have occurred due to the inherent limitations of voice recognition software. Read the chart carefully and recognize, using context, where substitutions have occurred.

## 2023-09-17 NOTE — PROGRESS NOTES
Progress Note - Pulmonary   Nikolai Salinas 60 y.o. male MRN: [id number]   Encounter: [id number]      Assessment/Plan:  Patient is a 62-year-old male presenting for routine follow-up. Overall the patient did relatively well since last visit. He did have to resume his steroids in setting of breathing discomfort. Discussed that he does require steroids about 10-15 times a year for breathing. He will be on a taper be okay for 2 weeks and then require resumption of steroids. Given his elevated eosinophil level and compliance with LABA/ICS will initiate Fasenra. Additionally, there is concern for lupus. He does have an elevated double-stranded DNA, although he has no other significant findings of lupus. We will use Fasenra to help differentiate between findings associated with asthma and lupus. We will check lupus activity labs at next visit with the hopes of decreasing his steroid use. He notes that his diabetes is under improved control. He is working with endocrinology and these numbers have been better. We will avoid steroids as much as possible. Patient was counseled on need for weight loss. This is likely a strong contributing factor. Additionally, may continue his current BiPAP. Morbid obesity  -  Counseled on weight loss and increased exercise    SCOTT  -  Continue bipap with supplemental oxygen  -  Reports good compliance and results    Chronic hypoxemic respiratory failure  -  Only used while sleeping    Abnormal CT scan  -  Improving;no acute indication for bronchoscopy    Moderate persistent asthma  -  Start Fasenra  -  Continue flovent and Anoro  -  PRN albuterol    Dyspnea on exertion  -  stable    Positive double stranded DNA  -  Check lupus activity labs   -  Will recheck at next visit    1. Lupus ([address])  -     C3 complement; Future  -     C4 complement; Future  -     Complement, total; Future  -     Anti-DNA antibody, double-stranded; Future  -     C-reactive protein;  Future  - Urinalysis with microscopic  -     Protein / creatinine ratio, urine    2. SCOTT (obstructive sleep apnea)    3. Type 2 diabetes mellitus with diabetic neuropathy, with long-term current use of insulin ([address])    4. COPD, severe ([address])    5. Class 3 severe obesity with serious comorbidity and body mass index (BMI) of 50.0 to 59.9 in adult, unspecified obesity type ([address])    6. Abnormal CT of the chest        Patient may follow up in 3-4 months or sooner as necessary. Orders:  Orders Placed This Encounter   Procedures   • C3 complement     Standing Status:   Future     Standing Expiration Date:   9/18/2024   • C4 complement     Standing Status:   Future     Standing Expiration Date:   9/18/2024   • Complement, total     Standing Status:   Future     Standing Expiration Date:   9/18/2024   • Anti-DNA antibody, double-stranded     Standing Status:   Future     Standing Expiration Date:   9/18/2024   • C-reactive protein     Standing Status:   Future     Standing Expiration Date:   9/18/2024   • Urinalysis with microscopic   • Protein / creatinine ratio, urine       Subjective: The patient resumed prednisone usage in early September. He had made it off of the prednisone for about 6 weeks. He noted that he had difficulty during the 6 weeks. He was using his rescue albuterol nebulizers. He is using flovent and anoro for maintenance. His wife notes that he is wheezing. He notes low blood pressure. He is 90s/50s. He is going to follow up with his cardiologist.      He is compliant with his bipap on a nightly basis with use of supplemental oxygen. Inhaler Regimen:  Flovent  Anoro  Albuterol    Remainder of review of systems negative except as described in HPI.       The following portions of the patient's history were reviewed and updated as appropriate: allergies, current medications, past family history, past medical history, past social history, past surgical history and problem list.     Objective: Vitals: Blood pressure 91/60, pulse 77, temperature 97.7 °F (36.5 °C), temperature source Tympanic, height 5' 7" (1.702 m), weight (!) 157 kg (347 lb), SpO2 92 %., RA, Body mass index is 54.35 kg/m². Physical Exam  Gen: Pleasant, awake, alert, oriented x 3, no acute distress  HEENT: Mucous membranes moist, no oral lesions, no thrush  NECK: No accessory muscle use, JVP not elevated  Cardiac: RRR, single S1, single S2, no murmurs, no rubs, no gallops  Lungs: CTA b/l; no w/r/c  Abdomen: normoactive bowel sounds, soft nontender, nondistended, no rebound or rigidity, no guarding, obese  Extremities: no cyanosis, no clubbing, no LE edema  MSK:  Strength equal in all extremities  Derm:  No rashes/lesions noted  Neuro:  Appropriate mood/affect    Labs: I have personally reviewed pertinent lab results. Lab Results   Component Value Date    WBC 8.36 08/08/2023    HGB 14.2 08/08/2023     08/08/2023     Lab Results   Component Value Date    CREATININE 1.01 08/08/2023        Imaging and other studies: I have personally reviewed pertinent reports. and I have personally reviewed pertinent films in PACS  CT Chest 8/8/2023  My interpretation:  Mid persistent groundglass at right lung base    Radiology findings:  LUNGS: No consolidation. Persistent mild groundglass density and interstitial reticular thickening seen in the right lower lobe, may be sequela of previous infection. 2 adjacent juxtapleural right lower lobe nodules measuring up to 4 mm (series 5 image 95). Numerous calcified granulomas both lungs. AIRWAYS: No significant filling defects. PLEURA:  Unremarkable. HEART/GREAT VESSELS: Heart is unremarkable for patient's age. Coarse calcification aortic valve annulus. Mild coronary artery calcifications. No thoracic aortic aneurysm. Unchanged left pulmonary artery enlargement. MEDIASTINUM AND TESSY: Mediastinal lipomatosis and mild mediastinal adenopathy, similar. The esophagus is unremarkable.   CHEST WALL AND LOWER NECK: Mild bilateral gynecomastia. VISUALIZED STRUCTURES IN THE UPPER ABDOMEN: Mild adenopathy gastrohepatic ligament and celiac axis, unchanged. Exophytic upper pole left renal cyst suggested. Pulmonary Function Testing: I have personally reviewed pertinent reports. and I have personally reviewed pertinent films in PACS                          FVC                 FEV1               FEV1/FVC       DLCOcor  9/4/2019          2.54 55%         1.36 41%         53%                 66%  12/28/2021      2.57 55%         1.62 45%         63%                 91%  8/8/2023 2.69 59% 1.66 47% 62%  82%    Mohsen Unger Spindle, [address]

## 2023-09-18 ENCOUNTER — OFFICE VISIT (OUTPATIENT)
Dept: PULMONOLOGY | Facility: CLINIC | Age: 58
End: 2023-09-18
Payer: COMMERCIAL

## 2023-09-18 VITALS
SYSTOLIC BLOOD PRESSURE: 91 MMHG | BODY MASS INDEX: 49.44 KG/M2 | HEART RATE: 77 BPM | WEIGHT: 315 LBS | HEIGHT: 67 IN | TEMPERATURE: 97.7 F | DIASTOLIC BLOOD PRESSURE: 60 MMHG | OXYGEN SATURATION: 92 %

## 2023-09-18 DIAGNOSIS — M32.9 LUPUS (HCC): Primary | ICD-10-CM

## 2023-09-18 DIAGNOSIS — E11.40 TYPE 2 DIABETES MELLITUS WITH DIABETIC NEUROPATHY, WITH LONG-TERM CURRENT USE OF INSULIN (HCC): ICD-10-CM

## 2023-09-18 DIAGNOSIS — G47.33 OSA (OBSTRUCTIVE SLEEP APNEA): ICD-10-CM

## 2023-09-18 DIAGNOSIS — E66.01 CLASS 3 SEVERE OBESITY WITH SERIOUS COMORBIDITY AND BODY MASS INDEX (BMI) OF 50.0 TO 59.9 IN ADULT, UNSPECIFIED OBESITY TYPE (HCC): ICD-10-CM

## 2023-09-18 DIAGNOSIS — J44.9 COPD, SEVERE (HCC): ICD-10-CM

## 2023-09-18 DIAGNOSIS — R93.89 ABNORMAL CT OF THE CHEST: ICD-10-CM

## 2023-09-18 DIAGNOSIS — Z79.4 TYPE 2 DIABETES MELLITUS WITH DIABETIC NEUROPATHY, WITH LONG-TERM CURRENT USE OF INSULIN (HCC): ICD-10-CM

## 2023-09-18 PROBLEM — IMO0002 LUPUS: Status: ACTIVE | Noted: 2023-09-18

## 2023-09-18 PROCEDURE — 99214 OFFICE O/P EST MOD 30 MIN: CPT | Performed by: INTERNAL MEDICINE

## 2023-09-19 DIAGNOSIS — J45.40 MODERATE PERSISTENT ASTHMA, UNSPECIFIED WHETHER COMPLICATED: Primary | ICD-10-CM

## 2023-09-19 NOTE — TELEPHONE ENCOUNTER
Received approval from [facility] for the Kellie pen loading an maintenance dose.        08/01/2023-12/31/2023

## 2023-09-20 ENCOUNTER — TELEPHONE (OUTPATIENT)
Dept: CARDIOLOGY CLINIC | Facility: CLINIC | Age: 58
End: 2023-09-20

## 2023-09-20 NOTE — TELEPHONE ENCOUNTER
While diallo was at his pulmonologist yesterday his BP was 90/56. They told him to stop his amlodipine. He wanted you to be aware.

## 2023-10-18 ENCOUNTER — TELEPHONE (OUTPATIENT)
Dept: PULMONOLOGY | Facility: CLINIC | Age: 58
End: 2023-10-18

## 2023-10-18 DIAGNOSIS — J45.40 MODERATE PERSISTENT ASTHMA WITHOUT COMPLICATION: Primary | ICD-10-CM

## 2023-10-18 RX ORDER — PREDNISONE 20 MG/1
40 TABLET ORAL DAILY
Qty: 10 TABLET | Refills: 0 | Status: SHIPPED | OUTPATIENT
Start: 2023-10-18

## 2023-10-18 NOTE — TELEPHONE ENCOUNTER
Patient lvm stating that his oxygen levels are going into the low 80's with any type of exercise. He said this happened twice this week already and wants to know if he should go to the ER to be evaluated. Please advise.

## 2023-10-18 NOTE — TELEPHONE ENCOUNTER
Sina Peterson would like a return call regarding     What is the reason for the call/chief complaint? Decrease oxygen    What additional symptoms are present or absent? SOB, yes   Are they on O2? Yes, describe: 4l-4l  Pulse O2 [id number] When walking without O2 81   chest pain/tightness, no  wheezing, yes  Cough, yes  mucous production,no. What color is it n/a  fevers/chills, no  weight gain, no  Swelling no  Pain no, does it hurt when breathing or all the time? N/a    When did they start/how long have they been going on? 1 week    Constant or intermittent; if intermittent describe yes? What makes it better or worse?n/a    Have you been exposed to anyone that is sick? No    Have you been tested for COVID recently? no    Check current pulmonary medications inhaler/nebs  frequency of use daily    Have they had any other treatment or testing for this problem elsewhere? N/A    Recent steroids? No    Recent Antibiotics? No     Last office visit? 9/18/2023    Patient pharmacy?    [address], [address] [facility] Phone: [phone]   Fax: [phone] or 90 day supply

## 2023-11-16 ENCOUNTER — RA CDI HCC (OUTPATIENT)
Dept: OTHER | Facility: HOSPITAL | Age: 58
End: 2023-11-16

## 2023-11-16 NOTE — PROGRESS NOTES
Noted steroid-induced hyperglycemia    I11.0  HCC coding opportunities          Chart Reviewed number of suggestions sent to Provider: 1     Patients Insurance     Medicare Insurance: [id number]

## 2023-11-27 ENCOUNTER — OFFICE VISIT (OUTPATIENT)
Dept: FAMILY MEDICINE CLINIC | Facility: CLINIC | Age: 58
End: 2023-11-27
Payer: COMMERCIAL

## 2023-11-27 VITALS
BODY MASS INDEX: 49.44 KG/M2 | OXYGEN SATURATION: 93 % | HEIGHT: 67 IN | HEART RATE: 98 BPM | SYSTOLIC BLOOD PRESSURE: 132 MMHG | WEIGHT: 315 LBS | DIASTOLIC BLOOD PRESSURE: 74 MMHG | TEMPERATURE: 98.2 F

## 2023-11-27 DIAGNOSIS — Z23 ENCOUNTER FOR IMMUNIZATION: ICD-10-CM

## 2023-11-27 DIAGNOSIS — Z79.4 TYPE 2 DIABETES MELLITUS WITH DIABETIC NEUROPATHY, WITH LONG-TERM CURRENT USE OF INSULIN (HCC): Primary | ICD-10-CM

## 2023-11-27 DIAGNOSIS — E11.40 TYPE 2 DIABETES MELLITUS WITH DIABETIC NEUROPATHY, WITH LONG-TERM CURRENT USE OF INSULIN (HCC): Primary | ICD-10-CM

## 2023-11-27 DIAGNOSIS — R19.5 POSITIVE COLORECTAL CANCER SCREENING USING COLOGUARD TEST: ICD-10-CM

## 2023-11-27 LAB — SL AMB POCT HEMOGLOBIN AIC: 8.7 (ref ?–6.5)

## 2023-11-27 PROCEDURE — 83036 HEMOGLOBIN GLYCOSYLATED A1C: CPT | Performed by: FAMILY MEDICINE

## 2023-11-27 PROCEDURE — G0008 ADMIN INFLUENZA VIRUS VAC: HCPCS | Performed by: FAMILY MEDICINE

## 2023-11-27 PROCEDURE — 90686 IIV4 VACC NO PRSV 0.5 ML IM: CPT | Performed by: FAMILY MEDICINE

## 2023-11-27 PROCEDURE — 99214 OFFICE O/P EST MOD 30 MIN: CPT | Performed by: FAMILY MEDICINE

## 2023-11-27 NOTE — PROGRESS NOTES
Name: Tiburcio Bentley      : 1965      MRN: [id number]  Encounter Provider: Miguel Correa DO  Encounter Date: 2023   Encounter department: [address] [place] [address]    Assessment & Plan   Hemoglobin A1c 8.7. Continue follow-up with endocrinology. Flu shot given today. I put another referral into GI for his positive Cologuard test.  He will call us if he has not heard from them by the end of this week. Follow-up with other specialist as scheduled. Follow-up here in 4 months or as needed. 1. Type 2 diabetes mellitus with diabetic neuropathy, with long-term current use of insulin (HCC)  -     POCT hemoglobin A1c    2. Encounter for immunization  -     influenza vaccine, quadrivalent, 0.5 mL, preservative-free, for adult and pediatric patients 6 mos+ (AFLURIA, FLUARIX, FLULAVAL, FLUZONE)    3. Positive colorectal cancer screening using Cologuard test  -     Ambulatory Referral to Gastroenterology; Future           Subjective     Patient here today for follow-up. He denies any chest pain or shortness of breath. His breathing is at actually improved. He does follow-up with endocrinology for his diabetes. Review of Systems   Constitutional: Negative. Respiratory: Negative. Cardiovascular: Negative. Gastrointestinal: Negative. Genitourinary: Negative. Past Medical History:   Diagnosis Date   • Aortic stenosis    • Asthma    • COPD (chronic obstructive pulmonary disease) ([facility])    • Coronary artery disease     Aortic stenosis   • Diabetes ([facility])    • Diabetes mellitus ([facility])    • Hyperlipidemia    • Hypertension 2014   • Pneumonia    • Sleep apnea, obstructive      Past Surgical History:   Procedure Laterality Date   • ACCESSORY NAVICULAR EXCISION Right    • APPENDECTOMY     • CARDIAC CATHETERIZATION N/A 2023    Procedure: Cardiac Coronary Angiogram;  Surgeon: Matt Ogden MD;  Location: [facility];   Service: Cardiology   • EAR FOREIGN BODY REMOVAL      cyst removal. bronchospasms after general anesthesia. • EYE SURGERY Left     traumatic injury at age 6-11     Family History   Problem Relation Age of Onset   • No Known Problems Mother    • Heart disease Father    • Lung cancer Father         Passed away 0   • Cancer Father         Lung cancer   • Hypertension Father    • ALS Maternal Grandmother    • Diabetes Maternal Grandfather    • Stomach cancer Maternal Grandfather      Social History     Socioeconomic History   • Marital status: /Civil Union     Spouse name: None   • Number of children: None   • Years of education: None   • Highest education level: None   Occupational History   • None   Tobacco Use   • Smoking status: Former     Packs/day: 3.00     Years: 30.00     Total pack years: 90.00     Types: Cigarettes     Start date: 3/15/1984     Quit date: 7/15/2005     Years since quittin.3   • Smokeless tobacco: Never   Vaping Use   • Vaping Use: Never used   Substance and Sexual Activity   • Alcohol use: Never   • Drug use: Never   • Sexual activity: Yes     Partners: Female     Birth control/protection: None   Other Topics Concern   • None   Social History Narrative   • None     Social Determinants of Health     Financial Resource Strain: Unknown (2023)    Overall Financial Resource Strain (CARDIA)    • Difficulty of Paying Living Expenses: Patient refused   Food Insecurity: Not on file   Transportation Needs: No Transportation Needs (2023)    PRAPARE - Transportation    • Lack of Transportation (Medical): No    • Lack of Transportation (Non-Medical):  No   Physical Activity: Not on file   Stress: Not on file   Social Connections: Not on file   Intimate Partner Violence: Not on file   Housing Stability: Not on file     Current Outpatient Medications on File Prior to Visit   Medication Sig   • albuterol (Ventolin HFA) 90 mcg/act inhaler Inhale 2 puffs every 6 (six) hours as needed for wheezing   • Anoro Ellipta 62.5-25 MCG/ACT inhaler INHALE 1 PUFF BY MOUTH DAILY   • aspirin (ECOTRIN LOW STRENGTH) 81 mg EC tablet Take 1 tablet (81 mg total) by mouth daily   • atorvastatin (LIPITOR) 40 mg tablet Take 1 tablet (40 mg total) by mouth daily   • Benralizumab 30 MG/ML SOAJ Inject 30 mg under the skin every 28 days Inject 30 mg every 28 days for the first 3 doses   • Blood Glucose Monitoring Suppl (CONTOUR NEXT MONITOR) w/Device KIT Test blood sugar once daily or as needed for fluctuating blood sugars   • carvedilol (COREG) 12.5 mg tablet Take 1 tablet (12.5 mg total) by mouth 2 (two) times a day with meals   • cyclobenzaprine (FLEXERIL) 10 mg tablet Take 10 mg by mouth   • Dapagliflozin Propanediol (Farxiga) 10 MG TABS Take 10 mg by mouth daily   • Flovent  MCG/ACT inhaler INHALE 2 PUFFS BY MOUTH TWICE DAILY *RINSE MOUTH AFTER USE*   • furosemide (LASIX) 40 mg tablet Take 1 tablet (40 mg total) by mouth 2 (two) times a day   • glimepiride (AMARYL) 4 mg tablet Take 4 mg by mouth 2 (two) times a day   • insulin glargine (Toujeo Max SoloStar) 300 units/mL CONCENTRATED U-300 injection pen (2-unit dial) Inject 70 Units under the skin daily at bedtime   • Insulin Pen Needle (BD Pen Needle Dee Dee U/F) 32G X 4 MM MISC Use 4 a day   • ipratropium-albuterol (DUO-NEB) 0.5-2.5 mg/3 mL nebulizer solution INHALE CONTENTS OF 1 VIAL VIA NEBULIZER FOUR TIMES A DAY   • losartan (COZAAR) 100 MG tablet Take 1 tablet (100 mg total) by mouth daily   • metFORMIN (GLUCOPHAGE) 1000 MG tablet TAKE 1 TABLET BY MOUTH TWICE DAILY WITH MEALS   • Multiple Vitamins-Minerals (MENS MULTIVITAMIN PO) Take by mouth   • Ozempic, 2 MG/DOSE, 8 MG/3ML SOPN 2 mg every 7 days   • sertraline (ZOLOFT) 50 mg tablet TAKE 1 TABLET BY MOUTH DAILY   • spironolactone (ALDACTONE) 50 mg tablet Take 1 tablet (50 mg total) by mouth daily   • [DISCONTINUED] amLODIPine (NORVASC) 10 mg tablet Take 1 tablet (10 mg total) by mouth daily   • [DISCONTINUED] ammonium lactate (LAC-HYDRIN) 12 % cream Apply topically as needed for dry skin (Patient taking differently: Apply topically as needed for dry skin As needed)   • [DISCONTINUED] Benralizumab 30 MG/ML SOAJ Inject 30 mg under the skin every 56 days   • [DISCONTINUED] gabapentin (NEURONTIN) 400 mg capsule TAKE 1 CAPSULE BY MOUTH THREE TIMES A DAY   • [DISCONTINUED] Lancets (OneTouch Delica Plus [id number]) MISC USE TO TEST BLOOD SUGAR THREE TIMES A DAY   • [DISCONTINUED] MELATONIN PO Take 10 mg by mouth   • [DISCONTINUED] OneTouch Ultra test strip USE TO TEST BLOOD SUGAR THREE TIMES A DAY   • [DISCONTINUED] predniSONE 20 mg tablet Take 2 tablets (40 mg total) by mouth daily     Allergies   Allergen Reactions   • Theophylline Other (See Comments)     Severe headaches  headaches  Severe headaches       Immunization History   Administered Date(s) Administered   • COVID-19 MODERNA VACC 0.5 ML IM 02/06/2021, 03/06/2021, 10/26/2021   • INFLUENZA 11/25/2014, 09/19/2016, 11/01/2017, 12/17/2018   • Influenza, injectable, quadrivalent, preservative free 0.5 mL 11/27/2023   • Influenza, recombinant, quadrivalent,injectable, preservative free 02/03/2020, 09/02/2020, 11/30/2022   • Influenza, seasonal, injectable 11/25/2014, 08/31/2015   • Pneumococcal Conjugate Vaccine 20-valent (Pcv20), Polysace 04/21/2023   • Pneumococcal Polysaccharide PPV23 05/23/2014   • Tdap 05/23/2014   • influenza, injectable, quadrivalent 12/05/2021       Objective     /74   Pulse 98   Temp 98.2 °F (36.8 °C)   Ht 5' 7" (1.702 m)   Wt (!) 155 kg (342 lb 6.4 oz)   SpO2 93%   BMI 53.63 kg/m²     Physical Exam  Vitals reviewed. Constitutional:       General: He is not in acute distress. Appearance: Normal appearance. He is well-developed. He is not ill-appearing, toxic-appearing or diaphoretic. HENT:      Head: Normocephalic and atraumatic. Eyes:      Conjunctiva/sclera: Conjunctivae normal.   Cardiovascular:      Rate and Rhythm: Normal rate and regular rhythm.       Heart sounds: Normal heart sounds. No murmur heard. No friction rub. No gallop. Pulmonary:      Effort: Pulmonary effort is normal. No respiratory distress. Breath sounds: Normal breath sounds. No wheezing, rhonchi or rales. Musculoskeletal:      Right lower leg: No edema. Left lower leg: No edema. Neurological:      General: No focal deficit present. Mental Status: He is alert and oriented to person, place, and time. Psychiatric:         Mood and Affect: Mood normal.         Behavior: Behavior normal.         Thought Content:  Thought content normal.         Judgment: Judgment normal.       Iyla Lock DO

## 2023-12-01 ENCOUNTER — TELEPHONE (OUTPATIENT)
Age: 58
End: 2023-12-01

## 2023-12-01 ENCOUNTER — VBI (OUTPATIENT)
Dept: ADMINISTRATIVE | Facility: OTHER | Age: 58
End: 2023-12-01

## 2023-12-01 NOTE — TELEPHONE ENCOUNTER
Patients GI provider:  Dr. Nichole Rios    Reason for call: Pt calling in to schedule colonoscopy. Pt failed OA due to using supplemental oxygen.     Scheduled procedure/appointment date if applicable: Apt/procedure 12/04/2023

## 2023-12-04 ENCOUNTER — TELEPHONE (OUTPATIENT)
Dept: CARDIOLOGY CLINIC | Facility: CLINIC | Age: 58
End: 2023-12-04

## 2023-12-04 ENCOUNTER — OFFICE VISIT (OUTPATIENT)
Dept: PULMONOLOGY | Facility: CLINIC | Age: 58
End: 2023-12-04
Payer: COMMERCIAL

## 2023-12-04 ENCOUNTER — APPOINTMENT (OUTPATIENT)
Dept: LAB | Facility: HOSPITAL | Age: 58
End: 2023-12-04
Payer: COMMERCIAL

## 2023-12-04 VITALS
SYSTOLIC BLOOD PRESSURE: 99 MMHG | BODY MASS INDEX: 49.44 KG/M2 | DIASTOLIC BLOOD PRESSURE: 68 MMHG | OXYGEN SATURATION: 91 % | HEIGHT: 67 IN | WEIGHT: 315 LBS | HEART RATE: 81 BPM | TEMPERATURE: 96 F

## 2023-12-04 DIAGNOSIS — J45.40 MODERATE PERSISTENT ASTHMA, UNSPECIFIED WHETHER COMPLICATED: Primary | ICD-10-CM

## 2023-12-04 DIAGNOSIS — J44.9 COPD, SEVERE (HCC): ICD-10-CM

## 2023-12-04 DIAGNOSIS — E11.40 TYPE 2 DIABETES MELLITUS WITH DIABETIC NEUROPATHY, WITH LONG-TERM CURRENT USE OF INSULIN (HCC): ICD-10-CM

## 2023-12-04 DIAGNOSIS — M32.9 LUPUS (HCC): ICD-10-CM

## 2023-12-04 DIAGNOSIS — J96.11 CHRONIC HYPOXEMIC RESPIRATORY FAILURE (HCC): ICD-10-CM

## 2023-12-04 DIAGNOSIS — G47.33 OSA (OBSTRUCTIVE SLEEP APNEA): ICD-10-CM

## 2023-12-04 DIAGNOSIS — Z79.4 TYPE 2 DIABETES MELLITUS WITH DIABETIC NEUROPATHY, WITH LONG-TERM CURRENT USE OF INSULIN (HCC): ICD-10-CM

## 2023-12-04 DIAGNOSIS — R91.8 PULMONARY NODULES: ICD-10-CM

## 2023-12-04 DIAGNOSIS — R76.8 POSITIVE DOUBLE STRANDED DNA ANTIBODY TEST: ICD-10-CM

## 2023-12-04 LAB
ANION GAP SERPL CALCULATED.3IONS-SCNC: 13 MMOL/L
BACTERIA UR QL AUTO: ABNORMAL /HPF
BILIRUB UR QL STRIP: NEGATIVE
BUN SERPL-MCNC: 28 MG/DL (ref 5–25)
C3 SERPL-MCNC: 165 MG/DL (ref 87–200)
C4 SERPL-MCNC: 40 MG/DL (ref 19–52)
CALCIUM SERPL-MCNC: 9.4 MG/DL (ref 8.4–10.2)
CHLORIDE SERPL-SCNC: 99 MMOL/L (ref 96–108)
CLARITY UR: CLEAR
CO2 SERPL-SCNC: 25 MMOL/L (ref 21–32)
COLOR UR: YELLOW
CREAT SERPL-MCNC: 1.05 MG/DL (ref 0.6–1.3)
CREAT UR-MCNC: 31.8 MG/DL
CREAT UR-MCNC: 32.9 MG/DL
CRP SERPL QL: 12.1 MG/L
EST. AVERAGE GLUCOSE BLD GHB EST-MCNC: 200 MG/DL
GFR SERPL CREATININE-BSD FRML MDRD: 77 ML/MIN/1.73SQ M
GLUCOSE P FAST SERPL-MCNC: 212 MG/DL (ref 65–99)
GLUCOSE UR STRIP-MCNC: ABNORMAL MG/DL
HBA1C MFR BLD: 8.6 %
HGB UR QL STRIP.AUTO: NEGATIVE
KETONES UR STRIP-MCNC: NEGATIVE MG/DL
LEUKOCYTE ESTERASE UR QL STRIP: ABNORMAL
MICROALBUMIN UR-MCNC: <7 MG/L
MICROALBUMIN/CREAT 24H UR: <21 MG/G CREATININE (ref 0–30)
NITRITE UR QL STRIP: NEGATIVE
NON-SQ EPI CELLS URNS QL MICRO: ABNORMAL /HPF
PH UR STRIP.AUTO: 6 [PH]
POTASSIUM SERPL-SCNC: 3.7 MMOL/L (ref 3.5–5.3)
PROT UR STRIP-MCNC: NEGATIVE MG/DL
PROT UR-MCNC: <4 MG/DL
RBC #/AREA URNS AUTO: ABNORMAL /HPF
SODIUM SERPL-SCNC: 137 MMOL/L (ref 135–147)
SP GR UR STRIP.AUTO: 1.01 (ref 1–1.03)
UROBILINOGEN UR QL STRIP.AUTO: 0.2 E.U./DL
WBC #/AREA URNS AUTO: ABNORMAL /HPF

## 2023-12-04 PROCEDURE — 36415 COLL VENOUS BLD VENIPUNCTURE: CPT

## 2023-12-04 PROCEDURE — 86160 COMPLEMENT ANTIGEN: CPT

## 2023-12-04 PROCEDURE — 83036 HEMOGLOBIN GLYCOSYLATED A1C: CPT

## 2023-12-04 PROCEDURE — 80048 BASIC METABOLIC PNL TOTAL CA: CPT

## 2023-12-04 PROCEDURE — 82785 ASSAY OF IGE: CPT

## 2023-12-04 PROCEDURE — 99214 OFFICE O/P EST MOD 30 MIN: CPT | Performed by: PHYSICIAN ASSISTANT

## 2023-12-04 PROCEDURE — 86140 C-REACTIVE PROTEIN: CPT

## 2023-12-04 PROCEDURE — 86225 DNA ANTIBODY NATIVE: CPT

## 2023-12-04 PROCEDURE — 86162 COMPLEMENT TOTAL (CH50): CPT

## 2023-12-04 PROCEDURE — 86003 ALLG SPEC IGE CRUDE XTRC EA: CPT

## 2023-12-04 NOTE — TELEPHONE ENCOUNTER
Ginny Paiz called he was at his pulmonary doctor and his bp was 99/68. Has been running high for the last few weeks. He was in today for an office visit but went to the wrong office and was late for the appointment. Recs?

## 2023-12-04 NOTE — PROGRESS NOTES
Pulmonary Follow Up Note   Tigre Degroot 60 y.o. male MRN: [id number]  12/6/2023      Assessment:    Asthma, moderate persistent  Continue Fasenra. He is completed his initial monthly doses x 3. Will initiate Fasenra injections every 56 days from here on out. Continue Anoro and Flovent. Continue as needed albuterol. Chronic hypoxemic respiratory failure (HCC)  Continue 2 L nasal cannula as needed. Stressed the importance of compliance to this to maintain saturations greater than 88%. Discussed the benefits of pulmonary rehab in detail. Following discussion he was agreeable to referral being placed. SCOTT (obstructive sleep apnea)  Continue auto titrating BiPAP with 2L bled in. He reports compliance on this without any issues. Pulmonary nodules  Repeat CT chest 12 months after last imaging, due August 2024. Positive double stranded DNA antibody test  Double-stranded DNA remains elevated as does CRP level. Following with pulm/rheum clinic. Plan:    Diagnoses and all orders for this visit:    Moderate persistent asthma, unspecified whether complicated  -     Discontinue: Benralizumab 30 MG/ML SOAJ; Inject 30 mg under the skin every 56 days Inject 30 mg every 28 days for the first 3 doses  -     Benralizumab 30 MG/ML SOAJ; Inject 30 mg under the skin every 56 days Inject 30 mg every 56 days    COPD, severe ([address])  -     Ambulatory Referral to Pulmonary Rehabilitation; Future    Chronic hypoxemic respiratory failure (HCC)    SCOTT (obstructive sleep apnea)    Pulmonary nodules    Positive double stranded DNA antibody test        Return in about 3 months (around 3/4/2024). History of Present Illness   HPI:  Tigre Degroot is a 60 y.o. male who presents to the office today for routine follow-up.   Patient last seen in our office 3 months ago by Dr. Shad Sullivan for his chronic hypoxic respiratory failure, moderate persistent asthma, morbid obesity, SCOTT, positive double-stranded DNA concerning for lupus, and persistent dyspnea on exertion. In the last 3 months, patient reports stability in his symptoms albeit admits to continual chronic issues regarding his breathing. His biggest complaint is dyspnea on exertion. He will endorse desaturations with ambulation without wearing oxygen. He is accompanied by his significant other who confirms this. When asked why he does not use his supplemental oxygen with activities at it is clearly documented work that is required with his last oxygen titration study, patient reports feeling everyone looks at him and he does not like feeling "chronically ill."  Last needed steroids in October for exacerbation. He is compliant on his inhaled regimen. Review of Systems   Constitutional:  Negative for appetite change and fever. HENT:  Negative for ear pain, postnasal drip, rhinorrhea, sore throat and trouble swallowing. Respiratory:  Positive for cough, shortness of breath and wheezing. Cardiovascular:  Negative for chest pain. Musculoskeletal:  Positive for myalgias. Neurological:  Negative for headaches. All other systems reviewed and are negative. Historical Information   Past Medical History:   Diagnosis Date    Aortic stenosis     Asthma 1965    COPD (chronic obstructive pulmonary disease) ([address] )     Coronary artery disease 2000    Aortic stenosis    Diabetes ([address])     Diabetes mellitus ([address])     Hyperlipidemia     Hypertension 07/02/2014    Pneumonia 2015    Sleep apnea, obstructive      Past Surgical History:   Procedure Laterality Date    ACCESSORY NAVICULAR EXCISION Right     APPENDECTOMY      CARDIAC CATHETERIZATION N/A 05/25/2023    Procedure: Cardiac Coronary Angiogram;  Surgeon: Collins Rust MD;  Location: [facility]; Service: Cardiology    EAR FOREIGN BODY REMOVAL      cyst removal. bronchospasms after general anesthesia.     EYE SURGERY Left     traumatic injury at age 6-11     Family History   Problem Relation Age of Onset    No Known Problems Mother     Heart disease Father     Lung cancer Father         Passed away 0    Cancer Father         Lung cancer    Hypertension Father     ALS Maternal Grandmother     Diabetes Maternal Grandfather     Stomach cancer Maternal Grandfather        Social History     Tobacco Use   Smoking Status Former    Packs/day: 3.00    Years: 30.00    Total pack years: 90.00    Types: Cigarettes    Start date: 3/15/1984    Quit date: 7/15/2005    Years since quittin.4   Smokeless Tobacco Never         Meds/Allergies     Current Outpatient Medications:     albuterol (Ventolin HFA) 90 mcg/act inhaler, Inhale 2 puffs every 6 (six) hours as needed for wheezing, Disp: 18 g, Rfl: 3    Anoro Ellipta 62.5-25 MCG/ACT inhaler, INHALE 1 PUFF BY MOUTH DAILY, Disp: 60 each, Rfl: 10    aspirin (ECOTRIN LOW STRENGTH) 81 mg EC tablet, Take 1 tablet (81 mg total) by mouth daily, Disp: 30 tablet, Rfl: 5    atorvastatin (LIPITOR) 40 mg tablet, Take 1 tablet (40 mg total) by mouth daily, Disp: 90 tablet, Rfl: 3    Benralizumab 30 MG/ML SOAJ, Inject 30 mg under the skin every 56 days Inject 30 mg every 56 days, Disp: 1 mL, Rfl: 5    Blood Glucose Monitoring Suppl (CONTOUR NEXT MONITOR) w/Device KIT, Test blood sugar once daily or as needed for fluctuating blood sugars, Disp: 1 kit, Rfl: 0    carvedilol (COREG) 12.5 mg tablet, Take 1 tablet (12.5 mg total) by mouth 2 (two) times a day with meals, Disp: 180 tablet, Rfl: 3    cyclobenzaprine (FLEXERIL) 10 mg tablet, Take 10 mg by mouth, Disp: , Rfl:     Dapagliflozin Propanediol (Farxiga) 10 MG TABS, Take 10 mg by mouth daily, Disp: , Rfl:     Flovent  MCG/ACT inhaler, INHALE 2 PUFFS BY MOUTH TWICE DAILY *RINSE MOUTH AFTER USE*, Disp: 12 g, Rfl: 10    furosemide (LASIX) 40 mg tablet, Take 1 tablet (40 mg total) by mouth 2 (two) times a day, Disp: 180 tablet, Rfl: 3    glimepiride (AMARYL) 4 mg tablet, Take 4 mg by mouth 2 (two) times a day, Disp: , Rfl:     insulin glargine (Toujeo Max SoloStar) 300 units/mL CONCENTRATED U-300 injection pen (2-unit dial), Inject 70 Units under the skin daily at bedtime, Disp: 24 mL, Rfl: 1    Insulin Pen Needle (BD Pen Needle Dee Dee U/F) 32G X 4 MM MISC, Use 4 a day, Disp: 200 each, Rfl: 5    ipratropium-albuterol (DUO-NEB) 0.5-2.5 mg/3 mL nebulizer solution, INHALE CONTENTS OF 1 VIAL VIA NEBULIZER FOUR TIMES A DAY, Disp: 180 mL, Rfl: 10    losartan (COZAAR) 100 MG tablet, Take 1 tablet (100 mg total) by mouth daily, Disp: 90 tablet, Rfl: 3    metFORMIN (GLUCOPHAGE) 1000 MG tablet, TAKE 1 TABLET BY MOUTH TWICE DAILY WITH MEALS, Disp: 180 tablet, Rfl: 0    Multiple Vitamins-Minerals (MENS MULTIVITAMIN PO), Take by mouth, Disp: , Rfl:     Ozempic, 2 MG/DOSE, 8 MG/3ML SOPN, 2 mg every 7 days, Disp: , Rfl:     sertraline (ZOLOFT) 50 mg tablet, TAKE 1 TABLET BY MOUTH DAILY, Disp: 30 tablet, Rfl: 10    spironolactone (ALDACTONE) 50 mg tablet, Take 1 tablet (50 mg total) by mouth daily, Disp: 90 tablet, Rfl: 3  Allergies   Allergen Reactions    Theophylline Other (See Comments)     Severe headaches  headaches  Severe headaches         Vitals: Blood pressure 99/68, pulse 81, temperature (!) 96 °F (35.6 °C), temperature source Tympanic, height 5' 7" (1.702 m), weight (!) 155 kg (342 lb), SpO2 91 %. Body mass index is 53.56 kg/m². Oxygen Therapy  SpO2: 91 %  Oxygen Therapy: None (Room air)    Physical Exam  Physical Exam  Vitals reviewed. Constitutional:       Appearance: Normal appearance. He is well-developed. HENT:      Head: Normocephalic and atraumatic. Nose: Nose normal.      Mouth/Throat:      Mouth: Mucous membranes are moist.   Eyes:      Extraocular Movements: Extraocular movements intact. Cardiovascular:      Rate and Rhythm: Normal rate and regular rhythm. Heart sounds: Normal heart sounds. Pulmonary:      Effort: Pulmonary effort is normal. No respiratory distress. Breath sounds: No wheezing, rhonchi or rales.    Musculoskeletal: General: No swelling. Skin:     General: Skin is warm and dry. Neurological:      Mental Status: He is alert. Mental status is at baseline. Psychiatric:         Mood and Affect: Mood normal.         Behavior: Behavior normal.         Labs: I have personally reviewed pertinent lab results. , ABG: No results found for: "PHART", "ZVK5IBY", "PO2ART", "SGN5DQI", "A9GMWMAA", "BEART", "SOURCE", BNP: No results found for: "BNP", CBC: No results found for: "WBC", "HGB", "HCT", "MCV", "PLT", "ADJUSTEDWBC", "RBC", "MCH", "MCHC", "RDW", "MPV", "NRBC", CMP: No results found for: "SODIUM", "K", "CL", "CO2", "ANIONGAP", "BUN", "CREATININE", "GLUCOSE", "CALCIUM", "AST", "ALT", "ALKPHOS", "PROT", "BILITOT", "EGFR", PT/INR: No results found for: "PT", "INR", Troponin: No results found for: "TROPONINI"  Lab Results   Component Value Date    WBC 8.36 08/08/2023    HGB 14.2 08/08/2023    HCT 45.5 08/08/2023    MCV 88 08/08/2023     08/08/2023     Lab Results   Component Value Date    CALCIUM 9.4 12/04/2023    K 3.7 12/04/2023    CO2 25 12/04/2023    CL 99 12/04/2023    BUN 28 (H) 12/04/2023    CREATININE 1.05 12/04/2023     No results found for: "IGE"  Lab Results   Component Value Date    ALT 38 08/08/2023    AST 17 08/08/2023    ALKPHOS 96 08/08/2023       Imaging and other studies: I have personally reviewed pertinent reports. and I have personally reviewed pertinent films in PACS     CT chest without contrast August 2024  No consolidations. Persistent mild groundglass densities and interstitial reticular thickening seen in the right lower lobe may be sequelae of previous infection. 2 adjacent juxtapleural right lower lobe nodules measuring up to 4 mm. Numerous calcified granulomas bilaterally. Mediastinal lipomatosis and mild mediastinal adenopathy similar to previous exams.     Pulmonary function testing:  Performed 8/8/23   FEV1/FVC ratio 62%   FEV1 47% predicted  FVC 59% predicted  no response to bronchodilators  TLC 93 % predicted   % predicted  DLCO corrected for hemoglobin 82 % predicted  Severe obstructive airflow defect. No significant change with bronchodilators. Lung volumes demonstrate air trapping. Normal diffusion capacity. Other Studies: I have personally reviewed pertinent reports.             Answers submitted by the patient for this visit:  Pulmonology Questionnaire (Submitted on 12/3/2023)  Chief Complaint: Primary symptoms  Do you have difficulty breathing?: Yes  Do you experience frequent throat clearing?: Yes  Do you have a hoarse voice?: Yes  Do you have a wet cough?: Yes  Chronicity: chronic  When did you first notice your symptoms?: more than 1 year ago  How often do your symptoms occur?: daily  Since you first noticed this problem, how has it changed?: gradually improving  Do you have shortness of breath that occurs with effort or exertion?: Yes  Do you have ear congestion?: No  Do you have heartburn?: No  Do you have fatigue?: Yes  Do you have nasal congestion?: No  Do you have shortness of breath when lying flat?: No  Do you have shortness of breath when you wake up?: No  Do you have sweats?: No  Have you experienced weight loss?: No  Which of the following makes your symptoms worse?: any activity, change in weather, climbing stairs, exercise, exposure to fumes, exposure to smoke, minimal activity, strenuous activity  Which of the following makes your symptoms better?: cold air, oral steroids, rest, steroid inhaler

## 2023-12-05 ENCOUNTER — TELEPHONE (OUTPATIENT)
Dept: CARDIOLOGY CLINIC | Facility: CLINIC | Age: 58
End: 2023-12-05

## 2023-12-05 LAB
CH50 SERPL-ACNC: >60 U/ML
DSDNA AB SER-ACNC: 20 IU/ML (ref 0–9)

## 2023-12-05 NOTE — TELEPHONE ENCOUNTER
Reschedtoni Jimenez for an appointment  on 12/13/2023. He wants to know if you want an echo prior to the visit?

## 2023-12-06 DIAGNOSIS — E11.40 TYPE 2 DIABETES MELLITUS WITH DIABETIC NEUROPATHY, WITH LONG-TERM CURRENT USE OF INSULIN (HCC): ICD-10-CM

## 2023-12-06 DIAGNOSIS — I35.0 NONRHEUMATIC AORTIC VALVE STENOSIS: Primary | ICD-10-CM

## 2023-12-06 DIAGNOSIS — Z79.4 TYPE 2 DIABETES MELLITUS WITH DIABETIC NEUROPATHY, WITH LONG-TERM CURRENT USE OF INSULIN (HCC): ICD-10-CM

## 2023-12-06 PROBLEM — R91.8 PULMONARY NODULES: Status: ACTIVE | Noted: 2022-09-12

## 2023-12-06 NOTE — TELEPHONE ENCOUNTER
Spoke to Kristin Senior and told him Dr Kwaku Garay ordered the echo for him. Told him he will need to have done at [facility] or [facility]  which he was good with.    He will call central scheduling and take care of asap

## 2023-12-07 LAB

## 2023-12-07 RX ORDER — INSULIN GLARGINE 300 U/ML
INJECTION, SOLUTION SUBCUTANEOUS
Qty: 6 ML | Refills: 10 | Status: SHIPPED | OUTPATIENT
Start: 2023-12-07

## 2023-12-07 NOTE — ASSESSMENT & PLAN NOTE
Continue Fasenra. He is completed his initial monthly doses x 3. Will initiate Fasenra injections every 56 days from here on out. Continue Anoro and Flovent. Continue as needed albuterol.

## 2023-12-07 NOTE — ASSESSMENT & PLAN NOTE
Continue 2 L nasal cannula as needed. Stressed the importance of compliance to this to maintain saturations greater than 88%. Discussed the benefits of pulmonary rehab in detail. Following discussion he was agreeable to referral being placed.

## 2023-12-08 ENCOUNTER — HOSPITAL ENCOUNTER (OUTPATIENT)
Dept: NON INVASIVE DIAGNOSTICS | Facility: HOSPITAL | Age: 58
Discharge: HOME | End: 2023-12-08
Payer: COMMERCIAL

## 2023-12-08 VITALS
HEART RATE: 80 BPM | WEIGHT: 315 LBS | HEIGHT: 67 IN | DIASTOLIC BLOOD PRESSURE: 84 MMHG | SYSTOLIC BLOOD PRESSURE: 136 MMHG | BODY MASS INDEX: 49.44 KG/M2

## 2023-12-08 DIAGNOSIS — I35.0 NONRHEUMATIC AORTIC VALVE STENOSIS: ICD-10-CM

## 2023-12-08 LAB
AORTIC ROOT: 2.8 CM
AORTIC VALVE MEAN VELOCITY: 25.2 M/S
AV AREA BY CONTINUOUS VTI: 1.3 CM2
AV AREA PEAK VELOCITY: 1.1 CM2
AV LVOT MEAN GRADIENT: 4 MMHG
AV LVOT PEAK GRADIENT: 5 MMHG
AV MEAN GRADIENT: 31 MMHG
AV PEAK GRADIENT: 74 MMHG
AV VALVE AREA: 1.26 CM2
AV VELOCITY RATIO: 0.27
DOP CALC AO PEAK VEL: 4.3 M/S
DOP CALC AO VTI: 73.83 CM
DOP CALC LVOT AREA: 3.46 CM2
DOP CALC LVOT CARDIAC INDEX: 3.14 L/MIN/M2
DOP CALC LVOT CARDIAC OUTPUT: 7.98 L/MIN
DOP CALC LVOT DIAMETER: 2.1 CM
DOP CALC LVOT PEAK VEL VTI: 26.86 CM
DOP CALC LVOT PEAK VEL: 1.15 M/S
DOP CALC LVOT STROKE INDEX: 35 ML/M2
DOP CALC LVOT STROKE VOLUME: 92.99
E WAVE DECELERATION TIME: 315 MS
E/A RATIO: 0.88
FRACTIONAL SHORTENING: 35 (ref 28–44)
INTERVENTRICULAR SEPTUM IN DIASTOLE (PARASTERNAL SHORT AXIS VIEW): 1 CM
INTERVENTRICULAR SEPTUM: 1 CM (ref 0.6–1.1)
LEFT ATRIUM SIZE: 4.2 CM
LEFT INTERNAL DIMENSION IN SYSTOLE: 3.2 CM (ref 2.1–4)
LEFT VENTRICULAR INTERNAL DIMENSION IN DIASTOLE: 4.9 CM (ref 3.5–6)
LEFT VENTRICULAR POSTERIOR WALL IN END DIASTOLE: 1.1 CM
LEFT VENTRICULAR STROKE VOLUME: 75 ML
LVSV (TEICH): 75 ML
MV E'TISSUE VEL-SEP: 7 CM/S
MV PEAK A VEL: 0.94 M/S
MV PEAK E VEL: 83 CM/S
MV STENOSIS PRESSURE HALF TIME: 91 MS
MV VALVE AREA P 1/2 METHOD: 2.42
SL CV LV EF: 65
SL CV PED ECHO LEFT VENTRICLE DIASTOLIC VOLUME (MOD BIPLANE) 2D: 115 ML
SL CV PED ECHO LEFT VENTRICLE SYSTOLIC VOLUME (MOD BIPLANE) 2D: 39 ML
TR MAX PG: 7 MMHG
TR PEAK VELOCITY: 1.4 M/S
TRICUSPID VALVE PEAK REGURGITATION VELOCITY: 1.35 M/S

## 2023-12-08 PROCEDURE — 93306 TTE W/DOPPLER COMPLETE: CPT

## 2023-12-08 PROCEDURE — 93306 TTE W/DOPPLER COMPLETE: CPT | Performed by: INTERNAL MEDICINE

## 2023-12-13 ENCOUNTER — OFFICE VISIT (OUTPATIENT)
Dept: CARDIOLOGY CLINIC | Facility: CLINIC | Age: 58
End: 2023-12-13
Payer: COMMERCIAL

## 2023-12-13 VITALS
DIASTOLIC BLOOD PRESSURE: 74 MMHG | SYSTOLIC BLOOD PRESSURE: 102 MMHG | HEIGHT: 67 IN | BODY MASS INDEX: 49.44 KG/M2 | HEART RATE: 90 BPM | WEIGHT: 315 LBS

## 2023-12-13 DIAGNOSIS — G47.33 OSA ON CPAP: ICD-10-CM

## 2023-12-13 DIAGNOSIS — I95.2 HYPOTENSION DUE TO DRUGS: ICD-10-CM

## 2023-12-13 DIAGNOSIS — I35.0 AORTIC VALVE STENOSIS, ETIOLOGY OF CARDIAC VALVE DISEASE UNSPECIFIED: ICD-10-CM

## 2023-12-13 PROCEDURE — 99214 OFFICE O/P EST MOD 30 MIN: CPT | Performed by: INTERNAL MEDICINE

## 2023-12-13 RX ORDER — ISOPROPYL ALCOHOL 0.75 G/1
SWAB TOPICAL
COMMUNITY
Start: 2023-11-24

## 2023-12-13 RX ORDER — SPIRONOLACTONE 50 MG/1
25 TABLET, FILM COATED ORAL DAILY
Start: 2023-12-13

## 2023-12-13 RX ORDER — LOSARTAN POTASSIUM 100 MG/1
50 TABLET ORAL DAILY
Start: 2023-12-13

## 2023-12-13 NOTE — PROGRESS NOTES
Subjective:        Patient ID: Skylar Lassiter is a 60 y.o. male. Chief Complaint:  Prasanth Jimenez is here because his BP has been on the low side, he has been tired/fatigued, and has mild orthostatic sx going from seated to standing position. We reviewed his recent Echo as well. The following portions of the patient's history were reviewed and updated as appropriate: allergies, current medications, past family history, past medical history, past social history, past surgical history, and problem list.  Review of Systems   Constitutional: Positive for malaise/fatigue. Negative for chills, diaphoresis and weight gain. HENT:  Negative for nosebleeds and stridor. Eyes:  Negative for double vision, vision loss in left eye, vision loss in right eye and visual disturbance. Cardiovascular:  Positive for dyspnea on exertion. Negative for chest pain, claudication, cyanosis, irregular heartbeat, leg swelling, near-syncope, orthopnea, palpitations, paroxysmal nocturnal dyspnea and syncope. Respiratory:  Negative for cough, shortness of breath, snoring and wheezing. Endocrine: Negative for polydipsia, polyphagia and polyuria. Hematologic/Lymphatic: Negative for bleeding problem. Does not bruise/bleed easily. Skin:  Negative for flushing and rash. Musculoskeletal:  Negative for falls and myalgias. Gastrointestinal:  Negative for abdominal pain, heartburn, hematemesis, hematochezia, melena and nausea. Genitourinary:  Negative for hematuria. Neurological:  Positive for light-headedness. Negative for brief paralysis, dizziness, focal weakness, headaches, loss of balance and vertigo. Psychiatric/Behavioral:  Negative for altered mental status and substance abuse. Allergic/Immunologic: Negative for hives. Objective:      /74   Pulse 90   Ht 5' 7" (1.702 m)   Wt (!) 154 kg (340 lb)   BMI 53.25 kg/m²   Physical Exam  Constitutional:       General: He is not in acute distress. Appearance: He is well-developed. He is not diaphoretic. HENT:      Head: Normocephalic and atraumatic. Eyes:      General: No scleral icterus. Pupils: Pupils are equal, round, and reactive to light. Neck:      Thyroid: No thyromegaly. Vascular: No carotid bruit or JVD. Cardiovascular:      Rate and Rhythm: Normal rate and regular rhythm. Heart sounds: Murmur heard. No friction rub. No gallop. Pulmonary:      Effort: Pulmonary effort is normal. No respiratory distress. Breath sounds: Normal breath sounds. No stridor. No wheezing or rales. Abdominal:      General: Bowel sounds are normal. There is no distension. Palpations: Abdomen is soft. There is no mass. Tenderness: There is no abdominal tenderness. Musculoskeletal:      Cervical back: Normal range of motion and neck supple. Right lower leg: No edema. Left lower leg: No edema. Skin:     General: Skin is warm and dry. Coloration: Skin is not pale. Findings: No erythema. Neurological:      Mental Status: He is alert and oriented to person, place, and time. Mental status is at baseline.       Coordination: Coordination normal.   Psychiatric:         Mood and Affect: Mood normal.         Behavior: Behavior normal.         Lab Review:   Hospital Outpatient Visit on 12/08/2023   Component Date Value    LVOT stroke volume 12/08/2023 92.99     LVOT stroke volume index 12/08/2023 35.00     LVOT Cardiac Index 12/08/2023 3.14     LVOT Cardiac Output 12/08/2023 7.98     LVIDd 12/08/2023 4.90     LVIDS 12/08/2023 3.20     IVSd 12/08/2023 1.00     LVPWd 12/08/2023 1.10     FS 12/08/2023 35     MV E' Tissue Velocity Se* 12/08/2023 7     E/A ratio 12/08/2023 0.88     E wave deceleration time 12/08/2023 315     MV Peak E Jorge A 12/08/2023 83     MV Peak A Jorge A 12/08/2023 0.94     AV LVOT peak gradient 12/08/2023 5     LVOT peak VTI 12/08/2023 26.86     LVOT peak jorge a 12/08/2023 1.15     LA size 12/08/2023 4.2 LVOT diameter 12/08/2023 2.1     Aortic valve peak veloci* 12/08/2023 4.3     Ao VTI 12/08/2023 73.83     AV mean gradient 12/08/2023 31     LVOT mn grad 12/08/2023 4.0     AV peak gradient 12/08/2023 74.0     AV area by cont VTI 12/08/2023 1.3     AV area peak jorge a 12/08/2023 1.1     MV stenosis pressure 1/2* 12/08/2023 91     MV valve area p 1/2 meth* 12/08/2023 2.42     TR Peak Jorge A 12/08/2023 1.4     Triscuspid Valve Regurgi* 12/08/2023 7.0     Ao root 12/08/2023 2.80     Aortic valve mean veloci* 12/08/2023 25.20     Tricuspid valve peak reg* 12/08/2023 1.35     Left ventricular stroke * 12/08/2023 75.00     IVS 12/08/2023 1     LEFT VENTRICLE SYSTOLIC * 51/13/7421 39     LV DIASTOLIC VOLUME (MOD* 62/51/6989 115     LVSV, 2D 12/08/2023 75     LV EF 12/08/2023 65     DVI 12/08/2023 0.27     LVOT area 12/08/2023 3.46     AV valve area 12/08/2023 1.26    Appointment on 12/04/2023   Component Date Value    Sodium 12/04/2023 137     Potassium 12/04/2023 3.7     Chloride 12/04/2023 99     CO2 12/04/2023 25     ANION GAP 12/04/2023 13     BUN 12/04/2023 28 (H)     Creatinine 12/04/2023 1.05     Glucose, Fasting 12/04/2023 212 (H)     Calcium 12/04/2023 9.4     eGFR 12/04/2023 77     Hemoglobin A1C 12/04/2023 8.6 (H)     EAG 12/04/2023 200     C3 Complement 12/04/2023 165     C4, COMPLEMENT 12/04/2023 40     Compl, Total (CH50) 12/04/2023 >60     ds DNA Ab 12/04/2023 20 (H)     CRP 12/04/2023 12.1 (H)    Office Visit on 11/27/2023   Component Date Value    Hemoglobin A1C 11/27/2023 8.7 (A)      Echo complete w/ contrast if indicated    Result Date: 12/8/2023  Narrative:   Technically limited study although useful information was obtained. Left Ventricle: Left ventricle is not optimally  visualized. Left ventricular cavity size is normal. The left ventricular ejection fraction is 65%. Systolic function is normal. Wall motion is normal. Diastolic function is mildly abnormal, consistent with grade I (abnormal) relaxation. Aortic Valve: The leaflets are moderately calcified. There is severely reduced mobility. There is severe stenosis. The aortic valve peak velocity is 4.3 m/s. The aortic valve mean gradient is 31 mmHg. Mitral Valve: There is mild regurgitation. Prior study date: 6/22/2023. No significant changes noted compared to the prior study. Assessment:       1. Hypotension due to drugs  losartan (COZAAR) 100 MG tablet      2. Aortic valve stenosis, etiology of cardiac valve disease unspecified  spironolactone (ALDACTONE) 50 mg tablet      3. SCOTT on CPAP  spironolactone (ALDACTONE) 50 mg tablet           Plan:       Advised he decrease Losartan to 50 mg daily and decrease Spironolactone to 25 mg daily. Call if symptoms worsen or dont improve within 1 week. Echo stable, will repeat with CUS in 5-6 months after next visit.

## 2023-12-13 NOTE — PATIENT INSTRUCTIONS
Dizziness   WHAT YOU NEED TO KNOW:   What is dizziness? Dizziness is a feeling of being off balance or unsteady. Common causes of dizziness are an inner ear fluid imbalance or a lack of oxygen in your blood. Dizziness may be acute (lasts 3 days or less) or chronic (lasts longer than 3 days). You may have dizzy spells that last from seconds to a few hours. What increases my risk for dizziness? Dizziness may get worse during certain activities or when you move a certain way. The following may also increase your risk for dizziness:  Older age    An infection, ear surgery, or an inner ear condition, such as Ménière disease    Stroke, a brain tumor, or a recent head trauma     An injury that causes a large amount of blood loss    Heart or blood pressure problems     Exposure to chemicals, or long-term alcohol use     Medicines used to treat high blood pressure, seizure disorders, or anxiety and depression     A nerve disorder, such as multiple sclerosis    What signs and symptoms may happen with dizziness? A feeling that your surroundings are moving even though you are standing still    Ringing in your ears or hearing loss     Feeling faint or lightheaded     Weakness or unsteadiness     Double vision or eye movements you cannot control    Nausea or vomiting     Confusion    How is the cause of dizziness diagnosed? Your healthcare provider may ask when the dizziness started. Tell the provider if you have dizzy spells, and how long they last. Tell the provider what happens before you become dizzy. The provider will ask if you have other health conditions and if you take any medicines. The provider will check your blood pressure and pulse to see if your dizziness may be related to your heart. Your balance, strength, reflexes, and the way you walk may also be checked. You may need any of the following tests to help find the cause of your dizziness: An EKG  records the electrical activity of your heart.  An EKG can be used to check for an abnormal heart beat or heart damage. Blood tests  will check your blood sugar level, infection, and your blood cell levels. CT or MRI  pictures check for a stroke, head injury, or brain tumor. They also check for brain bleeding or swelling. You may be given contrast liquid to help your brain show up better in the pictures. Tell the healthcare provider if you have ever had an allergic reaction to contrast liquid. Do not enter the MRI room with anything metal. Metal can cause serious injury. Tell the healthcare provider if you have any metal in or on your body. How is dizziness treated? Treatment will depend on the cause of your dizziness. Your healthcare provider may give you oxygen or medicines to decrease your dizziness and nausea. Your provider may also refer you to a specialist. Nelson Pugh may need to be admitted to the hospital for treatment. How can I manage my symptoms? Do not drive  or operate heavy machinery when you are dizzy. Get up slowly  from sitting or lying down. Drink plenty of liquids. Liquids help prevent dehydration. Ask how much liquid to drink each day and which liquids are best for you. When should I seek immediate care? You have a headache and a stiff neck. You have shaking chills and a fever. You vomit over and over with no relief. Your vomit or bowel movements are red or black. You have pain in your chest, back, or abdomen. You have numbness, especially in your face, arms, or legs. You have trouble moving your arms or legs. You are confused. When should I contact my healthcare provider? You have a fever. Your symptoms do not get better with treatment. You have questions or concerns about your condition or care. CARE AGREEMENT:   You have the right to help plan your care. Learn about your health condition and how it may be treated.  Discuss treatment options with your healthcare providers to decide what care you want to receive. You always have the right to refuse treatment. The above information is an  only. It is not intended as medical advice for individual conditions or treatments. Talk to your doctor, nurse or pharmacist before following any medical regimen to see if it is safe and effective for you. © Copyright [employer] 2023 Information is for End User's use only and may not be sold, redistributed or otherwise used for commercial purposes.

## 2023-12-15 ENCOUNTER — OFFICE VISIT (OUTPATIENT)
Dept: ENDOCRINOLOGY | Facility: CLINIC | Age: 58
End: 2023-12-15
Payer: COMMERCIAL

## 2023-12-15 VITALS
BODY MASS INDEX: 49.44 KG/M2 | SYSTOLIC BLOOD PRESSURE: 122 MMHG | HEIGHT: 67 IN | DIASTOLIC BLOOD PRESSURE: 70 MMHG | HEART RATE: 89 BPM | WEIGHT: 315 LBS | OXYGEN SATURATION: 93 %

## 2023-12-15 DIAGNOSIS — I10 ESSENTIAL HYPERTENSION: ICD-10-CM

## 2023-12-15 DIAGNOSIS — E66.01 MORBID OBESITY (HCC): ICD-10-CM

## 2023-12-15 DIAGNOSIS — Z79.4 TYPE 2 DIABETES MELLITUS WITH DIABETIC NEUROPATHY, WITH LONG-TERM CURRENT USE OF INSULIN (HCC): Primary | ICD-10-CM

## 2023-12-15 DIAGNOSIS — E11.40 TYPE 2 DIABETES MELLITUS WITH DIABETIC NEUROPATHY, WITH LONG-TERM CURRENT USE OF INSULIN (HCC): Primary | ICD-10-CM

## 2023-12-15 DIAGNOSIS — E11.40 PAINFUL DIABETIC NEUROPATHY (HCC): ICD-10-CM

## 2023-12-15 DIAGNOSIS — R26.89 BALANCE DISORDER: ICD-10-CM

## 2023-12-15 DIAGNOSIS — E78.2 MIXED HYPERLIPIDEMIA: ICD-10-CM

## 2023-12-15 PROCEDURE — 95251 CONT GLUC MNTR ANALYSIS I&R: CPT | Performed by: STUDENT IN AN ORGANIZED HEALTH CARE EDUCATION/TRAINING PROGRAM

## 2023-12-15 PROCEDURE — 99214 OFFICE O/P EST MOD 30 MIN: CPT | Performed by: STUDENT IN AN ORGANIZED HEALTH CARE EDUCATION/TRAINING PROGRAM

## 2023-12-15 RX ORDER — PREGABALIN 50 MG/1
50 CAPSULE ORAL 3 TIMES DAILY
Qty: 270 CAPSULE | Refills: 1 | Status: SHIPPED | OUTPATIENT
Start: 2023-12-15 | End: 2024-03-14

## 2023-12-15 NOTE — PROGRESS NOTES
Ellis Smalls 60 y.o. male MRN: [id number]    Encounter: [id number]      Assessment/Plan     1. Type 2 diabetes c/b DPN, steroid induced hyperglycemia - improving control. Allyson Ivan is benefiting from is-CGM in improving biofeedback. I encouraged more frequent scanning due to data gaps. No changes to therapy plan today. Will arrange for labs & follow up in 3-mo, call sooner with concerns. 2. Painful diabetic neuropathy - failed gabapentin. Will trial lyrica, reviewed pro / cons, including unsteadiness and brain fog. May consider pain management referral     3. Hypertension - stable. 4. Hyperlipidemia - currently on high intensity statin. Last lipids were poorly controlled. These will need to be followed. I believe he can also benefit from optimization of nutrition. 5. Morbid obesity - enjoying weight loss    6. Balance disorder - will check for vitamin B12 as he is on chronic metformin    Problem List Items Addressed This Visit     Essential hypertension    Type 2 diabetes mellitus with diabetic neuropathy, with long-term current use of insulin ([address]) - Primary    Relevant Orders    Basic metabolic panel Lab Collect    Vitamin B12    HEMOGLOBIN A1C W/ EAG ESTIMATION Lab Collect   Other Visit Diagnoses     Mixed hyperlipidemia        Morbid obesity ([address])        Painful diabetic neuropathy (HCC)        Relevant Medications    pregabalin (LYRICA) 50 mg capsule    Balance disorder        Relevant Orders    Vitamin B12          RTC 3-mo    CC: Diabetes    History of Present Illness     HPI:    Allyson Ivan returns today in follow up of diabetes. He is joined today by his wife. Melvin's wife mentions concerns for balance. Melvin's chief concern today is painful diabetic neuropathy. He has failed to respond to gabapentin. He is interested in trying an alternate therapy. He is continuing to work on his diet.      For diabetes, Allyson Ivan is on toujeo 90 units nightly, glimepiride 4 mg bid, farxiga 10 mg daily, ozempic 2 mg weekly, and metformin 2g daily. Ellis 1DayLaterfausto   Device used [employer] use     Indication   Type 2 Diabetes    More than 72 hours of data was reviewed. Report to be scanned to chart. Date Range: Dec 2 - Dec 15, 2023    Analysis of data:   % time CGM used: 44%  Average Glucose: 173 mg/dL  Coefficient of Variation: 30%     Time in Target Range: 62%   Time Above Range: 38%   Time Below Range: 0%     Interpretation of data: overall control seems fair, although interpretation of data somewhat limited due to infrequent scanning. Tall post-prandial peaks noted. May eventually need prandial insulin. Allyson Ivan denies hyperglycemic symptoms. He denies any recent hypoglycemia. For hyperlipidemia he takes lipitor 40 mg. For hypertension he takes losartan 50 mg daily, carvedilol 12.5 mg bid, amlodipine 10 mg and spironolactone 25 mg. Review of Systems   Constitutional:  Negative for diaphoresis. HENT:  Negative for voice change. Cardiovascular:  Negative for chest pain and palpitations. Gastrointestinal:  Negative for nausea and vomiting. Endocrine: Negative for polydipsia and polyuria. Neurological:  Negative for tremors. Psychiatric/Behavioral:  Negative for agitation. All other systems reviewed and are negative. Historical Information   Past Medical History:   Diagnosis Date   • Aortic stenosis    • Asthma 1965   • Chronic hypoxemic respiratory failure (HCC)    • COPD (chronic obstructive pulmonary disease) (HCC)    • Coronary artery disease 2000    Aortic stenosis   • Diabetes ([address])    • Diabetes mellitus ([address])    • Hyperlipidemia    • Hypertension 07/02/2014   • Pneumonia 2015   • Sleep apnea, obstructive      Past Surgical History:   Procedure Laterality Date   • ACCESSORY NAVICULAR EXCISION Right    • APPENDECTOMY     • CARDIAC CATHETERIZATION N/A 05/25/2023    Procedure: Cardiac Coronary Angiogram;  Surgeon: Annalise Tanner MD;  Location: [facility];   Service: Cardiology   • EAR FOREIGN BODY REMOVAL      cyst removal. bronchospasms after general anesthesia.    • EYE SURGERY Left     traumatic injury at age 6-11     Social History   Social History     Substance and Sexual Activity   Alcohol Use Never     Social History     Substance and Sexual Activity   Drug Use Never     Social History     Tobacco Use   Smoking Status Former   • Current packs/day: 0.00   • Average packs/day: 3.0 packs/day for 30.0 years (90.1 ttl pk-yrs)   • Types: Cigarettes   • Start date: 3/15/1984   • Quit date: 7/15/2005   • Years since quittin.4   Smokeless Tobacco Never     Family History:   Family History   Problem Relation Age of Onset   • No Known Problems Mother    • Heart disease Father    • Lung cancer Father         Passed away    • Cancer Father         Lung cancer   • Hypertension Father    • ALS Maternal Grandmother    • Diabetes Maternal Grandfather    • Stomach cancer Maternal Grandfather        Meds/Allergies   Current Outpatient Medications   Medication Sig Dispense Refill   • albuterol (Ventolin HFA) 90 mcg/act inhaler Inhale 2 puffs every 6 (six) hours as needed for wheezing 18 g 3   • Alcohol Swabs (B-D SINGLE USE SWABS REGULAR) PADS USE TO CLEAN INJECTION SITE     • Anoro Ellipta 62.5-25 MCG/ACT inhaler INHALE 1 PUFF BY MOUTH DAILY 60 each 10   • aspirin (ECOTRIN LOW STRENGTH) 81 mg EC tablet Take 1 tablet (81 mg total) by mouth daily 30 tablet 5   • atorvastatin (LIPITOR) 40 mg tablet Take 1 tablet (40 mg total) by mouth daily 90 tablet 3   • Benralizumab 30 MG/ML SOAJ Inject 30 mg under the skin every 56 days Inject 30 mg every 56 days 1 mL 5   • Blood Glucose Monitoring Suppl (CONTOUR NEXT MONITOR) w/Device KIT Test blood sugar once daily or as needed for fluctuating blood sugars 1 kit 0   • carvedilol (COREG) 12.5 mg tablet Take 1 tablet (12.5 mg total) by mouth 2 (two) times a day with meals 180 tablet 3   • cyclobenzaprine (FLEXERIL) 10 mg tablet Take 10 mg by mouth • Dapagliflozin Propanediol (Farxiga) 10 MG TABS Take 10 mg by mouth daily     • Flovent  MCG/ACT inhaler INHALE 2 PUFFS BY MOUTH TWICE DAILY *RINSE MOUTH AFTER USE* 12 g 10   • furosemide (LASIX) 40 mg tablet Take 1 tablet (40 mg total) by mouth 2 (two) times a day 180 tablet 3   • glimepiride (AMARYL) 4 mg tablet Take 4 mg by mouth 2 (two) times a day     • Insulin Pen Needle (BD Pen Needle Dee Dee U/F) 32G X 4 MM MISC Use 4 a day 200 each 5   • ipratropium-albuterol (DUO-NEB) 0.5-2.5 mg/3 mL nebulizer solution INHALE CONTENTS OF 1 VIAL VIA NEBULIZER FOUR TIMES A  mL 10   • losartan (COZAAR) 100 MG tablet Take 0.5 tablets (50 mg total) by mouth daily     • metFORMIN (GLUCOPHAGE) 1000 MG tablet TAKE 1 TABLET BY MOUTH TWICE DAILY WITH MEALS 180 tablet 0   • Multiple Vitamins-Minerals (MENS MULTIVITAMIN PO) Take by mouth     • Ozempic, 2 MG/DOSE, 8 MG/3ML SOPN 2 mg every 7 days     • pregabalin (LYRICA) 50 mg capsule Take 1 capsule (50 mg total) by mouth 3 (three) times a day 270 capsule 1   • sertraline (ZOLOFT) 50 mg tablet TAKE 1 TABLET BY MOUTH DAILY 30 tablet 10   • spironolactone (ALDACTONE) 50 mg tablet Take 0.5 tablets (25 mg total) by mouth daily     • Toujeo Max SoloStar 300 units/mL CONCENTRATED U-300 injection pen (2-unit dial) INJECT 70 UNITS SUBCUTANEOUSLY AT BEDTIME (Patient taking differently: 90 Units daily at bedtime) 6 mL 10     No current facility-administered medications for this visit. Allergies   Allergen Reactions   • Theophylline Other (See Comments)     Severe headaches  headaches  Severe headaches         Objective   Vitals: Blood pressure 122/70, pulse 89, height 5' 7" (1.702 m), weight (!) 153 kg (338 lb), SpO2 93%. Physical Exam  Vitals reviewed. Constitutional:       Appearance: Normal appearance. HENT:      Head: Normocephalic and atraumatic. Eyes:      General: No scleral icterus.      Conjunctiva/sclera: Conjunctivae normal.   Cardiovascular:      Rate and Rhythm: Normal rate and regular rhythm. Pulmonary:      Effort: Pulmonary effort is normal. No respiratory distress. Abdominal:      Palpations: Abdomen is soft. Tenderness: There is no abdominal tenderness. Musculoskeletal:      Cervical back: Normal range of motion. Skin:     General: Skin is warm and dry. Neurological:      General: No focal deficit present. Mental Status: He is alert. Psychiatric:         Mood and Affect: Mood normal.         Behavior: Behavior normal.         The history was obtained from the review of the chart, patient and family.     Lab Results:   Lab Results   Component Value Date/Time    Hemoglobin A1C 8.6 (H) 12/04/2023 09:04 AM    Hemoglobin A1C 8.7 (A) 11/27/2023 02:50 PM    Hemoglobin A1C 7.7 (H) 08/08/2023 12:08 PM    Hemoglobin A1C 7.7 (A) 07/27/2023 02:38 PM    Hemoglobin A1C 9.1 (A) 03/02/2023 11:19 AM    WBC 8.36 08/08/2023 12:08 PM    WBC 7.55 05/25/2023 08:46 AM    WBC 10.03 05/24/2023 01:26 PM    Hemoglobin 14.2 08/08/2023 12:08 PM    Hemoglobin 15.2 05/25/2023 08:46 AM    Hemoglobin 14.3 05/24/2023 01:26 PM    Hematocrit 45.5 08/08/2023 12:08 PM    Hematocrit 44.8 05/25/2023 08:46 AM    Hematocrit 44.4 05/24/2023 01:26 PM    MCV 88 08/08/2023 12:08 PM    MCV 84 05/25/2023 08:46 AM    MCV 86 05/24/2023 01:26 PM    Platelets 821 26/87/6234 12:08 PM    Platelets 222 36/49/8362 08:46 AM    Platelets 271 52/17/4790 01:26 PM    BUN 28 (H) 12/04/2023 09:04 AM    BUN 21 08/08/2023 12:08 PM    BUN 36 (H) 05/25/2023 08:46 AM    Potassium 3.7 12/04/2023 09:04 AM    Potassium 4.6 08/08/2023 12:08 PM    Potassium 4.1 05/25/2023 08:46 AM    Chloride 99 12/04/2023 09:04 AM    Chloride 107 08/08/2023 12:08 PM    Chloride 104 05/25/2023 08:46 AM    CO2 25 12/04/2023 09:04 AM    CO2 29 08/08/2023 12:08 PM    CO2 24 05/25/2023 08:46 AM    Creatinine 1.05 12/04/2023 09:04 AM    Creatinine 1.01 08/08/2023 12:08 PM    Creatinine 1.09 05/25/2023 08:46 AM    AST 17 08/08/2023 12:08 PM    AST 17 05/24/2023 01:26 PM    AST 10 (L) 04/26/2023 04:46 PM    ALT 38 08/08/2023 12:08 PM    ALT 40 05/24/2023 01:26 PM    ALT 20 04/26/2023 04:46 PM    Total Protein 7.0 08/08/2023 12:08 PM    Total Protein 7.5 05/24/2023 01:26 PM    Total Protein 6.7 04/26/2023 04:46 PM    Albumin 3.9 08/08/2023 12:08 PM    Albumin 4.1 05/24/2023 01:26 PM    Albumin 3.9 04/26/2023 04:46 PM    HDL, Direct 25 (L) 08/08/2023 12:08 PM    Triglycerides 218 (H) 08/08/2023 12:08 PM     Lab Results   Component Value Date    CHOLESTEROL 90 08/08/2023    CHOLESTEROL 94 01/26/2022    CHOLESTEROL 108 09/02/2020     Lab Results   Component Value Date    HDL 25 (L) 08/08/2023    HDL 22 (L) 01/26/2022    HDL 23 (L) 09/02/2020     Lab Results   Component Value Date    TRIG 218 (H) 08/08/2023    TRIG 292 (H) 01/26/2022    TRIG 130 07/28/2021     Lab Results   Component Value Date    NONHDLC 72 01/26/2022    [address] 07/19/2019     Lab Results   Component Value Date    LDLCALC 21 08/08/2023         Imaging Studies: I have personally reviewed pertinent reports. Portions of the record may have been created with voice recognition software. Occasional wrong word or "sound a like" substitutions may have occurred due to the inherent limitations of voice recognition software. Read the chart carefully and recognize, using context, where substitutions have occurred.

## 2023-12-18 ENCOUNTER — OFFICE VISIT (OUTPATIENT)
Dept: PODIATRY | Facility: CLINIC | Age: 58
End: 2023-12-18
Payer: COMMERCIAL

## 2023-12-18 VITALS
SYSTOLIC BLOOD PRESSURE: 105 MMHG | WEIGHT: 315 LBS | HEIGHT: 67 IN | BODY MASS INDEX: 49.44 KG/M2 | HEART RATE: 80 BPM | RESPIRATION RATE: 16 BRPM | DIASTOLIC BLOOD PRESSURE: 71 MMHG | OXYGEN SATURATION: 98 %

## 2023-12-18 DIAGNOSIS — Z79.4 TYPE 2 DIABETES MELLITUS WITH DIABETIC NEUROPATHY, WITH LONG-TERM CURRENT USE OF INSULIN (HCC): Primary | ICD-10-CM

## 2023-12-18 DIAGNOSIS — E11.40 TYPE 2 DIABETES MELLITUS WITH DIABETIC NEUROPATHY, WITH LONG-TERM CURRENT USE OF INSULIN (HCC): Primary | ICD-10-CM

## 2023-12-18 DIAGNOSIS — B35.1 ONYCHOMYCOSIS: ICD-10-CM

## 2023-12-18 PROCEDURE — 11721 DEBRIDE NAIL 6 OR MORE: CPT | Performed by: PODIATRIST

## 2023-12-18 NOTE — PROGRESS NOTES
Jey Vicente  1965  AT RISK FOOT CARE    1. Type 2 diabetes mellitus with diabetic neuropathy, with long-term current use of insulin (HCC)        2. Onychomycosis            Patient presents for at-risk foot care.  Patient has no acute concerns today.  Patient has significant lower extremity risk due to diminished pulses in the feet and trophic skin changes to the lower extremity including thick toenail, atrophic skin, and decreased hair growth.      On exam patient has thickened, hypertrophic, discolored, brittle toenails with subungual debris and tenderness x10   Callus: 0  Patient has diminished pedal pulses and decreased perfusion to the lower extremities  Patient has significant trophic changes to the skin including thick toenails, decreased pedal hair and atrophic skin.     Today's treatment includes:  Debridement of toenails. Using nail nipper, cali, and curette, nails were sharply debrided, reduced in thickness and length. Devitalized nail tissue and fungal debris excised and removed. Patient tolerated well.      Discussed proper shoe gear, daily inspections of feet, and general foot health with patient. Patient has Q8  findings and is recommended for at risk foot care every 9-10 weeks.

## 2023-12-20 ENCOUNTER — TELEPHONE (OUTPATIENT)
Dept: CARDIAC REHAB | Facility: HOSPITAL | Age: 58
End: 2023-12-20

## 2024-01-11 ENCOUNTER — OFFICE VISIT (OUTPATIENT)
Dept: SLEEP CENTER | Facility: CLINIC | Age: 59
End: 2024-01-11
Payer: COMMERCIAL

## 2024-01-11 VITALS
WEIGHT: 315 LBS | HEART RATE: 89 BPM | SYSTOLIC BLOOD PRESSURE: 118 MMHG | DIASTOLIC BLOOD PRESSURE: 72 MMHG | OXYGEN SATURATION: 88 % | BODY MASS INDEX: 46.65 KG/M2 | HEIGHT: 69 IN | TEMPERATURE: 96.5 F

## 2024-01-11 DIAGNOSIS — Z79.4 TYPE 2 DIABETES MELLITUS WITH DIABETIC NEUROPATHY, WITH LONG-TERM CURRENT USE OF INSULIN (HCC): ICD-10-CM

## 2024-01-11 DIAGNOSIS — G47.19 EXCESSIVE DAYTIME SLEEPINESS: ICD-10-CM

## 2024-01-11 DIAGNOSIS — I50.33 ACUTE ON CHRONIC DIASTOLIC HEART FAILURE (HCC): ICD-10-CM

## 2024-01-11 DIAGNOSIS — J30.9 ALLERGIC RHINITIS WITH POSTNASAL DRIP: ICD-10-CM

## 2024-01-11 DIAGNOSIS — E66.01 MORBID OBESITY WITH BMI OF 50.0-59.9, ADULT (HCC): ICD-10-CM

## 2024-01-11 DIAGNOSIS — I35.0 NONRHEUMATIC AORTIC VALVE STENOSIS: ICD-10-CM

## 2024-01-11 DIAGNOSIS — R09.82 ALLERGIC RHINITIS WITH POSTNASAL DRIP: ICD-10-CM

## 2024-01-11 DIAGNOSIS — I10 ESSENTIAL HYPERTENSION: ICD-10-CM

## 2024-01-11 DIAGNOSIS — G47.33 OSA (OBSTRUCTIVE SLEEP APNEA): Primary | ICD-10-CM

## 2024-01-11 DIAGNOSIS — J96.11 CHRONIC HYPOXEMIC RESPIRATORY FAILURE (HCC): ICD-10-CM

## 2024-01-11 DIAGNOSIS — J44.9 COPD, SEVERE (HCC): ICD-10-CM

## 2024-01-11 DIAGNOSIS — E11.40 TYPE 2 DIABETES MELLITUS WITH DIABETIC NEUROPATHY, WITH LONG-TERM CURRENT USE OF INSULIN (HCC): ICD-10-CM

## 2024-01-11 PROCEDURE — 99214 OFFICE O/P EST MOD 30 MIN: CPT | Performed by: INTERNAL MEDICINE

## 2024-01-11 NOTE — PATIENT INSTRUCTIONS

## 2024-01-11 NOTE — PROGRESS NOTES
Follow-Up Note - Sleep Center   Jey Vicente  58 y.o. male  :1965  MRN:[id number]  DOS:2024    CC: I saw this patient for follow-up in clinic today for Sleep disordered breathing, Coexisting Sleep and Medical Problems.  He is using a ResMed machine.  Interval changes: None reported.    He had repeat studies in  interpreted by Dr. Blankenship.  The diagnostic study demonstrated an AHI of 41.8 per hour, minimum oxygen saturation was 59% and 153 minutes of time asleep spent with saturations below 90%.  Severity may be under stated since he had poor sleep efficiency at 41% and no stage REM.  During the subsequent therapeutic study, sleep disordered breathing was sufficiently remediated with BiPAP at 20/14 cm H2O.  The AHI at this setting was 0.5 per hour with mean oxygen saturation of 91.4% with brief desaturations to a [place] of 87%.     PFSH, Problem List, Medications & Allergies were reviewed in EMR.   He  has a past medical history of Aortic stenosis, Arthritis (), Asthma (), Chronic hypoxemic respiratory failure (), Chronic kidney disease (10/2022), COPD (chronic obstructive pulmonary disease) ([facility]), Coronary artery disease (), Diabetes ([facility]), Diabetes mellitus ([facility]), Hyperlipidemia, Hypertension (2014), Lung disease (Birth), Neuropathy in diabetes ([facility]) (2022), Pneumonia (), and Sleep apnea, obstructive.    He has a current medication list which includes the following prescription(s): albuterol, b-d single use swabs regular, anoro ellipta, aspirin, atorvastatin, benralizumab, contour next monitor, carvedilol, cyclobenzaprine, farxiga, flovent hfa, furosemide, glimepiride, insulin pen needle, ipratropium-albuterol, losartan, metformin, multiple vitamins-minerals, ozempic (2 mg/dose), pregabalin, sertraline, spironolactone, and toujeo max solostar.    PHYSIOLOGICAL DATA REVIEW : Using PAP > 4 hours/night 100%. Estimated ZAC 0.3/hour with pressure of 25/14cm  "H2O@90th/95th percentile; patient has not been using non FDA approved devices to sanitize the machine.  INTERPRETATION: Compliance is excellent; Pressure setting is:within target range; ;   SUBJECTIVE: With respect to use of PAP, Jey  is experiencing some adverse effects:dry mouth/throat and dry nose.He derives benefit.  Is satisfied with sleep and daytime function.   Sleep Routine: Jey reports getting 8 hrs sleep; he has less difficulty initiating and maintaining sleep . He arises needing an alarm and feels more refreshed since on Rx.Jey reports daytime sleepiness, that he attributes to elevated blood sugar, feels like napping but avoids.  He rated himself at Total score: 16 /24 on the [place] Sleepiness Scale.   Other issues: He was recently started on Lyrica for diabetic peripheral neuropathy..     Habits: Reports that he quit smoking about 18 years ago. His smoking use included cigarettes. He started smoking about 39 years ago. He has a 90.1 pack-year smoking history. He has never used smokeless tobacco.,  Reports no history of alcohol use.,  Reports no history of drug use., Caffeine use:excessive until around 8 PM; Exercise routine: regular.      ROS: Significant for intentional weight reduction of around 35 pounds in the past year.  He has nasal symptoms due to seasonal allergies.  Respiratory symptoms are controlled.  CHF symptoms have improved..  He is on Zoloft for seasonal affective disorder    EXAM: /72 (BP Location: Left arm, Cuff Size: Large)   Pulse 89   Temp (!) 96.5 °F (35.8 °C) (Temporal)   Ht 5' 9\" (1.753 m)   Wt (!) 154 kg (340 lb)   SpO2 (!) 88%   BMI 50.21 kg/m²     Wt Readings from Last 3 Encounters:   01/11/24 (!) 154 kg (340 lb)   12/18/23 (!) 153 kg (338 lb)   12/15/23 (!) 153 kg (338 lb)      Patient is well groomed; well appearing.   CNS: Alert, orientated, speech clear & coherent  Psych: cooperative and in no distress. Mental state: Appears normal.  H&N: EOMI; NC/AT: " No facial pressure marks, no rashes.    Skin/Extrem: col & hydration normal; no edema  Resp: Respiratory effort is normal  Physical findings otherwise essentially unchanged from previous.    Serial BMPs have demonstrated some elevated CO2 levels but last was 25 mmol/L, elevated fasting glucose and otherwise unremarkable; last hemoglobin A1c was 8.6 mg percent    IMPRESSION: Problem List Items & Comorbidities Addressed this Visit    1. SCOTT (obstructive sleep apnea)  PAP DME Resupply/Reorder      2. Chronic hypoxemic respiratory failure (HCC)        3. Excessive daytime sleepiness        4. COPD, severe ([facility])        5. Allergic rhinitis with postnasal drip        6. Acute on chronic diastolic heart failure ([facility])        7. Essential hypertension        8. Nonrheumatic aortic valve stenosis        9. Morbid obesity with BMI of 50.0-59.9, adult ([facility])        10. Type 2 diabetes mellitus with diabetic neuropathy, with long-term current use of insulin ([facility])            PLAN:  I reviewed results of prior studies and physiologic data with the patient.   I discussed treatment options with risks and benefits.  Treatment with  PAP is medically necessary and Jey is agreable to continue use.   Care of equipment, methods to improve comfort using PAP and importance of compliance with therapy were discussed.  Pressure setting: continue 25/14 cmH2O.    Rx provided to replace supplies and Care coordinated with DME provider.   Diabetes medications are being adjusted and control improving with weight reduction.  He may benefit from duloxetine in place of Lyrica and sertraline because of his daytime drowsiness.  Encouraged to persist strategies for weight reduction.  A retitration study can be considered once weight is stable.  Follow-up is advised in 1 year or sooner if needed to monitor progress, compliance and to adjust therapy.     Thank you for allowing me to participate in the care of this patient.    Sincerely,     Authenticated  "electronically on 01/11/24   Board Certified Specialist     Portions of the record may have been created with voice recognition software. Occasional wrong word or \"sound a like\" substitutions may have occurred due to the inherent limitations of voice recognition software. There may also be notations and random deletions of words or characters from malfunctioning software. Read the chart carefully and recognize, using context, where substitutions/deletions have occurred.    "

## 2024-01-12 ENCOUNTER — TELEPHONE (OUTPATIENT)
Dept: SLEEP CENTER | Facility: CLINIC | Age: 59
End: 2024-01-12

## 2024-01-12 LAB
DME PARACHUTE DELIVERY DATE REQUESTED: NORMAL
DME PARACHUTE ITEM DESCRIPTION: NORMAL
DME PARACHUTE ORDER STATUS: NORMAL
DME PARACHUTE SUPPLIER NAME: NORMAL
DME PARACHUTE SUPPLIER PHONE: NORMAL

## 2024-01-15 LAB

## 2024-01-17 ENCOUNTER — TELEPHONE (OUTPATIENT)
Dept: PULMONOLOGY | Facility: CLINIC | Age: 59
End: 2024-01-17

## 2024-01-18 ENCOUNTER — TELEPHONE (OUTPATIENT)
Age: 59
End: 2024-01-18

## 2024-01-18 DIAGNOSIS — J44.9 COPD, SEVERE (HCC): ICD-10-CM

## 2024-01-18 RX ORDER — FLUTICASONE FUROATE 200 UG/1
1 POWDER RESPIRATORY (INHALATION) DAILY
Qty: 90 BLISTER | Refills: 3 | Status: SHIPPED | OUTPATIENT
Start: 2024-01-18 | End: 2025-01-12

## 2024-01-18 NOTE — TELEPHONE ENCOUNTER
Patient called, he received his scripts via mail delivery and he did not receive his Flovent inhaler. He was advised the product has been discontinued and is no longer being made. He said he only has about 8 days left of his inhaler and would need something to replace it as soon as possible. Please advise    Call Back #[phone]

## 2024-01-19 DIAGNOSIS — E11.40 TYPE 2 DIABETES MELLITUS WITH DIABETIC NEUROPATHY, WITH LONG-TERM CURRENT USE OF INSULIN (HCC): Primary | ICD-10-CM

## 2024-01-19 DIAGNOSIS — Z79.4 TYPE 2 DIABETES MELLITUS WITH DIABETIC NEUROPATHY, WITH LONG-TERM CURRENT USE OF INSULIN (HCC): Primary | ICD-10-CM

## 2024-01-19 RX ORDER — SEMAGLUTIDE 2.68 MG/ML
2 INJECTION, SOLUTION SUBCUTANEOUS
Qty: 9 ML | Refills: 3 | Status: SHIPPED | OUTPATIENT
Start: 2024-01-19

## 2024-01-23 DIAGNOSIS — I35.0 AORTIC VALVE STENOSIS, ETIOLOGY OF CARDIAC VALVE DISEASE UNSPECIFIED: ICD-10-CM

## 2024-01-23 DIAGNOSIS — I51.89 DIASTOLIC DYSFUNCTION WITHOUT HEART FAILURE: ICD-10-CM

## 2024-01-23 DIAGNOSIS — G47.33 OSA ON CPAP: ICD-10-CM

## 2024-01-23 DIAGNOSIS — I95.2 HYPOTENSION DUE TO DRUGS: ICD-10-CM

## 2024-01-23 RX ORDER — CARVEDILOL 12.5 MG/1
12.5 TABLET ORAL 2 TIMES DAILY WITH MEALS
Qty: 180 TABLET | Refills: 3 | Status: SHIPPED | OUTPATIENT
Start: 2024-01-23

## 2024-01-23 RX ORDER — LOSARTAN POTASSIUM 50 MG/1
50 TABLET ORAL DAILY
Qty: 90 TABLET | Refills: 3 | Status: SHIPPED | OUTPATIENT
Start: 2024-01-23

## 2024-02-05 DIAGNOSIS — E78.49 OTHER HYPERLIPIDEMIA: ICD-10-CM

## 2024-02-05 DIAGNOSIS — I35.0 AORTIC VALVE STENOSIS, ETIOLOGY OF CARDIAC VALVE DISEASE UNSPECIFIED: ICD-10-CM

## 2024-02-05 DIAGNOSIS — G47.33 OSA ON CPAP: ICD-10-CM

## 2024-02-05 DIAGNOSIS — I50.33 ACUTE ON CHRONIC DIASTOLIC HEART FAILURE (HCC): ICD-10-CM

## 2024-02-06 RX ORDER — SPIRONOLACTONE 50 MG/1
50 TABLET, FILM COATED ORAL DAILY
Qty: 90 TABLET | Refills: 3 | Status: SHIPPED | OUTPATIENT
Start: 2024-02-06

## 2024-02-06 RX ORDER — FUROSEMIDE 40 MG/1
40 TABLET ORAL 2 TIMES DAILY
Qty: 180 TABLET | Refills: 3 | Status: SHIPPED | OUTPATIENT
Start: 2024-02-06

## 2024-02-06 RX ORDER — ATORVASTATIN CALCIUM 40 MG/1
40 TABLET, FILM COATED ORAL DAILY
Qty: 90 TABLET | Refills: 3 | Status: SHIPPED | OUTPATIENT
Start: 2024-02-06

## 2024-02-09 NOTE — TELEPHONE ENCOUNTER
Bianca called in from  [facility] stating pt requested a change in dosage of carvedilol form 12.5 mg 2 x a day to 6.25 mg. 2 x a day. I looked throughout pt's chart and didn't see any changes made. I did speak with pt  and he did confirm speaking with Dr. Ferrara about decreasing the carvedilol.

## 2024-02-15 ENCOUNTER — TELEPHONE (OUTPATIENT)
Dept: CARDIOLOGY CLINIC | Facility: CLINIC | Age: 59
End: 2024-02-15

## 2024-02-15 DIAGNOSIS — I35.0 AORTIC VALVE STENOSIS, ETIOLOGY OF CARDIAC VALVE DISEASE UNSPECIFIED: Primary | ICD-10-CM

## 2024-02-15 NOTE — TELEPHONE ENCOUNTER
Spoke to Melvin and told him Dr Ferrara ordered an echo for him to get  done prior to his office visit on 6/17/2024

## 2024-02-15 NOTE — TELEPHONE ENCOUNTER
Patient wants to know if you want another echo prior to his next appt on 6/17/2024.  His last echo was 12/8/2023

## 2024-03-05 DIAGNOSIS — E11.40 PAINFUL DIABETIC NEUROPATHY (HCC): ICD-10-CM

## 2024-03-06 ENCOUNTER — TELEPHONE (OUTPATIENT)
Dept: CARDIOLOGY CLINIC | Facility: CLINIC | Age: 59
End: 2024-03-06

## 2024-03-06 ENCOUNTER — TELEPHONE (OUTPATIENT)
Age: 59
End: 2024-03-06

## 2024-03-06 ENCOUNTER — OFFICE VISIT (OUTPATIENT)
Dept: PULMONOLOGY | Facility: CLINIC | Age: 59
End: 2024-03-06
Payer: COMMERCIAL

## 2024-03-06 VITALS
BODY MASS INDEX: 46.65 KG/M2 | DIASTOLIC BLOOD PRESSURE: 66 MMHG | OXYGEN SATURATION: 95 % | TEMPERATURE: 97.8 F | HEART RATE: 79 BPM | HEIGHT: 69 IN | WEIGHT: 315 LBS | SYSTOLIC BLOOD PRESSURE: 99 MMHG

## 2024-03-06 DIAGNOSIS — J96.11 CHRONIC HYPOXEMIC RESPIRATORY FAILURE (HCC): Primary | ICD-10-CM

## 2024-03-06 DIAGNOSIS — J45.40 MODERATE PERSISTENT ASTHMA, UNSPECIFIED WHETHER COMPLICATED: ICD-10-CM

## 2024-03-06 DIAGNOSIS — G47.33 OSA (OBSTRUCTIVE SLEEP APNEA): ICD-10-CM

## 2024-03-06 PROCEDURE — 99214 OFFICE O/P EST MOD 30 MIN: CPT | Performed by: PHYSICIAN ASSISTANT

## 2024-03-06 RX ORDER — PREGABALIN 50 MG/1
50 CAPSULE ORAL 3 TIMES DAILY
Qty: 90 CAPSULE | Refills: 1 | Status: SHIPPED | OUTPATIENT
Start: 2024-03-06

## 2024-03-06 NOTE — TELEPHONE ENCOUNTER
Melvin was at his pulmonary doctor today and his BP was 99/58.  He was told to call you and make you aware

## 2024-03-06 NOTE — TELEPHONE ENCOUNTER
Spoke to Melvin and told him to only take 25 mg of his losartan per Dr Ferrara.  I asked if he needed a new script sent in and he stated no he will cut in half.

## 2024-03-06 NOTE — PROGRESS NOTES
Pulmonary Follow Up Note   Jey Vicente 58 y.o. male MRN: [id number]  3/6/2024      Assessment:    Asthma, moderate persistent  Trial Trelegy 200/62.5/25 mcg 1 puff daily.  Encouraged to rinse mouth after each use.  If no significant change in symptoms would resume Arnuity/Anoro.  Continue albuterol HFA/DuoNeb every 6 hours as needed.  Continue Fasenra injections every 56 days.  Will refax prescription to [facility] specialty pharmacy as patient reports not receiving it since November and I suspect this may be contributing to his increased chest tightness recently.    Chronic hypoxemic respiratory failure (HCC)  Continue 2 L nasal cannula as needed.  Patient is to maintain saturations greater than 88%.    SCOTT (obstructive sleep apnea)  Continue auto BiPAP with 2 L bled in during all hours of sleep.  Note patient follows with sleep medicine.      Plan:    Diagnoses and all orders for this visit:    Chronic hypoxemic respiratory failure (HCC)    Moderate persistent asthma, unspecified whether complicated  -     Benralizumab 30 MG/ML SOAJ; Inject 30 mg under the skin every 56 days Inject 30 mg every 56 days    SCOTT (obstructive sleep apnea)        No follow-ups on file.    History of Present Illness   HPI:  Jey Vicente is a 58 y.o. male who presents to the office today.  Patient was last seen in our office in December for moderate persistent asthma, chronic hypoxic respiratory failure, SCOTT, positive double-stranded DNA antibody test.  Patient has not required systemic steroids, antibiotics or been hospitalized in the last 3 months.  Patient states that overall his symptoms are well-controlled but does notice increased chest tightness with ambulation now for the last couple of weeks.  Denies wheezing.  He has a dry nonproductive cough.  Denies hemoptysis.  Endorses stable dyspnea on exertion.  Patient does note that he has not had Fasenra since November injection.    Review of Systems   Constitutional:  Negative for  appetite change and fever.   HENT:  Negative for ear pain, postnasal drip, rhinorrhea, sneezing, sore throat and trouble swallowing.    Respiratory:  Positive for cough, shortness of breath and wheezing.    Cardiovascular:  Negative for chest pain.   Musculoskeletal:  Positive for myalgias.   Neurological:  Negative for headaches.   All other systems reviewed and are negative.      Historical Information   Past Medical History:   Diagnosis Date    Aortic stenosis     Arthritis 1990    Both wrists    Asthma 1964    Chronic hypoxemic respiratory failure (HCC)     Chronic kidney disease 10/2022    COPD (chronic obstructive pulmonary disease) ([facility])     Coronary artery disease 2000    Aortic stenosis    Diabetes (HCC)     Diabetes mellitus (HCC)     Hyperlipidemia     Hypertension 07/02/2014    Lung disease Birth    Neuropathy in diabetes ([facility]) 1/2022    Pneumonia 2015    Sleep apnea, obstructive      Past Surgical History:   Procedure Laterality Date    ACCESSORY NAVICULAR EXCISION Right     APPENDECTOMY      CARDIAC CATHETERIZATION N/A 05/25/2023    Procedure: Cardiac Coronary Angiogram;  Surgeon: Mohit Farris MD;  Location: [facility];  Service: Cardiology    EAR FOREIGN BODY REMOVAL      cyst removal. bronchospasms after general anesthesia.    EYE SURGERY Left     traumatic injury at age 9-10    TONSILLECTOMY  No     Family History   Problem Relation Age of Onset    Cancer Mother         Skin Cancer    Heart disease Father     Lung cancer Father         Passed away 1997    Cancer Father         Lung cancer    Hypertension Father     ALS Maternal Grandmother     Diabetes Maternal Grandfather     Stomach cancer Maternal Grandfather     Arthritis Maternal Grandfather        Social History     Tobacco Use   Smoking Status Former    Current packs/day: 0.00    Average packs/day: 3.0 packs/day for 30.0 years (90.1 ttl pk-yrs)    Types: Cigarettes    Start date: 3/15/1984    Quit date: 7/15/2005    Years since  quittin.6   Smokeless Tobacco Never         Meds/Allergies     Current Outpatient Medications:     albuterol (Ventolin HFA) 90 mcg/act inhaler, Inhale 2 puffs every 6 (six) hours as needed for wheezing, Disp: 18 g, Rfl: 3    Alcohol Swabs (B-D SINGLE USE SWABS REGULAR) PADS, USE TO CLEAN INJECTION SITE, Disp: , Rfl:     Anoro Ellipta 62.5-25 MCG/ACT inhaler, INHALE 1 PUFF BY MOUTH DAILY, Disp: 60 each, Rfl: 10    aspirin (ECOTRIN LOW STRENGTH) 81 mg EC tablet, Take 1 tablet (81 mg total) by mouth daily, Disp: 30 tablet, Rfl: 5    atorvastatin (LIPITOR) 40 mg tablet, TAKE 1 TABLET BY MOUTH DAILY, Disp: 90 tablet, Rfl: 3    Benralizumab 30 MG/ML SOAJ, Inject 30 mg under the skin every 56 days Inject 30 mg every 56 days, Disp: 1 mL, Rfl: 5    Blood Glucose Monitoring Suppl (CONTOUR NEXT MONITOR) w/Device KIT, Test blood sugar once daily or as needed for fluctuating blood sugars, Disp: 1 kit, Rfl: 0    carvedilol (COREG) 12.5 mg tablet, Take 1 tablet (12.5 mg total) by mouth 2 (two) times a day with meals, Disp: 180 tablet, Rfl: 3    cyclobenzaprine (FLEXERIL) 10 mg tablet, Take 10 mg by mouth, Disp: , Rfl:     Dapagliflozin Propanediol (Farxiga) 10 MG TABS, Take 10 mg by mouth daily, Disp: , Rfl:     fluticasone (Arnuity Ellipta) 200 MCG/ACT AEPB inhaler, Inhale 1 puff daily Rinse mouth after use., Disp: 90 blister, Rfl: 3    furosemide (LASIX) 40 mg tablet, TAKE 1 TABLET BY MOUTH TWICE DAILY, Disp: 180 tablet, Rfl: 3    glimepiride (AMARYL) 4 mg tablet, Take 4 mg by mouth 2 (two) times a day, Disp: , Rfl:     Insulin Pen Needle (BD Pen Needle Dee Dee U/F) 32G X 4 MM MISC, Use 4 a day, Disp: 200 each, Rfl: 5    ipratropium-albuterol (DUO-NEB) 0.5-2.5 mg/3 mL nebulizer solution, INHALE CONTENTS OF 1 VIAL VIA NEBULIZER FOUR TIMES A DAY, Disp: 180 mL, Rfl: 10    losartan (COZAAR) 50 mg tablet, Take 1 tablet (50 mg total) by mouth daily, Disp: 90 tablet, Rfl: 3    metFORMIN (GLUCOPHAGE) 1000 MG tablet, TAKE 1 TABLET BY  "MOUTH TWICE DAILY WITH MEALS, Disp: 180 tablet, Rfl: 0    Multiple Vitamins-Minerals (MENS MULTIVITAMIN PO), Take by mouth, Disp: , Rfl:     Ozempic, 2 MG/DOSE, 8 MG/3ML injection pen, Inject 0.75 mL (2 mg total) under the skin every 7 days, Disp: 9 mL, Rfl: 3    pregabalin (LYRICA) 50 mg capsule, TAKE 1 CAPSULE BY MOUTH 3 TIMES DAILY, Disp: 90 capsule, Rfl: 1    sertraline (ZOLOFT) 50 mg tablet, TAKE 1 TABLET BY MOUTH DAILY, Disp: 30 tablet, Rfl: 10    spironolactone (ALDACTONE) 50 mg tablet, TAKE 1 TABLET BY MOUTH DAILY, Disp: 90 tablet, Rfl: 3    Toujeo Max SoloStar 300 units/mL CONCENTRATED U-300 injection pen (2-unit dial), INJECT 70 UNITS SUBCUTANEOUSLY AT BEDTIME (Patient taking differently: 90 Units daily at bedtime), Disp: 6 mL, Rfl: 10  Allergies   Allergen Reactions    Theophylline Other (See Comments)     Severe headaches  headaches  Severe headaches         Vitals: Blood pressure 99/66, pulse 79, temperature 97.8 °F (36.6 °C), temperature source Tympanic, height 5' 9\" (1.753 m), weight (!) 151 kg (333 lb), SpO2 95%. Body mass index is 49.18 kg/m². Oxygen Therapy  SpO2: 95 %  Oxygen Therapy: None (Room air)    Physical Exam  Physical Exam  Vitals reviewed.   Constitutional:       Appearance: Normal appearance. He is well-developed.   HENT:      Head: Normocephalic and atraumatic.      Nose: Nose normal.      Mouth/Throat:      Mouth: Mucous membranes are moist.   Eyes:      Extraocular Movements: Extraocular movements intact.   Cardiovascular:      Rate and Rhythm: Normal rate and regular rhythm.      Heart sounds: Normal heart sounds.   Pulmonary:      Effort: Pulmonary effort is normal. No respiratory distress.      Breath sounds: No wheezing, rhonchi or rales.   Musculoskeletal:         General: No swelling.   Skin:     General: Skin is warm and dry.   Neurological:      Mental Status: He is alert. Mental status is at baseline.   Psychiatric:         Mood and Affect: Mood normal.         Behavior: " "Behavior normal.         Labs: I have personally reviewed pertinent lab results., ABG: No results found for: \"PHART\", \"YKE0RFQ\", \"PO2ART\", \"FMI3IRW\", \"P3CLPGJX\", \"BEART\", \"SOURCE\", BNP: No results found for: \"BNP\", CBC: No results found for: \"WBC\", \"HGB\", \"HCT\", \"MCV\", \"PLT\", \"ADJUSTEDWBC\", \"RBC\", \"MCH\", \"MCHC\", \"RDW\", \"MPV\", \"NRBC\", CMP: No results found for: \"SODIUM\", \"K\", \"CL\", \"CO2\", \"ANIONGAP\", \"BUN\", \"CREATININE\", \"GLUCOSE\", \"CALCIUM\", \"AST\", \"ALT\", \"ALKPHOS\", \"PROT\", \"BILITOT\", \"EGFR\", PT/INR: No results found for: \"PT\", \"INR\", Troponin: No results found for: \"TROPONINI\"  Lab Results   Component Value Date    WBC 8.36 08/08/2023    HGB 14.2 08/08/2023    HCT 45.5 08/08/2023    MCV 88 08/08/2023     08/08/2023     Lab Results   Component Value Date    CALCIUM 9.4 12/04/2023    K 3.7 12/04/2023    CO2 25 12/04/2023    CL 99 12/04/2023    BUN 28 (H) 12/04/2023    CREATININE 1.05 12/04/2023     Lab Results   Component Value Date     (H) 12/04/2023     Lab Results   Component Value Date    ALT 38 08/08/2023    AST 17 08/08/2023    ALKPHOS 96 08/08/2023       Imaging and other studies: I have personally reviewed pertinent reports.   and I have personally reviewed pertinent films in PACS     CT chest without contrast 8/8/2023  IMPRESSION:     Persistent mild groundglass density and interstitial reticular thickening right lower lobe, may be sequela of previous infection. No consolidation.     2 adjacent juxtapleural right lower lobe nodules measuring up to 4 mm. Based on current Fleischner Society 2017 Guidelines on incidental pulmonary nodule, optional follow-up CT at 12 months can be considered.     Stable mild mediastinal and upper abdominal adenopathy.    Pulmonary function testing:  Performed 8/8/23   FEV1/FVC ratio 62%   FEV1 47% predicted  FVC 59% predicted  no response to bronchodilators  TLC 93 % predicted   % predicted  DLCO corrected for hemoglobin 82 % predicted  Severe obstructive " airflow defect.  No significant sponsor bronchodilators.  Increased RV indicative of air trapping.  Normal diffusion opacity.  Answers submitted by the patient for this visit:  Pulmonology Questionnaire (Submitted on 3/3/2024)  Chief Complaint: Primary symptoms  Do you have difficulty breathing?: Yes  Do you experience frequent throat clearing?: Yes  Do you have a hoarse voice?: Yes  Do you have a wet cough?: Yes  Chronicity: chronic  When did you first notice your symptoms?: more than 1 year ago  How often do your symptoms occur?: daily  Since you first noticed this problem, how has it changed?: unchanged  Do you have shortness of breath that occurs with effort or exertion?: Yes  Do you have ear congestion?: No  Do you have heartburn?: No  Do you have fatigue?: Yes  Do you have nasal congestion?: No  Do you have shortness of breath when lying flat?: No  Do you have shortness of breath when you wake up?: No  Do you have sweats?: No  Have you experienced weight loss?: No  Which of the following makes your symptoms worse?: change in weather, climbing stairs, exercise, exposure to fumes, exposure to smoke, minimal activity  Which of the following makes your symptoms better?: cold air, oral steroids, steroid inhaler

## 2024-03-06 NOTE — TELEPHONE ENCOUNTER
Caller: Melvin Vicente    Doctor: Jett Thomson    Reason for call: Melvin is Lidia's phone call.     Call back#: [phone]

## 2024-03-06 NOTE — ASSESSMENT & PLAN NOTE
Trial Trelegy 200/62.5/25 mcg 1 puff daily.  Encouraged to rinse mouth after each use.  If no significant change in symptoms would resume Arnuity/Anoro.  Continue albuterol HFA/DuoNeb every 6 hours as needed.  Continue Fasenra injections every 56 days.  Will refax prescription to [facility] specialty pharmacy as patient reports not receiving it since November and I suspect this may be contributing to his increased chest tightness recently.

## 2024-03-06 NOTE — ASSESSMENT & PLAN NOTE
Continue auto BiPAP with 2 L bled in during all hours of sleep.  Note patient follows with sleep medicine.

## 2024-03-08 DIAGNOSIS — E11.69 TYPE 2 DIABETES MELLITUS WITH OTHER SPECIFIED COMPLICATION, WITHOUT LONG-TERM CURRENT USE OF INSULIN (HCC): ICD-10-CM

## 2024-03-08 DIAGNOSIS — E11.65 UNCONTROLLED TYPE 2 DIABETES MELLITUS WITH HYPERGLYCEMIA (HCC): ICD-10-CM

## 2024-03-08 NOTE — TELEPHONE ENCOUNTER
Patient now sees David Rivera [facility]    Reason for call:   [x] Refill   [] Prior Auth  [] Other:     Office:   [] PCP/Provider -   [x] Specialty/Provider - Diabetes and Endo / [facility]    Medication:  Dapagliflozin Propanediol (Farxiga) 10 MG TABS     glimepiride (AMARYL) 4 mg tablet     Insulin Pen Needle (BD Pen Needle Dee Dee U/F) 32G X 4 MM MISC     metFORMIN (GLUCOPHAGE) 1000 MG tablet       [facility]     Does the patient have enough for 3 days?   [] Yes   [x] No - Send as HP to POD

## 2024-03-11 RX ORDER — PEN NEEDLE, DIABETIC 32GX 5/32"
NEEDLE, DISPOSABLE MISCELLANEOUS
Qty: 200 EACH | Refills: 5 | Status: SHIPPED | OUTPATIENT
Start: 2024-03-11

## 2024-03-11 RX ORDER — DAPAGLIFLOZIN 10 MG/1
10 TABLET, FILM COATED ORAL DAILY
Qty: 30 TABLET | Refills: 10 | Status: SHIPPED | OUTPATIENT
Start: 2024-03-11

## 2024-03-11 RX ORDER — GLIMEPIRIDE 4 MG/1
4 TABLET ORAL 2 TIMES DAILY
Qty: 180 TABLET | Refills: 1 | Status: SHIPPED | OUTPATIENT
Start: 2024-03-11

## 2024-03-11 RX ORDER — PEN NEEDLE, DIABETIC 32GX 5/32"
NEEDLE, DISPOSABLE MISCELLANEOUS
Qty: 100 EACH | Refills: 10 | Status: SHIPPED | OUTPATIENT
Start: 2024-03-11

## 2024-03-11 RX ORDER — GLIMEPIRIDE 4 MG/1
4 TABLET ORAL 2 TIMES DAILY
Qty: 60 TABLET | Refills: 10 | Status: SHIPPED | OUTPATIENT
Start: 2024-03-11

## 2024-03-11 RX ORDER — DAPAGLIFLOZIN 10 MG/1
10 TABLET, FILM COATED ORAL DAILY
Qty: 90 TABLET | Refills: 1 | Status: SHIPPED | OUTPATIENT
Start: 2024-03-11

## 2024-03-19 ENCOUNTER — APPOINTMENT (OUTPATIENT)
Dept: LAB | Facility: MEDICAL CENTER | Age: 59
End: 2024-03-19
Payer: COMMERCIAL

## 2024-03-19 DIAGNOSIS — E11.40 TYPE 2 DIABETES MELLITUS WITH DIABETIC NEUROPATHY, WITH LONG-TERM CURRENT USE OF INSULIN (HCC): ICD-10-CM

## 2024-03-19 DIAGNOSIS — R26.89 BALANCE DISORDER: ICD-10-CM

## 2024-03-19 DIAGNOSIS — E11.00 TYPE II DIABETES MELLITUS WITH HYPEROSMOLARITY, UNCONTROLLED (HCC): ICD-10-CM

## 2024-03-19 DIAGNOSIS — Z79.4 TYPE 2 DIABETES MELLITUS WITH DIABETIC NEUROPATHY, WITH LONG-TERM CURRENT USE OF INSULIN (HCC): ICD-10-CM

## 2024-03-19 LAB
ANION GAP SERPL CALCULATED.3IONS-SCNC: 9 MMOL/L (ref 4–13)
BUN SERPL-MCNC: 19 MG/DL (ref 5–25)
CALCIUM SERPL-MCNC: 8.9 MG/DL (ref 8.4–10.2)
CHLORIDE SERPL-SCNC: 101 MMOL/L (ref 96–108)
CO2 SERPL-SCNC: 29 MMOL/L (ref 21–32)
CREAT SERPL-MCNC: 0.76 MG/DL (ref 0.6–1.3)
EST. AVERAGE GLUCOSE BLD GHB EST-MCNC: 151 MG/DL
GFR SERPL CREATININE-BSD FRML MDRD: 99 ML/MIN/1.73SQ M
GLUCOSE P FAST SERPL-MCNC: 139 MG/DL (ref 65–99)
HBA1C MFR BLD: 6.9 %
POTASSIUM SERPL-SCNC: 4 MMOL/L (ref 3.5–5.3)
SODIUM SERPL-SCNC: 139 MMOL/L (ref 135–147)
VIT B12 SERPL-MCNC: 279 PG/ML (ref 180–914)

## 2024-03-19 PROCEDURE — 36415 COLL VENOUS BLD VENIPUNCTURE: CPT

## 2024-03-19 PROCEDURE — 80048 BASIC METABOLIC PNL TOTAL CA: CPT

## 2024-03-19 PROCEDURE — 82607 VITAMIN B-12: CPT

## 2024-03-19 PROCEDURE — 83036 HEMOGLOBIN GLYCOSYLATED A1C: CPT

## 2024-03-21 ENCOUNTER — OFFICE VISIT (OUTPATIENT)
Dept: ENDOCRINOLOGY | Facility: CLINIC | Age: 59
End: 2024-03-21
Payer: COMMERCIAL

## 2024-03-21 VITALS
WEIGHT: 315 LBS | OXYGEN SATURATION: 93 % | SYSTOLIC BLOOD PRESSURE: 102 MMHG | HEIGHT: 69 IN | DIASTOLIC BLOOD PRESSURE: 68 MMHG | BODY MASS INDEX: 46.65 KG/M2 | HEART RATE: 70 BPM

## 2024-03-21 DIAGNOSIS — E78.2 MIXED HYPERLIPIDEMIA: ICD-10-CM

## 2024-03-21 DIAGNOSIS — E66.01 MORBID OBESITY (HCC): ICD-10-CM

## 2024-03-21 DIAGNOSIS — E11.69 TYPE 2 DIABETES MELLITUS WITH OTHER SPECIFIED COMPLICATION, WITHOUT LONG-TERM CURRENT USE OF INSULIN (HCC): Primary | ICD-10-CM

## 2024-03-21 DIAGNOSIS — I10 ESSENTIAL HYPERTENSION: ICD-10-CM

## 2024-03-21 DIAGNOSIS — E11.40 PAINFUL DIABETIC NEUROPATHY (HCC): ICD-10-CM

## 2024-03-21 PROCEDURE — 95251 CONT GLUC MNTR ANALYSIS I&R: CPT | Performed by: STUDENT IN AN ORGANIZED HEALTH CARE EDUCATION/TRAINING PROGRAM

## 2024-03-21 PROCEDURE — 99214 OFFICE O/P EST MOD 30 MIN: CPT | Performed by: STUDENT IN AN ORGANIZED HEALTH CARE EDUCATION/TRAINING PROGRAM

## 2024-03-21 NOTE — PROGRESS NOTES
Jey Vicente 58 y.o. male MRN: [id number]    Encounter: [id number]      Assessment/Plan     1. Type 2 diabetes c/b DPN, steroid induced hyperglycemia - improving by A1c. CGM reflects tall post-prandial hyperglycemia. Dietary indiscretions revealed during exam. Referral to CDE for MNT counseling provided. If cannot control dietarily, may benefit from escalation to prandial insulin. No changes to regimen today, but advised reduction of toujeo from 90 -> 80 units in the event of recurrent fasting hypoglycemia.     2. Painful diabetic neuropathy - improving with lyrica 50 mg tid     3. Hypertension - stable.     4. Hyperlipidemia - continue statin. Follow up lipid panel     5. Morbid obesity - will monitor    6. Balance disorder - b12 is in normal range    Problem List Items Addressed This Visit     Essential hypertension   Other Visit Diagnoses     Type 2 diabetes mellitus with other specified complication, without long-term current use of insulin (HCC)    -  Primary    Relevant Orders    Ambulatory referral to Diabetic Education    Basic metabolic panel    Hemoglobin A1C    Lipid Panel with Direct LDL reflex    Albumin / creatinine urine ratio    Painful diabetic neuropathy (HCC)        Mixed hyperlipidemia        Morbid obesity (HCC)            RTC 4-mo    CC: Diabetes    History of Present Illness     HPI:    Melvin returns today in follow up of diabetes. He is joined today by his wife.    No new concerns today. Melvin had A1c completed recently and it was below 7%, which he is excited about. He does mention having some overnight lows on CGM into 50s recently, although not captured on his available download. His blood sugars have been high on CGM. He notes consumption of tasty cakes this morning, leading to severe highs. He has not met with nutrition services.     For diabetes, Melvin is on toujeo 90 units nightly, glimepiride 4 mg bid, farxiga 10 mg daily, ozempic 2 mg weekly, and metformin 2g daily.     For  hyperlipidemia he takes lipitor 40 mg. For hypertension he takes losartan 50 mg daily, carvedilol 12.5 mg bid, amlodipine 10 mg and spironolactone 25 mg.     Review of Systems   Constitutional:  Negative for diaphoresis.   HENT:  Negative for voice change.    Cardiovascular:  Negative for chest pain and palpitations.   Gastrointestinal:  Negative for nausea and vomiting.   Endocrine: Negative for polydipsia and polyuria.   Neurological:  Negative for tremors.   Psychiatric/Behavioral:  Negative for agitation.    All other systems reviewed and are negative.      Historical Information   Past Medical History:   Diagnosis Date   • Aortic stenosis    • Arthritis 1990    Both wrists   • Asthma 1964   • Chronic hypoxemic respiratory failure ([facility])    • Chronic kidney disease 10/2022   • COPD (chronic obstructive pulmonary disease) ([facility])    • Coronary artery disease 2000    Aortic stenosis   • Diabetes ([facility])    • Diabetes mellitus ([facility])    • Hyperlipidemia    • Hypertension 07/02/2014   • Lung disease Birth   • Neuropathy in diabetes ([facility]) 1/2022   • Pneumonia 2015   • Sleep apnea, obstructive      Past Surgical History:   Procedure Laterality Date   • ACCESSORY NAVICULAR EXCISION Right    • APPENDECTOMY     • CARDIAC CATHETERIZATION N/A 05/25/2023    Procedure: Cardiac Coronary Angiogram;  Surgeon: Mohit Farris MD;  Location: [facility];  Service: Cardiology   • EAR FOREIGN BODY REMOVAL      cyst removal. bronchospasms after general anesthesia.   • EYE SURGERY Left     traumatic injury at age 9-10   • TONSILLECTOMY  No     Social History   Social History     Substance and Sexual Activity   Alcohol Use Never     Social History     Substance and Sexual Activity   Drug Use Never     Social History     Tobacco Use   Smoking Status Former   • Current packs/day: 0.00   • Average packs/day: 3.0 packs/day for 30.0 years (90.1 ttl pk-yrs)   • Types: Cigarettes   • Start date: 3/15/1984   • Quit date: 7/15/2005   • Years  since quittin.6   Smokeless Tobacco Never     Family History:   Family History   Problem Relation Age of Onset   • Cancer Mother         Skin Cancer   • Heart disease Father    • Lung cancer Father         Passed away    • Cancer Father         Lung cancer   • Hypertension Father    • ALS Maternal Grandmother    • Diabetes Maternal Grandfather    • Stomach cancer Maternal Grandfather    • Arthritis Maternal Grandfather        Meds/Allergies   Current Outpatient Medications   Medication Sig Dispense Refill   • albuterol (Ventolin HFA) 90 mcg/act inhaler Inhale 2 puffs every 6 (six) hours as needed for wheezing 18 g 3   • Alcohol Swabs (B-D SINGLE USE SWABS REGULAR) PADS USE TO CLEAN INJECTION SITE     • Anoro Ellipta 62.5-25 MCG/ACT inhaler INHALE 1 PUFF BY MOUTH DAILY 60 each 10   • aspirin (ECOTRIN LOW STRENGTH) 81 mg EC tablet Take 1 tablet (81 mg total) by mouth daily 30 tablet 5   • atorvastatin (LIPITOR) 40 mg tablet TAKE 1 TABLET BY MOUTH DAILY 90 tablet 3   • Benralizumab 30 MG/ML SOAJ Inject 30 mg under the skin every 56 days Inject 30 mg every 56 days 1 mL 5   • Blood Glucose Monitoring Suppl (CONTOUR NEXT MONITOR) w/Device KIT Test blood sugar once daily or as needed for fluctuating blood sugars 1 kit 0   • carvedilol (COREG) 12.5 mg tablet Take 1 tablet (12.5 mg total) by mouth 2 (two) times a day with meals 180 tablet 3   • cyclobenzaprine (FLEXERIL) 10 mg tablet Take 10 mg by mouth     • dapagliflozin (Farxiga) 10 MG tablet Take 1 tablet (10 mg total) by mouth daily 90 tablet 1   • Farxiga 10 MG tablet TAKE 1 TABLET BY MOUTH ONCE DAILY 30 tablet 10   • fluticasone (Arnuity Ellipta) 200 MCG/ACT AEPB inhaler Inhale 1 puff daily Rinse mouth after use. 90 blister 3   • furosemide (LASIX) 40 mg tablet TAKE 1 TABLET BY MOUTH TWICE DAILY 180 tablet 3   • glimepiride (AMARYL) 4 mg tablet Take 1 tablet (4 mg total) by mouth 2 (two) times a day 180 tablet 1   • glimepiride (AMARYL) 4 mg tablet TAKE 1  "TABLET BY MOUTH TWICE DAILY 60 tablet 10   • Insulin Pen Needle (BD Pen Needle Dee Dee U/F) 32G X 4 MM MISC Use 4 a day 200 each 5   • Insulin Pen Needle (BD Pen Needle Dee Dee U/F) 32G X 4 MM MISC USE TO INJECT INSULIN 100 each 10   • ipratropium-albuterol (DUO-NEB) 0.5-2.5 mg/3 mL nebulizer solution INHALE CONTENTS OF 1 VIAL VIA NEBULIZER FOUR TIMES A  mL 10   • losartan (COZAAR) 50 mg tablet Take 1 tablet (50 mg total) by mouth daily 90 tablet 3   • metFORMIN (GLUCOPHAGE) 1000 MG tablet Take 1 tablet (1,000 mg total) by mouth 2 (two) times a day with meals 180 tablet 0   • metFORMIN (GLUCOPHAGE) 1000 MG tablet TAKE 1 TABLET BY MOUTH TWICE DAILY 60 tablet 10   • Multiple Vitamins-Minerals (MENS MULTIVITAMIN PO) Take by mouth     • Ozempic, 2 MG/DOSE, 8 MG/3ML injection pen Inject 0.75 mL (2 mg total) under the skin every 7 days 9 mL 3   • pregabalin (LYRICA) 50 mg capsule TAKE 1 CAPSULE BY MOUTH 3 TIMES DAILY 90 capsule 1   • sertraline (ZOLOFT) 50 mg tablet TAKE 1 TABLET BY MOUTH DAILY 30 tablet 10   • spironolactone (ALDACTONE) 50 mg tablet TAKE 1 TABLET BY MOUTH DAILY 90 tablet 3   • Toujeo Max SoloStar 300 units/mL CONCENTRATED U-300 injection pen (2-unit dial) INJECT 70 UNITS SUBCUTANEOUSLY AT BEDTIME (Patient taking differently: 90 Units daily at bedtime) 6 mL 10     No current facility-administered medications for this visit.     Allergies   Allergen Reactions   • Theophylline Other (See Comments)     Severe headaches  headaches  Severe headaches         Objective   Vitals: Blood pressure 102/68, pulse 70, height 5' 9\" (1.753 m), weight (!) 152 kg (336 lb), SpO2 93%.    Physical Exam  Vitals reviewed.   Constitutional:       Appearance: Normal appearance.   HENT:      Head: Normocephalic and atraumatic.   Eyes:      General: No scleral icterus.     Conjunctiva/sclera: Conjunctivae normal.   Cardiovascular:      Rate and Rhythm: Normal rate and regular rhythm.   Pulmonary:      Effort: Pulmonary effort is " normal. No respiratory distress.   Abdominal:      Palpations: Abdomen is soft.      Tenderness: There is no abdominal tenderness.   Musculoskeletal:      Cervical back: Normal range of motion.   Skin:     General: Skin is warm and dry.   Neurological:      General: No focal deficit present.      Mental Status: He is alert.   Psychiatric:         Mood and Affect: Mood normal.         Behavior: Behavior normal.         The history was obtained from the review of the chart, patient and family.    Lab Results:   Lab Results   Component Value Date/Time    Hemoglobin A1C 6.9 (H) 03/19/2024 10:56 AM    Hemoglobin A1C 8.6 (H) 12/04/2023 09:04 AM    Hemoglobin A1C 8.7 (A) 11/27/2023 02:50 PM    Hemoglobin A1C 7.7 (H) 08/08/2023 12:08 PM    WBC 8.36 08/08/2023 12:08 PM    WBC 7.55 05/25/2023 08:46 AM    WBC 10.03 05/24/2023 01:26 PM    Hemoglobin 14.2 08/08/2023 12:08 PM    Hemoglobin 15.2 05/25/2023 08:46 AM    Hemoglobin 14.3 05/24/2023 01:26 PM    Hematocrit 45.5 08/08/2023 12:08 PM    Hematocrit 44.8 05/25/2023 08:46 AM    Hematocrit 44.4 05/24/2023 01:26 PM    MCV 88 08/08/2023 12:08 PM    MCV 84 05/25/2023 08:46 AM    MCV 86 05/24/2023 01:26 PM    Platelets 273 08/08/2023 12:08 PM    Platelets 285 05/25/2023 08:46 AM    Platelets 295 05/24/2023 01:26 PM    BUN 19 03/19/2024 10:56 AM    BUN 28 (H) 12/04/2023 09:04 AM    BUN 21 08/08/2023 12:08 PM    Potassium 4.0 03/19/2024 10:56 AM    Potassium 3.7 12/04/2023 09:04 AM    Potassium 4.6 08/08/2023 12:08 PM    Chloride 101 03/19/2024 10:56 AM    Chloride 99 12/04/2023 09:04 AM    Chloride 107 08/08/2023 12:08 PM    CO2 29 03/19/2024 10:56 AM    CO2 25 12/04/2023 09:04 AM    CO2 29 08/08/2023 12:08 PM    Creatinine 0.76 03/19/2024 10:56 AM    Creatinine 1.05 12/04/2023 09:04 AM    Creatinine 1.01 08/08/2023 12:08 PM    AST 17 08/08/2023 12:08 PM    AST 17 05/24/2023 01:26 PM    AST 10 (L) 04/26/2023 04:46 PM    ALT 38 08/08/2023 12:08 PM    ALT 40 05/24/2023 01:26 PM     "ALT 20 04/26/2023 04:46 PM    Total Protein 7.0 08/08/2023 12:08 PM    Total Protein 7.5 05/24/2023 01:26 PM    Total Protein 6.7 04/26/2023 04:46 PM    Albumin 3.9 08/08/2023 12:08 PM    Albumin 4.1 05/24/2023 01:26 PM    Albumin 3.9 04/26/2023 04:46 PM    HDL, Direct 25 (L) 08/08/2023 12:08 PM    Triglycerides 218 (H) 08/08/2023 12:08 PM     Lab Results   Component Value Date    CHOLESTEROL 90 08/08/2023    CHOLESTEROL 94 01/26/2022    CHOLESTEROL 108 09/02/2020     Lab Results   Component Value Date    HDL 25 (L) 08/08/2023    HDL 22 (L) 01/26/2022    HDL 23 (L) 09/02/2020     Lab Results   Component Value Date    TRIG 218 (H) 08/08/2023    TRIG 292 (H) 01/26/2022    TRIG 130 07/28/2021     Lab Results   Component Value Date    NONHDLC 72 01/26/2022    NONHDLC 80 07/19/2019     Lab Results   Component Value Date    LDLCALC 21 08/08/2023         [employer]   Device used DINO   Home use     Indication   Type 2 Diabetes    More than 72 hours of data was reviewed. Report to be scanned to chart.     Date Range: Mar 8 - Mar 21, 2024    Analysis of data:   % time CGM used: 57%  Average Glucose: 185 mg/dL  Coefficient of Variation: 27.1%   Time in Target Range: 53%   Time Above Range: 47% (12% very high)  Time Below Range: 0%     Interpretation of data: fair control, however tall post prandial peaks. Notes dietary indiscretions.        Imaging Studies: I have personally reviewed pertinent reports.      Portions of the record may have been created with voice recognition software. Occasional wrong word or \"sound a like\" substitutions may have occurred due to the inherent limitations of voice recognition software. Read the chart carefully and recognize, using context, where substitutions have occurred.  "

## 2024-03-25 ENCOUNTER — TELEPHONE (OUTPATIENT)
Age: 59
End: 2024-03-25

## 2024-03-25 NOTE — TELEPHONE ENCOUNTER
Pt called in wanting to know if Dr. Johnson could order a Moe 3 for him. He states that he currently has the Moe 2 and would like to upgrade. He feels it would be better for him.     Please contact pt and advise as to whether or no this can be done and what will be needed to do so. Thank you [phone]

## 2024-03-25 NOTE — TELEPHONE ENCOUNTER
Patient returned Lata's call and stated he gets his sensors through [facility] through the mail.

## 2024-04-30 ENCOUNTER — TELEPHONE (OUTPATIENT)
Age: 59
End: 2024-04-30

## 2024-04-30 NOTE — TELEPHONE ENCOUNTER
Patient calling regarding his Fasenra injections. He states he has not heard anything about this and was due today for it. Please advise.

## 2024-04-30 NOTE — TELEPHONE ENCOUNTER
Marita approved  8/1/2023-12/1/2024  AUTH#: [id number]  [facility]: [phone]    Confirmed with the pharmacy that last delivery was made on 3/8/2024. Next delivery is set for 5/2/2024. Lvm to inform pt of delivery and to inform pt that medication is taken every 56 days. Also to call office back with any further questions.

## 2024-05-02 DIAGNOSIS — E11.40 PAINFUL DIABETIC NEUROPATHY (HCC): ICD-10-CM

## 2024-05-02 DIAGNOSIS — J44.9 COPD, SEVERE (HCC): ICD-10-CM

## 2024-05-02 RX ORDER — UMECLIDINIUM BROMIDE AND VILANTEROL TRIFENATATE 62.5; 25 UG/1; UG/1
1 POWDER RESPIRATORY (INHALATION) DAILY
Qty: 60 EACH | Refills: 2 | Status: SHIPPED | OUTPATIENT
Start: 2024-05-02

## 2024-05-03 RX ORDER — PREGABALIN 50 MG/1
50 CAPSULE ORAL 3 TIMES DAILY
Qty: 90 CAPSULE | Refills: 1 | Status: SHIPPED | OUTPATIENT
Start: 2024-05-03

## 2024-05-07 ENCOUNTER — TELEPHONE (OUTPATIENT)
Age: 59
End: 2024-05-07

## 2024-05-07 NOTE — TELEPHONE ENCOUNTER
PA for lyrica not required     Reason  The patient currently has access to the requested medication and a Prior Authorization is not needed for the patient/medication.               Message sent to provider pool yes

## 2024-05-28 ENCOUNTER — OFFICE VISIT (OUTPATIENT)
Dept: FAMILY MEDICINE CLINIC | Facility: CLINIC | Age: 59
End: 2024-05-28
Payer: COMMERCIAL

## 2024-05-28 VITALS
HEART RATE: 99 BPM | TEMPERATURE: 97.6 F | WEIGHT: 315 LBS | HEIGHT: 69 IN | DIASTOLIC BLOOD PRESSURE: 72 MMHG | OXYGEN SATURATION: 90 % | BODY MASS INDEX: 46.65 KG/M2 | SYSTOLIC BLOOD PRESSURE: 136 MMHG | RESPIRATION RATE: 18 BRPM

## 2024-05-28 DIAGNOSIS — E11.40 TYPE 2 DIABETES MELLITUS WITH DIABETIC NEUROPATHY, WITH LONG-TERM CURRENT USE OF INSULIN (HCC): Primary | ICD-10-CM

## 2024-05-28 DIAGNOSIS — I50.30 DIASTOLIC HEART FAILURE, UNSPECIFIED HF CHRONICITY (HCC): ICD-10-CM

## 2024-05-28 DIAGNOSIS — Z79.4 TYPE 2 DIABETES MELLITUS WITH DIABETIC NEUROPATHY, WITH LONG-TERM CURRENT USE OF INSULIN (HCC): Primary | ICD-10-CM

## 2024-05-28 DIAGNOSIS — E11.40 PAINFUL DIABETIC NEUROPATHY (HCC): ICD-10-CM

## 2024-05-28 DIAGNOSIS — M32.9 LUPUS (HCC): ICD-10-CM

## 2024-05-28 PROBLEM — R06.09 DOE (DYSPNEA ON EXERTION): Status: RESOLVED | Noted: 2022-04-20 | Resolved: 2024-05-28

## 2024-05-28 PROBLEM — D72.829 LEUKOCYTOSIS: Status: RESOLVED | Noted: 2023-04-17 | Resolved: 2024-05-28

## 2024-05-28 PROCEDURE — G2211 COMPLEX E/M VISIT ADD ON: HCPCS | Performed by: INTERNAL MEDICINE

## 2024-05-28 PROCEDURE — 99214 OFFICE O/P EST MOD 30 MIN: CPT | Performed by: INTERNAL MEDICINE

## 2024-05-28 NOTE — PROGRESS NOTES
Ambulatory Visit  Name: Jey Vicente      : 1965      MRN: [id number]  Encounter Provider: Cesia Martinez DO  Encounter Date: 2024   Encounter department: [facility]    Assessment & Plan   1. Type 2 diabetes mellitus with diabetic neuropathy, with long-term current use of insulin (HCC)  2. Painful diabetic neuropathy (HCC)  Pt will schedule eye exam in [place] and get report to us  He is followed by endocrine and has CGM His BS have been stable and last A1c improved  He has foot exam tomorrow   3. Lupus (HCC)  Pt is in process of workup He states positive labs but no joint sxs at this time   4. Diastolic heart failure, unspecified HF chronicity (HCC)  Pt has upcoming echo and followup with Dr Ferrara He has lost some weight recently and hopes to continue     Rto 3 months     Depression Screening and Follow-up Plan: Patient's depression screening was positive with a PHQ-9 score of 8. Patient assessed for underlying major depression. Brief counseling provided and recommend additional follow-up/re-evaluation next office visit.     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   HENT: Negative.     Eyes:  Negative for visual disturbance.   Respiratory:  Negative for cough and shortness of breath.    Cardiovascular:  Negative for chest pain, palpitations and leg swelling.   Gastrointestinal:  Negative for abdominal distention and abdominal pain.  "  Genitourinary: Negative.    Musculoskeletal:  Positive for arthralgias and gait problem.   Skin: Negative.    Neurological:  Negative for dizziness, light-headedness and headaches.   Psychiatric/Behavioral:  Negative for sleep disturbance. The patient is not nervous/anxious.        Objective     /72   Pulse 99   Temp 97.6 °F (36.4 °C) (Temporal)   Resp 18   Ht 5' 9\" (1.753 m)   Wt (!) 152 kg (335 lb 3.2 oz)   SpO2 90%   BMI 49.50 kg/m²     Physical Exam  Vitals and nursing note reviewed.   Constitutional:       General: He is not in acute distress.     Appearance: Normal appearance. He is not ill-appearing, toxic-appearing or diaphoretic.   HENT:      Head: Normocephalic and atraumatic.      Right Ear: External ear normal.      Left Ear: External ear normal.      Nose: Nose normal.      Mouth/Throat:      Mouth: Mucous membranes are moist.   Eyes:      General: No scleral icterus.     Extraocular Movements: Extraocular movements intact.      Conjunctiva/sclera: Conjunctivae normal.      Pupils: Pupils are equal, round, and reactive to light.   Cardiovascular:      Rate and Rhythm: Normal rate and regular rhythm.      Pulses: Normal pulses.      Heart sounds: Normal heart sounds.   Pulmonary:      Effort: Pulmonary effort is normal.      Breath sounds: Normal breath sounds.   Abdominal:      General: Bowel sounds are normal. There is no distension.      Palpations: Abdomen is soft.      Tenderness: There is no abdominal tenderness.   Musculoskeletal:         General: Normal range of motion.      Cervical back: Normal range of motion and neck supple.      Right lower leg: No edema.      Left lower leg: No edema.   Skin:     General: Skin is warm and dry.      Coloration: Skin is not jaundiced or pale.   Neurological:      General: No focal deficit present.      Mental Status: He is alert and oriented to person, place, and time. Mental status is at baseline.      Cranial Nerves: No cranial nerve deficit. "   Psychiatric:         Mood and Affect: Mood normal.         Behavior: Behavior normal.         Thought Content: Thought content normal.         Judgment: Judgment normal.       Administrative Statements

## 2024-06-03 ENCOUNTER — NURSE TRIAGE (OUTPATIENT)
Age: 59
End: 2024-06-03

## 2024-06-03 DIAGNOSIS — J20.9 COPD WITH ACUTE BRONCHITIS  (HCC): Primary | ICD-10-CM

## 2024-06-03 DIAGNOSIS — J44.0 COPD WITH ACUTE BRONCHITIS  (HCC): Primary | ICD-10-CM

## 2024-06-03 RX ORDER — PREDNISONE 10 MG/1
40 TABLET ORAL DAILY
Qty: 20 TABLET | Refills: 0 | Status: SHIPPED | OUTPATIENT
Start: 2024-06-03

## 2024-06-03 NOTE — TELEPHONE ENCOUNTER
"Reason for Disposition   MILD difficulty breathing (e.g., minimal/no SOB at rest, SOB with walking, pulse <100) and still present when not coughing    Answer Assessment - Initial Assessment Questions  1. ONSET: \"When did the cough begin?\"       About a week ago  2. SEVERITY: \"How bad is the cough today?\"       Unspecified  3. SPUTUM: \"Describe the color of your sputum\" (none, dry cough; clear, white, yellow, green)      Cough is dry  4. HEMOPTYSIS: \"Are you coughing up any blood?\" If so ask: \"How much?\" (flecks, streaks, tablespoons, etc.)      No  5. DIFFICULTY BREATHING: \"Are you having difficulty breathing?\" If Yes, ask: \"How bad is it?\" (e.g., mild, moderate, severe)     - MILD: No SOB at rest, mild SOB with walking, speaks normally in sentences, can lay down, no retractions, pulse < 100.     - MODERATE: SOB at rest, SOB with minimal exertion and prefers to sit, cannot lie down flat, speaks in phrases, mild retractions, audible wheezing, pulse 100-120.     - SEVERE: Very SOB at rest, speaks in single words, struggling to breathe, sitting hunched forward, retractions, pulse > 120       Mild-moderate SOB- worse than baseline  6. FEVER: \"Do you have a fever?\" If Yes, ask: \"What is your temperature, how was it measured, and when did it start?\"      Has not measured but pt feeling feverish  7. CARDIAC HISTORY: \"Do you have any history of heart disease?\" (e.g., heart attack, congestive heart failure)       See chart  8. LUNG HISTORY: \"Do you have any history of lung disease?\"  (e.g., pulmonary embolus, asthma, emphysema)      Asthma  9. PE RISK FACTORS: \"Do you have a history of blood clots?\" (or: recent major surgery, recent prolonged travel, bedridden)      Unspecified  10. OTHER SYMPTOMS: \"Do you have any other symptoms?\" (e.g., runny nose, wheezing, chest pain)        Pt denies chest pain       Wheezing- using nebs but not effective       2L NC PRN- using more frequently    Protocols used: Cough-ADULT-[place]    "

## 2024-06-03 NOTE — TELEPHONE ENCOUNTER
Pt stated Pulm Provider: Dr. Ibarra    Actionable item: Abx/steroid?    What is the reason for the call/chief complaint?    Pt's spouse calling. Pt has been experiencing increased cough for the past week. Cough is non-productive. Has been feeling feverish, but has not measured temp. SOB is worse than baseline, requiring 2L NC more frequently than normal. Pt's wife notices wheezing. Pt denies chest pain. Has been using neb treatments which have not been effective at managing his symptoms. Pt has already scheduled f/u with Dr. Ibarra on Thursday. His wife is requesting abx/steroid script to [facility] in [place]. Will review with provider and update pt's wife with further recommendations

## 2024-06-03 NOTE — TELEPHONE ENCOUNTER
Please let patient know I sent prednisone 40 mg po daily to his pharmacy. He should start and continue this until his follow-up appointment on Friday.

## 2024-06-04 ENCOUNTER — HOSPITAL ENCOUNTER (EMERGENCY)
Facility: HOSPITAL | Age: 59
Discharge: HOME | End: 2024-06-05
Attending: EMERGENCY MEDICINE
Payer: COMMERCIAL

## 2024-06-04 ENCOUNTER — APPOINTMENT (EMERGENCY)
Dept: RADIOLOGY | Facility: HOSPITAL | Age: 59
End: 2024-06-04
Payer: COMMERCIAL

## 2024-06-04 DIAGNOSIS — J18.9 RIGHT LOWER LOBE PNEUMONIA: ICD-10-CM

## 2024-06-04 DIAGNOSIS — J44.9 COPD (CHRONIC OBSTRUCTIVE PULMONARY DISEASE) (HCC): Primary | ICD-10-CM

## 2024-06-04 LAB
BASOPHILS # BLD AUTO: 0.01 THOUSANDS/ÂΜL (ref 0–0.1)
BASOPHILS NFR BLD AUTO: 0 % (ref 0–1)
EOSINOPHIL # BLD AUTO: 0 THOUSAND/ÂΜL (ref 0–0.61)
EOSINOPHIL NFR BLD AUTO: 0 % (ref 0–6)
ERYTHROCYTE [DISTWIDTH] IN BLOOD BY AUTOMATED COUNT: 14.7 % (ref 11.6–15.1)
HCT VFR BLD AUTO: 42 % (ref 36.5–49.3)
HGB BLD-MCNC: 13.9 G/DL (ref 12–17)
IMM GRANULOCYTES # BLD AUTO: 0.05 THOUSAND/UL (ref 0–0.2)
IMM GRANULOCYTES NFR BLD AUTO: 1 % (ref 0–2)
LYMPHOCYTES # BLD AUTO: 0.83 THOUSANDS/ÂΜL (ref 0.6–4.47)
LYMPHOCYTES NFR BLD AUTO: 9 % (ref 14–44)
MCH RBC QN AUTO: 26.9 PG (ref 26.8–34.3)
MCHC RBC AUTO-ENTMCNC: 33.1 G/DL (ref 31.4–37.4)
MCV RBC AUTO: 81 FL (ref 82–98)
MONOCYTES # BLD AUTO: 0.73 THOUSAND/ÂΜL (ref 0.17–1.22)
MONOCYTES NFR BLD AUTO: 7 % (ref 4–12)
NEUTROPHILS # BLD AUTO: 8.18 THOUSANDS/ÂΜL (ref 1.85–7.62)
NEUTS SEG NFR BLD AUTO: 83 % (ref 43–75)
NRBC BLD AUTO-RTO: 0 /100 WBCS
PLATELET # BLD AUTO: 271 THOUSANDS/UL (ref 149–390)
PMV BLD AUTO: 9.3 FL (ref 8.9–12.7)
RBC # BLD AUTO: 5.16 MILLION/UL (ref 3.88–5.62)
WBC # BLD AUTO: 9.8 THOUSAND/UL (ref 4.31–10.16)

## 2024-06-04 PROCEDURE — 99284 EMERGENCY DEPT VISIT MOD MDM: CPT | Performed by: EMERGENCY MEDICINE

## 2024-06-04 PROCEDURE — 85025 COMPLETE CBC W/AUTO DIFF WBC: CPT | Performed by: EMERGENCY MEDICINE

## 2024-06-04 PROCEDURE — 85379 FIBRIN DEGRADATION QUANT: CPT | Performed by: EMERGENCY MEDICINE

## 2024-06-04 PROCEDURE — 99285 EMERGENCY DEPT VISIT HI MDM: CPT

## 2024-06-04 PROCEDURE — 71045 X-RAY EXAM CHEST 1 VIEW: CPT

## 2024-06-04 PROCEDURE — 84484 ASSAY OF TROPONIN QUANT: CPT | Performed by: EMERGENCY MEDICINE

## 2024-06-04 PROCEDURE — 83880 ASSAY OF NATRIURETIC PEPTIDE: CPT | Performed by: EMERGENCY MEDICINE

## 2024-06-04 PROCEDURE — 36415 COLL VENOUS BLD VENIPUNCTURE: CPT | Performed by: EMERGENCY MEDICINE

## 2024-06-04 PROCEDURE — 80048 BASIC METABOLIC PNL TOTAL CA: CPT | Performed by: EMERGENCY MEDICINE

## 2024-06-05 ENCOUNTER — APPOINTMENT (EMERGENCY)
Dept: CT IMAGING | Facility: HOSPITAL | Age: 59
End: 2024-06-05
Payer: COMMERCIAL

## 2024-06-05 VITALS
RESPIRATION RATE: 20 BRPM | TEMPERATURE: 97.8 F | OXYGEN SATURATION: 91 % | SYSTOLIC BLOOD PRESSURE: 122 MMHG | HEART RATE: 84 BPM | DIASTOLIC BLOOD PRESSURE: 71 MMHG

## 2024-06-05 DIAGNOSIS — J44.9 COPD, SEVERE (HCC): ICD-10-CM

## 2024-06-05 LAB
ANION GAP SERPL CALCULATED.3IONS-SCNC: 11 MMOL/L (ref 4–13)
BNP SERPL-MCNC: 42 PG/ML (ref 0–100)
BUN SERPL-MCNC: 22 MG/DL (ref 5–25)
CALCIUM SERPL-MCNC: 9.4 MG/DL (ref 8.4–10.2)
CARDIAC TROPONIN I PNL SERPL HS: 7 NG/L
CHLORIDE SERPL-SCNC: 100 MMOL/L (ref 96–108)
CO2 SERPL-SCNC: 24 MMOL/L (ref 21–32)
CREAT SERPL-MCNC: 0.87 MG/DL (ref 0.6–1.3)
D DIMER PPP FEU-MCNC: <0.27 UG/ML FEU
GFR SERPL CREATININE-BSD FRML MDRD: 93 ML/MIN/1.73SQ M
GLUCOSE SERPL-MCNC: 340 MG/DL (ref 65–140)
POTASSIUM SERPL-SCNC: 3.5 MMOL/L (ref 3.5–5.3)
SODIUM SERPL-SCNC: 135 MMOL/L (ref 135–147)

## 2024-06-05 PROCEDURE — 36415 COLL VENOUS BLD VENIPUNCTURE: CPT | Performed by: EMERGENCY MEDICINE

## 2024-06-05 PROCEDURE — 71260 CT THORAX DX C+: CPT

## 2024-06-05 RX ORDER — AMOXICILLIN AND CLAVULANATE POTASSIUM 875; 125 MG/1; MG/1
1 TABLET, FILM COATED ORAL EVERY 12 HOURS
Qty: 14 TABLET | Refills: 0 | Status: SHIPPED | OUTPATIENT
Start: 2024-06-05 | End: 2024-06-12

## 2024-06-05 RX ORDER — AZITHROMYCIN 250 MG/1
500 TABLET, FILM COATED ORAL ONCE
Status: COMPLETED | OUTPATIENT
Start: 2024-06-05 | End: 2024-06-05

## 2024-06-05 RX ORDER — AZITHROMYCIN 250 MG/1
TABLET, FILM COATED ORAL
Qty: 4 TABLET | Refills: 0 | Status: SHIPPED | OUTPATIENT
Start: 2024-06-05 | End: 2024-06-09

## 2024-06-05 RX ORDER — AMOXICILLIN AND CLAVULANATE POTASSIUM 875; 125 MG/1; MG/1
1 TABLET, FILM COATED ORAL ONCE
Status: COMPLETED | OUTPATIENT
Start: 2024-06-05 | End: 2024-06-05

## 2024-06-05 RX ADMIN — IOHEXOL 85 ML: 350 INJECTION, SOLUTION INTRAVENOUS at 01:11

## 2024-06-05 RX ADMIN — AZITHROMYCIN DIHYDRATE 500 MG: 250 TABLET ORAL at 02:37

## 2024-06-05 RX ADMIN — AMOXICILLIN AND CLAVULANATE POTASSIUM 1 TABLET: 875; 125 TABLET, FILM COATED ORAL at 02:37

## 2024-06-05 NOTE — DISCHARGE INSTRUCTIONS
You have been seen for pneumonia. Please complete the full course of antibiotics as prescribed. Return to the emergency department if you develop worsening shortness of breath, fevers, chest pain or any other symptoms of concern. Please follow up with your PCP by calling the number provided.

## 2024-06-05 NOTE — ED PROVIDER NOTES
History  Chief Complaint   Patient presents with    Shortness of Breath     Per pt, began w/ SOB a few days ago and coughed up blood today.     Jey Vicente is a 59 y.o. year old male with PMH of COPD, HTN, HLD, CAD, AS presenting to the [facility] ED for shortness of breath and hemoptysis. Patient reporting presently worsening shortness of breath over the past week.  Patient reported to have subjective fevers and chills two days ago.  Today patient had 2 episodes of coughing up bright red clot of blood. Patient reporting mild, nonradiating right-sided chest discomfort. No nausea/vomitting/diarrhea.  He is reporting mild symmetric bilateral lower extremity edema. Patient wears 5LNC qhs, does not wear supplemental oxygen in evening. Patient was started on course of prednisone yesterday by PCP. Patient not on AC/AP medications. Patient denies personal or family history of DVT/PE. No recent travel.       History provided by:  Medical records and patient   used: No    Shortness of Breath  Associated symptoms: chest pain, cough and fever    Associated symptoms: no abdominal pain and no vomiting        Prior to Admission Medications   Prescriptions Last Dose Informant Patient Reported? Taking?   Alcohol Swabs (B-D SINGLE USE SWABS REGULAR) PADS  Self Yes No   Sig: USE TO CLEAN INJECTION SITE   Benralizumab 30 MG/ML SOAJ   No No   Sig: Inject 30 mg under the skin every 56 days Inject 30 mg every 56 days   Blood Glucose Monitoring Suppl (CONTOUR NEXT MONITOR) w/Device KIT  Self No No   Sig: Test blood sugar once daily or as needed for fluctuating blood sugars   Farxiga 10 MG tablet   No No   Sig: TAKE 1 TABLET BY MOUTH ONCE DAILY   Patient not taking: Reported on 5/28/2024   Insulin Pen Needle (BD Pen Needle Dee Dee U/F) 32G X 4 MM MISC   No No   Sig: Use 4 a day   Insulin Pen Needle (BD Pen Needle Dee Dee U/F) 32G X 4 MM MISC   No No   Sig: USE TO INJECT INSULIN   Multiple Vitamins-Minerals (MENS MULTIVITAMIN  PO)  Self Yes No   Sig: Take by mouth   Ozempic, 2 MG/DOSE, 8 MG/3ML injection pen   No No   Sig: Inject 0.75 mL (2 mg total) under the skin every 7 days   Toujeo Max SoloStar 300 units/mL CONCENTRATED U-300 injection pen (2-unit dial)  Self No No   Sig: INJECT 70 UNITS SUBCUTANEOUSLY AT BEDTIME   Patient taking differently: 90 Units daily at bedtime   albuterol (Ventolin HFA) 90 mcg/act inhaler  Self No No   Sig: Inhale 2 puffs every 6 (six) hours as needed for wheezing   aspirin (ECOTRIN LOW STRENGTH) 81 mg EC tablet  Self No No   Sig: Take 1 tablet (81 mg total) by mouth daily   atorvastatin (LIPITOR) 40 mg tablet   No No   Sig: TAKE 1 TABLET BY MOUTH DAILY   carvedilol (COREG) 12.5 mg tablet   No No   Sig: Take 1 tablet (12.5 mg total) by mouth 2 (two) times a day with meals   cyclobenzaprine (FLEXERIL) 10 mg tablet  Self Yes No   Sig: Take 10 mg by mouth   dapagliflozin (Farxiga) 10 MG tablet   No No   Sig: Take 1 tablet (10 mg total) by mouth daily   fluticasone (Arnuity Ellipta) 200 MCG/ACT AEPB inhaler   No No   Sig: Inhale 1 puff daily Rinse mouth after use.   furosemide (LASIX) 40 mg tablet   No No   Sig: TAKE 1 TABLET BY MOUTH TWICE DAILY   glimepiride (AMARYL) 4 mg tablet   No No   Sig: Take 1 tablet (4 mg total) by mouth 2 (two) times a day   glimepiride (AMARYL) 4 mg tablet   No No   Sig: TAKE 1 TABLET BY MOUTH TWICE DAILY   Patient not taking: Reported on 5/28/2024   ipratropium-albuterol (DUO-NEB) 0.5-2.5 mg/3 mL nebulizer solution  Self No No   Sig: INHALE CONTENTS OF 1 VIAL VIA NEBULIZER FOUR TIMES A DAY   losartan (COZAAR) 50 mg tablet   No No   Sig: Take 1 tablet (50 mg total) by mouth daily   metFORMIN (GLUCOPHAGE) 1000 MG tablet   No No   Sig: Take 1 tablet (1,000 mg total) by mouth 2 (two) times a day with meals   metFORMIN (GLUCOPHAGE) 1000 MG tablet   No No   Sig: TAKE 1 TABLET BY MOUTH TWICE DAILY   Patient not taking: Reported on 5/28/2024   predniSONE 10 mg tablet   No No   Sig: Take 4  tablets (40 mg total) by mouth daily   pregabalin (LYRICA) 50 mg capsule   No No   Sig: TAKE 1 CAPSULE BY MOUTH 3 TIMES DAILY   sertraline (ZOLOFT) 50 mg tablet  Self No No   Sig: TAKE 1 TABLET BY MOUTH DAILY   spironolactone (ALDACTONE) 50 mg tablet   No No   Sig: TAKE 1 TABLET BY MOUTH DAILY   umeclidinium-vilanterol (Anoro Ellipta) 62.5-25 mcg/actuation inhaler   No No   Sig: INHALE 1 PUFF BY MOUTH ONCE DAILY      Facility-Administered Medications: None       Past Medical History:   Diagnosis Date    Aortic stenosis     Arthritis 1990    Both wrists    Asthma 1964    Chronic hypoxemic respiratory failure ([facility])     Chronic kidney disease 10/2022    COPD (chronic obstructive pulmonary disease) ([facility])     Coronary artery disease 2000    Aortic stenosis    Diabetes ([facility])     Diabetes mellitus ([facility])     Hyperlipidemia     Hypertension 07/02/2014    Lung disease Birth    Neuropathy in diabetes ([facility]) 1/2022    Pneumonia 2015    Sleep apnea, obstructive        Past Surgical History:   Procedure Laterality Date    ACCESSORY NAVICULAR EXCISION Right     APPENDECTOMY      CARDIAC CATHETERIZATION N/A 05/25/2023    Procedure: Cardiac Coronary Angiogram;  Surgeon: Mohit Farris MD;  Location: [facility];  Service: Cardiology    EAR FOREIGN BODY REMOVAL      cyst removal. bronchospasms after general anesthesia.    EYE SURGERY Left     traumatic injury at age 9-10    TONSILLECTOMY  No       Family History   Problem Relation Age of Onset    Cancer Mother         Skin Cancer    Heart disease Father     Lung cancer Father         Passed away 1997    Cancer Father         Lung cancer    Hypertension Father     ALS Maternal Grandmother     Diabetes Maternal Grandfather     Stomach cancer Maternal Grandfather     Arthritis Maternal Grandfather      I have reviewed and agree with the history as documented.    E-Cigarette/Vaping    E-Cigarette Use Never User      E-Cigarette/Vaping Substances    Nicotine No     THC No     CBD  No     Flavoring No     Other No     Unknown No      Social History     Tobacco Use    Smoking status: Former     Current packs/day: 0.00     Average packs/day: 3.0 packs/day for 30.0 years (90.1 ttl pk-yrs)     Types: Cigarettes     Start date: 3/15/1984     Quit date: 7/15/2005     Years since quittin.9    Smokeless tobacco: Never   Vaping Use    Vaping status: Never Used   Substance Use Topics    Alcohol use: Never    Drug use: Never       Review of Systems   Constitutional:  Positive for chills and fever.   HENT:  Negative for congestion and rhinorrhea.    Respiratory:  Positive for cough and shortness of breath.    Cardiovascular:  Positive for chest pain and leg swelling.   Gastrointestinal:  Negative for abdominal pain, diarrhea, nausea and vomiting.   Genitourinary:  Negative for flank pain.   Neurological:  Negative for light-headedness.   All other systems reviewed and are negative.      Physical Exam  Physical Exam  Vitals and nursing note reviewed.   Constitutional:       Appearance: He is well-developed. He is obese. He is not ill-appearing.   HENT:      Head: Normocephalic and atraumatic.      Nose: No congestion or rhinorrhea.   Eyes:      General:         Right eye: No discharge.         Left eye: No discharge.   Cardiovascular:      Rate and Rhythm: Normal rate and regular rhythm.      Heart sounds: Murmur heard.   Pulmonary:      Effort: Pulmonary effort is normal. No tachypnea, accessory muscle usage or respiratory distress.      Breath sounds: No stridor. Examination of the right-lower field reveals rales. Rales present. No wheezing.   Abdominal:      General: Abdomen is flat.      Palpations: Abdomen is soft.      Tenderness: There is no abdominal tenderness. There is no guarding or rebound.   Musculoskeletal:      Cervical back: Normal range of motion. No rigidity.      Right lower leg: No edema.      Left lower leg: No edema.   Skin:     General: Skin is warm.      Capillary Refill:  Capillary refill takes less than 2 seconds.   Neurological:      Mental Status: He is alert and oriented to person, place, and time.   Psychiatric:         Mood and Affect: Mood and affect and mood normal.         Behavior: Behavior normal.         Vital Signs  ED Triage Vitals [06/04/24 2307]   Temperature Pulse Respirations Blood Pressure SpO2   97.8 °F (36.6 °C) 97 20 131/66 90 %      Temp Source Heart Rate Source Patient Position - Orthostatic VS BP Location FiO2 (%)   Temporal -- -- -- --      Pain Score       4           Vitals:    06/04/24 2307 06/05/24 0212   BP: 131/66 122/71   Pulse: 97 84         Visual Acuity      ED Medications  Medications   iohexol (OMNIPAQUE) 350 MG/ML injection (MULTI-DOSE) 85 mL (85 mL Intravenous Given 6/5/24 0111)   amoxicillin-clavulanate (AUGMENTIN) 875-125 mg per tablet 1 tablet (1 tablet Oral Given 6/5/24 0237)   azithromycin (ZITHROMAX) tablet 500 mg (500 mg Oral Given 6/5/24 0237)       Diagnostic Studies  Results Reviewed       Procedure Component Value Units Date/Time    HS Troponin 0hr (reflex protocol) [[id number]]  (Normal) Collected: 06/04/24 2343    Lab Status: Final result Specimen: Blood from Arm, Right Updated: 06/05/24 0017     hs TnI 0hr 7 ng/L     B-Type Natriuretic Peptide(BNP) [[id number]]  (Normal) Collected: 06/04/24 2343    Lab Status: Final result Specimen: Blood from Arm, Right Updated: 06/05/24 0015     BNP 42 pg/mL     Basic metabolic panel [[id number]]  (Abnormal) Collected: 06/04/24 2343    Lab Status: Final result Specimen: Blood from Arm, Right Updated: 06/05/24 0009     Sodium 135 mmol/L      Potassium 3.5 mmol/L      Chloride 100 mmol/L      CO2 24 mmol/L      ANION GAP 11 mmol/L      BUN 22 mg/dL      Creatinine 0.87 mg/dL      Glucose 340 mg/dL      Calcium 9.4 mg/dL      eGFR 93 ml/min/1.73sq m     Narrative:      National Kidney Disease Foundation guidelines for Chronic Kidney Disease (CKD):     Stage 1 with normal or high GFR (GFR > 90  mL/min/1.73 square meters)    Stage 2 Mild CKD (GFR = 60-89 mL/min/1.73 square meters)    Stage 3A Moderate CKD (GFR = 45-59 mL/min/1.73 square meters)    Stage 3B Moderate CKD (GFR = 30-44 mL/min/1.73 square meters)    Stage 4 Severe CKD (GFR = 15-29 mL/min/1.73 square meters)    Stage 5 End Stage CKD (GFR <15 mL/min/1.73 square meters)  Note: GFR calculation is accurate only with a steady state creatinine    D-dimer, quantitative [[id number]]  (Normal) Collected: 06/04/24 2343    Lab Status: Final result Specimen: Blood from Arm, Right Updated: 06/05/24 0007     D-Dimer, Quant <0.27 ug/ml FEU     Narrative:      In the evaluation for possible pulmonary embolism, in the appropriate (Well's Score of 4 or less) patient, the age adjusted d-dimer cutoff for this patient can be calculated as:    Age x 0.01 (in ug/mL) for Age-adjusted D-dimer exclusion threshold for a patient over 50 years.    CBC and differential [[id number]]  (Abnormal) Collected: 06/04/24 2343    Lab Status: Final result Specimen: Blood from Arm, Right Updated: 06/04/24 2352     WBC 9.80 Thousand/uL      RBC 5.16 Million/uL      Hemoglobin 13.9 g/dL      Hematocrit 42.0 %      MCV 81 fL      MCH 26.9 pg      MCHC 33.1 g/dL      RDW 14.7 %      MPV 9.3 fL      Platelets 271 Thousands/uL      nRBC 0 /100 WBCs      Segmented % 83 %      Immature Grans % 1 %      Lymphocytes % 9 %      Monocytes % 7 %      Eosinophils Relative 0 %      Basophils Relative 0 %      Absolute Neutrophils 8.18 Thousands/µL      Absolute Immature Grans 0.05 Thousand/uL      Absolute Lymphocytes 0.83 Thousands/µL      Absolute Monocytes 0.73 Thousand/µL      Eosinophils Absolute 0.00 Thousand/µL      Basophils Absolute 0.01 Thousands/µL                    CT chest with contrast   Final Result by Nyla Aguiar MD (06/05 0215)      Right basilar consolidation and tree-in-bud opacities at the right upper lobe compatible with pneumonia.               Workstation performed: [id number]          XR chest portable   ED Interpretation by Griffin Trinh DO (06/05 0007)   No focal infiltrate. No PTX. No vascular congestion. No acute cardiopulmonary disease.                 Procedures  Procedures         ED Course  ED Course as of 06/05/24 0321   Tue Jun 04, 2024   2335 Procedure Note: EKG  Date/Time: 06/04/24 11:35 PM   Interpreted by: Griffin Trinh DO  Indications / Diagnosis: Dyspnea  ECG reviewed by me, the ED Provider: yes   The EKG demonstrates:  Rhythm: normal sinus rhythm 86 BPM  Intervals: Normal [place] and QT intervals  Axis: Normal axis  QRS/Blocks: Normal QRS  ST Changes: No acute ST/T waves changes. No [place]. No TWI.   Wed Jun 05, 2024 0008 D-Dimer, Quant: <0.27                       PERC Rule for PE      Flowsheet Row Most Recent Value   PERC Rule for PE    Age >=50 1 Filed at: 06/05/2024 0007   HR >=100 0 Filed at: 06/05/2024 0007   O2 Sat on room air < 95% 1 Filed at: 06/05/2024 0007   History of PE or DVT 0 Filed at: 06/05/2024 0007   Recent trauma or surgery 0 Filed at: 06/05/2024 0007   Hemoptysis 1 Filed at: 06/05/2024 0007   Exogenous estrogen 0 Filed at: 06/05/2024 0007   Unilateral leg swelling 0 Filed at: 06/05/2024 0007   PERC Rule for PE Results 3 Filed at: 06/05/2024 0007                    Wells' Criteria for PE      Flowsheet Row Most Recent Value   Wells' Criteria for PE    Clinical signs and symptoms of DVT 0 Filed at: 06/05/2024 0007   PE is primary diagnosis or equally likely 0 Filed at: 06/05/2024 0007   HR >100 0 Filed at: 06/05/2024 0007   Immobilization at least 3 days or Surgery in the previous 4 weeks 0 Filed at: 06/05/2024 0007   Previous, objectively diagnosed PE or DVT 0 Filed at: 06/05/2024 0007   Hemoptysis 1 Filed at: 06/05/2024 0007   Malignancy with treatment within 6 months or palliative 0 Filed at: 06/05/2024 0007   Wells' Criteria Total 1 Filed at: 06/05/2024 0007                  Medical Decision Making    59 y.o. male presenting for shortness of  breath and hemoptysis.  O2 sat low 90s in setting of COPD. Normal WOB, other VSS.  Will order CBC, CMP, troponin, BNP, Ddimer, EKG, CXR to evaluate for arrhythmia, ACS, PTX, pulmonary disease, widened mediastinum, lung mass, CHF, PE.  Patient at risk for pneumonia. Given prior abnormal CT of chest will order CT of chest to evaluate for occult pneumonia or malignancy.    Reassessment: Patient well appearing, normal WOB on RA. O2 sat likely baseline given history of COPD. Reviewed CT results with patient and family.   No evidence of sepsis.  CURB-65 = 1. Patient agreeably to course of outpatient antibiotics.    Disposition: I have discussed with the patient our plan to discharge them from the ED and the patient is in agreement with this plan.     Discharge Plan: Rx for augmentin, azithromycin. Discussed possibility of disease progression and failure of outpatient treatment. RTED precautions emphasized. The patient was provided a written after visit summary with strict RTED precautions.     Followup: I have discussed with the patient plan to follow up with their PCP. Contact information provided in AVS. Patient also confirms he is scheduled for evaluation by pulmonologist later this week.    Amount and/or Complexity of Data Reviewed  Labs: ordered. Decision-making details documented in ED Course.  Radiology: ordered and independent interpretation performed.    Risk  Prescription drug management.             Disposition  Final diagnoses:   COPD (chronic obstructive pulmonary disease) (HCC)   Right lower lobe pneumonia     Time reflects when diagnosis was documented in both MDM as applicable and the Disposition within this note       Time User Action Codes Description Comment    6/5/2024  2:28 AM Griffin Trinh Add [J18.9] Right upper lobe pneumonia     6/5/2024  2:28 AM Griffin Trinh Add [J44.9] COPD (chronic obstructive pulmonary disease) (HCC)     6/5/2024  2:29 AM Griffin Trinh Modify [J44.9] COPD (chronic  obstructive pulmonary disease) (HCC)     6/5/2024  2:29 AM Griffin Trinh Remove [J18.9] Right upper lobe pneumonia     6/5/2024  2:30 AM Griffin Trinh Add [J18.9] Right lower lobe pneumonia           ED Disposition       ED Disposition   Discharge    Condition   Stable    Date/Time   Wed Jun 5, 2024 0228    Comment   Jey Vicente discharge to home/self care.                   Follow-up Information       Follow up With Specialties Details Why Contact Info    Cesia Martinez DO Internal Medicine, Hospice Services Schedule an appointment as soon as possible for a visit  To make appointment for reevaluation in 3-5 days. [address] [facility] [address]  [phone]              Discharge Medication List as of 6/5/2024  2:34 AM        START taking these medications    Details   amoxicillin-clavulanate (AUGMENTIN) 875-125 mg per tablet Take 1 tablet by mouth every 12 (twelve) hours for 7 days, Starting Wed 6/5/2024, Until Wed 6/12/2024, Normal      azithromycin (ZITHROMAX) 250 mg tablet Take 1 tablet daily x 4 days, Normal           CONTINUE these medications which have NOT CHANGED    Details   albuterol (Ventolin HFA) 90 mcg/act inhaler Inhale 2 puffs every 6 (six) hours as needed for wheezing, Starting Thu 4/21/2022, Normal      Alcohol Swabs (B-D SINGLE USE SWABS REGULAR) PADS USE TO CLEAN INJECTION SITE, Historical Med      aspirin (ECOTRIN LOW STRENGTH) 81 mg EC tablet Take 1 tablet (81 mg total) by mouth daily, Starting Mon 8/15/2022, No Print      atorvastatin (LIPITOR) 40 mg tablet TAKE 1 TABLET BY MOUTH DAILY, Starting Tue 2/6/2024, Normal      Benralizumab 30 MG/ML SOAJ Inject 30 mg under the skin every 56 days Inject 30 mg every 56 days, Starting Wed 3/6/2024, Normal      Blood Glucose Monitoring Suppl (CONTOUR NEXT MONITOR) w/Device KIT Test blood sugar once daily or as needed for fluctuating blood sugars, Normal      carvedilol (COREG) 12.5 mg tablet Take 1 tablet (12.5 mg total) by mouth 2 (two)  times a day with meals, Starting Tue 1/23/2024, Normal      cyclobenzaprine (FLEXERIL) 10 mg tablet Take 10 mg by mouth, Starting Mon 2/6/2017, Historical Med      !! dapagliflozin (Farxiga) 10 MG tablet Take 1 tablet (10 mg total) by mouth daily, Starting Mon 3/11/2024, Normal      !! Farxiga 10 MG tablet TAKE 1 TABLET BY MOUTH ONCE DAILY, Starting Mon 3/11/2024, Normal      fluticasone (Arnuity Ellipta) 200 MCG/ACT AEPB inhaler Inhale 1 puff daily Rinse mouth after use., Starting u 1/18/2024, Until Sun 1/12/2025, Normal      furosemide (LASIX) 40 mg tablet TAKE 1 TABLET BY MOUTH TWICE DAILY, Starting Tue 2/6/2024, Normal      !! glimepiride (AMARYL) 4 mg tablet Take 1 tablet (4 mg total) by mouth 2 (two) times a day, Starting Mon 3/11/2024, Normal      !! glimepiride (AMARYL) 4 mg tablet TAKE 1 TABLET BY MOUTH TWICE DAILY, Starting Mon 3/11/2024, Normal      !! Insulin Pen Needle (BD Pen Needle Dee Dee U/F) 32G X 4 MM MISC Use 4 a day, Normal      !! Insulin Pen Needle (BD Pen Needle Dee Dee U/F) 32G X 4 MM MISC USE TO INJECT INSULIN, Normal      ipratropium-albuterol (DUO-NEB) 0.5-2.5 mg/3 mL nebulizer solution INHALE CONTENTS OF 1 VIAL VIA NEBULIZER FOUR TIMES A DAY, Normal      losartan (COZAAR) 50 mg tablet Take 1 tablet (50 mg total) by mouth daily, Starting Tue 1/23/2024, Normal      !! metFORMIN (GLUCOPHAGE) 1000 MG tablet Take 1 tablet (1,000 mg total) by mouth 2 (two) times a day with meals, Starting Mon 3/11/2024, Normal      !! metFORMIN (GLUCOPHAGE) 1000 MG tablet TAKE 1 TABLET BY MOUTH TWICE DAILY, Starting Mon 3/11/2024, Normal      Multiple Vitamins-Minerals (MENS MULTIVITAMIN PO) Take by mouth, Historical Med      Ozempic, 2 MG/DOSE, 8 MG/3ML injection pen Inject 0.75 mL (2 mg total) under the skin every 7 days, Starting Fri 1/19/2024, Normal      predniSONE 10 mg tablet Take 4 tablets (40 mg total) by mouth daily, Starting Mon 6/3/2024, Normal      pregabalin (LYRICA) 50 mg capsule TAKE 1 CAPSULE BY  MOUTH 3 TIMES DAILY, Starting Fri 5/3/2024, Normal      sertraline (ZOLOFT) 50 mg tablet TAKE 1 TABLET BY MOUTH DAILY, Normal      spironolactone (ALDACTONE) 50 mg tablet TAKE 1 TABLET BY MOUTH DAILY, Starting Tue 2/6/2024, Normal      Toujeo Max SoloStar 300 units/mL CONCENTRATED U-300 injection pen (2-unit dial) INJECT 70 UNITS SUBCUTANEOUSLY AT BEDTIME, Normal      umeclidinium-vilanterol (Anoro Ellipta) 62.5-25 mcg/actuation inhaler INHALE 1 PUFF BY MOUTH ONCE DAILY, Starting u 5/2/2024, Normal       !! - Potential duplicate medications found. Please discuss with provider.          No discharge procedures on file.    PDMP Review         Value Time User    PDMP Reviewed  Yes 3/21/2024 11:18 AM David Johnson DO            ED Provider  Electronically Signed by             Griffin Trinh DO  06/05/24 0321

## 2024-06-06 ENCOUNTER — VBI (OUTPATIENT)
Dept: FAMILY MEDICINE CLINIC | Facility: CLINIC | Age: 59
End: 2024-06-06

## 2024-06-06 RX ORDER — IPRATROPIUM BROMIDE AND ALBUTEROL SULFATE 2.5; .5 MG/3ML; MG/3ML
SOLUTION RESPIRATORY (INHALATION)
Qty: 360 ML | Refills: 5 | Status: SHIPPED | OUTPATIENT
Start: 2024-06-06

## 2024-06-06 NOTE — TELEPHONE ENCOUNTER
06/06/24 9:14 AM    Patient contacted post ED visit, [facility] department spoke with patient/caregiver and outreach was successful.    Thank you.  JONAH VICENTE  [facility]

## 2024-06-07 ENCOUNTER — OFFICE VISIT (OUTPATIENT)
Dept: PULMONOLOGY | Facility: CLINIC | Age: 59
End: 2024-06-07
Payer: COMMERCIAL

## 2024-06-07 VITALS
HEART RATE: 82 BPM | HEIGHT: 69 IN | WEIGHT: 315 LBS | BODY MASS INDEX: 46.65 KG/M2 | OXYGEN SATURATION: 92 % | TEMPERATURE: 97.4 F | SYSTOLIC BLOOD PRESSURE: 98 MMHG | DIASTOLIC BLOOD PRESSURE: 65 MMHG

## 2024-06-07 DIAGNOSIS — J45.40 MODERATE PERSISTENT ASTHMA, UNSPECIFIED WHETHER COMPLICATED: Primary | ICD-10-CM

## 2024-06-07 DIAGNOSIS — J18.9 COMMUNITY ACQUIRED PNEUMONIA OF RIGHT LOWER LOBE OF LUNG: ICD-10-CM

## 2024-06-07 PROCEDURE — G2211 COMPLEX E/M VISIT ADD ON: HCPCS | Performed by: INTERNAL MEDICINE

## 2024-06-07 PROCEDURE — 99214 OFFICE O/P EST MOD 30 MIN: CPT | Performed by: INTERNAL MEDICINE

## 2024-06-07 RX ORDER — FLUTICASONE FUROATE, UMECLIDINIUM BROMIDE AND VILANTEROL TRIFENATATE 200; 62.5; 25 UG/1; UG/1; UG/1
1 POWDER RESPIRATORY (INHALATION) DAILY
Qty: 180 BLISTER | Refills: 0 | Status: SHIPPED | OUTPATIENT
Start: 2024-06-07 | End: 2024-09-05

## 2024-06-07 NOTE — PROGRESS NOTES
Progress Note - Pulmonary   Jey Vicente 59 y.o. male MRN: [id number]   Encounter: [id number]      Assessment/Plan:  Patient is a 59-year-old male with past medical history seen for moderate persistent asthma, positive double-stranded DNA who presents for routine follow-up.  The patient recently presented to the ED where he was found to have pneumonia.  The patient is doing significantly better since undergoing treatment for the pneumonia.  Prior to the pneumonia, the patient was doing quite well.  He notes that he was doing quite well with his breathing with the Fasenra.  His frequency of steroid use had dropped dramatically.  Recommend continuing with his current inhaler regimen as well as Fasenra.  He will need repeat imaging in approximately 3 months to ensure resolution of opacification.    Community acquired pneumonia  -  on azithromycin/augmentin  -  repeat imaging in 3 mos    Morbid obesity  -  Counseled on weight loss and increased exercise  -  on ozempic     SCOTT  -  Continue bipap with supplemental oxygen  -  continue current settings     Chronic hypoxemic respiratory failure  -  Only used while sleeping; in conjuction with bipap     Abnormal CT scan  -  right lower lab opacification  -  repeat in 3 months to ensure resolution     Moderate persistent asthma  -  continue Fasenra  -  Continue flovent and Anoro  -  PRN albuterol     Dyspnea on exertion  -  stable     Positive double stranded DNA  -  lupus activity labs negative    1. Moderate persistent asthma, unspecified whether complicated  -     fluticasone-umeclidinium-vilanterol (Trelegy Ellipta) 200-62.5-25 mcg/actuation AEPB inhaler; Inhale 1 puff daily Rinse mouth after use.  2. Community acquired pneumonia of right lower lobe of lung  -     CT chest without contrast; Future; Expected date: 09/07/2024      Patient may follow up in 3 months or sooner as necessary.     Orders:  Orders Placed This Encounter   Procedures    CT chest without contrast  "    Standing Status:   Future     Standing Expiration Date:   6/7/2028     Scheduling Instructions:      There is no prep for this study. Please bring your insurance cards, a form of photo ID and a list of your medications with you. Arrive 15 minutes prior to your appointment time to register. On the day of your test, please bring any prior CT or MRI studies of this area with you that were not performed at a [facility] facility.            To schedule this appointment, please contact Central Scheduling at [phone].                 Order Specific Question:   What is the patient's sedation requirement?     Answer:   No Sedation     Order Specific Question:   [facility]     Answer:   Immediate     Order Specific Question:   Reason for Exam     Answer:   right lower lobe opacification       Subjective:   The patient was recently seen in the ER with hemoptysis.  He was having fevers and chills in the beginning.  He is feeling better today.  He has had no more hemoptysis. He does reports some shortness of breath which is improving.      He notes improvement with the shot.  He had a delay in the shot and felt worse without.  He was doing well in regards rescue inhalers prior to infection.      He notes his weight is fluctuating.      Inhaler Regimen:  Fasenra  Arnuity  Anoro    Remainder of review of systems negative except as described in HPI.      The following portions of the patient's history were reviewed and updated as appropriate: allergies, current medications, past family history, past medical history, past social history, past surgical history and problem list.     Objective:   Vitals: Blood pressure 98/65, pulse 82, temperature (!) 97.4 °F (36.3 °C), temperature source Temporal, height 5' 9\" (1.753 m), weight (!) 152 kg (335 lb), SpO2 92%., RA, Body mass index is 49.47 kg/m².    Physical Exam  Gen: Pleasant, awake, alert, oriented x 3, no acute distress  HEENT: Mucous membranes " moist, no oral lesions, no thrush  NECK: No accessory muscle use, JVP not elevated  Cardiac: Regular rate rhythm, single S1, single S2, no murmurs, no rubs, no gallops  Lungs: Decreased breath sounds on right side  Abdomen: normoactive bowel sounds, soft nontender, nondistended, no rebound or rigidity, no guarding, obese  Extremities: no cyanosis, no clubbing, no LE edema  MSK:  Strength equal in all extremities  Derm:  No rashes/lesions noted  Neuro:  Appropriate mood/affect    Labs: I have personally reviewed pertinent lab results.  Lab Results   Component Value Date    WBC 9.80 06/04/2024    HGB 13.9 06/04/2024     06/04/2024     Lab Results   Component Value Date    CREATININE 0.87 06/04/2024    CREATININE 0.8 07/19/2018        Imaging and other studies: I have personally reviewed pertinent reports.   and I have personally reviewed pertinent films in PACS  CT Chest 6/5/2024  My interpretation:  Worsening opacification of right base    Radiology findings:  LUNGS: Right basilar consolidation. Additional tree-in-bud opacities at the right upper lobe.. There is no tracheal or endobronchial lesion.  PLEURA: Unremarkable.  HEART/GREAT VESSELS: Heart is unremarkable for patient's age. No thoracic aortic aneurysm.  MEDIASTINUM AND TESSY: Mediastinal lipomatosis and mild mediastinal adenopathy, similar.  CHEST WALL AND LOWER NECK: Unremarkable.  VISUALIZED STRUCTURES IN THE UPPER ABDOMEN: Unremarkable.  OSSEOUS STRUCTURES: No acute fracture or destructive osseous lesion.    Pulmonary Function Testing: I have personally reviewed pertinent reports.   and I have personally reviewed pertinent films in PACS                          FVC                 FEV1               FEV1/FVC       DLCOcor  9/4/2019          2.54 55%         1.36 41%         53%                 66%  12/28/2021      2.57 55%         1.62 45%         63%                 91%  8/8/2023          2.69 59%         1.66 47%         62%                  82%    Mohsen Ibarra MD  [facility]

## 2024-06-10 ENCOUNTER — HOSPITAL ENCOUNTER (OUTPATIENT)
Dept: NON INVASIVE DIAGNOSTICS | Facility: HOSPITAL | Age: 59
Discharge: HOME | End: 2024-06-10
Attending: INTERNAL MEDICINE
Payer: COMMERCIAL

## 2024-06-10 VITALS
DIASTOLIC BLOOD PRESSURE: 66 MMHG | HEART RATE: 84 BPM | HEIGHT: 69 IN | SYSTOLIC BLOOD PRESSURE: 99 MMHG | BODY MASS INDEX: 46.65 KG/M2 | WEIGHT: 315 LBS

## 2024-06-10 DIAGNOSIS — I35.0 AORTIC VALVE STENOSIS, ETIOLOGY OF CARDIAC VALVE DISEASE UNSPECIFIED: ICD-10-CM

## 2024-06-10 LAB
AORTIC VALVE MEAN VELOCITY: 28.9 M/S
AV AREA BY CONTINUOUS VTI: 0.7 CM2
AV AREA PEAK VELOCITY: 0.7 CM2
AV LVOT MEAN GRADIENT: 2 MMHG
AV LVOT PEAK GRADIENT: 3 MMHG
AV MEAN GRADIENT: 39 MMHG
AV PEAK GRADIENT: 76 MMHG
AV VALVE AREA: 0.74 CM2
AV VELOCITY RATIO: 0.2
BSA FOR ECHO PROCEDURE: 2.57 M2
DOP CALC AO PEAK VEL: 4.35 M/S
DOP CALC AO VTI: 90.26 CM
DOP CALC LVOT AREA: 3.46 CM2
DOP CALC LVOT CARDIAC INDEX: 2.09 L/MIN/M2
DOP CALC LVOT CARDIAC OUTPUT: 5.38 L/MIN
DOP CALC LVOT DIAMETER: 2.1 CM
DOP CALC LVOT PEAK VEL VTI: 19.19 CM
DOP CALC LVOT PEAK VEL: 0.86 M/S
DOP CALC LVOT STROKE INDEX: 25.7 ML/M2
DOP CALC LVOT STROKE VOLUME: 66
E WAVE DECELERATION TIME: 289 MS
E/A RATIO: 0.86
LAAS-AP4: 25.9 CM2
MV E'TISSUE VEL-LAT: 9 CM/S
MV E'TISSUE VEL-SEP: 8 CM/S
MV PEAK A VEL: 0.85 M/S
MV PEAK E VEL: 73 CM/S
MV STENOSIS PRESSURE HALF TIME: 84 MS
MV VALVE AREA P 1/2 METHOD: 2.6
PULM VEIN S/D RATIO: 0.58
PV PEAK D VEL: 0.19 M/S
PV PEAK S VEL: 0.11 M/S
SL CV LV EF: 65
TR MAX PG: 59 MMHG
TR PEAK VELOCITY: 3.8 M/S
TRICUSPID VALVE PEAK REGURGITATION VELOCITY: 3.84 M/S

## 2024-06-10 PROCEDURE — 93306 TTE W/DOPPLER COMPLETE: CPT | Performed by: INTERNAL MEDICINE

## 2024-06-10 PROCEDURE — 93306 TTE W/DOPPLER COMPLETE: CPT

## 2024-06-17 ENCOUNTER — OFFICE VISIT (OUTPATIENT)
Dept: CARDIOLOGY CLINIC | Facility: CLINIC | Age: 59
End: 2024-06-17
Payer: COMMERCIAL

## 2024-06-17 VITALS
HEART RATE: 79 BPM | HEIGHT: 69 IN | WEIGHT: 315 LBS | BODY MASS INDEX: 46.65 KG/M2 | DIASTOLIC BLOOD PRESSURE: 70 MMHG | SYSTOLIC BLOOD PRESSURE: 110 MMHG

## 2024-06-17 DIAGNOSIS — I10 ESSENTIAL HYPERTENSION: Primary | ICD-10-CM

## 2024-06-17 DIAGNOSIS — I35.0 NONRHEUMATIC AORTIC (VALVE) STENOSIS: ICD-10-CM

## 2024-06-17 PROCEDURE — 99213 OFFICE O/P EST LOW 20 MIN: CPT | Performed by: INTERNAL MEDICINE

## 2024-06-17 PROCEDURE — 93000 ELECTROCARDIOGRAM COMPLETE: CPT | Performed by: INTERNAL MEDICINE

## 2024-06-17 NOTE — ASSESSMENT & PLAN NOTE
Severe not critical AS.  Dyspnea more c/w severe COPD and Obesity/deconditioning  Echo 6-12 months planned

## 2024-06-17 NOTE — PROGRESS NOTES
" Patient ID: Jey Vicente is a 59 y.o. male.        Plan:      Nonrheumatic aortic (valve) stenosis  Severe not critical AS.  Dyspnea more c/w severe COPD and Obesity/deconditioning  Echo 6-12 months planned       Follow up Plan/Other summary comments:  Reviewed concerning AS sx, he will call should any arise, particularly with exertional CP/progressive RUTHERFORD/exertional (pre)syncope.  No med changes advised.  Return in about 6 months (around 12/17/2024).    HPI: Melvin is here for routine FU.  Getting over pneumonia.  Echo recently stable c/t his TTE and 3D BROOKE last year.  No new cardiac sx.  No exertional CP, no unusual RUTHERFORD, no exertional (pre)syncope.        Results for orders placed or performed in visit on 06/17/24   POCT ECG    Impression    NSR 79 bpm , WNL         Most recent or relevant cardiac/vascular testing:    Echo EF 65%, mean AV gradient 39 mm Hg  Cath 23 no sig CAD      Past Surgical History:   Procedure Laterality Date    ACCESSORY NAVICULAR EXCISION Right     APPENDECTOMY      CARDIAC CATHETERIZATION N/A 05/25/2023    Procedure: Cardiac Coronary Angiogram;  Surgeon: Mohit Farris MD;  Location: [facility];  Service: Cardiology    EAR FOREIGN BODY REMOVAL      cyst removal. bronchospasms after general anesthesia.    EYE SURGERY Left     traumatic injury at age 9-10    TONSILLECTOMY  No       Lipid Profile: Reviewed      Review of Systems   10  point ROS  was otherwise non pertinent or negative except as per HPI or as below.           Objective:     /70   Pulse 79   Ht 5' 9\" (1.753 m)   Wt (!) 153 kg (338 lb)   BMI 49.91 kg/m²     PHYSICAL EXAM:    General:  Normal appearance in no distress.  Eyes:  Anicteric.  Oral mucosa:  Moist.  Neck:  No JVD. Carotid upstrokes are brisk without bruits.  No masses.  Chest:  Clear to auscultation.  Cardiac:  No palpable PMI.  Normal S1 and S2.  3/6 AS quality murmur, no gallop or rub.  Abdomen:  Soft and nontender. No palpable organomegaly or " aortic enlargement.  Extremities:  No peripheral edema.  Musculoskeletal:  Symmetric.   Vascular: Pedal pulses are intact.  Neuro:  Grossly symmetric.  Psych:  Alert and oriented x3.      Meds reviewed.    Past Medical History:   Diagnosis Date    Aortic stenosis     Arthritis     Both wrists    Asthma 1964    Chronic hypoxemic respiratory failure ([facility])     Chronic kidney disease 10/2022    COPD (chronic obstructive pulmonary disease) ([facility])     Coronary artery disease 2000    Aortic stenosis    Diabetes ([facility])     Diabetes mellitus ([facility])     Hyperlipidemia     Hypertension 2014    Lung disease Birth    Neuropathy in diabetes ([facility]) 2022    Pneumonia     Sleep apnea, obstructive            Social History     Tobacco Use   Smoking Status Former    Current packs/day: 0.00    Average packs/day: 3.0 packs/day for 30.0 years (90.1 ttl pk-yrs)    Types: Cigarettes    Start date: 3/15/1984    Quit date: 7/15/2005    Years since quittin.9   Smokeless Tobacco Never       Current Outpatient Medications:     albuterol (Ventolin HFA) 90 mcg/act inhaler, Inhale 2 puffs every 6 (six) hours as needed for wheezing, Disp: 18 g, Rfl: 3    Alcohol Swabs (B-D SINGLE USE SWABS REGULAR) PADS, USE TO CLEAN INJECTION SITE, Disp: , Rfl:     aspirin (ECOTRIN LOW STRENGTH) 81 mg EC tablet, Take 1 tablet (81 mg total) by mouth daily, Disp: 30 tablet, Rfl: 5    atorvastatin (LIPITOR) 40 mg tablet, TAKE 1 TABLET BY MOUTH DAILY, Disp: 90 tablet, Rfl: 3    Benralizumab 30 MG/ML SOAJ, Inject 30 mg under the skin every 56 days Inject 30 mg every 56 days, Disp: 1 mL, Rfl: 5    carvedilol (COREG) 12.5 mg tablet, Take 1 tablet (12.5 mg total) by mouth 2 (two) times a day with meals, Disp: 180 tablet, Rfl: 3    cyclobenzaprine (FLEXERIL) 10 mg tablet, Take 10 mg by mouth, Disp: , Rfl:     dapagliflozin (Farxiga) 10 MG tablet, Take 1 tablet (10 mg total) by mouth daily, Disp: 90 tablet, Rfl: 1    fluticasone-umeclidinium-vilanterol  (Trelegy Ellipta) 200-62.5-25 mcg/actuation AEPB inhaler, Inhale 1 puff daily Rinse mouth after use., Disp: 180 blister, Rfl: 0    furosemide (LASIX) 40 mg tablet, TAKE 1 TABLET BY MOUTH TWICE DAILY, Disp: 180 tablet, Rfl: 3    glimepiride (AMARYL) 4 mg tablet, Take 1 tablet (4 mg total) by mouth 2 (two) times a day, Disp: 180 tablet, Rfl: 1    Insulin Pen Needle (BD Pen Needle Dee Dee U/F) 32G X 4 MM MISC, Use 4 a day, Disp: 200 each, Rfl: 5    ipratropium-albuterol (DUO-NEB) 0.5-2.5 mg/3 mL nebulizer solution, INHALE 1 VIAL VIA NEBULIZER FOUR TIMES A DAY, Disp: 360 mL, Rfl: 5    losartan (COZAAR) 50 mg tablet, Take 1 tablet (50 mg total) by mouth daily, Disp: 90 tablet, Rfl: 3    metFORMIN (GLUCOPHAGE) 1000 MG tablet, Take 1 tablet (1,000 mg total) by mouth 2 (two) times a day with meals, Disp: 180 tablet, Rfl: 0    Multiple Vitamins-Minerals (MENS MULTIVITAMIN PO), Take by mouth, Disp: , Rfl:     Ozempic, 2 MG/DOSE, 8 MG/3ML injection pen, Inject 0.75 mL (2 mg total) under the skin every 7 days, Disp: 9 mL, Rfl: 3    pregabalin (LYRICA) 50 mg capsule, TAKE 1 CAPSULE BY MOUTH 3 TIMES DAILY, Disp: 90 capsule, Rfl: 1    sertraline (ZOLOFT) 50 mg tablet, TAKE 1 TABLET BY MOUTH DAILY, Disp: 30 tablet, Rfl: 10    spironolactone (ALDACTONE) 50 mg tablet, TAKE 1 TABLET BY MOUTH DAILY, Disp: 90 tablet, Rfl: 3    Toujeo Max SoloStar 300 units/mL CONCENTRATED U-300 injection pen (2-unit dial), INJECT 70 UNITS SUBCUTANEOUSLY AT BEDTIME (Patient taking differently: 90 Units daily at bedtime), Disp: 6 mL, Rfl: 10    Blood Glucose Monitoring Suppl (CONTOUR NEXT MONITOR) w/Device KIT, Test blood sugar once daily or as needed for fluctuating blood sugars (Patient not taking: Reported on 6/17/2024), Disp: 1 kit, Rfl: 0    Farxiga 10 MG tablet, TAKE 1 TABLET BY MOUTH ONCE DAILY (Patient not taking: Reported on 5/28/2024), Disp: 30 tablet, Rfl: 10    fluticasone (Arnuity Ellipta) 200 MCG/ACT AEPB inhaler, Inhale 1 puff daily Rinse  mouth after use. (Patient not taking: Reported on 6/17/2024), Disp: 90 blister, Rfl: 3    glimepiride (AMARYL) 4 mg tablet, TAKE 1 TABLET BY MOUTH TWICE DAILY (Patient not taking: Reported on 5/28/2024), Disp: 60 tablet, Rfl: 10    Insulin Pen Needle (BD Pen Needle Dee Dee U/F) 32G X 4 MM MISC, USE TO INJECT INSULIN (Patient not taking: Reported on 6/17/2024), Disp: 100 each, Rfl: 10    metFORMIN (GLUCOPHAGE) 1000 MG tablet, TAKE 1 TABLET BY MOUTH TWICE DAILY (Patient not taking: Reported on 5/28/2024), Disp: 60 tablet, Rfl: 10    predniSONE 10 mg tablet, Take 4 tablets (40 mg total) by mouth daily (Patient not taking: Reported on 6/17/2024), Disp: 20 tablet, Rfl: 0    umeclidinium-vilanterol (Anoro Ellipta) 62.5-25 mcg/actuation inhaler, INHALE 1 PUFF BY MOUTH ONCE DAILY (Patient not taking: Reported on 6/17/2024), Disp: 60 each, Rfl: 2

## 2024-06-17 NOTE — PATIENT INSTRUCTIONS
Cholesterol and Your Health   AMBULATORY CARE:   Cholesterol  is a waxy, fat-like substance. Your body uses cholesterol to make hormones and new cells, and to protect nerves. Cholesterol is made by your body. It also comes from certain foods you eat, such as meat and dairy products. Your healthcare provider can help you set goals for your cholesterol levels. Your provider can help you create a plan to meet your goals.  Cholesterol level goals:  Your cholesterol level goals depend on your risk for heart disease, your age, and your other health conditions. The following are general guidelines:  Total cholesterol  includes low-density lipoprotein (LDL), high-density lipoprotein (HDL), and triglyceride levels. The total cholesterol level should be lower than 200 mg/dL and is best at about 150 mg/dL.    LDL cholesterol  is called bad cholesterol  because it forms plaque in your arteries. As plaque builds up, your arteries become narrow, and less blood flows through. When plaque decreases blood flow to your heart, you may have chest pain. If plaque completely blocks an artery that brings blood to your heart, you may have a heart attack. Plaque can break off and form blood clots. Blood clots may block arteries in your brain and cause a stroke. The level should be less than 130 mg/dL and is best at about 100 mg/dL.         HDL cholesterol  is called good cholesterol  because it helps remove LDL cholesterol from your arteries. It does this by attaching to LDL cholesterol and carrying it to your liver. Your liver breaks down LDL cholesterol so your body can get rid of it. High levels of HDL cholesterol can help prevent a heart attack and stroke. Low levels of HDL cholesterol can increase your risk for heart disease, heart attack, and stroke. The level should be at least 40 mg/dL in males or at least 50 mg/dL in females.    Triglycerides  are a type of fat that store energy from foods you eat. High levels of triglycerides also  cause plaque buildup. This can increase your risk for a heart attack or stroke. If your triglyceride level is high, your LDL cholesterol level may also be high. The level should be less than 150 mg/dL.    Any of the following can increase your risk for high cholesterol:   Smoking or drinking large amounts of alcohol    Having overweight or obesity, or not getting enough exercise    A medical condition such as hypertension (high blood pressure) or diabetes    A family history of high cholesterol    Age older than 65    What you need to know about having your cholesterol levels checked:  Adults 20 to 45 years of age should have their cholesterol levels checked every 4 to 6 years. Adults 45 years or older should have their cholesterol checked every 1 to 2 years. You may need your cholesterol checked more often, or at a younger age, if you have risk factors for heart disease. You may also need to have your cholesterol checked more often if you have other health conditions, such as diabetes. Blood tests are used to check cholesterol levels. Blood tests measure your levels of triglycerides, LDL cholesterol, and HDL cholesterol.  How healthy fats affect your cholesterol levels:  Healthy fats, also called unsaturated fats, help lower LDL cholesterol and triglyceride levels. Healthy fats include the following:  Monounsaturated fats  are found in foods such as olive oil, canola oil, avocado, nuts, and olives.    Polyunsaturated fats,  such as omega 3 fats, are found in fish, such as salmon, trout, and tuna. They can also be found in plant foods such as flaxseed, walnuts, and soybeans.    How unhealthy fats affect your cholesterol levels:  Unhealthy fats increase LDL cholesterol and triglyceride levels. They are found in foods high in cholesterol, saturated fat, and trans fat:  Cholesterol  is found in eggs, dairy, and meat.    Saturated fat  is found in butter, cheese, ice cream, whole milk, and coconut oil. Saturated fat is  also found in meat, such as sausage, hot dogs, and bologna.    Trans fat  is found in liquid oils and is used in fried and baked foods. Foods that contain trans fats include chips, crackers, muffins, sweet rolls, microwave popcorn, and cookies.    Treatment  for high cholesterol will also decrease your risk of heart disease, heart attack, and stroke. Treatment may include any of the following:  Lifestyle changes  may include food, exercise, weight loss, and quitting smoking. You may also need to decrease the amount of alcohol you drink. Your healthcare provider will want you to start with lifestyle changes. Other treatment may be added if lifestyle changes are not enough. Your healthcare provider may recommend you work with a team to manage hyperlipidemia. The team may include medical experts such as a dietitian, an exercise or physical therapist, and a behavior therapist. Your family members may be included in helping you create lifestyle changes.    Medicines  may be given to lower your LDL cholesterol, triglyceride levels, or total cholesterol level. You may need medicines to lower your cholesterol if any of the following is true:    You have a history of stroke, TIA, unstable angina, or a heart attack.    Your LDL cholesterol level is 190 mg/dL or higher.    You are age 40 to 75 years, have diabetes or heart disease risk factors, and your LDL cholesterol is 70 mg/dL or higher.    Supplements  include fish oil, red yeast rice, and garlic. Fish oil may help lower your triglyceride and LDL cholesterol levels. It may also increase your HDL cholesterol level. Red yeast rice may help decrease your total cholesterol level and LDL cholesterol level. Garlic may help lower your total cholesterol level. Do not take any supplements without talking to your healthcare provider.    Food changes you can make to lower your cholesterol levels:  A dietitian can help you create a healthy eating plan. Your dietitian can show you how  to read food labels and choose foods low in saturated fat, trans fats, and cholesterol.     Decrease the total amount of fat you eat.  Choose lean meats, fat-free or 1% fat milk, and low-fat dairy products, such as yogurt and cheese. Try to limit or avoid red meats. Limit or do not eat fried foods or baked goods, such as cookies.    Replace unhealthy fats with healthy fats.  Cook foods in olive oil or canola oil. Choose soft margarines that are low in saturated fat and trans fat. Seeds, nuts, and avocados are other examples of healthy fats.    Eat foods with omega-3 fats.  Examples include salmon, tuna, mackerel, walnuts, and flaxseed. Eat fish 2 times per week. Pregnant women should not eat fish that have high levels of mercury, such as shark, swordfish, and donna mackerel.         Increase the amount of high-fiber foods you eat.  High-fiber foods can help lower your LDL cholesterol. Aim to get between 20 and 30 grams of fiber each day. Fruits and vegetables are high in fiber. Eat at least 5 servings each day. Other high-fiber foods are whole-grain or whole-wheat breads, pastas, or cereals, and brown rice. Eat 3 ounces of whole-grain foods each day. Increase fiber slowly. You may have abdominal discomfort, bloating, and gas if you add fiber to your diet too quickly.         Eat healthy protein foods.  Examples include low-fat dairy products, skinless chicken and turkey, fish, and nuts.    Limit foods and drinks that are high in sugar.  Your dietitian or healthcare provider can help you create daily limits for high-sugar foods and drinks. The limit may be lower if you have diabetes or another health condition. Limits can also help you lose weight if needed.  Lifestyle changes you can make to lower your cholesterol levels:   Maintain a healthy weight.  Ask your healthcare provider what a healthy weight is for you. Ask your provider to help you create a weight loss plan if needed. Weight loss can decrease your total  cholesterol and triglyceride levels. Weight loss may also help keep your blood pressure at a healthy level.    Be physically active throughout the day.  Physical activity, such as exercise, can help lower your total cholesterol level and maintain a healthy weight. Physical activity can also help increase your HDL cholesterol level. Work with your healthcare provider to create an program that is right for you. Get at least 30 to 40 minutes of moderate physical activity most days of the week. Examples of exercise include brisk walking, swimming, or biking. Also include strength training at least 2 times each week. Your healthcare providers can help you create a physical activity plan.            Do not smoke.  Nicotine and other chemicals in cigarettes and cigars can raise your cholesterol levels. Ask your healthcare provider for information if you currently smoke and need help to quit. E-cigarettes or smokeless tobacco still contain nicotine. Talk to your healthcare provider before you use these products.         Limit or do not drink alcohol.  Alcohol can increase your triglyceride levels. Ask your healthcare provider before you drink alcohol. Ask how much is okay for you to drink in 24 hours or 1 week.    Follow up with your doctor as directed:  Write down your questions so you remember to ask them during your visits.  © Copyright [employer] 2023 Information is for End User's use only and may not be sold, redistributed or otherwise used for commercial purposes.  The above information is an  only. It is not intended as medical advice for individual conditions or treatments. Talk to your doctor, nurse or pharmacist before following any medical regimen to see if it is safe and effective for you.

## 2024-07-10 DIAGNOSIS — E11.40 PAINFUL DIABETIC NEUROPATHY (HCC): ICD-10-CM

## 2024-07-11 RX ORDER — PREGABALIN 50 MG/1
50 CAPSULE ORAL 3 TIMES DAILY
Qty: 90 CAPSULE | Refills: 3 | Status: SHIPPED | OUTPATIENT
Start: 2024-07-11

## 2024-07-11 NOTE — TELEPHONE ENCOUNTER
Requested medication(s) are due for refill today: Yes  Patient has already received a courtesy refill: No  Other reason request has been forwarded to provider: FAILED PROTOCOL

## 2024-07-30 DIAGNOSIS — Z79.4 TYPE 2 DIABETES MELLITUS WITH DIABETIC NEUROPATHY, WITH LONG-TERM CURRENT USE OF INSULIN (HCC): ICD-10-CM

## 2024-07-30 DIAGNOSIS — E11.40 TYPE 2 DIABETES MELLITUS WITH DIABETIC NEUROPATHY, WITH LONG-TERM CURRENT USE OF INSULIN (HCC): ICD-10-CM

## 2024-07-31 RX ORDER — INSULIN GLARGINE 300 U/ML
INJECTION, SOLUTION SUBCUTANEOUS
Qty: 6 ML | Refills: 5 | Status: SHIPPED | OUTPATIENT
Start: 2024-07-31

## 2024-08-01 DIAGNOSIS — J44.9 COPD, SEVERE (HCC): ICD-10-CM

## 2024-08-02 RX ORDER — UMECLIDINIUM BROMIDE AND VILANTEROL TRIFENATATE 62.5; 25 UG/1; UG/1
1 POWDER RESPIRATORY (INHALATION) DAILY
Qty: 60 BLISTER | Refills: 5 | Status: SHIPPED | OUTPATIENT
Start: 2024-08-02

## 2024-08-08 ENCOUNTER — NURSE TRIAGE (OUTPATIENT)
Age: 59
End: 2024-08-08

## 2024-08-08 NOTE — TELEPHONE ENCOUNTER
"Patient call:  Pt stated provider: Dr. Ibarra    Actionable item: Appointment scheduled. Provider review and recommendation    What is the reason for the call/chief complaint?    Reports 1-2 days of RUTHERFORD, chest tightness, and green mucous productive cough.  States that he had PNA about 1.5 months ago but feels that antibiotics never eliminated it fully.     Short duration relief with nebulizer.  Asking for antibiotics and steroids which usually help him. States this happens yearly.    Dispo: Appt scheduled. Routing to providers for review and recommendation. Continue nebulizing + all other medications as prescribed. Active expectoration and maintain hydration.    [facility].  Agrees with plan.   All questions answered.     Reason for Disposition   MILD difficulty breathing (e.g., minimal/no SOB at rest, SOB with walking, pulse < 100) of new-onset or worse than normal    Answer Assessment - Initial Assessment Questions  1. RESPIRATORY STATUS: \"Describe your breathing?\" (e.g., wheezing, shortness of breath, unable to speak, severe coughing)       RUTHERFORD, chest tightness, green mucous productive cough  2. ONSET: \"When did this breathing problem begin?\"       1-2 days ago  3. PATTERN \"Does the difficult breathing come and go, or has it been constant since it started?\"       With exertion  4. SEVERITY: \"How bad is your breathing?\" (e.g., mild, moderate, severe)     - MILD: No SOB at rest, mild SOB with walking, speaks normally in sentences, can lay down, no retractions, pulse < 100.     - MODERATE: SOB at rest, SOB with minimal exertion and prefers to sit, cannot lie down flat, speaks in phrases, mild retractions, audible wheezing, pulse 100-120.     - SEVERE: Very SOB at rest, speaks in single words, struggling to breathe, sitting hunched forward, retractions, pulse > 120       worsened  5. RECURRENT SYMPTOM: \"Have you had difficulty breathing before?\" If Yes, ask: \"When was the last " "time?\" and \"What happened that time?\"       Yes- abx and steroids  6. CARDIAC HISTORY: \"Do you have any history of heart disease?\" (e.g., heart attack, angina, bypass surgery, angioplasty)       Please see chart  7. LUNG HISTORY: \"Do you have any history of lung disease?\"  (e.g., pulmonary embolus, asthma, emphysema)      Please see chart  8. CAUSE: \"What do you think is causing the breathing problem?\"       PNA  9. OTHER SYMPTOMS: \"Do you have any other symptoms? (e.g., dizziness, runny nose, cough, chest pain, fever)      Please see #1    Protocols used: Breathing Difficulty-ADULT-[place]    "

## 2024-08-08 NOTE — TELEPHONE ENCOUNTER
Pt calling as he caught pneumonia 2 days ago, and he would like Dr. Ibarra to prescribe an antibiotic and a steroid to help him breath.

## 2024-08-09 ENCOUNTER — TELEPHONE (OUTPATIENT)
Age: 59
End: 2024-08-09

## 2024-08-09 DIAGNOSIS — J44.1 COPD EXACERBATION (HCC): Primary | ICD-10-CM

## 2024-08-09 DIAGNOSIS — R05.1 ACUTE COUGH: Primary | ICD-10-CM

## 2024-08-09 RX ORDER — CEFUROXIME AXETIL 500 MG/1
500 TABLET ORAL EVERY 12 HOURS SCHEDULED
Qty: 10 TABLET | Refills: 0 | Status: SHIPPED | OUTPATIENT
Start: 2024-08-09 | End: 2024-08-14

## 2024-08-09 RX ORDER — PREDNISONE 20 MG/1
40 TABLET ORAL DAILY
Qty: 10 TABLET | Refills: 0 | Status: SHIPPED | OUTPATIENT
Start: 2024-08-09 | End: 2024-08-12

## 2024-08-09 NOTE — TELEPHONE ENCOUNTER
Wife called in regarding her  antibiotic and steroid medication . Has not been sent over to the [facility] . Patient called the pharmacy  and was told they have not received any prescription . Wife stated the patient spoke to the doctor earlier today and was told the  medication would be sent over . Wife is worried because her  is not doing to well and the road to get to [facility]  is close and they will not go [facility] .  Please advise

## 2024-08-09 NOTE — TELEPHONE ENCOUNTER
LM on name identified voicemail.  Advised ABX and Steroid sent to pharmacy on file. Also stated CXR ordered and to please go get done today, go to any of our hospital locations, go to the radiology dept and they will complete that form. Further advised to please call back with any questions and/or concerns.

## 2024-08-09 NOTE — TELEPHONE ENCOUNTER
I see patient has an appt on 8/12. I did also order a CXR if he can have that done today possibly so we can see if any new findings. Thanks.

## 2024-08-09 NOTE — TELEPHONE ENCOUNTER
Patient calling wanting to speak with Dr. Ibarra.  Patient will not speak with anyone other than Dr. Ibarra    Patient has pneumonia    Oxygen levels dropping to 80, cough, green phlegm, chest congestion.    Patient reportedly called yesterday for antibiotic, but was not called in to pharmacy for him

## 2024-08-12 ENCOUNTER — HOSPITAL ENCOUNTER (OUTPATIENT)
Dept: RADIOLOGY | Facility: HOSPITAL | Age: 59
Discharge: HOME | End: 2024-08-12
Payer: COMMERCIAL

## 2024-08-12 ENCOUNTER — OFFICE VISIT (OUTPATIENT)
Dept: PULMONOLOGY | Facility: CLINIC | Age: 59
End: 2024-08-12
Payer: COMMERCIAL

## 2024-08-12 VITALS
HEIGHT: 69 IN | OXYGEN SATURATION: 91 % | DIASTOLIC BLOOD PRESSURE: 76 MMHG | BODY MASS INDEX: 46.65 KG/M2 | SYSTOLIC BLOOD PRESSURE: 125 MMHG | TEMPERATURE: 97.4 F | HEART RATE: 78 BPM | WEIGHT: 315 LBS

## 2024-08-12 DIAGNOSIS — R05.1 ACUTE COUGH: ICD-10-CM

## 2024-08-12 DIAGNOSIS — J18.9 COMMUNITY ACQUIRED PNEUMONIA OF RIGHT LOWER LOBE OF LUNG: Primary | ICD-10-CM

## 2024-08-12 DIAGNOSIS — J45.51 SEVERE PERSISTENT ASTHMA WITH EXACERBATION: ICD-10-CM

## 2024-08-12 PROCEDURE — 99213 OFFICE O/P EST LOW 20 MIN: CPT | Performed by: PHYSICIAN ASSISTANT

## 2024-08-12 PROCEDURE — 71046 X-RAY EXAM CHEST 2 VIEWS: CPT

## 2024-08-12 RX ORDER — PREDNISONE 10 MG/1
TABLET ORAL
Qty: 18 TABLET | Refills: 0 | Status: SHIPPED | OUTPATIENT
Start: 2024-08-14 | End: 2024-08-23

## 2024-08-12 RX ORDER — CEFUROXIME AXETIL 500 MG/1
500 TABLET ORAL EVERY 12 HOURS SCHEDULED
Qty: 4 TABLET | Refills: 0 | Status: SHIPPED | OUTPATIENT
Start: 2024-08-14 | End: 2024-08-16

## 2024-08-12 NOTE — PROGRESS NOTES
Pulmonary Follow Up Note   Jey Vicente 59 y.o. male MRN: [id number]  8/12/2024      Assessment:    Community acquired pneumonia of right lower lobe of lung  Check chest x-ray PA and lateral now  Check sputum culture  Extend  Ceftin 500 mg p.o. every 12 hours by 2 days for 7-day total course.  Steroids as below.    Severe persistent asthma with exacerbation  Extend steroid taper.  Starting 8/14 start prednisone 30 mg and taper by 10 mg every 3 days until completion.  Continue Trelegy 200  Continue albuterol HFA/nebs every 6 hours as needed      Plan:    Diagnoses and all orders for this visit:    Community acquired pneumonia of right lower lobe of lung  -     Sputum culture and Gram stain; Future  -     cefuroxime (CEFTIN) 500 mg tablet; Take 1 tablet (500 mg total) by mouth every 12 (twelve) hours for 2 days Do not start before August 14, 2024.  -     predniSONE 10 mg tablet; Take 3 tablets (30 mg total) by mouth daily for 3 days, THEN 2 tablets (20 mg total) daily for 3 days, THEN 1 tablet (10 mg total) daily for 3 days. Do not start before August 14, 2024.    Severe persistent asthma with exacerbation  -     predniSONE 10 mg tablet; Take 3 tablets (30 mg total) by mouth daily for 3 days, THEN 2 tablets (20 mg total) daily for 3 days, THEN 1 tablet (10 mg total) daily for 3 days. Do not start before August 14, 2024.        Return for Next scheduled follow up.    History of Present Illness   HPI:  Jey Vicente is a 59 y.o. male who presents to the office today for routine follow-up. Patient has a PMH positive for moderate persistent asthma, chronic hypoxic respiratory failure, SCOTT, positive double-stranded DNA antibody test.  Patient was last seen in the pulmonary office in June by Dr. Ibarra.  Patient had recently been in the ED and was diagnosed with community-acquired pneumonia just prior to that visit.  Recommendations were made to continue azithromycin and Augmentin at that time and repeat imaging in 3  months.  Patient reports today for sick visit stating that he antibiotics help but did not fully resolve symptoms.  Over the last few days he has worsening cough productive of green sputum, increased chest tightness, increased dyspnea on exertion.  Late last week, Ceftin and steroid burst were sent to pharmacy for him. He only feels minimal improvement today.     Review of Systems   All other systems reviewed and are negative.      Historical Information   Past Medical History:   Diagnosis Date    Aortic stenosis     Arthritis 1990    Both wrists    Asthma 1964    Chronic hypoxemic respiratory failure ([facility])     Chronic kidney disease 10/2022    COPD (chronic obstructive pulmonary disease) ([facility])     Coronary artery disease 2000    Aortic stenosis    Diabetes ([facility])     Diabetes mellitus ([facility])     Hyperlipidemia     Hypertension 07/02/2014    Lung disease Birth    Neuropathy in diabetes ([facility]) 1/2022    Pneumonia 2015    Sleep apnea, obstructive      Past Surgical History:   Procedure Laterality Date    ACCESSORY NAVICULAR EXCISION Right     APPENDECTOMY      CARDIAC CATHETERIZATION N/A 05/25/2023    Procedure: Cardiac Coronary Angiogram;  Surgeon: Mohit Farris MD;  Location: [facility];  Service: Cardiology    EAR FOREIGN BODY REMOVAL      cyst removal. bronchospasms after general anesthesia.    EYE SURGERY Left     traumatic injury at age 9-10    TONSILLECTOMY  No     Family History   Problem Relation Age of Onset    Cancer Mother         Skin Cancer    Heart disease Father     Lung cancer Father         Passed away 1997    Cancer Father         Lung cancer    Hypertension Father     ALS Maternal Grandmother     Diabetes Maternal Grandfather     Stomach cancer Maternal Grandfather     Arthritis Maternal Grandfather        Social History     Tobacco Use   Smoking Status Former    Current packs/day: 0.00    Average packs/day: 3.0 packs/day for 30.0 years (90.1 ttl pk-yrs)    Types: Cigarettes    Start date:  3/15/1984    Quit date: 7/15/2005    Years since quittin.0   Smokeless Tobacco Never         Meds/Allergies     Current Outpatient Medications:     albuterol (Ventolin HFA) 90 mcg/act inhaler, Inhale 2 puffs every 6 (six) hours as needed for wheezing, Disp: 18 g, Rfl: 3    Alcohol Swabs (B-D SINGLE USE SWABS REGULAR) PADS, USE TO CLEAN INJECTION SITE, Disp: , Rfl:     aspirin (ECOTRIN LOW STRENGTH) 81 mg EC tablet, Take 1 tablet (81 mg total) by mouth daily, Disp: 30 tablet, Rfl: 5    atorvastatin (LIPITOR) 40 mg tablet, TAKE 1 TABLET BY MOUTH DAILY, Disp: 90 tablet, Rfl: 3    Benralizumab 30 MG/ML SOAJ, Inject 30 mg under the skin every 56 days Inject 30 mg every 56 days, Disp: 1 mL, Rfl: 5    carvedilol (COREG) 12.5 mg tablet, Take 1 tablet (12.5 mg total) by mouth 2 (two) times a day with meals, Disp: 180 tablet, Rfl: 3    cefuroxime (CEFTIN) 500 mg tablet, Take 1 tablet (500 mg total) by mouth every 12 (twelve) hours for 5 days, Disp: 10 tablet, Rfl: 0    [START ON 2024] cefuroxime (CEFTIN) 500 mg tablet, Take 1 tablet (500 mg total) by mouth every 12 (twelve) hours for 2 days Do not start before 2024., Disp: 4 tablet, Rfl: 0    cyclobenzaprine (FLEXERIL) 10 mg tablet, Take 10 mg by mouth, Disp: , Rfl:     dapagliflozin (Farxiga) 10 MG tablet, Take 1 tablet (10 mg total) by mouth daily, Disp: 90 tablet, Rfl: 1    fluticasone-umeclidinium-vilanterol (Trelegy Ellipta) 200-62.5-25 mcg/actuation AEPB inhaler, Inhale 1 puff daily Rinse mouth after use., Disp: 180 blister, Rfl: 0    furosemide (LASIX) 40 mg tablet, TAKE 1 TABLET BY MOUTH TWICE DAILY, Disp: 180 tablet, Rfl: 3    glimepiride (AMARYL) 4 mg tablet, Take 1 tablet (4 mg total) by mouth 2 (two) times a day, Disp: 180 tablet, Rfl: 1    insulin glargine (Toujeo Max SoloStar) 300 units/mL CONCENTRATED U-300 injection pen (2-unit dial), INJECT 70 UNITS SUBCUTANEOUSLY AT BEDTIME, Disp: 6 mL, Rfl: 5    Insulin Pen Needle (BD Pen Needle Dee Dee  U/F) 32G X 4 MM MISC, Use 4 a day, Disp: 200 each, Rfl: 5    ipratropium-albuterol (DUO-NEB) 0.5-2.5 mg/3 mL nebulizer solution, INHALE 1 VIAL VIA NEBULIZER FOUR TIMES A DAY, Disp: 360 mL, Rfl: 5    losartan (COZAAR) 50 mg tablet, Take 1 tablet (50 mg total) by mouth daily, Disp: 90 tablet, Rfl: 3    metFORMIN (GLUCOPHAGE) 1000 MG tablet, Take 1 tablet (1,000 mg total) by mouth 2 (two) times a day with meals, Disp: 180 tablet, Rfl: 0    Multiple Vitamins-Minerals (MENS MULTIVITAMIN PO), Take by mouth, Disp: , Rfl:     Ozempic, 2 MG/DOSE, 8 MG/3ML injection pen, Inject 0.75 mL (2 mg total) under the skin every 7 days, Disp: 9 mL, Rfl: 3    [START ON 8/14/2024] predniSONE 10 mg tablet, Take 3 tablets (30 mg total) by mouth daily for 3 days, THEN 2 tablets (20 mg total) daily for 3 days, THEN 1 tablet (10 mg total) daily for 3 days. Do not start before August 14, 2024., Disp: 18 tablet, Rfl: 0    pregabalin (LYRICA) 50 mg capsule, TAKE 1 CAPSULE BY MOUTH 3 TIMES DAILY, Disp: 90 capsule, Rfl: 3    sertraline (ZOLOFT) 50 mg tablet, TAKE 1 TABLET BY MOUTH DAILY, Disp: 30 tablet, Rfl: 10    spironolactone (ALDACTONE) 50 mg tablet, TAKE 1 TABLET BY MOUTH DAILY, Disp: 90 tablet, Rfl: 3    Blood Glucose Monitoring Suppl (CONTOUR NEXT MONITOR) w/Device KIT, Test blood sugar once daily or as needed for fluctuating blood sugars (Patient not taking: Reported on 6/17/2024), Disp: 1 kit, Rfl: 0    Farxiga 10 MG tablet, TAKE 1 TABLET BY MOUTH ONCE DAILY (Patient not taking: Reported on 5/28/2024), Disp: 30 tablet, Rfl: 10    glimepiride (AMARYL) 4 mg tablet, TAKE 1 TABLET BY MOUTH TWICE DAILY (Patient not taking: Reported on 5/28/2024), Disp: 60 tablet, Rfl: 10    Insulin Pen Needle (BD Pen Needle Dee Dee U/F) 32G X 4 MM MISC, USE TO INJECT INSULIN (Patient not taking: Reported on 6/17/2024), Disp: 100 each, Rfl: 10    metFORMIN (GLUCOPHAGE) 1000 MG tablet, TAKE 1 TABLET BY MOUTH TWICE DAILY (Patient not taking: Reported on 5/28/2024),  "Disp: 60 tablet, Rfl: 10  Allergies   Allergen Reactions    Theophylline Other (See Comments)     Severe headaches  headaches  Severe headaches         Vitals: Blood pressure 125/76, pulse 78, temperature (!) 97.4 °F (36.3 °C), temperature source Temporal, height 5' 9\" (1.753 m), weight (!) 154 kg (338 lb 9.6 oz), SpO2 91%. Body mass index is 50 kg/m². Oxygen Therapy  SpO2: 91 %  Oxygen Therapy: None (Room air)    Physical Exam  Physical Exam  Vitals reviewed.   Constitutional:       Appearance: Normal appearance. He is well-developed.   HENT:      Head: Normocephalic and atraumatic.      Nose: Nose normal.      Mouth/Throat:      Mouth: Mucous membranes are moist.   Eyes:      Extraocular Movements: Extraocular movements intact.   Cardiovascular:      Rate and Rhythm: Normal rate and regular rhythm.      Heart sounds: Normal heart sounds.   Pulmonary:      Effort: Pulmonary effort is normal. No respiratory distress.      Breath sounds: No wheezing, rhonchi or rales.   Musculoskeletal:         General: No swelling.   Skin:     General: Skin is warm and dry.   Neurological:      Mental Status: He is alert. Mental status is at baseline.   Psychiatric:         Mood and Affect: Mood normal.         Behavior: Behavior normal.         Labs: I have personally reviewed pertinent lab results., ABG: No results found for: \"PHART\", \"XEB0OTA\", \"PO2ART\", \"IVV1FVX\", \"T5KTEIAV\", \"BEART\", \"SOURCE\", BNP: No results found for: \"BNP\", CBC: No results found for: \"WBC\", \"HGB\", \"HCT\", \"MCV\", \"PLT\", \"ADJUSTEDWBC\", \"RBC\", \"MCH\", \"MCHC\", \"RDW\", \"MPV\", \"NRBC\", CMP: No results found for: \"SODIUM\", \"K\", \"CL\", \"CO2\", \"ANIONGAP\", \"BUN\", \"CREATININE\", \"GLUCOSE\", \"CALCIUM\", \"AST\", \"ALT\", \"ALKPHOS\", \"PROT\", \"BILITOT\", \"EGFR\", PT/INR: No results found for: \"PT\", \"INR\", Troponin: No results found for: \"TROPONINI\"  Lab Results   Component Value Date    WBC 9.80 06/04/2024    HGB 13.9 06/04/2024    HCT 42.0 06/04/2024    MCV 81 (L) 06/04/2024     " 06/04/2024     Lab Results   Component Value Date    CALCIUM 9.4 06/04/2024    K 3.5 06/04/2024    CO2 24 06/04/2024     06/04/2024    BUN 22 06/04/2024    CREATININE 0.87 06/04/2024     Lab Results   Component Value Date     (H) 12/04/2023     Lab Results   Component Value Date    ALT 38 08/08/2023    AST 17 08/08/2023    ALKPHOS 96 08/08/2023       Imaging and other studies: I have personally reviewed pertinent reports.   and I have personally reviewed pertinent films in PACS     CT chest with contrast 6/5/2024  Right basilar consolidation.  Additional tree-in-bud opacities in the right upper lobe.  No pleural effusion or pneumothorax.    Pulmonary function testing:  Performed 8/8/23   FEV1/FVC ratio 62%   FEV1 47% predicted  FVC 59% predicted  no response to bronchodilators  TLC 93 % predicted   % predicted  DLCO corrected for hemoglobin 82 % predicted  Severe obstructive airflow defect.  No significant sponsor bronchodilators.  Increased lung volumes consistent with severe air trapping.  Normal diffusion capacity.  Increased airway resistance.

## 2024-08-12 NOTE — ASSESSMENT & PLAN NOTE
Check chest x-ray PA and lateral now  Check sputum culture  Extend  Ceftin 500 mg p.o. every 12 hours by 2 days for 7-day total course.  Steroids as below.

## 2024-08-12 NOTE — ASSESSMENT & PLAN NOTE
Extend steroid taper.  Starting 8/14 start prednisone 30 mg and taper by 10 mg every 3 days until completion.  Continue Trelegy 200  Continue albuterol HFA/nebs every 6 hours as needed

## 2024-08-20 ENCOUNTER — TELEPHONE (OUTPATIENT)
Age: 59
End: 2024-08-20

## 2024-08-20 ENCOUNTER — OFFICE VISIT (OUTPATIENT)
Dept: ENDOCRINOLOGY | Facility: CLINIC | Age: 59
End: 2024-08-20
Payer: COMMERCIAL

## 2024-08-20 VITALS
BODY MASS INDEX: 46.65 KG/M2 | HEART RATE: 73 BPM | WEIGHT: 315 LBS | HEIGHT: 69 IN | TEMPERATURE: 98 F | OXYGEN SATURATION: 94 % | SYSTOLIC BLOOD PRESSURE: 112 MMHG | DIASTOLIC BLOOD PRESSURE: 70 MMHG

## 2024-08-20 DIAGNOSIS — E11.69 TYPE 2 DIABETES MELLITUS WITH OTHER SPECIFIED COMPLICATION, WITHOUT LONG-TERM CURRENT USE OF INSULIN (HCC): ICD-10-CM

## 2024-08-20 DIAGNOSIS — E11.40 TYPE 2 DIABETES MELLITUS WITH DIABETIC NEUROPATHY, WITH LONG-TERM CURRENT USE OF INSULIN (HCC): Primary | ICD-10-CM

## 2024-08-20 DIAGNOSIS — E78.2 MIXED HYPERLIPIDEMIA: ICD-10-CM

## 2024-08-20 DIAGNOSIS — Z79.4 TYPE 2 DIABETES MELLITUS WITH DIABETIC NEUROPATHY, WITH LONG-TERM CURRENT USE OF INSULIN (HCC): Primary | ICD-10-CM

## 2024-08-20 DIAGNOSIS — I10 ESSENTIAL HYPERTENSION: ICD-10-CM

## 2024-08-20 PROCEDURE — 95251 CONT GLUC MNTR ANALYSIS I&R: CPT | Performed by: STUDENT IN AN ORGANIZED HEALTH CARE EDUCATION/TRAINING PROGRAM

## 2024-08-20 PROCEDURE — 99214 OFFICE O/P EST MOD 30 MIN: CPT | Performed by: STUDENT IN AN ORGANIZED HEALTH CARE EDUCATION/TRAINING PROGRAM

## 2024-08-20 RX ORDER — BLOOD-GLUCOSE SENSOR
1 EACH MISCELLANEOUS
COMMUNITY

## 2024-08-20 RX ORDER — GLIMEPIRIDE 4 MG/1
4 TABLET ORAL
Qty: 180 TABLET | Refills: 1 | Status: SHIPPED | OUTPATIENT
Start: 2024-08-20

## 2024-08-20 NOTE — PROGRESS NOTES
"Ambulatory Visit  Name: Jey Vicente      : 1965      MRN: [id number]  Encounter Provider: David Johnson DO  Encounter Date: 2024   Encounter department: [facility]    Assessment & Plan   1. Type 2 diabetes mellitus with diabetic neuropathy, with long-term current use of insulin (HCC)  Assessment & Plan:  Doing well. Will reduce toujeo 86 units, glimepiride 4 mg daily. This may help reduce barriers to weight loss. No other changes today. Will plan 6-mo f/u with labs    Orders:  -     Comprehensive metabolic panel; Future; Expected date: 2025  -     Hemoglobin A1C; Future; Expected date: 2025  -     Albumin / creatinine urine ratio; Future; Expected date: 2025  -     Lipid Panel with Direct LDL reflex; Future; Expected date: 2025  2. Mixed hyperlipidemia  Assessment & Plan:  Continue statin  3. Essential hypertension  Assessment & Plan:  Good control  4. Type 2 diabetes mellitus with other specified complication, without long-term current use of insulin (HCC)  -     glimepiride (AMARYL) 4 mg tablet; Take 1 tablet (4 mg total) by mouth daily with breakfast    RTC 6-mo    History of Present Illness     Jey Vicente is a 59 y.o. male who presents in followup of T2DM. No significant interval history since last visit. He is presently on prednisone, finishing a taper now. He has post-prandial hyperglycemia but is asymptomatic. No hypoglycemia. He does mention interest in weight loss. For DM he is taking toujeo 100 units qHS, MTF 1g bid, glimepiride 4 mg bid, ozempic 2 mg weekly, and farxiga 10 mg daily.      Review of Systems   Constitutional:  Positive for unexpected weight change. Negative for diaphoresis.   All other systems reviewed and are negative.      Objective     /70 (BP Location: Left arm, Patient Position: Sitting)   Pulse 73   Temp 98 °F (36.7 °C)   Ht 5' 9\" (1.753 m)   Wt (!) 153 kg (337 lb)   SpO2 94%   " BMI 49.77 kg/m²     Physical Exam  Vitals reviewed.   Constitutional:       General: He is not in acute distress.     Appearance: Normal appearance.   HENT:      Head: Normocephalic and atraumatic.      Nose: Nose normal.   Eyes:      General: No scleral icterus.     Conjunctiva/sclera: Conjunctivae normal.   Pulmonary:      Effort: Pulmonary effort is normal. No respiratory distress.   Musculoskeletal:         General: No deformity.      Cervical back: Normal range of motion.   Skin:     General: Skin is warm and dry.   Neurological:      Mental Status: He is alert.   Psychiatric:         Mood and Affect: Mood normal.         Behavior: Behavior normal.       Lab Results   Component Value Date    SODIUM 135 06/04/2024    K 3.5 06/04/2024     06/04/2024    CO2 24 06/04/2024    AGAP 11 06/04/2024    BUN 22 06/04/2024    CREATININE 0.87 06/04/2024    GLUC 340 (H) 06/04/2024    GLUF 139 (H) 03/19/2024    CALCIUM 9.4 06/04/2024    AST 17 08/08/2023    ALT 38 08/08/2023    ALKPHOS 96 08/08/2023    TP 7.0 08/08/2023    TBILI 0.52 08/08/2023    EGFR 93 06/04/2024     Lab Results   Component Value Date    HGBA1C 6.9 (H) 03/19/2024         Jey Vicente   Device used DINO 3  Home use     Indication   Type 2 Diabetes    More than 72 hours of data was reviewed. Report to be scanned to chart.     Date Range: Aug 3 - Aug 16, 2024    Analysis of data:   % time CGM used: 92%  Average Glucose: 160 mg/dL  Coefficient of Variation: 41.6%     Time in Target Range: 69%   Time Above Range: 30% (10% very high)   Time Below Range: 1% (0% very low)     Interpretation of data: there is tall post-prandial hyperglycemia following supper, this is more pronounced following supper      Administrative Statements

## 2024-08-20 NOTE — TELEPHONE ENCOUNTER
Kalee from [facility] calling. States she faxed over a form today for patient's DM supplies.     She is asking if this can be filled out and faxed back ASAP.

## 2024-08-20 NOTE — ASSESSMENT & PLAN NOTE
Doing well. Will reduce toujeo 86 units, glimepiride 4 mg daily. This may help reduce barriers to weight loss. No other changes today. Will plan 6-mo f/u with labs

## 2024-08-26 ENCOUNTER — OFFICE VISIT (OUTPATIENT)
Dept: FAMILY MEDICINE CLINIC | Facility: CLINIC | Age: 59
End: 2024-08-26
Payer: COMMERCIAL

## 2024-08-26 VITALS
SYSTOLIC BLOOD PRESSURE: 126 MMHG | RESPIRATION RATE: 18 BRPM | TEMPERATURE: 97.6 F | HEIGHT: 69 IN | WEIGHT: 315 LBS | BODY MASS INDEX: 46.65 KG/M2 | OXYGEN SATURATION: 92 % | HEART RATE: 74 BPM | DIASTOLIC BLOOD PRESSURE: 78 MMHG

## 2024-08-26 DIAGNOSIS — F33.9 DEPRESSION, RECURRENT (HCC): ICD-10-CM

## 2024-08-26 DIAGNOSIS — Z00.00 MEDICARE ANNUAL WELLNESS VISIT, SUBSEQUENT: Primary | ICD-10-CM

## 2024-08-26 DIAGNOSIS — E11.40 TYPE 2 DIABETES MELLITUS WITH DIABETIC NEUROPATHY, WITH LONG-TERM CURRENT USE OF INSULIN (HCC): ICD-10-CM

## 2024-08-26 DIAGNOSIS — Z79.4 TYPE 2 DIABETES MELLITUS WITH DIABETIC NEUROPATHY, WITH LONG-TERM CURRENT USE OF INSULIN (HCC): ICD-10-CM

## 2024-08-26 PROCEDURE — G0439 PPPS, SUBSEQ VISIT: HCPCS | Performed by: INTERNAL MEDICINE

## 2024-08-26 NOTE — PROGRESS NOTES
Ambulatory Visit  Name: Jey Vicente      : 1965      MRN: [id number]  Encounter Provider: Cesia Martinez DO  Encounter Date: 2024   Encounter department: [facility]    Assessment & Plan   1. Medicare annual wellness visit, subsequent  Increase activity as tolerated  He has followup with Pulmonary upcoming  Stay hydrated   2. Type 2 diabetes mellitus with diabetic neuropathy, with long-term current use of insulin (HCC)He follows with endocrine and BS trend has been improved/stable   3. Depression, recurrent (HCC)  Stable and pt keeps busy with different activities/groups       Depression Screening and Follow-up Plan: Patient was screened for depression during today's encounter. They screened negative with a PHQ-9 score of 3.    Preventive health issues were discussed with patient, and age appropriate screening tests were ordered as noted in patient's After Visit Summary. Personalized health advice and appropriate referrals for health education or preventive services given if needed, as noted in patient's After Visit Summary.  Rto 6months  History of Present Illness     HPI   Pt doing ok BS have been stable and he saw endocrine He was in [place] this weekend and more active and felt well so hoping to continue increased activity No chest pain Managing ok respiratory wise and has CT/pulmonary appt in Fall   Patient Care Team:  Cesia Martinez DO as PCP - General (Internal Medicine)  Eben Jorgensen,  as PCP - PCP-[facility] ([facility])  Eben Jorgensen DO as PCP - PCP-[facility] ([facility])  David Rivera [facility], DO as PCP - Endocrinology (Endocrinology)  José Luis Frausto DPM (Podiatry)  Mohsen Ibarra MD (Pulmonary Disease)    Review of Systems   Constitutional:  Negative for chills and fever.   HENT: Negative.     Eyes:  Negative for visual disturbance.        Has eye exam upcoming   Respiratory:  Positive for shortness of breath.    Cardiovascular: Negative.    Gastrointestinal:  Negative.    Genitourinary: Negative.    Musculoskeletal:  Positive for arthralgias.   Neurological: Negative.    Psychiatric/Behavioral:  Negative for sleep disturbance. The patient is not nervous/anxious.      Medical History Reviewed by provider this encounter:  Tobacco  Meds  Problems  Med Hx  Surg Hx  Fam Hx       Annual Wellness Visit Questionnaire   Jey is here for his Subsequent Wellness visit. Last Medicare Wellness visit information reviewed, patient interviewed, no change since last AWV.     Health Risk Assessment:   Patient rates overall health as fair. Patient feels that their physical health rating is same. Patient is satisfied with their life. Eyesight was rated as same. Hearing was rated as slightly worse. Patient feels that their emotional and mental health rating is same. Patients states they are sometimes angry. Patient states they are often unusually tired/fatigued. Pain experienced in the last 7 days has been a lot. Patient's pain rating has been 6/10. Patient states that he has experienced no weight loss or gain in last 6 months.     Depression Screening:   PHQ-9 Score: 3      Fall Risk Screening:   In the past year, patient has experienced: history of falling in past year      Home Safety:  Patient has trouble with stairs inside or outside of their home. Patient has working smoke alarms and has working carbon monoxide detector. Home safety hazards include: none.     Nutrition:   Current diet is Regular.     Medications:   Patient is currently taking over-the-counter supplements. OTC medications include: see medication list. Patient is able to manage medications.     Activities of Daily Living (ADLs)/Instrumental Activities of Daily Living (IADLs):   Walk and transfer into and out of bed and chair?: Yes  Dress and groom yourself?: Yes    Bathe or shower yourself?: Yes    Feed yourself? Yes  Do your laundry/housekeeping?: Yes  Manage your money, pay your bills and track your expenses?:  No  Make your own meals?: Yes    Do your own shopping?: Yes    Durable Medical Equipment Suppliers  [employer]    Previous Hospitalizations:   Any hospitalizations or ED visits within the last 12 months?: Yes    How many hospitalizations have you had in the last year?: 1-2    Advance Care Planning:   Living will: No    Durable POA for healthcare: Yes    Advanced directive: Yes    End of Life Decisions reviewed with patient: Yes    Provider agrees with end of life decisions: Yes      Cognitive Screening:   Provider or family/friend/caregiver concerned regarding cognition?: No    PREVENTIVE SCREENINGS      Cardiovascular Screening:    General: History Lipid Disorder and Risks and Benefits Discussed    Due for: Lipid Panel      Diabetes Screening:     General: History Diabetes and Risks and Benefits Discussed    Due for: Blood Glucose      Colorectal Cancer Screening:     General: Screening Current      Prostate Cancer Screening:    General: Screening Current      Osteoporosis Screening:    General: Screening Not Indicated      Abdominal Aortic Aneurysm (AAA) Screening:    Risk factors include: tobacco use        General: Screening Current      Lung Cancer Screening:     General: Screening Not Indicated      Hepatitis C Screening:    General: Screening Current    Screening, Brief Intervention, and Referral to Treatment (SBIRT)    Screening  Typical number of drinks in a day: 0  Typical number of drinks in a week: 0  Interpretation: Low risk drinking behavior.    AUDIT-C Screenin) How often did you have a drink containing alcohol in the past year? never  2) How many drinks did you have on a typical day when you were drinking in the past year? 0  3) How often did you have 6 or more drinks on one occasion in the past year? never    AUDIT-C Score: 0  Interpretation: Score 0-3 (male): Negative screen for alcohol misuse    Single Item Drug Screening:  How often have you used an illegal drug (including marijuana) or a  "prescription medication for non-medical reasons in the past year? never    Single Item Drug Screen Score: 0  Interpretation: Negative screen for possible drug use disorder    Brief Intervention  Alcohol & drug use screenings were reviewed. No concerns regarding substance use disorder identified.     SDOH Risk Assessment  Social determinants of health (SDOH) risk assesment tool was completed. The tool at a minimum covered housing stability, food insecurity, transportation needs, and utility difficulty. Patient had at risk responses for the following SDOH domains: food insecurity.     Other Counseling Topics:   Regular weightbearing exercise.     Social Determinants of Health     Financial Resource Strain: Patient Declined (7/27/2023)    Overall Financial Resource Strain (CARDIA)    • Difficulty of Paying Living Expenses: Patient declined   Food Insecurity: Food Insecurity Present (8/25/2024)    Hunger Vital Sign    • Worried About Running Out of Food in the Last Year: Sometimes true    • Ran Out of Food in the Last Year: Sometimes true   Transportation Needs: No Transportation Needs (8/25/2024)    PRAPARE - Transportation    • Lack of Transportation (Medical): No    • Lack of Transportation (Non-Medical): No   Housing Stability: Low Risk  (8/25/2024)    Housing Stability Vital Sign    • Unable to Pay for Housing in the Last Year: No    • Number of Times Moved in the Last Year: 0    • Homeless in the Last Year: No   Utilities: Not At Risk (8/25/2024)    [facility] Utilities    • Threatened with loss of utilities: No     No results found.    Objective     /78   Pulse 74   Temp 97.6 °F (36.4 °C) (Temporal)   Resp 18   Ht 5' 9\" (1.753 m)   Wt (!) 155 kg (340 lb 12.8 oz)   SpO2 92%   BMI 50.33 kg/m²   Diabetic Foot Exam    Patient's shoes and socks removed.    Right Foot/Ankle   Right Foot Inspection  Skin Exam: skin intact, dry skin, callus and callus.     Toe Exam: ROM and strength within normal limits, swelling " and tenderness.     Sensory   Vibration: diminished  Proprioception: diminished  Monofilament testing: diminished    Vascular  The right DP pulse is 2+. The right PT pulse is 1+.     Left Foot/Ankle  Left Foot Inspection  Skin Exam: skin intact and dry skin.     Toe Exam: ROM and strength within normal limits and swelling. No tenderness.     Sensory   Vibration: diminished  Proprioception: diminished  Monofilament testing: diminished    Vascular  The left DP pulse is 2+. The left PT pulse is 1+.     Assign Risk Category  Deformity present  Loss of protective sensation  Weak pulses  Risk: 2  Sclerotic nails and thickening skin at pads decrease pulses and sensation b/l with lower leg vascular changes to skin  Physical Exam  Vitals and nursing note reviewed.   Constitutional:       General: He is not in acute distress.     Appearance: Normal appearance. He is not ill-appearing, toxic-appearing or diaphoretic.   HENT:      Head: Normocephalic and atraumatic.      Right Ear: External ear normal.      Left Ear: External ear normal.      Nose: Nose normal.      Mouth/Throat:      Mouth: Mucous membranes are moist.   Eyes:      General: No scleral icterus.     Extraocular Movements: Extraocular movements intact.      Conjunctiva/sclera: Conjunctivae normal.      Pupils: Pupils are equal, round, and reactive to light.   Cardiovascular:      Rate and Rhythm: Normal rate and regular rhythm.      Pulses: Pulses are weak.           Dorsalis pedis pulses are 2+ on the right side and 2+ on the left side.        Posterior tibial pulses are 1+ on the right side and 1+ on the left side.      Heart sounds: Normal heart sounds. No murmur heard.  Pulmonary:      Effort: Pulmonary effort is normal. No respiratory distress.      Breath sounds: Normal breath sounds. No wheezing.   Abdominal:      General: Bowel sounds are normal. There is no distension.      Palpations: Abdomen is soft.      Tenderness: There is no abdominal tenderness.    Musculoskeletal:      Cervical back: Normal range of motion and neck supple.      Right lower leg: No edema.      Left lower leg: No edema.   Feet:      Right foot:      Skin integrity: Callus and dry skin present.      Left foot:      Skin integrity: Dry skin present.   Lymphadenopathy:      Cervical: No cervical adenopathy.   Skin:     General: Skin is warm and dry.   Neurological:      General: No focal deficit present.      Mental Status: He is alert and oriented to person, place, and time. Mental status is at baseline.      Cranial Nerves: No cranial nerve deficit.   Psychiatric:         Mood and Affect: Mood normal.         Behavior: Behavior normal.         Thought Content: Thought content normal.         Judgment: Judgment normal.

## 2024-08-26 NOTE — PATIENT INSTRUCTIONS
Medicare Preventive Visit Patient Instructions  Thank you for completing your Welcome to Medicare Visit or Medicare Annual Wellness Visit today. Your next wellness visit will be due in one year (8/27/2025).  The screening/preventive services that you may require over the next 5-10 years are detailed below. Some tests may not apply to you based off risk factors and/or age. Screening tests ordered at today's visit but not completed yet may show as past due. Also, please note that scanned in results may not display below.  Preventive Screenings:  Service Recommendations Previous Testing/Comments   Colorectal Cancer Screening  Colonoscopy    Fecal Occult Blood Test (FOBT)/Fecal Immunochemical Test (FIT)  Fecal DNA/Cologuard Test  Flexible Sigmoidoscopy Age: 45-75 years old   Colonoscopy: every 10 years (May be performed more frequently if at higher risk)  OR  FOBT/FIT: every 1 year  OR  Cologuard: every 3 years  OR  Sigmoidoscopy: every 5 years  Screening may be recommended earlier than age 45 if at higher risk for colorectal cancer. Also, an individualized decision between you and your healthcare provider will decide whether screening between the ages of 76-85 would be appropriate. Colonoscopy: Not on file  FOBT/FIT: Not on file  Cologuard: 03/22/2023  Sigmoidoscopy: Not on file          Prostate Cancer Screening Individualized decision between patient and health care provider in men between ages of 55-69   Medicare will cover every 12 months beginning on the day after your 50th birthday PSA: 0.1 ng/mL           Hepatitis C Screening Once for adults born between 1945 and 1965  More frequently in patients at high risk for Hepatitis C Hep C Antibody: 02/05/2020        Diabetes Screening 1-2 times per year if you're at risk for diabetes or have pre-diabetes Fasting glucose: 139 mg/dL (3/19/2024)  A1C: 6.9 % (3/19/2024)      Cholesterol Screening Once every 5 years if you don't have a lipid disorder. May order more often  based on risk factors. Lipid panel: 08/08/2023         Other Preventive Screenings Covered by Medicare:  Abdominal Aortic Aneurysm (AAA) Screening: covered once if your at risk. You're considered to be at risk if you have a family history of AAA or a male between the age of 65-75 who smoking at least 100 cigarettes in your lifetime.  Lung Cancer Screening: covers low dose CT scan once per year if you meet all of the following conditions: (1) Age 55-77; (2) No signs or symptoms of lung cancer; (3) Current smoker or have quit smoking within the last 15 years; (4) You have a tobacco smoking history of at least 20 pack years (packs per day x number of years you smoked); (5) You get a written order from a healthcare provider.  Glaucoma Screening: covered annually if you're considered high risk: (1) You have diabetes OR (2) Family history of glaucoma OR (3)  aged 50 and older OR (4)  American aged 65 and older  Osteoporosis Screening: covered every 2 years if you meet one of the following conditions: (1) Have a vertebral abnormality; (2) On glucocorticoid therapy for more than 3 months; (3) Have primary hyperparathyroidism; (4) On osteoporosis medications and need to assess response to drug therapy.  HIV Screening: covered annually if you're between the age of 15-65. Also covered annually if you are younger than 15 and older than 65 with risk factors for HIV infection. For pregnant patients, it is covered up to 3 times per pregnancy.    Immunizations:  Immunization Recommendations   Influenza Vaccine Annual influenza vaccination during flu season is recommended for all persons aged >= 6 months who do not have contraindications   Pneumococcal Vaccine   * Pneumococcal conjugate vaccine = PCV13 (Prevnar 13), PCV15 (Vaxneuvance), PCV20 (Prevnar 20)  * Pneumococcal polysaccharide vaccine = PPSV23 (Pneumovax) Adults 19-65 yo with certain risk factors or if 65+ yo  If never received any pneumonia vaccine:  recommend Prevnar 20 (PCV20)  Give PCV20 if previously received 1 dose of PCV13 or PPSV23   Hepatitis B Vaccine 3 dose series if at intermediate or high risk (ex: diabetes, end stage renal disease, liver disease)   Respiratory syncytial virus (RSV) Vaccine - COVERED BY MEDICARE PART D  * RSVPreF3 (Arexvy) CDC recommends that adults 60 years of age and older may receive a single dose of RSV vaccine using shared clinical decision-making (SCDM)   Tetanus (Td) Vaccine - COST NOT COVERED BY MEDICARE PART B Following completion of primary series, a booster dose should be given every 10 years to maintain immunity against tetanus. Td may also be given as tetanus wound prophylaxis.   Tdap Vaccine - COST NOT COVERED BY MEDICARE PART B Recommended at least once for all adults. For pregnant patients, recommended with each pregnancy.   Shingles Vaccine (Shingrix) - COST NOT COVERED BY MEDICARE PART B  2 shot series recommended in those 19 years and older who have or will have weakened immune systems or those 50 years and older     Health Maintenance Due:      Topic Date Due   • Colorectal Cancer Screening  03/22/2026   • HIV Screening  Completed   • Hepatitis C Screening  Completed     Immunizations Due:      Topic Date Due   • COVID-19 Vaccine (4 - 2023-24 season) 09/01/2023   • Influenza Vaccine (1) 09/01/2024     Advance Directives   What are advance directives?  Advance directives are legal documents that state your wishes and plans for medical care. These plans are made ahead of time in case you lose your ability to make decisions for yourself. Advance directives can apply to any medical decision, such as the treatments you want, and if you want to donate organs.   What are the types of advance directives?  There are many types of advance directives, and each state has rules about how to use them. You may choose a combination of any of the following:  Living will:  This is a written record of the treatment you want. You can  also choose which treatments you do not want, which to limit, and which to stop at a certain time. This includes surgery, medicine, IV fluid, and tube feedings.   Durable power of  for healthcare (DPAHC):  This is a written record that states who you want to make healthcare choices for you when you are unable to make them for yourself. This person, called a proxy, is usually a family member or a friend. You may choose more than 1 proxy.  Do not resuscitate (DNR) order:  A DNR order is used in case your heart stops beating or you stop breathing. It is a request not to have certain forms of treatment, such as CPR. A DNR order may be included in other types of advance directives.  Medical directive:  This covers the care that you want if you are in a coma, near death, or unable to make decisions for yourself. You can list the treatments you want for each condition. Treatment may include pain medicine, surgery, blood transfusions, dialysis, IV or tube feedings, and a ventilator (breathing machine).  Values history:  This document has questions about your views, beliefs, and how you feel and think about life. This information can help others choose the care that you would choose.  Why are advance directives important?  An advance directive helps you control your care. Although spoken wishes may be used, it is better to have your wishes written down. Spoken wishes can be misunderstood, or not followed. Treatments may be given even if you do not want them. An advance directive may make it easier for your family to make difficult choices about your care.   Weight Management   Why it is important to manage your weight:  Being overweight increases your risk of health conditions such as heart disease, high blood pressure, type 2 diabetes, and certain types of cancer. It can also increase your risk for osteoarthritis, sleep apnea, and other respiratory problems. Aim for a slow, steady weight loss. Even a small amount of  weight loss can lower your risk of health problems.  How to lose weight safely:  A safe and healthy way to lose weight is to eat fewer calories and get regular exercise. You can lose up about 1 pound a week by decreasing the number of calories you eat by 500 calories each day.   Healthy meal plan for weight management:  A healthy meal plan includes a variety of foods, contains fewer calories, and helps you stay healthy. A healthy meal plan includes the following:  Eat whole-grain foods more often.  A healthy meal plan should contain fiber. Fiber is the part of grains, fruits, and vegetables that is not broken down by your body. Whole-grain foods are healthy and provide extra fiber in your diet. Some examples of whole-grain foods are whole-wheat breads and pastas, oatmeal, brown rice, and bulgur.  Eat a variety of vegetables every day.  Include dark, leafy greens such as spinach, kale, bailee greens, and mustard greens. Eat yellow and orange vegetables such as carrots, sweet potatoes, and winter squash.   Eat a variety of fruits every day.  Choose fresh or canned fruit (canned in its own juice or light syrup) instead of juice. Fruit juice has very little or no fiber.  Eat low-fat dairy foods.  Drink fat-free (skim) milk or 1% milk. Eat fat-free yogurt and low-fat cottage cheese. Try low-fat cheeses such as mozzarella and other reduced-fat cheeses.  Choose meat and other protein foods that are low in fat.  Choose beans or other legumes such as split peas or lentils. Choose fish, skinless poultry (chicken or turkey), or lean cuts of red meat (beef or pork). Before you cook meat or poultry, cut off any visible fat.   Use less fat and oil.  Try baking foods instead of frying them. Add less fat, such as margarine, sour cream, regular salad dressing and mayonnaise to foods. Eat fewer high-fat foods. Some examples of high-fat foods include french fries, doughnuts, ice cream, and cakes.  Eat fewer sweets.  Limit foods and  drinks that are high in sugar. This includes candy, cookies, regular soda, and sweetened drinks.  Exercise:  Exercise at least 30 minutes per day on most days of the week. Some examples of exercise include walking, biking, dancing, and swimming. You can also fit in more physical activity by taking the stairs instead of the elevator or parking farther away from stores. Ask your healthcare provider about the best exercise plan for you.      © Copyright [employer] 2018 Information is for End User's use only and may not be sold, redistributed or otherwise used for commercial purposes. All illustrations and images included in CareNotes® are the copyrighted property of [employer] Inc[employer] or [employer]

## 2024-08-28 DIAGNOSIS — H90.2 CONDUCTIVE HEARING LOSS, UNSPECIFIED LATERALITY: Primary | ICD-10-CM

## 2024-08-29 ENCOUNTER — VBI (OUTPATIENT)
Dept: ADMINISTRATIVE | Facility: OTHER | Age: 59
End: 2024-08-29

## 2024-08-29 NOTE — TELEPHONE ENCOUNTER
08/29/24 2:14 PM     Chart reviewed for Microalbumin Creatinine Urine Ratio OR Albumin Creatinine Urine Ratio  ; nothing is submitted to the patient's insurance at this time.     Ana Maria Rodriguez   [facility]

## 2024-09-05 DIAGNOSIS — F32.A ANXIETY AND DEPRESSION: ICD-10-CM

## 2024-09-05 DIAGNOSIS — F41.9 ANXIETY AND DEPRESSION: ICD-10-CM

## 2024-09-11 PROBLEM — J18.9 COMMUNITY ACQUIRED PNEUMONIA OF RIGHT LOWER LOBE OF LUNG: Status: RESOLVED | Noted: 2024-08-12 | Resolved: 2024-09-11

## 2024-09-13 ENCOUNTER — HOSPITAL ENCOUNTER (OUTPATIENT)
Dept: CT IMAGING | Facility: HOSPITAL | Age: 59
Discharge: HOME | End: 2024-09-13
Attending: INTERNAL MEDICINE
Payer: COMMERCIAL

## 2024-09-13 DIAGNOSIS — J18.9 COMMUNITY ACQUIRED PNEUMONIA OF RIGHT LOWER LOBE OF LUNG: ICD-10-CM

## 2024-09-13 PROCEDURE — 71250 CT THORAX DX C-: CPT

## 2024-09-23 ENCOUNTER — OFFICE VISIT (OUTPATIENT)
Dept: PULMONOLOGY | Facility: CLINIC | Age: 59
End: 2024-09-23
Payer: COMMERCIAL

## 2024-09-23 VITALS
TEMPERATURE: 97.6 F | HEART RATE: 81 BPM | DIASTOLIC BLOOD PRESSURE: 85 MMHG | OXYGEN SATURATION: 91 % | HEIGHT: 69 IN | SYSTOLIC BLOOD PRESSURE: 137 MMHG | BODY MASS INDEX: 46.65 KG/M2 | WEIGHT: 315 LBS

## 2024-09-23 DIAGNOSIS — J45.50 SEVERE PERSISTENT ASTHMA, UNSPECIFIED WHETHER COMPLICATED: Primary | ICD-10-CM

## 2024-09-23 DIAGNOSIS — J96.11 CHRONIC HYPOXEMIC RESPIRATORY FAILURE (HCC): ICD-10-CM

## 2024-09-23 DIAGNOSIS — G47.33 OSA (OBSTRUCTIVE SLEEP APNEA): ICD-10-CM

## 2024-09-23 PROCEDURE — 99214 OFFICE O/P EST MOD 30 MIN: CPT | Performed by: PHYSICIAN ASSISTANT

## 2024-09-23 RX ORDER — PREDNISONE 20 MG/1
40 TABLET ORAL DAILY
Qty: 10 TABLET | Refills: 0 | Status: SHIPPED | OUTPATIENT
Start: 2024-09-23

## 2024-09-23 NOTE — PROGRESS NOTES
Pulmonary Follow Up Note   Jey Vicente 59 y.o. male MRN: [id number]  9/23/2024      Assessment:    Severe persistent asthma  No evidence of acute exacerbation on today's exam.  I explained this to patient and he was in agreements.  I did however give him prednisone burst to have on hand in case of exacerbation in the future.  For now, continue Trelegy 200/62.5/25 mcg 1 puff daily.  Continue albuterol HFA/DuoNebs every 6 hours as needed.  Continue Fasenra every 56 days for now.  I do have low threshold to change his biologic to Nucala or Tezspire.  Will discuss with Dr. Ibarra and get back to Melvin.    SCOTT (obstructive sleep apnea)  Continue auto BiPAP with 2 L bled in during all hours of sleep.  Follows with sleep medicine.  No changes made.    Chronic hypoxemic respiratory failure (HCC)  Continue 2 L nasal cannula as needed at home bled into auto BiPAP as above.  Patient knows to maintain saturations greater than or equal to 88%.      Plan:    Diagnoses and all orders for this visit:    Severe persistent asthma, unspecified whether complicated  -     predniSONE 20 mg tablet; Take 2 tablets (40 mg total) by mouth daily    SCOTT (obstructive sleep apnea)    Chronic hypoxemic respiratory failure (HCC)        Return in about 3 months (around 12/23/2024).    History of Present Illness   HPI:  Jey Vicente is a 59 y.o. male who presents the office today for routine follow-up.  He has a past medical asbestos for moderate to severe persistent asthma, chronic hypoxic respiratory failure, SCOTT, positive double-stranded DNA antibody test.  Patient was last in our office in August where he was seen for asthma exacerbation after completing course of antibiotics per requiring a longer taper of systemic steroids.  He reports that he took prednisone and finished as prescribed.  Symptoms were alleviated briefly for patient states that he is waxing and waning respiratory symptoms  most notably increased dyspnea on exertion and  nonproductive cough.  He feels that the symptoms do correlate with the timing of Fasenra.  He finds that he gets 3 good weeks before symptoms started to worsen.  He is also frustrated by the pharmacy not giving him Fasenra on time repeatedly.  He denies wheezing currently.  Denies chest pain.  Denies fevers chills or sick contacts currently.  No hemoptysis or mucopurulent sputum production.  Otherwise reports compliance on Trelegy.  He needs to use his nebulizer 4 times daily currently.     Review of Systems   Constitutional:  Negative for appetite change and fever.   HENT:  Negative for ear pain, postnasal drip, rhinorrhea, sneezing, sore throat and trouble swallowing.    Respiratory:  Positive for cough, shortness of breath and wheezing.    Cardiovascular:  Negative for chest pain.   Musculoskeletal:  Positive for myalgias.   Neurological:  Positive for headaches.   All other systems reviewed and are negative.      Historical Information   Past Medical History:   Diagnosis Date    Aortic stenosis     Arthritis 1990    Both wrists    Asthma 1964    Chronic hypoxemic respiratory failure ([facility])     Chronic kidney disease 10/2022    COPD (chronic obstructive pulmonary disease) ([facility])     Coronary artery disease 2000    Aortic stenosis    Diabetes ([facility])     Diabetes mellitus ([facility])     Hyperlipidemia     Hypertension 07/02/2014    Lung disease Birth    Neuropathy in diabetes ([facility]) 1/2022    Pneumonia 2015    Sleep apnea, obstructive      Past Surgical History:   Procedure Laterality Date    ACCESSORY NAVICULAR EXCISION Right     APPENDECTOMY      CARDIAC CATHETERIZATION N/A 05/25/2023    Procedure: Cardiac Coronary Angiogram;  Surgeon: Mohit Farris MD;  Location: [facility];  Service: Cardiology    EAR FOREIGN BODY REMOVAL      cyst removal. bronchospasms after general anesthesia.    EYE SURGERY Left     traumatic injury at age 9-10    TONSILLECTOMY  No     Family History   Problem Relation Age of Onset     Cancer Mother         Skin Cancer    Heart disease Father     Lung cancer Father         Passed away     Cancer Father         Lung cancer    Hypertension Father     ALS Maternal Grandmother     Diabetes Maternal Grandfather     Stomach cancer Maternal Grandfather     Arthritis Maternal Grandfather        Social History     Tobacco Use   Smoking Status Former    Current packs/day: 0.00    Average packs/day: 3.0 packs/day for 30.0 years (90.1 ttl pk-yrs)    Types: Cigarettes    Start date: 3/15/1984    Quit date: 7/15/2005    Years since quittin.2   Smokeless Tobacco Never         Meds/Allergies     Current Outpatient Medications:     albuterol (Ventolin HFA) 90 mcg/act inhaler, Inhale 2 puffs every 6 (six) hours as needed for wheezing, Disp: 18 g, Rfl: 3    Alcohol Swabs (B-D SINGLE USE SWABS REGULAR) PADS, USE TO CLEAN INJECTION SITE, Disp: , Rfl:     aspirin (ECOTRIN LOW STRENGTH) 81 mg EC tablet, Take 1 tablet (81 mg total) by mouth daily, Disp: 30 tablet, Rfl: 5    atorvastatin (LIPITOR) 40 mg tablet, TAKE 1 TABLET BY MOUTH DAILY, Disp: 90 tablet, Rfl: 3    Benralizumab 30 MG/ML SOAJ, Inject 30 mg under the skin every 56 days Inject 30 mg every 56 days, Disp: 1 mL, Rfl: 5    carvedilol (COREG) 12.5 mg tablet, Take 1 tablet (12.5 mg total) by mouth 2 (two) times a day with meals, Disp: 180 tablet, Rfl: 3    Continuous Glucose Sensor (FreeStyle Moe 3 Sensor) MISC, Use 1 Units every 14 (fourteen) days, Disp: , Rfl:     cyclobenzaprine (FLEXERIL) 10 mg tablet, Take 10 mg by mouth, Disp: , Rfl:     dapagliflozin (Farxiga) 10 MG tablet, Take 1 tablet (10 mg total) by mouth daily, Disp: 90 tablet, Rfl: 1    furosemide (LASIX) 40 mg tablet, TAKE 1 TABLET BY MOUTH TWICE DAILY, Disp: 180 tablet, Rfl: 3    glimepiride (AMARYL) 4 mg tablet, TAKE 1 TABLET BY MOUTH TWICE DAILY, Disp: 60 tablet, Rfl: 10    glimepiride (AMARYL) 4 mg tablet, Take 1 tablet (4 mg total) by mouth daily with breakfast, Disp: 180 tablet,  Rfl: 1    insulin glargine (Toujeo Max SoloStar) 300 units/mL CONCENTRATED U-300 injection pen (2-unit dial), INJECT 70 UNITS SUBCUTANEOUSLY AT BEDTIME (Patient taking differently: Inject 100 Units under the skin daily at bedtime), Disp: 6 mL, Rfl: 5    Insulin Pen Needle (BD Pen Needle Dee Dee U/F) 32G X 4 MM MISC, Use 4 a day, Disp: 200 each, Rfl: 5    Insulin Pen Needle (BD Pen Needle Dee Dee U/F) 32G X 4 MM MISC, USE TO INJECT INSULIN, Disp: 100 each, Rfl: 10    ipratropium-albuterol (DUO-NEB) 0.5-2.5 mg/3 mL nebulizer solution, INHALE 1 VIAL VIA NEBULIZER FOUR TIMES A DAY, Disp: 360 mL, Rfl: 5    losartan (COZAAR) 50 mg tablet, Take 1 tablet (50 mg total) by mouth daily, Disp: 90 tablet, Rfl: 3    metFORMIN (GLUCOPHAGE) 1000 MG tablet, Take 1 tablet (1,000 mg total) by mouth 2 (two) times a day with meals, Disp: 180 tablet, Rfl: 0    Multiple Vitamins-Minerals (MENS MULTIVITAMIN PO), Take by mouth, Disp: , Rfl:     Ozempic, 2 MG/DOSE, 8 MG/3ML injection pen, Inject 0.75 mL (2 mg total) under the skin every 7 days, Disp: 9 mL, Rfl: 3    predniSONE 20 mg tablet, Take 2 tablets (40 mg total) by mouth daily, Disp: 10 tablet, Rfl: 0    pregabalin (LYRICA) 50 mg capsule, TAKE 1 CAPSULE BY MOUTH 3 TIMES DAILY, Disp: 90 capsule, Rfl: 3    sertraline (ZOLOFT) 50 mg tablet, Take 1 tablet (50 mg total) by mouth daily, Disp: 90 tablet, Rfl: 3    spironolactone (ALDACTONE) 50 mg tablet, TAKE 1 TABLET BY MOUTH DAILY, Disp: 90 tablet, Rfl: 3    Blood Glucose Monitoring Suppl (CONTOUR NEXT MONITOR) w/Device KIT, Test blood sugar once daily or as needed for fluctuating blood sugars (Patient not taking: Reported on 6/17/2024), Disp: 1 kit, Rfl: 0    Farxiga 10 MG tablet, TAKE 1 TABLET BY MOUTH ONCE DAILY (Patient not taking: Reported on 5/28/2024), Disp: 30 tablet, Rfl: 10    fluticasone-umeclidinium-vilanterol (Trelegy Ellipta) 200-62.5-25 mcg/actuation AEPB inhaler, Inhale 1 puff daily Rinse mouth after use., Disp: 180 blister, Rfl:  "0    metFORMIN (GLUCOPHAGE) 1000 MG tablet, TAKE 1 TABLET BY MOUTH TWICE DAILY (Patient not taking: Reported on 5/28/2024), Disp: 60 tablet, Rfl: 10  No Known Allergies    Vitals: Blood pressure 137/85, pulse 81, temperature 97.6 °F (36.4 °C), temperature source Temporal, height 5' 9\" (1.753 m), weight (!) 154 kg (338 lb 12.8 oz), SpO2 91%. Body mass index is 50.03 kg/m². Oxygen Therapy  SpO2: 91 %  Oxygen Therapy: None (Room air)    Physical Exam  Physical Exam  Vitals reviewed.   Constitutional:       Appearance: Normal appearance. He is well-developed.   HENT:      Head: Normocephalic and atraumatic.      Nose: Nose normal.      Mouth/Throat:      Mouth: Mucous membranes are moist.   Eyes:      Extraocular Movements: Extraocular movements intact.   Cardiovascular:      Rate and Rhythm: Normal rate and regular rhythm.      Heart sounds: Normal heart sounds.   Pulmonary:      Effort: Pulmonary effort is normal. No respiratory distress.      Breath sounds: No wheezing, rhonchi or rales.   Musculoskeletal:         General: No swelling.   Skin:     General: Skin is warm and dry.   Neurological:      Mental Status: He is alert. Mental status is at baseline.   Psychiatric:         Mood and Affect: Mood normal.         Behavior: Behavior normal.         Labs: I have personally reviewed pertinent lab results., ABG: No results found for: \"PHART\", \"XEY7WMO\", \"PO2ART\", \"RRL8QQN\", \"Y2EBWHOF\", \"BEART\", \"SOURCE\", BNP: No results found for: \"BNP\", CBC: No results found for: \"WBC\", \"HGB\", \"HCT\", \"MCV\", \"PLT\", \"ADJUSTEDWBC\", \"RBC\", \"MCH\", \"MCHC\", \"RDW\", \"MPV\", \"NRBC\", CMP: No results found for: \"SODIUM\", \"K\", \"CL\", \"CO2\", \"ANIONGAP\", \"BUN\", \"CREATININE\", \"GLUCOSE\", \"CALCIUM\", \"AST\", \"ALT\", \"ALKPHOS\", \"PROT\", \"BILITOT\", \"EGFR\", PT/INR: No results found for: \"PT\", \"INR\", Troponin: No results found for: \"TROPONINI\"  Lab Results   Component Value Date    WBC 9.80 06/04/2024    HGB 13.9 06/04/2024    HCT 42.0 06/04/2024    MCV 81 (L) " 06/04/2024     06/04/2024     Lab Results   Component Value Date    CALCIUM 9.4 06/04/2024    K 3.5 06/04/2024    CO2 24 06/04/2024     06/04/2024    BUN 22 06/04/2024    CREATININE 0.87 06/04/2024     Lab Results   Component Value Date     (H) 12/04/2023     Lab Results   Component Value Date    ALT 38 08/08/2023    AST 17 08/08/2023    ALKPHOS 96 08/08/2023       Imaging and other studies: Personally reviewed the following image studies in PACS and associated radiology reports: CT chest. My interpretation of the radiology images/reports is: as below.     CT chest 9/13/2024  Interval improvement of previously seen right basilar consolidation.  Resolution of previously seen tree-in-bud nodularity in the right upper lobe.  Scattered small calcified granulomas.    Pulmonary function testing:  Performed 8/8/23   FEV1/FVC ratio 62%   FEV1 47% predicted  FVC 59% predicted  no response to bronchodilators  TLC 93 % predicted   % predicted  DLCO corrected for hemoglobin 82 % predicted  Severe obstructive airflow defect.  No significant sponsor bronchodilators.  Increased lung volumes consistent with severe air trapping.  Normal diffusion capacity.  Increased airway resistance.         Answers submitted by the patient for this visit:  Pulmonology Questionnaire (Submitted on 9/22/2024)  Chief Complaint: Primary symptoms  Do you have difficulty breathing?: Yes  Do you experience frequent throat clearing?: Yes  Do you have a wet cough?: Yes  Chronicity: chronic  When did you first notice your symptoms?: more than 1 month ago  How often do your symptoms occur?: constantly  Since you first noticed this problem, how has it changed?: unchanged  Do you have shortness of breath that occurs with effort or exertion?: Yes  Do you have ear congestion?: No  Do you have heartburn?: No  Do you have fatigue?: Yes  Do you have nasal congestion?: No  Do you have shortness of breath when lying flat?: No  Do you have  shortness of breath when you wake up?: No  Do you have sweats?: No  Have you experienced weight loss?: No  Which of the following makes your symptoms worse?: any activity, change in weather, climbing stairs, exercise, exposure to fumes, exposure to smoke, minimal activity, strenuous activity  Which of the following makes your symptoms better?: cold air, oral steroids, steroid inhaler

## 2024-09-23 NOTE — ASSESSMENT & PLAN NOTE
Continue auto BiPAP with 2 L bled in during all hours of sleep.  Follows with sleep medicine.  No changes made.

## 2024-09-23 NOTE — ASSESSMENT & PLAN NOTE
No evidence of acute exacerbation on today's exam.  I explained this to patient and he was in agreements.  I did however give him prednisone burst to have on hand in case of exacerbation in the future.  For now, continue Trelegy 200/62.5/25 mcg 1 puff daily.  Continue albuterol HFA/DuoNebs every 6 hours as needed.  Continue Fasenra every 56 days for now.  I do have low threshold to change his biologic to Nucala or Tezspire.  Will discuss with Dr. Ibarra and get back to Melvin.

## 2024-09-23 NOTE — ASSESSMENT & PLAN NOTE
Continue 2 L nasal cannula as needed at home bled into auto BiPAP as above.  Patient knows to maintain saturations greater than or equal to 88%.

## 2024-09-25 PROBLEM — Z00.00 MEDICARE ANNUAL WELLNESS VISIT, SUBSEQUENT: Status: RESOLVED | Noted: 2024-08-26 | Resolved: 2024-09-25

## 2024-10-09 ENCOUNTER — TELEPHONE (OUTPATIENT)
Age: 59
End: 2024-10-09

## 2024-10-09 NOTE — TELEPHONE ENCOUNTER
Pt calling as he would like to know if Roman would still like to change his Fasenra medication to nucala or trezspire as discussed at last ov, as he has not heard back from Roman and its over the 56 day hold. He has not taken the prednisone yet, but stated will take it if his breathing worsens.

## 2024-10-11 DIAGNOSIS — J45.50 SEVERE PERSISTENT ASTHMA, UNSPECIFIED WHETHER COMPLICATED: Primary | ICD-10-CM

## 2024-10-11 RX ORDER — EPINEPHRINE 0.3 MG/.3ML
0.3 INJECTION SUBCUTANEOUS ONCE
Qty: 0.6 ML | Refills: 0 | Status: SHIPPED | OUTPATIENT
Start: 2024-10-11 | End: 2024-10-11

## 2024-10-11 RX ORDER — TEZEPELUMAB-EKKO 210 MG/1.9ML
210 INJECTION, SOLUTION SUBCUTANEOUS
Qty: 1 ML | Refills: 11 | Status: SHIPPED | OUTPATIENT
Start: 2024-10-11

## 2024-10-11 NOTE — TELEPHONE ENCOUNTER
Spoke with patient.  Patient is  frustrated already by not getting his Fasenra sent to him in a timely fashion to keep Q8 dosing therefore he is no interested in switching Biologics altogether.  Will switch to testify and see if he has better response to this.  Order placed.

## 2024-10-11 NOTE — TELEPHONE ENCOUNTER
Pt calling as he states he missed a call from Roman earlier. No note left for me to relay to patient, he states if he misses call again its okay to leave a detailed message through vm

## 2024-10-16 ENCOUNTER — TELEPHONE (OUTPATIENT)
Dept: PULMONOLOGY | Facility: CLINIC | Age: 59
End: 2024-10-16

## 2024-10-16 NOTE — TELEPHONE ENCOUNTER
Application for Tezspire completed and signed and will be sent to our specialty medication team to further assist patient with authorization.

## 2024-10-30 ENCOUNTER — TELEPHONE (OUTPATIENT)
Age: 59
End: 2024-10-30

## 2024-10-30 DIAGNOSIS — J45.50 SEVERE PERSISTENT ASTHMA, UNSPECIFIED WHETHER COMPLICATED: ICD-10-CM

## 2024-10-30 DIAGNOSIS — E11.40 PAINFUL DIABETIC NEUROPATHY (HCC): ICD-10-CM

## 2024-10-30 RX ORDER — PREDNISONE 20 MG/1
40 TABLET ORAL DAILY
Qty: 10 TABLET | Refills: 0 | Status: SHIPPED | OUTPATIENT
Start: 2024-10-30

## 2024-10-30 NOTE — TELEPHONE ENCOUNTER
BRISA Pickett    Prednisone Emergency Pack    Pt requesting refill of Prednisone Emergency Pack, used his last week.      Please send to [facility] as per EMR

## 2024-10-31 ENCOUNTER — OFFICE VISIT (OUTPATIENT)
Dept: AUDIOLOGY | Facility: CLINIC | Age: 59
End: 2024-10-31
Payer: COMMERCIAL

## 2024-10-31 ENCOUNTER — OFFICE VISIT (OUTPATIENT)
Dept: PODIATRY | Facility: CLINIC | Age: 59
End: 2024-10-31
Payer: COMMERCIAL

## 2024-10-31 VITALS
HEART RATE: 90 BPM | WEIGHT: 315 LBS | OXYGEN SATURATION: 92 % | TEMPERATURE: 97.2 F | DIASTOLIC BLOOD PRESSURE: 71 MMHG | BODY MASS INDEX: 49.47 KG/M2 | SYSTOLIC BLOOD PRESSURE: 107 MMHG

## 2024-10-31 DIAGNOSIS — E11.40 TYPE 2 DIABETES MELLITUS WITH DIABETIC NEUROPATHY, WITH LONG-TERM CURRENT USE OF INSULIN (HCC): Primary | ICD-10-CM

## 2024-10-31 DIAGNOSIS — H90.3 SENSORY HEARING LOSS, BILATERAL: Primary | ICD-10-CM

## 2024-10-31 DIAGNOSIS — B35.1 ONYCHOMYCOSIS: ICD-10-CM

## 2024-10-31 DIAGNOSIS — Z79.4 TYPE 2 DIABETES MELLITUS WITH DIABETIC NEUROPATHY, WITH LONG-TERM CURRENT USE OF INSULIN (HCC): Primary | ICD-10-CM

## 2024-10-31 DIAGNOSIS — H90.2 CONDUCTIVE HEARING LOSS, UNSPECIFIED LATERALITY: ICD-10-CM

## 2024-10-31 PROCEDURE — 99213 OFFICE O/P EST LOW 20 MIN: CPT | Performed by: PODIATRIST

## 2024-10-31 PROCEDURE — 92557 COMPREHENSIVE HEARING TEST: CPT | Performed by: AUDIOLOGIST-HEARING AID FITTER

## 2024-10-31 PROCEDURE — 92567 TYMPANOMETRY: CPT | Performed by: AUDIOLOGIST-HEARING AID FITTER

## 2024-10-31 PROCEDURE — 11721 DEBRIDE NAIL 6 OR MORE: CPT | Performed by: PODIATRIST

## 2024-10-31 RX ORDER — PREGABALIN 50 MG/1
50 CAPSULE ORAL 3 TIMES DAILY
Qty: 90 CAPSULE | Refills: 3 | Status: SHIPPED | OUTPATIENT
Start: 2024-10-31

## 2024-10-31 NOTE — PROGRESS NOTES
Diabetic Foot Exam    Patient's shoes and socks removed.    Right Foot/Ankle   Right Foot Inspection  Skin Exam: skin normal and skin intact. No dry skin, no warmth, no callus, no erythema, no maceration, no abnormal color, no pre-ulcer, no ulcer and no callus.     Toe Exam: ROM and strength within normal limits.     Sensory   Monofilament testing: absent    Vascular  Capillary refills: < 3 seconds  The right DP pulse is 2+. The right PT pulse is 1+.     Left Foot/Ankle  Left Foot Inspection  Skin Exam: skin normal and skin intact. No dry skin, no warmth, no erythema, no maceration, normal color, no pre-ulcer, no ulcer and no callus.     Toe Exam: ROM and strength within normal limits.     Sensory   Monofilament testing: absent    Vascular  Capillary refills: < 3 seconds  The left DP pulse is 2+. The left PT pulse is 1+.     Assign Risk Category  Deformity present  Loss of protective sensation  Weak pulses  Risk: 2          Ambulatory Visit  Name: Jey Vicente      : 1965      MRN: [id number]  Encounter Provider: José Luis Frausto DPM  Encounter Date: 10/31/2024   Encounter department: [facility] PODIATRY [place]    Assessment & Plan  Type 2 diabetes mellitus with diabetic neuropathy, with long-term current use of insulin ([facility])    Lab Results   Component Value Date    HGBA1C 6.9 (H) 2024            Onychomycosis         -Nail care provided as below  - Q9 findings for at risk footcare  - he does have loss of protective sensation to the entirety plantar aspect of the foot  - Advised daily pedal checks and we did discuss other ancillary treatments for his neuropathy which does continue to bother him.  We did discuss topical capsaicin versus desensitizing measures versus increasing the dose of his Lyrica  - he is to return at next available for nail care and diabetic foot exam      Procedure: All mycotic toenails were reduced and debrided in length, width, and girth using a nail nipper and  abdullahi.  All hyperkeratotic skin lesion(s) were sharply pared with a scalpel with no bleeding or evidence of ulceration.  Patient tolerated procedure(s) well without complications.  [address], Q9      History of Present Illness     Jey Vicente is a 59 y.o. male who presents evaluation and management of his bilateral feet, he is complaining of continued numbness burning and tingling.  He is taking Lyrica 50 mg and he does have diabetic shoes.  He does check his feet daily no other pedal issues      Review of Systems   Constitutional:  Negative for chills and fever.   HENT:  Negative for ear pain and sore throat.    Eyes:  Negative for pain and visual disturbance.   Respiratory:  Negative for cough and shortness of breath.    Cardiovascular:  Negative for chest pain and palpitations.   Gastrointestinal:  Negative for abdominal pain and vomiting.   Genitourinary:  Negative for dysuria and hematuria.   Musculoskeletal:  Negative for arthralgias and back pain.   Skin:  Negative for color change and rash.   Neurological:  Negative for seizures and syncope.   All other systems reviewed and are negative.          Objective     /71 (BP Location: Left arm, Patient Position: Sitting, Cuff Size: Large)   Pulse 90   Temp (!) 97.2 °F (36.2 °C)   Wt (!) 152 kg (335 lb)   SpO2 92%   BMI 49.47 kg/m²     Physical Exam  Cardiovascular:      Pulses: Pulses are weak.           Dorsalis pedis pulses are 2+ on the right side and 2+ on the left side.        Posterior tibial pulses are 1+ on the right side and 1+ on the left side.   Musculoskeletal:        Feet:    Feet:      Right foot:      Skin integrity: No ulcer, skin breakdown, erythema, warmth, callus or dry skin.      Left foot:      Skin integrity: No ulcer, skin breakdown, erythema, warmth, callus or dry skin.   Skin:     Comments: Nails 1 through 5 bilateral thick elongated subungual wheeze,.  No open lesions.  Cavovarus foot type.  Loss protection station entire.   Plantar foot has a paresthesias

## 2024-10-31 NOTE — PROGRESS NOTES
Diagnostic Hearing Evaluation    Name:  Jey Vicente  :  1965  Age:  59 y.o.   MRN:  [id number]  Date of Evaluation: 10/31/24     HISTORY:     Reason for visit: Difficulty Understanding    Jey Vicente is being seen today at the request of Dr. Martinez for an initial  evaluation of hearing. The patient reports trouble hearing in background noise. The patient  denies otalgia, otorrhea, dizziness, and fullness. There is a history of loud noise exposure and tinnitus.     EVALUATION:    Otoscopic Evaluation:   Right Ear: Unremarkable, canal clear   Left Ear: Unremarkable, canal clear    Tympanometry:   Right Ear: Type A; normal middle ear pressure and static compliance    Left Ear: Type A; normal middle ear pressure and static compliance     Speech Audiometry:  Speech Reception (SRT)    Right Ear: 25 dB HL    Left Ear: 35 dB HL    Word Recognition Scores (WRS):  Right Ear: excellent (100 % correct)     Left Ear: excellent (100 % correct)    Stimuli: NU-6    Pure Tone Audiometry:  Conventional pure tone audiometry from 250 - 8000 Hz  was obtained with good reliability and revealed the following:     Right Ear: Mild sloping to severe sensorineural hearing loss (SNHL)    Left Ear: Mild sloping to moderately severe sensorineural hearing loss (SNHL)     *see attached audiogram    RECOMMENDATIONS:  Return to [facility]. for F/U and Hearing Aid Evaluation    PATIENT EDUCATION:   The results of today's results and recommendations were reviewed with the patient and his hearing thresholds were explained at length. Treatment options, including amplification and communication strategies, were discussed as appropriate. The patient voiced understanding of his test results. Questions were addressed and the patient was encouraged to contact our department should concerns arise.      Jason Yanez, CCC-A  Clinical Audiologist  [facility] AUDIOLOGY [address] [address]

## 2024-11-15 ENCOUNTER — TELEPHONE (OUTPATIENT)
Dept: PULMONOLOGY | Facility: CLINIC | Age: 59
End: 2024-11-15

## 2024-11-15 DIAGNOSIS — J44.9 COPD, SEVERE (HCC): Primary | ICD-10-CM

## 2024-11-15 RX ORDER — FLUTICASONE FUROATE, UMECLIDINIUM BROMIDE AND VILANTEROL TRIFENATATE 200; 62.5; 25 UG/1; UG/1; UG/1
1 POWDER RESPIRATORY (INHALATION) DAILY
Qty: 180 BLISTER | Refills: 3 | Status: SHIPPED | OUTPATIENT
Start: 2024-11-15 | End: 2025-11-10

## 2024-11-15 RX ORDER — FLUTICASONE FUROATE, UMECLIDINIUM BROMIDE AND VILANTEROL TRIFENATATE 200; 62.5; 25 UG/1; UG/1; UG/1
1 POWDER RESPIRATORY (INHALATION) DAILY
Qty: 60 BLISTER | Refills: 5 | Status: SHIPPED | OUTPATIENT
Start: 2024-11-15 | End: 2024-11-15

## 2024-11-15 NOTE — TELEPHONE ENCOUNTER
Patient called the RX Refill Line. Message is being forwarded to the office.     Patient called requesting a refill on Trelegy. The medication is not in his profile, but he stated he was prescribed the medication    Please contact patient at [phone] to discuss the medication further

## 2024-11-26 ENCOUNTER — VBI (OUTPATIENT)
Dept: ADMINISTRATIVE | Facility: OTHER | Age: 59
End: 2024-11-26

## 2024-11-26 DIAGNOSIS — E11.40 TYPE 2 DIABETES MELLITUS WITH DIABETIC NEUROPATHY, WITH LONG-TERM CURRENT USE OF INSULIN (HCC): ICD-10-CM

## 2024-11-26 DIAGNOSIS — Z79.4 TYPE 2 DIABETES MELLITUS WITH DIABETIC NEUROPATHY, WITH LONG-TERM CURRENT USE OF INSULIN (HCC): ICD-10-CM

## 2024-11-26 NOTE — TELEPHONE ENCOUNTER
Reason for call:   [x] Refill   [] Prior Auth  [] Other:     Office:   [x] PCP/Provider -   [] Specialty/Provider -     Medication: Ozempic, 2 MG/DOSE, 8 MG/3ML injection pen     Dose/Frequency:  Inject 0.75 mL (2 mg total) under the skin every 7 days     Quantity:  9 mL     Pharmacy: : [facility]-[address]     Does the patient have enough for 3 days?   [x] Yes   [] No - Send as HP to POD

## 2024-11-26 NOTE — TELEPHONE ENCOUNTER
11/26/24 11:23 AM     Chart reviewed for Microalbumin Creatinine Urine Ratio OR Albumin Creatinine Urine Ratio  ; nothing is submitted to the patient's insurance at this time.     Ana Maria Rodriguez   [facility]

## 2024-11-27 DIAGNOSIS — Z79.4 TYPE 2 DIABETES MELLITUS WITH DIABETIC NEUROPATHY, WITH LONG-TERM CURRENT USE OF INSULIN (HCC): ICD-10-CM

## 2024-11-27 DIAGNOSIS — E11.40 TYPE 2 DIABETES MELLITUS WITH DIABETIC NEUROPATHY, WITH LONG-TERM CURRENT USE OF INSULIN (HCC): ICD-10-CM

## 2024-11-27 RX ORDER — SEMAGLUTIDE 2.68 MG/ML
2 INJECTION, SOLUTION SUBCUTANEOUS
Qty: 3 ML | Refills: 0 | Status: SHIPPED | OUTPATIENT
Start: 2024-11-27 | End: 2024-11-27 | Stop reason: SDUPTHER

## 2024-11-27 RX ORDER — SEMAGLUTIDE 2.68 MG/ML
2 INJECTION, SOLUTION SUBCUTANEOUS
Qty: 9 ML | Refills: 3 | Status: SHIPPED | OUTPATIENT
Start: 2024-11-27

## 2024-11-27 NOTE — TELEPHONE ENCOUNTER
Patient needs updated blood work and has previously placed orders. Please contact patient to go for labs. Courtesy refill provided.    A1C needed

## 2024-11-29 DIAGNOSIS — E11.40 TYPE 2 DIABETES MELLITUS WITH DIABETIC NEUROPATHY, WITH LONG-TERM CURRENT USE OF INSULIN (HCC): ICD-10-CM

## 2024-11-29 DIAGNOSIS — J44.9 COPD, SEVERE (HCC): ICD-10-CM

## 2024-11-29 DIAGNOSIS — Z79.4 TYPE 2 DIABETES MELLITUS WITH DIABETIC NEUROPATHY, WITH LONG-TERM CURRENT USE OF INSULIN (HCC): ICD-10-CM

## 2024-12-02 RX ORDER — IPRATROPIUM BROMIDE AND ALBUTEROL SULFATE 2.5; .5 MG/3ML; MG/3ML
3 SOLUTION RESPIRATORY (INHALATION) 4 TIMES DAILY
Qty: 360 ML | Refills: 10 | Status: SHIPPED | OUTPATIENT
Start: 2024-12-02

## 2024-12-02 RX ORDER — INSULIN GLARGINE 300 U/ML
INJECTION, SOLUTION SUBCUTANEOUS
Qty: 12 ML | Refills: 5 | Status: SHIPPED | OUTPATIENT
Start: 2024-12-02

## 2024-12-06 ENCOUNTER — IMMUNIZATIONS (OUTPATIENT)
Dept: FAMILY MEDICINE CLINIC | Facility: CLINIC | Age: 59
End: 2024-12-06
Payer: COMMERCIAL

## 2024-12-06 DIAGNOSIS — Z23 ENCOUNTER FOR IMMUNIZATION: Primary | ICD-10-CM

## 2024-12-06 PROCEDURE — 90673 RIV3 VACCINE NO PRESERV IM: CPT

## 2024-12-06 PROCEDURE — 90471 IMMUNIZATION ADMIN: CPT

## 2024-12-19 ENCOUNTER — TELEPHONE (OUTPATIENT)
Age: 59
End: 2024-12-19

## 2024-12-19 NOTE — TELEPHONE ENCOUNTER
"Asked the patient if he could send in a picture of his \"black [place]\" on his foot. Told him I would talk to Dr. Frausto about the spot and see if he would like to see him sooner.  "

## 2024-12-20 ENCOUNTER — TELEPHONE (OUTPATIENT)
Dept: PULMONOLOGY | Facility: CLINIC | Age: 59
End: 2024-12-20

## 2024-12-20 NOTE — TELEPHONE ENCOUNTER
Submitted renewal for Tezspire auto injector pens to [facility] via [employer]. Auth #: [id number]. Case Pending.

## 2024-12-20 NOTE — TELEPHONE ENCOUNTER
Caller: Melvin    Doctor: Jett    Reason for call: Returning a phone call from yesterday     Call back#: [phone]

## 2024-12-30 DIAGNOSIS — E11.69 TYPE 2 DIABETES MELLITUS WITH OTHER SPECIFIED COMPLICATION, WITHOUT LONG-TERM CURRENT USE OF INSULIN (HCC): ICD-10-CM

## 2024-12-30 DIAGNOSIS — E11.65 UNCONTROLLED TYPE 2 DIABETES MELLITUS WITH HYPERGLYCEMIA (HCC): ICD-10-CM

## 2024-12-30 DIAGNOSIS — I95.2 HYPOTENSION DUE TO DRUGS: ICD-10-CM

## 2024-12-31 RX ORDER — DAPAGLIFLOZIN 10 MG/1
10 TABLET, FILM COATED ORAL DAILY
Qty: 30 TABLET | Refills: 5 | Status: SHIPPED | OUTPATIENT
Start: 2024-12-31

## 2024-12-31 RX ORDER — LOSARTAN POTASSIUM 50 MG/1
50 TABLET ORAL DAILY
Qty: 30 TABLET | Refills: 5 | Status: SHIPPED | OUTPATIENT
Start: 2024-12-31

## 2025-01-06 ENCOUNTER — OFFICE VISIT (OUTPATIENT)
Dept: PULMONOLOGY | Facility: CLINIC | Age: 60
End: 2025-01-06
Payer: COMMERCIAL

## 2025-01-06 VITALS
HEIGHT: 69 IN | SYSTOLIC BLOOD PRESSURE: 110 MMHG | BODY MASS INDEX: 46.65 KG/M2 | WEIGHT: 315 LBS | HEART RATE: 87 BPM | DIASTOLIC BLOOD PRESSURE: 71 MMHG | TEMPERATURE: 97.6 F | OXYGEN SATURATION: 93 %

## 2025-01-06 DIAGNOSIS — J96.11 CHRONIC RESPIRATORY FAILURE WITH HYPOXIA (HCC): ICD-10-CM

## 2025-01-06 DIAGNOSIS — G47.33 OSA (OBSTRUCTIVE SLEEP APNEA): ICD-10-CM

## 2025-01-06 DIAGNOSIS — J45.50 SEVERE PERSISTENT ASTHMA WITHOUT COMPLICATION: Primary | ICD-10-CM

## 2025-01-06 PROCEDURE — 99214 OFFICE O/P EST MOD 30 MIN: CPT | Performed by: PHYSICIAN ASSISTANT

## 2025-01-06 NOTE — PROGRESS NOTES
Name: Jey Vicente      : 1965      MRN: [id number]  Encounter Provider: Lj Pickett PA-C  Encounter Date: 2025   Encounter department: [facility] PULMONARY [facility]  :  Assessment & Plan  Severe persistent asthma without complication  No evidence of acute exacerbation on today's exam.  No need for systemic steroids at this time.  Continue Trelegy 200/62.5/25 mcg 1 puff daily.  Continue albuterol HFA/nebs every 6 hours as needed  Continue Tezspire subq injections every 28 days.   He knows to call with any worsening symptoms.  Encouraged him to get RSV vaccine at local pharmacy.        SCOTT (obstructive sleep apnea)  Continue auto BiPAP with 2L  bled in during all hours of sleep.   He endorses improved sleep with the use of PAP therapy.        Chronic respiratory failure with hypoxia (HCC)  Seen on room air with adequate oxygenation but does have supplemental O2 at home for which he uses on an as-needed basis to maintain saturations greater than or equal to 88% and at night bled into bipap.            History of Present Illness   Jey Vicente is a 59 y.o. male who presents to the office today for routine follow-up.  He has a history of severe persistent asthma, SCOTT, chronic hypoxic respiratory failure, positive double-stranded DNA antibody test.  Patient was last in our office in September.  In that timeframe he has not required systemic steroids or antibiotics for respiratory related illness.    Maintained on Trelegy once daily.  He uses DuoNeb 2-3 times a day as needed.  Rarely uses rescue inhaler.  Patient was previously having difficulty getting Fasenra's shipped to him on time and has since been switched to Tezspire. He has had 2 doses of this so far and feels that it is helping him.     Denies worsening symptoms at this time. Chronically complains of [place] with steps but wheeze and cough are not bothersome at this time. No chest pain. No fevers or chills.     Review of Systems    Constitutional:  Negative for appetite change and fever.   HENT:  Negative for ear pain, postnasal drip, rhinorrhea, sneezing, sore throat and trouble swallowing.    Respiratory:  Positive for cough, shortness of breath and wheezing.    Cardiovascular:  Negative for chest pain.   Musculoskeletal:  Positive for myalgias.   Neurological:  Negative for headaches.   All other systems reviewed and are negative.    Past Medical History   Past Medical History:   Diagnosis Date    Aortic stenosis     Arthritis 1990    Both wrists    Asthma 1964    Chronic hypoxemic respiratory failure (HCC)     Chronic kidney disease 10/2022    COPD (chronic obstructive pulmonary disease) ([facility])     Coronary artery disease 2000    Aortic stenosis    Diabetes ([facility])     Diabetes mellitus ([facility])     Hyperlipidemia     Hypertension 07/02/2014    Lung disease Birth    Neuropathy in diabetes ([facility]) 1/2022    Pneumonia 2015    Sleep apnea, obstructive      Past Surgical History:   Procedure Laterality Date    ACCESSORY NAVICULAR EXCISION Right     APPENDECTOMY      CARDIAC CATHETERIZATION N/A 05/25/2023    Procedure: Cardiac Coronary Angiogram;  Surgeon: Mohit Farris MD;  Location: [facility];  Service: Cardiology    EAR FOREIGN BODY REMOVAL      cyst removal. bronchospasms after general anesthesia.    EYE SURGERY Left     traumatic injury at age 9-10    TONSILLECTOMY  No     Family History   Problem Relation Age of Onset    Cancer Mother         Skin Cancer    Heart disease Father     Lung cancer Father         Passed away 1997    Cancer Father         Lung cancer    Hypertension Father     ALS Maternal Grandmother     Diabetes Maternal Grandfather     Stomach cancer Maternal Grandfather     Arthritis Maternal Grandfather       reports that he quit smoking about 19 years ago. His smoking use included cigarettes. He started smoking about 40 years ago. He has a 90.1 pack-year smoking history. He has never used smokeless tobacco. He  reports that he does not drink alcohol and does not use drugs.  Current Outpatient Medications on File Prior to Visit   Medication Sig Dispense Refill    albuterol (Ventolin HFA) 90 mcg/act inhaler Inhale 2 puffs every 6 (six) hours as needed for wheezing 18 g 3    fluticasone-umeclidinium-vilanterol (Trelegy Ellipta) 200-62.5-25 mcg/actuation AEPB inhaler Inhale 1 puff daily Rinse mouth after use. 180 blister 3    ipratropium-albuterol (DUO-NEB) 0.5-2.5 mg/3 mL nebulizer solution INHALE CONTENTS OF 1 VIAL VIA NEBULIZER FOUR TIMES A  mL 10    Tezepelumab-ekko (Tezspire) 210 MG/1.91ML SOAJ Inject 210 mg under the skin every 28 days 1 mL 11    Alcohol Swabs (B-D SINGLE USE SWABS REGULAR) PADS USE TO CLEAN INJECTION SITE      aspirin (ECOTRIN LOW STRENGTH) 81 mg EC tablet Take 1 tablet (81 mg total) by mouth daily 30 tablet 5    atorvastatin (LIPITOR) 40 mg tablet TAKE 1 TABLET BY MOUTH DAILY 90 tablet 3    Blood Glucose Monitoring Suppl (CONTOUR NEXT MONITOR) w/Device KIT Test blood sugar once daily or as needed for fluctuating blood sugars (Patient not taking: Reported on 6/17/2024) 1 kit 0    carvedilol (COREG) 12.5 mg tablet Take 1 tablet (12.5 mg total) by mouth 2 (two) times a day with meals 180 tablet 3    Continuous Glucose Sensor (FreeStyle Moe 3 Sensor) MISC Use 1 Units every 14 (fourteen) days      cyclobenzaprine (FLEXERIL) 10 mg tablet Take 10 mg by mouth      dapagliflozin (Farxiga) 10 MG tablet Take 1 tablet (10 mg total) by mouth daily 90 tablet 1    dapagliflozin (Farxiga) 10 MG tablet TAKE 1 TABLET BY MOUTH ONCE DAILY 30 tablet 5    EPINEPHrine (EPIPEN) 0.3 mg/0.3 mL SOAJ Inject 0.3 mL (0.3 mg total) into a muscle once for 1 dose 0.6 mL 0    furosemide (LASIX) 40 mg tablet TAKE 1 TABLET BY MOUTH TWICE DAILY 180 tablet 3    glimepiride (AMARYL) 4 mg tablet TAKE 1 TABLET BY MOUTH TWICE DAILY 60 tablet 10    glimepiride (AMARYL) 4 mg tablet Take 1 tablet (4 mg total) by mouth daily with  breakfast 180 tablet 1    insulin glargine (Toujeo Max SoloStar) 300 units/mL CONCENTRATED U-300 injection pen (2-unit dial) INJECT 70 UNITS SUBCUTANEOUSLY AT BEDTIME 12 mL 5    Insulin Pen Needle (BD Pen Needle Dee Dee U/F) 32G X 4 MM MISC Use 4 a day 200 each 5    Insulin Pen Needle (BD Pen Needle Dee Dee U/F) 32G X 4 MM MISC USE TO INJECT INSULIN 100 each 10    losartan (COZAAR) 50 mg tablet TAKE 1 TABLET BY MOUTH DAILY 30 tablet 5    metFORMIN (GLUCOPHAGE) 1000 MG tablet Take 1 tablet (1,000 mg total) by mouth 2 (two) times a day with meals 180 tablet 0    metFORMIN (GLUCOPHAGE) 1000 MG tablet TAKE 1 TABLET BY MOUTH TWICE DAILY (Patient not taking: Reported on 5/28/2024) 60 tablet 10    metFORMIN (GLUCOPHAGE) 1000 MG tablet TAKE 1 TABLET BY MOUTH TWICE DAILY 60 tablet 10    Multiple Vitamins-Minerals (MENS MULTIVITAMIN PO) Take by mouth      Ozempic, 2 MG/DOSE, 8 MG/3ML injection pen Inject 0.75 mL (2 mg total) under the skin every 7 days 9 mL 3    predniSONE 20 mg tablet Take 2 tablets (40 mg total) by mouth daily (Patient not taking: Reported on 1/6/2025) 10 tablet 0    pregabalin (LYRICA) 50 mg capsule TAKE 1 CAPSULE BY MOUTH 3 TIMES DAILY 90 capsule 3    sertraline (ZOLOFT) 50 mg tablet Take 1 tablet (50 mg total) by mouth daily 90 tablet 3    spironolactone (ALDACTONE) 50 mg tablet TAKE 1 TABLET BY MOUTH DAILY 90 tablet 3     No current facility-administered medications on file prior to visit.   No Known Allergies   Current Outpatient Medications on File Prior to Visit   Medication Sig Dispense Refill    albuterol (Ventolin HFA) 90 mcg/act inhaler Inhale 2 puffs every 6 (six) hours as needed for wheezing 18 g 3    fluticasone-umeclidinium-vilanterol (Trelegy Ellipta) 200-62.5-25 mcg/actuation AEPB inhaler Inhale 1 puff daily Rinse mouth after use. 180 blister 3    ipratropium-albuterol (DUO-NEB) 0.5-2.5 mg/3 mL nebulizer solution INHALE CONTENTS OF 1 VIAL VIA NEBULIZER FOUR TIMES A  mL 10     Tezepelumab-ekko (Tezspire) 210 MG/1.91ML SOAJ Inject 210 mg under the skin every 28 days 1 mL 11    Alcohol Swabs (B-D SINGLE USE SWABS REGULAR) PADS USE TO CLEAN INJECTION SITE      aspirin (ECOTRIN LOW STRENGTH) 81 mg EC tablet Take 1 tablet (81 mg total) by mouth daily 30 tablet 5    atorvastatin (LIPITOR) 40 mg tablet TAKE 1 TABLET BY MOUTH DAILY 90 tablet 3    Blood Glucose Monitoring Suppl (CONTOUR NEXT MONITOR) w/Device KIT Test blood sugar once daily or as needed for fluctuating blood sugars (Patient not taking: Reported on 6/17/2024) 1 kit 0    carvedilol (COREG) 12.5 mg tablet Take 1 tablet (12.5 mg total) by mouth 2 (two) times a day with meals 180 tablet 3    Continuous Glucose Sensor (FreeStyle Moe 3 Sensor) MISC Use 1 Units every 14 (fourteen) days      cyclobenzaprine (FLEXERIL) 10 mg tablet Take 10 mg by mouth      dapagliflozin (Farxiga) 10 MG tablet Take 1 tablet (10 mg total) by mouth daily 90 tablet 1    dapagliflozin (Farxiga) 10 MG tablet TAKE 1 TABLET BY MOUTH ONCE DAILY 30 tablet 5    EPINEPHrine (EPIPEN) 0.3 mg/0.3 mL SOAJ Inject 0.3 mL (0.3 mg total) into a muscle once for 1 dose 0.6 mL 0    furosemide (LASIX) 40 mg tablet TAKE 1 TABLET BY MOUTH TWICE DAILY 180 tablet 3    glimepiride (AMARYL) 4 mg tablet TAKE 1 TABLET BY MOUTH TWICE DAILY 60 tablet 10    glimepiride (AMARYL) 4 mg tablet Take 1 tablet (4 mg total) by mouth daily with breakfast 180 tablet 1    insulin glargine (Toujeo Max SoloStar) 300 units/mL CONCENTRATED U-300 injection pen (2-unit dial) INJECT 70 UNITS SUBCUTANEOUSLY AT BEDTIME 12 mL 5    Insulin Pen Needle (BD Pen Needle Dee Dee U/F) 32G X 4 MM MISC Use 4 a day 200 each 5    Insulin Pen Needle (BD Pen Needle Dee Dee U/F) 32G X 4 MM MISC USE TO INJECT INSULIN 100 each 10    losartan (COZAAR) 50 mg tablet TAKE 1 TABLET BY MOUTH DAILY 30 tablet 5    metFORMIN (GLUCOPHAGE) 1000 MG tablet Take 1 tablet (1,000 mg total) by mouth 2 (two) times a day with meals 180 tablet 0     "metFORMIN (GLUCOPHAGE) 1000 MG tablet TAKE 1 TABLET BY MOUTH TWICE DAILY (Patient not taking: Reported on 2024) 60 tablet 10    metFORMIN (GLUCOPHAGE) 1000 MG tablet TAKE 1 TABLET BY MOUTH TWICE DAILY 60 tablet 10    Multiple Vitamins-Minerals (MENS MULTIVITAMIN PO) Take by mouth      Ozempic, 2 MG/DOSE, 8 MG/3ML injection pen Inject 0.75 mL (2 mg total) under the skin every 7 days 9 mL 3    predniSONE 20 mg tablet Take 2 tablets (40 mg total) by mouth daily (Patient not taking: Reported on 2025) 10 tablet 0    pregabalin (LYRICA) 50 mg capsule TAKE 1 CAPSULE BY MOUTH 3 TIMES DAILY 90 capsule 3    sertraline (ZOLOFT) 50 mg tablet Take 1 tablet (50 mg total) by mouth daily 90 tablet 3    spironolactone (ALDACTONE) 50 mg tablet TAKE 1 TABLET BY MOUTH DAILY 90 tablet 3     No current facility-administered medications on file prior to visit.      Social History     Tobacco Use    Smoking status: Former     Current packs/day: 0.00     Average packs/day: 3.0 packs/day for 30.0 years (90.1 ttl pk-yrs)     Types: Cigarettes     Start date: 3/15/1984     Quit date: 7/15/2005     Years since quittin.4    Smokeless tobacco: Never   Vaping Use    Vaping status: Never Used   Substance and Sexual Activity    Alcohol use: Never    Drug use: Never    Sexual activity: Yes     Partners: Female     Birth control/protection: None        Historical Information   Tobacco History: quit 19 years ago  Occupational History: constable     Objective   /71 (BP Location: Right arm, Patient Position: Sitting, Cuff Size: Extra-Large)   Pulse 87   Temp 97.6 °F (36.4 °C) (Temporal)   Ht 5' 9\" (1.753 m)   Wt (!) 156 kg (343 lb)   SpO2 93%   BMI 50.65 kg/m²      Physical Exam  Vitals reviewed.   Constitutional:       Appearance: Normal appearance. He is well-developed.   HENT:      Head: Normocephalic and atraumatic.      Nose: Nose normal.      Mouth/Throat:      Mouth: Mucous membranes are moist.   Eyes:      Extraocular " Movements: Extraocular movements intact.   Cardiovascular:      Rate and Rhythm: Normal rate and regular rhythm.      Heart sounds: Normal heart sounds.   Pulmonary:      Effort: Pulmonary effort is normal. No respiratory distress.      Breath sounds: No wheezing, rhonchi or rales.   Musculoskeletal:         General: No swelling.   Skin:     General: Skin is warm and dry.   Neurological:      Mental Status: He is alert. Mental status is at baseline.   Psychiatric:         Mood and Affect: Mood normal.         Behavior: Behavior normal.       Lab Results: I have reviewed pertinent labs.    Radiology Results Review: I personally reviewed the following image studies in PACS and associated radiology reports: CT chest. My interpretation of the radiology images/reports is: CT chest without contrast 9/13/2020/resolution of previously seen right upper lobe pneumonia, near complete resolution of right basilar pneumonia.  Other Study Results: none to review   PFT Results Reviewed: reviewed      Answers submitted by the patient for this visit:  Pulmonology Questionnaire (Submitted on 1/5/2025)  Chief Complaint: Primary symptoms  Do you have difficulty breathing?: Yes  Do you experience frequent throat clearing?: Yes  Do you have a hoarse voice?: Yes  Do you have a wet cough?: Yes  Chronicity: chronic  When did you first notice your symptoms?: more than 1 year ago  How often do your symptoms occur?: constantly  Since you first noticed this problem, how has it changed?: unchanged  Do you have shortness of breath that occurs with effort or exertion?: Yes  Do you have ear congestion?: No  Do you have heartburn?: No  Do you have fatigue?: Yes  Do you have nasal congestion?: No  Do you have shortness of breath when lying flat?: No  Do you have shortness of breath when you wake up?: No  Do you have sweats?: No  Have you experienced weight loss?: No  Which of the following makes your symptoms worse?: any activity, change in weather,  climbing stairs, emotional stress, exercise, exposure to fumes, exposure to smoke, strenuous activity  Which of the following makes your symptoms better?: cold air, oral steroids, steroid inhaler

## 2025-01-08 NOTE — ASSESSMENT & PLAN NOTE
Continue auto BiPAP with 2L  bled in during all hours of sleep.   He endorses improved sleep with the use of PAP therapy.

## 2025-01-08 NOTE — ASSESSMENT & PLAN NOTE
No evidence of acute exacerbation on today's exam.  No need for systemic steroids at this time.  Continue Trelegy 200/62.5/25 mcg 1 puff daily.  Continue albuterol HFA/nebs every 6 hours as needed  Continue Tezspire subq injections every 28 days.   He knows to call with any worsening symptoms.  Encouraged him to get RSV vaccine at local pharmacy.

## 2025-01-23 ENCOUNTER — APPOINTMENT (EMERGENCY)
Dept: RADIOLOGY | Facility: HOSPITAL | Age: 60
End: 2025-01-23
Payer: COMMERCIAL

## 2025-01-23 ENCOUNTER — HOSPITAL ENCOUNTER (EMERGENCY)
Facility: HOSPITAL | Age: 60
Discharge: HOME | End: 2025-01-23
Attending: EMERGENCY MEDICINE
Payer: COMMERCIAL

## 2025-01-23 ENCOUNTER — NURSE TRIAGE (OUTPATIENT)
Age: 60
End: 2025-01-23

## 2025-01-23 VITALS
BODY MASS INDEX: 50.3 KG/M2 | SYSTOLIC BLOOD PRESSURE: 124 MMHG | HEART RATE: 83 BPM | DIASTOLIC BLOOD PRESSURE: 68 MMHG | WEIGHT: 315 LBS | OXYGEN SATURATION: 90 % | RESPIRATION RATE: 18 BRPM | TEMPERATURE: 97.4 F

## 2025-01-23 DIAGNOSIS — I51.89 DIASTOLIC DYSFUNCTION WITHOUT HEART FAILURE: ICD-10-CM

## 2025-01-23 DIAGNOSIS — I50.9 CHF (CONGESTIVE HEART FAILURE) (HCC): Primary | ICD-10-CM

## 2025-01-23 DIAGNOSIS — J02.9 PHARYNGITIS: ICD-10-CM

## 2025-01-23 LAB
2HR DELTA HS TROPONIN: 1 NG/L
ALBUMIN SERPL BCG-MCNC: 4.1 G/DL (ref 3.5–5)
ALP SERPL-CCNC: 74 U/L (ref 34–104)
ALT SERPL W P-5'-P-CCNC: 21 U/L (ref 7–52)
ANION GAP SERPL CALCULATED.3IONS-SCNC: 10 MMOL/L (ref 4–13)
AST SERPL W P-5'-P-CCNC: 12 U/L (ref 13–39)
BASOPHILS # BLD AUTO: 0.02 THOUSANDS/ΜL (ref 0–0.1)
BASOPHILS NFR BLD AUTO: 0 % (ref 0–1)
BILIRUB SERPL-MCNC: 0.72 MG/DL (ref 0.2–1)
BNP SERPL-MCNC: 137 PG/ML (ref 0–100)
BUN SERPL-MCNC: 12 MG/DL (ref 5–25)
CALCIUM SERPL-MCNC: 9.1 MG/DL (ref 8.4–10.2)
CARDIAC TROPONIN I PNL SERPL HS: 5 NG/L (ref ?–50)
CARDIAC TROPONIN I PNL SERPL HS: 6 NG/L (ref ?–50)
CHLORIDE SERPL-SCNC: 104 MMOL/L (ref 96–108)
CO2 SERPL-SCNC: 24 MMOL/L (ref 21–32)
CREAT SERPL-MCNC: 0.7 MG/DL (ref 0.6–1.3)
D DIMER PPP FEU-MCNC: <0.27 UG/ML FEU
EOSINOPHIL # BLD AUTO: 0.08 THOUSAND/ΜL (ref 0–0.61)
EOSINOPHIL NFR BLD AUTO: 1 % (ref 0–6)
ERYTHROCYTE [DISTWIDTH] IN BLOOD BY AUTOMATED COUNT: 14.9 % (ref 11.6–15.1)
FLUAV AG UPPER RESP QL IA.RAPID: NEGATIVE
FLUBV AG UPPER RESP QL IA.RAPID: NEGATIVE
GFR SERPL CREATININE-BSD FRML MDRD: 102 ML/MIN/1.73SQ M
GLUCOSE SERPL-MCNC: 198 MG/DL (ref 65–140)
HCT VFR BLD AUTO: 46.2 % (ref 36.5–49.3)
HGB BLD-MCNC: 15.5 G/DL (ref 12–17)
IMM GRANULOCYTES # BLD AUTO: 0.05 THOUSAND/UL (ref 0–0.2)
IMM GRANULOCYTES NFR BLD AUTO: 1 % (ref 0–2)
LYMPHOCYTES # BLD AUTO: 0.85 THOUSANDS/ΜL (ref 0.6–4.47)
LYMPHOCYTES NFR BLD AUTO: 8 % (ref 14–44)
MAGNESIUM SERPL-MCNC: 2 MG/DL (ref 1.9–2.7)
MCH RBC QN AUTO: 27.4 PG (ref 26.8–34.3)
MCHC RBC AUTO-ENTMCNC: 33.5 G/DL (ref 31.4–37.4)
MCV RBC AUTO: 82 FL (ref 82–98)
MONOCYTES # BLD AUTO: 0.77 THOUSAND/ΜL (ref 0.17–1.22)
MONOCYTES NFR BLD AUTO: 7 % (ref 4–12)
NEUTROPHILS # BLD AUTO: 8.88 THOUSANDS/ΜL (ref 1.85–7.62)
NEUTS SEG NFR BLD AUTO: 83 % (ref 43–75)
NRBC BLD AUTO-RTO: 0 /100 WBCS
PLATELET # BLD AUTO: 216 THOUSANDS/UL (ref 149–390)
PMV BLD AUTO: 8.9 FL (ref 8.9–12.7)
POTASSIUM SERPL-SCNC: 3.9 MMOL/L (ref 3.5–5.3)
PROCALCITONIN SERPL-MCNC: 0.07 NG/ML
PROT SERPL-MCNC: 6.7 G/DL (ref 6.4–8.4)
RBC # BLD AUTO: 5.65 MILLION/UL (ref 3.88–5.62)
SARS-COV+SARS-COV-2 AG RESP QL IA.RAPID: NEGATIVE
SODIUM SERPL-SCNC: 138 MMOL/L (ref 135–147)
WBC # BLD AUTO: 10.65 THOUSAND/UL (ref 4.31–10.16)

## 2025-01-23 PROCEDURE — 96374 THER/PROPH/DIAG INJ IV PUSH: CPT

## 2025-01-23 PROCEDURE — 87811 SARS-COV-2 COVID19 W/OPTIC: CPT | Performed by: PHYSICIAN ASSISTANT

## 2025-01-23 PROCEDURE — 93005 ELECTROCARDIOGRAM TRACING: CPT

## 2025-01-23 PROCEDURE — 84145 PROCALCITONIN (PCT): CPT | Performed by: PHYSICIAN ASSISTANT

## 2025-01-23 PROCEDURE — 83880 ASSAY OF NATRIURETIC PEPTIDE: CPT | Performed by: PHYSICIAN ASSISTANT

## 2025-01-23 PROCEDURE — 36415 COLL VENOUS BLD VENIPUNCTURE: CPT | Performed by: PHYSICIAN ASSISTANT

## 2025-01-23 PROCEDURE — 83735 ASSAY OF MAGNESIUM: CPT | Performed by: PHYSICIAN ASSISTANT

## 2025-01-23 PROCEDURE — 85379 FIBRIN DEGRADATION QUANT: CPT | Performed by: PHYSICIAN ASSISTANT

## 2025-01-23 PROCEDURE — 99285 EMERGENCY DEPT VISIT HI MDM: CPT

## 2025-01-23 PROCEDURE — 99285 EMERGENCY DEPT VISIT HI MDM: CPT | Performed by: PHYSICIAN ASSISTANT

## 2025-01-23 PROCEDURE — 85025 COMPLETE CBC W/AUTO DIFF WBC: CPT | Performed by: PHYSICIAN ASSISTANT

## 2025-01-23 PROCEDURE — 84484 ASSAY OF TROPONIN QUANT: CPT | Performed by: PHYSICIAN ASSISTANT

## 2025-01-23 PROCEDURE — 80053 COMPREHEN METABOLIC PANEL: CPT | Performed by: PHYSICIAN ASSISTANT

## 2025-01-23 PROCEDURE — 94640 AIRWAY INHALATION TREATMENT: CPT

## 2025-01-23 PROCEDURE — 87804 INFLUENZA ASSAY W/OPTIC: CPT | Performed by: PHYSICIAN ASSISTANT

## 2025-01-23 PROCEDURE — 71045 X-RAY EXAM CHEST 1 VIEW: CPT

## 2025-01-23 RX ORDER — IPRATROPIUM BROMIDE AND ALBUTEROL SULFATE 2.5; .5 MG/3ML; MG/3ML
3 SOLUTION RESPIRATORY (INHALATION) ONCE
Status: COMPLETED | OUTPATIENT
Start: 2025-01-23 | End: 2025-01-23

## 2025-01-23 RX ORDER — AMOXICILLIN 500 MG/1
500 CAPSULE ORAL EVERY 8 HOURS SCHEDULED
Qty: 21 CAPSULE | Refills: 0 | Status: SHIPPED | OUTPATIENT
Start: 2025-01-23 | End: 2025-01-30

## 2025-01-23 RX ORDER — CARVEDILOL 12.5 MG/1
TABLET ORAL
Qty: 180 TABLET | Refills: 1 | Status: SHIPPED | OUTPATIENT
Start: 2025-01-23

## 2025-01-23 RX ADMIN — MORPHINE SULFATE 2 MG: 2 INJECTION, SOLUTION INTRAMUSCULAR; INTRAVENOUS at 15:28

## 2025-01-23 RX ADMIN — IPRATROPIUM BROMIDE AND ALBUTEROL SULFATE 3 ML: 2.5; .5 SOLUTION RESPIRATORY (INHALATION) at 15:28

## 2025-01-23 NOTE — ED PROVIDER NOTES
Time reflects when diagnosis was documented in both MDM as applicable and the Disposition within this note       Time User Action Codes Description Comment    1/23/2025  6:21 PM Griffin Mejia Add [I50.9] CHF (congestive heart failure) ([facility])     1/23/2025  6:21 PM Griffin Mejia Add [J02.9] Pharyngitis           ED Disposition       ED Disposition   Discharge    Condition   Stable    Date/Time   Thu Jan 23, 2025  6:21 PM    Comment   Jey Cinthia discharge to home/self care.                   Assessment & Plan       Medical Decision Making  59-year-old male here for evaluation of cough shortness of breath asthma exacerbations.  See HPI for further details differential diagnosis includes acute coronary syndrome, pulmonary embolism, pneumonia, COVID, influenza, pneumothorax, sepsis.    Chest x-ray shows mild venous congestion mild BNP elevation troponin x 2 EKG normal.  No COVID no flu nor pneumonia.  Still has a sore throat.  Will treat with Amoxil have him double his Lasix for 2 days only then retreat back to his standard dosing.  He admits to missing today and yesterday's dose secondary to sore throat.    Amount and/or Complexity of Data Reviewed  Labs: ordered.  Radiology: ordered.    Risk  Prescription drug management.        ED Course as of 01/23/25 1824   Thu Jan 23, 2025   1627 IMPRESSION:     Mild pulmonary venous congestion.            Medications   ipratropium-albuterol (DUO-NEB) 0.5-2.5 mg/3 mL inhalation solution 3 mL (3 mL Nebulization Given 1/23/25 1528)   morphine injection 2 mg (2 mg Intravenous Given 1/23/25 1528)       ED Risk Strat Scores   HEART Risk Score      Flowsheet Row Most Recent Value   Heart Score Risk Calculator    History 0 Filed at: 01/23/2025 1532   ECG 0 Filed at: 01/23/2025 1532   Age 1 Filed at: 01/23/2025 1532   Risk Factors 2 Filed at: 01/23/2025 1532   Troponin 0 Filed at: 01/23/2025 1532   HEART Score 3 Filed at: 01/23/2025 1532          HEART Risk Score      Flowsheet  Row Most Recent Value   Heart Score Risk Calculator    History 0 Filed at: 01/23/2025 1532   ECG 0 Filed at: 01/23/2025 1532   Age 1 Filed at: 01/23/2025 1532   Risk Factors 2 Filed at: 01/23/2025 1532   Troponin 0 Filed at: 01/23/2025 1532   HEART Score 3 Filed at: 01/23/2025 1532                            SBIRT 22yo+      Flowsheet Row Most Recent Value   Initial Alcohol Screen: US AUDIT-C     1. How often do you have a drink containing alcohol? 0 Filed at: 01/23/2025 1504   2. How many drinks containing alcohol do you have on a typical day you are drinking?  0 Filed at: 01/23/2025 1504   3a. Male UNDER 65: How often do you have five or more drinks on one occasion? 0 Filed at: 01/23/2025 1504   3b. FEMALE Any Age, or MALE 65+: How often do you have 4 or more drinks on one occassion? 0 Filed at: 01/23/2025 1504   Audit-C Score 0 Filed at: 01/23/2025 1504   [place]: How many times in the past year have you...    Used an illegal drug or used a prescription medication for non-medical reasons? Never Filed at: 01/23/2025 1504                            History of Present Illness       Chief Complaint   Patient presents with    Shortness of Breath     Patient has had sore throat, cough, and sob for two days. Hx of asthma. Denies cp        Past Medical History:   Diagnosis Date    Aortic stenosis     Arthritis 1990    Both wrists    Asthma 1964    Chronic hypoxemic respiratory failure (HCC)     Chronic kidney disease 10/2022    COPD (chronic obstructive pulmonary disease) (HCC)     Coronary artery disease 2000    Aortic stenosis    Diabetes (HCC)     Diabetes mellitus (HCC)     Hyperlipidemia     Hypertension 07/02/2014    Lung disease Birth    Neuropathy in diabetes (HCC) 1/2022    Nonrheumatic aortic (valve) stenosis     Pneumonia 2015    Sleep apnea, obstructive       Past Surgical History:   Procedure Laterality Date    ACCESSORY NAVICULAR EXCISION Right     APPENDECTOMY      CARDIAC CATHETERIZATION N/A 05/25/2023     Procedure: Cardiac Coronary Angiogram;  Surgeon: Mohit Farris MD;  Location: [facility];  Service: Cardiology    EAR FOREIGN BODY REMOVAL      cyst removal. bronchospasms after general anesthesia.    EYE SURGERY Left     traumatic injury at age 9-10    TONSILLECTOMY  No      Family History   Problem Relation Age of Onset    Cancer Mother         Skin Cancer    Heart disease Father     Lung cancer Father         Passed away     Cancer Father         Lung cancer    Hypertension Father     ALS Maternal Grandmother     Diabetes Maternal Grandfather     Stomach cancer Maternal Grandfather     Arthritis Maternal Grandfather       Social History     Tobacco Use    Smoking status: Former     Current packs/day: 0.00     Average packs/day: 3.0 packs/day for 30.0 years (90.1 ttl pk-yrs)     Types: Cigarettes     Start date: 3/15/1984     Quit date: 7/15/2005     Years since quittin.5    Smokeless tobacco: Never   Vaping Use    Vaping status: Never Used   Substance Use Topics    Alcohol use: Never    Drug use: Never      E-Cigarette/Vaping    E-Cigarette Use Never User       E-Cigarette/Vaping Substances    Nicotine No     THC No     CBD No     Flavoring No     Other No     Unknown No       I have reviewed and agree with the history as documented.     This is a 59-year-old male presenting to the emergency department today for evaluation of a dry nonproductive cough shortness of breath intermittent asthma exacerbations throughout the last few days.  Denies headache fever.  No chest pain no leg pain leg edema.  History of CHF COPD sleep apnea congestive heart failure and asthma.      Shortness of Breath  Associated symptoms: cough    Associated symptoms: no abdominal pain, no chest pain, no ear pain, no fever, no rash, no sore throat and no vomiting        Review of Systems   Constitutional:  Negative for chills and fever.   HENT:  Negative for ear pain and sore throat.    Eyes:  Negative for pain and visual  disturbance.   Respiratory:  Positive for cough and shortness of breath.    Cardiovascular:  Negative for chest pain and palpitations.   Gastrointestinal:  Negative for abdominal pain and vomiting.   Genitourinary:  Negative for dysuria and hematuria.   Musculoskeletal:  Negative for arthralgias and back pain.   Skin:  Negative for color change and rash.   Neurological:  Negative for seizures and syncope.   All other systems reviewed and are negative.          Objective       ED Triage Vitals   Temperature Pulse Blood Pressure Respirations SpO2 Patient Position - Orthostatic VS   01/23/25 1504 01/23/25 1504 01/23/25 1504 01/23/25 1504 01/23/25 1504 01/23/25 1504   (!) 96.1 °F (35.6 °C) 98 135/78 (!) 24 92 % Lying      Temp Source Heart Rate Source BP Location FiO2 (%) Pain Score    01/23/25 1504 01/23/25 1504 01/23/25 1504 -- 01/23/25 1528    Temporal Monitor Left arm  6      Vitals      Date and Time Temp Pulse SpO2 Resp BP Pain Score FACES Pain Rating User   01/23/25 1800 97 °F (36.1 °C) 82 90 % 20 136/70 6 -- [place]   01/23/25 1700 96.9 °F (36.1 °C) 85 90 % 22 136/73 6 -- [place]   01/23/25 1600 96.5 °F (35.8 °C) 84 90 % 22 122/63 6 -- [place]   01/23/25 1528 -- -- -- -- -- 6 -- CLS   01/23/25 1504 96.1 °F (35.6 °C) 98 92 % 24 135/78 -- -- CLS            Physical Exam  Vitals reviewed.   Constitutional:       General: He is not in acute distress.     Appearance: Normal appearance. He is obese. He is not ill-appearing, toxic-appearing or diaphoretic.   HENT:      Head: Normocephalic and atraumatic.      Right Ear: External ear normal.      Left Ear: External ear normal.   Eyes:      General: No scleral icterus.        Right eye: No discharge.         Left eye: No discharge.      Extraocular Movements: Extraocular movements intact.      Conjunctiva/sclera: Conjunctivae normal.   Cardiovascular:      Rate and Rhythm: Normal rate.      Pulses: Normal pulses.   Pulmonary:      Effort: Pulmonary effort is normal. No respiratory  distress.      Breath sounds: No stridor. Decreased breath sounds present. No wheezing or rhonchi.   Musculoskeletal:         General: No deformity or signs of injury.      Cervical back: Normal range of motion. No rigidity.      Right lower leg: No tenderness. No edema.      Left lower leg: No tenderness. No edema.   Skin:     General: Skin is warm.      Coloration: Skin is not jaundiced.      Findings: No lesion or rash.   Neurological:      General: No focal deficit present.      Mental Status: He is alert and oriented to person, place, and time. Mental status is at baseline.      Gait: Gait normal.   Psychiatric:         Mood and Affect: Mood normal.         Thought Content: Thought content normal.         Judgment: Judgment normal.         Results Reviewed       Procedure Component Value Units Date/Time    HS Troponin I 2hr [[id number]]  (Normal) Collected: 01/23/25 1722    Lab Status: Final result Specimen: Blood from Arm, Right Updated: 01/23/25 1753     hs TnI 2hr 6 ng/L      Delta 2hr hsTnI 1 ng/L     Procalcitonin [[id number]]  (Normal) Collected: 01/23/25 1525    Lab Status: Final result Specimen: Blood from Arm, Right Updated: 01/23/25 1602     Procalcitonin 0.07 ng/ml     Comprehensive metabolic panel [[id number]]  (Abnormal) Collected: 01/23/25 1525    Lab Status: Final result Specimen: Blood from Arm, Right Updated: 01/23/25 1600     Sodium 138 mmol/L      Potassium 3.9 mmol/L      Chloride 104 mmol/L      CO2 24 mmol/L      ANION GAP 10 mmol/L      BUN 12 mg/dL      Creatinine 0.70 mg/dL      Glucose 198 mg/dL      Calcium 9.1 mg/dL      AST 12 U/L      ALT 21 U/L      Alkaline Phosphatase 74 U/L      Total Protein 6.7 g/dL      Albumin 4.1 g/dL      Total Bilirubin 0.72 mg/dL      eGFR 102 ml/min/1.73sq m     Narrative:      National Kidney Disease Foundation guidelines for Chronic Kidney Disease (CKD):     Stage 1 with normal or high GFR (GFR > 90 mL/min/1.73 square meters)    Stage 2 Mild CKD (GFR =  60-89 mL/min/1.73 square meters)    Stage 3A Moderate CKD (GFR = 45-59 mL/min/1.73 square meters)    Stage 3B Moderate CKD (GFR = 30-44 mL/min/1.73 square meters)    Stage 4 Severe CKD (GFR = 15-29 mL/min/1.73 square meters)    Stage 5 End Stage CKD (GFR <15 mL/min/1.73 square meters)  Note: GFR calculation is accurate only with a steady state creatinine    Magnesium [[id number]]  (Normal) Collected: 01/23/25 1525    Lab Status: Final result Specimen: Blood from Arm, Right Updated: 01/23/25 1600     Magnesium 2.0 mg/dL     HS Troponin 0hr (reflex protocol) [[id number]]  (Normal) Collected: 01/23/25 1525    Lab Status: Final result Specimen: Blood from Arm, Right Updated: 01/23/25 1559     hs TnI 0hr 5 ng/L     B-Type Natriuretic Peptide(BNP) [[id number]]  (Abnormal) Collected: 01/23/25 1525    Lab Status: Final result Specimen: Blood from Arm, Right Updated: 01/23/25 1558      pg/mL     FLU/COVID Rapid Antigen (30 min. TAT) - Preferred screening test in ED [[id number]]  (Normal) Collected: 01/23/25 1525    Lab Status: Final result Specimen: Nares from Nose Updated: 01/23/25 1555     SARS COV Rapid Antigen Negative     Influenza A Rapid Antigen Negative     Influenza B Rapid Antigen Negative    Narrative:      This test has been performed using the Quidel Alaina 2 FLU+SARS Antigen test under the Emergency Use Authorization (EUA). This test has been validated by the  and verified by the performing laboratory. The Alaina uses lateral flow immunofluorescent sandwich assay to detect SARS-COV, Influenza A and Influenza B Antigen.     The Quidel Alaina 2 SARS Antigen test does not differentiate between SARS-CoV and SARS-CoV-2.     Negative results are presumptive and may be confirmed with a molecular assay, if necessary, for patient management. Negative results do not rule out SARS-CoV-2 or influenza infection and should not be used as the sole basis for treatment or patient management decisions. A negative  test result may occur if the level of antigen in a sample is below the limit of detection of this test.     Positive results are indicative of the presence of viral antigens, but do not rule out bacterial infection or co-infection with other viruses.     All test results should be used as an adjunct to clinical observations and other information available to the provider.    FOR PEDIATRIC PATIENTS - copy/paste COVID Guidelines URL to browser: [email or web address]    D-Dimer [[id number]]  (Normal) Collected: 01/23/25 1525    Lab Status: Final result Specimen: Blood from Arm, Right Updated: 01/23/25 1552     D-Dimer, Quant <0.27 ug/ml FEU     Narrative:      In the evaluation for possible pulmonary embolism, in the appropriate (Well's Score of 4 or less) patient, the age adjusted d-dimer cutoff for this patient can be calculated as:    Age x 0.01 (in ug/mL) for Age-adjusted D-dimer exclusion threshold for a patient over 50 years.    CBC and differential [[id number]]  (Abnormal) Collected: 01/23/25 1525    Lab Status: Final result Specimen: Blood from Arm, Right Updated: 01/23/25 1533     WBC 10.65 Thousand/uL      RBC 5.65 Million/uL      Hemoglobin 15.5 g/dL      Hematocrit 46.2 %      MCV 82 fL      MCH 27.4 pg      MCHC 33.5 g/dL      RDW 14.9 %      MPV 8.9 fL      Platelets 216 Thousands/uL      nRBC 0 /100 WBCs      Segmented % 83 %      Immature Grans % 1 %      Lymphocytes % 8 %      Monocytes % 7 %      Eosinophils Relative 1 %      Basophils Relative 0 %      Absolute Neutrophils 8.88 Thousands/µL      Absolute Immature Grans 0.05 Thousand/uL      Absolute Lymphocytes 0.85 Thousands/µL      Absolute Monocytes 0.77 Thousand/µL      Eosinophils Absolute 0.08 Thousand/µL      Basophils Absolute 0.02 Thousands/µL             XR chest 1 view portable   Final Interpretation by Linda Howe MD (01/23 1610)      Mild pulmonary venous congestion.             Workstation performed: [id number]             ECG 12 Lead Documentation Only    Date/Time: 1/23/2025 3:31 PM    Performed by: Griffin Mejia PA-C  Authorized by: Griffin Mejia PA-C    Indications / Diagnosis:  Sob  ECG reviewed by me, the ED Provider: yes    Patient location:  ED  Interpretation:     Interpretation: normal    Rate:     ECG rate:  85    ECG rate assessment: normal    Rhythm:     Rhythm: sinus rhythm    Ectopy:     Ectopy: none    QRS:     QRS axis:  Normal    QRS intervals:  Normal  Conduction:     Conduction: normal    ST segments:     ST segments:  Normal  T waves:     T waves: normal        ED Medication and Procedure Management   Prior to Admission Medications   Prescriptions Last Dose Informant Patient Reported? Taking?   Alcohol Swabs (B-D SINGLE USE SWABS REGULAR) PADS  Self Yes No   Sig: USE TO CLEAN INJECTION SITE   Blood Glucose Monitoring Suppl (CONTOUR NEXT MONITOR) w/Device KIT  Self No No   Sig: Test blood sugar once daily or as needed for fluctuating blood sugars   Patient not taking: Reported on 6/17/2024   Continuous Glucose Sensor (FreeStyle Moe 3 Sensor) MISC  Self Yes No   Sig: Use 1 Units every 14 (fourteen) days   EPINEPHrine (EPIPEN) 0.3 mg/0.3 mL SOAJ  Self No No   Sig: Inject 0.3 mL (0.3 mg total) into a muscle once for 1 dose   Insulin Pen Needle (BD Pen Needle Dee Dee U/F) 32G X 4 MM MISC  Self No No   Sig: Use 4 a day   Insulin Pen Needle (BD Pen Needle Dee Dee U/F) 32G X 4 MM MISC  Self No No   Sig: USE TO INJECT INSULIN   Multiple Vitamins-Minerals (MENS MULTIVITAMIN PO) Past Week Self Yes Yes   Sig: Take by mouth   Ozempic, 2 MG/DOSE, 8 MG/3ML injection pen Past Week  No Yes   Sig: Inject 0.75 mL (2 mg total) under the skin every 7 days   Tezepelumab-ekko (Tezspire) 210 MG/1.91ML SOAJ  Self No No   Sig: Inject 210 mg under the skin every 28 days   albuterol (Ventolin HFA) 90 mcg/act inhaler 1/22/2025 Self No Yes   Sig: Inhale 2 puffs every 6 (six) hours as  needed for wheezing   aspirin (ECOTRIN LOW STRENGTH) 81 mg EC tablet Past Week Self No Yes   Sig: Take 1 tablet (81 mg total) by mouth daily   atorvastatin (LIPITOR) 40 mg tablet Past Week Self No Yes   Sig: TAKE 1 TABLET BY MOUTH DAILY   carvedilol (COREG) 12.5 mg tablet   No No   Sig: TAKE ONE (1) TABLET BY MOUTH TWICE DAILY WITH MEALS   cyclobenzaprine (FLEXERIL) 10 mg tablet  Self Yes No   Sig: Take 10 mg by mouth   dapagliflozin (Farxiga) 10 MG tablet  Self No No   Sig: Take 1 tablet (10 mg total) by mouth daily   dapagliflozin (Farxiga) 10 MG tablet   No No   Sig: TAKE 1 TABLET BY MOUTH ONCE DAILY   fluticasone-umeclidinium-vilanterol (Trelegy Ellipta) 200-62.5-25 mcg/actuation AEPB inhaler   No No   Sig: Inhale 1 puff daily Rinse mouth after use.   furosemide (LASIX) 40 mg tablet Past Week Self No Yes   Sig: TAKE 1 TABLET BY MOUTH TWICE DAILY   glimepiride (AMARYL) 4 mg tablet Past Week Self No Yes   Sig: TAKE 1 TABLET BY MOUTH TWICE DAILY   glimepiride (AMARYL) 4 mg tablet Past Week Self No Yes   Sig: Take 1 tablet (4 mg total) by mouth daily with breakfast   insulin glargine (Toujeo Max SoloStar) 300 units/mL CONCENTRATED U-300 injection pen (2-unit dial)   No No   Sig: INJECT 70 UNITS SUBCUTANEOUSLY AT BEDTIME   ipratropium-albuterol (DUO-NEB) 0.5-2.5 mg/3 mL nebulizer solution Past Week  No Yes   Sig: INHALE CONTENTS OF 1 VIAL VIA NEBULIZER FOUR TIMES A DAY   losartan (COZAAR) 50 mg tablet Past Week  No Yes   Sig: TAKE 1 TABLET BY MOUTH DAILY   metFORMIN (GLUCOPHAGE) 1000 MG tablet Past Week Self No Yes   Sig: Take 1 tablet (1,000 mg total) by mouth 2 (two) times a day with meals   metFORMIN (GLUCOPHAGE) 1000 MG tablet Past Week Self No Yes   Sig: TAKE 1 TABLET BY MOUTH TWICE DAILY   metFORMIN (GLUCOPHAGE) 1000 MG tablet Past Week  No Yes   Sig: TAKE 1 TABLET BY MOUTH TWICE DAILY   predniSONE 20 mg tablet Past Week Self No Yes   Sig: Take 2 tablets (40 mg total) by mouth daily   pregabalin (LYRICA) 50 mg  capsule Past Week  No Yes   Sig: TAKE 1 CAPSULE BY MOUTH 3 TIMES DAILY   sertraline (ZOLOFT) 50 mg tablet Past Week Self No Yes   Sig: Take 1 tablet (50 mg total) by mouth daily   spironolactone (ALDACTONE) 50 mg tablet Past Week Self No Yes   Sig: TAKE 1 TABLET BY MOUTH DAILY      Facility-Administered Medications: None     Patient's Medications   Discharge Prescriptions    AMOXICILLIN (AMOXIL) 500 MG CAPSULE    Take 1 capsule (500 mg total) by mouth every 8 (eight) hours for 7 days       Start Date: 1/23/2025 End Date: 1/30/2025       Order Dose: 500 mg       Quantity: 21 capsule    Refills: 0     No discharge procedures on file.  ED SEPSIS DOCUMENTATION   Time reflects when diagnosis was documented in both MDM as applicable and the Disposition within this note       Time User Action Codes Description Comment    1/23/2025  6:21 PM Griffin Mejia [I50.9] CHF (congestive heart failure) ([facility])     1/23/2025  6:21 PM Griffin Mejia [J02.9] Pharyngitis                  Griffin Mejia PA-C  01/23/25 1823       Griffin Mejia PA-C  01/23/25 1824

## 2025-01-23 NOTE — DISCHARGE INSTRUCTIONS
Double Lasix dose for 2 days only then go back to the standard dose.  Follow-up with primary care.

## 2025-01-23 NOTE — TELEPHONE ENCOUNTER
"Pep warm tx pt to CTS, Pts wife reports pt has SOB at rest with hx of Asthmas, Pt is coughing a lot now  and states it hurts to swallow food. Wife denies pt has swelling in throat and fever at this time.    Per protocol, Triage nurse advise pt to go to the ER.   Patient expressed he wants an appt only but wife will make pt go to the ER.     Wife wanted to let the provider know she can't go to the ER until 3pm today due to their child being in school.    PCP please f/u and further advise pt.  Thank you    Reason for Disposition   SEVERE sore throat pain   MODERATE difficulty breathing (e.g., speaks in phrases, SOB even at rest, pulse 100-120) of new-onset or worse than normal    Answer Assessment - Initial Assessment Questions  1. ONSET: \"When did the throat start hurting?\" (Hours or days ago)       2 -days ago  2. SEVERITY: \"How bad is the sore throat?\" (Scale 1-10; mild, moderate or severe)      10-Severe   3. STREP EXPOSURE: \"Has there been any exposure to strep within the past week?\" If Yes, ask: \"What type of contact occurred?\"       Covid exposure   4.  VIRAL SYMPTOMS: \"Are there any symptoms of a cold, such as a runny nose, cough, hoarse voice or red eyes?\"       Cough, hoarse voice, runny nose   5. FEVER: \"Do you have a fever?\" If Yes, ask: \"What is your temperature, how was it measured, and when did it start?\"      denies  6. PUS ON THE TONSILS: \"Is there pus on the tonsils in the back of your throat?\"      Wont let the family see  7. OTHER SYMPTOMS: \"Do you have any other symptoms?\" (e.g., difficulty breathing, headache, rash)      Difficulty breathing, sob at rest   8. PREGNANCY: \"Is there any chance you are pregnant?\" \"When was your last menstrual period?\"      N/A    Answer Assessment - Initial Assessment Questions  1. RESPIRATORY STATUS: \"Describe your breathing?\" (e.g., wheezing, shortness of breath, unable to speak, severe coughing)       SOB  2. ONSET: \"When did this breathing problem begin?\"   SOB " "started out of no where   3. PATTERN \"Does the difficult breathing come and go, or has it been constant since it started?\"       Constant   4. SEVERITY: \"How bad is your breathing?\" (e.g., mild, moderate, severe)       Mod to severe   5. RECURRENT SYMPTOM: \"Have you had difficulty breathing before?\" If Yes, ask: \"When was the last time?\" and \"What happened that time?\"       Yes due to his asthma   6. CARDIAC HISTORY: \"Do you have any history of heart disease?\" (e.g., heart attack, angina, bypass surgery, angioplasty)       Yes mild cardiac hx  7. LUNG HISTORY: \"Do you have any history of lung disease?\"  (e.g., pulmonary embolus, asthma, emphysema)      Asthma   8. CAUSE: \"What do you think is causing the breathing problem?\"       Asthma an expose to covid   9. OTHER SYMPTOMS: \"Do you have any other symptoms?\" (e.g., chest pain, cough, dizziness, fever, runny nose)      Cough runny nose  10. O2 SATURATION MONITOR:  \"Do you use an oxygen saturation monitor (pulse oximeter) at home?\" If Yes, ask: \"What is your reading (oxygen level) today?\" \"What is your usual oxygen saturation reading?\" (e.g., 95%)        denies  11. PREGNANCY: \"Is there any chance you are pregnant?\" \"When was your last menstrual period?\"        N/a  12. TRAVEL: \"Have you traveled out of the country in the last month?\" (e.g., travel history, exposures)        N/a    Protocols used: Sore Throat-Adult-[place], Breathing Difficulty-Adult-[place]    "

## 2025-01-24 ENCOUNTER — VBI (OUTPATIENT)
Dept: FAMILY MEDICINE CLINIC | Facility: CLINIC | Age: 60
End: 2025-01-24

## 2025-01-24 LAB
ATRIAL RATE: 85 BPM
P AXIS: 72 DEGREES
PR INTERVAL: 160 MS
QRS AXIS: -18 DEGREES
QRSD INTERVAL: 90 MS
QT INTERVAL: 378 MS
QTC INTERVAL: 449 MS
T WAVE AXIS: 69 DEGREES
VENTRICULAR RATE: 85 BPM

## 2025-01-24 PROCEDURE — 93010 ELECTROCARDIOGRAM REPORT: CPT | Performed by: INTERNAL MEDICINE

## 2025-01-24 NOTE — TELEPHONE ENCOUNTER
01/24/25 10:43 AM    Patient contacted post ED visit, first outreach attempt made. Message was left for patient to return a call to the [facility] Department at [facility]: Phone [phone].    Thank you.  [facility]  [facility]

## 2025-01-27 ENCOUNTER — TELEPHONE (OUTPATIENT)
Dept: FAMILY MEDICINE CLINIC | Facility: CLINIC | Age: 60
End: 2025-01-27

## 2025-01-27 DIAGNOSIS — J02.9 PHARYNGITIS, UNSPECIFIED ETIOLOGY: Primary | ICD-10-CM

## 2025-01-27 RX ORDER — PREDNISONE 20 MG/1
20 TABLET ORAL DAILY
Qty: 5 TABLET | Refills: 0 | Status: SHIPPED | OUTPATIENT
Start: 2025-01-27

## 2025-01-27 NOTE — TELEPHONE ENCOUNTER
Check if anyone has availability today as it sounds like he should be seen today I am full for today probably have sick visit tomorrow

## 2025-01-27 NOTE — TELEPHONE ENCOUNTER
Pt wife would like for someone to call her back and not her husbands number due to his throat being swollen. Thanks!

## 2025-01-27 NOTE — TELEPHONE ENCOUNTER
Front got pt scheduled with you tomorrow morning for ongoing sxs, wife is asking if there is anything stronger that could be prescribed for his throat at least for tonight until he can be seen? Please advise.

## 2025-01-27 NOTE — TELEPHONE ENCOUNTER
I sent prednisone but if he has increased sxs like difficulty swallowing or breathing should return to ER sooner

## 2025-01-27 NOTE — TELEPHONE ENCOUNTER
Pt's wife called, he was seen in the ED 1/23 and they put him on an amoxicillin and he is stating that his throat is still red, swollen. However you don't have any ovl opening until next Monday. Can you please advise?

## 2025-01-27 NOTE — TELEPHONE ENCOUNTER
01/27/25 2:52 PM    Patient contacted post ED visit, [facility] phone outreaches documented. Patient called practice and scheduled a follow-up ED visit.  1-28-25    Thank you.  [facility]  [facility]

## 2025-01-27 NOTE — TELEPHONE ENCOUNTER
Do you want to see pt in office? Looks like he should still be on antibx but just not feeling much better.

## 2025-01-28 ENCOUNTER — OFFICE VISIT (OUTPATIENT)
Dept: FAMILY MEDICINE CLINIC | Facility: CLINIC | Age: 60
End: 2025-01-28
Payer: COMMERCIAL

## 2025-01-28 VITALS
SYSTOLIC BLOOD PRESSURE: 126 MMHG | HEIGHT: 69 IN | DIASTOLIC BLOOD PRESSURE: 78 MMHG | BODY MASS INDEX: 46.65 KG/M2 | TEMPERATURE: 96.6 F | RESPIRATION RATE: 18 BRPM | WEIGHT: 315 LBS | HEART RATE: 63 BPM | OXYGEN SATURATION: 97 %

## 2025-01-28 DIAGNOSIS — F33.9 DEPRESSION, RECURRENT (HCC): ICD-10-CM

## 2025-01-28 DIAGNOSIS — M32.9 SYSTEMIC LUPUS ERYTHEMATOSUS, UNSPECIFIED SLE TYPE, UNSPECIFIED ORGAN INVOLVEMENT STATUS (HCC): ICD-10-CM

## 2025-01-28 DIAGNOSIS — J43.9 PULMONARY EMPHYSEMA, UNSPECIFIED EMPHYSEMA TYPE (HCC): ICD-10-CM

## 2025-01-28 DIAGNOSIS — I50.33 ACUTE ON CHRONIC DIASTOLIC HEART FAILURE (HCC): ICD-10-CM

## 2025-01-28 DIAGNOSIS — J02.9 PHARYNGITIS, UNSPECIFIED ETIOLOGY: Primary | ICD-10-CM

## 2025-01-28 DIAGNOSIS — E11.40 TYPE 2 DIABETES MELLITUS WITH DIABETIC NEUROPATHY, WITH LONG-TERM CURRENT USE OF INSULIN (HCC): ICD-10-CM

## 2025-01-28 DIAGNOSIS — Z79.4 TYPE 2 DIABETES MELLITUS WITH DIABETIC NEUROPATHY, WITH LONG-TERM CURRENT USE OF INSULIN (HCC): ICD-10-CM

## 2025-01-28 PROCEDURE — 99214 OFFICE O/P EST MOD 30 MIN: CPT | Performed by: INTERNAL MEDICINE

## 2025-01-28 PROCEDURE — G2211 COMPLEX E/M VISIT ADD ON: HCPCS | Performed by: INTERNAL MEDICINE

## 2025-01-28 NOTE — PROGRESS NOTES
Name: Jey Vicente      : 1965      MRN: [id number]  Encounter Provider: Cesia Martinez DO  Encounter Date: 2025   Encounter department: [place] [facility]  :  Assessment & Plan  Acute on chronic diastolic heart failure (HCC)  Wt Readings from Last 3 Encounters:   25 (!) 154 kg (338 lb 9.6 oz)   25 (!) 154 kg (340 lb 9.8 oz)   25 (!) 156 kg (343 lb)     Breathing at baseline Pt feeling better in past 24 hours        Pulmonary emphysema, unspecified emphysema type ([facility])  Stable overall His breathing was not that affected by recent sxs according to pt        Depression, recurrent ([facility])  Pt stable He eeps active which helps          Body mass index (BMI) 45.0-49.9, adult ([facility])  Lo fat diet Monitor BS He has regular visit in March       Type 2 diabetes mellitus with diabetic neuropathy, with long-term current use of insulin ([facility])    Lab Results   Component Value Date    HGBA1C 6.9 (H) 2024   Pt aware steroid may increase BS for few days and he is monitoring He had not been eating for past few days so resumed diet yesterday          Systemic lupus erythematosus, unspecified SLE type, unspecified organ involvement status ([facility])  Pt follows with rheumatology        Pharyngitis, unspecified etiology  Fiish steroid rx sent yesterday which seems to have improved sxs He is finishing Amoxil  as well Stay hydrated If sxs recur off steroids, ENT eval        Rto as scheduled        History of Present Illness   HPI  Patient is feeling better finally He took steroid last pm and was able to eat and drink without pain for the first time in days No fever or chills Breathing is at his baseline He did not feel breathing was an issue Weight stable BS up slightly this Am but he did eat last PM No headache No dizziness Today is the best he felt in several days Cough lessening He is finishing Amoxil rx today or tomorrow and thinks that did help as well   Review of Systems   Constitutional:   Negative for chills and fever.   HENT:  Positive for sore throat.         Swallowing improved after steroid yesterday    Respiratory:  Positive for cough. Negative for shortness of breath.    Cardiovascular:  Negative for chest pain, palpitations and leg swelling.   Gastrointestinal: Negative.    Genitourinary: Negative.    Musculoskeletal:  Positive for arthralgias.   Skin:  Negative for color change.   Neurological:  Negative for dizziness, facial asymmetry, light-headedness and headaches.   Psychiatric/Behavioral:  Negative for sleep disturbance. The patient is not nervous/anxious.      Past Medical History:   Diagnosis Date    Aortic stenosis     Arthritis 1990    Both wrists    Asthma 1964    Chronic hypoxemic respiratory failure ([facility])     Chronic kidney disease 10/2022    COPD (chronic obstructive pulmonary disease) ([facility])     Coronary artery disease 2000    Aortic stenosis    Diabetes ([facility])     Diabetes mellitus ([facility])     Hyperlipidemia     Hypertension 07/02/2014    Lung disease Birth    Neuropathy in diabetes ([facility]) 1/2022    Nonrheumatic aortic (valve) stenosis     Pneumonia 2015    Sleep apnea, obstructive      Past Surgical History:   Procedure Laterality Date    ACCESSORY NAVICULAR EXCISION Right     APPENDECTOMY      CARDIAC CATHETERIZATION N/A 05/25/2023    Procedure: Cardiac Coronary Angiogram;  Surgeon: Mohit Farris MD;  [facility];  Service: Cardiology    EAR FOREIGN BODY REMOVAL      cyst removal. bronchospasms after general anesthesia.    EYE SURGERY Left     traumatic injury at age 9-10    TONSILLECTOMY  No     Social History     Socioeconomic History    Marital status: /Civil Union     Spouse name: Not on file    Number of children: Not on file    Years of education: Not on file    Highest education level: Not on file   Occupational History    Not on file   Tobacco Use    Smoking status: Former     Current packs/day: 0.00     Average packs/day: 3.0 packs/day for 30.0  "years (90.1 ttl pk-yrs)     Types: Cigarettes     Start date: 3/15/1984     Quit date: 7/15/2005     Years since quittin.5    Smokeless tobacco: Never   Vaping Use    Vaping status: Never Used   Substance and Sexual Activity    Alcohol use: Never    Drug use: Never    Sexual activity: Yes     Partners: Female     Birth control/protection: None   Other Topics Concern    Not on file   Social History Narrative    Not on file     Social Drivers of Health     Financial Resource Strain: Patient Declined (2023)    Overall Financial Resource Strain (CARDIA)     Difficulty of Paying Living Expenses: Patient declined   Food Insecurity: Food Insecurity Present (2024)    Nursing - Inadequate Food Risk Classification     Worried About Running Out of Food in the Last Year: Sometimes true     Ran Out of Food in the Last Year: Sometimes true     Ran Out of Food in the Last Year: Not on file   Transportation Needs: No Transportation Needs (2024)    PRAPARE - Transportation     Lack of Transportation (Medical): No     Lack of Transportation (Non-Medical): No   Physical Activity: Not on file   Stress: Not on file   Social Connections: Unknown (2024)    Received from [employer]    Social Circadence     How often do you feel lonely or isolated from those around you? (Adult - for ages 18 years and over): Not on file   Intimate Partner Violence: Not on file   Housing Stability: Low Risk  (2024)    Housing Stability Vital Sign     Unable to Pay for Housing in the Last Year: No     Number of Times Moved in the Last Year: 0     Homeless in the Last Year: No     No Known Allergies    Objective   /78   Pulse 63   Temp (!) 96.6 °F (35.9 °C) (Temporal)   Resp 18   Ht 5' 9\" (1.753 m)   Wt (!) 154 kg (338 lb 9.6 oz)   SpO2 97%   BMI 50.00 kg/m²      Physical Exam  Vitals and nursing note reviewed.   Constitutional:       General: He is not in acute distress.     Appearance: He is well-developed. He is not " ill-appearing, toxic-appearing or diaphoretic.   HENT:      Head: Normocephalic and atraumatic.      Right Ear: Tympanic membrane normal. No swelling.      Left Ear: Tympanic membrane normal. No swelling.      Nose: No congestion or rhinorrhea.      Mouth/Throat:      Mouth: Mucous membranes are moist. No oral lesions.      Pharynx: Uvula midline. Posterior oropharyngeal erythema present. No pharyngeal swelling or oropharyngeal exudate.   Eyes:      Conjunctiva/sclera: Conjunctivae normal.      Pupils: Pupils are equal, round, and reactive to light.   Cardiovascular:      Rate and Rhythm: Normal rate.      Heart sounds: Normal heart sounds.   Pulmonary:      Effort: Pulmonary effort is normal. No respiratory distress.      Breath sounds: No wheezing.   Abdominal:      General: Bowel sounds are normal. There is no distension.      Palpations: Abdomen is soft.   Musculoskeletal:      Cervical back: Normal range of motion and neck supple.   Lymphadenopathy:      Cervical: No cervical adenopathy.   Skin:     General: Skin is warm and dry.      Coloration: Skin is not pale.   Neurological:      General: No focal deficit present.      Mental Status: He is alert and oriented to person, place, and time.   Psychiatric:         Mood and Affect: Mood normal.

## 2025-01-28 NOTE — ASSESSMENT & PLAN NOTE
Wt Readings from Last 3 Encounters:   01/28/25 (!) 154 kg (338 lb 9.6 oz)   01/23/25 (!) 154 kg (340 lb 9.8 oz)   01/06/25 (!) 156 kg (343 lb)     Breathing at baseline Pt feeling better in past 24 hours

## 2025-01-28 NOTE — ASSESSMENT & PLAN NOTE
Lab Results   Component Value Date    HGBA1C 6.9 (H) 03/19/2024   Pt aware steroid may increase BS for few days and he is monitoring He had not been eating for past few days so resumed diet yesterday

## 2025-01-28 NOTE — ASSESSMENT & PLAN NOTE
Fiish steroid rx sent yesterday which seems to have improved sxs He is finishing Amoxil  as well Stay hydrated If sxs recur off steroids, ENT eval

## 2025-01-29 ENCOUNTER — NURSE TRIAGE (OUTPATIENT)
Age: 60
End: 2025-01-29

## 2025-01-29 DIAGNOSIS — J44.9 COPD, SEVERE (HCC): Primary | ICD-10-CM

## 2025-01-29 RX ORDER — PREDNISONE 20 MG/1
40 TABLET ORAL DAILY
Qty: 10 TABLET | Refills: 0 | Status: SHIPPED | OUTPATIENT
Start: 2025-01-29 | End: 2025-02-03

## 2025-01-29 NOTE — TELEPHONE ENCOUNTER
"Pt stated Pulm Provider: Dr. Ibarra    Actionable item: Requesting Prednisone for COPD Exacerbation.    What is the reason for the call/chief complaint? Cold like S/S started 01/20/25. 01/23/25 Pt to ED for COPD Exacerbation, prescribed ABX, no Steroid given. PCP provided 1 day of Prednisone. Pt still having SOB, wheezing, and cough. SpO2 94% at rest, 86% while going up stairs, states recovers fasts. Denies fever, chest pain. Requesting Prednisone taper.  Advised as per protocol. Please advise further.    Priority: HIGH    Reason for Disposition   Oxygen level (e.g., pulse oximetry) 91 to 94%    Answer Assessment - Initial Assessment Questions  1. RESPIRATORY STATUS: \"Describe your breathing?\" (e.g., wheezing, shortness of breath, unable to speak, severe coughing)       SOB, Coughing, wheezing  2. ONSET: \"When did this breathing problem begin?\"       01/20/25  3. PATTERN \"Does the difficult breathing come and go, or has it been constant since it started?\"       Constant and worsening  4. SEVERITY: \"How bad is your breathing?\" (e.g., mild, moderate, severe)       SOB w/Activity, MILD  5. RECURRENT SYMPTOM: \"Have you had difficulty breathing before?\" If Yes, ask: \"When was the last time?\" and \"What happened that time?\"       Awhile ago, \"rainy season\"  6. CARDIAC HISTORY: \"Do you have any history of heart disease?\" (e.g., heart attack, angina, bypass surgery, angioplasty)       See chart, aortic stenosis  7. LUNG HISTORY: \"Do you have any history of lung disease?\"  (e.g., pulmonary embolus, asthma, emphysema)      COPD/Asthma  8. CAUSE: \"What do you think is causing the breathing problem?\"       Weather changes trigger flare  9. OTHER SYMPTOMS: \"Do you have any other symptoms?\" (e.g., chest pain, cough, dizziness, fever, runny nose)      Cold like S/S  10. O2 SATURATION MONITOR:  \"Do you use an oxygen saturation monitor (pulse oximeter) at home?\" If Yes, ask: \"What is your reading (oxygen level) today?\" \"What is your " "usual oxygen saturation reading?\" (e.g., 95%)        92% RA at rest    Protocols used: Breathing Difficulty-Adult-[place]    "

## 2025-01-29 NOTE — TELEPHONE ENCOUNTER
Regarding: predisone chilo, ED 1/23, sob, wheezing  ----- Message from Flores FLOYD sent at 1/29/2025  8:32 AM EST -----  Patient called, he went to the ED on 1/23 and he was prescribed an antibiotic. He is still having issues with his breathing of shortness of breath, a cough, and wheezing. He is asking if Lj would please prescribe him a steriod as well to help clear this up. Please advise

## 2025-01-29 NOTE — TELEPHONE ENCOUNTER
Patient called in again regarding this matter. He is asking for prednisone and a call back. Please advise.

## 2025-01-29 NOTE — TELEPHONE ENCOUNTER
Informed Melvin of Prednisone being sent in for him, and of recommendation to schedule sick visit if not better after Rx. He understood and was very grateful for the help he received today.

## 2025-01-30 DIAGNOSIS — E78.49 OTHER HYPERLIPIDEMIA: ICD-10-CM

## 2025-01-30 DIAGNOSIS — G47.33 OSA ON CPAP: ICD-10-CM

## 2025-01-30 DIAGNOSIS — I50.33 ACUTE ON CHRONIC DIASTOLIC HEART FAILURE (HCC): ICD-10-CM

## 2025-01-30 DIAGNOSIS — I35.0 AORTIC VALVE STENOSIS, ETIOLOGY OF CARDIAC VALVE DISEASE UNSPECIFIED: ICD-10-CM

## 2025-01-31 RX ORDER — ATORVASTATIN CALCIUM 40 MG/1
40 TABLET, FILM COATED ORAL DAILY
Qty: 30 TABLET | Refills: 0 | Status: SHIPPED | OUTPATIENT
Start: 2025-01-31

## 2025-01-31 RX ORDER — SPIRONOLACTONE 50 MG/1
50 TABLET, FILM COATED ORAL DAILY
Qty: 30 TABLET | Refills: 0 | Status: SHIPPED | OUTPATIENT
Start: 2025-01-31

## 2025-01-31 RX ORDER — FUROSEMIDE 40 MG/1
40 TABLET ORAL 2 TIMES DAILY
Qty: 60 TABLET | Refills: 0 | Status: SHIPPED | OUTPATIENT
Start: 2025-01-31

## 2025-02-06 ENCOUNTER — TELEPHONE (OUTPATIENT)
Age: 60
End: 2025-02-06

## 2025-02-06 NOTE — TELEPHONE ENCOUNTER
Patient called and asked for fax number to send Dr Ibarra paperwork to fill out. I provided central fax number. Patient asked for a call back once Dr Ibarra fills out his portion so he can  at [facility]. Please advise.

## 2025-02-19 ENCOUNTER — NURSE TRIAGE (OUTPATIENT)
Age: 60
End: 2025-02-19

## 2025-02-19 NOTE — TELEPHONE ENCOUNTER
Regarding: extreme sob  ----- Message from Maxine NEWELL sent at 2/19/2025  1:31 PM EST -----  Patient called and stated he is having extreme sob (o2 drops to 81-82 walking up steps) coughing up phlegm. Please advise.

## 2025-02-19 NOTE — TELEPHONE ENCOUNTER
"Called and spoke with patient. See assessment questions. Reports Spo2 drops to 81-82 % on RA when ambulating up the stairs, pulse 112 and feels severely short of breath.  He said at rest he has mild shortness of breath and Spo2 has been ranging from 87%-91%.  He said he does use O2 up to 4 L as needed.  He also reports a cough with green mucous.  His wife thinks he needs antibiotic and steroid. Patient is speaking in full sentences during duration of call.  Due to his complaints of severe SOB on exertion and Spo2 well below 90 during that time, per protocol advised patient to report to ER for evaluation today.  Patient declines.  Discussed possibly can go to urgent care, but based on their assessment can't guarantee they will not recommend higher level of care in ER. Patient would like message sent to pulm provider first.  Please advise and call patient.     Reason for Disposition   Oxygen level (e.g., pulse oximetry) 90% or lower    Answer Assessment - Initial Assessment Questions  1. RESPIRATORY STATUS: \"Describe your breathing?\" (e.g., wheezing, shortness of breath, unable to speak, severe coughing)       Short of breath when walking up stairs spo2 drops on RA  2. ONSET: \"When did this breathing problem begin?\"       Monday  3. PATTERN \"Does the difficult breathing come and go, or has it been constant since it started?\"       Comes and goes  4. SEVERITY: \"How bad is your breathing?\" (e.g., mild, moderate, severe)       Mild when resting, severe when walking up stairs was on RA  5. RECURRENT SYMPTOM: \"Have you had difficulty breathing before?\" If Yes, ask: \"When was the last time?\" and \"What happened that time?\"       yes  6. CARDIAC HISTORY: \"Do you have any history of heart disease?\" (e.g., heart attack, angina, bypass surgery, angioplasty)       Aortic stenosis  7. LUNG HISTORY: \"Do you have any history of lung disease?\"  (e.g., pulmonary embolus, asthma, emphysema)      Yes   8. CAUSE: \"What do you think is " "causing the breathing problem?\"       Chest congestion cough  9. OTHER SYMPTOMS: \"Do you have any other symptoms?\" (e.g., chest pain, cough, dizziness, fever, runny nose)      cough  10. O2 SATURATION MONITOR:  \"Do you use an oxygen saturation monitor (pulse oximeter) at home?\" If Yes, ask: \"What is your reading (oxygen level) today?\" \"What is your usual oxygen saturation reading?\" (e.g., 95%)        Was 81-82 when walking up stairs 87%-91 % at rest    Protocols used: Breathing Difficulty-Adult-[place]    "

## 2025-02-20 DIAGNOSIS — J44.1 COPD EXACERBATION (HCC): Primary | ICD-10-CM

## 2025-02-20 RX ORDER — AZITHROMYCIN 250 MG/1
TABLET, FILM COATED ORAL
Qty: 6 TABLET | Refills: 0 | Status: SHIPPED | OUTPATIENT
Start: 2025-02-20 | End: 2025-02-24

## 2025-02-20 RX ORDER — PREDNISONE 10 MG/1
TABLET ORAL
Qty: 30 TABLET | Refills: 0 | Status: SHIPPED | OUTPATIENT
Start: 2025-02-20 | End: 2025-03-04

## 2025-02-20 NOTE — TELEPHONE ENCOUNTER
Patient called back. There is concern for copd/asthma exacerbation with acute bronchitis. Prednisone taper and Zpak sent to [facility]. Reinforced good compliance on his home inhalers and neb. He verbalized understanding and agrees to the plan.

## 2025-02-20 NOTE — TELEPHONE ENCOUNTER
Attempted to call patient but was unable to connect. Left message to call me back. Also instructed over VM to go to the ED if symptoms worsen before we are able to connect.

## 2025-02-28 DIAGNOSIS — E11.40 PAINFUL DIABETIC NEUROPATHY (HCC): ICD-10-CM

## 2025-02-28 DIAGNOSIS — F32.A ANXIETY AND DEPRESSION: ICD-10-CM

## 2025-02-28 DIAGNOSIS — G47.33 OSA ON CPAP: ICD-10-CM

## 2025-02-28 DIAGNOSIS — E11.65 UNCONTROLLED TYPE 2 DIABETES MELLITUS WITH HYPERGLYCEMIA (HCC): ICD-10-CM

## 2025-02-28 DIAGNOSIS — F41.9 ANXIETY AND DEPRESSION: ICD-10-CM

## 2025-02-28 DIAGNOSIS — I50.33 ACUTE ON CHRONIC DIASTOLIC HEART FAILURE (HCC): ICD-10-CM

## 2025-02-28 DIAGNOSIS — E78.49 OTHER HYPERLIPIDEMIA: ICD-10-CM

## 2025-02-28 DIAGNOSIS — I35.0 AORTIC VALVE STENOSIS, ETIOLOGY OF CARDIAC VALVE DISEASE UNSPECIFIED: ICD-10-CM

## 2025-03-03 ENCOUNTER — OFFICE VISIT (OUTPATIENT)
Dept: FAMILY MEDICINE CLINIC | Facility: CLINIC | Age: 60
End: 2025-03-03
Payer: COMMERCIAL

## 2025-03-03 ENCOUNTER — APPOINTMENT (OUTPATIENT)
Dept: LAB | Facility: MEDICAL CENTER | Age: 60
End: 2025-03-03
Payer: COMMERCIAL

## 2025-03-03 VITALS
SYSTOLIC BLOOD PRESSURE: 126 MMHG | HEART RATE: 75 BPM | HEIGHT: 69 IN | WEIGHT: 315 LBS | TEMPERATURE: 97.1 F | DIASTOLIC BLOOD PRESSURE: 70 MMHG | OXYGEN SATURATION: 94 % | BODY MASS INDEX: 46.65 KG/M2 | RESPIRATION RATE: 18 BRPM

## 2025-03-03 DIAGNOSIS — Z79.4 TYPE 2 DIABETES MELLITUS WITH DIABETIC NEUROPATHY, WITH LONG-TERM CURRENT USE OF INSULIN (HCC): ICD-10-CM

## 2025-03-03 DIAGNOSIS — J20.9 COPD WITH ACUTE BRONCHITIS  (HCC): ICD-10-CM

## 2025-03-03 DIAGNOSIS — E11.40 TYPE 2 DIABETES MELLITUS WITH DIABETIC NEUROPATHY, WITH LONG-TERM CURRENT USE OF INSULIN (HCC): ICD-10-CM

## 2025-03-03 DIAGNOSIS — G47.33 OSA (OBSTRUCTIVE SLEEP APNEA): ICD-10-CM

## 2025-03-03 DIAGNOSIS — I10 ESSENTIAL HYPERTENSION: Primary | ICD-10-CM

## 2025-03-03 DIAGNOSIS — J44.0 COPD WITH ACUTE BRONCHITIS  (HCC): ICD-10-CM

## 2025-03-03 DIAGNOSIS — I35.0 NONRHEUMATIC AORTIC (VALVE) STENOSIS: ICD-10-CM

## 2025-03-03 DIAGNOSIS — E11.69 TYPE 2 DIABETES MELLITUS WITH OTHER SPECIFIED COMPLICATION, WITHOUT LONG-TERM CURRENT USE OF INSULIN (HCC): ICD-10-CM

## 2025-03-03 PROBLEM — R93.89 ABNORMAL CT OF THE CHEST: Status: RESOLVED | Noted: 2022-08-12 | Resolved: 2025-03-03

## 2025-03-03 PROBLEM — I50.33 ACUTE ON CHRONIC DIASTOLIC HEART FAILURE (HCC): Status: RESOLVED | Noted: 2025-01-28 | Resolved: 2025-03-03

## 2025-03-03 LAB
ALBUMIN SERPL BCG-MCNC: 4.2 G/DL (ref 3.5–5)
ALP SERPL-CCNC: 79 U/L (ref 34–104)
ALT SERPL W P-5'-P-CCNC: 32 U/L (ref 7–52)
ANION GAP SERPL CALCULATED.3IONS-SCNC: 11 MMOL/L (ref 4–13)
AST SERPL W P-5'-P-CCNC: 17 U/L (ref 13–39)
BILIRUB SERPL-MCNC: 0.49 MG/DL (ref 0.2–1)
BUN SERPL-MCNC: 33 MG/DL (ref 5–25)
CALCIUM SERPL-MCNC: 9.5 MG/DL (ref 8.4–10.2)
CHLORIDE SERPL-SCNC: 100 MMOL/L (ref 96–108)
CHOLEST SERPL-MCNC: 141 MG/DL (ref ?–200)
CO2 SERPL-SCNC: 28 MMOL/L (ref 21–32)
CREAT SERPL-MCNC: 0.89 MG/DL (ref 0.6–1.3)
CREAT UR-MCNC: 27.8 MG/DL
EST. AVERAGE GLUCOSE BLD GHB EST-MCNC: 220 MG/DL
GFR SERPL CREATININE-BSD FRML MDRD: 92 ML/MIN/1.73SQ M
GLUCOSE P FAST SERPL-MCNC: 128 MG/DL (ref 65–99)
HBA1C MFR BLD: 9.3 %
HDLC SERPL-MCNC: 31 MG/DL
LDLC SERPL CALC-MCNC: 54 MG/DL (ref 0–100)
MICROALBUMIN UR-MCNC: <7 MG/L
POTASSIUM SERPL-SCNC: 3.8 MMOL/L (ref 3.5–5.3)
PROT SERPL-MCNC: 7.1 G/DL (ref 6.4–8.4)
SODIUM SERPL-SCNC: 139 MMOL/L (ref 135–147)
TRIGL SERPL-MCNC: 279 MG/DL (ref ?–150)

## 2025-03-03 PROCEDURE — 82570 ASSAY OF URINE CREATININE: CPT

## 2025-03-03 PROCEDURE — 36415 COLL VENOUS BLD VENIPUNCTURE: CPT

## 2025-03-03 PROCEDURE — 82043 UR ALBUMIN QUANTITATIVE: CPT

## 2025-03-03 PROCEDURE — 99214 OFFICE O/P EST MOD 30 MIN: CPT | Performed by: INTERNAL MEDICINE

## 2025-03-03 PROCEDURE — 80053 COMPREHEN METABOLIC PANEL: CPT

## 2025-03-03 PROCEDURE — G2211 COMPLEX E/M VISIT ADD ON: HCPCS | Performed by: INTERNAL MEDICINE

## 2025-03-03 PROCEDURE — 80061 LIPID PANEL: CPT

## 2025-03-03 PROCEDURE — 83036 HEMOGLOBIN GLYCOSYLATED A1C: CPT

## 2025-03-03 RX ORDER — FUROSEMIDE 40 MG/1
40 TABLET ORAL 2 TIMES DAILY
Qty: 60 TABLET | Refills: 5 | Status: SHIPPED | OUTPATIENT
Start: 2025-03-03

## 2025-03-03 RX ORDER — SPIRONOLACTONE 50 MG/1
50 TABLET, FILM COATED ORAL DAILY
Qty: 30 TABLET | Refills: 5 | Status: SHIPPED | OUTPATIENT
Start: 2025-03-03

## 2025-03-03 RX ORDER — PEN NEEDLE, DIABETIC 32GX 5/32"
NEEDLE, DISPOSABLE MISCELLANEOUS 4 TIMES DAILY
Qty: 400 EACH | Refills: 1 | Status: SHIPPED | OUTPATIENT
Start: 2025-03-03

## 2025-03-03 NOTE — PROGRESS NOTES
Name: Jey Vicente      : 1965      MRN: [id number]  Encounter Provider: Cesia Martinez DO  Encounter Date: 3/3/2025   Encounter department: [place] [facility]  :  Assessment & Plan  Essential hypertension  Bp stable lo sodium diet Stay hydrated   Continue current rx has cardio appt scheduled       Nonrheumatic aortic (valve) stenosis  Stable He has Cardiology appt in spring        COPD with acute bronchitis  (HCC)  He is finishing antibx and steroid for recent flare but improved from last week He has Pulmonary followup in May        SCOTT (obstructive sleep apnea)  Stable and pt is compliant with mask       Type 2 diabetes mellitus with diabetic neuropathy, with long-term current use of insulin (HCC)    Lab Results   Component Value Date    HGBA1C 6.9 (H) 2024     BS stable even with recent steroid/illness He does monitor and is trying to follow lo carb diet He will be more active in spring        Rto 6months       Depression Screening and Follow-up Plan: Patient was screened for depression during today's encounter. They screened negative with a PHQ-9 score of 4.        History of Present Illness   HPI  Pt doing ok He was on antibx and steroid recently and is slowly improving COUgh has lessened and breathing status improved NO chest pain His BS have been stable range He is getting back to his regular routine/schedule No dizziness or lightheadedness   Review of Systems   Constitutional:  Negative for chills and fever.   HENT:  Positive for postnasal drip.    Eyes:  Negative for visual disturbance.   Respiratory:  Positive for shortness of breath. Negative for cough.    Cardiovascular:  Negative for chest pain, palpitations and leg swelling.   Gastrointestinal: Negative.    Genitourinary: Negative.    Musculoskeletal: Negative.    Neurological:  Negative for dizziness, light-headedness and headaches.   Psychiatric/Behavioral:  Negative for sleep disturbance. The patient is not nervous/anxious.       Past Medical History:   Diagnosis Date    Acute on chronic diastolic heart failure (HCC) 2025    Aortic stenosis     Arthritis 1990    Both wrists    Asthma 1964    Chronic hypoxemic respiratory failure ([facility])     Chronic kidney disease 10/2022    COPD (chronic obstructive pulmonary disease) ([facility])     Coronary artery disease 2000    Aortic stenosis    Diabetes (HCC)     Diabetes mellitus ([facility])     Hyperlipidemia     Hypertension 2014    Lung disease Birth    Neuropathy in diabetes ([facility]) 2022    Nonrheumatic aortic (valve) stenosis     Pneumonia     Sleep apnea, obstructive      Past Surgical History:   Procedure Laterality Date    ACCESSORY NAVICULAR EXCISION Right     APPENDECTOMY      CARDIAC CATHETERIZATION N/A 2023    Procedure: Cardiac Coronary Angiogram;  Surgeon: Mohit Farris MD;  [facility];  Service: Cardiology    EAR FOREIGN BODY REMOVAL      cyst removal. bronchospasms after general anesthesia.    EYE SURGERY Left     traumatic injury at age 9-10    TONSILLECTOMY  No     Social History     Socioeconomic History    Marital status: /Civil Union     Spouse name: Not on file    Number of children: Not on file    Years of education: Not on file    Highest education level: Not on file   Occupational History    Not on file   Tobacco Use    Smoking status: Former     Current packs/day: 0.00     Average packs/day: 3.0 packs/day for 30.0 years (90.1 ttl pk-yrs)     Types: Cigarettes     Start date: 3/15/1984     Quit date: 7/15/2005     Years since quittin.6    Smokeless tobacco: Never   Vaping Use    Vaping status: Never Used   Substance and Sexual Activity    Alcohol use: Never    Drug use: Never    Sexual activity: Yes     Partners: Female     Birth control/protection: None   Other Topics Concern    Not on file   Social History Narrative    Not on file     Social Drivers of Health     Financial Resource Strain: Patient Declined (2023)    Overall  "Financial Resource Strain (CARDIA)     Difficulty of Paying Living Expenses: Patient declined   Food Insecurity: Food Insecurity Present (8/25/2024)    Nursing - Inadequate Food Risk Classification     Worried About Running Out of Food in the Last Year: Sometimes true     Ran Out of Food in the Last Year: Sometimes true     Ran Out of Food in the Last Year: Not on file   Transportation Needs: No Transportation Needs (8/25/2024)    PRAPARE - Transportation     Lack of Transportation (Medical): No     Lack of Transportation (Non-Medical): No   Physical Activity: Not on file   Stress: Not on file   Social Connections: Unknown (6/18/2024)    Received from [employer]     How often do you feel lonely or isolated from those around you? (Adult - for ages 18 years and over): Not on file   Intimate Partner Violence: Not on file   Housing Stability: Low Risk  (8/25/2024)    Housing Stability Vital Sign     Unable to Pay for Housing in the Last Year: No     Number of Times Moved in the Last Year: 0     Homeless in the Last Year: No     No Known Allergies    Objective   /70   Pulse 75   Temp (!) 97.1 °F (36.2 °C) (Temporal)   Resp 18   Ht 5' 9\" (1.753 m)   Wt (!) 154 kg (340 lb)   SpO2 94%   BMI 50.21 kg/m²      Physical Exam  Vitals and nursing note reviewed.   Constitutional:       General: He is not in acute distress.     Appearance: Normal appearance. He is not ill-appearing, toxic-appearing or diaphoretic.   HENT:      Head: Normocephalic and atraumatic.      Right Ear: External ear normal.      Left Ear: External ear normal.      Nose: Rhinorrhea present.      Mouth/Throat:      Mouth: Mucous membranes are moist.   Eyes:      General: No scleral icterus.  Cardiovascular:      Rate and Rhythm: Normal rate and regular rhythm.      Pulses: Normal pulses.      Heart sounds: No murmur heard.  Pulmonary:      Effort: Pulmonary effort is normal. No respiratory distress.      Breath sounds: Normal " breath sounds. No wheezing.   Abdominal:      General: Bowel sounds are normal. There is no distension.      Palpations: Abdomen is soft.      Tenderness: There is no abdominal tenderness.   Musculoskeletal:      Cervical back: Normal range of motion and neck supple.      Right lower leg: No edema.      Left lower leg: No edema.   Lymphadenopathy:      Cervical: No cervical adenopathy.   Skin:     General: Skin is warm and dry.      Coloration: Skin is not jaundiced or pale.   Neurological:      General: No focal deficit present.      Mental Status: He is alert and oriented to person, place, and time. Mental status is at baseline.      Cranial Nerves: No cranial nerve deficit.   Psychiatric:         Mood and Affect: Mood normal.         Behavior: Behavior normal.         Thought Content: Thought content normal.         Judgment: Judgment normal.

## 2025-03-03 NOTE — ASSESSMENT & PLAN NOTE
Lab Results   Component Value Date    HGBA1C 6.9 (H) 03/19/2024     BS stable even with recent steroid/illness He does monitor and is trying to follow lo carb diet He will be more active in spring

## 2025-03-03 NOTE — ASSESSMENT & PLAN NOTE
He is finishing antibx and steroid for recent flare but improved from last week He has Pulmonary followup in May

## 2025-03-04 RX ORDER — PREGABALIN 50 MG/1
50 CAPSULE ORAL 3 TIMES DAILY
Qty: 90 CAPSULE | Refills: 3 | Status: SHIPPED | OUTPATIENT
Start: 2025-03-04

## 2025-03-05 RX ORDER — ATORVASTATIN CALCIUM 40 MG/1
40 TABLET, FILM COATED ORAL DAILY
Qty: 30 TABLET | Refills: 10 | Status: SHIPPED | OUTPATIENT
Start: 2025-03-05

## 2025-03-19 DIAGNOSIS — E11.69 TYPE 2 DIABETES MELLITUS WITH OTHER SPECIFIED COMPLICATION, WITHOUT LONG-TERM CURRENT USE OF INSULIN (HCC): ICD-10-CM

## 2025-03-19 RX ORDER — GLIMEPIRIDE 4 MG/1
4 TABLET ORAL
Qty: 180 TABLET | Refills: 0 | Status: SHIPPED | OUTPATIENT
Start: 2025-03-19

## 2025-03-19 NOTE — TELEPHONE ENCOUNTER
Please mail A1C to patient Reason for call:   [x] Refill   [] Prior Auth  [] Other:     Office:   [] PCP/Provider -   [x] Specialty/Provider - endo miners     Medication: glimepiride (AMARYL) 4 mg tablet    Dose/Frequency: Sig: Take 1 tablet (4 mg total) by mouth daily with breakfast      Quantity: 180    [id number]  Phone: [phone]  Fax: [phone]    Local Pharmacy   Does the patient have enough for 3 days?   [x] Yes   [] No - Send as HP to POD    Mail Away Pharmacy   Does the patient have enough for 10 days?   [x] Yes   [] No - Send as HP to POD

## 2025-04-09 ENCOUNTER — OFFICE VISIT (OUTPATIENT)
Dept: ENDOCRINOLOGY | Facility: CLINIC | Age: 60
End: 2025-04-09
Payer: COMMERCIAL

## 2025-04-09 VITALS
TEMPERATURE: 97.8 F | WEIGHT: 315 LBS | DIASTOLIC BLOOD PRESSURE: 78 MMHG | SYSTOLIC BLOOD PRESSURE: 110 MMHG | HEIGHT: 69 IN | HEART RATE: 84 BPM | BODY MASS INDEX: 46.65 KG/M2

## 2025-04-09 DIAGNOSIS — I10 ESSENTIAL HYPERTENSION: ICD-10-CM

## 2025-04-09 DIAGNOSIS — Z79.4 TYPE 2 DIABETES MELLITUS WITH DIABETIC NEUROPATHY, WITH LONG-TERM CURRENT USE OF INSULIN (HCC): Primary | ICD-10-CM

## 2025-04-09 DIAGNOSIS — E11.40 PAINFUL DIABETIC NEUROPATHY (HCC): ICD-10-CM

## 2025-04-09 DIAGNOSIS — E78.2 MIXED HYPERLIPIDEMIA: ICD-10-CM

## 2025-04-09 DIAGNOSIS — E11.40 TYPE 2 DIABETES MELLITUS WITH DIABETIC NEUROPATHY, WITH LONG-TERM CURRENT USE OF INSULIN (HCC): Primary | ICD-10-CM

## 2025-04-09 DIAGNOSIS — E66.01 MORBID OBESITY (HCC): ICD-10-CM

## 2025-04-09 PROCEDURE — 95251 CONT GLUC MNTR ANALYSIS I&R: CPT | Performed by: STUDENT IN AN ORGANIZED HEALTH CARE EDUCATION/TRAINING PROGRAM

## 2025-04-09 PROCEDURE — 99214 OFFICE O/P EST MOD 30 MIN: CPT | Performed by: STUDENT IN AN ORGANIZED HEALTH CARE EDUCATION/TRAINING PROGRAM

## 2025-04-09 RX ORDER — TIRZEPATIDE 5 MG/.5ML
5 INJECTION, SOLUTION SUBCUTANEOUS WEEKLY
Qty: 2 ML | Refills: 3 | Status: SHIPPED | OUTPATIENT
Start: 2025-04-09

## 2025-04-09 NOTE — PROGRESS NOTES
Name: Jey Vicente      : 1965      MRN: [id number]  Encounter Provider: David Johnson DO  Encounter Date: 2025   Encounter department: [facility]    No chief complaint on file.  :  Assessment & Plan  Type 2 diabetes mellitus with diabetic neuropathy, with long-term current use of insulin (HCC)  His A1c level, recorded on 2025, was 9.3%. However, the average blood glucose level over the past 28 days, as per the Moe device, suggests an improvement, aligning more closely with an A1c of 8.2%. His fasting blood glucose levels typically range between 150 and 180, with postprandial spikes observed depending on his dietary intake. The goal is to reduce his average blood glucose level to achieve an A1c of 7.5% or lower. He is advised to monitor his blood glucose levels post meals and identify any potential triggers for sudden increases in blood sugar levels. He is also encouraged to schedule an eye examination and share the report with us. A prescription for Mounjaro 5 mg pen will be provided, with the potential for monthly dosage adjustments up to a maximum of 15 mg. The aim is to gradually reduce his reliance on other medications, particularly insulin and glimepiride.    Follow-up  The patient will follow up in 3 to 4 months.    Lab Results   Component Value Date    HGBA1C 9.3 (H) 2025     Orders:  •  Tirzepatide (Mounjaro) 5 MG/0.5ML SOAJ; Inject 5 mg under the skin once a week  •  Basic metabolic panel; Future  •  Hemoglobin A1C; Future    Painful diabetic neuropathy (HCC)    Lab Results   Component Value Date    HGBA1C 9.3 (H) 2025          Mixed hyperlipidemia  LDL in good range on statin. May consider adjunctive therapy for lowering high triglycerides, such as omega-3 (eg lovaza) vs fibrates       Essential hypertension  Good control       Morbid obesity (HCC)             History of Present Illness   History of Present Illness  The  "patient, a 59-year-old male, presents for a routine follow-up regarding his chronic type 2 diabetes mellitus, which is managed with insulin and complicated by diabetic peripheral neuropathy.    He reports intermittent use of prednisone due to weather-related factors, which he has discontinued for the past month. His current medication regimen includes Toujeo, Farxiga, metformin, glimepiride, and Ozempic, with no reported adverse effects. He is on the maximum dose of Ozempic, which he tolerates well. The patient acknowledges the necessity of scheduling an ophthalmologic examination.    MEDICATIONS  Toujeo, Farxiga, metformin, glimepiride, Ozempic, prednisone (off about a month now).    CGM Interpretation:  Date Range: Mar 13 - Apr 9, 2025  Device used: Ciplex 3+  Option - Home use   Option - Indication: Type 2 Diabetes  Analysis of data:   Average Glucose: 205  Glucose Variability: 29.6%  Time in Target Range: 42%   Time Above Range: 36% high, 22% very high  Time Below Range: 0% low, 0% very low  Interpretation of data: excessive daytime and post-prandial hyperglycemia  More than 72 hours of data was reviewed. Report to be scanned to chart.     Current regimen:     Last Eye Exam: Not on file  Last Foot Exam: 10/31/2024  Health Maintenance   Topic Date Due   • Diabetic Eye Exam  03/30/2023   • Diabetic Foot Exam  10/31/2025     Pertinent Medical History         Review of Systems as per [facility]         Medical History Reviewed by provider this encounter:  Tobacco  Allergies  Meds  Problems  Med Hx  Surg Hx  Fam Hx     .    Objective   /78 (Patient Position: Sitting, Cuff Size: Standard)   Pulse 84   Temp 97.8 °F (36.6 °C) (Temporal)   Ht 5' 9\" (1.753 m)   Wt (!) 156 kg (343 lb)   BMI 50.65 kg/m²      Body mass index is 50.65 kg/m².  Wt Readings from Last 3 Encounters:   04/09/25 (!) 156 kg (343 lb)   03/03/25 (!) 154 kg (340 lb)   01/28/25 (!) 154 kg (338 lb 9.6 oz)     Physical Exam    Physical " Exam  Vitals reviewed.   Constitutional:       General: He is not in acute distress.     Appearance: Normal appearance.   HENT:      Head: Normocephalic and atraumatic.      Nose: Nose normal.   Eyes:      General: No scleral icterus.     Conjunctiva/sclera: Conjunctivae normal.   Pulmonary:      Effort: Pulmonary effort is normal. No respiratory distress.   Musculoskeletal:         General: No deformity.      Cervical back: Normal range of motion.   Skin:     General: Skin is warm and dry.   Neurological:      Mental Status: He is alert.   Psychiatric:         Mood and Affect: Mood normal.         Behavior: Behavior normal.       Results  Laboratory Studies  A1c was 9.3% on 03/03/2025. Average blood sugar over the last 28 days is closer to 8.2.  Labs:   Lab Results   Component Value Date    HGBA1C 9.3 (H) 03/03/2025    HGBA1C 6.9 (H) 03/19/2024    HGBA1C 8.6 (H) 12/04/2023     Lab Results   Component Value Date    CREATININE 0.89 03/03/2025    CREATININE 0.70 01/23/2025    CREATININE 0.87 06/04/2024    BUN 33 (H) 03/03/2025    K 3.8 03/03/2025     03/03/2025    CO2 28 03/03/2025     EGFR   Date Value Ref Range Status   07/19/2018 >60.0 >60 Final     Comment:     If patient is , multiply estimated GFR by 1.159.     eGFR   Date Value Ref Range Status   03/03/2025 92 ml/min/1.73sq m Final     Lab Results   Component Value Date    HDL 31 (L) 03/03/2025    TRIG 279 (H) 03/03/2025     Lab Results   Component Value Date    ALT 32 03/03/2025    AST 17 03/03/2025    ALKPHOS 79 03/03/2025     Lab Results   Component Value Date    NVQ5MJAVLMHP 1.870 01/26/2022    TVL7MMBBONHW 2.360 09/02/2020    HRU5UQUEQSAC 2.290 02/05/2020       There are no Patient Instructions on file for this visit.    Discussed with the patient and all questioned fully answered. He will call me if any problems arise.

## 2025-04-09 NOTE — ASSESSMENT & PLAN NOTE
His A1c level, recorded on 03/03/2025, was 9.3%. However, the average blood glucose level over the past 28 days, as per the Moe device, suggests an improvement, aligning more closely with an A1c of 8.2%. His fasting blood glucose levels typically range between 150 and 180, with postprandial spikes observed depending on his dietary intake. The goal is to reduce his average blood glucose level to achieve an A1c of 7.5% or lower. He is advised to monitor his blood glucose levels post meals and identify any potential triggers for sudden increases in blood sugar levels. He is also encouraged to schedule an eye examination and share the report with us. A prescription for Mounjaro 5 mg pen will be provided, with the potential for monthly dosage adjustments up to a maximum of 15 mg. The aim is to gradually reduce his reliance on other medications, particularly insulin and glimepiride.    Follow-up  The patient will follow up in 3 to 4 months.    Lab Results   Component Value Date    HGBA1C 9.3 (H) 03/03/2025     Orders:  •  Tirzepatide (Mounjaro) 5 MG/0.5ML SOAJ; Inject 5 mg under the skin once a week  •  Basic metabolic panel; Future  •  Hemoglobin A1C; Future

## 2025-04-09 NOTE — ASSESSMENT & PLAN NOTE
LDL in good range on statin. May consider adjunctive therapy for lowering high triglycerides, such as omega-3 (eg lovaza) vs fibrates

## 2025-04-11 ENCOUNTER — NURSE TRIAGE (OUTPATIENT)
Age: 60
End: 2025-04-11

## 2025-04-11 DIAGNOSIS — J44.1 COPD EXACERBATION (HCC): Primary | ICD-10-CM

## 2025-04-11 RX ORDER — PREDNISONE 10 MG/1
TABLET ORAL
Qty: 30 TABLET | Refills: 0 | Status: SHIPPED | OUTPATIENT
Start: 2025-04-11

## 2025-04-11 NOTE — TELEPHONE ENCOUNTER
"FOLLOW UP: 05/07/25 D. Bizarre    REASON FOR CONVERSATION: COPD/Asthma Flare    SYMPTOMS: Hx of COPD/Asthma, Aortic Stenosis. C/O worsening SOB and Wheezing for past 10 days. Pt thinks weather has flared his COPD/Asthma. Denied other respiratory S/S such chest pain, dry/moist productive cough and/or fever. Pt denies SOB at rest; SOB worse with activity, pt has to rest post activity to catch breath. O2 sats with activity 87-89%, pt states he recovers fast to his norm of 94-95%. Pt using inhaler and nebulizer without improvement. Advised as per protocol, declined appt. Pt requesting Prednisone. Please advise further.     OTHER: [facility] as per EMR    DISPOSITION: Callback From Office Today (overriding Go to Office Now/Urgent Care)    Reason for Disposition   Longstanding difficulty breathing (e.g., CHF, COPD, emphysema) and worse than normal    Answer Assessment - Initial Assessment Questions  1. RESPIRATORY STATUS: \"Describe your breathing?\" (e.g., wheezing, shortness of breath, unable to speak, severe coughing)       SOB, Wheezing  2. ONSET: \"When did this breathing problem begin?\"       10 days  3. PATTERN \"Does the difficult breathing come and go, or has it been constant since it started?\"       Worsening since started, off/on  4. SEVERITY: \"How bad is your breathing?\" (e.g., mild, moderate, severe)       Activity - MILD  5. RECURRENT SYMPTOM: \"Have you had difficulty breathing before?\" If Yes, ask: \"When was the last time?\" and \"What happened that time?\"       A few months  6. CARDIAC HISTORY: \"Do you have any history of heart disease?\" (e.g., heart attack, angina, bypass surgery, angioplasty)       Aortic Stenosis  7. LUNG HISTORY: \"Do you have any history of lung disease?\"  (e.g., pulmonary embolus, asthma, emphysema)      COPD/Asthma  8. CAUSE: \"What do you think is causing the breathing problem?\"       Weather change  9. OTHER SYMPTOMS: \"Do you have any other symptoms?\" (e.g., chest pain, cough, " "dizziness, fever, runny nose)      denies  10. O2 SATURATION MONITOR:  \"Do you use an oxygen saturation monitor (pulse oximeter) at home?\" If Yes, ask: \"What is your reading (oxygen level) today?\" \"What is your usual oxygen saturation reading?\" (e.g., 95%)        Activity O2 sats 87-89%, but recover fast to 95% norm  11. PREGNANCY: \"Is there any chance you are pregnant?\" \"When was your last menstrual period?\"        N/A  12. TRAVEL: \"Have you traveled out of the country in the last month?\" (e.g., travel history, exposures)        No    Protocols used: Breathing Difficulty-Adult-[place]    "

## 2025-04-17 ENCOUNTER — TELEPHONE (OUTPATIENT)
Age: 60
End: 2025-04-17

## 2025-04-17 ENCOUNTER — HOSPITAL ENCOUNTER (OUTPATIENT)
Dept: RADIOLOGY | Facility: HOSPITAL | Age: 60
Discharge: HOME | End: 2025-04-17
Payer: COMMERCIAL

## 2025-04-17 DIAGNOSIS — J20.9 ACUTE BRONCHITIS: ICD-10-CM

## 2025-04-17 DIAGNOSIS — J20.9 ACUTE BRONCHITIS: Primary | ICD-10-CM

## 2025-04-17 PROCEDURE — 71046 X-RAY EXAM CHEST 2 VIEWS: CPT

## 2025-04-17 NOTE — TELEPHONE ENCOUNTER
Called and spoke to patient and he was very thankful. Patient will try to have Xray completed today or tomorrow.

## 2025-04-17 NOTE — TELEPHONE ENCOUNTER
Pt states on 4th day of prednisone and now bringing up green. His Osat is still in the 90%. Still short of breath and has the constant cough but bringing up the green phlegm. He states that usually when this happens he requires an antibiotic.

## 2025-04-17 NOTE — TELEPHONE ENCOUNTER
Please let patient know that I ordered a CXR for him to get. Also sent Augmentin in for him to start in addition to prednisone.

## 2025-04-18 ENCOUNTER — RESULTS FOLLOW-UP (OUTPATIENT)
Dept: OTHER | Facility: HOSPITAL | Age: 60
End: 2025-04-18

## 2025-04-22 DIAGNOSIS — E08.00 DIABETES MELLITUS DUE TO UNDERLYING CONDITION WITH HYPEROSMOLARITY WITHOUT COMA, UNSPECIFIED WHETHER LONG TERM INSULIN USE (HCC): Primary | ICD-10-CM

## 2025-04-30 ENCOUNTER — OFFICE VISIT (OUTPATIENT)
Dept: PULMONOLOGY | Facility: CLINIC | Age: 60
End: 2025-04-30
Payer: COMMERCIAL

## 2025-04-30 ENCOUNTER — NURSE TRIAGE (OUTPATIENT)
Age: 60
End: 2025-04-30

## 2025-04-30 VITALS
DIASTOLIC BLOOD PRESSURE: 67 MMHG | HEIGHT: 69 IN | SYSTOLIC BLOOD PRESSURE: 111 MMHG | HEART RATE: 93 BPM | BODY MASS INDEX: 46.65 KG/M2 | OXYGEN SATURATION: 93 % | TEMPERATURE: 97.5 F | WEIGHT: 315 LBS

## 2025-04-30 DIAGNOSIS — J44.1 COPD EXACERBATION (HCC): ICD-10-CM

## 2025-04-30 DIAGNOSIS — G47.33 OSA (OBSTRUCTIVE SLEEP APNEA): ICD-10-CM

## 2025-04-30 DIAGNOSIS — J96.11 CHRONIC HYPOXEMIC RESPIRATORY FAILURE (HCC): ICD-10-CM

## 2025-04-30 DIAGNOSIS — J20.9 ACUTE BRONCHITIS, UNSPECIFIED ORGANISM: ICD-10-CM

## 2025-04-30 DIAGNOSIS — J45.51 SEVERE PERSISTENT ASTHMA WITH EXACERBATION: Primary | ICD-10-CM

## 2025-04-30 PROCEDURE — 99214 OFFICE O/P EST MOD 30 MIN: CPT | Performed by: PHYSICIAN ASSISTANT

## 2025-04-30 RX ORDER — PREDNISONE 10 MG/1
TABLET ORAL
Qty: 30 TABLET | Refills: 0 | Status: SHIPPED | OUTPATIENT
Start: 2025-04-30

## 2025-04-30 RX ORDER — CEFDINIR 300 MG/1
300 CAPSULE ORAL EVERY 12 HOURS SCHEDULED
Qty: 14 CAPSULE | Refills: 0 | Status: SHIPPED | OUTPATIENT
Start: 2025-04-30 | End: 2025-05-07

## 2025-04-30 NOTE — TELEPHONE ENCOUNTER
"FOLLOW UP: Asking for ABX to [facility] on file    REASON FOR CONVERSATION: Bronchitis    SYMPTOMS: shortness of breath, wheezing, cough    OTHER: not helping with Prednisone    DISPOSITION: Go to Office Now/Urgent Care      Pt states still experiencing shortness of breath, bringing up a lot of green mucus, and wheezing. States the prednisone helped a bit but not much. Was about to go to ED because it was pretty bad. Has been using nebulizer more than usual and using his O2, he's been going to 5LPM more frequently. His Osat drops into 80s (89-88, just walking, going up stairs goes to 84-83%) Currently sitting in his room and he's just wheezing and coughing with little movements. When he is on the 5LPM his Osat goes up quicker and having to sleep with O2 on.       Symptoms feel exactly the same since 1st time he called. Usually starting the prednisone he feels a difference this time nothing.   Pt states that he typically isn't concerned but since it's not getting any better he is concerned. He is aware that Lj's message from 4/21 was to hold off on abx and that cough and phlegm can linger for several weeks. Pt is concerned as he is going away and would like it taken care of.    Please advise pt does not want to go to urgent care or ED  Reason for Disposition   Longstanding difficulty breathing and not responding to usual therapy    Answer Assessment - Initial Assessment Questions  1. RESPIRATORY STATUS: \"Describe your breathing?\" (e.g., wheezing, shortness of breath, unable to speak, severe coughing)       Shortness of breath  2. ONSET: \"When did this breathing problem begin?\"       4/11  3. PATTERN \"Does the difficult breathing come and go, or has it been constant since it started?\"       Intermittent  4. SEVERITY: \"How bad is your breathing?\" (e.g., mild, moderate, severe)       Moderate  5. RECURRENT SYMPTOM: \"Have you had difficulty breathing before?\" If Yes, ask: \"When was the last time?\" and \"What " "happened that time?\"       Yes, was on prednisone and didn't help  6. CARDIAC HISTORY: \"Do you have any history of heart disease?\" (e.g., heart attack, angina, bypass surgery, angioplasty)       See chart  7. LUNG HISTORY: \"Do you have any history of lung disease?\"  (e.g., pulmonary embolus, asthma, emphysema)      COPD/Asthma  8. CAUSE: \"What do you think is causing the breathing problem?\"       Believes bronchitis  9. OTHER SYMPTOMS: \"Do you have any other symptoms?\" (e.g., chest pain, cough, dizziness, fever, runny nose)      Cough, congestion in chest. tightness  10. O2 SATURATION MONITOR:  \"Do you use an oxygen saturation monitor (pulse oximeter) at home?\" If Yes, ask: \"What is your reading (oxygen level) today?\" \"What is your usual oxygen saturation reading?\" (e.g., 95%)        90s on 5LPM, lowest 83-84 with exertion, 88-89 with walking    Protocols used: Breathing Difficulty-Adult-[place]    "

## 2025-04-30 NOTE — PROGRESS NOTES
Follow-up  Visit - Pulmonary Medicine   Name: Jey Vicente      : 1965      MRN: [id number]  Encounter Provider: Lj Pickett PA-C  Encounter Date: 2025   Encounter department: [facility] PULMON[facility]  :  Assessment & Plan  Severe persistent asthma with exacerbation  Patient has evidence of exacerbation on today's exam.  Recommend restarting prednisone taper at 40 mg and tapering by 10 mg every 3 days until completion.  Continue Trelegy 200/62.5/25 mcg 1 puff daily.  Rinse mouth after each use.  Continue albuterol/DuoNeb every 6 hours as needed.  Recommend he call his insurance about restarting Teszpire now that he had met his deductible.  Orders:    Sputum culture and Gram stain; Future    predniSONE 10 mg tablet; Take 4 tablets daily for 3 days then 3 tablets daily for 3 days then 2 tablets daily for 3 days then 1 tablet daily for 3 days.    cefdinir (OMNICEF) 300 mg capsule; Take 1 capsule (300 mg total) by mouth every 12 (twelve) hours for 7 days    dextromethorphan-guaifenesin (MUCINEX DM)  MG per 12 hr tablet; Take 1 tablet by mouth every 12 (twelve) hours    Acute bronchitis, unspecified organism  In addition to systemic steroids will also treat with cefdinir 300 mg p.o. twice daily x 7 days.  Recommended he collect sputum culture prior to starting antibiotic.  Recommend Robitussin DM for supportive care of cough.  Orders:    Sputum culture and Gram stain; Future    predniSONE 10 mg tablet; Take 4 tablets daily for 3 days then 3 tablets daily for 3 days then 2 tablets daily for 3 days then 1 tablet daily for 3 days.    cefdinir (OMNICEF) 300 mg capsule; Take 1 capsule (300 mg total) by mouth every 12 (twelve) hours for 7 days    dextromethorphan-guaifenesin (MUCINEX DM)  MG per 12 hr tablet; Take 1 tablet by mouth every 12 (twelve) hours    Chronic hypoxemic respiratory failure (HCC)  Seen on room air with adequate oxygen saturations in the office today.  He  has supplemental oxygen for which he uses on an as-needed basis during the day and bled into his auto BiPAP.  He knows to maintain saturations greater than or equal to 88%.       SCOTT (obstructive sleep apnea)  Continue auto BiPAP during all hours of sleep.         Return for Next scheduled follow up.    History of Present Illness   Jey Vicente is a 59 y.o. male who presents to the office today for sick visit.  Patient has a past medical hist positive for severe persistent asthma, SCOTT, chronic hypoxic respiratory failure, positive double-stranded DNA antibody test, morbid obesity.  Patient states that he has been sick for the last 3 weeks.  Pertinent symptoms include increased shortness of breath/dyspnea on exertion, wheezing, chest tightness, cough productive of dark green sputum.  He denies fevers or chills.  Reports that his daughter did have URI symptoms several weeks ago but unsure if it is related.  He called our office with the symptoms 4/11 was treated with prednisone taper.  He then called back again 4/17 and was given a prescription of Augmentin.  He has since completed both prescriptions and feels that they have only helped minimally and since stopping them symptoms have returned full force.  He does noted desaturations with ambulation when not wearing his oxygen.  Currently in the office today he is seen on room air 93%.  Otherwise hemodynamically stable.  Chest x-ray 4/17 was unremarkable.    Review of Systems   All other systems reviewed and are negative.      Aside from what is mentioned in the HPI, ROS is otherwise negative    Past Medical History   Past Medical History:   Diagnosis Date    Acute on chronic diastolic heart failure (HCC) 01/28/2025    Aortic stenosis     Arthritis 1990    Both wrists    Asthma 1965    Chronic hypoxemic respiratory failure ([facility])     Chronic kidney disease 10/2022    COPD (chronic obstructive pulmonary disease) ([facility])     Coronary artery disease 2000    Aortic  stenosis    Diabetes (HCC)     Diabetes mellitus (HCC)     Hyperlipidemia     Hypertension 07/02/2014    Lung disease Birth    Neuropathy in diabetes (HCC) 1/2022    Nonrheumatic aortic (valve) stenosis     Pneumonia 2015    Sleep apnea, obstructive      Past Surgical History:   Procedure Laterality Date    ACCESSORY NAVICULAR EXCISION Right     APPENDECTOMY      CARDIAC CATHETERIZATION N/A 05/25/2023    Procedure: Cardiac Coronary Angiogram;  Surgeon: Mohit Farris MD;  Location:  CARDIAC CATH LAB;  Service: Cardiology    EAR FOREIGN BODY REMOVAL      cyst removal. bronchospasms after general anesthesia.    EYE SURGERY Left     traumatic injury at age 9-10    TONSILLECTOMY  No     Family History   Problem Relation Age of Onset    Cancer Mother         Skin Cancer    Heart disease Father     Lung cancer Father         Passed away 1997    Cancer Father         Lung cancer    Hypertension Father     ALS Maternal Grandmother     Diabetes Maternal Grandfather     Stomach cancer Maternal Grandfather     Arthritis Maternal Grandfather       reports that he quit smoking about 19 years ago. His smoking use included cigarettes. He started smoking about 41 years ago. He has a 90.1 pack-year smoking history. He has never used smokeless tobacco. He reports that he does not drink alcohol and does not use drugs.  Current Outpatient Medications   Medication Instructions    albuterol (Ventolin HFA) 90 mcg/act inhaler 2 puffs, Inhalation, Every 6 hours PRN    Alcohol Swabs (B-D SINGLE USE SWABS REGULAR) PADS USE TO CLEAN INJECTION SITE    aspirin (ECOTRIN LOW STRENGTH) 81 mg, Oral, Daily    atorvastatin (LIPITOR) 40 mg, Oral, Daily    Blood Glucose Monitoring Suppl (CONTOUR NEXT MONITOR) w/Device KIT Test blood sugar once daily or as needed for fluctuating blood sugars    carvedilol (COREG) 12.5 mg tablet TAKE ONE (1) TABLET BY MOUTH TWICE DAILY WITH MEALS    cefdinir (OMNICEF) 300 mg, Oral, Every 12 hours scheduled    Continuous  "Glucose Sensor (FreeStyle Moe 3 Sensor) MISC 1 Units, Every 14 days    cyclobenzaprine (FLEXERIL) 10 mg    dapagliflozin (FARXIGA) 10 mg, Oral, Daily    dextromethorphan-guaifenesin (MUCINEX DM)  MG per 12 hr tablet 1 tablet, Oral, Every 12 hours    EPINEPHrine (EPIPEN) 0.3 mg, Intramuscular, Once    Farxiga 10 mg, Oral, Daily    fluticasone-umeclidinium-vilanterol (Trelegy Ellipta) 200-62.5-25 mcg/actuation AEPB inhaler 1 puff, Inhalation, Daily, Rinse mouth after use.    furosemide (LASIX) 40 mg, Oral, 2 times daily    glimepiride (AMARYL) 4 mg, Oral, 2 times daily    insulin glargine (Toujeo Max SoloStar) 300 units/mL CONCENTRATED U-300 injection pen (2-unit dial) INJECT 70 UNITS SUBCUTANEOUSLY AT BEDTIME    Insulin Pen Needle (BD Pen Needle Dee Dee U/F) 32G X 4 MM MISC USE TO INJECT INSULIN    Insulin Pen Needle (BD Pen Needle Dee Dee U/F) 32G X 4 MM MISC 4 times daily    ipratropium-albuterol (DUO-NEB) 0.5-2.5 mg/3 mL nebulizer solution 3 mL, Nebulization, 4 times daily    losartan (COZAAR) 50 mg, Oral, Daily    metFORMIN (GLUCOPHAGE) 1,000 mg, Oral, 2 times daily with meals    metFORMIN (GLUCOPHAGE) 1,000 mg, Oral, 2 times daily    Mounjaro 5 mg, Subcutaneous, Weekly    Multiple Vitamins-Minerals (MENS MULTIVITAMIN PO) Take by mouth    predniSONE 10 mg tablet 4 tabs x 3 days decrease 1 tab every third day    predniSONE 10 mg tablet Take 4 tablets daily for 3 days then 3 tablets daily for 3 days then 2 tablets daily for 3 days then 1 tablet daily for 3 days.    pregabalin (LYRICA) 50 mg, Oral, 3 times daily    sertraline (ZOLOFT) 50 mg, Oral, Daily    spironolactone (ALDACTONE) 50 mg, Oral, Daily    Tezspire 210 mg, Subcutaneous, Every 28 days   No Known Allergies      Medical History Reviewed by provider this encounter:     .    Objective   /67   Pulse 93   Temp 97.5 °F (36.4 °C) (Temporal)   Ht 5' 9\" (1.753 m)   Wt (!) 153 kg (338 lb)   SpO2 93%   BMI 49.91 kg/m²     Physical Exam  Vitals " reviewed.   Constitutional:       Appearance: Normal appearance. He is well-developed. He is obese.   HENT:      Head: Normocephalic and atraumatic.      Nose: Nose normal.      Mouth/Throat:      Mouth: Mucous membranes are moist.   Eyes:      Extraocular Movements: Extraocular movements intact.   Cardiovascular:      Rate and Rhythm: Normal rate and regular rhythm.      Heart sounds: Normal heart sounds.   Pulmonary:      Effort: Pulmonary effort is normal. No respiratory distress.      Breath sounds: Wheezing and rhonchi present. No rales.   Musculoskeletal:         General: No swelling.   Skin:     General: Skin is warm and dry.   Neurological:      Mental Status: He is alert. Mental status is at baseline.   Psychiatric:         Mood and Affect: Mood normal.         Behavior: Behavior normal.         Diagnostic Data:  Labs: I personally reviewed the most recent laboratory data pertinent to today's visit.    Radiology results:  Radiology Results Review: I personally reviewed the following image studies in PACS and associated radiology reports: chest xray. My interpretation of the radiology images/reports is: CXR 4/17/25 lungs are clear without infiltrates, pleural effusion, or pneumothorax.      Lj Pickett PA-C

## 2025-04-30 NOTE — TELEPHONE ENCOUNTER
Regarding: Low O2  ----- Message from Maxine NEWELL sent at 4/30/2025 12:02 PM EDT -----  Patient called and stated he finished prednisone and is still having symptoms. His O2 levels are dropping into the 80s and he is bringing up phlegm. Please advise

## 2025-04-30 NOTE — ASSESSMENT & PLAN NOTE
Seen on room air with adequate oxygen saturations in the office today.  He has supplemental oxygen for which he uses on an as-needed basis during the day and bled into his auto BiPAP.  He knows to maintain saturations greater than or equal to 88%.

## 2025-05-07 ENCOUNTER — HOSPITAL ENCOUNTER (INPATIENT)
Facility: HOSPITAL | Age: 60
LOS: 1 days | Discharge: HOME | DRG: 306 | End: 2025-05-09
Attending: EMERGENCY MEDICINE | Admitting: HOSPITALIST
Payer: COMMERCIAL

## 2025-05-07 ENCOUNTER — APPOINTMENT (EMERGENCY)
Dept: RADIOLOGY | Facility: HOSPITAL | Age: 60
DRG: 306 | End: 2025-05-07
Payer: COMMERCIAL

## 2025-05-07 ENCOUNTER — APPOINTMENT (OUTPATIENT)
Dept: LAB | Facility: MEDICAL CENTER | Age: 60
DRG: 306 | End: 2025-05-07
Attending: PHYSICIAN ASSISTANT
Payer: COMMERCIAL

## 2025-05-07 ENCOUNTER — OFFICE VISIT (OUTPATIENT)
Dept: PULMONOLOGY | Facility: CLINIC | Age: 60
End: 2025-05-07
Payer: COMMERCIAL

## 2025-05-07 VITALS
OXYGEN SATURATION: 92 % | BODY MASS INDEX: 46.65 KG/M2 | HEART RATE: 99 BPM | WEIGHT: 315 LBS | TEMPERATURE: 97.6 F | HEIGHT: 69 IN | DIASTOLIC BLOOD PRESSURE: 72 MMHG | SYSTOLIC BLOOD PRESSURE: 131 MMHG

## 2025-05-07 DIAGNOSIS — I50.33 ACUTE ON CHRONIC DIASTOLIC HEART FAILURE (HCC): ICD-10-CM

## 2025-05-07 DIAGNOSIS — J20.9 ACUTE BRONCHITIS, UNSPECIFIED ORGANISM: ICD-10-CM

## 2025-05-07 DIAGNOSIS — R73.9 HYPERGLYCEMIA: Primary | ICD-10-CM

## 2025-05-07 DIAGNOSIS — G47.33 OSA (OBSTRUCTIVE SLEEP APNEA): ICD-10-CM

## 2025-05-07 DIAGNOSIS — I35.0 NONRHEUMATIC AORTIC (VALVE) STENOSIS: ICD-10-CM

## 2025-05-07 DIAGNOSIS — R06.09 DYSPNEA ON EXERTION: ICD-10-CM

## 2025-05-07 DIAGNOSIS — J44.9 COPD, SEVERE (HCC): ICD-10-CM

## 2025-05-07 DIAGNOSIS — E87.70 VOLUME OVERLOAD: ICD-10-CM

## 2025-05-07 DIAGNOSIS — J45.51 SEVERE PERSISTENT ASTHMA WITH ACUTE EXACERBATION: Primary | ICD-10-CM

## 2025-05-07 DIAGNOSIS — J45.51 SEVERE PERSISTENT ASTHMA WITH EXACERBATION: ICD-10-CM

## 2025-05-07 DIAGNOSIS — J96.11 CHRONIC HYPOXEMIC RESPIRATORY FAILURE (HCC): ICD-10-CM

## 2025-05-07 DIAGNOSIS — I50.33 ACUTE ON CHRONIC HEART FAILURE WITH PRESERVED EJECTION FRACTION (HCC): ICD-10-CM

## 2025-05-07 DIAGNOSIS — R06.02 SOB (SHORTNESS OF BREATH): ICD-10-CM

## 2025-05-07 DIAGNOSIS — J18.9 COMMUNITY ACQUIRED PNEUMONIA OF RIGHT LOWER LOBE OF LUNG: ICD-10-CM

## 2025-05-07 PROBLEM — E87.1 HYPONATREMIA: Status: RESOLVED | Noted: 2022-10-04 | Resolved: 2025-05-07

## 2025-05-07 PROBLEM — J45.50 SEVERE PERSISTENT ASTHMA WITHOUT COMPLICATION: Status: ACTIVE | Noted: 2025-05-07

## 2025-05-07 PROBLEM — J02.9 PHARYNGITIS: Status: RESOLVED | Noted: 2025-01-28 | Resolved: 2025-05-07

## 2025-05-07 PROBLEM — E11.42 DIABETIC PERIPHERAL NEUROPATHY (HCC): Status: ACTIVE | Noted: 2025-05-07

## 2025-05-07 LAB
ALBUMIN SERPL BCG-MCNC: 4 G/DL (ref 3.5–5)
ALP SERPL-CCNC: 66 U/L (ref 34–104)
ALT SERPL W P-5'-P-CCNC: 33 U/L (ref 7–52)
ANION GAP SERPL CALCULATED.3IONS-SCNC: 12 MMOL/L (ref 4–13)
APTT PPP: 26 SECONDS (ref 23–34)
AST SERPL W P-5'-P-CCNC: 16 U/L (ref 13–39)
B-OH-BUTYR SERPL-MCNC: 0.11 MMOL/L (ref 0.2–0.6)
BASOPHILS # BLD MANUAL: 0 THOUSAND/UL (ref 0–0.1)
BASOPHILS NFR MAR MANUAL: 0 % (ref 0–1)
BILIRUB SERPL-MCNC: 0.52 MG/DL (ref 0.2–1)
BNP SERPL-MCNC: 63 PG/ML (ref 0–100)
BUN SERPL-MCNC: 27 MG/DL (ref 5–25)
CALCIUM SERPL-MCNC: 8.8 MG/DL (ref 8.4–10.2)
CARDIAC TROPONIN I PNL SERPL HS: 9 NG/L (ref ?–50)
CHLORIDE SERPL-SCNC: 99 MMOL/L (ref 96–108)
CO2 SERPL-SCNC: 24 MMOL/L (ref 21–32)
CREAT SERPL-MCNC: 1.05 MG/DL (ref 0.6–1.3)
D DIMER PPP FEU-MCNC: <0.27 UG/ML FEU
EOSINOPHIL # BLD MANUAL: 0.1 THOUSAND/UL (ref 0–0.4)
EOSINOPHIL NFR BLD MANUAL: 1 % (ref 0–6)
ERYTHROCYTE [DISTWIDTH] IN BLOOD BY AUTOMATED COUNT: 14.4 % (ref 11.6–15.1)
GFR SERPL CREATININE-BSD FRML MDRD: 76 ML/MIN/1.73SQ M
GLUCOSE SERPL-MCNC: 319 MG/DL (ref 65–140)
GLUCOSE SERPL-MCNC: 330 MG/DL (ref 65–140)
GLUCOSE SERPL-MCNC: 485 MG/DL (ref 65–140)
GLUCOSE SERPL-MCNC: 514 MG/DL (ref 65–140)
HCT VFR BLD AUTO: 45.9 % (ref 36.5–49.3)
HGB BLD-MCNC: 15.4 G/DL (ref 12–17)
INR PPP: 0.93 (ref 0.85–1.19)
LACTATE SERPL-SCNC: 2.9 MMOL/L (ref 0.5–2)
LACTATE SERPL-SCNC: 3.5 MMOL/L (ref 0.5–2)
LYMPHOCYTES # BLD AUTO: 0.59 THOUSAND/UL (ref 0.6–4.47)
LYMPHOCYTES # BLD AUTO: 6 % (ref 14–44)
MAGNESIUM SERPL-MCNC: 2 MG/DL (ref 1.9–2.7)
MCH RBC QN AUTO: 27.9 PG (ref 26.8–34.3)
MCHC RBC AUTO-ENTMCNC: 33.6 G/DL (ref 31.4–37.4)
MCV RBC AUTO: 83 FL (ref 82–98)
MONOCYTES # BLD AUTO: 0.2 THOUSAND/UL (ref 0–1.22)
MONOCYTES NFR BLD: 2 % (ref 4–12)
NEUTROPHILS # BLD MANUAL: 8.93 THOUSAND/UL (ref 1.85–7.62)
NEUTS BAND NFR BLD MANUAL: 2 % (ref 0–8)
NEUTS SEG NFR BLD AUTO: 89 % (ref 43–75)
PLATELET # BLD AUTO: 233 THOUSANDS/UL (ref 149–390)
PLATELET BLD QL SMEAR: ADEQUATE
PMV BLD AUTO: 9.7 FL (ref 8.9–12.7)
POTASSIUM SERPL-SCNC: 4.2 MMOL/L (ref 3.5–5.3)
PROCALCITONIN SERPL-MCNC: <0.05 NG/ML
PROT SERPL-MCNC: 6.5 G/DL (ref 6.4–8.4)
PROTHROMBIN TIME: 13 SECONDS (ref 12.3–15)
RBC # BLD AUTO: 5.52 MILLION/UL (ref 3.88–5.62)
RBC MORPH BLD: NORMAL
SODIUM SERPL-SCNC: 135 MMOL/L (ref 135–147)
WBC # BLD AUTO: 9.81 THOUSAND/UL (ref 4.31–10.16)

## 2025-05-07 PROCEDURE — 99222 1ST HOSP IP/OBS MODERATE 55: CPT | Performed by: FAMILY MEDICINE

## 2025-05-07 PROCEDURE — 96365 THER/PROPH/DIAG IV INF INIT: CPT

## 2025-05-07 PROCEDURE — 82948 REAGENT STRIP/BLOOD GLUCOSE: CPT

## 2025-05-07 PROCEDURE — 87205 SMEAR GRAM STAIN: CPT

## 2025-05-07 PROCEDURE — 83735 ASSAY OF MAGNESIUM: CPT | Performed by: EMERGENCY MEDICINE

## 2025-05-07 PROCEDURE — 99214 OFFICE O/P EST MOD 30 MIN: CPT | Performed by: PHYSICIAN ASSISTANT

## 2025-05-07 PROCEDURE — 71046 X-RAY EXAM CHEST 2 VIEWS: CPT

## 2025-05-07 PROCEDURE — 85610 PROTHROMBIN TIME: CPT | Performed by: EMERGENCY MEDICINE

## 2025-05-07 PROCEDURE — 80053 COMPREHEN METABOLIC PANEL: CPT | Performed by: EMERGENCY MEDICINE

## 2025-05-07 PROCEDURE — 93005 ELECTROCARDIOGRAM TRACING: CPT

## 2025-05-07 PROCEDURE — 87070 CULTURE OTHR SPECIMN AEROBIC: CPT

## 2025-05-07 PROCEDURE — 99285 EMERGENCY DEPT VISIT HI MDM: CPT | Performed by: EMERGENCY MEDICINE

## 2025-05-07 PROCEDURE — 85007 BL SMEAR W/DIFF WBC COUNT: CPT | Performed by: EMERGENCY MEDICINE

## 2025-05-07 PROCEDURE — 85027 COMPLETE CBC AUTOMATED: CPT | Performed by: EMERGENCY MEDICINE

## 2025-05-07 PROCEDURE — 83880 ASSAY OF NATRIURETIC PEPTIDE: CPT | Performed by: EMERGENCY MEDICINE

## 2025-05-07 PROCEDURE — 85379 FIBRIN DEGRADATION QUANT: CPT | Performed by: EMERGENCY MEDICINE

## 2025-05-07 PROCEDURE — 96375 TX/PRO/DX INJ NEW DRUG ADDON: CPT

## 2025-05-07 PROCEDURE — 83605 ASSAY OF LACTIC ACID: CPT | Performed by: EMERGENCY MEDICINE

## 2025-05-07 PROCEDURE — 84484 ASSAY OF TROPONIN QUANT: CPT | Performed by: EMERGENCY MEDICINE

## 2025-05-07 PROCEDURE — 84145 PROCALCITONIN (PCT): CPT | Performed by: EMERGENCY MEDICINE

## 2025-05-07 PROCEDURE — 36415 COLL VENOUS BLD VENIPUNCTURE: CPT | Performed by: EMERGENCY MEDICINE

## 2025-05-07 PROCEDURE — 94760 N-INVAS EAR/PLS OXIMETRY 1: CPT

## 2025-05-07 PROCEDURE — 82010 KETONE BODYS QUAN: CPT | Performed by: EMERGENCY MEDICINE

## 2025-05-07 PROCEDURE — 85730 THROMBOPLASTIN TIME PARTIAL: CPT | Performed by: EMERGENCY MEDICINE

## 2025-05-07 PROCEDURE — 94640 AIRWAY INHALATION TREATMENT: CPT

## 2025-05-07 PROCEDURE — 99285 EMERGENCY DEPT VISIT HI MDM: CPT

## 2025-05-07 RX ORDER — SPIRONOLACTONE 25 MG/1
50 TABLET ORAL EVERY EVENING
Status: DISCONTINUED | OUTPATIENT
Start: 2025-05-07 | End: 2025-05-09 | Stop reason: HOSPADM

## 2025-05-07 RX ORDER — LOSARTAN POTASSIUM 50 MG/1
50 TABLET ORAL DAILY
Status: DISCONTINUED | OUTPATIENT
Start: 2025-05-08 | End: 2025-05-07

## 2025-05-07 RX ORDER — INSULIN LISPRO 100 [IU]/ML
5 INJECTION, SOLUTION INTRAVENOUS; SUBCUTANEOUS ONCE
Status: COMPLETED | OUTPATIENT
Start: 2025-05-07 | End: 2025-05-07

## 2025-05-07 RX ORDER — INSULIN LISPRO 100 [IU]/ML
2-12 INJECTION, SOLUTION INTRAVENOUS; SUBCUTANEOUS
Status: DISCONTINUED | OUTPATIENT
Start: 2025-05-07 | End: 2025-05-09 | Stop reason: HOSPADM

## 2025-05-07 RX ORDER — LEVALBUTEROL INHALATION SOLUTION 1.25 MG/3ML
1.25 SOLUTION RESPIRATORY (INHALATION)
Status: DISCONTINUED | OUTPATIENT
Start: 2025-05-07 | End: 2025-05-08

## 2025-05-07 RX ORDER — ASPIRIN 81 MG/1
81 TABLET ORAL DAILY
Status: DISCONTINUED | OUTPATIENT
Start: 2025-05-08 | End: 2025-05-09 | Stop reason: HOSPADM

## 2025-05-07 RX ORDER — HEPARIN SODIUM 5000 [USP'U]/ML
7500 INJECTION, SOLUTION INTRAVENOUS; SUBCUTANEOUS EVERY 8 HOURS SCHEDULED
Status: DISCONTINUED | OUTPATIENT
Start: 2025-05-07 | End: 2025-05-09 | Stop reason: HOSPADM

## 2025-05-07 RX ORDER — METHYLPREDNISOLONE SODIUM SUCCINATE 125 MG/2ML
60 INJECTION, POWDER, LYOPHILIZED, FOR SOLUTION INTRAMUSCULAR; INTRAVENOUS ONCE
Status: COMPLETED | OUTPATIENT
Start: 2025-05-07 | End: 2025-05-07

## 2025-05-07 RX ORDER — CARVEDILOL 12.5 MG/1
12.5 TABLET ORAL 2 TIMES DAILY WITH MEALS
Status: DISCONTINUED | OUTPATIENT
Start: 2025-05-08 | End: 2025-05-09 | Stop reason: HOSPADM

## 2025-05-07 RX ORDER — FUROSEMIDE 10 MG/ML
80 INJECTION INTRAMUSCULAR; INTRAVENOUS ONCE
Status: COMPLETED | OUTPATIENT
Start: 2025-05-07 | End: 2025-05-07

## 2025-05-07 RX ORDER — MAGNESIUM SULFATE HEPTAHYDRATE 40 MG/ML
2 INJECTION, SOLUTION INTRAVENOUS ONCE
Status: COMPLETED | OUTPATIENT
Start: 2025-05-07 | End: 2025-05-07

## 2025-05-07 RX ORDER — ATORVASTATIN CALCIUM 40 MG/1
40 TABLET, FILM COATED ORAL DAILY
Status: DISCONTINUED | OUTPATIENT
Start: 2025-05-08 | End: 2025-05-09 | Stop reason: HOSPADM

## 2025-05-07 RX ORDER — PREGABALIN 50 MG/1
50 CAPSULE ORAL 3 TIMES DAILY
Status: DISCONTINUED | OUTPATIENT
Start: 2025-05-07 | End: 2025-05-09 | Stop reason: HOSPADM

## 2025-05-07 RX ORDER — ACETAMINOPHEN 325 MG/1
650 TABLET ORAL EVERY 4 HOURS PRN
Status: DISCONTINUED | OUTPATIENT
Start: 2025-05-07 | End: 2025-05-09 | Stop reason: HOSPADM

## 2025-05-07 RX ORDER — ALBUTEROL SULFATE 0.83 MG/ML
2.5 SOLUTION RESPIRATORY (INHALATION) EVERY 4 HOURS PRN
Status: DISCONTINUED | OUTPATIENT
Start: 2025-05-07 | End: 2025-05-08

## 2025-05-07 RX ORDER — INSULIN GLARGINE 100 [IU]/ML
60 INJECTION, SOLUTION SUBCUTANEOUS
Status: DISCONTINUED | OUTPATIENT
Start: 2025-05-07 | End: 2025-05-08

## 2025-05-07 RX ORDER — LOSARTAN POTASSIUM 50 MG/1
50 TABLET ORAL
Status: DISCONTINUED | OUTPATIENT
Start: 2025-05-07 | End: 2025-05-09 | Stop reason: HOSPADM

## 2025-05-07 RX ORDER — IPRATROPIUM BROMIDE AND ALBUTEROL SULFATE 2.5; .5 MG/3ML; MG/3ML
3 SOLUTION RESPIRATORY (INHALATION)
Status: DISCONTINUED | OUTPATIENT
Start: 2025-05-07 | End: 2025-05-07

## 2025-05-07 RX ADMIN — FUROSEMIDE 80 MG: 10 INJECTION, SOLUTION INTRAMUSCULAR; INTRAVENOUS at 17:35

## 2025-05-07 RX ADMIN — SPIRONOLACTONE 50 MG: 25 TABLET ORAL at 20:04

## 2025-05-07 RX ADMIN — INSULIN LISPRO 8 UNITS: 100 INJECTION, SOLUTION INTRAVENOUS; SUBCUTANEOUS at 22:22

## 2025-05-07 RX ADMIN — INSULIN LISPRO 5 UNITS: 100 INJECTION, SOLUTION INTRAVENOUS; SUBCUTANEOUS at 20:05

## 2025-05-07 RX ADMIN — LEVALBUTEROL HYDROCHLORIDE 1.25 MG: 1.25 SOLUTION RESPIRATORY (INHALATION) at 20:24

## 2025-05-07 RX ADMIN — METHYLPREDNISOLONE SODIUM SUCCINATE 60 MG: 125 INJECTION, POWDER, FOR SOLUTION INTRAMUSCULAR; INTRAVENOUS at 17:30

## 2025-05-07 RX ADMIN — HEPARIN SODIUM 7500 UNITS: 5000 INJECTION INTRAVENOUS; SUBCUTANEOUS at 22:23

## 2025-05-07 RX ADMIN — INSULIN GLARGINE 60 UNITS: 100 INJECTION, SOLUTION SUBCUTANEOUS at 22:22

## 2025-05-07 RX ADMIN — PREGABALIN 50 MG: 50 CAPSULE ORAL at 20:04

## 2025-05-07 RX ADMIN — IPRATROPIUM BROMIDE 0.5 MG: 0.5 SOLUTION RESPIRATORY (INHALATION) at 20:24

## 2025-05-07 RX ADMIN — MAGNESIUM SULFATE HEPTAHYDRATE 2 G: 40 INJECTION, SOLUTION INTRAVENOUS at 17:30

## 2025-05-07 NOTE — PROGRESS NOTES
Follow-up  Visit - Pulmonary Medicine   Name: Jey Vicente      : 1965      MRN: [id number]  Encounter Provider: Lj Pickett PA-C  Encounter Date: 2025   Encounter department: [facility] PULMONARY [facility]  :  Assessment & Plan  Severe persistent asthma with acute exacerbation  Patient has had no subjective improvement since  being started on prednisone taper and complaining of  7-day course of cefdinir.  He has now tried and failed 2 rounds of antibiotics and steroids as an outpatient.  Therefore, I recommend he go to the emergency department for likely admission for asthma exacerbation +/- pneumonia vs tracheobronchitis depending on what repeat imaging and lab work reveals. He verbalized understanding and agrees to the plan. Called the ED and reviewed with Dr. May.        Chronic hypoxemic respiratory failure (HCC)  Oxygen saturations are adequate in the office today.  He knows to continue supplemental oxygen to maintain saturations greater than or equal to 88%.       SCOTT (obstructive sleep apnea)  Continue auto BiPAP with oxygen bled in during all hours of sleep           History of Present Illness   Jey Vicente is a 59 y.o. male who presents presents the office today for sick visit.  Patient has a past medical history positive for severe persistent asthma, SCOTT, chronic hypoxic respiratory failure, positive double-stranded DNA antibody test, morbid obesity.  Patient was last seen in our office a week ago.  He is pain dealing with increased respiratory symptoms for just about a month now.  Pertinent symptoms including worsening shortness of breath/dyspnea exertion, wheezing, chest tightness, cough productive of green sputum.  He was first treated with a prednisone taper  then was given Augmentin  without improvement. He was then restarted on prednisone taper and cefdinir .  Patient states that he feels absolutely no better.  He continues to wheeze and have dark  green phlegm.  Also worth mentioning that he has a 7 pound weight gain since last week.  He denies fevers or chills.  Denies sick contacts.  He reports compliance on his Trelegy.  Using his nebulizer 4 times a day with minimal improvement that does not last very long.    Review of Systems   All other systems reviewed and are negative.    Aside from what is mentioned in the HPI, ROS is otherwise negative    Past Medical History   Past Medical History:   Diagnosis Date    Acute on chronic diastolic heart failure (HCC) 01/28/2025    Aortic stenosis     Arthritis 1990    Both wrists    Asthma 1964    Chronic hypoxemic respiratory failure ([facility])     Chronic kidney disease 10/2022    COPD (chronic obstructive pulmonary disease) ([facility])     Coronary artery disease 2000    Aortic stenosis    Diabetes ([facility])     Diabetes mellitus ([facility])     Hyperlipidemia     Hypertension 07/02/2014    Lung disease Birth    Neuropathy in diabetes ([facility]) 1/2022    Nonrheumatic aortic (valve) stenosis     Pneumonia 2015    Sleep apnea, obstructive      Past Surgical History:   Procedure Laterality Date    ACCESSORY NAVICULAR EXCISION Right     APPENDECTOMY      CARDIAC CATHETERIZATION N/A 05/25/2023    Procedure: Cardiac Coronary Angiogram;  Surgeon: Mohit Farris MD;  Location: [facility];  Service: Cardiology    EAR FOREIGN BODY REMOVAL      cyst removal. bronchospasms after general anesthesia.    EYE SURGERY Left     traumatic injury at age 9-10    TONSILLECTOMY  No     Family History   Problem Relation Age of Onset    Cancer Mother         Skin Cancer    Heart disease Father     Lung cancer Father         Passed away 1997    Cancer Father         Lung cancer    Hypertension Father     ALS Maternal Grandmother     Diabetes Maternal Grandfather     Stomach cancer Maternal Grandfather     Arthritis Maternal Grandfather       reports that he quit smoking about 19 years ago. His smoking use included cigarettes. He started smoking about 41  years ago. He has a 90.1 pack-year smoking history. He has never used smokeless tobacco. He reports that he does not drink alcohol and does not use drugs.  Current Outpatient Medications   Medication Instructions    albuterol (Ventolin HFA) 90 mcg/act inhaler 2 puffs, Inhalation, Every 6 hours PRN    Alcohol Swabs (B-D SINGLE USE SWABS REGULAR) PADS USE TO CLEAN INJECTION SITE    aspirin (ECOTRIN LOW STRENGTH) 81 mg, Oral, Daily    atorvastatin (LIPITOR) 40 mg, Oral, Daily    Blood Glucose Monitoring Suppl (CONTOUR NEXT MONITOR) w/Device KIT Test blood sugar once daily or as needed for fluctuating blood sugars    carvedilol (COREG) 12.5 mg tablet TAKE ONE (1) TABLET BY MOUTH TWICE DAILY WITH MEALS    cefdinir (OMNICEF) 300 mg, Oral, Every 12 hours scheduled    Continuous Glucose Sensor (FreeStyle Moe 3 Sensor) MISC 1 Units, Every 14 days    cyclobenzaprine (FLEXERIL) 10 mg    dapagliflozin (FARXIGA) 10 mg, Oral, Daily    dextromethorphan-guaifenesin (MUCINEX DM)  MG per 12 hr tablet 1 tablet, Oral, Every 12 hours    EPINEPHrine (EPIPEN) 0.3 mg, Intramuscular, Once    Farxiga 10 mg, Oral, Daily    fluticasone-umeclidinium-vilanterol (Trelegy Ellipta) 200-62.5-25 mcg/actuation AEPB inhaler 1 puff, Inhalation, Daily, Rinse mouth after use.    furosemide (LASIX) 40 mg, Oral, 2 times daily    glimepiride (AMARYL) 4 mg, Oral, 2 times daily    insulin glargine (Toujeo Max SoloStar) 300 units/mL CONCENTRATED U-300 injection pen (2-unit dial) INJECT 70 UNITS SUBCUTANEOUSLY AT BEDTIME    Insulin Pen Needle (BD Pen Needle Dee Dee U/F) 32G X 4 MM MISC USE TO INJECT INSULIN    Insulin Pen Needle (BD Pen Needle Dee Dee U/F) 32G X 4 MM MISC 4 times daily    ipratropium-albuterol (DUO-NEB) 0.5-2.5 mg/3 mL nebulizer solution 3 mL, Nebulization, 4 times daily    losartan (COZAAR) 50 mg, Oral, Daily    metFORMIN (GLUCOPHAGE) 1,000 mg, Oral, 2 times daily with meals    metFORMIN (GLUCOPHAGE) 1,000 mg, Oral, 2 times daily    Mounjaro  "5 mg, Subcutaneous, Weekly    Multiple Vitamins-Minerals (MENS MULTIVITAMIN PO) Take by mouth    predniSONE 10 mg tablet 4 tabs x 3 days decrease 1 tab every third day    predniSONE 10 mg tablet Take 4 tablets daily for 3 days then 3 tablets daily for 3 days then 2 tablets daily for 3 days then 1 tablet daily for 3 days.    pregabalin (LYRICA) 50 mg, Oral, 3 times daily    sertraline (ZOLOFT) 50 mg, Oral, Daily    spironolactone (ALDACTONE) 50 mg, Oral, Daily    Tezspire 210 mg, Subcutaneous, Every 28 days   No Known Allergies      Medical History Reviewed by provider this encounter:     .    Objective   /72 (BP Location: Left arm, Patient Position: Sitting, Cuff Size: Adult)   Pulse 99   Temp 97.6 °F (36.4 °C) (Temporal)   Ht 5' 9\" (1.753 m)   Wt (!) 156 kg (345 lb)   SpO2 92%   BMI 50.95 kg/m²     Physical Exam  Vitals reviewed.   Constitutional:       Appearance: Normal appearance. He is well-developed. He is morbidly obese.   HENT:      Head: Normocephalic and atraumatic.      Nose: Nose normal.      Mouth/Throat:      Mouth: Mucous membranes are moist.   Eyes:      Extraocular Movements: Extraocular movements intact.   Cardiovascular:      Rate and Rhythm: Normal rate and regular rhythm.      Heart sounds: Normal heart sounds.   Pulmonary:      Effort: Pulmonary effort is normal. No respiratory distress.      Breath sounds: Wheezing and rhonchi present. No rales.   Musculoskeletal:         General: No swelling.   Skin:     General: Skin is warm and dry.   Neurological:      Mental Status: He is alert. Mental status is at baseline.   Psychiatric:         Mood and Affect: Mood normal.         Behavior: Behavior normal.       Lj Pickett PA-C      "

## 2025-05-07 NOTE — PLAN OF CARE
Problem: PAIN - ADULT  Goal: Verbalizes/displays adequate comfort level or baseline comfort level  Description: Interventions:- Encourage patient to monitor pain and request assistance- Assess pain using appropriate pain scale- Administer analgesics based on type and severity of pain and evaluate response- Implement non-pharmacological measures as appropriate and evaluate response- Consider cultural and social influences on pain and pain management- Notify physician/advanced practitioner if interventions unsuccessful or patient reports new pain  Outcome: Progressing     Problem: INFECTION - ADULT  Goal: Absence or prevention of progression during hospitalization  Description: INTERVENTIONS:- Assess and monitor for signs and symptoms of infection- Monitor lab/diagnostic results- Monitor all insertion sites, i.e. indwelling lines, tubes, and drains- Monitor endotracheal if appropriate and nasal secretions for changes in amount and color- [place] appropriate cooling/warming therapies per order- Administer medications as ordered- Instruct and encourage patient and family to use good hand hygiene technique- Identify and instruct in appropriate isolation precautions for identified infection/condition  Outcome: Progressing     Problem: SAFETY ADULT  Goal: Patient will remain free of falls  Description: INTERVENTIONS:- Educate patient/family on patient safety including physical limitations- Instruct patient to call for assistance with activity - Consult OT/PT to assist with strengthening/mobility - Keep Call bell within reach- Keep bed low and locked with side rails adjusted as appropriate- Keep care items and personal belongings within reach- Initiate and maintain comfort rounds- Make Fall Risk Sign visible to staff- Offer Toileting every 2 Hours, in advance of need-Obtain necessary fall risk management equipment: nonskid footwear- Apply yellow socks and bracelet for high fall risk patients- Consider moving patient to  room near nurses station  Outcome: Progressing  Goal: Maintain or return to baseline ADL function  Description: INTERVENTIONS:-  Assess patient's ability to carry out ADLs; assess patient's baseline for ADL function and identify physical deficits which impact ability to perform ADLs (bathing, care of mouth/teeth, toileting, grooming, dressing, etc.)- Assess/evaluate cause of self-care deficits - Assess range of motion- Assess patient's mobility; develop plan if impaired- Assess patient's need for assistive devices and provide as appropriate- Encourage maximum independence but intervene and supervise when necessary- Involve family in performance of ADLs- Assess for home care needs following discharge - Consider OT consult to assist with ADL evaluation and planning for discharge- Provide patient education as appropriate  Outcome: Progressing  Goal: Maintains/Returns to pre admission functional level  Description: INTERVENTIONS:- Perform AM-PAC 6 Click Basic Mobility/ Daily Activity assessment daily.- Set and communicate daily mobility goal to care team and patient/family/caregiver. - Collaborate with rehabilitation services on mobility goals if consulted- Perform Range of Motion 3 times a day.- Reposition patient every 2 hours.- Dangle patient 3 times a day- Stand patient 3 times a day- Ambulate patient 3 times a day- Out of bed to chair 3 times a day - Out of bed for meals 3 times a day- Out of bed for toileting- Record patient progress and toleration of activity level   Outcome: Progressing     Problem: Knowledge Deficit  Goal: Patient/family/caregiver demonstrates understanding of disease process, treatment plan, medications, and discharge instructions  Description: Complete learning assessment and assess knowledge base.Interventions:- Provide teaching at level of understanding- Provide teaching via preferred learning methods  Outcome: Progressing     Problem: CARDIOVASCULAR - ADULT  Goal: Maintains optimal cardiac  output and hemodynamic stability  Description: INTERVENTIONS:- Monitor I/O, vital signs and rhythm- Monitor for S/S and trends of decreased cardiac output- Administer and titrate ordered vasoactive medications to optimize hemodynamic stability- Assess quality of pulses, skin color and temperature- Assess for signs of decreased coronary artery perfusion- Instruct patient to report change in severity of symptoms  Outcome: Progressing  Goal: Absence of cardiac dysrhythmias or at baseline rhythm  Description: INTERVENTIONS:- Continuous cardiac monitoring, vital signs, obtain 12 lead EKG if ordered- Administer antiarrhythmic and heart rate control medications as ordered- Monitor electrolytes and administer replacement therapy as ordered  Outcome: Progressing     Problem: METABOLIC, FLUID AND ELECTROLYTES - ADULT  Goal: Electrolytes maintained within normal limits  Description: INTERVENTIONS:- Monitor labs and assess patient for signs and symptoms of electrolyte imbalances- Administer electrolyte replacement as ordered- Monitor response to electrolyte replacements, including repeat lab results as appropriate- Instruct patient on fluid and nutrition as appropriate  Outcome: Progressing  Goal: Fluid balance maintained  Description: INTERVENTIONS:- Monitor labs - Monitor I/O and WT- Instruct patient on fluid and nutrition as appropriate- Assess for signs & symptoms of volume excess or deficit  Outcome: Progressing  Goal: Glucose maintained within target range  Description: INTERVENTIONS:- Monitor Blood Glucose as ordered- Assess for signs and symptoms of hyperglycemia and hypoglycemia- Administer ordered medications to maintain glucose within target range- Assess nutritional intake and initiate nutrition service referral as needed  Outcome: Progressing

## 2025-05-07 NOTE — ED PROVIDER NOTES
Time reflects when diagnosis was documented in both MDM as applicable and the Disposition within this note       Time User Action Codes Description Comment    5/7/2025  5:52 PM Martita Maygan Add [R73.9] Hyperglycemia     5/7/2025  5:52 PM Yadira, Lynn Add [E87.70] Volume overload     5/7/2025  5:53 PM Yadira, Lynn Add [R06.09] Dyspnea on exertion     5/7/2025  5:54 PM Yadira Lynn Add [J45.51] Severe persistent asthma with exacerbation     5/7/2025  7:14 PM Juares, Yasmani Add [R06.02] SOB (shortness of breath)     5/7/2025  7:14 PM Juares, Yasmani Add [I35.0] Nonrheumatic aortic (valve) stenosis     5/7/2025  7:34 PM Juares, Yasmani Add [J44.9] COPD, severe (HCC)           ED Disposition       None          Assessment & Plan       Medical Decision Making  59-year-old male presents for evaluation from outpatient pulmonology office for dyspnea, potential hospitalization for IV steroids.  Patient over the last month has had cough with sputum production, symptoms are unrelenting despite breathing treatments, antibiotics and steroids.  Over the last week he believes he has gained about 8 pounds and has pitting edema to his legs, he is no longer able to lie flat and less he uses his BiPAP.  Will assess patient for CHF exacerbation, ACS, VTE, pleural effusions, doubt pneumothorax.  Will likely obtain CT chest to better characterize lung parenchyma, will be with or without depending on dimer assay.  Will likely provide a dose of IV Lasix in the meantime.  He does have abnormal lung sounds however does not believe he needs a breathing treatment at the moment, will hold on this, he does not appear in duress, he additionally is maintaining saturations on room air.  He has been on steroids for the better part of the month, his glucometer in room is currently reading high with the limit of 350, will obtain fingerstick, assess for potential DKA or other associated abnormalities in setting of hyperglycemia.  He does  admit to increased thirst which may be related to hyperglycemia or potentially steroids.    Amount and/or Complexity of Data Reviewed  Labs: ordered. Decision-making details documented in ED Course.  Radiology: ordered. Decision-making details documented in ED Course.    Risk  Prescription drug management.        ED Course as of 05/07/25 2219   Wed May 07, 2025   1607 Procedure Note: EKG  Date/Time: 05/07/25 4:07 PM   Interpreted by: Lynn May  Indications / Diagnosis: dyspnea  ECG reviewed by me, the ED Provider: yes   The EKG demonstrates:  Rhythm: normal sinus  Intervals: normal intervals  Axis: left axis  QRS/Blocks: normal QRS  ST Changes: No acute ST Changes, no STD/[place].       1614 POC Glucose(!!): 485  Elevated, possibly from steroids   1624 CBC and differential  unremarkable   1642 D-Dimer, Quant: <0.27  Normal/negative   1642 LACTIC ACID(!): 3.5  Possibly elevated in setting of breathing treatments, steroids; did treatment prior to coming to campus   1645 BNP: 63  normal   1649 hs TnI 0hr: 9  Likely baseline   1650 MAGNESIUM: 2.0  normal   1654 Comprehensive metabolic panel(!!)  Hyperglycemia without normal K, bicarb, AG, unlikely DKA   1735 XR chest pa and lateral  On my interpretation, likely edema, no focal consolidation    1735 Patient is not septic, do not suspect bacterial infection at this time.    1751 SLIM accepts for obs at this time       Medications   furosemide (LASIX) injection 80 mg (80 mg Intravenous Given 5/7/25 1735)   magnesium sulfate 2 g/50 mL IVPB (premix) 2 g (2 g Intravenous New Bag 5/7/25 1730)   methylPREDNISolone sodium succinate (Solu-MEDROL) injection 60 mg (60 mg Intravenous Given 5/7/25 1730)       ED Risk Strat Scores                    No data recorded        SBIRT 20yo+      Flowsheet Row Most Recent Value   Initial Alcohol Screen: US AUDIT-C     1. How often do you have a drink containing alcohol? 0 Filed at: 05/07/2025 1551   2. How many drinks containing alcohol  do you have on a typical day you are drinking?  0 Filed at: 05/07/2025 1551   3a. Male UNDER 65: How often do you have five or more drinks on one occasion? 0 Filed at: 05/07/2025 1551   3b. FEMALE Any Age, or MALE 65+: How often do you have 4 or more drinks on one occassion? 0 Filed at: 05/07/2025 1551   Audit-C Score 0 Filed at: 05/07/2025 1551   ROSA MARIA: How many times in the past year have you...    Used an illegal drug or used a prescription medication for non-medical reasons? Never Filed at: 05/07/2025 1551                            History of Present Illness       Chief Complaint   Patient presents with    Chest Pain     Pt states that he started with chest pain and SOB about a month ago that got worse today. Pt has a hx of asthma and COPD. Complains of a productive cough and bringing up green mucus. Been on antibiotics for pneumonia.        Past Medical History:   Diagnosis Date    Acute on chronic diastolic heart failure (HCC) 01/28/2025    Aortic stenosis     Arthritis 1990    Both wrists    Asthma 1964    Chronic hypoxemic respiratory failure ([facility])     Chronic kidney disease 10/2022    COPD (chronic obstructive pulmonary disease) ([facility])     Coronary artery disease 2000    Aortic stenosis    Diabetes ([facility])     Diabetes mellitus ([facility])     Hyperlipidemia     Hypertension 07/02/2014    Lung disease Birth    Neuropathy in diabetes ([facility]) 1/2022    Nonrheumatic aortic (valve) stenosis     Pneumonia 2015    Sleep apnea, obstructive       Past Surgical History:   Procedure Laterality Date    ACCESSORY NAVICULAR EXCISION Right     APPENDECTOMY      CARDIAC CATHETERIZATION N/A 05/25/2023    Procedure: Cardiac Coronary Angiogram;  Surgeon: Mohit Farris MD;  Location: [facility];  Service: Cardiology    EAR FOREIGN BODY REMOVAL      cyst removal. bronchospasms after general anesthesia.    EYE SURGERY Left     traumatic injury at age 9-10    TONSILLECTOMY  No      Family History   Problem Relation Age of Onset     Cancer Mother         Skin Cancer    Heart disease Father     Lung cancer Father         Passed away     Cancer Father         Lung cancer    Hypertension Father     ALS Maternal Grandmother     Diabetes Maternal Grandfather     Stomach cancer Maternal Grandfather     Arthritis Maternal Grandfather       Social History     Tobacco Use    Smoking status: Former     Current packs/day: 0.00     Average packs/day: 3.0 packs/day for 30.0 years (90.1 ttl pk-yrs)     Types: Cigarettes     Start date: 3/15/1984     Quit date: 7/15/2005     Years since quittin.8    Smokeless tobacco: Never   Vaping Use    Vaping status: Never Used   Substance Use Topics    Alcohol use: Never    Drug use: Never      E-Cigarette/Vaping    E-Cigarette Use Never User       E-Cigarette/Vaping Substances    Nicotine No     THC No     CBD No     Flavoring No     Other No     Unknown No       I have reviewed and agree with the history as documented.     59-year-old male presents from pulmonary outpatient office for evaluation of continued dyspnea.  Patient has history of asthma, SCOTT, COPD and utilizes BiPAP at night, for the last week or so he is needed to add on oxygen therapy.  For about the last month he has had cold symptoms including increased cough and mucus production, reports green mucus, denies hematemesis.  He said no recent fevers.  He does report increased dyspnea with exertion about a 7 to 8 pound weight gain over the last week despite Lasix therapy daily.  He does feel his urine output is not as much as expected.  He is unable to lay flat over this timeframe as well, does admit to pitting swelling in his legs.  He has been on antibiotic courses as well as courses of steroids.  He does note a burning sensation in his chest with exertion. No history of VTE.  He has had no GI upset.     Wt Readings from Last 3 Encounters:  25 : (!) 156 kg (345 lb)  25 : (!) 153 kg (338 lb)  25 : (!) 156 kg (343 lb)            Review of Systems   Constitutional:  Negative for activity change, appetite change and fever.   Respiratory:  Positive for cough and shortness of breath.    Cardiovascular:  Positive for chest pain and leg swelling.   Gastrointestinal:  Negative for abdominal pain, diarrhea, nausea and vomiting.   Genitourinary:  Positive for decreased urine volume. Negative for dysuria.   All other systems reviewed and are negative.          Objective       ED Triage Vitals [05/07/25 1550]   Temperature Pulse Blood Pressure Respirations SpO2 Patient Position - Orthostatic VS   (!) 97.3 °F (36.3 °C) 103 144/75 20 93 % Sitting      Temp Source Heart Rate Source BP Location FiO2 (%) Pain Score    Temporal Monitor Left arm -- No Pain      Vitals      Date and Time Temp Pulse SpO2 Resp BP Pain Score FACES Pain Rating User   05/07/25 2025 -- -- 94 % -- -- -- -- MD   05/07/25 2005 -- 92 93 % 16 -- -- -- MD   05/07/25 1941 -- 85 93 % -- 138/88 -- -- DII   05/07/25 1929 -- -- -- -- -- No Pain -- NOELLE   05/07/25 1835 -- -- -- 18 -- -- --    05/07/25 1835 97.4 °F (36.3 °C) 83 92 % -- 156/93 -- -- DII   05/07/25 1733 -- 89 92 % 21 116/82 -- -- CD   05/07/25 1630 -- 92 92 % 20 114/55 -- -- CD   05/07/25 1550 97.3 °F (36.3 °C) 103 93 % 20 144/75 No Pain -- AB            Physical Exam  Vitals reviewed.   Constitutional:       General: He is not in acute distress.     Appearance: He is well-developed. He is not ill-appearing, toxic-appearing or diaphoretic.      Comments: Sitting upright   HENT:      Head: Normocephalic and atraumatic.      Right Ear: External ear normal.      Left Ear: External ear normal.      Nose: Nose normal.      Mouth/Throat:      Mouth: Mucous membranes are dry.   Eyes:      General:         Right eye: No discharge.         Left eye: No discharge.   Cardiovascular:      Rate and Rhythm: Regular rhythm. Tachycardia present.      Comments: Pitting edema to at least knees b/l  Pulmonary:      Effort: No tachypnea or  respiratory distress.      Breath sounds: Wheezing and rhonchi present. No decreased breath sounds or rales.   Abdominal:      General: There is no distension.      Palpations: Abdomen is soft.      Tenderness: There is no abdominal tenderness. There is no guarding or rebound.   Musculoskeletal:         General: No deformity or signs of injury.      Right lower leg: Edema present.      Left lower leg: Edema present.   Skin:     General: Skin is warm.      Coloration: Skin is not jaundiced or pale.   Neurological:      General: No focal deficit present.      Mental Status: He is alert.         Results Reviewed       Procedure Component Value Units Date/Time    Lactic acid 2 Hours [[id number]]  (Abnormal) Collected: 05/07/25 1941    Lab Status: Final result Specimen: Blood from Hand, Left Updated: 05/07/25 2023     LACTIC ACID 2.9 mmol/L     Narrative:      Result may be elevated if tourniquet was used during collection.    Beta Hydroxybutyrate [[id number]]  (Abnormal) Collected: 05/07/25 1610    Lab Status: Final result Specimen: Blood from Arm, Right Updated: 05/07/25 1902     Beta- Hydroxybutyrate 0.11 mmol/L     RBC Morphology Reflex Test [[id number]] Collected: 05/07/25 1610    Lab Status: Final result Specimen: Blood from Arm, Right Updated: 05/07/25 1701    Comprehensive metabolic panel [[id number]]  (Abnormal) Collected: 05/07/25 1610    Lab Status: Final result Specimen: Blood from Arm, Right Updated: 05/07/25 1653     Sodium 135 mmol/L      Potassium 4.2 mmol/L      Chloride 99 mmol/L      CO2 24 mmol/L      ANION GAP 12 mmol/L      BUN 27 mg/dL      Creatinine 1.05 mg/dL      Glucose 514 mg/dL      Calcium 8.8 mg/dL      AST 16 U/L      ALT 33 U/L      Alkaline Phosphatase 66 U/L      Total Protein 6.5 g/dL      Albumin 4.0 g/dL      Total Bilirubin 0.52 mg/dL      eGFR 76 ml/min/1.73sq m     Narrative:      National Kid[employer] guidelines for Chronic Kidney Disease (CKD):     Stage 1 with normal  or high GFR (GFR > 90 mL/min/1.73 square meters)    Stage 2 Mild CKD (GFR = 60-89 mL/min/1.73 square meters)    Stage 3A Moderate CKD (GFR = 45-59 mL/min/1.73 square meters)    Stage 3B Moderate CKD (GFR = 30-44 mL/min/1.73 square meters)    Stage 4 Severe CKD (GFR = 15-29 mL/min/1.73 square meters)    Stage 5 End Stage CKD (GFR <15 mL/min/1.73 square meters)  Note: GFR calculation is accurate only with a steady state creatinine    Magnesium [[id number]]  (Normal) Collected: 05/07/25 1610    Lab Status: Final result Specimen: Blood from Arm, Right Updated: 05/07/25 1649     Magnesium 2.0 mg/dL     HS Troponin 0hr (reflex protocol) [[id number]]  (Normal) Collected: 05/07/25 1610    Lab Status: Final result Specimen: Blood from Arm, Right Updated: 05/07/25 1649     hs TnI 0hr 9 ng/L     Procalcitonin [[id number]]  (Normal) Collected: 05/07/25 1610    Lab Status: Final result Specimen: Blood from Arm, Right Updated: 05/07/25 1648     Procalcitonin <0.05 ng/ml     Manual Differential(PHLEBS Do Not Order) [[id number]]  (Abnormal) Collected: 05/07/25 1610    Lab Status: Final result Specimen: Blood from Arm, Right Updated: 05/07/25 1647     Segmented % 89 %      Bands % 2 %      Lymphocytes % 6 %      Monocytes % 2 %      Eosinophils % 1 %      Basophils % 0 %      Absolute Neutrophils 8.93 Thousand/uL      Absolute Lymphocytes 0.59 Thousand/uL      Absolute Monocytes 0.20 Thousand/uL      Absolute Eosinophils 0.10 Thousand/uL      Absolute Basophils 0.00 Thousand/uL      Total Counted --     RBC Morphology Normal     Platelet Estimate Adequate    CBC and differential [[id number]]  (Normal) Collected: 05/07/25 1610    Lab Status: Final result Specimen: Blood from Arm, Right Updated: 05/07/25 1647     WBC 9.81 Thousand/uL      RBC 5.52 Million/uL      Hemoglobin 15.4 g/dL      Hematocrit 45.9 %      MCV 83 fL      MCH 27.9 pg      MCHC 33.6 g/dL      RDW 14.4 %      MPV 9.7 fL      Platelets 233 Thousands/uL     Narrative:       This is an appended report.  These results have been appended to a previously verified report.    B-Type Natriuretic Peptide(BNP) [[id number]]  (Normal) Collected: 05/07/25 1610    Lab Status: Final result Specimen: Blood from Arm, Right Updated: 05/07/25 1644     BNP 63 pg/mL     D-Dimer [[id number]]  (Normal) Collected: 05/07/25 1610    Lab Status: Final result Specimen: Blood from Arm, Right Updated: 05/07/25 1636     D-Dimer, Quant <0.27 ug/ml FEU     Narrative:      In the evaluation for possible pulmonary embolism, in the appropriate (Well's Score of 4 or less) patient, the age adjusted d-dimer cutoff for this patient can be calculated as:    Age x 0.01 (in ug/mL) for Age-adjusted D-dimer exclusion threshold for a patient over 50 years.    Lactic acid, plasma (w/reflex if result > 2.0) [[id number]]  (Abnormal) Collected: 05/07/25 1610    Lab Status: Final result Specimen: Blood from Arm, Right Updated: 05/07/25 1635     LACTIC ACID 3.5 mmol/L     Narrative:      Result may be elevated if tourniquet was used during collection.    Protime-INR [[id number]]  (Normal) Collected: 05/07/25 1610    Lab Status: Final result Specimen: Blood from Arm, Right Updated: 05/07/25 1632     Protime 13.0 seconds      INR 0.93    Narrative:      INR Therapeutic Range    Indication                                             INR Range      Atrial Fibrillation                                               2.0-3.0  Hypercoagulable State                                    2.0.2.3  Left Ventricular Asist Device                            2.0-3.0  Mechanical Heart Valve                                  -    Aortic(with afib, MI, embolism, HF, LA enlargement,    and/or coagulopathy)                                     2.0-3.0 (2.5-3.5)     Mitral                                                             2.5-3.5  Prosthetic/Bioprosthetic Heart Valve               2.0-3.0  Venous thromboembolism (VTE: VT, PE        2.0-3.0    APTT  [[id number]]  (Normal) Collected: 05/07/25 1610    Lab Status: Final result Specimen: Blood from Arm, Right Updated: 05/07/25 1632     PTT 26 seconds     Fingerstick Glucose (POCT) [[id number]]  (Abnormal) Collected: 05/07/25 1609    Lab Status: Final result Specimen: Blood Updated: 05/07/25 1614     POC Glucose 485 mg/dl             XR chest pa and lateral   Final Interpretation by Chon Barnett MD (05/07 1753)      No acute cardiopulmonary disease.            Workstation performed: [id number]             Procedures    ED Medication and Procedure Management   Prior to Admission Medications   Prescriptions Last Dose Informant Patient Reported? Taking?   Alcohol Swabs (B-D SINGLE USE SWABS REGULAR) PADS 5/7/2025 Self Yes Yes   Sig: USE TO CLEAN INJECTION SITE   Blood Glucose Monitoring Suppl (CONTOUR NEXT MONITOR) w/Device KIT Unknown Self No No   Sig: Test blood sugar once daily or as needed for fluctuating blood sugars   Patient not taking: Reported on 4/9/2025   Continuous Glucose Sensor (FreeStyle Moe 3 Sensor) MISC 5/7/2025 Self Yes Yes   Sig: Use 1 Units every 14 (fourteen) days   EPINEPHrine (EPIPEN) 0.3 mg/0.3 mL SOAJ  Self No No   Sig: Inject 0.3 mL (0.3 mg total) into a muscle once for 1 dose   Insulin Pen Needle (BD Pen Needle Dee Dee U/F) 32G X 4 MM MISC 5/7/2025 Self No Yes   Sig: USE TO INJECT INSULIN   Insulin Pen Needle (BD Pen Needle Dee Dee U/F) 32G X 4 MM MISC 5/7/2025  No Yes   Sig: USE FOUR TIMES A DAY   Multiple Vitamins-Minerals (MENS MULTIVITAMIN PO) 5/7/2025 Self Yes Yes   Sig: Take by mouth   Tezepelumab-ekko (Tezspire) 210 MG/1.91ML SOAJ Unknown Self No No   Sig: Inject 210 mg under the skin every 28 days   Patient not taking: Reported on 4/9/2025   Tirzepatide (Mounjaro) 5 MG/0.5ML SOAJ Past Week  No Yes   Sig: Inject 5 mg under the skin once a week   albuterol (Ventolin HFA) 90 mcg/act inhaler 5/7/2025 Self No Yes   Sig: Inhale 2 puffs every 6 (six) hours as needed for wheezing   aspirin  (ECOTRIN LOW STRENGTH) 81 mg EC tablet 5/7/2025 Self No Yes   Sig: Take 1 tablet (81 mg total) by mouth daily   atorvastatin (LIPITOR) 40 mg tablet 5/7/2025  No Yes   Sig: TAKE 1 TABLET BY MOUTH EVERY DAY   carvedilol (COREG) 12.5 mg tablet 5/7/2025  No Yes   Sig: TAKE ONE (1) TABLET BY MOUTH TWICE DAILY WITH MEALS   cefdinir (OMNICEF) 300 mg capsule 5/7/2025  No Yes   Sig: Take 1 capsule (300 mg total) by mouth every 12 (twelve) hours for 7 days   cyclobenzaprine (FLEXERIL) 10 mg tablet 5/7/2025 Self Yes Yes   Sig: Take 10 mg by mouth   dapagliflozin (Farxiga) 10 MG tablet 5/6/2025 Evening Self No Yes   Sig: Take 1 tablet (10 mg total) by mouth daily   dapagliflozin (Farxiga) 10 MG tablet Unknown  No No   Sig: TAKE 1 TABLET BY MOUTH ONCE DAILY   Patient not taking: Reported on 4/9/2025   dextromethorphan-guaifenesin (MUCINEX DM)  MG per 12 hr tablet 5/7/2025  No Yes   Sig: Take 1 tablet by mouth every 12 (twelve) hours   fluticasone-umeclidinium-vilanterol (Trelegy Ellipta) 200-62.5-25 mcg/actuation AEPB inhaler 5/7/2025  No Yes   Sig: Inhale 1 puff daily Rinse mouth after use.   furosemide (LASIX) 40 mg tablet 5/7/2025  No Yes   Sig: TAKE ONE (1) TABLET BY MOUTH TWICE DAILY   glimepiride (AMARYL) 4 mg tablet 5/6/2025 Evening Self No Yes   Sig: TAKE 1 TABLET BY MOUTH TWICE DAILY   insulin glargine (Toujeo Max SoloStar) 300 units/mL CONCENTRATED U-300 injection pen (2-unit dial) Unknown  No No   Sig: INJECT 70 UNITS SUBCUTANEOUSLY AT BEDTIME   ipratropium-albuterol (DUO-NEB) 0.5-2.5 mg/3 mL nebulizer solution 5/7/2025  No Yes   Sig: INHALE CONTENTS OF 1 VIAL VIA NEBULIZER FOUR TIMES A DAY   losartan (COZAAR) 50 mg tablet 5/6/2025 Evening  No Yes   Sig: TAKE 1 TABLET BY MOUTH DAILY   metFORMIN (GLUCOPHAGE) 1000 MG tablet 5/7/2025 Self No Yes   Sig: Take 1 tablet (1,000 mg total) by mouth 2 (two) times a day with meals   metFORMIN (GLUCOPHAGE) 1000 MG tablet Unknown  No No   Sig: TAKE 1 TABLET BY MOUTH TWICE  DAILY   predniSONE 10 mg tablet 2025  No Yes   Si tabs x 3 days decrease 1 tab every third day   predniSONE 10 mg tablet Unknown  No No   Sig: Take 4 tablets daily for 3 days then 3 tablets daily for 3 days then 2 tablets daily for 3 days then 1 tablet daily for 3 days.   pregabalin (LYRICA) 50 mg capsule 2025 Evening  No Yes   Sig: TAKE 1 CAPSULE BY MOUTH THREE TIMES DAILY   sertraline (ZOLOFT) 50 mg tablet 2025  No Yes   Sig: TAKE 1 TABLET BY MOUTH DAILY   spironolactone (ALDACTONE) 50 mg tablet 2025 Evening  No Yes   Sig: TAKE 1 TABLET BY MOUTH EVERY DAY      Facility-Administered Medications: None     Current Discharge Medication List        CONTINUE these medications which have NOT CHANGED    Details   albuterol (Ventolin HFA) 90 mcg/act inhaler Inhale 2 puffs every 6 (six) hours as needed for wheezing  Qty: 18 g, Refills: 3    Comments: Substitution to a formulary equivalent within the same pharmaceutical class is authorized.  Associated Diagnoses: Moderate persistent asthma without complication      Alcohol Swabs (B-D SINGLE USE SWABS REGULAR) PADS USE TO CLEAN INJECTION SITE      aspirin (ECOTRIN LOW STRENGTH) 81 mg EC tablet Take 1 tablet (81 mg total) by mouth daily  Qty: 30 tablet, Refills: 5    Associated Diagnoses: ASCVD (arteriosclerotic cardiovascular disease)      atorvastatin (LIPITOR) 40 mg tablet TAKE 1 TABLET BY MOUTH EVERY DAY  Qty: 30 tablet, Refills: 10    Associated Diagnoses: Other hyperlipidemia      carvedilol (COREG) 12.5 mg tablet TAKE ONE (1) TABLET BY MOUTH TWICE DAILY WITH MEALS  Qty: 180 tablet, Refills: 1    Associated Diagnoses: Diastolic dysfunction without heart failure      cefdinir (OMNICEF) 300 mg capsule Take 1 capsule (300 mg total) by mouth every 12 (twelve) hours for 7 days  Qty: 14 capsule, Refills: 0    Associated Diagnoses: Severe persistent asthma with exacerbation; Acute bronchitis, unspecified organism      Continuous Glucose Sensor (FreeStyle  Moe 3 Sensor) MISC Use 1 Units every 14 (fourteen) days      cyclobenzaprine (FLEXERIL) 10 mg tablet Take 10 mg by mouth      !! dapagliflozin (Farxiga) 10 MG tablet Take 1 tablet (10 mg total) by mouth daily  Qty: 90 tablet, Refills: 1    Associated Diagnoses: Type 2 diabetes mellitus with other specified complication, without long-term current use of insulin (HCC)      dextromethorphan-guaifenesin (MUCINEX DM)  MG per 12 hr tablet Take 1 tablet by mouth every 12 (twelve) hours  Qty: 60 tablet, Refills: 0    Associated Diagnoses: Severe persistent asthma with exacerbation; Acute bronchitis, unspecified organism      fluticasone-umeclidinium-vilanterol (Trelegy Ellipta) 200-62.5-25 mcg/actuation AEPB inhaler Inhale 1 puff daily Rinse mouth after use.  Qty: 180 blister, Refills: 3    Associated Diagnoses: COPD, severe (HCC)      furosemide (LASIX) 40 mg tablet TAKE ONE (1) TABLET BY MOUTH TWICE DAILY  Qty: 60 tablet, Refills: 5    Associated Diagnoses: Acute on chronic diastolic heart failure (HCC)      glimepiride (AMARYL) 4 mg tablet TAKE 1 TABLET BY MOUTH TWICE DAILY  Qty: 60 tablet, Refills: 10    Associated Diagnoses: Type 2 diabetes mellitus with other specified complication, without long-term current use of insulin ([facility]); Uncontrolled type 2 diabetes mellitus with hyperglycemia ([facility])      !! Insulin Pen Needle (BD Pen Needle Dee Dee U/F) 32G X 4 MM MISC USE TO INJECT INSULIN  Qty: 100 each, Refills: 10    Associated Diagnoses: Uncontrolled type 2 diabetes mellitus with hyperglycemia ([facility])      !! Insulin Pen Needle (BD Pen Needle Dee Dee U/F) 32G X 4 MM MISC USE FOUR TIMES A DAY  Qty: 400 each, Refills: 1    Associated Diagnoses: Uncontrolled type 2 diabetes mellitus with hyperglycemia ([facility])      ipratropium-albuterol (DUO-NEB) 0.5-2.5 mg/3 mL nebulizer solution INHALE CONTENTS OF 1 VIAL VIA NEBULIZER FOUR TIMES A DAY  Qty: 360 mL, Refills: 10    Associated Diagnoses: COPD, severe (HCC)      losartan  (COZAAR) 50 mg tablet TAKE 1 TABLET BY MOUTH DAILY  Qty: 30 tablet, Refills: 5    Associated Diagnoses: Hypotension due to drugs      !! metFORMIN (GLUCOPHAGE) 1000 MG tablet Take 1 tablet (1,000 mg total) by mouth 2 (two) times a day with meals  Qty: 180 tablet, Refills: 0    Associated Diagnoses: Type 2 diabetes mellitus with other specified complication, without long-term current use of insulin ([facility])      Multiple Vitamins-Minerals (MENS MULTIVITAMIN PO) Take by mouth      !! predniSONE 10 mg tablet 4 tabs x 3 days decrease 1 tab every third day  Qty: 30 tablet, Refills: 0    Associated Diagnoses: COPD exacerbation ([facility])      pregabalin (LYRICA) 50 mg capsule TAKE 1 CAPSULE BY MOUTH THREE TIMES DAILY  Qty: 90 capsule, Refills: 3    Associated Diagnoses: Painful diabetic neuropathy ([facility])      sertraline (ZOLOFT) 50 mg tablet TAKE 1 TABLET BY MOUTH DAILY  Qty: 30 tablet, Refills: 10    Associated Diagnoses: Anxiety and depression      spironolactone (ALDACTONE) 50 mg tablet TAKE 1 TABLET BY MOUTH EVERY DAY  Qty: 30 tablet, Refills: 5    Associated Diagnoses: SCOTT on CPAP; Aortic valve stenosis, etiology of cardiac valve disease unspecified      Tirzepatide (Mounjaro) 5 MG/0.5ML SOAJ Inject 5 mg under the skin once a week  Qty: 2 mL, Refills: 3    Associated Diagnoses: Type 2 diabetes mellitus with diabetic neuropathy, with long-term current use of insulin ([facility])      Blood Glucose Monitoring Suppl (CONTOUR NEXT MONITOR) w/Device KIT Test blood sugar once daily or as needed for fluctuating blood sugars  Qty: 1 kit, Refills: 0    Associated Diagnoses: Type 2 diabetes mellitus with hemoglobin A1c goal of less than 8.0% ([facility])      !! dapagliflozin (Farxiga) 10 MG tablet TAKE 1 TABLET BY MOUTH ONCE DAILY  Qty: 30 tablet, Refills: 5    Associated Diagnoses: Type 2 diabetes mellitus with other specified complication, without long-term current use of insulin ([facility]); Uncontrolled type 2 diabetes mellitus with hyperglycemia  ([facility])      EPINEPHrine (EPIPEN) 0.3 mg/0.3 mL SOAJ Inject 0.3 mL (0.3 mg total) into a muscle once for 1 dose  Qty: 0.6 mL, Refills: 0    Associated Diagnoses: Severe persistent asthma, unspecified whether complicated      insulin glargine (Toujeo Max SoloStar) 300 units/mL CONCENTRATED U-300 injection pen (2-unit dial) INJECT 70 UNITS SUBCUTANEOUSLY AT BEDTIME  Qty: 12 mL, Refills: 5    Associated Diagnoses: Type 2 diabetes mellitus with diabetic neuropathy, with long-term current use of insulin ([facility])      !! metFORMIN (GLUCOPHAGE) 1000 MG tablet TAKE 1 TABLET BY MOUTH TWICE DAILY  Qty: 60 tablet, Refills: 10    Associated Diagnoses: Type 2 diabetes mellitus with other specified complication, without long-term current use of insulin ([facility])      !! predniSONE 10 mg tablet Take 4 tablets daily for 3 days then 3 tablets daily for 3 days then 2 tablets daily for 3 days then 1 tablet daily for 3 days.  Qty: 30 tablet, Refills: 0    Associated Diagnoses: Severe persistent asthma with exacerbation; Acute bronchitis, unspecified organism      Tezepelumab-ekko (Tezspire) 210 MG/1.91ML SOAJ Inject 210 mg under the skin every 28 days  Qty: 1 mL, Refills: 11    Associated Diagnoses: Severe persistent asthma, unspecified whether complicated       !! - Potential duplicate medications found. Please discuss with provider.        No discharge procedures on file.  ED SEPSIS DOCUMENTATION   Time reflects when diagnosis was documented in both MDM as applicable and the Disposition within this note       Time User Action Codes Description Comment    5/7/2025  5:52 PM Lynn May Add [R73.9] Hyperglycemia     5/7/2025  5:52 PM Lynn May Add [E87.70] Volume overload     5/7/2025  5:53 PM Lynn May Add [R06.09] Dyspnea on exertion     5/7/2025  5:54 PM Lynn May Add [J45.51] Severe persistent asthma with exacerbation     5/7/2025  7:14 PM Yasmani Juares Add [R06.02] SOB (shortness of breath)     5/7/2025  7:14 PM  Yasmani Juares Add [I35.0] Nonrheumatic aortic (valve) stenosis     5/7/2025  7:34 PM Yasmani Juares Add [J44.9] COPD, severe (HCC)                  Lnyn May DO  05/07/25 1229

## 2025-05-07 NOTE — ASSESSMENT & PLAN NOTE
Multifactorial, most likely secondary to aortic stenosis and some volume overload  Patient has improvement on Lasix 80 mg IV x 1, patient takes Lasix 40 mg twice daily at home  Lasix 100mg IV BID, continue with Aldactone 50 mg  Obtain echocardiogram  Consult to cardiology  Patient is followed by pulmonary, has had a prednisone taper and completed a 7-day course of cefdinir, he has tried and failed 2 rounds of antibiotics and steroids as an outpatient, anticipate this is not pulmonary.  No further steroids antibiotics at this time

## 2025-05-07 NOTE — ASSESSMENT & PLAN NOTE
Continue with breathing treatments, is on trilogy at home  Followed by pulmonary  Will consult pulmonary as they have been following him for the past month for shortness of breath

## 2025-05-07 NOTE — H&P
H&P - Hospitalist   Name: Jey Vicente 59 y.o. male I MRN: [id number]  Unit/Bed#: 411-01 I Date of Admission: 5/7/2025   Date of Service: 5/7/2025 I Hospital Day: 0     Assessment & Plan  SOB (shortness of breath)  Multifactorial, most likely secondary to aortic stenosis and some volume overload  Patient has improvement on Lasix 80 mg IV x 1, patient takes Lasix 40 mg twice daily at home  Lasix 100mg IV BID, continue with Aldactone 50 mg  Obtain echocardiogram  Consult to cardiology  Patient is followed by pulmonary, has had a prednisone taper and completed a 7-day course of cefdinir, he has tried and failed 2 rounds of antibiotics and steroids as an outpatient, anticipate this is not pulmonary.  No further steroids antibiotics at this time  Nonrheumatic aortic (valve) stenosis  Obtain Echo    Echo: June 2024  EF of 65%, severe aortic stenosis  Severe persistent asthma without complication  Continue with breathing treatments, is on trilogy at home  Followed by pulmonary  Will consult pulmonary as they have been following him for the past month for shortness of breath  Essential hypertension  Continue Coreg, losartan  Mixed hyperlipidemia  Continue Lipitor   Type 2 diabetes mellitus with diabetic neuropathy, with long-term current use of insulin (HCC)  Followed by endocrinology  Home regimen: Toujeo 70 units at bedtime, Farxiga, metformin, glimepiride, Ozempic, recently added [facility] regimen: Lantus 60 units at bedtime, insulin sliding scale      Lab Results   Component Value Date    HGBA1C 9.3 (H) 03/03/2025     Diabetic peripheral neuropathy (HCC)  Continue Lyrica  Anxiety and depression  Continue Zoloft  SCOTT (obstructive sleep apnea)  BiPAP when sleeping  Class 3 severe obesity with serious comorbidity and body mass index (BMI) of 50.0 to 59.9 in adult  BMI of 50.40      Disposition  #1  Lasix 100 mg IV twice daily  #2  Echocardiogram  #3  Consult to cardiology and pulmonary  #4  Repeat labs in the  morning      VTE Pharmacologic Prophylaxis:   Moderate Risk (Score 3-4) - Pharmacological DVT Prophylaxis Ordered: heparin.  Code Status: Level 1 - Full Code   Discussion with family: Updated  (wife) at bedside.    Anticipated Length of Stay: Patient will be admitted on an observation basis with an anticipated length of stay of less than 2 midnights secondary to shortness of breath.    History of Present Illness   Chief Complaint: Shortness of breath    Jey Vicente is a 59 y.o. male with a PMH of severe persistent asthma, diabetes mellitus type 2 on insulin, hypertension, hyperlipidemia, severe aortic stenosis, diabetic peripheral neuropathy, who presents with shortness of breath.  The patient has been followed by pulmonary for shortness of breath and some cough and wheezing.  The patient has tried in the outpatient setting with 2 rounds of antibiotics and steroid courses without relief.  Patient was sent from the pulmonary office to the emergency room for further workup.  Patient had a 7 pound weight gain and was thought perhaps he was volume overloaded.  Patient received Lasix in the ER with improvement in his breathing and also his wheezing..    Review of Systems   Constitutional:  Positive for fatigue.   HENT: Negative.     Respiratory:  Positive for chest tightness, shortness of breath and wheezing.    Cardiovascular:  Positive for leg swelling.   Gastrointestinal: Negative.    Musculoskeletal: Negative.    Neurological: Negative.        Historical Information   Past Medical History:   Diagnosis Date    Acute on chronic diastolic heart failure (HCC) 01/28/2025    Aortic stenosis     Arthritis 1990    Both wrists    Asthma 1964    Chronic hypoxemic respiratory failure (HCC)     Chronic kidney disease 10/2022    COPD (chronic obstructive pulmonary disease) ([facility])     Coronary artery disease 2000    Aortic stenosis    Diabetes (HCC)     Diabetes mellitus (HCC)     Hyperlipidemia     Hypertension  2014    Lung disease Birth    Neuropathy in diabetes (HCC) 2022    Nonrheumatic aortic (valve) stenosis     Pneumonia 2015    Sleep apnea, obstructive      Past Surgical History:   Procedure Laterality Date    ACCESSORY NAVICULAR EXCISION Right     APPENDECTOMY      CARDIAC CATHETERIZATION N/A 2023    Procedure: Cardiac Coronary Angiogram;  Surgeon: Mohit Farris MD;  Location:  CARDIAC CATH LAB;  Service: Cardiology    EAR FOREIGN BODY REMOVAL      cyst removal. bronchospasms after general anesthesia.    EYE SURGERY Left     traumatic injury at age 9-10    TONSILLECTOMY  No     Social History     Tobacco Use    Smoking status: Former     Current packs/day: 0.00     Average packs/day: 3.0 packs/day for 30.0 years (90.1 ttl pk-yrs)     Types: Cigarettes     Start date: 3/15/1984     Quit date: 7/15/2005     Years since quittin.8    Smokeless tobacco: Never   Vaping Use    Vaping status: Never Used   Substance and Sexual Activity    Alcohol use: Never    Drug use: Never    Sexual activity: Yes     Partners: Female     Birth control/protection: None     E-Cigarette/Vaping    E-Cigarette Use Never User      E-Cigarette/Vaping Substances    Nicotine No     THC No     CBD No     Flavoring No     Other No     Unknown No      Family history non-contributory  Social History:  Marital Status: /Civil Union   Occupation:   Patient Pre-hospital Living Situation: Home  Patient Pre-hospital Level of Mobility: walks  Patient Pre-hospital Diet Restrictions: diabetic     Meds/Allergies   I have reviewed home medications using recent Epic encounter.  Prior to Admission medications    Medication Sig Start Date End Date Taking? Authorizing Provider   albuterol (Ventolin HFA) 90 mcg/act inhaler Inhale 2 puffs every 6 (six) hours as needed for wheezing 22  Yes Lj Pickett PA-C   Alcohol Swabs (B-D SINGLE USE SWABS REGULAR) PADS USE TO CLEAN INJECTION SITE 23  Yes Historical Provider, MD    aspirin (ECOTRIN LOW STRENGTH) 81 mg EC tablet Take 1 tablet (81 mg total) by mouth daily 8/15/22  Yes Balbir Ferrara, DO   atorvastatin (LIPITOR) 40 mg tablet TAKE 1 TABLET BY MOUTH EVERY DAY 3/5/25  Yes Balbir Ferrara, DO   carvedilol (COREG) 12.5 mg tablet TAKE ONE (1) TABLET BY MOUTH TWICE DAILY WITH MEALS 1/23/25  Yes Balbir Ferrara, DO   cefdinir (OMNICEF) 300 mg capsule Take 1 capsule (300 mg total) by mouth every 12 (twelve) hours for 7 days 4/30/25 5/7/25 Yes Lj Pickett PA-C   Continuous Glucose Sensor (FreeStyle Moe 3 Sensor) MISC Use 1 Units every 14 (fourteen) days   Yes Historical Provider, MD   cyclobenzaprine (FLEXERIL) 10 mg tablet Take 10 mg by mouth 2/6/17  Yes Historical Provider, MD   dapagliflozin (Farxiga) 10 MG tablet Take 1 tablet (10 mg total) by mouth daily 3/11/24  Yes David Johnson, DO   dextromethorphan-guaifenesin (MUCINEX DM)  MG per 12 hr tablet Take 1 tablet by mouth every 12 (twelve) hours 4/30/25  Yes Lj Pickett PA-C   fluticasone-umeclidinium-vilanterol (Trelegy Ellipta) 200-62.5-25 mcg/actuation AEPB inhaler Inhale 1 puff daily Rinse mouth after use. 11/15/24 11/10/25 Yes Lj Pickett PA-C   furosemide (LASIX) 40 mg tablet TAKE ONE (1) TABLET BY MOUTH TWICE DAILY 3/3/25  Yes Balbir Ferrara,    glimepiride (AMARYL) 4 mg tablet TAKE 1 TABLET BY MOUTH TWICE DAILY 3/11/24  Yes David Johnson, DO   Insulin Pen Needle (BD Pen Needle Dee Dee U/F) 32G X 4 MM MISC USE TO INJECT INSULIN 3/11/24  Yes David Johnson, DO   Insulin Pen Needle (BD Pen Needle Dee Dee U/F) 32G X 4 MM MISC USE FOUR TIMES A DAY 3/3/25  Yes David Johnson, DO   ipratropium-albuterol (DUO-NEB) 0.5-2.5 mg/3 mL nebulizer solution INHALE CONTENTS OF 1 VIAL VIA NEBULIZER FOUR TIMES A DAY 12/2/24  Yes JAMES Chaudhary   losartan (COZAAR) 50 mg tablet TAKE 1 TABLET BY MOUTH DAILY 12/31/24  Yes Balbir Ferrara DO   metFORMIN (GLUCOPHAGE) 1000 MG tablet Take 1 tablet (1,000 mg total) by mouth  2 (two) times a day with meals 3/11/24  Yes David Johnson, DO   Multiple Vitamins-Minerals (MENS MULTIVITAMIN PO) Take by mouth   Yes Historical Provider, MD   predniSONE 10 mg tablet 4 tabs x 3 days decrease 1 tab every third day 4/11/25  Yes JAMES Chaudhary   pregabalin (LYRICA) 50 mg capsule TAKE 1 CAPSULE BY MOUTH THREE TIMES DAILY 3/4/25  Yes David Johnson, DO   sertraline (ZOLOFT) 50 mg tablet TAKE 1 TABLET BY MOUTH DAILY 2/28/25  Yes Cesia Martinez,    spironolactone (ALDACTONE) 50 mg tablet TAKE 1 TABLET BY MOUTH EVERY DAY 3/3/25  Yes Balbir Ferrara, DO   Tirzepatide (Mounjaro) 5 MG/0.5ML SOAJ Inject 5 mg under the skin once a week 4/9/25  Yes David Johnson, DO   Blood Glucose Monitoring Suppl (CONTOUR NEXT MONITOR) w/Device KIT Test blood sugar once daily or as needed for fluctuating blood sugars  Patient not taking: Reported on 4/9/2025 2/7/20   Barbara Foote PA-C   dapagliflozin (Farxiga) 10 MG tablet TAKE 1 TABLET BY MOUTH ONCE DAILY  Patient not taking: Reported on 4/9/2025 12/31/24   David Johnson DO   EPINEPHrine (EPIPEN) 0.3 mg/0.3 mL SOAJ Inject 0.3 mL (0.3 mg total) into a muscle once for 1 dose 10/11/24 3/3/25  Lj Pickett PA-C   insulin glargine (Toujeo Max SoloStar) 300 units/mL CONCENTRATED U-300 injection pen (2-unit dial) INJECT 70 UNITS SUBCUTANEOUSLY AT BEDTIME 12/2/24   David Johnson DO   metFORMIN (GLUCOPHAGE) 1000 MG tablet TAKE 1 TABLET BY MOUTH TWICE DAILY 1/2/25   David Johnson DO   predniSONE 10 mg tablet Take 4 tablets daily for 3 days then 3 tablets daily for 3 days then 2 tablets daily for 3 days then 1 tablet daily for 3 days. 4/30/25   Lj Pickett PA-C   Tezepelumab-ekko (Tezspire) 210 MG/1.91ML SOAJ Inject 210 mg under the skin every 28 days  Patient not taking: Reported on 4/9/2025 10/11/24   Lj Pickett PA-C     No Known Allergies    Objective :  Temp:  [97.3 °F (36.3 °C)-97.6 °F (36.4 °C)] 97.4 °F (36.3 °C)  HR:  []  85  BP: (114-156)/(55-93) 138/88  Resp:  [18-21] 18  SpO2:  [92 %-93 %] 93 %  O2 Device: None (Room air)    Physical Exam  Constitutional:       Appearance: Normal appearance. He is obese.   HENT:      Head: Normocephalic and atraumatic.      Nose: Nose normal.      Mouth/Throat:      Mouth: Mucous membranes are moist.      Pharynx: Oropharynx is clear.   Eyes:      Extraocular Movements: Extraocular movements intact.      Conjunctiva/sclera: Conjunctivae normal.   Cardiovascular:      Rate and Rhythm: Normal rate and regular rhythm.      Heart sounds: Murmur heard.   Pulmonary:      Comments: Some mild scattered wheezing  Abdominal:      General: There is no distension.      Palpations: Abdomen is soft.      Tenderness: There is no abdominal tenderness. There is no guarding.   Musculoskeletal:         General: Swelling present.      Right lower leg: Edema present.      Left lower leg: Edema present.   Skin:     General: Skin is warm and dry.   Neurological:      General: No focal deficit present.      Mental Status: He is alert and oriented to person, place, and time. Mental status is at baseline.   Psychiatric:         Mood and Affect: Mood normal.         Behavior: Behavior normal.         Thought Content: Thought content normal.            Lab Results: I have reviewed the following results:  Results from last 7 days   Lab Units 05/07/25  1610   WBC Thousand/uL 9.81   HEMOGLOBIN g/dL 15.4   HEMATOCRIT % 45.9   PLATELETS Thousands/uL 233   BANDS PCT % 2   LYMPHO PCT % 6*   MONO PCT % 2*   EOS PCT % 1     Results from last 7 days   Lab Units 05/07/25  1610   SODIUM mmol/L 135   POTASSIUM mmol/L 4.2   CHLORIDE mmol/L 99   CO2 mmol/L 24   BUN mg/dL 27*   CREATININE mg/dL 1.05   ANION GAP mmol/L 12   CALCIUM mg/dL 8.8   ALBUMIN g/dL 4.0   TOTAL BILIRUBIN mg/dL 0.52   ALK PHOS U/L 66   ALT U/L 33   AST U/L 16   GLUCOSE RANDOM mg/dL 514*     Results from last 7 days   Lab Units 05/07/25  1610   INR  0.93     Results  from last 7 days   Lab Units 05/07/25  1927 05/07/25  1609   POC GLUCOSE mg/dl 319* 485*     Lab Results   Component Value Date    HGBA1C 9.3 (H) 03/03/2025    HGBA1C 6.9 (H) 03/19/2024    HGBA1C 8.6 (H) 12/04/2023     Results from last 7 days   Lab Units 05/07/25  1610   LACTIC ACID mmol/L 3.5*   PROCALCITONIN ng/ml <0.05       Imaging Results Review: I reviewed radiology reports from this admission including: chest xray.  Other Study Results Review: Other studies reviewed include: Echocardiogram    Administrative Statements     Medical decision making: Moderate  Diagnosis addressed: Acute complicated: Shortness of breath multifactorial most likely secondary to aortic stenosis  Data:   Reviewed  CBC, CMP, chest x-ray, A1c  Ordered CBC, BMP, mag  Reviewed external notes from cardiology, endocrinology pulmonary  Discussion of management with ER provider: Lasix 80 mg IV x 1,           Risk:  Prescription drug management: Lasix 10 mg IV twice daily,  Discussion for hospitalization with ER provider: Observation for shortness of breath   Drug therapy requiring intensive monitoring: Lasix, requires renal monitoring        ** Please Note: This note has been constructed using a voice recognition system. **

## 2025-05-07 NOTE — ASSESSMENT & PLAN NOTE
Followed by endocrinology  Home regimen: Toujeo 70 units at bedtime, Farxiga, metformin, glimepiride, Ozempic, recently added [facility] regimen: Lantus 60 units at bedtime, insulin sliding scale      Lab Results   Component Value Date    HGBA1C 9.3 (H) 03/03/2025

## 2025-05-08 ENCOUNTER — APPOINTMENT (OUTPATIENT)
Dept: NON INVASIVE DIAGNOSTICS | Facility: HOSPITAL | Age: 60
DRG: 306 | End: 2025-05-08
Payer: COMMERCIAL

## 2025-05-08 LAB
ANION GAP SERPL CALCULATED.3IONS-SCNC: 10 MMOL/L (ref 4–13)
AORTIC VALVE MEAN VELOCITY: 46.8 M/S
ASCENDING AORTA: 3.7 CM
ATRIAL RATE: 97 BPM
AV AREA BY CONTINUOUS VTI: 0.4 CM2
AV AREA PEAK VELOCITY: 0.5 CM2
AV LVOT MEAN GRADIENT: 2 MMHG
AV LVOT PEAK GRADIENT: 3 MMHG
AV MEAN PRESS GRAD SYS DOP V1V2: 94 MMHG
AV ORIFICE AREA US: 0.43 CM2
AV PEAK GRADIENT: 166 MMHG
AV VELOCITY RATIO: 0.13
AV VMAX SYS DOP: 6.44 M/S
BSA FOR ECHO PROCEDURE: 2.56 M2
BUN SERPL-MCNC: 26 MG/DL (ref 5–25)
CALCIUM SERPL-MCNC: 8.9 MG/DL (ref 8.4–10.2)
CARDIAC TROPONIN I PNL SERPL HS: 14 NG/L (ref 8–18)
CHLORIDE SERPL-SCNC: 100 MMOL/L (ref 96–108)
CO2 SERPL-SCNC: 27 MMOL/L (ref 21–32)
CREAT SERPL-MCNC: 0.87 MG/DL (ref 0.6–1.3)
DOP CALC AO VTI: 155.47 CM
DOP CALC LVOT AREA: 3.46 CM2
DOP CALC LVOT CARDIAC INDEX: 1.91 L/MIN/M2
DOP CALC LVOT CARDIAC OUTPUT: 4.89 L/MIN
DOP CALC LVOT DIAMETER: 2.1 CM
DOP CALC LVOT PEAK VEL VTI: 19.52 CM
DOP CALC LVOT PEAK VEL: 0.81 M/S
DOP CALC LVOT STROKE INDEX: 26.6 ML/M2
DOP CALC LVOT STROKE VOLUME: 67.58
E WAVE DECELERATION TIME: 275 MS
E/A RATIO: 0.92
ERYTHROCYTE [DISTWIDTH] IN BLOOD BY AUTOMATED COUNT: 14.5 % (ref 11.6–15.1)
GFR SERPL CREATININE-BSD FRML MDRD: 93 ML/MIN/1.73SQ M
GLUCOSE SERPL-MCNC: 210 MG/DL (ref 65–140)
GLUCOSE SERPL-MCNC: 243 MG/DL (ref 65–140)
GLUCOSE SERPL-MCNC: 249 MG/DL (ref 65–140)
GLUCOSE SERPL-MCNC: 303 MG/DL (ref 65–140)
GLUCOSE SERPL-MCNC: 323 MG/DL (ref 65–140)
HCT VFR BLD AUTO: 45.4 % (ref 36.5–49.3)
HGB BLD-MCNC: 15 G/DL (ref 12–17)
LACTATE SERPL-SCNC: 2 MMOL/L (ref 0.5–2)
MAGNESIUM SERPL-MCNC: 2.5 MG/DL (ref 1.9–2.7)
MCH RBC QN AUTO: 27.1 PG (ref 26.8–34.3)
MCHC RBC AUTO-ENTMCNC: 33 G/DL (ref 31.4–37.4)
MCV RBC AUTO: 82 FL (ref 82–98)
MV E'TISSUE VEL-LAT: 7 CM/S
MV E'TISSUE VEL-SEP: 9 CM/S
MV PEAK A VEL: 1.1 M/S
MV PEAK E VEL: 101 CM/S
MV STENOSIS PRESSURE HALF TIME: 80 MS
MV VALVE AREA P 1/2 METHOD: 2.75
P AXIS: 75 DEGREES
PLATELET # BLD AUTO: 207 THOUSANDS/UL (ref 149–390)
PMV BLD AUTO: 9.3 FL (ref 8.9–12.7)
POTASSIUM SERPL-SCNC: 4.1 MMOL/L (ref 3.5–5.3)
PR INTERVAL: 158 MS
QRS AXIS: -36 DEGREES
QRSD INTERVAL: 90 MS
QT INTERVAL: 368 MS
QTC INTERVAL: 467 MS
RA PRESSURE ESTIMATED: 15 MMHG
RBC # BLD AUTO: 5.54 MILLION/UL (ref 3.88–5.62)
RV PSP: 72 MMHG
SL CV LV EF: 75
SODIUM SERPL-SCNC: 137 MMOL/L (ref 135–147)
T WAVE AXIS: 83 DEGREES
TR MAX PG: 57 MMHG
TR PEAK VELOCITY: 3.8 M/S
TRICUSPID ANNULAR PLANE SYSTOLIC EXCURSION: 3.7 CM
TRICUSPID VALVE PEAK REGURGITATION VELOCITY: 3.78 M/S
VENTRICULAR RATE: 97 BPM
WBC # BLD AUTO: 8.3 THOUSAND/UL (ref 4.31–10.16)

## 2025-05-08 PROCEDURE — 99233 SBSQ HOSP IP/OBS HIGH 50: CPT | Performed by: HOSPITALIST

## 2025-05-08 PROCEDURE — 94760 N-INVAS EAR/PLS OXIMETRY 1: CPT

## 2025-05-08 PROCEDURE — 93010 ELECTROCARDIOGRAM REPORT: CPT | Performed by: INTERNAL MEDICINE

## 2025-05-08 PROCEDURE — 94640 AIRWAY INHALATION TREATMENT: CPT

## 2025-05-08 PROCEDURE — 85027 COMPLETE CBC AUTOMATED: CPT | Performed by: FAMILY MEDICINE

## 2025-05-08 PROCEDURE — 99215 OFFICE O/P EST HI 40 MIN: CPT | Performed by: INTERNAL MEDICINE

## 2025-05-08 PROCEDURE — 82948 REAGENT STRIP/BLOOD GLUCOSE: CPT

## 2025-05-08 PROCEDURE — C8929 TTE W OR WO FOL WCON,DOPPLER: HCPCS

## 2025-05-08 PROCEDURE — 99223 1ST HOSP IP/OBS HIGH 75: CPT | Performed by: PHYSICIAN ASSISTANT

## 2025-05-08 PROCEDURE — 94664 DEMO&/EVAL PT USE INHALER: CPT

## 2025-05-08 PROCEDURE — 93306 TTE W/DOPPLER COMPLETE: CPT | Performed by: INTERNAL MEDICINE

## 2025-05-08 PROCEDURE — 94668 MNPJ CHEST WALL SBSQ: CPT

## 2025-05-08 PROCEDURE — 80048 BASIC METABOLIC PNL TOTAL CA: CPT | Performed by: FAMILY MEDICINE

## 2025-05-08 PROCEDURE — 93005 ELECTROCARDIOGRAM TRACING: CPT

## 2025-05-08 PROCEDURE — 83605 ASSAY OF LACTIC ACID: CPT | Performed by: FAMILY MEDICINE

## 2025-05-08 PROCEDURE — 84484 ASSAY OF TROPONIN QUANT: CPT | Performed by: FAMILY MEDICINE

## 2025-05-08 PROCEDURE — 83735 ASSAY OF MAGNESIUM: CPT | Performed by: FAMILY MEDICINE

## 2025-05-08 RX ORDER — PREDNISONE 20 MG/1
20 TABLET ORAL DAILY
Status: DISCONTINUED | OUTPATIENT
Start: 2025-05-08 | End: 2025-05-09 | Stop reason: HOSPADM

## 2025-05-08 RX ORDER — INSULIN GLARGINE 100 [IU]/ML
70 INJECTION, SOLUTION SUBCUTANEOUS
Status: DISCONTINUED | OUTPATIENT
Start: 2025-05-08 | End: 2025-05-09 | Stop reason: HOSPADM

## 2025-05-08 RX ORDER — ALBUTEROL SULFATE 0.83 MG/ML
2.5 SOLUTION RESPIRATORY (INHALATION) EVERY 4 HOURS PRN
Status: DISCONTINUED | OUTPATIENT
Start: 2025-05-08 | End: 2025-05-09 | Stop reason: HOSPADM

## 2025-05-08 RX ORDER — CALCIUM CARBONATE 500 MG/1
1000 TABLET, CHEWABLE ORAL 2 TIMES DAILY PRN
Status: DISCONTINUED | OUTPATIENT
Start: 2025-05-08 | End: 2025-05-09 | Stop reason: HOSPADM

## 2025-05-08 RX ORDER — FAMOTIDINE 20 MG/1
20 TABLET, FILM COATED ORAL 2 TIMES DAILY
Status: DISCONTINUED | OUTPATIENT
Start: 2025-05-08 | End: 2025-05-09 | Stop reason: HOSPADM

## 2025-05-08 RX ADMIN — HEPARIN SODIUM 7500 UNITS: 5000 INJECTION INTRAVENOUS; SUBCUTANEOUS at 22:20

## 2025-05-08 RX ADMIN — ATORVASTATIN CALCIUM 40 MG: 40 TABLET, FILM COATED ORAL at 08:10

## 2025-05-08 RX ADMIN — INSULIN LISPRO 8 UNITS: 100 INJECTION, SOLUTION INTRAVENOUS; SUBCUTANEOUS at 22:20

## 2025-05-08 RX ADMIN — PREGABALIN 50 MG: 50 CAPSULE ORAL at 17:27

## 2025-05-08 RX ADMIN — SERTRALINE HYDROCHLORIDE 50 MG: 50 TABLET ORAL at 08:10

## 2025-05-08 RX ADMIN — PREGABALIN 50 MG: 50 CAPSULE ORAL at 08:10

## 2025-05-08 RX ADMIN — INSULIN GLARGINE 70 UNITS: 100 INJECTION, SOLUTION SUBCUTANEOUS at 22:20

## 2025-05-08 RX ADMIN — SPIRONOLACTONE 50 MG: 25 TABLET ORAL at 17:27

## 2025-05-08 RX ADMIN — CARVEDILOL 12.5 MG: 12.5 TABLET, FILM COATED ORAL at 17:27

## 2025-05-08 RX ADMIN — PREGABALIN 50 MG: 50 CAPSULE ORAL at 22:20

## 2025-05-08 RX ADMIN — IPRATROPIUM BROMIDE 0.5 MG: 0.5 SOLUTION RESPIRATORY (INHALATION) at 13:17

## 2025-05-08 RX ADMIN — HEPARIN SODIUM 7500 UNITS: 5000 INJECTION INTRAVENOUS; SUBCUTANEOUS at 05:02

## 2025-05-08 RX ADMIN — FAMOTIDINE 20 MG: 20 TABLET, FILM COATED ORAL at 19:40

## 2025-05-08 RX ADMIN — CARVEDILOL 12.5 MG: 12.5 TABLET, FILM COATED ORAL at 08:10

## 2025-05-08 RX ADMIN — PERFLUTREN 0.8 ML/MIN: 6.52 INJECTION, SUSPENSION INTRAVENOUS at 07:57

## 2025-05-08 RX ADMIN — HEPARIN SODIUM 7500 UNITS: 5000 INJECTION INTRAVENOUS; SUBCUTANEOUS at 14:03

## 2025-05-08 RX ADMIN — IPRATROPIUM BROMIDE 0.5 MG: 0.5 SOLUTION RESPIRATORY (INHALATION) at 06:02

## 2025-05-08 RX ADMIN — INSULIN LISPRO 4 UNITS: 100 INJECTION, SOLUTION INTRAVENOUS; SUBCUTANEOUS at 17:29

## 2025-05-08 RX ADMIN — LEVALBUTEROL HYDROCHLORIDE 1.25 MG: 1.25 SOLUTION RESPIRATORY (INHALATION) at 06:02

## 2025-05-08 RX ADMIN — FUROSEMIDE 100 MG: 10 INJECTION, SOLUTION INTRAMUSCULAR; INTRAVENOUS at 17:29

## 2025-05-08 RX ADMIN — INSULIN LISPRO 8 UNITS: 100 INJECTION, SOLUTION INTRAVENOUS; SUBCUTANEOUS at 11:57

## 2025-05-08 RX ADMIN — ASPIRIN 81 MG: 81 TABLET, COATED ORAL at 08:10

## 2025-05-08 RX ADMIN — PREDNISONE 20 MG: 20 TABLET ORAL at 14:03

## 2025-05-08 RX ADMIN — FUROSEMIDE 100 MG: 10 INJECTION, SOLUTION INTRAMUSCULAR; INTRAVENOUS at 08:10

## 2025-05-08 RX ADMIN — LEVALBUTEROL HYDROCHLORIDE 1.25 MG: 1.25 SOLUTION RESPIRATORY (INHALATION) at 13:16

## 2025-05-08 RX ADMIN — INSULIN LISPRO 4 UNITS: 100 INJECTION, SOLUTION INTRAVENOUS; SUBCUTANEOUS at 08:10

## 2025-05-08 NOTE — ASSESSMENT & PLAN NOTE
Mild improving exacerbation, would continue prednisone 20 mg po daily and taper by 10 mg every 3 days until complete.   Continue Xopenex/Atrovent nebs 3 times daily  Resume Trelegy and as needed albuterol HFA/nebs at discharge

## 2025-05-08 NOTE — ASSESSMENT & PLAN NOTE
Lab Results   Component Value Date    HGBA1C 9.3 (H) 03/03/2025   FBG this AM: 249  Poorly controlled  Likely exacerbated by recent outpatient steroids.  Followed by endocrinology outpatient  Home regimen: Toujeo 70 units at bedtime, Farxiga, metformin, glimepiride, Ozempic, recently added Mounjaro.  Hospital regimen: Increase Lantus to 70 units at bedtime, + insulin sliding scale  Modify carb-controlled diet to level 1  Regular blood sugar monitoring  Consider increasing basal insulin on discharge.

## 2025-05-08 NOTE — ASSESSMENT & PLAN NOTE
Wt Readings from Last 3 Encounters:   05/08/25 (!) 150 kg (330 lb 11 oz)   05/07/25 (!) 156 kg (345 lb)   04/30/25 (!) 153 kg (338 lb)   Net negative volume; continue to monitor I/O  Losing weight appropriately; continue to monitor  Repeat echo showed LVEF 75%, + severe aortic stenosis  Continue diuretics per [facility] recs (see above)

## 2025-05-08 NOTE — CONSULTS
Consultation - Cardiology   Jey Vicente 59 y.o. male MRN: [id number]  Unit/Bed#: 411-01 Encounter: [id number]  05/08/25  9:04 AM        Impression:    59-year-old with hypertension, diabetes, dyslipidemia, COPD with remote smoking, at baseline on furosemide 40 g twice daily, spironolactone 50 mg daily, severe aortic stenosis, with admitted decreased functional capacity compared to 1 year ago, sporadic chest pains with exertion more recently, now admitted with acute on chronic heart failure with preserved ejection fraction manifested as shortness of breath, weight gain, lower extremity edema, orthopnea, likely exacerbated by recent treatment with steroids but also severe aortic stenosis being the predominant etiology.    Plan:    Acute on chronic heart failure with preserved ejection fraction: This is from valvular heart disease,  Will review current echo  No accurate input output charting, by weight, down by 5 kg since yesterday.  Remains volume overloaded, and not back to baseline yet, still on oxygen, continue current diuresis, responding very well with stable renal function    Severe aortic stenosis: Now symptomatic, Will need referral for consideration for aortic valve replacement, can be done as an outpatient    Hypertension: Controlled    Diabetes and dyslipidemia: Needs better control of his diabetes, A1c of 9.3,  At baseline on atorvastatin 40 mg, continued, last calculated LDL/non-HDL of 54/110 in March 2025        Assessment:  Principal Problem:    SOB (shortness of breath)  Active Problems:    SCOTT (obstructive sleep apnea)    Class 3 severe obesity with serious comorbidity and body mass index (BMI) of 45.0 to 49.9 in adult    Mixed hyperlipidemia    Essential hypertension    Type 2 diabetes mellitus with diabetic neuropathy, with long-term current use of insulin ([facility])    Nonrheumatic aortic (valve) stenosis    Anxiety and depression    Severe persistent asthma without complication    Diabetic  peripheral neuropathy (HCC)    Chief Complaint   Patient presents with    Chest Pain     Pt states that he started with chest pain and SOB about a month ago that got worse today. Pt has a hx of asthma and COPD. Complains of a productive cough and bringing up green mucus. Been on antibiotics for pneumonia.      Admitting diagnosis:  Chest pain [R07.9]  Dyspnea on exertion [R06.09]  Hyperglycemia [R73.9]  Severe persistent asthma with exacerbation [J45.51]  Volume overload [E87.70]    History of Present Illness   Physician Requesting Consult: Galdino Connolly Counts, DO   Reason for Consult / Principal Problem: Congestive heart failure  HPI: Jey Vicente is a   59-year-old with a history of hypertension, diabetes, dyslipidemia, COPD, at baseline on furosemide 40 mg twice daily, spironolactone 50 mg daily, severe aortic stenosis last prehospital echo June 2024 with ejection fraction 65%, peak/mean gradient of 36/39, valve area of 0.7, obesity with a BMI of 49, admitted with shortness of breath, weight gain, lower extremity edema and with recent treatment with steroids, now initiated on IV furosemide for suspected acute CHF with improvement in symptoms.  He also has a previous history of coronary angiography 2023 that showed minimal disease.    He states that he usually has an episode of bronchitis/cough/shortness of breath with mild edema every year around this time that improves promptly with steroids and antibiotics but this time it did not.    Hemodynamically stable, unremarkable vitals at presentation and unremarkable blood work with negative troponin and BNP within the normal range.    He is currently on furosemide 100 mg twice daily as well as spironolactone at the baseline dose of 50 mg daily, responding very well to diuresis, negative by 5 kg since yesterday but not back to baseline yet.      Consults    Review of Systems:  feels well  Review of Systems   Constitutional:  Positive for fatigue. Negative for  activity change, appetite change, chills, diaphoresis, fever and unexpected weight change.   HENT: Negative.     Eyes: Negative.    Respiratory:  Positive for shortness of breath. Negative for apnea, cough, choking, chest tightness, wheezing and stridor.    Cardiovascular:  Positive for leg swelling. Negative for chest pain and palpitations.       Historical Information   Past Medical History:   Diagnosis Date    Acute on chronic diastolic heart failure ([facility]) 2025    Aortic stenosis     Arthritis     Both wrists    Asthma 1964    Chronic hypoxemic respiratory failure ([facility])     Chronic kidney disease 10/2022    COPD (chronic obstructive pulmonary disease) ([facility])     Coronary artery disease 2000    Aortic stenosis    Diabetes ([facility])     Diabetes mellitus ([facility])     Hyperlipidemia     Hypertension 2014    Lung disease Birth    Neuropathy in diabetes ([facility]) 2022    Nonrheumatic aortic (valve) stenosis     Pneumonia     Sleep apnea, obstructive      Past Surgical History:   Procedure Laterality Date    ACCESSORY NAVICULAR EXCISION Right     APPENDECTOMY      CARDIAC CATHETERIZATION N/A 2023    Procedure: Cardiac Coronary Angiogram;  Surgeon: Mohit Farris MD;  Location: [facility];  Service: Cardiology    EAR FOREIGN BODY REMOVAL      cyst removal. bronchospasms after general anesthesia.    EYE SURGERY Left     traumatic injury at age 9-10    TONSILLECTOMY  No     Social History     Substance and Sexual Activity   Alcohol Use Never     Social History     Substance and Sexual Activity   Drug Use Never     Social History     Tobacco Use   Smoking Status Former    Current packs/day: 0.00    Average packs/day: 3.0 packs/day for 30.0 years (90.1 ttl pk-yrs)    Types: Cigarettes    Start date: 3/15/1984    Quit date: 7/15/2005    Years since quittin.8   Smokeless Tobacco Never     Family History:   Family History   Problem Relation Age of Onset    Cancer Mother         Skin Cancer     Heart disease Father     Lung cancer Father         Passed away 1997    Cancer Father         Lung cancer    Hypertension Father     ALS Maternal Grandmother     Diabetes Maternal Grandfather     Stomach cancer Maternal Grandfather     Arthritis Maternal Grandfather      No family history of premature CAD or Sudden Cardiac Death    Meds/Allergies     Current Facility-Administered Medications:     acetaminophen (TYLENOL) tablet 650 mg, 650 mg, Oral, Q4H PRN, Yasmani Juares MD    albuterol inhalation solution 2.5 mg, 2.5 mg, Nebulization, Q4H PRN, Yasmani Juares MD    aspirin (ECOTRIN LOW STRENGTH) EC tablet 81 mg, 81 mg, Oral, Daily, Yasmani Juares MD, 81 mg at 05/08/25 0810    atorvastatin (LIPITOR) tablet 40 mg, 40 mg, Oral, Daily, Yasmani Juares MD, 40 mg at 05/08/25 0810    carvedilol (COREG) tablet 12.5 mg, 12.5 mg, Oral, BID With Meals, Yasmani Juares MD, 12.5 mg at 05/08/25 0810    furosemide (LASIX) 100 mg in dextrose 5 % 50 mL IVPB, 100 mg, Intravenous, BID (diuretic), Yasmani Juares MD, Last Rate: 100 mL/hr at 05/08/25 0810, 100 mg at 05/08/25 0810    heparin (porcine) subcutaneous injection 7,500 Units, 7,500 Units, Subcutaneous, Q8H MADELINE, Yasmani Juares MD, 7,500 Units at 05/08/25 0502    insulin glargine (LANTUS) subcutaneous injection 60 Units 0.6 mL, 60 Units, Subcutaneous, HS, Yasmani Juares MD, 60 Units at 05/07/25 2222    insulin lispro (HumALOG/ADMELOG) 100 units/mL subcutaneous injection 2-12 Units, 2-12 Units, Subcutaneous, 4x Daily (AC & HS), 4 Units at 05/08/25 0810 **AND** Fingerstick Glucose (POCT), , , 4x Daily AC and at bedtime, Yasmani uJares MD    ipratropium (ATROVENT) 0.02 % inhalation solution 0.5 mg, 0.5 mg, Nebulization, TID, Yasmani Juares MD, 0.5 mg at 05/08/25 0602    levalbuterol (XOPENEX) inhalation solution 1.25 mg, 1.25 mg, Nebulization, TID, Yasmani Jay Juares MD, 1.25 mg at 05/08/25 0602    losartan (COZAAR) tablet  "50 mg, 50 mg, Oral, HS, Yasmani Juares MD    pregabalin (LYRICA) capsule 50 mg, 50 mg, Oral, TID, Yasmani Juares MD, 50 mg at 05/08/25 0810    sertraline (ZOLOFT) tablet 50 mg, 50 mg, Oral, Daily, Yasmani Juares MD, 50 mg at 05/08/25 0810    spironolactone (ALDACTONE) tablet 50 mg, 50 mg, Oral, QPM, Yasmani Juares MD, 50 mg at 05/07/25 2004  No Known Allergies    Objective   Vitals: Blood pressure 113/59, pulse 74, temperature (!) 97.4 °F (36.3 °C), resp. rate 18, height 5' 9\" (1.753 m), weight (!) 150 kg (330 lb 11 oz), SpO2 93%., Body mass index is 48.83 kg/m².,   Orthostatic Blood Pressures      Flowsheet Row Most Recent Value   Blood Pressure 113/59 filed at 05/08/2025 0815   Patient Position - Orthostatic VS Sitting filed at 05/07/2025 1835              Intake/Output Summary (Last 24 hours) at 5/8/2025 0904  Last data filed at 5/8/2025 0507  Gross per 24 hour   Intake 800 ml   Output 900 ml   Net -100 ml       Weight (last 2 days)       Date/Time Weight    05/08/25 07:13:57 150 (330.69)    05/08/25 0500 150 (331.13)    05/07/25 1914 155 (341.27)    05/07/25 18:35:36 155 (341.27)            Invasive Devices       Peripheral Intravenous Line  Duration             Peripheral IV 05/07/25 Right Antecubital <1 day                Well      Physical Exam:  GEN: Jey Vicente appears well well, alert and oriented x 3, pleasant and cooperative   HEENT: pupils equal, round, and reactive to light; extraocular muscles intact  NECK: supple, no carotid bruits or JVD  HEART: regular rhythm, normal S1 and S2, aortic stenosis ejection systolic murmurs, no clicks, gallops or rubs   LUNGS: clear to auscultation bilaterally; no wheezes or rhonchi, no rales  ABDOMEN/GI: normal bowel sounds, soft, no tenderness, no distention  EXTREMITIES/Musculoskeltal: peripheral pulses normal; no clubbing, cyanosis, mild edema  NEURO: no focal motor findings   SKIN: normal without suspicious lesions on exposed " skin      Lab Results:   Admission on 05/07/2025   Component Date Value    WBC 05/07/2025 9.81     RBC 05/07/2025 5.52     Hemoglobin 05/07/2025 15.4     Hematocrit 05/07/2025 45.9     MCV 05/07/2025 83     MCH 05/07/2025 27.9     MCHC 05/07/2025 33.6     RDW 05/07/2025 14.4     MPV 05/07/2025 9.7     Platelets 05/07/2025 233     Protime 05/07/2025 13.0     INR 05/07/2025 0.93     PTT 05/07/2025 26     Sodium 05/07/2025 135     Potassium 05/07/2025 4.2     Chloride 05/07/2025 99     CO2 05/07/2025 24     ANION GAP 05/07/2025 12     BUN 05/07/2025 27 (H)     Creatinine 05/07/2025 1.05     Glucose 05/07/2025 514 (HH)     Calcium 05/07/2025 8.8     AST 05/07/2025 16     ALT 05/07/2025 33     Alkaline Phosphatase 05/07/2025 66     Total Protein 05/07/2025 6.5     Albumin 05/07/2025 4.0     Total Bilirubin 05/07/2025 0.52     eGFR 05/07/2025 76     hs TnI 0hr 05/07/2025 9     BNP 05/07/2025 63     D-Dimer, Quant 05/07/2025 <0.27     Magnesium 05/07/2025 2.0     LACTIC ACID 05/07/2025 3.5 (H)     Procalcitonin 05/07/2025 <0.05     POC Glucose 05/07/2025 485 (HH)     Beta- Hydroxybutyrate 05/07/2025 0.11 (L)     LACTIC ACID 05/07/2025 2.9 (H)     Segmented % 05/07/2025 89 (H)     Bands % 05/07/2025 2     Lymphocytes % 05/07/2025 6 (L)     Monocytes % 05/07/2025 2 (L)     Eosinophils % 05/07/2025 1     Basophils % 05/07/2025 0     Absolute Neutrophils 05/07/2025 8.93 (H)     Absolute Lymphocytes 05/07/2025 0.59 (L)     Absolute Monocytes 05/07/2025 0.20     Absolute Eosinophils 05/07/2025 0.10     Absolute Basophils 05/07/2025 0.00     RBC Morphology 05/07/2025 Normal     Platelet Estimate 05/07/2025 Adequate     Triscuspid Valve Regurgi* 05/08/2025 57.0     MV Peak A Jorge A 05/08/2025 1.1     MV stenosis pressure 1/2* 05/08/2025 80     MV Peak E Jorge A 05/08/2025 101     AV peak gradient 05/08/2025 166     LVOT stroke volume 05/08/2025 68.00     Ao VTI 05/08/2025 155.47     Aortic valve peak veloci* 05/08/2025 6.44     LVOT peak  VTI 05/08/2025 19.52     LVOT peak jorge a 05/08/2025 0.81     LVOT diameter 05/08/2025 2.1     E wave deceleration time 05/08/2025 275     E/A ratio 05/08/2025 0.92     MV valve area p 1/2 meth* 05/08/2025 2.80     AV LVOT peak gradient 05/08/2025 3     AV mean gradient 05/08/2025 94     TR Peak Jorge A 05/08/2025 3.8     AV area peak jorge a 05/08/2025 0.5     AV area by cont VTI 05/08/2025 0.4     LVOT mn grad 05/08/2025 2.0     Aortic valve mean veloci* 05/08/2025 46.80     Tricuspid valve peak reg* 05/08/2025 3.78     Tricuspid annular plane * 05/08/2025 3.70     Asc Ao 05/08/2025 3.7     LVOT Cardiac Index 05/08/2025 1.91     LVOT stroke volume index 05/08/2025 26.60     LVOT Cardiac Output 05/08/2025 4.89     MV E' Tissue Velocity La* 05/08/2025 7     MV E' Tissue Velocity Se* 05/08/2025 9     LVOT area 05/08/2025 3.46     DVI 05/08/2025 0.13     AV valve area 05/08/2025 0.43     BSA 05/08/2025 2.56     POC Glucose 05/07/2025 319 (H)     POC Glucose 05/07/2025 330 (H)     WBC 05/08/2025 8.30     RBC 05/08/2025 5.54     Hemoglobin 05/08/2025 15.0     Hematocrit 05/08/2025 45.4     MCV 05/08/2025 82     MCH 05/08/2025 27.1     MCHC 05/08/2025 33.0     RDW 05/08/2025 14.5     Platelets 05/08/2025 207     MPV 05/08/2025 9.3     Sodium 05/08/2025 137     Potassium 05/08/2025 4.1     Chloride 05/08/2025 100     CO2 05/08/2025 27     ANION GAP 05/08/2025 10     BUN 05/08/2025 26 (H)     Creatinine 05/08/2025 0.87     Glucose 05/08/2025 249 (H)     Calcium 05/08/2025 8.9     eGFR 05/08/2025 93     Magnesium 05/08/2025 2.5     LACTIC ACID 05/08/2025 2.0     POC Glucose 05/08/2025 243 (H)          Imaging: Results Review Statement: I personally reviewed the following image studies/reports in PACS and discussed pertinent findings with Radiology: Echocardiogram. My interpretation of the radiology images/reports is: Above.      EKG/Telemetry: Sinus rhythm    Counseling / Coordination of Care    Thank you for allowing us to  "participate in their care.     This note was completed in part utilizing [employer] direct voice recognition software.   Grammatical errors, random word insertion, spelling mistakes, occasional wrong word or \"sound-alike\" substitutions and incomplete sentences may be an occasional consequence of the system secondary to software limitations, ambient noise and hardware issues. At the time of dictation, efforts were made to edit, clarify and /or correct errors.  Please read the chart carefully and recognize, using context, where substitutions have occurred.  If you have any questions or concerns about the context, text or information contained within the body of this dictation, please contact myself, the provider, for further clarification.    Trinidad Wei MD      "

## 2025-05-08 NOTE — PROGRESS NOTES
"Progress Note - Hospitalist   Name: Jey Vicente 59 y.o. male I MRN: [id number]  Unit/Bed#: 411-01 I Date of Admission: 5/7/2025   Date of Service: 5/8/2025 I Hospital Day: 0    Assessment & Plan  SOB (shortness of breath)  Multifactorial, most likely secondary to aortic stenosis and some volume overload  Patient has improvement on Lasix 80 mg IV x 1, patient takes Lasix 40 mg twice daily at home  Lasix 100mg IV BID, continue with Aldactone 50 mg  Echocardiogram (5/8/25) showed severe aortic stenosis.   Consulted Cardiology; we greatly appreciate their recommendations. Continue diuresis at current rate  Pulmonology consulting; we greatly appreciate their recommendations  -Treat HFpEF + aortic stenosis (see below)  -Continue BiPAP for SCOTT.   -Continue asthma treatment (see below)  -Would benefit from weight loss  Nonrheumatic aortic (valve) stenosis  Echo (June 2024): EF of 65%, severe aortic stenosis; Peak gradient 76 mmHg, mean gradient 39 mmHg, LOLIS 0.7 cm2.  Repeat Echo (5/8/25): \"The aortic valve peak velocity is 6.44 m/s. The aortic valve peak gradient is 166 mmHg. The aortic valve mean gradient is 94 mmHg. The dimensionless velocity index is 0.13. The aortic valve area is 0.43 cm2.\"  Consulting Cardiology; we greatly appreciate the recommendations  Will need cardiology referral for consideration of outpatient aortic valve replacement.  Acute on chronic heart failure with preserved ejection fraction (HCC)  Wt Readings from Last 3 Encounters:   05/08/25 (!) 150 kg (330 lb 11 oz)   05/07/25 (!) 156 kg (345 lb)   04/30/25 (!) 153 kg (338 lb)   Net negative volume; continue to monitor I/O  Losing weight appropriately; continue to monitor  Repeat echo showed LVEF 75%, + severe aortic stenosis  Continue diuretics per Cariology recs (see above)  Severe persistent asthma without complication  Continue with breathing treatments, is on Trelegy + PRN albuterol at home and receives monthly Tezspire " injections.  Pulmonology consulting; we greatly appreciate their recommendations.  Continue prednisone taper: 20 mg/dy, taper by 10 mg Q3D  Continue Duonebs TID  Essential hypertension  Continue home Coreg & losartan  Mixed hyperlipidemia  Continue Lipitor   Type 2 diabetes mellitus with diabetic neuropathy, with long-term current use of insulin (HCC)  Lab Results   Component Value Date    HGBA1C 9.3 (H) 03/03/2025   FBG this AM: 249  Poorly controlled  Likely exacerbated by recent outpatient steroids.  Followed by endocrinology outpatient  Home regimen: Toujeo 70 units at bedtime, Farxiga, metformin, glimepiride, Ozempic, recently added Mounjaro.  Hospital regimen: Increase Lantus to 70 units at bedtime, + insulin sliding scale  Modify carb-controlled diet to level 1  Regular blood sugar monitoring  Consider increasing basal insulin on discharge.  Diabetic peripheral neuropathy (HCC)  Continue Lyrica  Anxiety and depression  Continue Zoloft  SCOTT (obstructive sleep apnea)  BiPAP when sleeping  Class 3 severe obesity with serious comorbidity and body mass index (BMI) of 45.0 to 49.9 in adult  Recommend lifestyle modification    VTE Pharmacologic Prophylaxis:   High Risk (Score >/= 5) - Pharmacological DVT Prophylaxis Ordered: heparin. Sequential Compression Devices Ordered.    Mobility:   Basic Mobility Inpatient Raw Score: 24  JH-HLM Goal: 8: Walk 250 feet or more  JH-HLM Achieved: 8: Walk 250 feet ot more  JH-HLM Goal achieved. Continue to encourage appropriate mobility.    Patient Centered Rounds: I performed bedside rounds with nursing staff today.   Discussions with Specialists or Other Care Team Provider: Attending physician    Education and Discussions with Family / Patient: Updated  (wife) at bedside.    Current Length of Stay: 0 day(s)  Current Patient Status: Observation   Certification Statement: The patient will continue to require additional inpatient hospital stay due to volume  overload/acute on chronic heart failure  Discharge Plan: Anticipate discharge tomorrow to home.    Code Status: Level 1 - Full Code    Subjective   Patient was seen and examined at bedside.  He has no acute complaints.  He does not feel short of breath, though still little wheezy.    Objective :  Temp:  [97.1 °F (36.2 °C)-97.4 °F (36.3 °C)] 97.4 °F (36.3 °C)  HR:  [74-92] 77  BP: ()/(50-93) 111/61  Resp:  [16-21] 17  SpO2:  [90 %-96 %] 91 %  O2 Device: None (Room air)    Body mass index is 48.83 kg/m².     Input and Output Summary (last 24 hours):     Intake/Output Summary (Last 24 hours) at 2025 1553  Last data filed at 2025 1449  Gross per 24 hour   Intake 1940 ml   Output 2425 ml   Net -485 ml       Physical Exam  Constitutional:       General: He is not in acute distress.     Appearance: Normal appearance. He is obese. He is not ill-appearing, toxic-appearing or diaphoretic.   HENT:      Head: Normocephalic and atraumatic.   Cardiovascular:      Rate and Rhythm: Normal rate and regular rhythm.      Heart sounds: Murmur heard.      Systolic murmur is present.   Pulmonary:      Effort: Pulmonary effort is normal. No respiratory distress.      Breath sounds: Wheezing present. No rhonchi or rales.   Abdominal:      General: There is no distension.      Palpations: Abdomen is soft.      Tenderness: There is no abdominal tenderness. There is no guarding.   Musculoskeletal:      Right lower le+ Pitting Edema present.      Left lower le+ Pitting Edema present.   Neurological:      Mental Status: He is alert.           Telemetry:  Telemetry Orders (From admission, onward)               24 Hour Telemetry Monitoring  Continuous x 24 Hours (Telem)        Expiring   Question:  Reason for 24 Hour Telemetry  Answer:  Decompensated CHF- and any one of the following: continuous diuretic infusion or total diuretic dose >200 mg daily, associated electrolyte derangement (I.e. K < 3.0), inotropic drip  (continuous infusion), hx of ventricular arrhythmia, or new EF < 35%                     Telemetry Reviewed: PVCs  Indication for Continued Telemetry Use: No indication for continued use. Will discontinue.                Lab Results: I have reviewed the following results:   Results from last 7 days   Lab Units 05/08/25  0440 05/07/25  1610   WBC Thousand/uL 8.30 9.81   HEMOGLOBIN g/dL 15.0 15.4   HEMATOCRIT % 45.4 45.9   PLATELETS Thousands/uL 207 233   BANDS PCT %  --  2   LYMPHO PCT %  --  6*   MONO PCT %  --  2*   EOS PCT %  --  1     Results from last 7 days   Lab Units 05/08/25  0440 05/07/25  1610   SODIUM mmol/L 137 135   POTASSIUM mmol/L 4.1 4.2   CHLORIDE mmol/L 100 99   CO2 mmol/L 27 24   BUN mg/dL 26* 27*   CREATININE mg/dL 0.87 1.05   ANION GAP mmol/L 10 12   CALCIUM mg/dL 8.9 8.8   ALBUMIN g/dL  --  4.0   TOTAL BILIRUBIN mg/dL  --  0.52   ALK PHOS U/L  --  66   ALT U/L  --  33   AST U/L  --  16   GLUCOSE RANDOM mg/dL 249* 514*     Results from last 7 days   Lab Units 05/07/25  1610   INR  0.93     Results from last 7 days   Lab Units 05/08/25  1037 05/08/25  0736 05/07/25  2103 05/07/25  1927 05/07/25  1609   POC GLUCOSE mg/dl 303* 243* 330* 319* 485*         Results from last 7 days   Lab Units 05/08/25  0440 05/07/25  1941 05/07/25  1610   LACTIC ACID mmol/L 2.0 2.9* 3.5*   PROCALCITONIN ng/ml  --   --  <0.05       Recent Cultures (last 7 days):   Results from last 7 days   Lab Units 05/07/25  0939   SPUTUM CULTURE  Culture too young- will reincubate   GRAM STAIN RESULT  2+ Epithelial cells per low power field*  2+ Polys*  2+ Gram positive cocci in pairs and chains*  1+ Gram positive cocci in clusters*  1+ Gram negative rods*       Imaging Results Review: I reviewed radiology reports from this admission including: chest xray.  Other Study Results Review: No additional pertinent studies reviewed.    Last 24 Hours Medication List:     Current Facility-Administered Medications:     acetaminophen  (TYLENOL) tablet 650 mg, Q4H PRN    albuterol inhalation solution 2.5 mg, Q4H PRN    aspirin (ECOTRIN LOW STRENGTH) EC tablet 81 mg, Daily    atorvastatin (LIPITOR) tablet 40 mg, Daily    carvedilol (COREG) tablet 12.5 mg, BID With Meals    furosemide (LASIX) 100 mg in dextrose 5 % 50 mL IVPB, BID (diuretic), Last Rate: 100 mg (05/08/25 0810)    heparin (porcine) subcutaneous injection 7,500 Units, Q8H MADELINE    insulin glargine (LANTUS) subcutaneous injection 60 Units 0.6 mL, HS    insulin lispro (HumALOG/ADMELOG) 100 units/mL subcutaneous injection 2-12 Units, 4x Daily (AC & HS) **AND** Fingerstick Glucose (POCT), 4x Daily AC and at bedtime    ipratropium (ATROVENT) 0.02 % inhalation solution 0.5 mg, TID    levalbuterol (XOPENEX) inhalation solution 1.25 mg, TID    losartan (COZAAR) tablet 50 mg, HS    predniSONE tablet 20 mg, Daily    pregabalin (LYRICA) capsule 50 mg, TID    sertraline (ZOLOFT) tablet 50 mg, Daily    spironolactone (ALDACTONE) tablet 50 mg, QPM    Administrative Statements   Today, Patient Was Seen By: Tono Sanches DO      **Please Note: This note may have been constructed using a voice recognition system.**

## 2025-05-08 NOTE — ASSESSMENT & PLAN NOTE
Multifactorial but seems to be mostly driven by acute on chronic HFpEF.  Also in the setting of morbid obesity, asthma exacerbation, aortic stenosis.  Treate underlying causes, see below

## 2025-05-08 NOTE — PLAN OF CARE
Problem: PAIN - ADULT  Goal: Verbalizes/displays adequate comfort level or baseline comfort level  Description: Interventions:- Encourage patient to monitor pain and request assistance- Assess pain using appropriate pain scale- Administer analgesics based on type and severity of pain and evaluate response- Implement non-pharmacological measures as appropriate and evaluate response- Consider cultural and social influences on pain and pain management- Notify physician/advanced practitioner if interventions unsuccessful or patient reports new pain  Outcome: Progressing     Problem: INFECTION - ADULT  Goal: Absence or prevention of progression during hospitalization  Description: INTERVENTIONS:- Assess and monitor for signs and symptoms of infection- Monitor lab/diagnostic results- Monitor all insertion sites, i.e. indwelling lines, tubes, and drains- Monitor endotracheal if appropriate and nasal secretions for changes in amount and color- Kelly appropriate cooling/warming therapies per order- Administer medications as ordered- Instruct and encourage patient and family to use good hand hygiene technique- Identify and instruct in appropriate isolation precautions for identified infection/condition  Outcome: Progressing     Problem: SAFETY ADULT  Goal: Patient will remain free of falls  Description: INTERVENTIONS:- Educate patient/family on patient safety including physical limitations- Instruct patient to call for assistance with activity - Consult OT/PT to assist with strengthening/mobility - Keep Call bell within reach- Keep bed low and locked with side rails adjusted as appropriate- Keep care items and personal belongings within reach- Initiate and maintain comfort rounds- Make Fall Risk Sign visible to staff- Offer Toileting every 2 Hours, in advance of need-Obtain necessary fall risk management equipment: nonskid footwear- Apply yellow socks and bracelet for high fall risk patients- Consider moving patient to  room near nurses station  Outcome: Progressing  Goal: Maintain or return to baseline ADL function  Description: INTERVENTIONS:-  Assess patient's ability to carry out ADLs; assess patient's baseline for ADL function and identify physical deficits which impact ability to perform ADLs (bathing, care of mouth/teeth, toileting, grooming, dressing, etc.)- Assess/evaluate cause of self-care deficits - Assess range of motion- Assess patient's mobility; develop plan if impaired- Assess patient's need for assistive devices and provide as appropriate- Encourage maximum independence but intervene and supervise when necessary- Involve family in performance of ADLs- Assess for home care needs following discharge - Consider OT consult to assist with ADL evaluation and planning for discharge- Provide patient education as appropriate  Outcome: Progressing  Goal: Maintains/Returns to pre admission functional level  Description: INTERVENTIONS:- Perform AM-PAC 6 Click Basic Mobility/ Daily Activity assessment daily.- Set and communicate daily mobility goal to care team and patient/family/caregiver. - Collaborate with rehabilitation services on mobility goals if consulted- Perform Range of Motion 3 times a day.- Reposition patient every 2 hours.- Dangle patient 3 times a day- Stand patient 3 times a day- Ambulate patient 3 times a day- Out of bed to chair 3 times a day - Out of bed for meals 3 times a day- Out of bed for toileting- Record patient progress and toleration of activity level   Outcome: Progressing     Problem: Knowledge Deficit  Goal: Patient/family/caregiver demonstrates understanding of disease process, treatment plan, medications, and discharge instructions  Description: Complete learning assessment and assess knowledge base.Interventions:- Provide teaching at level of understanding- Provide teaching via preferred learning methods  Outcome: Progressing     Problem: CARDIOVASCULAR - ADULT  Goal: Maintains optimal cardiac  output and hemodynamic stability  Description: INTERVENTIONS:- Monitor I/O, vital signs and rhythm- Monitor for S/S and trends of decreased cardiac output- Administer and titrate ordered vasoactive medications to optimize hemodynamic stability- Assess quality of pulses, skin color and temperature- Assess for signs of decreased coronary artery perfusion- Instruct patient to report change in severity of symptoms  Outcome: Progressing  Goal: Absence of cardiac dysrhythmias or at baseline rhythm  Description: INTERVENTIONS:- Continuous cardiac monitoring, vital signs, obtain 12 lead EKG if ordered- Administer antiarrhythmic and heart rate control medications as ordered- Monitor electrolytes and administer replacement therapy as ordered  Outcome: Progressing     Problem: METABOLIC, FLUID AND ELECTROLYTES - ADULT  Goal: Electrolytes maintained within normal limits  Description: INTERVENTIONS:- Monitor labs and assess patient for signs and symptoms of electrolyte imbalances- Administer electrolyte replacement as ordered- Monitor response to electrolyte replacements, including repeat lab results as appropriate- Instruct patient on fluid and nutrition as appropriate  Outcome: Progressing  Goal: Fluid balance maintained  Description: INTERVENTIONS:- Monitor labs - Monitor I/O and WT- Instruct patient on fluid and nutrition as appropriate- Assess for signs & symptoms of volume excess or deficit  Outcome: Progressing  Goal: Glucose maintained within target range  Description: INTERVENTIONS:- Monitor Blood Glucose as ordered- Assess for signs and symptoms of hyperglycemia and hypoglycemia- Administer ordered medications to maintain glucose within target range- Assess nutritional intake and initiate nutrition service referral as needed  Outcome: Progressing

## 2025-05-08 NOTE — ASSESSMENT & PLAN NOTE
Multifactorial, most likely secondary to aortic stenosis and some volume overload  Patient has improvement on Lasix 80 mg IV x 1, patient takes Lasix 40 mg twice daily at home  Lasix 100mg IV BID, continue with Aldactone 50 mg  Echocardiogram (5/8/25) showed severe aortic stenosis.   Consulted Cardiology; we greatly appreciate their recommendations. Continue diuresis at current rate  Pulmonology consulting; we greatly appreciate their recommendations  -Treat HFpEF + aortic stenosis (see below)  -Continue BiPAP for SCOTT.   -Continue asthma treatment (see below)  -Would benefit from weight loss

## 2025-05-08 NOTE — ASSESSMENT & PLAN NOTE
"Echo (June 2024): EF of 65%, severe aortic stenosis; Peak gradient 76 mmHg, mean gradient 39 mmHg, LOLIS 0.7 cm2.  Repeat Echo (5/8/25): \"The aortic valve peak velocity is 6.44 m/s. The aortic valve peak gradient is 166 mmHg. The aortic valve mean gradient is 94 mmHg. The dimensionless velocity index is 0.13. The aortic valve area is 0.43 cm2.\"  Consulting Cardiology; we greatly appreciate the recommendations  Will need cardiology referral for consideration of outpatient aortic valve replacement.  "

## 2025-05-08 NOTE — UTILIZATION REVIEW
OBSERVATION 5-7-25 CHANGED TO INPATIENT 5-8-25 FOR VOLUME OVERLOAD, IV LASIX.      Initial Clinical Review    Admission: Date/Time/Statement:     Admission Orders (From admission, onward)       Ordered        05/08/25 1625  INPATIENT ADMISSION  Once            05/07/25 1755  Place in Observation  Once                             ED Arrival Information       Expected   5/7/2025     Arrival   5/7/2025 15:45    Acuity   Emergent              Means of arrival   Walk-In    Escorted by   Self    Service   Hospitalist    Admission type   Emergency              Arrival complaint   chest pain             Chief Complaint   Patient presents with    Chest Pain     Pt states that he started with chest pain and SOB about a month ago that got worse today. Pt has a hx of asthma and COPD. Complains of a productive cough and bringing up green mucus. Been on antibiotics for pneumonia.        Initial Presentation: 59 y.o. male presents to ed from home on 5-7-25 for evaluation and treatment of shortness of breath.  PMHX:   CHF, DM, HTN, pneumonia, home O2 5L at sleep, BIPAP.    Clinical assessment significant for gained about 8 pounds and has pitting edema to his legs to knees, wheezing, rhonchi, dry mucous membranes.  Not able to lie flat. Productive green sputum / cough.   lb> 345 lb.    B-HB 0.11, glucose 514, LA 3.5.  Initially treated with iv lasix, iv Mag x1, iv solumedrol x1.  Admit to observation.   Plan includes: consult cardiology and pulmonary, Echo, iv lasix bid.   Monitor I/O and daily weights.      Anticipated Length of Stay/Certification Statement: Patient will be admitted on an observation basis with an anticipated length of stay of less than 2 midnights secondary to shortness of breath.     Date: 5- 8-25    Day 2: observation changed to inpatient   Certification Statement: The patient will continue to require additional inpatient hospital stay due to volume overload/acute on chronic heart failure  Discharge Plan:  Anticipate discharge tomorrow to home.    Sputum culture: gram neg rods, gram positive cocci. Consult cardiology: remains in volume overload.  Not yet back to baseline.  Still on oxygen. Echo pending.  Consult pulmonary:  continue po prednisone tapered, continue xopenex/ atrovent tid. Monitor on telemetry.      Date: 5-9-25   Day 3: Has surpassed a 2nd midnight with active treatments and services.  Remains on  telemetry.   Spo2 89% ra resting.  Afebrile /  VSS. Received iv lasix 100 mg at 9 am. Overall with diuresis, has done well, weight is down by about 6 kg since admission, renal function remained stable, he feels he is 90% better. Current echo continues to show preserved LV systolic function and by gradients/hemodynamic severe aortic stenosis, valve itself is hard to visualize.     Discharged 5-9-25  60 year old male patient who originally presented to the hospital on 5/7/2025 due to shortness of breath, likely multifactorial in the setting of severe persistent asthma, and acute on chronic congestive heart failure.  Patient was started on nebulizers and respiratory treatment, as well as IV diuresis.  Patient quick clinical improvement with IV diuresis, which was continued and he showed further improvement in volume status and subjective symptoms.  He was seen by cardiology recommending increase of his home diuretics, follow-up with his outpatient cardiologist.  He did have an echocardiogram showing ejection fraction 75%, with severe aortic stenosis, will be referred to valvular clinic for review and evaluation of severe aortic stenosis.  Medically stable for discharge.       ED Treatment-Medication Administration from 05/07/2025 1545 to 05/07/2025 1822         Date/Time Order Dose Route Action     05/07/2025 1735 furosemide (LASIX) injection 80 mg 80 mg Intravenous Given     05/07/2025 1730 magnesium sulfate 2 g/50 mL IVPB (premix) 2 g 2 g Intravenous New Bag     05/07/2025 1730 methylPREDNISolone sodium  succinate (Solu-MEDROL) injection 60 mg 60 mg Intravenous Given            Scheduled Medications:    aspirin, 81 mg, Oral, Daily  atorvastatin, 40 mg, Oral, Daily  carvedilol, 12.5 mg, Oral, BID With Meals  furosemide, 100 mg, Intravenous, BID (diuretic)  heparin (porcine), 7,500 Units, Subcutaneous, Q8H MADELINE  insulin glargine, 70 Units, Subcutaneous, HS  insulin lispro, 2-12 Units, Subcutaneous, 4x Daily (AC & HS)  ipratropium, 0.5 mg, Nebulization, TID  levalbuterol, 1.25 mg, Nebulization, TID  losartan, 50 mg, Oral, HS  predniSONE, 20 mg, Oral, Daily  pregabalin, 50 mg, Oral, TID  sertraline, 50 mg, Oral, Daily  spironolactone, 50 mg, Oral, QPM      Continuous IV Infusions:     PRN Meds:  acetaminophen, 650 mg, Oral, Q4H PRN  albuterol, 2.5 mg, Nebulization, Q4H PRN      ED Triage Vitals [05/07/25 1550]   Temperature Pulse Respirations Blood Pressure SpO2 Pain Score   (!) 97.3 °F (36.3 °C) 103 20 144/75 93 % No Pain     Weight (last 2 days)       Date/Time Weight    05/08/25 07:13:57 150 (330.69)    05/08/25 0500 150 (331.13)    05/07/25 1914 155 (341.27)    05/07/25 18:35:36 155 (341.27)            Vital Signs (last 3 days)       Date/Time Temp Pulse Resp BP MAP (mmHg) SpO2 O2 Device Pain    05/08/25 14:49:34 97.4 °F (36.3 °C) 77 17 111/61 78 91 % None (Room air) --    05/08/25 1322 -- -- -- -- -- -- None (Room air) --    05/08/25 12:04:24 97.1 °F (36.2 °C) 74 17 112/61 78 90 % None (Room air) --    05/08/25 08:15:19 -- 74 -- 113/59 77 93 % -- --    05/08/25 0815 -- -- -- -- -- 90 % None (Room air) No Pain    05/08/25 0810 -- 74 -- 113/59 -- -- -- --    05/08/25 07:13:57 -- 74 -- 91/51 64 92 % -- --    05/08/25 0605 -- -- -- -- -- 95 % None (Room air) --    05/08/25 02:37:09 97.4 °F (36.3 °C) 82 -- 116/62 80 96 % -- --    05/07/25 22:24:16 -- 83 18 102/50 67 90 % -- --    05/07/25 2025 -- -- -- -- -- 94 % None (Room air) --    05/07/25 2005 -- 92 16 -- -- 93 % None (Room air) --    05/07/25 19:41:35 -- 85 --  138/88 105 93 % -- --    05/07/25 1929 -- -- -- -- -- -- -- No Pain    05/07/25 18:35:36 97.4 °F (36.3 °C) 83 18 156/93 114 92 % None (Room air) --    05/07/25 1733 -- 89 21 116/82 83 92 % None (Room air) --    05/07/25 1630 -- 92 20 114/55 75 92 % None (Room air) --    05/07/25 1554 -- -- -- -- -- -- None (Room air) --    05/07/25 1550 97.3 °F (36.3 °C) 103 20 144/75 103 93 % None (Room air) No Pain         Pertinent Labs/Diagnostic Test Results:     Radiology:  XR chest pa and lateral   Final  (05/07 1753)      No acute cardiopulmonary disease.        Cardiology:  Echo complete   Final (05/08 1122)        Left Ventricle: Left ventricular cavity size is normal. Wall thickness    is normal. The left ventricular ejection fraction is 75%. Systolic    function is vigorous. Wall motion cannot be accurately assessed. Diastolic    function is normal.  Left atrial filling pressure is normal.     Right Ventricle: Right ventricular cavity size is normal. Systolic    function is normal.     Aortic Valve: The leaflets are moderately thickened. The leaflets are    moderately calcified. There is severely reduced mobility. There is severe    stenosis. The aortic valve peak velocity is 6.44 m/s. The aortic valve    peak gradient is 166 mmHg. The aortic valve mean gradient is 94 mmHg. The    dimensionless velocity index is 0.13. The aortic valve area is 0.43 cm2.     Tricuspid Valve: The right ventricular systolic pressure is severely    elevated. The estimated right ventricular systolic pressure is 72.00 mmHg.     Study quality was poor. This was a technically difficult study         ECG 12 lead   Final (05/08 1047)   Normal sinus rhythm   Left axis deviation   Abnormal ECG   When compared with ECG of 23-Jan-2025 15:25,   No significant change was found        GI:  No orders to display           Results from last 7 days   Lab Units 05/08/25  0440 05/07/25  1610   WBC Thousand/uL 8.30 9.81   HEMOGLOBIN g/dL 15.0 15.4   HEMATOCRIT %  45.4 45.9   PLATELETS Thousands/uL 207 233   BANDS PCT %  --  2         Results from last 7 days   Lab Units 05/08/25  0440 05/07/25  1610   SODIUM mmol/L 137 135   POTASSIUM mmol/L 4.1 4.2   CHLORIDE mmol/L 100 99   CO2 mmol/L 27 24   ANION GAP mmol/L 10 12   BUN mg/dL 26* 27*   CREATININE mg/dL 0.87 1.05   EGFR ml/min/1.73sq m 93 76   CALCIUM mg/dL 8.9 8.8   MAGNESIUM mg/dL 2.5 2.0     Results from last 7 days   Lab Units 05/07/25  1610   AST U/L 16   ALT U/L 33   ALK PHOS U/L 66   TOTAL PROTEIN g/dL 6.5   ALBUMIN g/dL 4.0   TOTAL BILIRUBIN mg/dL 0.52     Results from last 7 days   Lab Units 05/08/25  1037 05/08/25  0736 05/07/25  2103 05/07/25  1927 05/07/25  1609   POC GLUCOSE mg/dl 303* 243* 330* 319* 485*     Results from last 7 days   Lab Units 05/08/25  0440 05/07/25  1610   GLUCOSE RANDOM mg/dL 249* 514*       Beta- Hydroxybutyrate   Date Value Ref Range Status   05/07/2025 0.11 (L) 0.20 - 0.60 mmol/L Final         Results from last 7 days   Lab Units 05/07/25  1610   HS TNI 0HR ng/L 9     Results from last 7 days   Lab Units 05/07/25  1610   D-DIMER QUANTITATIVE ug/ml FEU <0.27     Results from last 7 days   Lab Units 05/07/25  1610   PROTIME seconds 13.0   INR  0.93   PTT seconds 26         Results from last 7 days   Lab Units 05/07/25  1610   PROCALCITONIN ng/ml <0.05     Results from last 7 days   Lab Units 05/08/25  0440 05/07/25  1941 05/07/25  1610   LACTIC ACID mmol/L 2.0 2.9* 3.5*     Results from last 7 days   Lab Units 05/07/25  1610   BNP pg/mL 63       Results from last 7 days   Lab Units 05/07/25  0939   SPUTUM CULTURE  Culture too young- will reincubate   GRAM STAIN RESULT    SPUTUM   2+ Epithelial cells per low power field*  2+ Polys*  2+ Gram positive cocci in pairs and chains*  1+ Gram positive cocci in clusters*  1+ Gram negative rods*       Past Medical History:   Diagnosis Date    Acute on chronic diastolic heart failure (HCC) 01/28/2025    Aortic stenosis     Arthritis 1990    Both  wrists    Asthma 1964    Chronic hypoxemic respiratory failure ([facility])     Chronic kidney disease 10/2022    COPD (chronic obstructive pulmonary disease) ([facility])     Coronary artery disease 2000    Aortic stenosis    Diabetes ([facility])     Diabetes mellitus ([facility])     Hyperlipidemia     Hypertension 07/02/2014    Lung disease Birth    Neuropathy in diabetes ([facility]) 1/2022    Nonrheumatic aortic (valve) stenosis     Pneumonia 2015    Sleep apnea, obstructive      Present on Admission:   Essential hypertension   Mixed hyperlipidemia   Nonrheumatic aortic (valve) stenosis   SOB (shortness of breath)   SCOTT (obstructive sleep apnea)   Severe persistent asthma without complication   Anxiety and depression   Diabetic peripheral neuropathy ([facility])   Acute on chronic heart failure with preserved ejection fraction ([facility])      Admitting Diagnosis:     Chest pain [R07.9]  Dyspnea on exertion [R06.09]  Hyperglycemia [R73.9]  Severe persistent asthma with exacerbation [J45.51]  Volume overload [E87.70]    Age/Sex: 59 y.o. male    Network Utilization Review Department  ATTENTION: Please call with any questions or concerns to [phone] and carefully listen to the prompts so that you are directed to the right person. All voicemails are confidential.   For Discharge needs, contact Care Management DC Support Team at [phone] opt. 2  Send all requests for admission clinical reviews, approved or denied determinations and any other requests to dedicated fax number below belonging to the campus where the patient is receiving treatment. List of dedicated fax numbers for the Facilities:  FACILITY NAME UR FAX NUMBER   ADMISSION DENIALS (Administrative/Medical Necessity) [phone]   DISCHARGE SUPPORT TEAM (NETWORK) [phone]   PARENT CHILD HEALTH (Maternity/NICU/Pediatrics) [phone]   [facility] [phone]   [facility] [phone]   [facility]  [phone]   [facility] [phone]   [facility] [phone]   [facility] [phone]   [facility] [phone]   [facility] [phone]   [facility] [phone]   [facility] [phone]   [facility] [phone]   [facility] [phone]

## 2025-05-08 NOTE — CASE MANAGEMENT
Case Management Discharge Planning Note    Patient name Jey Vicente  Location /411-01 MRN [id number]  : 1965 Date 2025       Current Admission Date: 2025  Current Admission Diagnosis:SOB (shortness of breath)   Patient Active Problem List    Diagnosis Date Noted Date Diagnosed    SOB (shortness of breath) 2025     Severe persistent asthma without complication 2025     Diabetic peripheral neuropathy (HCC) 2025     Depression, recurrent (HCC) 2024     Severe persistent asthma with acute exacerbation 2024     Systemic lupus erythematosus, unspecified SLE type, unspecified organ involvement status ([facility]) 2023     Anxiety and depression 2023     Positive double stranded DNA antibody test 2022     Stage 2 chronic kidney disease 10/04/2022     Pulmonary nodules 2022     Periodic limb movement disorder (PLMD) 10/22/2021     Nonrheumatic aortic (valve) stenosis 2020     COPD with acute bronchitis  (HCC) 2020     Presbyopia 2020     COPD, severe (HCC) 2019     Chronic hypoxemic respiratory failure (HCC) 2019     Diastolic heart failure (HCC) 2019     Class 3 severe obesity with serious comorbidity and body mass index (BMI) of 45.0 to 49.9 in adult 2019     Mixed simple and mucopurulent chronic bronchitis (HCC) 2019     Myopia, bilateral 2019     Renovascular hypertension with goal blood pressure less than 140/90 2016     Type 2 diabetes mellitus with diabetic neuropathy, with long-term current use of insulin (HCC) 2015     SCOTT (obstructive sleep apnea) 10/08/2014     Mixed hyperlipidemia 2014     Essential hypertension 2014       LOS (days): 0  Geometric Mean LOS (GMLOS) (days):   Days to GMLOS:     OBJECTIVE:            Current admission status: Observation   Preferred Pharmacy:   [facility] [address]  [place]  [address]  Phone: [phone] Fax: [phone]    [facility] - [place], [place] - [address]  [address] [address]  Phone: [phone] Fax: [phone]    [facility] - [place], [place] - [address]  [address]  [address]  [facility] [address]  Phone: [phone] Fax: [phone]    Primary Care Provider: Cesia Martinez DO    [facility]  Secondary Insurance:     DISCHARGE DETAILS:  Discussed pt in interdisciplinary team meeting. Continuing to diurese for CHF/volume overloaded. Responding well to diuresis, not back to baseline yet and still on oxygen. Plans are home on dc with OP follow up when medically ready for dc. CM will continue to follow and and assist in dc planning.

## 2025-05-08 NOTE — ASSESSMENT & PLAN NOTE
Continue with breathing treatments, is on Trelegy + PRN albuterol at home and receives monthly Tezspire injections.  Pulmonology consulting; we greatly appreciate their recommendations.  Continue prednisone taper: 20 mg/dy, taper by 10 mg Q3D  Continue Duonebs TID

## 2025-05-08 NOTE — RESPIRATORY THERAPY NOTE
05/08/25 1300   Inhalation Therapy Tx   $ Inhalation Therapy Performed Yes   Pre-Treatment Pulse 85   Pre-Treatment Respirations 14   Duration 20   Breath Sounds Pre-Treatment Bilateral Clear   Delivery Source Air;UDN   Position Up in chair   Treatment Tolerance Tolerated well   Resp Comments patient is on room air, he has not wheezing, will change schedule nebulizers to prn, patient is okay with this plan

## 2025-05-08 NOTE — RESPIRATORY THERAPY NOTE
RT Protocol Note  Jey Vicente 59 y.o. male MRN: [id number]  Unit/Bed#: 411-01 Encounter: [id number]    Assessment    Principal Problem:    SOB (shortness of breath)  Active Problems:    SCOTT (obstructive sleep apnea)    Class 3 severe obesity with serious comorbidity and body mass index (BMI) of 50.0 to 59.9 in adult    Mixed hyperlipidemia    Essential hypertension    Type 2 diabetes mellitus with diabetic neuropathy, with long-term current use of insulin ([facility])    Nonrheumatic aortic (valve) stenosis    Anxiety and depression    Severe persistent asthma without complication    Diabetic peripheral neuropathy ([facility])      Home Pulmonary Medications:    Home Devices/Therapy: BiPAP/CPAP, Other (Comment) (Patient takes breathing treatments at home and wears PAP therapy for sleeping.  Patient has his home machine with him.  He uses 5 LPM with the PAP only for sleeping.)    Past Medical History:   Diagnosis Date    Acute on chronic diastolic heart failure ([facility]) 2025    Aortic stenosis     Arthritis     Both wrists    Asthma 1964    Chronic hypoxemic respiratory failure ([facility])     Chronic kidney disease 10/2022    COPD (chronic obstructive pulmonary disease) ([facility])     Coronary artery disease     Aortic stenosis    Diabetes ([facility])     Diabetes mellitus ([facility])     Hyperlipidemia     Hypertension 2014    Lung disease Birth    Neuropathy in diabetes ([facility]) 2022    Nonrheumatic aortic (valve) stenosis     Pneumonia     Sleep apnea, obstructive      Social History     Socioeconomic History    Marital status: /Civil Union     Spouse name: None    Number of children: None    Years of education: None    Highest education level: None   Occupational History    None   Tobacco Use    Smoking status: Former     Current packs/day: 0.00     Average packs/day: 3.0 packs/day for 30.0 years (90.1 ttl pk-yrs)     Types: Cigarettes     Start date: 3/15/1984     Quit date: 7/15/2005     Years since quittin.8     Smokeless tobacco: Never   Vaping Use    Vaping status: Never Used   Substance and Sexual Activity    Alcohol use: Never    Drug use: Never    Sexual activity: Yes     Partners: Female     Birth control/protection: None   Other Topics Concern    None   Social History Narrative    None     Social Drivers of Health     Financial Resource Strain: Patient Declined (2023)    Overall Financial Resource Strain (CARDIA)     Difficulty of Paying Living Expenses: Patient declined   Food Insecurity: No Food Insecurity (2025)    Nursing - Inadequate Food Risk Classification     Worried About Running Out of Food in the Last Year: Sometimes true     Ran Out of Food in the Last Year: Sometimes true     Ran Out of Food in the Last Year: Never true   Transportation Needs: No Transportation Needs (2025)    Nursing - Transportation Risk Classification     Lack of Transportation: Not on file     Lack of Transportation: No   Physical Activity: Not on file   Stress: Not on file   Social Connections: Unknown (2024)    Received from [employer]     How often do you feel lonely or isolated from those around you? (Adult - for ages 18 years and over): Not on file   Intimate Partner Violence: Unknown (2025)    Nursing IPS     Feels Physically and Emotionally Safe: Not on file     Physically Hurt by Someone: Not on file     Humiliated or Emotionally Abused by Someone: Not on file     Physically Hurt by Someone: No     Hurt or Threatened by Someone: No   Housing Stability: Unknown (2025)    Nursing: Inadequate Housing Risk Classification     Has Housing: Not on file     Worried About Losing Housing: Not on file     Unable to Get Utilities: Not on file     Unable to Pay for Housing in the Last Year: No     Has Housin       Subjective     Patient assessed per RT protocol.  He was instructed to come to the ER from the Pulmonary office.  Patient admitted with aortic stenosis, shortness of breath  "and suspect volume overload.  Patient stated to me that he has a history of Asthma/COPD and SCOTT.  He has his home CPAP with him and he takes breathing treatments at baseline.  Patient has a 7 pound weight gain according to notes.  Upon my physical assessment, the patient's lung sounds are diminished with basilar wheezes.  He is currently on room air, no significant distress and SPO2 is 93%.  I assisted the patient in setting up his home PAP unit and ensured that it is fully functioning.  Will move forward with TID Xopenex/Atrovent, PAP therapy with 5 LPM O2 bleed as all HS and continue improvement with breathing.      Objective    Physical Exam:   Assessment Type: Assess only  General Appearance: Alert, Awake  Respiratory Pattern: Normal  Chest Assessment: Chest expansion symmetrical  Bilateral Breath Sounds: Diminished, Expiratory wheezes  Cough: None    Vitals:  Blood pressure 138/88, pulse 92, temperature (!) 97.4 °F (36.3 °C), temperature source Oral, resp. rate 16, height 5' 9\" (1.753 m), weight (!) 155 kg (341 lb 4.4 oz), SpO2 94%.          Imaging and other studies: Results Review Statement: I personally reviewed the following image studies/reports in PACS and discussed pertinent findings with Radiology: chest xray. My interpretation of the radiology images/reports is:  .          Plan    Respiratory Plan: No distress/Pulmonary history, Home Bronchodilator Patient pathway  Airway Clearance Plan: Flutter     TID Xopenex/Atrovent, PAP therapy for sleeping.      Resp Comments: Patient assessed per RT protocol   "

## 2025-05-08 NOTE — CONSULTS
Consultation - Pulmonology   Name: Jey Vicente 59 y.o. male I MRN: [id number]  Unit/Bed#: 411-01 I Date of Admission: 5/7/2025   Date of Service: 5/8/2025 I Hospital Day: 0   Inpatient consult to Pulmonology  Consult performed by: Lj Pickett PA-C  Consult ordered by: Yasmani Juares MD        Physician Requesting Evaluation: Galdino Connolly Counts, DO   Reason for Evaluation / Principal Problem: SOB    Assessment & Plan  SOB (shortness of breath)  Multifactorial but seems to be mostly driven by acute on chronic HFpEF.  Also in the setting of morbid obesity, asthma exacerbation, aortic stenosis.  Treate underlying causes, see below  Acute on chronic heart failure with preserved ejection fraction (HCC)  Wt Readings from Last 3 Encounters:   05/08/25 (!) 150 kg (330 lb 11 oz)   05/07/25 (!) 156 kg (345 lb)   04/30/25 (!) 153 kg (338 lb)   Monitor I/Os and daily weights  Repeat echo pending  Continue diuretics per cardiology  Severe persistent asthma without complication  Mild improving exacerbation, would continue prednisone 20 mg po daily and taper by 10 mg every 3 days until complete.   Continue Xopenex/Atrovent nebs 3 times daily  Resume Trelegy and as needed albuterol HFA/nebs at discharge  Nonrheumatic aortic (valve) stenosis  Cardiology following- recommending referral for consideration of aortic valve replacement  SCOTT (obstructive sleep apnea)  Continue BiPAP with O2 bled in during all hours of sleep  Class 3 severe obesity with serious comorbidity and body mass index (BMI) of 45.0 to 49.9 in adult  Would benefit from weight loss    I have discussed the above management plan in detail with the primary service.   Pulmonology service will sign off. Call with questions.     History of Present Illness   Jey Vicente is a 59 y.o. male who presents with shortness of breath.  He has a past medical asbestos for severe persistent asthma, SCOTT, chronic hypoxic respiratory failure, double-stranded DNA  antibody, morbid obesity, aortic stenosis, HFpEF.  Patient was seen in the pulmonary office today for sick visit.  Has been complaining of increased shortness breath, cough productive of green sputum, wheezing for the last month and has been on 2 courses of antibiotics and and steroids without any improvement.  He is currently on prednisone 20 mg at the time of the visit and felt no different than when he started it last week.  It was decided to go to the emergency department for further evaluation.  There he was noted to be grossly volume overloaded with 70 pound weight gain over the last week.  Despite having normal BNP he was started on Lasix and has had excellent response.  Today, patient states that he feels significantly better.  He is down 10 pounds.    Review of Systems   All other systems reviewed and are negative.      Historical Information   Historical Information   Past Medical History:   Diagnosis Date    Acute on chronic diastolic heart failure (HCC) 01/28/2025    Aortic stenosis     Arthritis 1990    Both wrists    Asthma 1964    Chronic hypoxemic respiratory failure ([facility])     Chronic kidney disease 10/2022    COPD (chronic obstructive pulmonary disease) ([facility])     Coronary artery disease 2000    Aortic stenosis    Diabetes ([facility])     Diabetes mellitus ([facility])     Hyperlipidemia     Hypertension 07/02/2014    Lung disease Birth    Neuropathy in diabetes ([facility]) 1/2022    Nonrheumatic aortic (valve) stenosis     Pneumonia 2015    Sleep apnea, obstructive      Past Surgical History:   Procedure Laterality Date    ACCESSORY NAVICULAR EXCISION Right     APPENDECTOMY      CARDIAC CATHETERIZATION N/A 05/25/2023    Procedure: Cardiac Coronary Angiogram;  Surgeon: Mohit Farris MD;  Location: [facility];  Service: Cardiology    EAR FOREIGN BODY REMOVAL      cyst removal. bronchospasms after general anesthesia.    EYE SURGERY Left     traumatic injury at age 9-10    TONSILLECTOMY  No     Social History      Tobacco Use    Smoking status: Former     Current packs/day: 0.00     Average packs/day: 3.0 packs/day for 30.0 years (90.1 ttl pk-yrs)     Types: Cigarettes     Start date: 3/15/1984     Quit date: 7/15/2005     Years since quittin.8    Smokeless tobacco: Never   Vaping Use    Vaping status: Never Used   Substance and Sexual Activity    Alcohol use: Never    Drug use: Never    Sexual activity: Yes     Partners: Female     Birth control/protection: None     E-Cigarette/Vaping    E-Cigarette Use Never User      E-Cigarette/Vaping Substances    Nicotine No     THC No     CBD No     Flavoring No     Other No     Unknown No      Tobacco History: former smoker  Occupational History: [employer]   Family History:  Family History   Problem Relation Age of Onset    Cancer Mother         Skin Cancer    Heart disease Father     Lung cancer Father         Passed away     Cancer Father         Lung cancer    Hypertension Father     ALS Maternal Grandmother     Diabetes Maternal Grandfather     Stomach cancer Maternal Grandfather     Arthritis Maternal Grandfather        Objective :  Temp:  [97.1 °F (36.2 °C)-97.6 °F (36.4 °C)] 97.1 °F (36.2 °C)  HR:  [] 74  BP: ()/(50-93) 112/61  Resp:  [16-21] 17  SpO2:  [90 %-96 %] 90 %  O2 Device: None (Room air)    Physical Exam  Vitals reviewed.   Constitutional:       Appearance: Normal appearance. He is well-developed. He is obese.   HENT:      Head: Normocephalic and atraumatic.      Nose: Nose normal.      Mouth/Throat:      Mouth: Mucous membranes are moist.   Eyes:      Extraocular Movements: Extraocular movements intact.   Cardiovascular:      Rate and Rhythm: Normal rate and regular rhythm.      Heart sounds: Murmur heard.   Pulmonary:      Effort: Pulmonary effort is normal. No respiratory distress.      Breath sounds: Wheezing (faint) and rales present. No rhonchi.   Musculoskeletal:      Right lower leg: Edema present.      Left lower leg: Edema present.    Skin:     General: Skin is warm and dry.   Neurological:      Mental Status: He is alert. Mental status is at baseline.   Psychiatric:         Mood and Affect: Mood normal.         Behavior: Behavior normal.         Lab Results: I have reviewed the following results:  .     05/07/25  1610 05/07/25  1941 05/08/25  0440   WBC 9.81  --  8.30   HGB 15.4  --  15.0   HCT 45.9  --  45.4     --  207   BANDSPCT 2  --   --    SODIUM 135  --  137   K 4.2  --  4.1   CL 99  --  100   CO2 24  --  27   BUN 27*  --  26*   CREATININE 1.05  --  0.87   GLUC 514*  --  249*   MG 2.0  --  2.5   AST 16  --   --    ALT 33  --   --    ALB 4.0  --   --    TBILI 0.52  --   --    ALKPHOS 66  --   --    PTT 26  --   --    INR 0.93  --   --    HSTNI0 9  --   --    BNP 63  --   --    LACTICACID 3.5*   < > 2.0    < > = values in this interval not displayed.     ABG: No new results in last 24 hours.    Imaging Results Review: I personally reviewed the following image studies in PACS and associated radiology reports: chest xray. My interpretation of the radiology images/reports is: CXR 5/7/25 are clear without acute infiltrate, pneumothorax, pleural effusion.    Other Study Results Review: Other studies reviewed include: Echo with EF 75%, diastolic function, severe aortic stenosis, severely elevated right ventricular pressure of 72 mmHg    PFT Results Reviewed: reviewed  PFT 8/8/2023  Results:  FEV1/FVC Ratio: 62%, FEV1 1.66 L / 47%, FVC 2.69 L / 59%     Lung volumes by body plethysmography: TLC 93%, %, DLCO 82%        Interpretation:  Spirometer shows a severe obstructive ventilatory defect  No significant response after administration of bronchodilator according to the ats standards  Increased lung volumes consistent with a severe air trapping  Normal diffusion capacity  Increased airway resistance

## 2025-05-08 NOTE — PLAN OF CARE
Problem: PAIN - ADULT  Goal: Verbalizes/displays adequate comfort level or baseline comfort level  Description: Interventions:- Encourage patient to monitor pain and request assistance- Assess pain using appropriate pain scale- Administer analgesics based on type and severity of pain and evaluate response- Implement non-pharmacological measures as appropriate and evaluate response- Consider cultural and social influences on pain and pain management- Notify physician/advanced practitioner if interventions unsuccessful or patient reports new pain  Outcome: Progressing     Problem: INFECTION - ADULT  Goal: Absence or prevention of progression during hospitalization  Description: INTERVENTIONS:- Assess and monitor for signs and symptoms of infection- Monitor lab/diagnostic results- Monitor all insertion sites, i.e. indwelling lines, tubes, and drains- Monitor endotracheal if appropriate and nasal secretions for changes in amount and color- [place] appropriate cooling/warming therapies per order- Administer medications as ordered- Instruct and encourage patient and family to use good hand hygiene technique- Identify and instruct in appropriate isolation precautions for identified infection/condition  Outcome: Progressing     Problem: SAFETY ADULT  Goal: Patient will remain free of falls  Description: INTERVENTIONS:- Educate patient/family on patient safety including physical limitations- Instruct patient to call for assistance with activity - Consult OT/PT to assist with strengthening/mobility - Keep Call bell within reach- Keep bed low and locked with side rails adjusted as appropriate- Keep care items and personal belongings within reach- Initiate and maintain comfort rounds- Make Fall Risk Sign visible to staff- Offer Toileting every 2 Hours, in advance of need-Obtain necessary fall risk management equipment: nonskid footwear- Apply yellow socks and bracelet for high fall risk patients- Consider moving patient to  room near nurses station  Outcome: Progressing  Goal: Maintain or return to baseline ADL function  Description: INTERVENTIONS:-  Assess patient's ability to carry out ADLs; assess patient's baseline for ADL function and identify physical deficits which impact ability to perform ADLs (bathing, care of mouth/teeth, toileting, grooming, dressing, etc.)- Assess/evaluate cause of self-care deficits - Assess range of motion- Assess patient's mobility; develop plan if impaired- Assess patient's need for assistive devices and provide as appropriate- Encourage maximum independence but intervene and supervise when necessary- Involve family in performance of ADLs- Assess for home care needs following discharge - Consider OT consult to assist with ADL evaluation and planning for discharge- Provide patient education as appropriate  Outcome: Progressing  Goal: Maintains/Returns to pre admission functional level  Description: INTERVENTIONS:- Perform AM-PAC 6 Click Basic Mobility/ Daily Activity assessment daily.- Set and communicate daily mobility goal to care team and patient/family/caregiver. - Collaborate with rehabilitation services on mobility goals if consulted- Perform Range of Motion 3 times a day.- Reposition patient every 2 hours.- Dangle patient 3 times a day- Stand patient 3 times a day- Ambulate patient 3 times a day- Out of bed to chair 3 times a day - Out of bed for meals 3 times a day- Out of bed for toileting- Record patient progress and toleration of activity level   Outcome: Progressing     Problem: Knowledge Deficit  Goal: Patient/family/caregiver demonstrates understanding of disease process, treatment plan, medications, and discharge instructions  Description: Complete learning assessment and assess knowledge base.Interventions:- Provide teaching at level of understanding- Provide teaching via preferred learning methods  Outcome: Progressing     Problem: CARDIOVASCULAR - ADULT  Goal: Maintains optimal cardiac  output and hemodynamic stability  Description: INTERVENTIONS:- Monitor I/O, vital signs and rhythm- Monitor for S/S and trends of decreased cardiac output- Administer and titrate ordered vasoactive medications to optimize hemodynamic stability- Assess quality of pulses, skin color and temperature- Assess for signs of decreased coronary artery perfusion- Instruct patient to report change in severity of symptoms  Outcome: Progressing  Goal: Absence of cardiac dysrhythmias or at baseline rhythm  Description: INTERVENTIONS:- Continuous cardiac monitoring, vital signs, obtain 12 lead EKG if ordered- Administer antiarrhythmic and heart rate control medications as ordered- Monitor electrolytes and administer replacement therapy as ordered  Outcome: Progressing     Problem: METABOLIC, FLUID AND ELECTROLYTES - ADULT  Goal: Electrolytes maintained within normal limits  Description: INTERVENTIONS:- Monitor labs and assess patient for signs and symptoms of electrolyte imbalances- Administer electrolyte replacement as ordered- Monitor response to electrolyte replacements, including repeat lab results as appropriate- Instruct patient on fluid and nutrition as appropriate  Outcome: Progressing  Goal: Fluid balance maintained  Description: INTERVENTIONS:- Monitor labs - Monitor I/O and WT- Instruct patient on fluid and nutrition as appropriate- Assess for signs & symptoms of volume excess or deficit  Outcome: Progressing  Goal: Glucose maintained within target range  Description: INTERVENTIONS:- Monitor Blood Glucose as ordered- Assess for signs and symptoms of hyperglycemia and hypoglycemia- Administer ordered medications to maintain glucose within target range- Assess nutritional intake and initiate nutrition service referral as needed  Outcome: Progressing

## 2025-05-08 NOTE — ASSESSMENT & PLAN NOTE
Wt Readings from Last 3 Encounters:   05/08/25 (!) 150 kg (330 lb 11 oz)   05/07/25 (!) 156 kg (345 lb)   04/30/25 (!) 153 kg (338 lb)   Monitor I/Os and daily weights  Repeat echo pending  Continue diuretics per cardiology

## 2025-05-09 ENCOUNTER — TELEPHONE (OUTPATIENT)
Age: 60
End: 2025-05-09

## 2025-05-09 VITALS
HEIGHT: 69 IN | BODY MASS INDEX: 46.65 KG/M2 | HEART RATE: 65 BPM | DIASTOLIC BLOOD PRESSURE: 62 MMHG | WEIGHT: 315 LBS | RESPIRATION RATE: 16 BRPM | OXYGEN SATURATION: 89 % | SYSTOLIC BLOOD PRESSURE: 108 MMHG | TEMPERATURE: 97.1 F

## 2025-05-09 DIAGNOSIS — I35.0 AORTIC VALVE STENOSIS, ETIOLOGY OF CARDIAC VALVE DISEASE UNSPECIFIED: Primary | ICD-10-CM

## 2025-05-09 LAB
ANION GAP SERPL CALCULATED.3IONS-SCNC: 10 MMOL/L (ref 4–13)
ATRIAL RATE: 67 BPM
BASOPHILS # BLD AUTO: 0.02 THOUSANDS/ÂΜL (ref 0–0.1)
BASOPHILS NFR BLD AUTO: 0 % (ref 0–1)
BUN SERPL-MCNC: 29 MG/DL (ref 5–25)
CALCIUM SERPL-MCNC: 8.8 MG/DL (ref 8.4–10.2)
CHLORIDE SERPL-SCNC: 100 MMOL/L (ref 96–108)
CO2 SERPL-SCNC: 27 MMOL/L (ref 21–32)
CREAT SERPL-MCNC: 0.94 MG/DL (ref 0.6–1.3)
EOSINOPHIL # BLD AUTO: 0.09 THOUSAND/ÂΜL (ref 0–0.61)
EOSINOPHIL NFR BLD AUTO: 1 % (ref 0–6)
ERYTHROCYTE [DISTWIDTH] IN BLOOD BY AUTOMATED COUNT: 14.6 % (ref 11.6–15.1)
GFR SERPL CREATININE-BSD FRML MDRD: 87 ML/MIN/1.73SQ M
GLUCOSE SERPL-MCNC: 167 MG/DL (ref 65–140)
GLUCOSE SERPL-MCNC: 182 MG/DL (ref 65–140)
GLUCOSE SERPL-MCNC: 203 MG/DL (ref 65–140)
HCT VFR BLD AUTO: 46.1 % (ref 36.5–49.3)
HGB BLD-MCNC: 15.1 G/DL (ref 12–17)
IMM GRANULOCYTES # BLD AUTO: 0.11 THOUSAND/UL (ref 0–0.2)
IMM GRANULOCYTES NFR BLD AUTO: 1 % (ref 0–2)
LYMPHOCYTES # BLD AUTO: 0.96 THOUSANDS/ÂΜL (ref 0.6–4.47)
LYMPHOCYTES NFR BLD AUTO: 12 % (ref 14–44)
MCH RBC QN AUTO: 27.3 PG (ref 26.8–34.3)
MCHC RBC AUTO-ENTMCNC: 32.8 G/DL (ref 31.4–37.4)
MCV RBC AUTO: 83 FL (ref 82–98)
MONOCYTES # BLD AUTO: 0.68 THOUSAND/ÂΜL (ref 0.17–1.22)
MONOCYTES NFR BLD AUTO: 9 % (ref 4–12)
NEUTROPHILS # BLD AUTO: 6.12 THOUSANDS/ÂΜL (ref 1.85–7.62)
NEUTS SEG NFR BLD AUTO: 77 % (ref 43–75)
NRBC BLD AUTO-RTO: 0 /100 WBCS
P AXIS: 73 DEGREES
PLATELET # BLD AUTO: 217 THOUSANDS/UL (ref 149–390)
PMV BLD AUTO: 9.4 FL (ref 8.9–12.7)
POTASSIUM SERPL-SCNC: 3.6 MMOL/L (ref 3.5–5.3)
PR INTERVAL: 150 MS
QRS AXIS: -2 DEGREES
QRSD INTERVAL: 90 MS
QT INTERVAL: 410 MS
QTC INTERVAL: 433 MS
RBC # BLD AUTO: 5.53 MILLION/UL (ref 3.88–5.62)
SODIUM SERPL-SCNC: 137 MMOL/L (ref 135–147)
T WAVE AXIS: 85 DEGREES
VENTRICULAR RATE: 67 BPM
WBC # BLD AUTO: 7.98 THOUSAND/UL (ref 4.31–10.16)

## 2025-05-09 PROCEDURE — 93010 ELECTROCARDIOGRAM REPORT: CPT | Performed by: INTERNAL MEDICINE

## 2025-05-09 PROCEDURE — 99239 HOSP IP/OBS DSCHRG MGMT >30: CPT | Performed by: HOSPITALIST

## 2025-05-09 PROCEDURE — 82948 REAGENT STRIP/BLOOD GLUCOSE: CPT

## 2025-05-09 PROCEDURE — 85025 COMPLETE CBC W/AUTO DIFF WBC: CPT

## 2025-05-09 PROCEDURE — 80048 BASIC METABOLIC PNL TOTAL CA: CPT

## 2025-05-09 PROCEDURE — 99232 SBSQ HOSP IP/OBS MODERATE 35: CPT | Performed by: INTERNAL MEDICINE

## 2025-05-09 RX ORDER — FUROSEMIDE 20 MG/1
60 TABLET ORAL 2 TIMES DAILY
Qty: 180 TABLET | Refills: 0 | Status: SHIPPED | OUTPATIENT
Start: 2025-05-09

## 2025-05-09 RX ADMIN — FAMOTIDINE 20 MG: 20 TABLET, FILM COATED ORAL at 08:46

## 2025-05-09 RX ADMIN — INSULIN LISPRO 4 UNITS: 100 INJECTION, SOLUTION INTRAVENOUS; SUBCUTANEOUS at 11:47

## 2025-05-09 RX ADMIN — PREGABALIN 50 MG: 50 CAPSULE ORAL at 08:46

## 2025-05-09 RX ADMIN — ASPIRIN 81 MG: 81 TABLET, COATED ORAL at 08:46

## 2025-05-09 RX ADMIN — HEPARIN SODIUM 7500 UNITS: 5000 INJECTION INTRAVENOUS; SUBCUTANEOUS at 05:48

## 2025-05-09 RX ADMIN — PREDNISONE 20 MG: 20 TABLET ORAL at 08:46

## 2025-05-09 RX ADMIN — FUROSEMIDE 100 MG: 10 INJECTION, SOLUTION INTRAMUSCULAR; INTRAVENOUS at 08:46

## 2025-05-09 RX ADMIN — CARVEDILOL 12.5 MG: 12.5 TABLET, FILM COATED ORAL at 08:46

## 2025-05-09 RX ADMIN — ATORVASTATIN CALCIUM 40 MG: 40 TABLET, FILM COATED ORAL at 08:46

## 2025-05-09 RX ADMIN — INSULIN LISPRO 2 UNITS: 100 INJECTION, SOLUTION INTRAVENOUS; SUBCUTANEOUS at 08:46

## 2025-05-09 RX ADMIN — SERTRALINE HYDROCHLORIDE 50 MG: 50 TABLET ORAL at 08:46

## 2025-05-09 NOTE — DISCHARGE SUMMARY
"Discharge Summary - Hospitalist   Name: Jey Vicente 60 y.o. male I MRN: [id number]  Unit/Bed#: 411-01 I Date of Admission: 5/7/2025   Date of Service: 5/9/2025 I Hospital Day: 1     Assessment & Plan  SOB (shortness of breath)  Multifactorial, most likely secondary to aortic stenosis and some volume overload  Patient not improved with outpatient treatment of asthma with steroid tapers and antibiotics  Likely more in part due to congestive heart failure  Patient is due with IV diuresis, weight and clinical volume status improved  Patient weaned to room air, stable, symptomatically improved  Acute on chronic heart failure with preserved ejection fraction (HCC)  Wt Readings from Last 3 Encounters:   05/09/25 (!) 149 kg (328 lb 7.8 oz)   05/07/25 (!) 156 kg (345 lb)   04/30/25 (!) 153 kg (338 lb)   Net negative volume; continue to monitor I/O  Repeat echo showed LVEF 75%, + severe aortic stenosis  Prescient cardiology input  Patient did well with IV diuresis  On discharge adjust home diuretics 60 mg twice daily  BMP in 1 week with PCP  Patient will need follow-up as outpatient cardiologist Dr. Ferrara  Cardiology to place referral to cardiovascular surgery for evaluation of severe aortic stenosis    Nonrheumatic aortic (valve) stenosis  Echo (June 2024): EF of 65%, severe aortic stenosis; Peak gradient 76 mmHg, mean gradient 39 mmHg, LOLIS 0.7 cm2.  Repeat Echo (5/8/25): \"The aortic valve peak velocity is 6.44 m/s. The aortic valve peak gradient is 166 mmHg. The aortic valve mean gradient is 94 mmHg. The dimensionless velocity index is 0.13. The aortic valve area is 0.43 cm2.\"  Consulting Cardiology; we greatly appreciate the recommendations  Will need cardiology referral for consideration of outpatient aortic valve replacement.  Severe persistent asthma without complication  Continue with breathing treatments, is on Trelegy + PRN albuterol at home and receives monthly Tezspire injections.  Pulmonology consulting; we " greatly appreciate their recommendations.  Continue his prior home inhalers at discharge  Patient to complete his previously prescribed steroid taper  Essential hypertension  Continue home Coreg & losartan  Mixed hyperlipidemia  Continue Lipitor   Type 2 diabetes mellitus with diabetic neuropathy, with long-term current use of insulin (HCC)  Lab Results   Component Value Date    HGBA1C 9.3 (H) 03/03/2025   FBG this AM: 249  Poorly controlled  Likely exacerbated by recent outpatient steroids.  Followed by endocrinology outpatient  Home regimen: Toujeo 70 units at bedtime, Farxiga, metformin, glimepiride, Ozempic, recently added Mounjaro.  Hospital regimen: Increase Lantus to 70 units at bedtime, + insulin sliding scale  Modify carb-controlled diet to level 1  For to PCP for further management  Diabetic peripheral neuropathy (HCC)  Continue Lyrica  Anxiety and depression  Continue Zoloft  SCOTT (obstructive sleep apnea)  BiPAP when sleeping  Class 3 severe obesity with serious comorbidity and body mass index (BMI) of 45.0 to 49.9 in adult  Recommend lifestyle modification     Medical Problems       Resolved Problems  Date Reviewed: 4/9/2025   None       Discharging Physician / Practitioner: Galdino Orantes DO  PCP: Cesia Martinez DO  Admission Date:   Admission Orders (From admission, onward)       Ordered        05/08/25 1625  INPATIENT ADMISSION  Once            05/07/25 1755  Place in Observation  Once                          Discharge Date: 05/09/25    Consultations During Hospital Stay:  Cardiology, pulmonology    Procedures Performed:   none    Significant Findings / Test Results:   XR chest pa and lateral   Final Result by Chon Barnett MD (05/07 1753)      No acute cardiopulmonary disease.            Workstation performed: [id number]           Lab Results   Component Value Date    WBC 7.98 05/09/2025    HGB 15.1 05/09/2025    HCT 46.1 05/09/2025    MCV 83 05/09/2025     05/09/2025     Lab  "Results   Component Value Date    SODIUM 137 05/09/2025    K 3.6 05/09/2025     05/09/2025    CO2 27 05/09/2025    BUN 29 (H) 05/09/2025    CREATININE 0.94 05/09/2025    GLUC 182 (H) 05/09/2025    CALCIUM 8.8 05/09/2025         Incidental Findings:   See above   I reviewed the above mentioned incidental findings with the patient and/or family and they expressed understanding.    Test Results Pending at Discharge (will require follow up):   none     Outpatient Tests Requested:  BMP in one week    Complications:  none    Reason for Admission: SoB    Hospital Course:   Jey Vicente is a 60 y.o. male patient who originally presented to the hospital on 5/7/2025 due to shortness of breath, likely multifactorial in the setting of severe persistent asthma, and acute on chronic congestive heart failure.  Patient was started on nebulizers and respiratory treatment, as well as IV diuresis.  Patient quick clinical improvement with IV diuresis, which was continued and he showed further improvement in volume status and subjective symptoms.  He was seen by cardiology recommending increase of his home diuretics, follow-up with his outpatient cardiologist.  He did have an echocardiogram showing ejection fraction 75%, with severe aortic stenosis, will be referred to valvular clinic for review and evaluation of severe aortic stenosis.  Medically stable for discharge.          Please see above list of diagnoses and related plan for additional information.     Condition at Discharge: good    Discharge Day Visit / Exam:   Subjective:  patient feels much better, no dyspnea or SoB  Vitals: Blood Pressure: 108/62 (05/09/25 1051)  Pulse: 65 (05/09/25 1051)  Temperature: (!) 97.1 °F (36.2 °C) (05/09/25 1051)  Temp Source: Oral (05/08/25 1449)  Respirations: 16 (05/09/25 1051)  Height: 5' 9\" (175.3 cm) (05/08/25 0713)  Weight - Scale: (!) 149 kg (328 lb 7.8 oz) (05/09/25 0558)  SpO2: (!) 89 % (05/09/25 1051)  Physical Exam  Vitals and " nursing note reviewed.   Constitutional:       General: He is not in acute distress.     Appearance: He is well-developed.   HENT:      Head: Normocephalic and atraumatic.   Eyes:      Conjunctiva/sclera: Conjunctivae normal.   Cardiovascular:      Rate and Rhythm: Normal rate and regular rhythm.      Heart sounds: Murmur heard.   Pulmonary:      Effort: Pulmonary effort is normal. No respiratory distress.      Comments: Mildly reduced breath sounds bilaterally, do not appreciate wheezing  Abdominal:      Palpations: Abdomen is soft.      Tenderness: There is no abdominal tenderness.   Musculoskeletal:         General: No swelling.      Cervical back: Neck supple.      Comments: Trace edema lower extremities bilaterally, much improved from prior   Skin:     General: Skin is warm and dry.   Neurological:      Mental Status: He is alert.   Psychiatric:         Mood and Affect: Mood normal.          Discussion with Family: Patient declined call to .     Discharge instructions/Information to patient and family:   See after visit summary for information provided to patient and family.      Provisions for Follow-Up Care:  See after visit summary for information related to follow-up care and any pertinent home health orders.      Mobility at time of Discharge:   Basic Mobility Inpatient Raw Score: 24  JH-HLM Goal: 8: Walk 250 feet or more  JH-HLM Achieved: 7: Walk 25 feet or more  HLM Goal NOT achieved. Continue to encourage mobility in post discharge setting.     Disposition:   Home    Planned Readmission: no    Discharge Medications:  See after visit summary for reconciled discharge medications provided to patient and/or family.      Administrative Statements   Discharge Statement:  I have spent a total time of 45 minutes in caring for this patient on the day of the visit/encounter. >30 minutes of time was spent on: Diagnostic results, Impressions, Counseling / Coordination of care, Documenting in the  medical record, Reviewing / ordering tests, medicine, procedures  , and Communicating with other healthcare professionals .    **Please Note: This note may have been constructed using a voice recognition system**

## 2025-05-09 NOTE — CASE MANAGEMENT
Case Management Discharge Planning Note    Patient name Jey Vicente  Location /411-01 MRN [id number]  : 1965 Date 2025       Current Admission Date: 2025  Current Admission Diagnosis:SOB (shortness of breath)   Patient Active Problem List    Diagnosis Date Noted Date Diagnosed    SOB (shortness of breath) 2025     Severe persistent asthma without complication 2025     Diabetic peripheral neuropathy (HCC) 2025     Acute on chronic heart failure with preserved ejection fraction (HCC) 2025     Depression, recurrent (HCC) 2024     Severe persistent asthma with acute exacerbation 2024     Systemic lupus erythematosus, unspecified SLE type, unspecified organ involvement status ([facility]) 2023     Anxiety and depression 2023     Positive double stranded DNA antibody test 2022     Stage 2 chronic kidney disease 10/04/2022     Pulmonary nodules 2022     Periodic limb movement disorder (PLMD) 10/22/2021     Nonrheumatic aortic (valve) stenosis 2020     COPD with acute bronchitis  (HCC) 2020     Presbyopia 2020     COPD, severe (HCC) 2019     Chronic hypoxemic respiratory failure (HCC) 2019     Diastolic heart failure (HCC) 2019     Class 3 severe obesity with serious comorbidity and body mass index (BMI) of 45.0 to 49.9 in adult 2019     Mixed simple and mucopurulent chronic bronchitis (HCC) 2019     Myopia, bilateral 2019     Renovascular hypertension with goal blood pressure less than 140/90 2016     Type 2 diabetes mellitus with diabetic neuropathy, with long-term current use of insulin (HCC) 2015     SCOTT (obstructive sleep apnea) 10/08/2014     Mixed hyperlipidemia 2014     Essential hypertension 2014       LOS (days): 1  Geometric Mean LOS (GMLOS) (days): 3.9  Days to GMLOS:3.1     OBJECTIVE:  Risk of Unplanned Readmission Score: 16.12         Current admission  status: Inpatient   Preferred Pharmacy:   [facility] [place] - [address]  [address]  [place] [address]  Phone: [phone] Fax: [phone]    [facility], [place] - [address] [address]  Phone: [phone] Fax: [phone]    [facility] - [place], [place] - [address]  [address]  [address]  [facility] [address]  Phone: [phone] Fax: [phone]    Primary Care Provider: Cesia Martinez DO    [facility]  Secondary Insurance:     DISCHARGE DETAILS:  Pt is a probable dc home on this date with OP follow up after dc.

## 2025-05-09 NOTE — ASSESSMENT & PLAN NOTE
Multifactorial, most likely secondary to aortic stenosis and some volume overload  Patient not improved with outpatient treatment of asthma with steroid tapers and antibiotics  Likely more in part due to congestive heart failure  Patient is due with IV diuresis, weight and clinical volume status improved  Patient weaned to room air, stable, symptomatically improved

## 2025-05-09 NOTE — ASSESSMENT & PLAN NOTE
Continue with breathing treatments, is on Trelegy + PRN albuterol at home and receives monthly Tezspire injections.  Pulmonology consulting; we greatly appreciate their recommendations.  Continue his prior home inhalers at discharge  Patient to complete his previously prescribed steroid taper

## 2025-05-09 NOTE — PROGRESS NOTES
Progress Note - Cardiology   Jey Vicente 60 y.o. male MRN: [id number]  Unit/Bed#: 411-01 Encounter: [id number]  05/09/25  11:09 AM    Impression:     59-year-old with hypertension, diabetes, dyslipidemia, COPD with remote smoking, at baseline on furosemide 40 mg twice daily, spironolactone 50 mg daily, severe aortic stenosis, with admitted decreased functional capacity compared to 1 year ago, sporadic chest pains with exertion more recently, now admitted with acute on chronic heart failure with preserved ejection fraction manifested as shortness of breath, weight gain, lower extremity edema, orthopnea, likely exacerbated by recent treatment with steroids but also severe aortic stenosis being the predominant etiology.    Overall with diuresis, has done well, weight is down by about 6 kg since admission, renal function remained stable, he feels he is 90% better.     Plan:     Acute on chronic heart failure with preserved ejection fraction: This is from valvular heart disease,  Current echo continues to show preserved LV systolic function and by gradients/hemodynamic severe aortic stenosis, valve itself is hard to visualize.  No accurate input output charting, by weight, down by 6 kg since yesterday.  Advised him to walk and if not symptomatic or requiring oxygen, okay for discharge on furosemide 60 mg twice daily, he came in on 40 twice daily, BMP in 1 week and long-term follow-up with Dr. Ferrara  Will send a message for heart failure appointment and put an order for outpatient evaluation by cardiac surgery     Severe aortic stenosis: Now symptomatic, Will need referral for consideration for aortic valve replacement, can be done as an outpatient     Hypertension: Controlled     Diabetes and dyslipidemia: Needs better control of his diabetes, A1c of 9.3,  At baseline on atorvastatin 40 mg, continued, last calculated LDL/non-HDL of 54/110 in March 2025    "      ===================================================================    Chief Complaint:   Chief Complaint   Patient presents with    Chest Pain     Pt states that he started with chest pain and SOB about a month ago that got worse today. Pt has a hx of asthma and COPD. Complains of a productive cough and bringing up green mucus. Been on antibiotics for pneumonia.          Subjective/Objective     Subjective: Denies any complaints    Objective: No distress    Patient Active Problem List   Diagnosis    SCOTT (obstructive sleep apnea)    Diastolic heart failure (HCC)    Class 3 severe obesity with serious comorbidity and body mass index (BMI) of 45.0 to 49.9 in adult    Mixed simple and mucopurulent chronic bronchitis (HCC)    COPD, severe (HCC)    Chronic hypoxemic respiratory failure (HCC)    Mixed hyperlipidemia    Essential hypertension    Myopia, bilateral    Presbyopia    Renovascular hypertension with goal blood pressure less than 140/90    Type 2 diabetes mellitus with diabetic neuropathy, with long-term current use of insulin (HCC)    COPD with acute bronchitis  (HCC)    Nonrheumatic aortic (valve) stenosis    Periodic limb movement disorder (PLMD)    Pulmonary nodules    Stage 2 chronic kidney disease    Positive double stranded DNA antibody test    Anxiety and depression    Systemic lupus erythematosus, unspecified SLE type, unspecified organ involvement status (HCC)    Severe persistent asthma with acute exacerbation    Depression, recurrent (HCC)    Acute on chronic heart failure with preserved ejection fraction (HCC)    SOB (shortness of breath)    Severe persistent asthma without complication    Diabetic peripheral neuropathy (HCC)       Vitals: /62   Pulse 65   Temp (!) 97.1 °F (36.2 °C)   Resp 16   Ht 5' 9\" (1.753 m)   Wt (!) 149 kg (328 lb 7.8 oz)   SpO2 (!) 89%   BMI 48.51 kg/m²     I/O this shift:  In: 420 [P.O.:420]  Out: 700 [Urine:700]  Wt Readings from Last 3 Encounters: "   05/09/25 (!) 149 kg (328 lb 7.8 oz)   05/07/25 (!) 156 kg (345 lb)   04/30/25 (!) 153 kg (338 lb)       Intake/Output Summary (Last 24 hours) at 5/9/2025 1109  Last data filed at 5/9/2025 1051  Gross per 24 hour   Intake 1960 ml   Output 3675 ml   Net -1715 ml     I/O last 3 completed shifts:  In: 2760 [P.O.:2760]  Out: 2975 [Urine:2975]    Invasive Devices       Peripheral Intravenous Line  Duration             Peripheral IV 05/07/25 Right Antecubital 1 day                      Physical Exam:  GEN: Jey Vicente appears okay, alert and oriented x 3, pleasant and cooperative   HEENT: pupils equal, round, and reactive to light; extraocular muscles intact  NECK: supple, no carotid bruits or JVD  HEART: regular rhythm, normal S1 and S2, ejection systolic murmur, no clicks, gallops or rubs   LUNGS: clear to auscultation bilaterally; no wheezes or rhonchi, no rales  ABDOMEN/GI: normal bowel sounds, soft, no tenderness, no distention  EXTREMITIES/Musculoskeltal: peripheral pulses normal; no clubbing, cyanosis, minimal lower extremity edema  NEURO: no focal motor findings   SKIN: normal without suspicious lesions on exposed skin              Lab Results:       Results from last 7 days   Lab Units 05/09/25  0553 05/08/25  0440 05/07/25  1610   WBC Thousand/uL 7.98 8.30 9.81   HEMOGLOBIN g/dL 15.1 15.0 15.4   HEMATOCRIT % 46.1 45.4 45.9   PLATELETS Thousands/uL 217 207 233         Results from last 7 days   Lab Units 05/09/25  0553 05/08/25  0440 05/07/25  1610   POTASSIUM mmol/L 3.6 4.1 4.2   CHLORIDE mmol/L 100 100 99   CO2 mmol/L 27 27 24   BUN mg/dL 29* 26* 27*   CREATININE mg/dL 0.94 0.87 1.05   CALCIUM mg/dL 8.8 8.9 8.8   ALK PHOS U/L  --   --  66   ALT U/L  --   --  33   AST U/L  --   --  16     Results from last 7 days   Lab Units 05/07/25  1610   INR  0.93       Imaging: Results Review Statement: I personally reviewed the following image studies/reports in PACS and discussed pertinent findings with Radiology:  "Echocardiogram. My interpretation of the radiology images/reports is: Above.  EKG/Telemtry: No events    Scheduled Meds:  Current Facility-Administered Medications   Medication Dose Route Frequency Provider Last Rate    acetaminophen  650 mg Oral Q4H PRN Yasmani Juares MD      albuterol  2.5 mg Nebulization Q4H PRN Galdino Orantes DO      aspirin  81 mg Oral Daily Yasmani Juares MD      atorvastatin  40 mg Oral Daily Yasmani Juares MD      calcium carbonate  1,000 mg Oral BID PRN Yasmani Juares MD      carvedilol  12.5 mg Oral BID With Meals Yasmani Juares MD      famotidine  20 mg Oral BID Yasmani Juares MD      furosemide  100 mg Intravenous BID (diuretic) Yasmani Juares  mg (05/09/25 0846)    heparin (porcine)  7,500 Units Subcutaneous Q8H MADELINE Yasmani Juares MD      insulin glargine  70 Units Subcutaneous HS Tono Sanches DO      insulin lispro  2-12 Units Subcutaneous 4x Daily (AC & HS) Yasmani Juares MD      losartan  50 mg Oral HS Yasmani Juares MD      predniSONE  20 mg Oral Daily Lj Pickett PA-C      pregabalin  50 mg Oral TID Yasmani Juares MD      sertraline  50 mg Oral Daily Yasmani Juares MD      spironolactone  50 mg Oral QPM Yasmani Juares MD       Continuous Infusions:       VTE Pharmacologic Prophylaxis: Heparin    This note was completed in part utilizing [employer] fluency direct voice recognition software.   Grammatical errors, random word insertion, spelling mistakes, occasional wrong word or \"sound-alike\" substitutions and incomplete sentences may be an occasional consequence of the system secondary to software limitations, ambient noise and hardware issues. At the time of dictation, efforts were made to edit, clarify and /or correct errors.  Please read the chart carefully and recognize, using context, where substitutions have occurred.  If you have any questions or concerns about the context, text or " information contained within the body of this dictation, please contact myself, the provider, for further clarification.

## 2025-05-09 NOTE — ASSESSMENT & PLAN NOTE
Wt Readings from Last 3 Encounters:   05/09/25 (!) 149 kg (328 lb 7.8 oz)   05/07/25 (!) 156 kg (345 lb)   04/30/25 (!) 153 kg (338 lb)   Net negative volume; continue to monitor I/O  Repeat echo showed LVEF 75%, + severe aortic stenosis  Prescient cardiology input  Patient did well with IV diuresis  On discharge adjust home diuretics 60 mg twice daily  BMP in 1 week with PCP  Patient will need follow-up as outpatient cardiologist Dr. Ferrara  Cardiology to place referral to cardiovascular surgery for evaluation of severe aortic stenosis

## 2025-05-09 NOTE — PLAN OF CARE
Problem: PAIN - ADULT  Goal: Verbalizes/displays adequate comfort level or baseline comfort level  Description: Interventions:- Encourage patient to monitor pain and request assistance- Assess pain using appropriate pain scale- Administer analgesics based on type and severity of pain and evaluate response- Implement non-pharmacological measures as appropriate and evaluate response- Consider cultural and social influences on pain and pain management- Notify physician/advanced practitioner if interventions unsuccessful or patient reports new pain  Outcome: Progressing     Problem: INFECTION - ADULT  Goal: Absence or prevention of progression during hospitalization  Description: INTERVENTIONS:- Assess and monitor for signs and symptoms of infection- Monitor lab/diagnostic results- Monitor all insertion sites, i.e. indwelling lines, tubes, and drains- Monitor endotracheal if appropriate and nasal secretions for changes in amount and color- [place] appropriate cooling/warming therapies per order- Administer medications as ordered- Instruct and encourage patient and family to use good hand hygiene technique- Identify and instruct in appropriate isolation precautions for identified infection/condition  Outcome: Progressing     Problem: SAFETY ADULT  Goal: Patient will remain free of falls  Description: INTERVENTIONS:- Educate patient/family on patient safety including physical limitations- Instruct patient to call for assistance with activity - Consult OT/PT to assist with strengthening/mobility - Keep Call bell within reach- Keep bed low and locked with side rails adjusted as appropriate- Keep care items and personal belongings within reach- Initiate and maintain comfort rounds- Make Fall Risk Sign visible to staff- Offer Toileting every 2 Hours, in advance of need-Obtain necessary fall risk management equipment: nonskid footwear- Apply yellow socks and bracelet for high fall risk patients- Consider moving patient to  room near nurses station  Outcome: Progressing  Goal: Maintain or return to baseline ADL function  Description: INTERVENTIONS:-  Assess patient's ability to carry out ADLs; assess patient's baseline for ADL function and identify physical deficits which impact ability to perform ADLs (bathing, care of mouth/teeth, toileting, grooming, dressing, etc.)- Assess/evaluate cause of self-care deficits - Assess range of motion- Assess patient's mobility; develop plan if impaired- Assess patient's need for assistive devices and provide as appropriate- Encourage maximum independence but intervene and supervise when necessary- Involve family in performance of ADLs- Assess for home care needs following discharge - Consider OT consult to assist with ADL evaluation and planning for discharge- Provide patient education as appropriate  Outcome: Progressing  Goal: Maintains/Returns to pre admission functional level  Description: INTERVENTIONS:- Perform AM-PAC 6 Click Basic Mobility/ Daily Activity assessment daily.- Set and communicate daily mobility goal to care team and patient/family/caregiver. - Collaborate with rehabilitation services on mobility goals if consulted- Perform Range of Motion 3 times a day.- Reposition patient every 2 hours.- Dangle patient 3 times a day- Stand patient 3 times a day- Ambulate patient 3 times a day- Out of bed to chair 3 times a day - Out of bed for meals 3 times a day- Out of bed for toileting- Record patient progress and toleration of activity level   Outcome: Progressing     Problem: Knowledge Deficit  Goal: Patient/family/caregiver demonstrates understanding of disease process, treatment plan, medications, and discharge instructions  Description: Complete learning assessment and assess knowledge base.Interventions:- Provide teaching at level of understanding- Provide teaching via preferred learning methods  Outcome: Progressing     Problem: CARDIOVASCULAR - ADULT  Goal: Maintains optimal cardiac  output and hemodynamic stability  Description: INTERVENTIONS:- Monitor I/O, vital signs and rhythm- Monitor for S/S and trends of decreased cardiac output- Administer and titrate ordered vasoactive medications to optimize hemodynamic stability- Assess quality of pulses, skin color and temperature- Assess for signs of decreased coronary artery perfusion- Instruct patient to report change in severity of symptoms  Outcome: Progressing  Goal: Absence of cardiac dysrhythmias or at baseline rhythm  Description: INTERVENTIONS:- Continuous cardiac monitoring, vital signs, obtain 12 lead EKG if ordered- Administer antiarrhythmic and heart rate control medications as ordered- Monitor electrolytes and administer replacement therapy as ordered  Outcome: Progressing     Problem: METABOLIC, FLUID AND ELECTROLYTES - ADULT  Goal: Electrolytes maintained within normal limits  Description: INTERVENTIONS:- Monitor labs and assess patient for signs and symptoms of electrolyte imbalances- Administer electrolyte replacement as ordered- Monitor response to electrolyte replacements, including repeat lab results as appropriate- Instruct patient on fluid and nutrition as appropriate  Outcome: Progressing  Goal: Fluid balance maintained  Description: INTERVENTIONS:- Monitor labs - Monitor I/O and WT- Instruct patient on fluid and nutrition as appropriate- Assess for signs & symptoms of volume excess or deficit  Outcome: Progressing  Goal: Glucose maintained within target range  Description: INTERVENTIONS:- Monitor Blood Glucose as ordered- Assess for signs and symptoms of hyperglycemia and hypoglycemia- Administer ordered medications to maintain glucose within target range- Assess nutritional intake and initiate nutrition service referral as needed  Outcome: Progressing

## 2025-05-09 NOTE — TELEPHONE ENCOUNTER
"Hello,    The following message was sent via e-mail to the leadership team:     Please advise if you can help facilitate the following overbook request:    Patient Name: Melvin Vicente    Patient MRN: [id number]     Call back #:  [phone]     Insurance: [facility]    Department:Cardiology    Speciality: Heart Failure    Reason for overbook request: 24-72 HOUR HEART FAILURE HOSPITAL FOLLOW UP    Comments (Write \"N/a\" if no comments): Dorina from [facility] cardiology, Dr Wei's office called to schedule a hospital CHF post follow up with Dr Ferrara or an JAYLON within 72 hours of discharge per Dr Wei.  (Hospital 5/7/2025-present)  Please call Dorina back [phone]       Requested doctor and location: Dr Ferrara or JAYLON/[place] or  [place]    Date of current appointment: 6/12/2025      Thank you.    "

## 2025-05-09 NOTE — ASSESSMENT & PLAN NOTE
Lab Results   Component Value Date    HGBA1C 9.3 (H) 03/03/2025   FBG this AM: 249  Poorly controlled  Likely exacerbated by recent outpatient steroids.  Followed by endocrinology outpatient  Home regimen: Toujeo 70 units at bedtime, Farxiga, metformin, glimepiride, Ozempic, recently added Mounjaro.  Hospital regimen: Increase Lantus to 70 units at bedtime, + insulin sliding scale  Modify carb-controlled diet to level 1  For to PCP for further management

## 2025-05-10 LAB
BACTERIA SPT RESP CULT: ABNORMAL
BACTERIA SPT RESP CULT: ABNORMAL
GRAM STN SPEC: ABNORMAL

## 2025-05-12 ENCOUNTER — TRANSITIONAL CARE MANAGEMENT (OUTPATIENT)
Dept: FAMILY MEDICINE CLINIC | Facility: CLINIC | Age: 60
End: 2025-05-12

## 2025-05-13 ENCOUNTER — RESULTS FOLLOW-UP (OUTPATIENT)
Dept: PULMONOLOGY | Facility: CLINIC | Age: 60
End: 2025-05-13

## 2025-05-14 ENCOUNTER — TELEPHONE (OUTPATIENT)
Age: 60
End: 2025-05-14

## 2025-05-15 ENCOUNTER — TELEPHONE (OUTPATIENT)
Age: 60
End: 2025-05-15

## 2025-05-15 DIAGNOSIS — E11.40 TYPE 2 DIABETES MELLITUS WITH DIABETIC NEUROPATHY, WITH LONG-TERM CURRENT USE OF INSULIN (HCC): ICD-10-CM

## 2025-05-15 DIAGNOSIS — Z79.4 TYPE 2 DIABETES MELLITUS WITH DIABETIC NEUROPATHY, WITH LONG-TERM CURRENT USE OF INSULIN (HCC): ICD-10-CM

## 2025-05-15 RX ORDER — TIRZEPATIDE 5 MG/.5ML
5 INJECTION, SOLUTION SUBCUTANEOUS WEEKLY
Qty: 6 ML | Refills: 3 | Status: SHIPPED | OUTPATIENT
Start: 2025-05-15

## 2025-05-15 NOTE — TELEPHONE ENCOUNTER
Patient asking if he should stay on Mounjaro or go back on Ozempic. Patient states he feels fine with no side effects. Patient states he will need a new rx for 3 mth supply sent to [facility]. Please advise, thank you.

## 2025-05-16 ENCOUNTER — RESULTS FOLLOW-UP (OUTPATIENT)
Dept: FAMILY MEDICINE CLINIC | Facility: CLINIC | Age: 60
End: 2025-05-16

## 2025-05-16 ENCOUNTER — APPOINTMENT (OUTPATIENT)
Dept: LAB | Facility: MEDICAL CENTER | Age: 60
End: 2025-05-16
Attending: INTERNAL MEDICINE
Payer: COMMERCIAL

## 2025-05-16 DIAGNOSIS — E08.00 DIABETES MELLITUS DUE TO UNDERLYING CONDITION WITH HYPEROSMOLARITY WITHOUT COMA, UNSPECIFIED WHETHER LONG TERM INSULIN USE (HCC): ICD-10-CM

## 2025-05-16 DIAGNOSIS — I50.33 ACUTE ON CHRONIC HEART FAILURE WITH PRESERVED EJECTION FRACTION (HCC): ICD-10-CM

## 2025-05-16 LAB
ALBUMIN SERPL BCG-MCNC: 4 G/DL (ref 3.5–5)
ALP SERPL-CCNC: 78 U/L (ref 34–104)
ALT SERPL W P-5'-P-CCNC: 35 U/L (ref 7–52)
ANION GAP SERPL CALCULATED.3IONS-SCNC: 11 MMOL/L (ref 4–13)
AST SERPL W P-5'-P-CCNC: 18 U/L (ref 13–39)
BILIRUB SERPL-MCNC: 0.62 MG/DL (ref 0.2–1)
BUN SERPL-MCNC: 25 MG/DL (ref 5–25)
CALCIUM SERPL-MCNC: 8.8 MG/DL (ref 8.4–10.2)
CHLORIDE SERPL-SCNC: 95 MMOL/L (ref 96–108)
CO2 SERPL-SCNC: 30 MMOL/L (ref 21–32)
CREAT SERPL-MCNC: 1.23 MG/DL (ref 0.6–1.3)
GFR SERPL CREATININE-BSD FRML MDRD: 62 ML/MIN/1.73SQ M
GLUCOSE P FAST SERPL-MCNC: 235 MG/DL (ref 65–99)
POTASSIUM SERPL-SCNC: 3.9 MMOL/L (ref 3.5–5.3)
PROT SERPL-MCNC: 6.9 G/DL (ref 6.4–8.4)
SODIUM SERPL-SCNC: 136 MMOL/L (ref 135–147)

## 2025-05-16 PROCEDURE — 36415 COLL VENOUS BLD VENIPUNCTURE: CPT

## 2025-05-16 PROCEDURE — 80053 COMPREHEN METABOLIC PANEL: CPT

## 2025-05-19 ENCOUNTER — CONSULT (OUTPATIENT)
Dept: CARDIAC SURGERY | Facility: CLINIC | Age: 60
End: 2025-05-19
Payer: COMMERCIAL

## 2025-05-19 VITALS
SYSTOLIC BLOOD PRESSURE: 106 MMHG | DIASTOLIC BLOOD PRESSURE: 46 MMHG | WEIGHT: 315 LBS | HEIGHT: 69 IN | BODY MASS INDEX: 46.65 KG/M2 | HEART RATE: 74 BPM | OXYGEN SATURATION: 93 %

## 2025-05-19 DIAGNOSIS — G47.30 SLEEP APNEA TREATED WITH NOCTURNAL BIPAP: ICD-10-CM

## 2025-05-19 DIAGNOSIS — I50.32 CHRONIC DIASTOLIC HEART FAILURE (HCC): ICD-10-CM

## 2025-05-19 DIAGNOSIS — J96.11 CHRONIC HYPOXEMIC RESPIRATORY FAILURE (HCC): ICD-10-CM

## 2025-05-19 DIAGNOSIS — Z99.81 ON HOME OXYGEN THERAPY: ICD-10-CM

## 2025-05-19 DIAGNOSIS — Z13.6 ENCOUNTER FOR SCREENING FOR STENOSIS OF CAROTID ARTERY: ICD-10-CM

## 2025-05-19 DIAGNOSIS — R06.02 SOB (SHORTNESS OF BREATH): ICD-10-CM

## 2025-05-19 DIAGNOSIS — Z53.20 COLONOSCOPY REFUSED: ICD-10-CM

## 2025-05-19 DIAGNOSIS — I35.0 NONRHEUMATIC AORTIC (VALVE) STENOSIS: Primary | ICD-10-CM

## 2025-05-19 PROCEDURE — 99214 OFFICE O/P EST MOD 30 MIN: CPT | Performed by: THORACIC SURGERY (CARDIOTHORACIC VASCULAR SURGERY)

## 2025-05-19 NOTE — H&P (VIEW-ONLY)
Consultation - Cardiothoracic Surgery   Jey Vicente 60 y.o. male MRN: [id number]    Primary Cardiologist: Dr. Ferrara    Reason for Consult / Principal Problem: Aortic stenosis, Non-Rheumatic    History of Present Illness: Jey Vicente is a 60 y.o. year old male who was previously evaluated in our office May 2023 by Kentrell Barahona D.O. for transcatheter aortic valve replacement.  During this initial consultation, arrangements were made for the following studies to be completed: Gated CTA of the chest/abdomen/pelvis, 3D BROOKE, cardiac catheterization, dental clearance, pulmonary function tests with ABG, and carotid artery ultrasound. He was found to have moderate aortic stenosis that did not meet criteria for intervention. Continued surveillance and medical management was recommenced.  Patient has been experiencing worsening RUTHERFORD for a few months. He was recently treated by his pulmonologist for presumed acute bronchitis with antibiotics and steroids without improvement. He was seen in his pulmonologist office for a sick visit on 5/7/25 and found to be fluid overloaded, therefore referred to the hospital. He was admitted with acute on chronic CHF. He was diuresed and underwent repeat echo which demonstrated critical aortic stenosis: LOLIS 0.43 cm2, Vmax 6.44 m/s, Mean & peak gradients: 94/166 mmHg and DVI 0/13. Patient signed out AMA.     In review, patient's PMHx is notable for aortic stenosis, severe COPD/Asthma, SCOTT (nocturnal BiPAP w/ 5 L O2), chronic hypoxic resp failure, diastolic heart failure, DM2 (insulin) w/ neuropathy, HTN, HLD, CKD 2, SLE, morbid obesity (BMI 49.2) and pulmonary nodules.    Patient is accompanied by his wife and daughter today. He reports worsening RUTHERFORD, chest pressure/burning, lightheadedness, weight gain and occasional palpitations. He denies syncope, orthopnea or PND. She does developed some LE edema but tends to accumulate his fluid centrally.      Former heavy smoker, 3 ppd,  quit 2005. No alcohol or drug use.    FH- Dad s/p  valve replacement surgery in 20's or 30's ( details unknown)    Patient is disabled but does work for his local [employer].     Lives with wife in 2 story home, independent with ADL's and  drives.          Past Medical History:  Past Medical History:   Diagnosis Date    Acute on chronic diastolic heart failure (HCC) 01/28/2025    Aortic stenosis     Arthritis 1990    Both wrists    Asthma 1964    Chronic hypoxemic respiratory failure (HCC)     Chronic kidney disease 10/2022    COPD (chronic obstructive pulmonary disease) ([facility])     Coronary artery disease 2000    Aortic stenosis    Diabetes (HCC)     Diabetes mellitus (HCC)     Hyperlipidemia     Hypertension 07/02/2014    Lung disease Birth    Neuropathy in diabetes ([facility]) 1/2022    Nonrheumatic aortic (valve) stenosis     Pneumonia 2015    Sleep apnea, obstructive          Past Surgical History:   Past Surgical History:   Procedure Laterality Date    ACCESSORY NAVICULAR EXCISION Right     APPENDECTOMY      CARDIAC CATHETERIZATION N/A 05/25/2023    Procedure: Cardiac Coronary Angiogram;  Surgeon: Mohit Farris MD;  Location: [facility];  Service: Cardiology    EAR FOREIGN BODY REMOVAL      cyst removal. bronchospasms after general anesthesia.    EYE SURGERY Left     traumatic injury at age 9-10    TONSILLECTOMY  No         Family History:  Family History   Problem Relation Age of Onset    Cancer Mother         Skin Cancer    Heart disease Father     Lung cancer Father         Passed away 1997    Cancer Father         Lung cancer    Hypertension Father     ALS Maternal Grandmother     Diabetes Maternal Grandfather     Stomach cancer Maternal Grandfather     Arthritis Maternal Grandfather          Social History:    Social History     Substance and Sexual Activity   Alcohol Use Never     Social History     Substance and Sexual Activity   Drug Use Never     Tobacco Use History[1]    Home Medications:    Prior to Admission medications    Medication Sig Start Date End Date Taking? Authorizing Provider   albuterol (Ventolin HFA) 90 mcg/act inhaler Inhale 2 puffs every 6 (six) hours as needed for wheezing 4/21/22  Yes Lj Pickett PA-C   Alcohol Swabs (B-D SINGLE USE SWABS REGULAR) PADS  11/24/23  Yes Historical Provider, MD   aspirin (ECOTRIN LOW STRENGTH) 81 mg EC tablet Take 1 tablet (81 mg total) by mouth daily 8/15/22  Yes Balbir Ferrara, DO   atorvastatin (LIPITOR) 40 mg tablet TAKE 1 TABLET BY MOUTH EVERY DAY 3/5/25  Yes Balbir Ferrara, DO   carvedilol (COREG) 12.5 mg tablet TAKE ONE (1) TABLET BY MOUTH TWICE DAILY WITH MEALS 1/23/25  Yes Balbir Ferrara, DO   Continuous Glucose Sensor (FreeStyle Moe 3 Sensor) MISC Use 1 Units every 14 (fourteen) days   Yes Historical Provider, MD   cyclobenzaprine (FLEXERIL) 10 mg tablet Take 10 mg by mouth 2/6/17  Yes Historical Provider, MD   dapagliflozin (Farxiga) 10 MG tablet Take 1 tablet (10 mg total) by mouth daily 3/11/24  Yes David Johnson, DO   EPINEPHrine (EPIPEN) 0.3 mg/0.3 mL SOAJ Inject 0.3 mL (0.3 mg total) into a muscle once for 1 dose  Patient taking differently: Inject 0.3 mg into a muscle if needed 10/11/24 5/19/25 Yes Lj Pickett PA-C   fluticasone-umeclidinium-vilanterol (Trelegy Ellipta) 200-62.5-25 mcg/actuation AEPB inhaler Inhale 1 puff daily Rinse mouth after use. 11/15/24 11/10/25 Yes Lj Pickett PA-C   furosemide (LASIX) 20 mg tablet Take 3 tablets (60 mg total) by mouth 2 (two) times a day 5/9/25  Yes Galdino Orantes, DO   glimepiride (AMARYL) 4 mg tablet TAKE 1 TABLET BY MOUTH TWICE DAILY 3/11/24  Yes David Johnson, DO   insulin glargine (Toujeo Max SoloStar) 300 units/mL CONCENTRATED U-300 injection pen (2-unit dial) INJECT 70 UNITS SUBCUTANEOUSLY AT BEDTIME 12/2/24  Yes David Johnson,    ipratropium-albuterol (DUO-NEB) 0.5-2.5 mg/3 mL nebulizer solution INHALE CONTENTS OF 1 VIAL VIA NEBULIZER FOUR TIMES A DAY 12/2/24   Yes JAMES Chaudhary   losartan (COZAAR) 50 mg tablet TAKE 1 TABLET BY MOUTH DAILY 12/31/24  Yes Balbir Ferrara, DO   metFORMIN (GLUCOPHAGE) 1000 MG tablet Take 1 tablet (1,000 mg total) by mouth 2 (two) times a day with meals 3/11/24  Yes David Johnson, DO   Multiple Vitamins-Minerals (MENS MULTIVITAMIN PO) Take by mouth   Yes Historical Provider, MD   pregabalin (LYRICA) 50 mg capsule TAKE 1 CAPSULE BY MOUTH THREE TIMES DAILY 3/4/25  Yes David Johnson, DO   sertraline (ZOLOFT) 50 mg tablet TAKE 1 TABLET BY MOUTH DAILY 2/28/25  Yes Cesia Maritnez, DO   spironolactone (ALDACTONE) 50 mg tablet TAKE 1 TABLET BY MOUTH EVERY DAY 3/3/25  Yes Balbir Ferrara, DO   Tirzepatide (Mounjaro) 5 MG/0.5ML SOAJ Inject 5 mg under the skin once a week 5/15/25  Yes David Johnson, DO   Blood Glucose Monitoring Suppl (CONTOUR NEXT MONITOR) w/Device KIT Test blood sugar once daily or as needed for fluctuating blood sugars  Patient not taking: Reported on 4/9/2025 2/7/20   Barbara oFote PA-C   dextromethorphan-guaifenesin (MUCINEX DM)  MG per 12 hr tablet Take 1 tablet by mouth every 12 (twelve) hours 4/30/25   Lj Pickett PA-C   Insulin Pen Needle (BD Pen Needle Dee Dee U/F) 32G X 4 MM MISC USE TO INJECT INSULIN 3/11/24   David Johnson, DO   Insulin Pen Needle (BD Pen Needle Dee Dee U/F) 32G X 4 MM MISC USE FOUR TIMES A DAY 3/3/25   David Johnson, DO   predniSONE 10 mg tablet 4 tabs x 3 days decrease 1 tab every third day 4/11/25   JAMES Chaudhary   predniSONE 10 mg tablet Take 4 tablets daily for 3 days then 3 tablets daily for 3 days then 2 tablets daily for 3 days then 1 tablet daily for 3 days. 4/30/25   Lj Pickett PA-C   Tezepelumab-ekko (Tezspire) 210 MG/1.91ML SOAJ Inject 210 mg under the skin every 28 days  Patient not taking: Reported on 4/9/2025 10/11/24   Lj Pickett PA-C       Allergies:  No Known Allergies    Review of Systems:  Review of Systems - History obtained from chart  "review and the patient  General ROS: positive for  - fatigue and weight gain  negative for - chills or fever  Psychological ROS: negative  Ophthalmic ROS: positive for - uses glasses  ENT ROS: negative  Allergy and Immunology ROS: negative  Hematological and Lymphatic ROS: negative  Endocrine ROS: negative  Breast ROS: negative  Respiratory ROS: positive for - productive cough, shortness of breath  negative for - hemoptysis, orthopnea, or pleuritic pain  Cardiovascular ROS: positive for - chest pain, dyspnea on exertion, edema, murmur, palpitations, and shortness of breath  negative for - irregular heartbeat, loss of consciousness, orthopnea, paroxysmal nocturnal dyspnea, or rapid heart rate  Gastrointestinal ROS: no abdominal pain, change in bowel habits, or black or bloody stools  Genito-Urinary ROS: no dysuria, trouble voiding, or hematuria  Musculoskeletal ROS: negative  Neurological ROS: positive for - numbness/tingling and lightheadedness  Dermatological ROS: negative    Vital Signs:     Vitals:    05/19/25 0929 05/19/25 0932   BP: (!) 98/49 (!) 106/46   BP Location: Right arm Left arm   Patient Position: Sitting Sitting   Cuff Size: Large Large   Pulse: 74    SpO2: 93%    Weight: (!) 151 kg (333 lb 3.2 oz)    Height: 5' 9\" (1.753 m)        Physical Exam:    General: Alert, oriented, obese, no acute distress  HEENT/NECK:  PERRLA.  No jugular venous distention.    Cardiac:Regular rate and rhythm, III/VI systolic murmur.  Carotids: 1+ pulses, no bruits   Pulmonary:  Breath sounds clear bilaterally.  Abdomen:  Non-tender, Non-distended.  Positive bowel sounds.  Upper extremities: 2+ radial pulses; brisk capillary refill; LEFT hand dominant  Lower extremities: Extremities warm/dry.  PT/DP pulses 1+ bilaterally.  1+ edema B/L  Neuro: Alert and oriented X 3.  Sensation is grossly intact.  No focal deficits.  Musculoskeletal: MAEE, stable gait  Skin: Warm/Dry, without rashes or lesions.    Lab Results:       "   Results from last 7 days   Lab Units 25  1136   POTASSIUM mmol/L 3.9   CHLORIDE mmol/L 95*   CO2 mmol/L 30   BUN mg/dL 25   CREATININE mg/dL 1.23   CALCIUM mg/dL 8.8     Lab Results   Component Value Date    WBC 7.98 2025    HGB 15.1 2025    HCT 46.1 2025    MCV 83 2025     2025     Lab Results   Component Value Date    HGBA1C 9.3 (H) 2025     Lab Results   Component Value Date    CHOLESTEROL 141 2025    CHOLESTEROL 90 2023    CHOLESTEROL 94 2022     Lab Results   Component Value Date    HDL 31 (L) 2025    HDL 25 (L) 2023    HDL 22 (L) 2022     Lab Results   Component Value Date    TRIG 279 (H) 2025    TRIG 218 (H) 2023    TRIG 292 (H) 2022     Lab Results   Component Value Date    NONHDLC 72 2022    NONHDLC 80 2019     Lab Results   Component Value Date    BNP 63 2025        Imaging Studies:     EC23    Normal sinus rhythm with sinus arrhythmia  Nonspecific T wave abnormality    Echocardiogram: 25      Left Ventricle: Left ventricular cavity size is normal. Wall thickness is normal. The left ventricular ejection fraction is 75%. Systolic function is vigorous. Wall motion cannot be accurately assessed. Diastolic function is normal.  Left atrial filling pressure is normal.    Right Ventricle: Right ventricular cavity size is normal. Systolic function is normal.    Aortic Valve: The leaflets are moderately thickened. The leaflets are moderately calcified. There is severely reduced mobility. There is severe stenosis. The aortic valve peak velocity is 6.44 m/s. The aortic valve peak gradient is 166 mmHg. The aortic valve mean gradient is 94 mmHg. The dimensionless velocity index is 0.13. The aortic valve area is 0.43 cm2.    Tricuspid Valve: The right ventricular systolic pressure is severely elevated. The estimated right ventricular systolic pressure is 72.00 mmHg.    Study quality was  poor. This was a technically difficult study        Findings    Left Ventricle Left ventricular cavity size is normal. Wall thickness is normal. The left ventricular ejection fraction is 75%. Systolic function is vigorous. Wall motion cannot be accurately assessed. Diastolic function is normal.  Left atrial filling pressure is normal.   Right Ventricle Right ventricular cavity size is normal. Systolic function is normal. Wall thickness is normal.   Left Atrium The atrium is normal in size.   Right Atrium The atrium is normal in size.   Aortic Valve The aortic valve was not well visualized. The leaflets are moderately thickened. The leaflets are moderately calcified. There is severely reduced mobility. There is no evidence of regurgitation. There is severe stenosis. The aortic valve peak velocity is 6.44 m/s. The aortic valve peak gradient is 166 mmHg. The aortic valve mean gradient is 94 mmHg. The dimensionless velocity index is 0.13. The aortic valve area is 0.43 cm2.   Mitral Valve There is no evidence of regurgitation. There is no evidence of stenosis. The mitral valve has normal structure and normal function.   Tricuspid Valve Tricuspid valve structure is normal. There is trace regurgitation. There is no evidence of stenosis. The right ventricular systolic pressure is severely elevated. The estimated right ventricular systolic pressure is 72.00 mmHg.   Pulmonic Valve Pulmonic valve structure is normal. There is no evidence of regurgitation. There is no evidence of stenosis.   Ascending Aorta The aortic root is normal in size.   IVC/SVC The right atrial pressure is estimated at 15.0 mmHg. The inferior vena cava is dilated. Respirophasic changes were blunted (less than 50% variation).   Pericardium There is no pericardial effusion. The pericardium is normal in appearance.     Left Ventricle Measurements    Function/Volumes   LVOT stroke volume 67.58         LVOT stroke volume index 26.6 ml/m2         LVOT Cardiac  Output 4.89 l/min         LVOT Cardiac Index 1.91 l/min/m2         Dimensions   LVOT area 3.46 cm2         Diastolic Filling   MV E' Tissue Velocity Septal 9 cm/s         MV E' Tissue Velocity Lateral 7 cm/s         E/A ratio 0.92         E wave deceleration time 275 ms         MV Peak E Jorge A 101 cm/s         MV Peak A Jorge A 1.1 m/s          Report Measurements   AV LVOT peak gradient 3 mmHg              Interventricular Septum Measurements    Shunt Ratio   LVOT peak VTI 19.52 cm         LVOT peak jorge a 0.81 m/s              Right Ventricle Measurements    Dimensions   Tricuspid annular plane systolic excursion 3.7 cm               Atrial Septum Measurements    Shunt Ratio   LVOT peak VTI 19.52 cm         LVOT peak jorge a 0.81 m/s               Aortic Valve Measurements    Stenosis   Aortic valve peak velocity 6.44 m/s         LVOT peak jorge a 0.81 m/s         Ao .47 cm         LVOT peak VTI 19.52 cm         AV mean gradient 94 mmHg         LVOT mn grad 2 mmHg         AV peak gradient 166 mmHg         AV LVOT peak gradient 3 mmHg         Area/Dimensions   DVI 0.13         AV valve area 0.43 cm2         AV area by cont VTI 0.4 cm2         AV area peak jorge a 0.5 cm2         LVOT diameter 2.1 cm         LVOT area 3.46 cm2               Mitral Valve Measurements    Stenosis   MV stenosis pressure 1/2 time 80 ms         MV valve area p 1/2 method 2.75               Tricuspid Valve Measurements    RVSP Parameters   TR Peak Jorge A 3.8 m/s         Est. RA pres 15 mmHg         Triscuspid Valve Regurgitation Peak Gradient 57 mmHg         Right Ventricular Peak Systolic Pressure 72 mmHg               Aorta Measurements    Aortic Dimensions   Asc Ao 3.7 cm               IVC/SVC Measurements    IVC/SVC   Est. RA pres 15 mmHg              Gated CTA of the chest/abdomen/pelvis: 6/9/23     1. TAVR measurements: Suboptimal contrast opacification despite IV injection x2. Best possible measurements obtained.  Annulus: diameter 29.0 x 23.4  mm  area: 565.3 sq mm  Annulus to LCA: 14.4 mm  Annulus to RCA: 9.8 mm  Minimal diameter right iliofemoral segment: 7-8 mm  Minimal diameter left iliofemoral segment: 7-8 mm     2. Arterial contrast opacification is inadequate in the abdomen/pelvis to assess for significant stenosis. No suspected aortoiliac occlusive disease.     3. Near complete resolution of consolidative process in the right lower lobe. Small amount of groundglass opacity persists.        Cardiac catheterization: 5/25/23      Few focal luminal irregularities w/ no evidence for obstructive disease     Pulmonary function tests: 8/8/23     Results:  FEV1/FVC Ratio: 62%, FEV1 1.66 L / 47%, FVC 2.69 L / 59%     Lung volumes by body plethysmography: TLC 93%, %, DLCO 82%     Interpretation:  Spirometer shows a severe obstructive ventilatory defect  No significant response after administration of bronchodilator according to the ats standards  Increased lung volumes consistent with a severe air trapping  Normal diffusion capacity  Increased airway resistanc      Carotid artery ultrasound: 6/9/23    Impression  RIGHT:  There is <50% stenosis noted in the internal carotid artery. Plaque is  heterogenous and smooth.  Vertebral artery flow is antegrade. There is no significant subclavian artery  disease.  LEFT:  There is no evidence of arterial disease throughout the extracranial carotid  system.  Vertebral artery flow is antegrade. There is no significant subclavian artery  disease.         TAVR evaluation Assessment:     STS Risk Score: 4.22%    Aortic Stenosis Stage: D1    [facility]: III      Cardiology notes reviewed       Impression/Plan:    Jey Vicente has symptomatic critical aortic stenosis.   The risks, benefits, and alternatives to TAVR were discussed in detail with the patient today. He understands and wishes to proceed with pre-op TAVR testing.  He will need to repeat cardiac cath, CTA c/a/p, carotid duplex and obtain dental clearance. He  will return for follow-up when testing completed.      Jey Vicente and his family were comfortable with our recommendations, and their questions were answered to their satisfaction.       SIGNATURE: JAMES Callhaan  DATE: May 19, 2025  TIME: 9:34 AM         [1]   Social History  Tobacco Use   Smoking Status Former    Current packs/day: 0.00    Average packs/day: 3.0 packs/day for 30.0 years (90.1 ttl pk-yrs)    Types: Cigarettes    Start date: 3/15/1984    Quit date: 7/15/2005    Years since quittin.8   Smokeless Tobacco Never

## 2025-05-19 NOTE — PROGRESS NOTES
Consultation - Cardiothoracic Surgery   Jey Vicente 60 y.o. male MRN: [id number]    Primary Cardiologist: Dr. Ferrara    Reason for Consult / Principal Problem: Aortic stenosis, Non-Rheumatic    History of Present Illness: Jey Vicente is a 60 y.o. year old male who was previously evaluated in our office May 2023 by Kentrell Barahona D.O. for transcatheter aortic valve replacement.  During this initial consultation, arrangements were made for the following studies to be completed: Gated CTA of the chest/abdomen/pelvis, 3D BROOKE, cardiac catheterization, dental clearance, pulmonary function tests with ABG, and carotid artery ultrasound. He was found to have moderate aortic stenosis that did not meet criteria for intervention. Continued surveillance and medical management was recommenced.  Patient has been experiencing worsening RUTHERFORD for a few months. He was recently treated by his pulmonologist for presumed acute bronchitis with antibiotics and steroids without improvement. He was seen in his pulmonologist office for a sick visit on 5/7/25 and found to be fluid overloaded, therefore referred to the hospital. He was admitted with acute on chronic CHF. He was diuresed and underwent repeat echo which demonstrated critical aortic stenosis: LOLIS 0.43 cm2, Vmax 6.44 m/s, Mean & peak gradients: 94/166 mmHg and DVI 0/13. Patient signed out AMA.     In review, patient's PMHx is notable for aortic stenosis, severe COPD/Asthma, SCOTT (nocturnal BiPAP w/ 5 L O2), chronic hypoxic resp failure, diastolic heart failure, DM2 (insulin) w/ neuropathy, HTN, HLD, CKD 2, SLE, morbid obesity (BMI 49.2) and pulmonary nodules.    Patient is accompanied by his wife and daughter today. He reports worsening RUTHERFORD, chest pressure/burning, lightheadedness, weight gain and occasional palpitations. He denies syncope, orthopnea or PND. She does developed some LE edema but tends to accumulate his fluid centrally.      Former heavy smoker, 3 ppd,  quit 2005. No alcohol or drug use.    FH- Dad s/p  valve replacement surgery in 20's or 30's ( details unknown)    Patient is disabled but does work for his local [place].     Lives with wife in 2 story home, independent with ADL's and  drives.          Past Medical History:  Past Medical History:   Diagnosis Date    Acute on chronic diastolic heart failure (HCC) 01/28/2025    Aortic stenosis     Arthritis 1990    Both wrists    Asthma 1964    Chronic hypoxemic respiratory failure (HCC)     Chronic kidney disease 10/2022    COPD (chronic obstructive pulmonary disease) ([facility])     Coronary artery disease 2000    Aortic stenosis    Diabetes (HCC)     Diabetes mellitus (HCC)     Hyperlipidemia     Hypertension 07/02/2014    Lung disease Birth    Neuropathy in diabetes ([facility]) 1/2022    Nonrheumatic aortic (valve) stenosis     Pneumonia 2015    Sleep apnea, obstructive          Past Surgical History:   Past Surgical History:   Procedure Laterality Date    ACCESSORY NAVICULAR EXCISION Right     APPENDECTOMY      CARDIAC CATHETERIZATION N/A 05/25/2023    Procedure: Cardiac Coronary Angiogram;  Surgeon: Mohit Farris MD;  Location: [facility];  Service: Cardiology    EAR FOREIGN BODY REMOVAL      cyst removal. bronchospasms after general anesthesia.    EYE SURGERY Left     traumatic injury at age 9-10    TONSILLECTOMY  No         Family History:  Family History   Problem Relation Age of Onset    Cancer Mother         Skin Cancer    Heart disease Father     Lung cancer Father         Passed away 1997    Cancer Father         Lung cancer    Hypertension Father     ALS Maternal Grandmother     Diabetes Maternal Grandfather     Stomach cancer Maternal Grandfather     Arthritis Maternal Grandfather          Social History:    Social History     Substance and Sexual Activity   Alcohol Use Never     Social History     Substance and Sexual Activity   Drug Use Never     Tobacco Use History[1]    Home Medications:    Prior to Admission medications    Medication Sig Start Date End Date Taking? Authorizing Provider   albuterol (Ventolin HFA) 90 mcg/act inhaler Inhale 2 puffs every 6 (six) hours as needed for wheezing 4/21/22  Yes Lj Pickett PA-C   Alcohol Swabs (B-D SINGLE USE SWABS REGULAR) PADS  11/24/23  Yes Historical Provider, MD   aspirin (ECOTRIN LOW STRENGTH) 81 mg EC tablet Take 1 tablet (81 mg total) by mouth daily 8/15/22  Yes Balbir Ferrara, DO   atorvastatin (LIPITOR) 40 mg tablet TAKE 1 TABLET BY MOUTH EVERY DAY 3/5/25  Yes Balbir Ferrara, DO   carvedilol (COREG) 12.5 mg tablet TAKE ONE (1) TABLET BY MOUTH TWICE DAILY WITH MEALS 1/23/25  Yes Balbir Ferrara, DO   Continuous Glucose Sensor (FreeStyle Moe 3 Sensor) MISC Use 1 Units every 14 (fourteen) days   Yes Historical Provider, MD   cyclobenzaprine (FLEXERIL) 10 mg tablet Take 10 mg by mouth 2/6/17  Yes Historical Provider, MD   dapagliflozin (Farxiga) 10 MG tablet Take 1 tablet (10 mg total) by mouth daily 3/11/24  Yes David Johnson, DO   EPINEPHrine (EPIPEN) 0.3 mg/0.3 mL SOAJ Inject 0.3 mL (0.3 mg total) into a muscle once for 1 dose  Patient taking differently: Inject 0.3 mg into a muscle if needed 10/11/24 5/19/25 Yes Lj Pickett PA-C   fluticasone-umeclidinium-vilanterol (Trelegy Ellipta) 200-62.5-25 mcg/actuation AEPB inhaler Inhale 1 puff daily Rinse mouth after use. 11/15/24 11/10/25 Yes Lj Pickett PA-C   furosemide (LASIX) 20 mg tablet Take 3 tablets (60 mg total) by mouth 2 (two) times a day 5/9/25  Yes Galdino Orantes, DO   glimepiride (AMARYL) 4 mg tablet TAKE 1 TABLET BY MOUTH TWICE DAILY 3/11/24  Yes David Johnson, DO   insulin glargine (Toujeo Max SoloStar) 300 units/mL CONCENTRATED U-300 injection pen (2-unit dial) INJECT 70 UNITS SUBCUTANEOUSLY AT BEDTIME 12/2/24  Yes David Johnson,    ipratropium-albuterol (DUO-NEB) 0.5-2.5 mg/3 mL nebulizer solution INHALE CONTENTS OF 1 VIAL VIA NEBULIZER FOUR TIMES A DAY 12/2/24   Yes JAMES Chaudhary   losartan (COZAAR) 50 mg tablet TAKE 1 TABLET BY MOUTH DAILY 12/31/24  Yes Balbir Ferrara, DO   metFORMIN (GLUCOPHAGE) 1000 MG tablet Take 1 tablet (1,000 mg total) by mouth 2 (two) times a day with meals 3/11/24  Yes David Johnson, DO   Multiple Vitamins-Minerals (MENS MULTIVITAMIN PO) Take by mouth   Yes Historical Provider, MD   pregabalin (LYRICA) 50 mg capsule TAKE 1 CAPSULE BY MOUTH THREE TIMES DAILY 3/4/25  Yes David Johnson, DO   sertraline (ZOLOFT) 50 mg tablet TAKE 1 TABLET BY MOUTH DAILY 2/28/25  Yes Cesia Martinez, DO   spironolactone (ALDACTONE) 50 mg tablet TAKE 1 TABLET BY MOUTH EVERY DAY 3/3/25  Yes Balbir Ferrara, DO   Tirzepatide (Mounjaro) 5 MG/0.5ML SOAJ Inject 5 mg under the skin once a week 5/15/25  Yes David Johnson, DO   Blood Glucose Monitoring Suppl (CONTOUR NEXT MONITOR) w/Device KIT Test blood sugar once daily or as needed for fluctuating blood sugars  Patient not taking: Reported on 4/9/2025 2/7/20   Barbara Foote PA-C   dextromethorphan-guaifenesin (MUCINEX DM)  MG per 12 hr tablet Take 1 tablet by mouth every 12 (twelve) hours 4/30/25   Lj Pickett PA-C   Insulin Pen Needle (BD Pen Needle Dee Dee U/F) 32G X 4 MM MISC USE TO INJECT INSULIN 3/11/24   David Johnson, DO   Insulin Pen Needle (BD Pen Needle Dee Dee U/F) 32G X 4 MM MISC USE FOUR TIMES A DAY 3/3/25   David Johnson, DO   predniSONE 10 mg tablet 4 tabs x 3 days decrease 1 tab every third day 4/11/25   JAMES Chaudhary   predniSONE 10 mg tablet Take 4 tablets daily for 3 days then 3 tablets daily for 3 days then 2 tablets daily for 3 days then 1 tablet daily for 3 days. 4/30/25   Lj Pickett PA-C   Tezepelumab-ekko (Tezspire) 210 MG/1.91ML SOAJ Inject 210 mg under the skin every 28 days  Patient not taking: Reported on 4/9/2025 10/11/24   Lj Pickett PA-C       Allergies:  No Known Allergies    Review of Systems:  Review of Systems - History obtained from chart  "review and the patient  General ROS: positive for  - fatigue and weight gain  negative for - chills or fever  Psychological ROS: negative  Ophthalmic ROS: positive for - uses glasses  ENT ROS: negative  Allergy and Immunology ROS: negative  Hematological and Lymphatic ROS: negative  Endocrine ROS: negative  Breast ROS: negative  Respiratory ROS: positive for - productive cough, shortness of breath  negative for - hemoptysis, orthopnea, or pleuritic pain  Cardiovascular ROS: positive for - chest pain, dyspnea on exertion, edema, murmur, palpitations, and shortness of breath  negative for - irregular heartbeat, loss of consciousness, orthopnea, paroxysmal nocturnal dyspnea, or rapid heart rate  Gastrointestinal ROS: no abdominal pain, change in bowel habits, or black or bloody stools  Genito-Urinary ROS: no dysuria, trouble voiding, or hematuria  Musculoskeletal ROS: negative  Neurological ROS: positive for - numbness/tingling and lightheadedness  Dermatological ROS: negative    Vital Signs:     Vitals:    05/19/25 0929 05/19/25 0932   BP: (!) 98/49 (!) 106/46   BP Location: Right arm Left arm   Patient Position: Sitting Sitting   Cuff Size: Large Large   Pulse: 74    SpO2: 93%    Weight: (!) 151 kg (333 lb 3.2 oz)    Height: 5' 9\" (1.753 m)        Physical Exam:    General: Alert, oriented, obese, no acute distress  HEENT/NECK:  PERRLA.  No jugular venous distention.    Cardiac:Regular rate and rhythm, III/VI systolic murmur.  Carotids: 1+ pulses, no bruits   Pulmonary:  Breath sounds clear bilaterally.  Abdomen:  Non-tender, Non-distended.  Positive bowel sounds.  Upper extremities: 2+ radial pulses; brisk capillary refill; LEFT hand dominant  Lower extremities: Extremities warm/dry.  PT/DP pulses 1+ bilaterally.  1+ edema B/L  Neuro: Alert and oriented X 3.  Sensation is grossly intact.  No focal deficits.  Musculoskeletal: MAEE, stable gait  Skin: Warm/Dry, without rashes or lesions.    Lab Results:       "   Results from last 7 days   Lab Units 25  1136   POTASSIUM mmol/L 3.9   CHLORIDE mmol/L 95*   CO2 mmol/L 30   BUN mg/dL 25   CREATININE mg/dL 1.23   CALCIUM mg/dL 8.8     Lab Results   Component Value Date    WBC 7.98 2025    HGB 15.1 2025    HCT 46.1 2025    MCV 83 2025     2025     Lab Results   Component Value Date    HGBA1C 9.3 (H) 2025     Lab Results   Component Value Date    CHOLESTEROL 141 2025    CHOLESTEROL 90 2023    CHOLESTEROL 94 2022     Lab Results   Component Value Date    HDL 31 (L) 2025    HDL 25 (L) 2023    HDL 22 (L) 2022     Lab Results   Component Value Date    TRIG 279 (H) 2025    TRIG 218 (H) 2023    TRIG 292 (H) 2022     Lab Results   Component Value Date    NONHDLC 72 2022    NONHDLC 80 2019     Lab Results   Component Value Date    BNP 63 2025        Imaging Studies:     EC23    Normal sinus rhythm with sinus arrhythmia  Nonspecific T wave abnormality    Echocardiogram: 25      Left Ventricle: Left ventricular cavity size is normal. Wall thickness is normal. The left ventricular ejection fraction is 75%. Systolic function is vigorous. Wall motion cannot be accurately assessed. Diastolic function is normal.  Left atrial filling pressure is normal.    Right Ventricle: Right ventricular cavity size is normal. Systolic function is normal.    Aortic Valve: The leaflets are moderately thickened. The leaflets are moderately calcified. There is severely reduced mobility. There is severe stenosis. The aortic valve peak velocity is 6.44 m/s. The aortic valve peak gradient is 166 mmHg. The aortic valve mean gradient is 94 mmHg. The dimensionless velocity index is 0.13. The aortic valve area is 0.43 cm2.    Tricuspid Valve: The right ventricular systolic pressure is severely elevated. The estimated right ventricular systolic pressure is 72.00 mmHg.    Study quality was  poor. This was a technically difficult study        Findings    Left Ventricle Left ventricular cavity size is normal. Wall thickness is normal. The left ventricular ejection fraction is 75%. Systolic function is vigorous. Wall motion cannot be accurately assessed. Diastolic function is normal.  Left atrial filling pressure is normal.   Right Ventricle Right ventricular cavity size is normal. Systolic function is normal. Wall thickness is normal.   Left Atrium The atrium is normal in size.   Right Atrium The atrium is normal in size.   Aortic Valve The aortic valve was not well visualized. The leaflets are moderately thickened. The leaflets are moderately calcified. There is severely reduced mobility. There is no evidence of regurgitation. There is severe stenosis. The aortic valve peak velocity is 6.44 m/s. The aortic valve peak gradient is 166 mmHg. The aortic valve mean gradient is 94 mmHg. The dimensionless velocity index is 0.13. The aortic valve area is 0.43 cm2.   Mitral Valve There is no evidence of regurgitation. There is no evidence of stenosis. The mitral valve has normal structure and normal function.   Tricuspid Valve Tricuspid valve structure is normal. There is trace regurgitation. There is no evidence of stenosis. The right ventricular systolic pressure is severely elevated. The estimated right ventricular systolic pressure is 72.00 mmHg.   Pulmonic Valve Pulmonic valve structure is normal. There is no evidence of regurgitation. There is no evidence of stenosis.   Ascending Aorta The aortic root is normal in size.   IVC/SVC The right atrial pressure is estimated at 15.0 mmHg. The inferior vena cava is dilated. Respirophasic changes were blunted (less than 50% variation).   Pericardium There is no pericardial effusion. The pericardium is normal in appearance.     Left Ventricle Measurements    Function/Volumes   LVOT stroke volume 67.58         LVOT stroke volume index 26.6 ml/m2         LVOT Cardiac  Output 4.89 l/min         LVOT Cardiac Index 1.91 l/min/m2         Dimensions   LVOT area 3.46 cm2         Diastolic Filling   MV E' Tissue Velocity Septal 9 cm/s         MV E' Tissue Velocity Lateral 7 cm/s         E/A ratio 0.92         E wave deceleration time 275 ms         MV Peak E Jorge A 101 cm/s         MV Peak A Jorge A 1.1 m/s          Report Measurements   AV LVOT peak gradient 3 mmHg              Interventricular Septum Measurements    Shunt Ratio   LVOT peak VTI 19.52 cm         LVOT peak jorge a 0.81 m/s              Right Ventricle Measurements    Dimensions   Tricuspid annular plane systolic excursion 3.7 cm               Atrial Septum Measurements    Shunt Ratio   LVOT peak VTI 19.52 cm         LVOT peak jorge a 0.81 m/s               Aortic Valve Measurements    Stenosis   Aortic valve peak velocity 6.44 m/s         LVOT peak jorge a 0.81 m/s         Ao .47 cm         LVOT peak VTI 19.52 cm         AV mean gradient 94 mmHg         LVOT mn grad 2 mmHg         AV peak gradient 166 mmHg         AV LVOT peak gradient 3 mmHg         Area/Dimensions   DVI 0.13         AV valve area 0.43 cm2         AV area by cont VTI 0.4 cm2         AV area peak jorge a 0.5 cm2         LVOT diameter 2.1 cm         LVOT area 3.46 cm2               Mitral Valve Measurements    Stenosis   MV stenosis pressure 1/2 time 80 ms         MV valve area p 1/2 method 2.75               Tricuspid Valve Measurements    RVSP Parameters   TR Peak Jorge A 3.8 m/s         Est. RA pres 15 mmHg         Triscuspid Valve Regurgitation Peak Gradient 57 mmHg         Right Ventricular Peak Systolic Pressure 72 mmHg               Aorta Measurements    Aortic Dimensions   Asc Ao 3.7 cm               IVC/SVC Measurements    IVC/SVC   Est. RA pres 15 mmHg              Gated CTA of the chest/abdomen/pelvis: 6/9/23     1. TAVR measurements: Suboptimal contrast opacification despite IV injection x2. Best possible measurements obtained.  Annulus: diameter 29.0 x 23.4  mm  area: 565.3 sq mm  Annulus to LCA: 14.4 mm  Annulus to RCA: 9.8 mm  Minimal diameter right iliofemoral segment: 7-8 mm  Minimal diameter left iliofemoral segment: 7-8 mm     2. Arterial contrast opacification is inadequate in the abdomen/pelvis to assess for significant stenosis. No suspected aortoiliac occlusive disease.     3. Near complete resolution of consolidative process in the right lower lobe. Small amount of groundglass opacity persists.        Cardiac catheterization: 5/25/23      Few focal luminal irregularities w/ no evidence for obstructive disease     Pulmonary function tests: 8/8/23     Results:  FEV1/FVC Ratio: 62%, FEV1 1.66 L / 47%, FVC 2.69 L / 59%     Lung volumes by body plethysmography: TLC 93%, %, DLCO 82%     Interpretation:  Spirometer shows a severe obstructive ventilatory defect  No significant response after administration of bronchodilator according to the ats standards  Increased lung volumes consistent with a severe air trapping  Normal diffusion capacity  Increased airway resistanc      Carotid artery ultrasound: 6/9/23    Impression  RIGHT:  There is <50% stenosis noted in the internal carotid artery. Plaque is  heterogenous and smooth.  Vertebral artery flow is antegrade. There is no significant subclavian artery  disease.  LEFT:  There is no evidence of arterial disease throughout the extracranial carotid  system.  Vertebral artery flow is antegrade. There is no significant subclavian artery  disease.         TAVR evaluation Assessment:     STS Risk Score: 4.22%    Aortic Stenosis Stage: D1    [facility]: III      Cardiology notes reviewed       Impression/Plan:    Jey Vicente has symptomatic critical aortic stenosis.   The risks, benefits, and alternatives to TAVR were discussed in detail with the patient today. He understands and wishes to proceed with pre-op TAVR testing.  He will need to repeat cardiac cath, CTA c/a/p, carotid duplex and obtain dental clearance. He  will return for follow-up when testing completed.      Jey Vicente and his family were comfortable with our recommendations, and their questions were answered to their satisfaction.       SIGNATURE: JAMES Callahan  DATE: May 19, 2025  TIME: 9:34 AM         [1]   Social History  Tobacco Use   Smoking Status Former    Current packs/day: 0.00    Average packs/day: 3.0 packs/day for 30.0 years (90.1 ttl pk-yrs)    Types: Cigarettes    Start date: 3/15/1984    Quit date: 7/15/2005    Years since quittin.8   Smokeless Tobacco Never

## 2025-05-20 ENCOUNTER — PREP FOR PROCEDURE (OUTPATIENT)
Dept: NON INVASIVE DIAGNOSTICS | Facility: HOSPITAL | Age: 60
End: 2025-05-20

## 2025-05-20 ENCOUNTER — TELEPHONE (OUTPATIENT)
Dept: CARDIOLOGY CLINIC | Facility: CLINIC | Age: 60
End: 2025-05-20

## 2025-05-20 ENCOUNTER — OFFICE VISIT (OUTPATIENT)
Dept: FAMILY MEDICINE CLINIC | Facility: CLINIC | Age: 60
End: 2025-05-20
Payer: COMMERCIAL

## 2025-05-20 VITALS
TEMPERATURE: 97.2 F | SYSTOLIC BLOOD PRESSURE: 128 MMHG | BODY MASS INDEX: 46.65 KG/M2 | WEIGHT: 315 LBS | RESPIRATION RATE: 18 BRPM | HEIGHT: 69 IN | DIASTOLIC BLOOD PRESSURE: 80 MMHG

## 2025-05-20 DIAGNOSIS — R06.02 SOB (SHORTNESS OF BREATH): ICD-10-CM

## 2025-05-20 DIAGNOSIS — E11.40 TYPE 2 DIABETES MELLITUS WITH DIABETIC NEUROPATHY, WITH LONG-TERM CURRENT USE OF INSULIN (HCC): ICD-10-CM

## 2025-05-20 DIAGNOSIS — Z79.4 TYPE 2 DIABETES MELLITUS WITH DIABETIC NEUROPATHY, WITH LONG-TERM CURRENT USE OF INSULIN (HCC): ICD-10-CM

## 2025-05-20 DIAGNOSIS — I35.0 AORTIC VALVE STENOSIS, ETIOLOGY OF CARDIAC VALVE DISEASE UNSPECIFIED: Primary | ICD-10-CM

## 2025-05-20 DIAGNOSIS — J44.9 COPD, SEVERE (HCC): ICD-10-CM

## 2025-05-20 DIAGNOSIS — I35.0 NONRHEUMATIC AORTIC VALVE STENOSIS: Primary | ICD-10-CM

## 2025-05-20 DIAGNOSIS — I35.0 NONRHEUMATIC AORTIC (VALVE) STENOSIS: Primary | ICD-10-CM

## 2025-05-20 DIAGNOSIS — E66.01 MORBID OBESITY WITH BMI OF 50.0-59.9, ADULT (HCC): ICD-10-CM

## 2025-05-20 PROBLEM — E66.813 CLASS 3 SEVERE OBESITY WITH SERIOUS COMORBIDITY AND BODY MASS INDEX (BMI) OF 45.0 TO 49.9 IN ADULT: Status: RESOLVED | Noted: 2019-08-20 | Resolved: 2025-05-20

## 2025-05-20 PROCEDURE — 99495 TRANSJ CARE MGMT MOD F2F 14D: CPT | Performed by: INTERNAL MEDICINE

## 2025-05-20 NOTE — ASSESSMENT & PLAN NOTE
Pt feels breathing is better than it was in the hospital and he is trying to balance water weight

## 2025-05-20 NOTE — ASSESSMENT & PLAN NOTE
Lab Results   Component Value Date    HGBA1C 9.3 (H) 03/03/2025   Pt Mounjaro was increased last week and he is working on lowering carb intake Reached out to endocrine to update Dr Johnson about procedure upcoming so he can monitor/optimize BS control prior to surgery

## 2025-05-20 NOTE — TELEPHONE ENCOUNTER
Patient scheduled for   R+LHC  at [facility] on 6/10/2025  with Dr Farris.  Dr Farris can you please place the prep for procedure for this patient cath.  Thank you.    Patient aware of general instructions, labs test required.   Meds holds:   SPIRONOLACTONE: Hold the morning of the procedure.     LASIX: Hold the morning of the procedure.     GLIMEPIRIDE: Hold morning of the procedure.     METFORMIN: Hold morning of the procedure.     FARXIGA: Hold  4 days prior to the procedure. LAST DOSE ON 06/05/2025 AS PRESCRIBED.    TOUJEO: TAKE your regular dose as you normally would either the evening before your procedure or morning of your procedure.     MOUNJARO: hold day’s before your procedure including the day of the procedure. LAST DOSE ON 06/02/2025 AS PRESCRIBED.

## 2025-05-20 NOTE — ASSESSMENT & PLAN NOTE
Pt encouraged to follow lo sodium diet and perform daily weights If his weight increases more than 3 pound in one day He should notify cardio  He has CT and Carotid scheduled and was told today cath is being scheduled

## 2025-05-20 NOTE — ASSESSMENT & PLAN NOTE
Pt is agreeable to proceed with TAVR He has testing upcoming as well as Cardiology followup  Endocrine sent message to update since last A1c was elevated and pt is monitoring his weigh and compliant with current med regimen

## 2025-05-20 NOTE — TELEPHONE ENCOUNTER
----- Message from Maeve MARK sent at 5/19/2025 12:52 PM EDT -----  Regarding: RE: Cath  Theresa this patient had previous cath on 05/2023 by Dr Farris, usually we have to continue schedule the patient with the provider who performed the previous cath.Please advised.Thank you.  ----- Message -----  From: Theresa Acevedo  Sent: 5/19/2025  11:30 AM EDT  To: Ira Stevenson RN; Jhonna Sandoval; Trinidad Wei#  Subject: Cath                                             Please schedule patient for: Pre TAVR Cardiac Cath: to be scheduled in the next few weeks. Patient has [employer] as primary insurance and had recent bloodwork.Please schedule with either Dr. Jorge, Dr. Madden, Dr. Clark or Dr. Rivera To be done at: [facility] To be done by: Please address any questions regarding this request to Theresa Styles or Ira Stevenson, Thank you.

## 2025-05-20 NOTE — PROGRESS NOTES
Transition of Care Visit:  Name: Jey Vicente      : 1965      MRN: [id number]  Encounter Provider: Cesia Martinez DO  Encounter Date: 2025   Encounter department: [facility]    Assessment & Plan  SOB (shortness of breath)  Pt encouraged to follow lo sodium diet and perform daily weights If his weight increases more than 3 pound in one day He should notify cardio  He has CT and Carotid scheduled and was told today cath is being scheduled        Type 2 diabetes mellitus with diabetic neuropathy, with long-term current use of insulin (HCC)    Lab Results   Component Value Date    HGBA1C 9.3 (H) 2025   Pt Mounjaro was increased last week and he is working on lowering carb intake Reached out to endocrine to update Dr Johnson about procedure upcoming so he can monitor/optimize BS control prior to surgery         COPD, severe (HCC)  Pt feels breathing is better than it was in the hospital and he is trying to balance water weight        Morbid obesity with BMI of 50.0-59.9, adult (HCC)  Mounjaro was increased last week by endocrine Encouraged pt to limit snacks and carbs to facilitate further weight loss          Nonrheumatic aortic (valve) stenosis  Pt is agreeable to proceed with TAVR He has testing upcoming as well as Cardiology followup  Endocrine sent message to update since last A1c was elevated and pt is monitoring his weigh and compliant with current med regimen             History of Present Illness   Pt recently admitted and left AMA for admission for sob found to be caused by underlying severe AS He saw CT surgeon this week and he has been scheduling for testing prep for TAVR Pt is agreeable to procedure and is hoping symptoms will improve He states BS recently have been 200 or slightly higher but Mounjaro dose increased last week by endocrine since A1c elevated No chest pain He is trying to improve diet choices and weight has been stable since home as he does daily check for  weight Pt did lose some water weight in the hospital No dizziness or syncope Lower leg edema is less than it was     Transitional Care Management Review:   Jey Vicente is a 60 y.o. male here for TCM follow up.     During the TCM phone call patient stated:  TCM Call (since 5/6/2025)       Date and time call was made  5/12/2025 11:48 AM    Hospital care reviewed  Records reviewed    Patient was hospitialized at  [facility]    Date of Admission  05/07/25    Date of discharge  05/09/25    Diagnosis  shortness of breath    Disposition  Home  self care    Were the patients medications reviewed and updated  No    Current Symptoms  None          TCM Call (since 5/6/2025)       Post hospital issues  Reduced activity    Scheduled for follow up?  Yes    Did you obtain your prescribed medications  Yes    Do you need help managing your prescriptions or medications  No    Is transportation to your appointment needed  No    I have advised the patient to call PCP with any new or worsening symptoms  Lauro Baker,     Are you recieving home care services  No          HPIsee above  Review of Systems   Constitutional:  Negative for chills and fever.   HENT: Negative.     Eyes:  Negative for visual disturbance.   Respiratory:  Positive for shortness of breath. Negative for cough and wheezing.    Cardiovascular:  Negative for chest pain, palpitations and leg swelling.   Gastrointestinal:  Negative for abdominal distention and abdominal pain.   Genitourinary: Negative.    Neurological:  Negative for dizziness, light-headedness and headaches.     Past Medical History:   Diagnosis Date   • Acute on chronic diastolic heart failure (HCC) 01/28/2025   • Aortic stenosis    • Arthritis 1990    Both wrists   • Asthma 1964   • Chronic hypoxemic respiratory failure (HCC)    • Chronic kidney disease 10/2022   • Class 3 severe obesity with serious comorbidity and body mass index (BMI) of 45.0 to 49.9 in adult 08/20/2019    • COPD (chronic obstructive pulmonary disease) ([facility])    • Coronary artery disease 2000    Aortic stenosis   • Diabetes ([facility])    • Diabetes mellitus ([facility])    • Hyperlipidemia    • Hypertension 2014   • Lung disease Birth   • Neuropathy in diabetes ([facility]) 2022   • Nonrheumatic aortic (valve) stenosis    • Pneumonia    • Sleep apnea, obstructive      Past Surgical History:   Procedure Laterality Date   • ACCESSORY NAVICULAR EXCISION Right    • APPENDECTOMY     • CARDIAC CATHETERIZATION N/A 2023    Procedure: Cardiac Coronary Angiogram;  Surgeon: Mohit Farris MD;  Location: [facility];  Service: Cardiology   • EAR FOREIGN BODY REMOVAL      cyst removal. bronchospasms after general anesthesia.   • EYE SURGERY Left     traumatic injury at age 9-10: proptosis- eye returned to socket.     Social History     Socioeconomic History   • Marital status: /Civil Union     Spouse name: Not on file   • Number of children: Not on file   • Years of education: Not on file   • Highest education level: Not on file   Occupational History   • Not on file   Tobacco Use   • Smoking status: Former     Current packs/day: 0.00     Average packs/day: 3.0 packs/day for 30.0 years (90.1 ttl pk-yrs)     Types: Cigarettes     Start date: 3/15/1984     Quit date: 7/15/2005     Years since quittin.8   • Smokeless tobacco: Never   Vaping Use   • Vaping status: Never Used   Substance and Sexual Activity   • Alcohol use: Never   • Drug use: Never   • Sexual activity: Yes     Partners: Female     Birth control/protection: None   Other Topics Concern   • Not on file   Social History Narrative   • Not on file     Social Drivers of Health     Financial Resource Strain: Patient Declined (2023)    Overall Financial Resource Strain (CARDIA)    • Difficulty of Paying Living Expenses: Patient declined   Food Insecurity: No Food Insecurity (2025)    Nursing - Inadequate Food Risk Classification    • Worried About  "Running Out of Food in the Last Year: Sometimes true    • Ran Out of Food in the Last Year: Sometimes true    • Ran Out of Food in the Last Year: Never true   Transportation Needs: No Transportation Needs (2025)    Nursing - Transportation Risk Classification    • Lack of Transportation: Not on file    • Lack of Transportation: No   Physical Activity: Not on file   Stress: Not on file   Social Connections: Unknown (2024)    Received from [employer]    Social Connections    • How often do you feel lonely or isolated from those around you? (Adult - for ages 18 years and over): Not on file   Intimate Partner Violence: Unknown (2025)    Nursing IPS    • Feels Physically and Emotionally Safe: Not on file    • Physically Hurt by Someone: Not on file    • Humiliated or Emotionally Abused by Someone: Not on file    • Physically Hurt by Someone: No    • Hurt or Threatened by Someone: No   Housing Stability: Unknown (2025)    Nursing: Inadequate Housing Risk Classification    • Has Housing: Not on file    • Worried About Losing Housing: Not on file    • Unable to Get Utilities: Not on file    • Unable to Pay for Housing in the Last Year: No    • Has Housin     No Known Allergies    Objective   /80   Temp (!) 97.2 °F (36.2 °C)   Resp 18   Ht 5' 9\" (1.753 m)   Wt (!) 156 kg (344 lb)   BMI 50.80 kg/m²     Physical Exam  Vitals and nursing note reviewed.   Constitutional:       General: He is not in acute distress.     Appearance: Normal appearance. He is not ill-appearing, toxic-appearing or diaphoretic.   HENT:      Head: Normocephalic and atraumatic.      Right Ear: External ear normal.      Left Ear: External ear normal.      Nose: Nose normal.      Mouth/Throat:      Mouth: Mucous membranes are moist.     Eyes:      General: No scleral icterus.     Extraocular Movements: Extraocular movements intact.      Conjunctiva/sclera: Conjunctivae normal.      Pupils: Pupils are equal, round, and " reactive to light.       Cardiovascular:      Rate and Rhythm: Normal rate and regular rhythm.      Pulses: Normal pulses.      Heart sounds: Normal heart sounds.   Pulmonary:      Effort: Pulmonary effort is normal. No respiratory distress.      Breath sounds: Normal breath sounds. No wheezing.   Abdominal:      General: There is no distension.      Palpations: Abdomen is soft.     Musculoskeletal:      Cervical back: Normal range of motion and neck supple.      Right lower leg: No edema.      Left lower leg: No edema.   Lymphadenopathy:      Cervical: No cervical adenopathy.     Skin:     General: Skin is warm and dry.      Coloration: Skin is not jaundiced.     Neurological:      General: No focal deficit present.      Mental Status: He is alert and oriented to person, place, and time. Mental status is at baseline.      Cranial Nerves: No cranial nerve deficit.     Psychiatric:         Mood and Affect: Mood normal.         Behavior: Behavior normal.         Thought Content: Thought content normal.         Judgment: Judgment normal.     Medications have been reviewed by provider in current encounter

## 2025-05-29 ENCOUNTER — NURSE TRIAGE (OUTPATIENT)
Age: 60
End: 2025-05-29

## 2025-05-29 DIAGNOSIS — J45.50 SEVERE PERSISTENT ASTHMA WITHOUT COMPLICATION: Primary | ICD-10-CM

## 2025-05-29 RX ORDER — TEZEPELUMAB-EKKO 210 MG/1.9ML
210 INJECTION, SOLUTION SUBCUTANEOUS
Qty: 1.91 ML | Refills: 11 | Status: SHIPPED | OUTPATIENT
Start: 2025-05-29

## 2025-05-29 NOTE — TELEPHONE ENCOUNTER
Called patient to discuss. I suspect his ongoing symptoms are multifactorial but thankfully his asthma does not sound like he is in exacerbation. I offered reassurance that he had an infectious workup that was found to be negative when his symptoms first started. Given lack of change in said symptoms I don't think it is infectious now. I recommend that we stay the course with his regular prescription pulmonary regimen. If symptoms worsen please let me know. Otherwise continue work up with cardiology.     On a different note, he told me he now got approval through his insurance to restart Tezspire. Azalea, I'm only looping you in to make sure you're aware. I re-sent a script to [facility] specialty pharmacy.

## 2025-05-29 NOTE — TELEPHONE ENCOUNTER
"REASON FOR CONVERSATION: Cough    SYMPTOMS: Pt calling in with continued cough ongoing since May 7. Pt states he is having nasal drainage, post nasal drip, productive cough-coughing up green/yellow sputum, wheezing, lightheadedness with coughing spells, & fatigue. Pt states he having SOB with exertion not worsened. Pt states wheezing goes away after he is done coughing.  Pt denies fever, chest pain, dizziness. Pt using Neb tx 3-4 times a day, prescribed inhalers, & Zyrtec. Pt able to communicate in clear sentences while on phone. Pt checked Spo2 while on phone and states 92% and P 72. Pt has cardiac cath on 6/10/25.     OTHER HEALTH INFORMATION: Aortic Stenosis, Asthma, COPD    PROTOCOL DISPOSITION: Callback From Office Today (overriding Go to Office Now/Urgent Care), Home Care    CARE ADVICE PROVIDED: Call office back in you have worsening SOB, fever, any new symptoms, or he becomes worse. OTC cough medicines, OTC cough drops, stay hydrated, drink warm fluids.     PRACTICE FOLLOW-UP: Call back from provider today        Reason for Disposition   Cough with cold symptoms (e.g., runny nose, postnasal drip, throat clearing)   Wheezing is present    Answer Assessment - Initial Assessment Questions  1. ONSET: \"When did the cough begin?\"       May 7   2. SEVERITY: \"How bad is the cough today?\"       Moderate   3. SPUTUM: \"Describe the color of your sputum\" (e.g., none, dry cough; clear, white, yellow, green)      Green, Yellow   4. HEMOPTYSIS: \"Are you coughing up any blood?\" If Yes, ask: \"How much?\" (e.g., flecks, streaks, tablespoons, etc.)      NO   5. DIFFICULTY BREATHING: \"Are you having difficulty breathing?\" If Yes, ask: \"How bad is it?\" (e.g., mild, moderate, severe)       NO   6. FEVER: \"Do you have a fever?\" If Yes, ask: \"What is your temperature, how was it measured, and when did it start?\"      NO   7. CARDIAC HISTORY: \"Do you have any history of heart disease?\" (e.g., heart attack, congestive heart failure) " "      Aortic Stenosis  8. LUNG HISTORY: \"Do you have any history of lung disease?\"  (e.g., pulmonary embolus, asthma, emphysema)      Asthma, COPD   9. PE RISK FACTORS: \"Do you have a history of blood clots?\" (or: recent major surgery, recent prolonged travel, bedridden)      NO   10. OTHER SYMPTOMS: \"Do you have any other symptoms?\" (e.g., runny nose, wheezing, chest pain)        Wheezing, Nasal drainage,Post nasal drip    Protocols used: Cough-Adult-[place]    "

## 2025-06-03 ENCOUNTER — HOSPITAL ENCOUNTER (OUTPATIENT)
Dept: NON INVASIVE DIAGNOSTICS | Facility: HOSPITAL | Age: 60
Discharge: HOME | End: 2025-06-03
Attending: NURSE PRACTITIONER
Payer: COMMERCIAL

## 2025-06-03 ENCOUNTER — HOSPITAL ENCOUNTER (OUTPATIENT)
Dept: RADIOLOGY | Facility: HOSPITAL | Age: 60
Discharge: HOME | End: 2025-06-03
Attending: NURSE PRACTITIONER
Payer: COMMERCIAL

## 2025-06-03 DIAGNOSIS — R06.02 SOB (SHORTNESS OF BREATH): ICD-10-CM

## 2025-06-03 DIAGNOSIS — I50.32 CHRONIC DIASTOLIC HEART FAILURE (HCC): ICD-10-CM

## 2025-06-03 DIAGNOSIS — I35.0 NONRHEUMATIC AORTIC (VALVE) STENOSIS: ICD-10-CM

## 2025-06-03 DIAGNOSIS — G47.30 SLEEP APNEA TREATED WITH NOCTURNAL BIPAP: ICD-10-CM

## 2025-06-03 DIAGNOSIS — J96.11 CHRONIC HYPOXEMIC RESPIRATORY FAILURE (HCC): ICD-10-CM

## 2025-06-03 DIAGNOSIS — Z99.81 ON HOME OXYGEN THERAPY: ICD-10-CM

## 2025-06-03 DIAGNOSIS — Z13.6 ENCOUNTER FOR SCREENING FOR STENOSIS OF CAROTID ARTERY: ICD-10-CM

## 2025-06-03 PROCEDURE — 75572 CT HRT W/3D IMAGE: CPT

## 2025-06-03 PROCEDURE — 74174 CTA ABD&PLVS W/CONTRAST: CPT

## 2025-06-03 PROCEDURE — 93880 EXTRACRANIAL BILAT STUDY: CPT | Performed by: SURGERY

## 2025-06-03 PROCEDURE — 93880 EXTRACRANIAL BILAT STUDY: CPT

## 2025-06-03 RX ADMIN — IOHEXOL 100 ML: 350 INJECTION, SOLUTION INTRAVENOUS at 10:33

## 2025-06-03 RX ADMIN — IOHEXOL 100 ML: 350 INJECTION, SOLUTION INTRAVENOUS at 10:12

## 2025-06-05 ENCOUNTER — APPOINTMENT (OUTPATIENT)
Dept: LAB | Facility: MEDICAL CENTER | Age: 60
End: 2025-06-05
Attending: INTERNAL MEDICINE
Payer: COMMERCIAL

## 2025-06-05 DIAGNOSIS — I35.0 AORTIC VALVE STENOSIS, ETIOLOGY OF CARDIAC VALVE DISEASE UNSPECIFIED: ICD-10-CM

## 2025-06-05 LAB
BASOPHILS # BLD AUTO: 0.06 THOUSANDS/ÂΜL (ref 0–0.1)
BASOPHILS NFR BLD AUTO: 1 % (ref 0–1)
EOSINOPHIL # BLD AUTO: 0.21 THOUSAND/ÂΜL (ref 0–0.61)
EOSINOPHIL NFR BLD AUTO: 3 % (ref 0–6)
ERYTHROCYTE [DISTWIDTH] IN BLOOD BY AUTOMATED COUNT: 14.7 % (ref 11.6–15.1)
HCT VFR BLD AUTO: 44.6 % (ref 36.5–49.3)
HGB BLD-MCNC: 15.2 G/DL (ref 12–17)
IMM GRANULOCYTES # BLD AUTO: 0.1 THOUSAND/UL (ref 0–0.2)
IMM GRANULOCYTES NFR BLD AUTO: 1 % (ref 0–2)
LYMPHOCYTES # BLD AUTO: 1.13 THOUSANDS/ÂΜL (ref 0.6–4.47)
LYMPHOCYTES NFR BLD AUTO: 13 % (ref 14–44)
MCH RBC QN AUTO: 27.9 PG (ref 26.8–34.3)
MCHC RBC AUTO-ENTMCNC: 34.1 G/DL (ref 31.4–37.4)
MCV RBC AUTO: 82 FL (ref 82–98)
MONOCYTES # BLD AUTO: 0.93 THOUSAND/ÂΜL (ref 0.17–1.22)
MONOCYTES NFR BLD AUTO: 11 % (ref 4–12)
NEUTROPHILS # BLD AUTO: 6.12 THOUSANDS/ÂΜL (ref 1.85–7.62)
NEUTS SEG NFR BLD AUTO: 71 % (ref 43–75)
NRBC BLD AUTO-RTO: 0 /100 WBCS
PLATELET # BLD AUTO: 258 THOUSANDS/UL (ref 149–390)
PMV BLD AUTO: 11 FL (ref 8.9–12.7)
RBC # BLD AUTO: 5.45 MILLION/UL (ref 3.88–5.62)
WBC # BLD AUTO: 8.55 THOUSAND/UL (ref 4.31–10.16)

## 2025-06-05 PROCEDURE — 36415 COLL VENOUS BLD VENIPUNCTURE: CPT

## 2025-06-05 PROCEDURE — 85025 COMPLETE CBC W/AUTO DIFF WBC: CPT

## 2025-06-10 ENCOUNTER — HOSPITAL ENCOUNTER (OUTPATIENT)
Facility: HOSPITAL | Age: 60
Setting detail: OUTPATIENT SURGERY
Discharge: HOME | End: 2025-06-10
Attending: INTERNAL MEDICINE | Admitting: INTERNAL MEDICINE
Payer: COMMERCIAL

## 2025-06-10 VITALS
BODY MASS INDEX: 46.65 KG/M2 | SYSTOLIC BLOOD PRESSURE: 108 MMHG | OXYGEN SATURATION: 91 % | HEART RATE: 76 BPM | RESPIRATION RATE: 17 BRPM | DIASTOLIC BLOOD PRESSURE: 59 MMHG | WEIGHT: 315 LBS | HEIGHT: 69 IN | TEMPERATURE: 97.2 F

## 2025-06-10 DIAGNOSIS — I35.0 NONRHEUMATIC AORTIC VALVE STENOSIS: ICD-10-CM

## 2025-06-10 PROBLEM — Z98.890 S/P CARDIAC CATH: Status: ACTIVE | Noted: 2025-06-10

## 2025-06-10 LAB
ATRIAL RATE: 77 BPM
P AXIS: 65 DEGREES
PR INTERVAL: 170 MS
QRS AXIS: 1 DEGREES
QRSD INTERVAL: 92 MS
QT INTERVAL: 412 MS
QTC INTERVAL: 467 MS
T WAVE AXIS: 111 DEGREES
VENTRICULAR RATE: 77 BPM

## 2025-06-10 PROCEDURE — C1769 GUIDE WIRE: HCPCS | Performed by: INTERNAL MEDICINE

## 2025-06-10 PROCEDURE — C1887 CATHETER, GUIDING: HCPCS | Performed by: INTERNAL MEDICINE

## 2025-06-10 PROCEDURE — 93010 ELECTROCARDIOGRAM REPORT: CPT | Performed by: INTERNAL MEDICINE

## 2025-06-10 PROCEDURE — 99153 MOD SED SAME PHYS/QHP EA: CPT | Performed by: INTERNAL MEDICINE

## 2025-06-10 PROCEDURE — 99152 MOD SED SAME PHYS/QHP 5/>YRS: CPT | Performed by: INTERNAL MEDICINE

## 2025-06-10 PROCEDURE — 93005 ELECTROCARDIOGRAM TRACING: CPT

## 2025-06-10 PROCEDURE — C1894 INTRO/SHEATH, NON-LASER: HCPCS | Performed by: INTERNAL MEDICINE

## 2025-06-10 PROCEDURE — 93454 CORONARY ARTERY ANGIO S&I: CPT | Performed by: INTERNAL MEDICINE

## 2025-06-10 RX ORDER — NITROGLYCERIN 20 MG/100ML
INJECTION INTRAVENOUS CODE/TRAUMA/SEDATION MEDICATION
Status: DISCONTINUED | OUTPATIENT
Start: 2025-06-10 | End: 2025-06-10 | Stop reason: HOSPADM

## 2025-06-10 RX ORDER — HEPARIN SODIUM 1000 [USP'U]/ML
INJECTION, SOLUTION INTRAVENOUS; SUBCUTANEOUS CODE/TRAUMA/SEDATION MEDICATION
Status: DISCONTINUED | OUTPATIENT
Start: 2025-06-10 | End: 2025-06-10 | Stop reason: HOSPADM

## 2025-06-10 RX ORDER — SODIUM CHLORIDE 9 MG/ML
125 INJECTION, SOLUTION INTRAVENOUS CONTINUOUS
Status: DISCONTINUED | OUTPATIENT
Start: 2025-06-10 | End: 2025-06-10 | Stop reason: HOSPADM

## 2025-06-10 RX ORDER — LIDOCAINE HYDROCHLORIDE 10 MG/ML
INJECTION, SOLUTION EPIDURAL; INFILTRATION; INTRACAUDAL; PERINEURAL CODE/TRAUMA/SEDATION MEDICATION
Status: DISCONTINUED | OUTPATIENT
Start: 2025-06-10 | End: 2025-06-10 | Stop reason: HOSPADM

## 2025-06-10 RX ORDER — MIDAZOLAM HYDROCHLORIDE 2 MG/2ML
INJECTION, SOLUTION INTRAMUSCULAR; INTRAVENOUS CODE/TRAUMA/SEDATION MEDICATION
Status: DISCONTINUED | OUTPATIENT
Start: 2025-06-10 | End: 2025-06-10 | Stop reason: HOSPADM

## 2025-06-10 RX ORDER — ACETAMINOPHEN 325 MG/1
975 TABLET ORAL ONCE AS NEEDED
Status: DISCONTINUED | OUTPATIENT
Start: 2025-06-10 | End: 2025-06-10 | Stop reason: HOSPADM

## 2025-06-10 RX ORDER — FENTANYL CITRATE 50 UG/ML
INJECTION, SOLUTION INTRAMUSCULAR; INTRAVENOUS CODE/TRAUMA/SEDATION MEDICATION
Status: DISCONTINUED | OUTPATIENT
Start: 2025-06-10 | End: 2025-06-10 | Stop reason: HOSPADM

## 2025-06-10 RX ORDER — ONDANSETRON 2 MG/ML
4 INJECTION INTRAMUSCULAR; INTRAVENOUS ONCE AS NEEDED
Status: DISCONTINUED | OUTPATIENT
Start: 2025-06-10 | End: 2025-06-10 | Stop reason: HOSPADM

## 2025-06-10 RX ORDER — ASPIRIN 81 MG/1
324 TABLET, CHEWABLE ORAL ONCE
Status: COMPLETED | OUTPATIENT
Start: 2025-06-10 | End: 2025-06-10

## 2025-06-10 RX ADMIN — ASPIRIN 81 MG CHEWABLE TABLET 324 MG: 81 TABLET CHEWABLE at 07:57

## 2025-06-10 NOTE — DISCHARGE INSTR - AVS FIRST PAGE
1. Please see the post cardiac catheterization dishcarge instructions.   No heavy lifting, greater than 10 lbs. or strenuous  activity for 48 hrs.    2.Remove band aid tomorrow.  Shower and wash area- wrist gently with soap and water- beginning tomorrow. Rinse and pat dry.  Apply new water seal band aid.  Repeat this process for 5 days. No powders, creams lotions or antibiotic ointments  for 5 days.  No tub baths, hot tubs or swimming for 5 days.     3. Please call our office ([phone]) if you have any fever, redness, swelling, discharge from your wrist access site.    4.No driving for 1 day

## 2025-06-10 NOTE — INTERVAL H&P NOTE
Patient seen and examined at bedside.    Vitals:    06/10/25 0753   BP: 114/59   Pulse: 79   Resp: 17   Temp: (!) 96.8 °F (36 °C)   SpO2: 93%       Lab Results   Component Value Date    WBC 8.55 06/05/2025    HGB 15.2 06/05/2025    HCT 44.6 06/05/2025    MCV 82 06/05/2025     06/05/2025       Lab Results   Component Value Date    SODIUM 136 05/16/2025    K 3.9 05/16/2025    CL 95 (L) 05/16/2025    CO2 30 05/16/2025    BUN 25 05/16/2025    CREATININE 1.23 05/16/2025    GLUC 182 (H) 05/09/2025    CALCIUM 8.8 05/16/2025         Brief overview of procedure discussed with patient.  Indication, risk, benefits and alternatives discussed with patient who verbalized understanding.  All questions addressed fully.  Patient elected to proceed with planned procedure, consent completed.    Patient remains clinically stable. TAVR work up    We will proceed with planned procedure.    Carlos Vargas 06/10/25 8:11 AM

## 2025-06-19 ENCOUNTER — OFFICE VISIT (OUTPATIENT)
Dept: CARDIOLOGY CLINIC | Facility: CLINIC | Age: 60
End: 2025-06-19
Payer: COMMERCIAL

## 2025-06-19 VITALS
BODY MASS INDEX: 49.44 KG/M2 | SYSTOLIC BLOOD PRESSURE: 134 MMHG | HEART RATE: 76 BPM | HEIGHT: 67 IN | OXYGEN SATURATION: 96 % | DIASTOLIC BLOOD PRESSURE: 76 MMHG | WEIGHT: 315 LBS | RESPIRATION RATE: 22 BRPM

## 2025-06-19 DIAGNOSIS — I50.33 ACUTE ON CHRONIC DIASTOLIC HEART FAILURE (HCC): ICD-10-CM

## 2025-06-19 DIAGNOSIS — I35.0 NONRHEUMATIC AORTIC (VALVE) STENOSIS: Primary | ICD-10-CM

## 2025-06-19 DIAGNOSIS — I10 ESSENTIAL HYPERTENSION: ICD-10-CM

## 2025-06-19 PROCEDURE — 99214 OFFICE O/P EST MOD 30 MIN: CPT | Performed by: NURSE PRACTITIONER

## 2025-06-19 RX ORDER — POTASSIUM CHLORIDE 1500 MG/1
20 TABLET, EXTENDED RELEASE ORAL DAILY
Qty: 30 TABLET | Refills: 3 | Status: SHIPPED | OUTPATIENT
Start: 2025-06-19

## 2025-06-19 RX ORDER — FUROSEMIDE 40 MG/1
40 TABLET ORAL DAILY
Qty: 30 TABLET | Refills: 3 | Status: SHIPPED | OUTPATIENT
Start: 2025-06-19 | End: 2025-06-27 | Stop reason: SDUPTHER

## 2025-06-19 RX ORDER — POTASSIUM CHLORIDE 1500 MG/1
20 TABLET, EXTENDED RELEASE ORAL DAILY
Qty: 30 TABLET | Refills: 3 | Status: SHIPPED | OUTPATIENT
Start: 2025-06-19 | End: 2025-06-19

## 2025-06-19 RX ORDER — FUROSEMIDE 40 MG/1
40 TABLET ORAL DAILY
Qty: 30 TABLET | Refills: 3 | Status: SHIPPED | OUTPATIENT
Start: 2025-06-19 | End: 2025-06-19

## 2025-06-19 RX ORDER — FUROSEMIDE 20 MG/1
60 TABLET ORAL 2 TIMES DAILY
Qty: 180 TABLET | Refills: 0 | Status: CANCELLED | OUTPATIENT
Start: 2025-06-19

## 2025-06-19 NOTE — PATIENT INSTRUCTIONS
Weigh yourself tomorrow--if weight increases by 2-3 lbs overnight or 5 lbs in one week, use lasix.  If you do this more than once per week, let us know

## 2025-06-19 NOTE — ASSESSMENT & PLAN NOTE
Wt Readings from Last 3 Encounters:   06/19/25 (!) 154 kg (340 lb)   06/10/25 (!) 156 kg (344 lb)   05/20/25 (!) 156 kg (344 lb)

## 2025-06-19 NOTE — PROGRESS NOTES
" Patient ID: Jey Vicente is a 60 y.o. male.        Plan:      Assessment & Plan  Essential hypertension  Blood pressure well-controlled on current regimen  Nonrheumatic aortic (valve) stenosis  Planning for TAVR 8/2025      Follow up Plan/Summary Comments:  Melvin is doing well since his discharge from the hospital.  Because of some recurrent symptoms of coughing and mild volume overload, I have represcribed Lasix 40 mg to use for weight gain of 3 pounds overnight or 5 pounds in 1 week with potassium 20 mEq when using Lasix    Recommend BMP in 1 to 2 weeks to monitor renal function and electrolytes    Follow-up after valve surgery    HPI: I had the pleasure of meeting Melvin in the office today for a hospital follow-up evaluation.  He was recently hospitalized with acute acute heart failure and shortness of breath secondary to severe aortic stenosis.he was referred to CT surgery for TAVR evaluation.  Catheterization was performed 6/10/2025 and arrangements have been made for TAVR in August.      Since his discharge from the hospital, he reports he occasionally has orthopnea and has been coughing a little bit more.  He has chronic exertional dyspnea which is also related to his asthma/COPD.    He had been prescribed Lasix and noted improvement in the symptoms previously, however his prescription ran out.    Review of Systems   10  point ROS  was otherwise non pertinent or negative except as per HPI or as below.   Gait: Normal      Most recent or relevant cardiac/vascular testing:    Cardiac cath 6/10/2025  Normal coronary arteries    Echo 5/8/2025  EF 75%  Severe aortic stenosis  RVSP is elevated, 72 mmHg        Objective:     /76 (BP Location: Left arm, Patient Position: Sitting, Cuff Size: Large)   Pulse 76   Resp 22   Ht 5' 7\" (1.702 m)   Wt (!) 154 kg (340 lb)   SpO2 96%   BMI 53.25 kg/m²     PHYSICAL EXAM:    General:  Normal appearance, no acute distress  Eyes:  Anicteric.  Oral mucosa:  " Moist.  Neck:  No JVD. Carotid upstrokes are brisk without bruits.  No masses.  Chest:  Clear to auscultation   Cardiac:  No palpable PMI.  Normal S1, S2 not auscultated.  3/6 systolic murmur at the base   Abdomen: Obese, soft and nontender. No palpable organomegaly or aortic enlargement.  Extremities: Trace bilateral lower extremity edema  Musculoskeletal:  Symmetric.   Vascular: Pedal pulses are intact.  Neuro:  Grossly symmetric.  Psych:  Alert and oriented x3.    No Known Allergies  Current Medications[1]  Past Medical History[2]  Past Surgical History[3]    CMP:   Lab Results   Component Value Date    K 3.9 05/16/2025    K 4.9 07/19/2018    CL 95 (L) 05/16/2025     07/19/2018    CO2 30 05/16/2025    CO2 33 (H) 07/19/2018    BUN 25 05/16/2025    BUN 19 07/19/2018    CREATININE 1.23 05/16/2025    CREATININE 0.8 07/19/2018    EGFR 62 05/16/2025    EGFR >60.0 07/19/2018     Lipid Profile:    Lab Results   Component Value Date    TRIG 279 (H) 03/03/2025    HDL 31 (L) 03/03/2025         Tobacco Use History[4]                 [1]   Current Outpatient Medications:     albuterol (Ventolin HFA) 90 mcg/act inhaler, Inhale 2 puffs every 6 (six) hours as needed for wheezing, Disp: 18 g, Rfl: 3    aspirin (ECOTRIN LOW STRENGTH) 81 mg EC tablet, Take 1 tablet (81 mg total) by mouth daily, Disp: 30 tablet, Rfl: 5    atorvastatin (LIPITOR) 40 mg tablet, TAKE 1 TABLET BY MOUTH EVERY DAY, Disp: 30 tablet, Rfl: 10    carvedilol (COREG) 12.5 mg tablet, TAKE ONE (1) TABLET BY MOUTH TWICE DAILY WITH MEALS, Disp: 180 tablet, Rfl: 1    dapagliflozin (Farxiga) 10 MG tablet, Take 1 tablet (10 mg total) by mouth daily, Disp: 90 tablet, Rfl: 1    fluticasone-umeclidinium-vilanterol (Trelegy Ellipta) 200-62.5-25 mcg/actuation AEPB inhaler, Inhale 1 puff daily Rinse mouth after use., Disp: 180 blister, Rfl: 3    glimepiride (AMARYL) 4 mg tablet, TAKE 1 TABLET BY MOUTH TWICE DAILY, Disp: 60 tablet, Rfl: 10    insulin glargine (Toujeo  Max SoloStar) 300 units/mL CONCENTRATED U-300 injection pen (2-unit dial), INJECT 70 UNITS SUBCUTANEOUSLY AT BEDTIME, Disp: 12 mL, Rfl: 5    ipratropium-albuterol (DUO-NEB) 0.5-2.5 mg/3 mL nebulizer solution, INHALE CONTENTS OF 1 VIAL VIA NEBULIZER FOUR TIMES A DAY, Disp: 360 mL, Rfl: 10    losartan (COZAAR) 50 mg tablet, TAKE 1 TABLET BY MOUTH DAILY, Disp: 30 tablet, Rfl: 5    metFORMIN (GLUCOPHAGE) 1000 MG tablet, Take 1 tablet (1,000 mg total) by mouth 2 (two) times a day with meals, Disp: 180 tablet, Rfl: 0    Multiple Vitamins-Minerals (MENS MULTIVITAMIN PO), Take by mouth, Disp: , Rfl:     pregabalin (LYRICA) 50 mg capsule, TAKE 1 CAPSULE BY MOUTH THREE TIMES DAILY, Disp: 90 capsule, Rfl: 3    sertraline (ZOLOFT) 50 mg tablet, TAKE 1 TABLET BY MOUTH DAILY, Disp: 30 tablet, Rfl: 10    spironolactone (ALDACTONE) 50 mg tablet, TAKE 1 TABLET BY MOUTH EVERY DAY, Disp: 30 tablet, Rfl: 5    Tirzepatide (Mounjaro) 5 MG/0.5ML SOAJ, Inject 5 mg under the skin once a week, Disp: 6 mL, Rfl: 3    Alcohol Swabs (B-D SINGLE USE SWABS REGULAR) PADS, , Disp: , Rfl:     Blood Glucose Monitoring Suppl (CONTOUR NEXT MONITOR) w/Device KIT, Test blood sugar once daily or as needed for fluctuating blood sugars (Patient not taking: Reported on 4/9/2025), Disp: 1 kit, Rfl: 0    Continuous Glucose Sensor (FreeStyle Moe 3 Sensor) MISC, Use 1 Units every 14 (fourteen) days, Disp: , Rfl:     cyclobenzaprine (FLEXERIL) 10 mg tablet, Take 10 mg by mouth, Disp: , Rfl:     furosemide (LASIX) 20 mg tablet, Take 3 tablets (60 mg total) by mouth 2 (two) times a day (Patient not taking: Reported on 6/19/2025), Disp: 180 tablet, Rfl: 0    Tezepelumab-ekko (Tezspire) 210 MG/1.91ML SOAJ, Inject 210 mg under the skin every 28 days, Disp: 1.91 mL, Rfl: 11  [2]   Past Medical History:  Diagnosis Date    Acute on chronic diastolic heart failure (HCC) 01/28/2025    Aortic stenosis     Arthritis 1990    Both wrists    Asthma 1964    Chronic hypoxemic  respiratory failure ([facility])     Chronic kidney disease 10/2022    Class 3 severe obesity with serious comorbidity and body mass index (BMI) of 45.0 to 49.9 in adult 2019    COPD (chronic obstructive pulmonary disease) ([facility])     Coronary artery disease 2000    Aortic stenosis    Diabetes ([facility])     Diabetes mellitus ([facility])     Hyperlipidemia     Hypertension 2014    Lung disease Birth    Neuropathy in diabetes ([facility]) 2022    Nonrheumatic aortic (valve) stenosis     Pneumonia     Sleep apnea, obstructive    [3]   Past Surgical History:  Procedure Laterality Date    ACCESSORY NAVICULAR EXCISION Right     APPENDECTOMY      CARDIAC CATHETERIZATION N/A 2023    Procedure: Cardiac Coronary Angiogram;  Surgeon: Mohit Farris MD;  Location: [facility];  Service: Cardiology    CARDIAC CATHETERIZATION N/A 6/10/2025    Procedure: Cardiac Catheterization;  Surgeon: Mohit Farris MD;  [facility];  Service: Cardiology    CARDIAC CATHETERIZATION N/A 6/10/2025    Procedure: Cardiac Coronary Angiogram;  Surgeon: Mohit Farris MD;  [facility];  Service: Cardiology    EAR FOREIGN BODY REMOVAL      cyst removal. bronchospasms after general anesthesia.    EYE SURGERY Left     traumatic injury at age 9-10: proptosis- eye returned to socket.   [4]   Social History  Tobacco Use   Smoking Status Former    Current packs/day: 0.00    Average packs/day: 3.0 packs/day for 30.0 years (90.1 ttl pk-yrs)    Types: Cigarettes    Start date: 3/15/1984    Quit date: 7/15/2005    Years since quittin.9   Smokeless Tobacco Never

## 2025-06-26 ENCOUNTER — RA CDI HCC (OUTPATIENT)
Dept: OTHER | Facility: HOSPITAL | Age: 60
End: 2025-06-26

## 2025-06-26 ENCOUNTER — NURSE TRIAGE (OUTPATIENT)
Age: 60
End: 2025-06-26

## 2025-06-26 DIAGNOSIS — I50.33 ACUTE ON CHRONIC DIASTOLIC HEART FAILURE (HCC): ICD-10-CM

## 2025-06-26 NOTE — TELEPHONE ENCOUNTER
"REASON FOR CONVERSATION: Leg Swelling  Patient calling, states he has had increased leg swelling and sob over the past 3 days. Was restarted on Furosemide 40 mg as needed at OV on 6/19/25. Patient has been taking Furosemide 40 mg daily and his swelling is not going down.   He would like to go back on his post-hospital regimen of Furosemide 60 mg BID.      SYMPTOMS: moderate leg swelling from knee down, SOB, non-productive cough, fatigue    OTHER HEALTH INFORMATION: POX 94% room air, wears bipap at bedtime, HR 72  Weight today 343 lbs- up 3 lbs overnight. Pt states his target weight is around 330 lbs    PROTOCOL DISPOSITION: Discuss With PCP and Callback by Nurse Within 1 Hour    CARE ADVICE PROVIDED: Advised patient would send message to clinical. Instructed patient to call back or go to ED with any worsening/urgent symptoms.   Patient verbalized understanding.     PRACTICE FOLLOW-UP: Please review and advise. Patient would like to go back to taking Furosemide 60 mg BID.     Reason for Disposition   Oxygen level (e.g., pulse oximetry) 91 to 94%    Answer Assessment - Initial Assessment Questions  1. MAIN CONCERN OR SYMPTOM: \"What is your main concern right now?\" \"What's the main symptom you're worried about?\" (e.g., breathing difficulty, ankle swelling, weight gain).      Leg swelling, shortness of breath  2. ONSET: \"When did the  leg swelling  start?\"      3 days ago  3. BREATHING DIFFICULTY: \"Are you having any difficulty breathing?\" If Yes, ask: \"How bad is it?\"  (e.g., none, mild, moderate, severe)  \"Is this worse than usual for you?\"      Yes  4. EDEMA - FOOT-LEG SWELLING: \"Do you have swelling of your ankles, feet or legs?\" If Yes, ask: \"How bad is the swelling?\" (e.g., localized; mild, moderate, severe)      B/l legs moderate  5. WEIGHT - CURRENT: \"What is your weight today?\"      343 lbs  6. WEIGHT - TARGET RANGE: \"Do you try to keep your weight in a target (goal) range?\" If Yes, ask: \"What is that range?\"   " "   330 lbs  7. WEIGHT - CHANGE: \"Have you gained (or lost) weight in the past 24 hours? Past week (7 days)?\" If Yes, ask:  \"How much weight?\"      3 lbs  8. OTHER SYMPTOMS: \"Do you have any other symptoms?\" (e.g., depression, weakness or fatigue, abdomen bloating, hacky cough)      Fatigue, cough- non-productive  9. DIURETICS: \"Are you currently taking water pills?\" (e.g., furosemide [Lasix], hydrochlorothiazide [HCTZ], bumetanide [Bumex], metolazone [Zaroxolyn]) If Yes, ask: \"What medicine are you taking, and how often?\"  \"Any recent change in dose?\"       Furosemide 40 mg daily   10. O2 SATURATION MONITOR: \"Do you use an oxygen saturation monitor (pulse oximeter) at home?\" If Yes, ask: \"What is your reading (oxygen level) today?\" \"What is your usual oxygen saturation reading?\" (e.g., 95%)        94% room air during the day- wears bipap with oxygen at night  11. HEART FAILURE HCP: \"Who treats your heart failure?\"  (e.g., cardiologist or heart specialist, heart failure clinic or center, primary care doctor)        Dr. Ferrara  12. FLUID and SODIUM RESTRICTIONS: \"Have you been instructed to restrict your daily fluid intake or sodium (salt) intake?\" If Yes, ask: \"What are your daily limits?\"        Low sodium diet    Protocols used: Heart Failure on Treatment Follow-up Call-Adult-[place]    "

## 2025-06-26 NOTE — TELEPHONE ENCOUNTER
Called patient.  He does not want to get recommendations from [facility] as he was seen yesterday and did not feel that his complaints were addressed.  Would like Dr Ferrara to review.  He would like to go back to furosemide 60 mg twice a day as that was working for him post hospital.   He believes the wheezing is due to fluid overload.  Please see below for further information.

## 2025-06-27 RX ORDER — FUROSEMIDE 20 MG/1
60 TABLET ORAL 2 TIMES DAILY
Qty: 540 TABLET | Refills: 3 | Status: SHIPPED | OUTPATIENT
Start: 2025-06-27

## 2025-06-29 DIAGNOSIS — I95.2 HYPOTENSION DUE TO DRUGS: ICD-10-CM

## 2025-06-29 DIAGNOSIS — I51.89 DIASTOLIC DYSFUNCTION WITHOUT HEART FAILURE: ICD-10-CM

## 2025-06-29 DIAGNOSIS — E11.40 PAINFUL DIABETIC NEUROPATHY (HCC): ICD-10-CM

## 2025-06-29 DIAGNOSIS — E11.69 TYPE 2 DIABETES MELLITUS WITH OTHER SPECIFIED COMPLICATION, WITHOUT LONG-TERM CURRENT USE OF INSULIN (HCC): ICD-10-CM

## 2025-06-30 RX ORDER — CARVEDILOL 12.5 MG/1
12.5 TABLET ORAL 2 TIMES DAILY WITH MEALS
Qty: 180 TABLET | Refills: 1 | Status: SHIPPED | OUTPATIENT
Start: 2025-06-30

## 2025-06-30 RX ORDER — LOSARTAN POTASSIUM 50 MG/1
50 TABLET ORAL DAILY
Qty: 90 TABLET | Refills: 1 | Status: SHIPPED | OUTPATIENT
Start: 2025-06-30

## 2025-06-30 RX ORDER — PREGABALIN 50 MG/1
50 CAPSULE ORAL 3 TIMES DAILY
Qty: 90 CAPSULE | Refills: 4 | Status: SHIPPED | OUTPATIENT
Start: 2025-06-30

## 2025-06-30 RX ORDER — DAPAGLIFLOZIN 10 MG/1
10 TABLET, FILM COATED ORAL DAILY
Qty: 30 TABLET | Refills: 5 | Status: SHIPPED | OUTPATIENT
Start: 2025-06-30

## 2025-06-30 NOTE — PATIENT INSTRUCTIONS
SCOTT on CPAP  He is followed thru [facility] and is compliant w/ the CPAP but doesn't know the settings  He gets his machine thru [facility]  He will need a nocturnal oximetry to evaluate adequacy of his CPAP and oxygenation status  Moderate persistent asthma without complication  He has a clinical diagnosis of asthma and is clinically better w/ steroids but inhalers are insufficient  Will check spirometry and CXR  NOTE: he was given an urgent Duoneb since he was profoundly hypoxemic w/ walking into the office  Hypoxemia requiring supplemental oxygen  He had profound drop in his O2 sats w/ walking into the exam room from the waiting room (30 ft)      Will check ECHO to evaluate for pulmonary HTN no

## 2025-07-04 ENCOUNTER — HOSPITAL ENCOUNTER (EMERGENCY)
Facility: HOSPITAL | Age: 60
Discharge: HOME | End: 2025-07-04
Attending: EMERGENCY MEDICINE
Payer: COMMERCIAL

## 2025-07-04 ENCOUNTER — APPOINTMENT (EMERGENCY)
Dept: RADIOLOGY | Facility: HOSPITAL | Age: 60
End: 2025-07-04
Payer: COMMERCIAL

## 2025-07-04 VITALS
HEART RATE: 83 BPM | DIASTOLIC BLOOD PRESSURE: 57 MMHG | RESPIRATION RATE: 18 BRPM | TEMPERATURE: 96.9 F | SYSTOLIC BLOOD PRESSURE: 120 MMHG | OXYGEN SATURATION: 93 %

## 2025-07-04 DIAGNOSIS — S82.832A CLOSED FRACTURE OF DISTAL END OF LEFT FIBULA, UNSPECIFIED FRACTURE MORPHOLOGY, INITIAL ENCOUNTER: Primary | ICD-10-CM

## 2025-07-04 PROCEDURE — 99284 EMERGENCY DEPT VISIT MOD MDM: CPT | Performed by: EMERGENCY MEDICINE

## 2025-07-04 PROCEDURE — 73610 X-RAY EXAM OF ANKLE: CPT

## 2025-07-04 PROCEDURE — 29515 APPLICATION SHORT LEG SPLINT: CPT | Performed by: EMERGENCY MEDICINE

## 2025-07-04 PROCEDURE — 99283 EMERGENCY DEPT VISIT LOW MDM: CPT

## 2025-07-04 RX ORDER — IBUPROFEN 400 MG/1
400 TABLET, FILM COATED ORAL ONCE
Status: COMPLETED | OUTPATIENT
Start: 2025-07-04 | End: 2025-07-04

## 2025-07-04 RX ORDER — ACETAMINOPHEN 325 MG/1
975 TABLET ORAL ONCE
Status: COMPLETED | OUTPATIENT
Start: 2025-07-04 | End: 2025-07-04

## 2025-07-04 RX ADMIN — IBUPROFEN 400 MG: 400 TABLET ORAL at 22:07

## 2025-07-04 RX ADMIN — ACETAMINOPHEN 975 MG: 325 TABLET ORAL at 22:07

## 2025-07-05 NOTE — ED PROVIDER NOTES
Time reflects when diagnosis was documented in both MDM as applicable and the Disposition within this note       Time User Action Codes Description Comment    7/4/2025 10:02 PM Lance Chacko Add [S82.832A] Closed fracture of distal end of left fibula, unspecified fracture morphology, initial encounter           ED Disposition       ED Disposition   Discharge    Condition   Stable    Date/Time   Fri Jul 4, 2025 10:24 PM    Comment   Jey Fernandezshannan discharge to home/self care.                   Assessment & Plan       Medical Decision Making  Amount and/or Complexity of Data Reviewed  Radiology: independent interpretation performed.    Risk  OTC drugs.  Prescription drug management.        ED Course as of 07/04/25 2359   Fri Jul 04, 2025   2222 Closed minimally displaced distal fibular fracture with intact neurovascular function and no signs/symptoms of compartment syndrome.  No other trauma or injury from this episode.  No other radiographic abnormality identified. Posterior splint applied by RN.  Nonweightbearing with crutches. I emphasized the importance of NWB status to patient multiple times. Referral to orthopedic surgery; information given to patient on follow-up process and included with discharge instructions.  ED return for any signs or symptoms of compartment syndrome. All questions answered to satisfaction of patient and family prior to discharge.    Splint application: Posterior short leg splint was applied by technician. Appropriate position for splint type. Neurovascular status (including capillary refill <3s) and accessible tendon function intact post application. Evaluated by me prior to discharge.         Medications   acetaminophen (TYLENOL) tablet 975 mg (975 mg Oral Given 7/4/25 2207)   ibuprofen (MOTRIN) tablet 400 mg (400 mg Oral Given 7/4/25 2207)       ED Risk Strat Scores                    No data recorded        SBIRT 20yo+      Flowsheet Row Most Recent Value   Initial Alcohol  "Screen: US AUDIT-C     1. How often do you have a drink containing alcohol? 0 Filed at: 07/04/2025 2125   2. How many drinks containing alcohol do you have on a typical day you are drinking?  0 Filed at: 07/04/2025 2125   3a. Male UNDER 65: How often do you have five or more drinks on one occasion? 0 Filed at: 07/04/2025 2125   Audit-C Score 0 Filed at: 07/04/2025 2125   ROSA MARIA: How many times in the past year have you...    Used an illegal drug or used a prescription medication for non-medical reasons? Never Filed at: 07/04/2025 2125                            History of Present Illness       Chief Complaint   Patient presents with    Ankle Pain     Pt. Arrives to ED with family from home with complaints of left posterior ankle pain. Pt. States that around 2-3pm today, was at family members home, stepped of the sidewalk and fell.  Pt. States that twisted his left ankle and heard a crack.  Denies hitting head, denies LOC, denies thinners.  Pt. Verbalizes 7/10 to left ankle. +C&S LLE.        Past Medical History[1]   Past Surgical History[2]   Family History[3]   Social History[4]   E-Cigarette/Vaping    E-Cigarette Use Never User       E-Cigarette/Vaping Substances    Nicotine No     THC No     CBD No     Flavoring No     Other No     Unknown No       I have reviewed and agree with the history as documented.     60M with noted past medical history presents to emergency department with inversion type injury of the left ankle occurring approximately 1430 this afternoon.  Tripped and fell while at an outdoor event, inverting the left ankle.  Also fell and scraped the anterior right leg.  No head injury or LOC.  Felt/heard a \"crack\" in the left ankle and has had severe pain in the joint since that point.  Was able to bear weight on it immediately thereafter and has been ambulating with some difficulty since that point.  Pain is along the lateral aspect of the ankle at the joint line; he reports no pain otherwise in the " foot or the proximal leg.  There has been substantial edema in the left ankle since the injury; no leg edema.  No paresthesias or weakness in left lower extremity since the injury.  No prior fracture or surgery in left lower extremity.  Has not taken or used anything for symptoms.      History provided by:  Patient, medical records and spouse  Ankle Pain      Review of Systems   Musculoskeletal:  Positive for arthralgias, gait problem, joint swelling and myalgias.   Skin: Negative.    Neurological:  Negative for weakness and numbness.   Hematological: Negative.            Objective       ED Triage Vitals [07/04/25 2119]   Temperature Pulse Blood Pressure Respirations SpO2 Patient Position - Orthostatic VS   (!) 96.9 °F (36.1 °C) 83 120/57 18 93 % Sitting      Temp Source Heart Rate Source BP Location FiO2 (%) Pain Score    Temporal Monitor Left arm -- 7      Vitals      Date and Time Temp Pulse SpO2 Resp BP Pain Score FACES Pain Rating User   07/04/25 2207 -- -- -- -- -- 7 -- CD   07/04/25 2119 96.9 °F (36.1 °C) 83 93 % 18 120/57 7 -- TF            Physical Exam  Vitals and nursing note reviewed.   Constitutional:       General: He is awake. He is not in acute distress.     Appearance: Normal appearance. He is well-developed.   HENT:      Head: Normocephalic and atraumatic.      Right Ear: Hearing and external ear normal.      Left Ear: Hearing and external ear normal.   Neck:      Trachea: Trachea and phonation normal.     Cardiovascular:      Rate and Rhythm: Normal rate and regular rhythm.      Pulses:           Radial pulses are 2+ on the right side and 2+ on the left side.        Dorsalis pedis pulses are 2+ on the right side and 2+ on the left side.        Posterior tibial pulses are 2+ on the right side and 2+ on the left side.   Pulmonary:      Effort: Pulmonary effort is normal. No accessory muscle usage or respiratory distress.     Musculoskeletal:      Comments: L ankle:    Moderate soft tissue swelling  immediately inferior/anterior to the lateral malleolus with no palpable bony deformity and no overlying skin breakdown.  TTP over lateral malleolus and immediately inferior/anterior.  Remainder of tibia and fibular nontender including fibular head.  No ttp of proximal 5th MT.  Limited AROM in flexion/extension/inversion/eversion at ankle, approximate 30% normal range of motion each direction.  Strength 5/5 in LE bilaterally at knee/ankle/toes in flexion/extension.  Nontender calcaneal tendon       Skin:     General: Skin is warm and dry.     Neurological:      Mental Status: He is alert and oriented to person, place, and time.      GCS: GCS eye subscore is 4. GCS verbal subscore is 5. GCS motor subscore is 6.      Sensory: No sensory deficit.      Motor: No abnormal muscle tone.      Comments: PERRLA; EOMI. Sensation intact to light touch over face in V1-V3 distribution bilaterally. Facial expressions symmetric. Tongue/uvula midline. Shoulder shrug equal bilaterally. Strength 5/5 in UE/LE bilaterally. Sensation intact to light touch in UE/LE bilaterally.       Results Reviewed       None            XR ankle 3+ views LEFT   ED Interpretation by Lance Chacko DO (07/04 2214)   Minimally displaced distal fibula fracture.  No dislocation.  No other fracture about the ankle          Procedures    ED Medication and Procedure Management   Prior to Admission Medications   Prescriptions Last Dose Informant Patient Reported? Taking?   Alcohol Swabs (B-D SINGLE USE SWABS REGULAR) PADS  Self Yes No   Blood Glucose Monitoring Suppl (CONTOUR NEXT MONITOR) w/Device KIT  Self No No   Sig: Test blood sugar once daily or as needed for fluctuating blood sugars   Patient not taking: Reported on 4/9/2025   Continuous Glucose Sensor (FreeStyle Moe 3 Sensor) MISC  Self Yes No   Sig: Use 1 Units every 14 (fourteen) days   Multiple Vitamins-Minerals (MENS MULTIVITAMIN PO)  Self Yes No   Sig: Take by mouth   Tirzepatide (Mounjaro) 5  MG/0.5ML SOAJ   No No   Sig: Inject 5 mg under the skin once a week   albuterol (Ventolin HFA) 90 mcg/act inhaler  Self No No   Sig: Inhale 2 puffs every 6 (six) hours as needed for wheezing   aspirin (ECOTRIN LOW STRENGTH) 81 mg EC tablet  Self No No   Sig: Take 1 tablet (81 mg total) by mouth daily   atorvastatin (LIPITOR) 40 mg tablet   No No   Sig: TAKE 1 TABLET BY MOUTH EVERY DAY   carvedilol (COREG) 12.5 mg tablet   No No   Sig: TAKE 1 TABLET BY MOUTH TWICE DAILY WITH MEALS   dapagliflozin (Farxiga) 10 MG tablet   No No   Sig: TAKE 1 TABLET BY MOUTH ONCE DAILY   fluticasone-umeclidinium-vilanterol (Trelegy Ellipta) 200-62.5-25 mcg/actuation AEPB inhaler   No No   Sig: Inhale 1 puff daily Rinse mouth after use.   furosemide (LASIX) 20 mg tablet   No No   Sig: Take 3 tablets (60 mg total) by mouth 2 (two) times a day   glimepiride (AMARYL) 4 mg tablet  Self No No   Sig: TAKE 1 TABLET BY MOUTH TWICE DAILY   insulin glargine (Toujeo Max SoloStar) 300 units/mL CONCENTRATED U-300 injection pen (2-unit dial)   No No   Sig: INJECT 70 UNITS SUBCUTANEOUSLY AT BEDTIME   ipratropium-albuterol (DUO-NEB) 0.5-2.5 mg/3 mL nebulizer solution   No No   Sig: INHALE CONTENTS OF 1 VIAL VIA NEBULIZER FOUR TIMES A DAY   losartan (COZAAR) 50 mg tablet   No No   Sig: TAKE 1 TABLET BY MOUTH DAILY   metFORMIN (GLUCOPHAGE) 1000 MG tablet  Self No No   Sig: Take 1 tablet (1,000 mg total) by mouth 2 (two) times a day with meals   potassium chloride (Klor-Con M20) 20 mEq tablet   No No   Sig: Take 1 tablet (20 mEq total) by mouth daily   pregabalin (LYRICA) 50 mg capsule   No No   Sig: TAKE 1 CAPSULE BY MOUTH THREE TIMES DAILY   sertraline (ZOLOFT) 50 mg tablet   No No   Sig: TAKE 1 TABLET BY MOUTH DAILY   spironolactone (ALDACTONE) 50 mg tablet   No No   Sig: TAKE 1 TABLET BY MOUTH EVERY DAY      Facility-Administered Medications: None     Discharge Medication List as of 7/4/2025 10:25 PM        CONTINUE these medications which have NOT  CHANGED    Details   albuterol (Ventolin HFA) 90 mcg/act inhaler Inhale 2 puffs every 6 (six) hours as needed for wheezing, Starting Thu 4/21/2022, Normal      Alcohol Swabs (B-D SINGLE USE SWABS REGULAR) PADS Historical Med      aspirin (ECOTRIN LOW STRENGTH) 81 mg EC tablet Take 1 tablet (81 mg total) by mouth daily, Starting Mon 8/15/2022, No Print      atorvastatin (LIPITOR) 40 mg tablet TAKE 1 TABLET BY MOUTH EVERY DAY, Starting Wed 3/5/2025, Normal      Blood Glucose Monitoring Suppl (CONTOUR NEXT MONITOR) w/Device KIT Test blood sugar once daily or as needed for fluctuating blood sugars, Normal      carvedilol (COREG) 12.5 mg tablet TAKE 1 TABLET BY MOUTH TWICE DAILY WITH MEALS, Starting Mon 6/30/2025, Normal      Continuous Glucose Sensor (FreeStyle Moe 3 Sensor) MISC Use 1 Units every 14 (fourteen) days, Historical Med      dapagliflozin (Farxiga) 10 MG tablet TAKE 1 TABLET BY MOUTH ONCE DAILY, Starting Mon 6/30/2025, Normal      fluticasone-umeclidinium-vilanterol (Trelegy Ellipta) 200-62.5-25 mcg/actuation AEPB inhaler Inhale 1 puff daily Rinse mouth after use., Starting Fri 11/15/2024, Until Mon 11/10/2025, Normal      furosemide (LASIX) 20 mg tablet Take 3 tablets (60 mg total) by mouth 2 (two) times a day, Starting Fri 6/27/2025, Normal      glimepiride (AMARYL) 4 mg tablet TAKE 1 TABLET BY MOUTH TWICE DAILY, Starting Mon 3/11/2024, Normal      insulin glargine (Toujeo Max SoloStar) 300 units/mL CONCENTRATED U-300 injection pen (2-unit dial) INJECT 70 UNITS SUBCUTANEOUSLY AT BEDTIME, Normal      ipratropium-albuterol (DUO-NEB) 0.5-2.5 mg/3 mL nebulizer solution INHALE CONTENTS OF 1 VIAL VIA NEBULIZER FOUR TIMES A DAY, Starting Mon 12/2/2024, Normal      losartan (COZAAR) 50 mg tablet TAKE 1 TABLET BY MOUTH DAILY, Starting Mon 6/30/2025, Normal      metFORMIN (GLUCOPHAGE) 1000 MG tablet Take 1 tablet (1,000 mg total) by mouth 2 (two) times a day with meals, Starting Mon 3/11/2024, Normal      Multiple  Vitamins-Minerals (MENS MULTIVITAMIN PO) Take by mouth, Historical Med      potassium chloride (Klor-Con M20) 20 mEq tablet Take 1 tablet (20 mEq total) by mouth daily, Starting Thu 6/19/2025, Normal      pregabalin (LYRICA) 50 mg capsule TAKE 1 CAPSULE BY MOUTH THREE TIMES DAILY, Starting Mon 6/30/2025, Normal      sertraline (ZOLOFT) 50 mg tablet TAKE 1 TABLET BY MOUTH DAILY, Starting Fri 2/28/2025, Normal      spironolactone (ALDACTONE) 50 mg tablet TAKE 1 TABLET BY MOUTH EVERY DAY, Starting Mon 3/3/2025, Normal      Tirzepatide (Mounjaro) 5 MG/0.5ML SOAJ Inject 5 mg under the skin once a week, Starting Thu 5/15/2025, Normal             ED SEPSIS DOCUMENTATION   Time reflects when diagnosis was documented in both MDM as applicable and the Disposition within this note       Time User Action Codes Description Comment    7/4/2025 10:02 PM Lance Chakco Add [S82.516E] Closed fracture of distal end of left fibula, unspecified fracture morphology, initial encounter                    [1]   Past Medical History:  Diagnosis Date    Acute on chronic diastolic heart failure (HCC) 01/28/2025    Aortic stenosis     Arthritis 1990    Both wrists    Asthma 1964    Chronic hypoxemic respiratory failure ([facility])     Chronic kidney disease 10/2022    Class 3 severe obesity with serious comorbidity and body mass index (BMI) of 45.0 to 49.9 in adult 08/20/2019    COPD (chronic obstructive pulmonary disease) ([facility])     Coronary artery disease 2000    Aortic stenosis    Diabetes ([facility])     Diabetes mellitus ([facility])     Hyperlipidemia     Hypertension 07/02/2014    Lung disease Birth    Neuropathy in diabetes ([facility]) 1/2022    Nonrheumatic aortic (valve) stenosis     Pneumonia 2015    Sleep apnea, obstructive    [2]   Past Surgical History:  Procedure Laterality Date    ACCESSORY NAVICULAR EXCISION Right     APPENDECTOMY      CARDIAC CATHETERIZATION N/A 05/25/2023    Procedure: Cardiac Coronary Angiogram;  Surgeon: Mohit Farris MD;   Location: [facility];  Service: Cardiology    CARDIAC CATHETERIZATION N/A 6/10/2025    Procedure: Cardiac Catheterization;  Surgeon: Mohit Farris MD;  Location: [facility];  Service: Cardiology    CARDIAC CATHETERIZATION N/A 6/10/2025    Procedure: Cardiac Coronary Angiogram;  Surgeon: Mohit Farris MD;  Location: [facility];  Service: Cardiology    EAR FOREIGN BODY REMOVAL      cyst removal. bronchospasms after general anesthesia.    EYE SURGERY Left     traumatic injury at age 9-10: proptosis- eye returned to socket.   [3]   Family History  Problem Relation Name Age of Onset    Cancer Mother Smitha (Postupack) Cinthia         Skin Cancer    Heart disease Father Odin Vicente     Lung cancer Father Odin Vicente         Passed away     Cancer Father Odin Vicente         Lung cancer    Hypertension Father Odin Vicente     ALS Maternal Grandmother      Diabetes Maternal Grandfather Carlos Postupack     Stomach cancer Maternal Grandfather Carlos Postupack     Arthritis Maternal Grandfather Carlos Postupack    [4]   Social History  Tobacco Use    Smoking status: Former     Current packs/day: 0.00     Average packs/day: 3.0 packs/day for 30.0 years (90.1 ttl pk-yrs)     Types: Cigarettes     Start date: 3/15/1984     Quit date: 7/15/2005     Years since quittin.9    Smokeless tobacco: Never   Vaping Use    Vaping status: Never Used   Substance Use Topics    Alcohol use: Never    Drug use: Never        Lance Chacko DO  [phone]

## 2025-07-05 NOTE — DISCHARGE INSTRUCTIONS
Please contact the orthopedic surgery office at [phone] to schedule your appointment.     Use the crutches whenever you are up and moving around.  Do not put any weight on your foot.    Acetaminophen and/or ibuprofen as needed for pain control.    Go to the ER for any numbness/tingling in your toes or foot, severely worsening pain in your leg, color changes in your toes, or inability to move your toes.

## 2025-07-07 ENCOUNTER — TELEPHONE (OUTPATIENT)
Dept: FAMILY MEDICINE CLINIC | Facility: CLINIC | Age: 60
End: 2025-07-07

## 2025-07-07 ENCOUNTER — TELEPHONE (OUTPATIENT)
Dept: CARDIAC SURGERY | Facility: CLINIC | Age: 60
End: 2025-07-07

## 2025-07-07 NOTE — TELEPHONE ENCOUNTER
Patient called stating he tripped and broke his leg this weekend. Seeing ortho today. He will call us with update and we will discuss next steps regarding TAVR.

## 2025-07-08 ENCOUNTER — TELEPHONE (OUTPATIENT)
Dept: CARDIAC SURGERY | Facility: CLINIC | Age: 60
End: 2025-07-08

## 2025-07-08 ENCOUNTER — OFFICE VISIT (OUTPATIENT)
Age: 60
End: 2025-07-08
Payer: COMMERCIAL

## 2025-07-08 VITALS — HEIGHT: 67 IN | WEIGHT: 315 LBS | BODY MASS INDEX: 49.44 KG/M2

## 2025-07-08 DIAGNOSIS — S82.832A CLOSED FRACTURE OF DISTAL END OF LEFT FIBULA, UNSPECIFIED FRACTURE MORPHOLOGY, INITIAL ENCOUNTER: ICD-10-CM

## 2025-07-08 DIAGNOSIS — Z79.4 TYPE 2 DIABETES MELLITUS WITH DIABETIC NEUROPATHY, WITH LONG-TERM CURRENT USE OF INSULIN (HCC): Primary | ICD-10-CM

## 2025-07-08 DIAGNOSIS — E11.40 TYPE 2 DIABETES MELLITUS WITH DIABETIC NEUROPATHY, WITH LONG-TERM CURRENT USE OF INSULIN (HCC): Primary | ICD-10-CM

## 2025-07-08 PROCEDURE — 99214 OFFICE O/P EST MOD 30 MIN: CPT | Performed by: PODIATRIST

## 2025-07-08 NOTE — ASSESSMENT & PLAN NOTE
Orders:    Ambulatory Referral to Orthopedic Surgery    Walker  - XR reviewed and slightly comminuted, but not displaced.

## 2025-07-08 NOTE — TELEPHONE ENCOUNTER
Patient called with update after visit with DPM, patient needs ORIF but will not be done until diabetes is controlled, Has appt in 3 weeks to see DPM again. Informed him I will discuss with Dr. Mccoy and call him with recommendations.

## 2025-07-08 NOTE — PROGRESS NOTES
Name: Jey Vicente      : 1965      MRN: [id number]  Encounter Provider: José Luis Frausto DPM  Encounter Date: 2025   Encounter department: [facility] [address]  :  Assessment & Plan  Closed fracture of distal end of left fibula, unspecified fracture morphology, initial encounter    Orders:    Ambulatory Referral to Orthopedic Surgery    Walker  - XR reviewed and slightly comminuted, but not displaced.   Type 2 diabetes mellitus with diabetic neuropathy, with long-term current use of insulin ([facility])    Lab Results   Component Value Date    HGBA1C 9.3 (H) 2025          -At this point his diabetes is uncontrolled, he is a bit between a rock and a hard place  - If he A1c was not this high I would recommend aggressive open reduction internal fixation with lettering of the syndesmosis due to his comorbidities  - At this point in time we will make him strict nonweightbearing and apply a splint, he is to return in 3 weeks for change of splint and evaluation  - I personally think that he is very high risk to stay at home and be strict nonweightbearing, but we will prescribe him a knee scooter and he is to ice elevate and stay off the foot, and if he has any difficulty being compliant with this we should consider placing him into rehab  I also discussed strict glycemic control and the need for this-in relation of fracture healing      History of Present Illness   HPI  Jey Vicente is a 60 y.o. male who presents evaluation management of his left lower extremity, he fell on .  Had pain, presents today being compliant with wheelchair and also splint.  Is diabetic last A1c is 9.3 but this was done in March, I reviewed his blood sugar control and is still elevated.      Review of Systems   Constitutional:  Negative for chills and fever.   HENT:  Negative for ear pain and sore throat.    Eyes:  Negative for pain and visual disturbance.   Respiratory:  Negative for cough  "and shortness of breath.    Cardiovascular:  Negative for chest pain and palpitations.   Gastrointestinal:  Negative for abdominal pain and vomiting.   Genitourinary:  Negative for dysuria and hematuria.   Musculoskeletal:  Negative for arthralgias and back pain.   Skin:  Negative for color change and rash.   Neurological:  Negative for seizures and syncope.   All other systems reviewed and are negative.         Objective   Ht 5' 7\" (1.702 m)   Wt (!) 154 kg (340 lb)   BMI 53.25 kg/m²      Physical Exam    Skin:     Comments: High risk ankle fracture he does have loss protective sensation of the foot.  Range of motion is intact.  No pain with squeeze test but this is not reliable           "

## 2025-07-08 NOTE — TELEPHONE ENCOUNTER
Pt called requesting to speak with Ira.  Was able to warm transfer to [facility] in [place] surgery office.

## 2025-07-08 NOTE — ASSESSMENT & PLAN NOTE
Lab Results   Component Value Date    HGBA1C 9.3 (H) 03/03/2025          -At this point his diabetes is uncontrolled, he is a bit between a rock and a hard place  - If he A1c was not this high I would recommend aggressive open reduction internal fixation with lettering of the syndesmosis due to his comorbidities  - At this point in time we will make him strict nonweightbearing and apply a splint, he is to return in 3 weeks for change of splint and evaluation  - I personally think that he is very high risk to stay at home and be strict nonweightbearing, but we will prescribe him a knee scooter and he is to ice elevate and stay off the foot, and if he has any difficulty being compliant with this we should consider placing him into rehab  I also discussed strict glycemic control and the need for this-in relation of fracture healing

## 2025-07-09 ENCOUNTER — TELEPHONE (OUTPATIENT)
Age: 60
End: 2025-07-09

## 2025-07-09 NOTE — TELEPHONE ENCOUNTER
Caller: Melvin Vicente    Doctor and/or Office: Dr. Farusto/Kassie    #: [phone]    Escalation: Last office visit Patient hasn't heard anything yet about the knee scooter, he is following up about that. Please return call. Thank you

## 2025-07-11 ENCOUNTER — TELEPHONE (OUTPATIENT)
Dept: CARDIAC SURGERY | Facility: CLINIC | Age: 60
End: 2025-07-11

## 2025-07-11 NOTE — TELEPHONE ENCOUNTER
Patient states DPM appt 7/28/25 and will call us with an update. States was told minimum of 9 weeks until wt bearing. Informed him Dr. Mccoy recommends no TAVR until nicholas to wt bear. Jillian questions addressed.

## 2025-07-24 ENCOUNTER — TELEPHONE (OUTPATIENT)
Age: 60
End: 2025-07-24

## 2025-07-24 NOTE — TELEPHONE ENCOUNTER
Called and left a detailed voicemail with information to stay off foot and elevate, did state Dr. Frausto can not evaluate his foot without images and seeing the foot. Stated if he can not wait till the 28th he should report to ER or Urgent Care

## 2025-07-24 NOTE — TELEPHONE ENCOUNTER
Caller: Patient- Melvin     Doctor: Dr. Frausto    Reason for call: Patient is calling in stating that he had a left ankle injury, he stated that he was leaving his house yesterday evening and fell his wife along with him heard a crack. The patient feels as though it is broken again, he does not have any increased pain or swelling as it does feel the same. Patient is not sure as to what further he should do relating this, he is scheduled to come into the office on 7/28 to be seen with the Dr. Please advise.      Call back#: [phone]

## 2025-07-28 ENCOUNTER — PREP FOR PROCEDURE (OUTPATIENT)
Dept: PODIATRY | Facility: CLINIC | Age: 60
End: 2025-07-28

## 2025-07-28 ENCOUNTER — APPOINTMENT (OUTPATIENT)
Dept: RADIOLOGY | Facility: CLINIC | Age: 60
End: 2025-07-28
Attending: PODIATRIST
Payer: COMMERCIAL

## 2025-07-28 ENCOUNTER — OFFICE VISIT (OUTPATIENT)
Dept: PODIATRY | Facility: CLINIC | Age: 60
End: 2025-07-28
Payer: COMMERCIAL

## 2025-07-28 VITALS — BODY MASS INDEX: 49.44 KG/M2 | HEIGHT: 67 IN | WEIGHT: 315 LBS

## 2025-07-28 DIAGNOSIS — S82.842A CLOSED DISPLACED BIMALLEOLAR FRACTURE OF LEFT ANKLE, INITIAL ENCOUNTER: Primary | ICD-10-CM

## 2025-07-28 DIAGNOSIS — S82.832A CLOSED FRACTURE OF DISTAL END OF LEFT FIBULA, UNSPECIFIED FRACTURE MORPHOLOGY, INITIAL ENCOUNTER: Primary | ICD-10-CM

## 2025-07-28 DIAGNOSIS — S82.832A CLOSED FRACTURE OF DISTAL END OF LEFT FIBULA, UNSPECIFIED FRACTURE MORPHOLOGY, INITIAL ENCOUNTER: ICD-10-CM

## 2025-07-28 PROCEDURE — 99214 OFFICE O/P EST MOD 30 MIN: CPT | Performed by: PODIATRIST

## 2025-07-28 PROCEDURE — 73610 X-RAY EXAM OF ANKLE: CPT

## 2025-07-28 RX ORDER — CHLORHEXIDINE GLUCONATE ORAL RINSE 1.2 MG/ML
15 SOLUTION DENTAL ONCE
Status: CANCELLED | OUTPATIENT
Start: 2025-08-01 | End: 2025-07-28

## 2025-07-28 RX ORDER — CHLORHEXIDINE GLUCONATE 40 MG/ML
SOLUTION TOPICAL DAILY PRN
Status: CANCELLED | OUTPATIENT
Start: 2025-08-01

## 2025-07-29 LAB
LEFT EYE DIABETIC RETINOPATHY: NORMAL
RIGHT EYE DIABETIC RETINOPATHY: NORMAL

## 2025-07-30 ENCOUNTER — CONSULT (OUTPATIENT)
Dept: FAMILY MEDICINE CLINIC | Facility: CLINIC | Age: 60
End: 2025-07-30
Payer: COMMERCIAL

## 2025-07-30 VITALS
OXYGEN SATURATION: 94 % | WEIGHT: 315 LBS | DIASTOLIC BLOOD PRESSURE: 78 MMHG | TEMPERATURE: 96.4 F | BODY MASS INDEX: 53.25 KG/M2 | SYSTOLIC BLOOD PRESSURE: 130 MMHG | RESPIRATION RATE: 18 BRPM

## 2025-07-30 DIAGNOSIS — S82.832D OTHER CLOSED FRACTURE OF DISTAL END OF LEFT FIBULA WITH ROUTINE HEALING, SUBSEQUENT ENCOUNTER: Primary | ICD-10-CM

## 2025-07-30 DIAGNOSIS — E11.40 TYPE 2 DIABETES MELLITUS WITH DIABETIC NEUROPATHY, WITH LONG-TERM CURRENT USE OF INSULIN (HCC): ICD-10-CM

## 2025-07-30 DIAGNOSIS — J45.50 SEVERE PERSISTENT ASTHMA WITHOUT COMPLICATION: ICD-10-CM

## 2025-07-30 DIAGNOSIS — Z79.4 TYPE 2 DIABETES MELLITUS WITH DIABETIC NEUROPATHY, WITH LONG-TERM CURRENT USE OF INSULIN (HCC): ICD-10-CM

## 2025-07-30 DIAGNOSIS — I50.32 CHRONIC DIASTOLIC HEART FAILURE (HCC): ICD-10-CM

## 2025-07-30 PROBLEM — R06.02 SOB (SHORTNESS OF BREATH): Status: RESOLVED | Noted: 2025-05-07 | Resolved: 2025-07-30

## 2025-07-30 PROCEDURE — 99214 OFFICE O/P EST MOD 30 MIN: CPT | Performed by: INTERNAL MEDICINE

## 2025-08-01 ENCOUNTER — ANESTHESIA EVENT (OUTPATIENT)
Dept: PERIOP | Facility: HOSPITAL | Age: 60
End: 2025-08-01
Payer: COMMERCIAL

## 2025-08-01 ENCOUNTER — HOSPITAL ENCOUNTER (OUTPATIENT)
Facility: HOSPITAL | Age: 60
Setting detail: OUTPATIENT SURGERY
Discharge: HOME | End: 2025-08-01
Attending: PODIATRIST | Admitting: PODIATRIST
Payer: COMMERCIAL

## 2025-08-01 VITALS
WEIGHT: 315 LBS | HEIGHT: 67 IN | BODY MASS INDEX: 49.44 KG/M2 | TEMPERATURE: 97 F | RESPIRATION RATE: 22 BRPM | HEART RATE: 90 BPM | SYSTOLIC BLOOD PRESSURE: 123 MMHG | DIASTOLIC BLOOD PRESSURE: 61 MMHG | OXYGEN SATURATION: 92 %

## 2025-08-01 PROCEDURE — NC001 PR NO CHARGE: Performed by: ANESTHESIOLOGY

## 2025-08-01 RX ORDER — CHLORHEXIDINE GLUCONATE 40 MG/ML
SOLUTION TOPICAL DAILY PRN
Status: DISCONTINUED | OUTPATIENT
Start: 2025-08-01 | End: 2025-08-01 | Stop reason: HOSPADM

## 2025-08-01 RX ORDER — CHLORHEXIDINE GLUCONATE ORAL RINSE 1.2 MG/ML
15 SOLUTION DENTAL ONCE
Status: COMPLETED | OUTPATIENT
Start: 2025-08-01 | End: 2025-08-01

## 2025-08-01 RX ADMIN — CHLORHEXIDINE GLUCONATE 15 ML: 1.2 SOLUTION ORAL at 11:23

## 2025-08-04 ENCOUNTER — HOSPITAL ENCOUNTER (INPATIENT)
Facility: HOSPITAL | Age: 60
LOS: 3 days | Discharge: HOME HEALTH | End: 2025-08-08
Attending: PODIATRIST | Admitting: INTERNAL MEDICINE
Payer: COMMERCIAL

## 2025-08-04 ENCOUNTER — APPOINTMENT (OUTPATIENT)
Dept: RADIOLOGY | Facility: HOSPITAL | Age: 60
End: 2025-08-04
Payer: COMMERCIAL

## 2025-08-04 ENCOUNTER — ANESTHESIA (OUTPATIENT)
Dept: PERIOP | Facility: HOSPITAL | Age: 60
End: 2025-08-04
Payer: COMMERCIAL

## 2025-08-04 RX ORDER — BUPIVACAINE HYDROCHLORIDE 5 MG/ML
INJECTION, SOLUTION EPIDURAL; INTRACAUDAL; PERINEURAL
Status: COMPLETED | OUTPATIENT
Start: 2025-08-04 | End: 2025-08-04

## 2025-08-04 RX ORDER — KETAMINE HCL IN NACL, ISO-OSM 100MG/10ML
SYRINGE (ML) INJECTION AS NEEDED
Status: DISCONTINUED | OUTPATIENT
Start: 2025-08-04 | End: 2025-08-04

## 2025-08-04 RX ADMIN — Medication 8 MCG: at 11:57

## 2025-08-04 RX ADMIN — BUPIVACAINE HYDROCHLORIDE 20 ML: 5 INJECTION, SOLUTION EPIDURAL; INTRACAUDAL; PERINEURAL at 11:26

## 2025-08-04 RX ADMIN — Medication 3000 MG: at 11:40

## 2025-08-04 RX ADMIN — BUPIVACAINE 13 ML: 13.3 INJECTION, SUSPENSION, LIPOSOMAL INFILTRATION at 11:26

## 2025-08-04 RX ADMIN — Medication 10 MG: at 12:32

## 2025-08-04 RX ADMIN — BUPIVACAINE HYDROCHLORIDE 15 ML: 5 INJECTION, SOLUTION EPIDURAL; INTRACAUDAL; PERINEURAL at 11:26

## 2025-08-04 RX ADMIN — Medication 15 MG: at 12:11

## 2025-08-05 PROBLEM — I50.32 CHRONIC DIASTOLIC HEART FAILURE (HCC): Status: ACTIVE | Noted: 2019-08-20

## 2025-08-11 ENCOUNTER — TRANSITIONAL CARE MANAGEMENT (OUTPATIENT)
Dept: FAMILY MEDICINE CLINIC | Facility: CLINIC | Age: 60
End: 2025-08-11

## 2025-08-12 ENCOUNTER — OFFICE VISIT (OUTPATIENT)
Dept: FAMILY MEDICINE CLINIC | Facility: CLINIC | Age: 60
End: 2025-08-12
Payer: COMMERCIAL

## 2025-08-18 ENCOUNTER — OFFICE VISIT (OUTPATIENT)
Dept: PODIATRY | Facility: CLINIC | Age: 60
End: 2025-08-18

## 2025-08-18 VITALS — BODY MASS INDEX: 49.44 KG/M2 | WEIGHT: 315 LBS | HEIGHT: 67 IN

## 2025-08-18 DIAGNOSIS — S82.832A CLOSED FRACTURE OF DISTAL END OF LEFT FIBULA, UNSPECIFIED FRACTURE MORPHOLOGY, INITIAL ENCOUNTER: Primary | ICD-10-CM

## 2025-08-18 PROCEDURE — 99024 POSTOP FOLLOW-UP VISIT: CPT | Performed by: PODIATRIST

## 2025-08-19 DIAGNOSIS — Z79.4 TYPE 2 DIABETES MELLITUS WITH DIABETIC NEUROPATHY, WITH LONG-TERM CURRENT USE OF INSULIN (HCC): ICD-10-CM

## 2025-08-19 DIAGNOSIS — E11.40 TYPE 2 DIABETES MELLITUS WITH DIABETIC NEUROPATHY, WITH LONG-TERM CURRENT USE OF INSULIN (HCC): ICD-10-CM

## 2025-08-20 RX ORDER — INSULIN GLARGINE 300 U/ML
INJECTION, SOLUTION SUBCUTANEOUS
Qty: 12 ML | Refills: 5 | Status: SHIPPED | OUTPATIENT
Start: 2025-08-20

## (undated) DEVICE — GLIDESHEATH BASIC HYDROPHILIC COATED INTRODUCER SHEATH: Brand: GLIDESHEATH

## (undated) DEVICE — TR BAND RADIAL ARTERY COMPRESSION DEVICE: Brand: TR BAND

## (undated) DEVICE — GLIDESHEATH SLENDER STAINLESS STEEL KIT: Brand: GLIDESHEATH SLENDER

## (undated) DEVICE — Device: Brand: ASAHI SILVERWAY

## (undated) DEVICE — CATH GUIDE LAUNCHER 5FR EBU 3.75

## (undated) DEVICE — GUIDEWIRE WHOLEY HI TORQUE INTERM MOD J .035 145CM

## (undated) DEVICE — DGW .035 FC J3MM 260CM TEF: Brand: EMERALD

## (undated) DEVICE — RADIFOCUS OPTITORQUE ANGIOGRAPHIC CATHETER: Brand: OPTITORQUE

## (undated) DEVICE — CATH DIAG 5FR IMPULSE 100CM AR1

## (undated) DEVICE — CATH DIAG 5FR IMPULSE 100CM FR4